# Patient Record
Sex: FEMALE | Race: WHITE | NOT HISPANIC OR LATINO | Employment: OTHER | URBAN - METROPOLITAN AREA
[De-identification: names, ages, dates, MRNs, and addresses within clinical notes are randomized per-mention and may not be internally consistent; named-entity substitution may affect disease eponyms.]

---

## 2017-02-17 ENCOUNTER — TRANSCRIBE ORDERS (OUTPATIENT)
Dept: ADMINISTRATIVE | Facility: HOSPITAL | Age: 62
End: 2017-02-17

## 2017-02-17 DIAGNOSIS — Z12.31 SCREENING MAMMOGRAM FOR HIGH-RISK PATIENT: Primary | ICD-10-CM

## 2017-03-09 ENCOUNTER — HOSPITAL ENCOUNTER (OUTPATIENT)
Dept: RADIOLOGY | Facility: HOSPITAL | Age: 62
Discharge: HOME | End: 2017-03-09
Attending: INTERNAL MEDICINE
Payer: COMMERCIAL

## 2017-03-09 DIAGNOSIS — Z12.31 SCREENING MAMMOGRAM FOR HIGH-RISK PATIENT: ICD-10-CM

## 2017-03-09 PROCEDURE — G0202 SCR MAMMO BI INCL CAD: HCPCS

## 2017-05-08 ENCOUNTER — TRANSCRIBE ORDERS (OUTPATIENT)
Dept: ADMINISTRATIVE | Facility: HOSPITAL | Age: 62
End: 2017-05-08

## 2017-05-08 DIAGNOSIS — Z13.820 OSTEOPOROSIS SCREENING: Primary | ICD-10-CM

## 2017-06-06 ENCOUNTER — HOSPITAL ENCOUNTER (OUTPATIENT)
Dept: RADIOLOGY | Facility: HOSPITAL | Age: 62
Discharge: HOME | End: 2017-06-06
Attending: INTERNAL MEDICINE
Payer: COMMERCIAL

## 2017-06-06 DIAGNOSIS — Z13.820 OSTEOPOROSIS SCREENING: ICD-10-CM

## 2017-06-06 PROCEDURE — 77080 DXA BONE DENSITY AXIAL: CPT

## 2017-12-06 ENCOUNTER — ALLSCRIPTS OFFICE VISIT (OUTPATIENT)
Dept: OTHER | Facility: OTHER | Age: 62
End: 2017-12-06

## 2017-12-06 ENCOUNTER — APPOINTMENT (OUTPATIENT)
Dept: RADIOLOGY | Facility: CLINIC | Age: 62
End: 2017-12-06
Payer: COMMERCIAL

## 2017-12-06 DIAGNOSIS — M54.50 LOW BACK PAIN: ICD-10-CM

## 2017-12-06 DIAGNOSIS — M47.816 SPONDYLOSIS OF LUMBAR REGION WITHOUT MYELOPATHY OR RADICULOPATHY: ICD-10-CM

## 2017-12-06 DIAGNOSIS — G89.4 CHRONIC PAIN SYNDROME: ICD-10-CM

## 2017-12-06 PROCEDURE — 72114 X-RAY EXAM L-S SPINE BENDING: CPT

## 2017-12-07 NOTE — CONSULTS
Assessment  Assessed    1  Chronic pain syndrome (338 4) (G89 4)   2  Chronic low back pain (724 2,338 29) (M54 5,G89 29)   3  Lumbar spondylosis (721 3) (M47 816)   4  Right shoulder pain (719 41) (M25 511)   5  Chronic pain of both wrists (926 16,267 81) (M25 531,M25 532,G89 29)    Plan  Chronic low back pain, Chronic pain syndrome, Lumbar spondylosis    · Diagnostic Lumbar Medial Branch Block-POC; Status:Active; Requested for:02Wvh0975;    Perform: In Office; ; Ordered;low back pain, Chronic pain syndrome, Lumbar spondylosis; Ordered By:Lloyd Lewis;   · XR SPINE LUMBAR COMPLETE W BENDING MINIMUM 6 VIEWS; Status:Active; Requested for:76Cse5376;    Perform:[facility]; UZF:05BDS6919; Ordered;low back pain, Chronic pain syndrome, Lumbar spondylosis; Ordered By:Lloyd Lewis;   · Follow-up Visit in 4 Weeks Evaluation and Treatment  Follow-up  Status: Hold For -Scheduling  Requested for: 40ZGN9276   Ordered;Chronic low back pain, Chronic pain syndrome, Lumbar spondylosis; Ordered By: Via Earnest 17 Performed:  Due: 16TTB7170  Chronic pain of both wrists, Right shoulder pain    · 1 - Blanco Sarabia DO, CHALO BRADEN [facility]  *  Status: Active  Requested for:30Cgh0024   Ordered;Chronic pain of both wrists, Right shoulder pain; Ordered By: Via Earnest 17 Performed:  Due: 15HHI1336  Care Summary provided  : Yes    Discussion/Summary    My impressions and treatment recommendations were discussed in detail with the patient, who verbalized understanding and had no further questions  that the patient has signs and symptoms of low back pain secondary to lumbar spondylosis, discussed the rationale of undergoing bilateral L3, L4, L5, and S1 diagnostic and confirmatory medial branch blocks  procedures, its risks, and benefits were explained in detail to the patient   Risks include but are not limited to bleeding, infection, hematoma formation, abscess formation, weakness, headache, failure the pain to improve, nerve irritation or damage, and potential worsening of the pain  The patient verbalized understanding and wished to proceed with the procedure  patient is also complaining of right shoulder pain as well as bilateral wrist pain  I would like her to see Dr Cherry Lin of Orthopedic sports medicine to evaluate her right shoulder pain and bilateral wrist pain  addition, since the patient does have arthritis in multiple joints in her body, I felt a reasonable to prescribe her a compound cream to be applied to her multiple painful joints 2-4 times daily as needed for pain relief  Side effects were reviewed with the patient  also felt a reasonable to obtain an x-ray of the lumbar spine to evaluate for any pathology that may be contributing to her pain complaints  is planned with the patient in 4 weeks time or sooner as warranted  Discharge instructions were provided  I personally saw and examined the patient and I agree with the above discussed plan of care  Chief Complaint  Chief Complaints    1  Pain    History of Present Illness  Ms María Dutta is a pleasant 72-year-old  female who [facility] initial evaluation of the above-stated pain complaints  The patient has a past medical and chronic pain history as outlined in the impression section below  She was referred to our office by Dr Xin Ashley  The patient reports a 15 year history of low back pain that is nonradiating in nature  She states that his pain is moderate to severe and 5 to 10/10 on the verbal numerical pain rating scale  The patient reports that her pain is nearly constant in nature without any typical pattern  She describes her pain as âburning, cramping, shooting, numbness, sharp, dull/aching, cutting, pins and needles, and throbbing  â The patient reports no pain relieving factors, but does state that standing, bending, walking, and exercise increases pain   She reports that physical therapy, home exercises, and chiropractic manipulation did not provide any pain relief  She does report moderate pain relief with bilateral knee surgery as well as heat/ice treatment  She is currently using tramadol, hydrocodone/acetaminophen, and oxycodone ER as needed for pain relief  She reports that she uses the oxycodone ER at night and depending on the severity of her pain, she will use Tramadol at and hydrocodone/acetaminophen for breakthrough pain  She does report that her medications do provide her pain relief some of the time  The patient is currently retired  Referring physician is  Dr Christian Resendiz  Primary Care physician is  [facility] presents with complaints of gradual onset of constant episodes of severe bilateral neck, bilateral upper back, bilateral lower back, right shoulder, right hand, bilateral hip, right ankle and right foot pain, described as sharp, dull, aching, burning, throbbing and cramping  On a scale of 1 to 10, the patient rates the pain as 10  Review of Systems   Constitutional: recent weight gain, but-- no fever-- and-- no recent weight loss  Eyes: no double vision-- and-- no blurry vision  Cardiovascular: no chest pain,-- no palpitations-- and-- no lower extremity edema  Respiratory: no complaints of shortness of breath-- and-- no wheezing  Musculoskeletal: difficulty walking,-- muscle weakness,-- joint stiffness-- and-- decreased range of motion, but-- no joint swelling,-- no limb swelling-- and-- no pain in extremity  Neurological: dizziness, but-- no difficulty swallowing,-- no memory loss,-- no loss of consciousness-- and-- no seizures  Gastrointestinal: no nausea,-- no vomiting,-- no constipation-- and-- no diarrhea  Genitourinary: frequent urination, but-- no difficulty initiating urine stream-- and-- no genital pain  Integumentary: no complaints of skin rash  Psychiatric: no depression    Endocrine: no excessive thirst,-- no adrenal disease,-- no hypothyroidism-- and-- no hyperthyroidism  Hematologic/Lymphatic: no tendency for easy bruising-- and-- no tendency for easy bleeding  ROS reviewed  Active Problems  Problems    1  Arthritis (716 90) (M19 90)   2  Depression (311) (F32 9)   3  Hepatitis, C Virus (070 70)   4  Hypertension (401 9) (I10)   5  Morbid or severe obesity due to excess calories (278 01) (E66 01)    Past Medical History  Problems    1  History of alcoholism (V11 3) (F10 21)   2  History of anxiety (V11 8) (Z86 59)   3  History of depression (V11 8) (Z86 59)   4  History of high cholesterol (V12 29) (Z86 39)   5  History of hypertension (V12 59) (Z86 79)    The active problems and past medical history were reviewed and updated today  Surgical History  Problems    1  History of Appendectomy   2  History of Cholecystectomy   3  History of Gastric Surgery For Morbid Obesity Laparoscopic Longitudinal Gastrectomy   4  History of Knee Replacement   5  History of Tonsillectomy    The surgical history was reviewed and updated today  Family History  Mother    1  Family history of Hypertension (V17 49)  Father    2  Family history of Congenital Heart Disease   3  Family history of Congestive Heart Failure   4  Family history of Hypertension (V17 49)   5  Family history of Stroke Syndrome (V17 1)  Family History    6  Family history of diabetes mellitus (V18 0) (Z83 3)    The family history was reviewed and updated today  Social History  Problems    · Does not drink alcohol (V49 89) (Z78 9)   · Lives with family   · No illicit drug use   · Retired  The social history was reviewed and updated today  The social history was reviewed and is unchanged  Current Meds   1  Calcium TABS; Therapy: (Recorded:18Bzq8029) to Recorded   2  [address] [facility] [place]; Therapy: (Delaney Cage) to Recorded   3  Lexapro 10 MG Oral Tablet; Therapy: 00ETE3810 to (Last Apple White)  Requested for: 51EBC5123 Ordered   4  Multiple Vitamins TABS;  Therapy: (Quintero Carmel) to Recorded   5  Omeprazole 20 MG Oral Capsule Delayed Release; take 1 capsule daily; Therapy: 43GLQ8722 to (Last Salud Miller)  Requested for: 24ODL6669; Status: ACTIVE - Renewal Denied Ordered   6  Oxycodone-Acetaminophen 5-325 MG Oral Tablet; Therapy: 07RKP7243 to (Last Rx:00Dbt3545)  Requested for: 44YUY5371 Ordered   7  Perindopril Erbumine 4 MG Oral Tablet; Therapy: 48AOY4658 to (Last Martha Grant)  Requested for: 31XDC0431 Ordered   8  TraMADol HCl - 50 MG Oral Tablet; Therapy: 60MER1084 to (Last Rx:08Apr2011)  Requested for: 50Xwd1653 Ordered    The medication list was reviewed and updated today  Allergies  Medication    1  Levaquin TABS   2  Penicillins    Vitals  Vital Signs    Recorded: 36XKS2528 08:24AM   Temperature 98 F   Heart Rate 72   Systolic 094   Diastolic 90   Height 5 ft 3 in   Weight 263 lb    BMI Calculated 46 59   BSA Calculated 2 17   Pain Scale 10       Physical Exam   Constitutional  General appearance: Well developed, well nourished, alert, in no distress, non-toxic and no overt pain behavior  Eyes  Sclera: anicteric  HEENT  Hearing grossly intact  Pulmonary  Respiratory effort: Even and unlabored  Cardiovascular  Examination of extremities: No edema or pitting edema present  Skin  Skin and subcutaneous tissue: Normal without rashes or lesions, well hydrated  Psychiatric  Mood and affect: Mood and affect appropriate  Neurologic  Cranial nerves: Cranial nerves II-XII grossly intact  Musculoskeletal  Gait and station: Normal    Lumbar/Sacral Spine examination demonstrates Lumbosacral Spine:  Appearance: Normal  Spinal alignment exhibits normal lordosis  Tenderness: None except at lumbar spine  No sacroiliac joint tenderness noted  ABDOULAYE testing is negative bilaterally  Lumbosacral Spine Sensory: intact to light touch and pinprick in the lower extremities  ROM Lumbosacral Spine: Lumbar facet loading is positive bilaterally   Flexion was not restricted-- and-- was painful  Extension was not restricted-- and-- was painful  Left lateral flexion was not restricted-- and-- was painful  Right lateral flexion was not restricted-- and-- was painful  Rotation to the left was not restricted-- and-- was painless  Rotation to the right was not restricted-- and-- was painless  Right Foot Plantar Flexion: 5/5  Left Foot Plantar Flexion: 5/5  Right Foot Dorsiflexion: 5/5  Left Foot Dorsiflexion: 5/5  Right Knee Flexion: 5/5  Left Knee Flexion: 5/5  Right Knee Extension: 5/5  Left Knee Extension: 5/5  Right Hip Flexion: 5/5  Left Hip Flexion: 5/5  Right Hip Extension: 5/5  Left Hip Extension: 5/5  Right Hip Abduction: 5/5  Left Hip Abduction: 5/5  Right Hip Adduction: 5/5-- and-- 5/5  Evaluation of Muscle Stretch Reflexes on the right side demonstrates 2/4 Knee Jerk Reflex-- and-- 2/4 Ankle Jerk Reflex  Evaluation of Muscle Stretch Reflexes on the left side demonstrates 2/4 Knee Jerk Reflex-- and-- 2/4 Ankle Jerk Reflex  Special Tests: negative Straight Leg Raise on right-- and-- negative Straight Leg Raise on left        Signatures   Electronically signed by : MONTSE Marinelli ; Dec  6 2017  9:21AM EST                       (Author)

## 2017-12-12 ENCOUNTER — GENERIC CONVERSION - ENCOUNTER (OUTPATIENT)
Dept: OTHER | Facility: OTHER | Age: 62
End: 2017-12-12

## 2017-12-15 ENCOUNTER — GENERIC CONVERSION - ENCOUNTER (OUTPATIENT)
Dept: OTHER | Facility: OTHER | Age: 62
End: 2017-12-15

## 2017-12-15 ENCOUNTER — HOSPITAL ENCOUNTER (OUTPATIENT)
Dept: RADIOLOGY | Facility: HOSPITAL | Age: 62
Setting detail: OUTPATIENT SURGERY
Discharge: HOME | End: 2017-12-15

## 2017-12-15 DIAGNOSIS — Z92.241 S/P EPIDURAL STEROID INJECTION: ICD-10-CM

## 2017-12-29 ENCOUNTER — GENERIC CONVERSION - ENCOUNTER (OUTPATIENT)
Dept: PAIN MEDICINE | Facility: CLINIC | Age: 62
End: 2017-12-29

## 2017-12-29 ENCOUNTER — HOSPITAL ENCOUNTER (OUTPATIENT)
Facility: AMBULARY SURGERY CENTER | Age: 62
Setting detail: OUTPATIENT SURGERY
Discharge: HOME | End: 2017-12-29
Attending: ANESTHESIOLOGY | Admitting: ANESTHESIOLOGY
Payer: COMMERCIAL

## 2017-12-29 ENCOUNTER — HOSPITAL ENCOUNTER (OUTPATIENT)
Dept: RADIOLOGY | Facility: HOSPITAL | Age: 62
Setting detail: OUTPATIENT SURGERY
Discharge: HOME | End: 2017-12-29
Payer: COMMERCIAL

## 2017-12-29 VITALS
SYSTOLIC BLOOD PRESSURE: 158 MMHG | RESPIRATION RATE: 18 BRPM | HEART RATE: 82 BPM | OXYGEN SATURATION: 97 % | DIASTOLIC BLOOD PRESSURE: 86 MMHG | TEMPERATURE: 98.8 F

## 2017-12-29 PROBLEM — G89.4 CHRONIC PAIN SYNDROME: Status: ACTIVE | Noted: 2017-12-29

## 2017-12-29 PROBLEM — M54.50 LOW BACK PAIN: Status: ACTIVE | Noted: 2017-12-29

## 2017-12-29 PROBLEM — M47.816 SPONDYLOSIS OF LUMBAR REGION WITHOUT MYELOPATHY OR RADICULOPATHY: Status: ACTIVE | Noted: 2017-12-29

## 2017-12-29 PROCEDURE — 72020 X-RAY EXAM OF SPINE 1 VIEW: CPT

## 2017-12-29 RX ORDER — HYDROCODONE BITARTRATE AND ACETAMINOPHEN 5; 325 MG/1; MG/1
1 TABLET ORAL EVERY 6 HOURS PRN
COMMUNITY
End: 2018-10-04 | Stop reason: ALTCHOICE

## 2017-12-29 RX ORDER — TRAMADOL HYDROCHLORIDE 50 MG/1
50 TABLET ORAL 2 TIMES DAILY
COMMUNITY
End: 2019-05-20

## 2017-12-29 RX ORDER — LIDOCAINE WITH 8.4% SOD BICARB 0.9%(10ML)
SYRINGE (ML) INJECTION AS NEEDED
Status: DISCONTINUED | OUTPATIENT
Start: 2017-12-29 | End: 2017-12-29 | Stop reason: HOSPADM

## 2017-12-29 RX ORDER — PERINDOPRIL ERBUMINE 4 MG/1
8 TABLET ORAL 2 TIMES DAILY
COMMUNITY

## 2017-12-29 RX ORDER — LIDOCAINE HYDROCHLORIDE 10 MG/ML
INJECTION, SOLUTION INFILTRATION; PERINEURAL AS NEEDED
Status: DISCONTINUED | OUTPATIENT
Start: 2017-12-29 | End: 2017-12-29 | Stop reason: HOSPADM

## 2017-12-29 RX ORDER — OMEPRAZOLE 20 MG/1
20 CAPSULE, DELAYED RELEASE ORAL DAILY PRN
COMMUNITY

## 2017-12-29 RX ORDER — ESCITALOPRAM OXALATE 10 MG/1
15 TABLET ORAL DAILY
COMMUNITY
End: 2019-05-20

## 2017-12-29 NOTE — OP NOTE
ATTENDING PHYSICIAN:  Katey Snyder MD     PRE-PROCEDURE DIAGNOSIS:  Lumbar facet arthropathy  POST-PROCEDURE DIAGNOSIS:  Lumbar facet arthropathy  PROCEDURE:  Bilateral lumbar diagnostic medial branch blocks at the L4, L5, and S1 levels  ESTIMATED BLOOD LOSS:  Minimal     COMPLICATIONS:  None  ANESTHESIA:  Local     LOCATION:  [address], [facility]  CONSENT: Today's procedure and its risks, benefits, as well as alternatives were explained to the patient  Risks include but are not limited to bleeding, infection, weakness, nerve irritation or damage, hematoma formation, abscess formation, failure the pain to improve, or potential worsening of the pain  The patient verbalized understanding and wished to proceed with the procedure  Written informed consent was thereby obtained  DESCRIPTION OF PROCEDURE: After written informed consent was obtained, the patient was taken to the fluoroscopy suite and placed in the prone position  Anatomical landmarks were identified with the aid of fluoroscopy in the PA and oblique views  The patient's lumbar region was prepped with antiseptic solution and draped in the usual sterile fashion  Strict aseptic technique was utilized  The skin and subcutaneous tissues at each needle entry site was infiltrated with a small amount of 1% preservative-free lidocaine using a 25-gauge 1-1/2-inch needle  A 25-gauge 3-1/2-inch needle was then incrementally advanced under fluoroscopic guidance in multiple views at each level such that the needle tip was advanced to contact os at the junction of the superior articulating process and the superomedial border of the transverse process at each of the cephalad levels as well as to contact os at the junction of the superior articulating process and the superomedial border of the sacral ala at the S1 level   After negative aspiration was confirmed, 0 5 mL of preservative-free 1% Lidocaine was injected into the cephalad needles and 1 mL of preservative-free 1% Lidocaine was injected into the needles at the S1 level  All needles were removed intact and hemostasis was maintained throughout  The patient tolerated the procedure well and no paresthesias or other complications were reported during or following this procedure  The antiseptic was wiped clean and Band-Aids were placed as appropriate  The patient was transferred to the recovery area and was observed for an appropriate period of time during which the patient remained hemodynamically stable and neurovascularly intact as the patient was prior to the procedure  The patient was subsequently discharged home in stable condition with supervision  The patient was instructed to call the office in a few days with an update  Discharge instructions were provided  I was present for and participated in all key and critical portions of this procedure      Faraz Gill MD  12/29/2017  10:03 AM

## 2017-12-29 NOTE — DISCHARGE INSTRUCTIONS

## 2018-01-03 ENCOUNTER — GENERIC CONVERSION - ENCOUNTER (OUTPATIENT)
Dept: OTHER | Facility: OTHER | Age: 63
End: 2018-01-03

## 2018-01-12 ENCOUNTER — HOSPITAL ENCOUNTER (OUTPATIENT)
Dept: RADIOLOGY | Facility: HOSPITAL | Age: 63
Setting detail: OUTPATIENT SURGERY
Discharge: HOME | End: 2018-01-12
Payer: COMMERCIAL

## 2018-01-12 ENCOUNTER — GENERIC CONVERSION - ENCOUNTER (OUTPATIENT)
Dept: PAIN MEDICINE | Facility: CLINIC | Age: 63
End: 2018-01-12

## 2018-01-12 ENCOUNTER — HOSPITAL ENCOUNTER (OUTPATIENT)
Facility: AMBULARY SURGERY CENTER | Age: 63
Setting detail: OUTPATIENT SURGERY
Discharge: HOME | End: 2018-01-12
Attending: ANESTHESIOLOGY | Admitting: ANESTHESIOLOGY
Payer: COMMERCIAL

## 2018-01-12 VITALS
RESPIRATION RATE: 18 BRPM | HEART RATE: 73 BPM | SYSTOLIC BLOOD PRESSURE: 174 MMHG | DIASTOLIC BLOOD PRESSURE: 79 MMHG | TEMPERATURE: 98.2 F | OXYGEN SATURATION: 96 %

## 2018-01-12 DIAGNOSIS — R52 PAIN: ICD-10-CM

## 2018-01-12 PROCEDURE — 72020 X-RAY EXAM OF SPINE 1 VIEW: CPT

## 2018-01-12 RX ORDER — BUPIVACAINE HYDROCHLORIDE 2.5 MG/ML
INJECTION, SOLUTION EPIDURAL; INFILTRATION; INTRACAUDAL AS NEEDED
Status: DISCONTINUED | OUTPATIENT
Start: 2018-01-12 | End: 2018-01-12 | Stop reason: HOSPADM

## 2018-01-12 RX ORDER — LIDOCAINE WITH 8.4% SOD BICARB 0.9%(10ML)
SYRINGE (ML) INJECTION AS NEEDED
Status: DISCONTINUED | OUTPATIENT
Start: 2018-01-12 | End: 2018-01-12 | Stop reason: HOSPADM

## 2018-01-12 NOTE — DISCHARGE INSTRUCTIONS

## 2018-01-12 NOTE — OP NOTE
ATTENDING PHYSICIAN:  Anderson Schmitt MD     PRE-PROCEDURE DIAGNOSIS:  Lumbar facet arthropathy  POST-PROCEDURE DIAGNOSIS:  Lumbar facet arthropathy  PROCEDURE:  Bilateral lumbar diagnostic medial branch blocks at the L4, L5, and S1 levels  ESTIMATED BLOOD LOSS:  Minimal     COMPLICATIONS:  None  ANESTHESIA:  Local     LOCATION:  [address]  CONSENT: Today's procedure and its risks, benefits, as well as alternatives were explained to the patient  Risks include but are not limited to bleeding, infection, weakness, nerve irritation or damage, hematoma formation, abscess formation, failure the pain to improve, or potential worsening of the pain  The patient verbalized understanding and wished to proceed with the procedure  Written informed consent was thereby obtained  DESCRIPTION OF PROCEDURE: After written informed consent was obtained, the patient was taken to the fluoroscopy suite and placed in the prone position  Anatomical landmarks were identified with the aid of fluoroscopy in the PA and oblique views  The patient's lumbar region was prepped with antiseptic solution and draped in the usual sterile fashion  Strict aseptic technique was utilized  The skin and subcutaneous tissues at each needle entry site was infiltrated with a small amount of 1% preservative-free lidocaine using a 25-gauge 1-1/2-inch needle  A 25-gauge 3-1/2-inch needle was then incrementally advanced under fluoroscopic guidance in multiple views at each level such that the needle tip was advanced to contact os at the junction of the superior articulating process and the superomedial border of the transverse process at each of the cephalad levels as well as to contact os at the junction of the superior articulating process and the superomedial border of the sacral ala at the S1 level   After negative aspiration was confirmed, 0 5 mL of preservative-free 0 25% Bupivicaine was injected into the cephalad needles and 1 mL of preservative-free 0 25% Bupivicaine was injected into the needles at the S1 level  All needles were removed intact and hemostasis was maintained throughout  The patient tolerated the procedure well and no paresthesias or other complications were reported during or following this procedure  The antiseptic was wiped clean and Band-Aids were placed as appropriate  The patient was transferred to the recovery area and was observed for an appropriate period of time during which the patient remained hemodynamically stable and neurovascularly intact as the patient was prior to the procedure  The patient was subsequently discharged home in stable condition with supervision  The patient was instructed to call the office in a few days with an update  Discharge instructions were provided  I was present for and participated in all key and critical portions of this procedure      Onesimo Anderson MD  1/12/2018  9:01 AM

## 2018-01-15 ENCOUNTER — GENERIC CONVERSION - ENCOUNTER (OUTPATIENT)
Dept: OTHER | Facility: OTHER | Age: 63
End: 2018-01-15

## 2018-01-22 VITALS
HEART RATE: 72 BPM | DIASTOLIC BLOOD PRESSURE: 90 MMHG | HEIGHT: 63 IN | TEMPERATURE: 98 F | SYSTOLIC BLOOD PRESSURE: 180 MMHG | BODY MASS INDEX: 46.6 KG/M2 | WEIGHT: 263 LBS

## 2018-01-23 NOTE — MISCELLANEOUS
Message   Recorded as Task   Date: 12/14/2017 02:43 PM, Created By: Zain Rincon   Task Name: Miscellaneous   Assigned To: [facility] [facility] [address] Clinical,Team   Regarding Patient: Deysi Jones, Status: Active   CommentCigordo Ambrocio - 14 Dec 2017 2:43 PM     TASK CREATED  Pt has a very bad lumbar strain and is scheduled for a procedure tomorrow and wants to know if this would be a problem  Please call [phone]   Pramod Reeder - 14 Dec 2017 3:56 PM     TASK EDITED  S/W pt  Pt stated she has been dealing with a bad lumbar strain since December 3rd  She had been putting ice on the area, taking , tramadol, hydrocodone  Pt states the area is so irritated and traumatized that she is afraid it will affect the outcome of the procedure tomorrow  Pt stopped her Krill oil on December 10  Pt wants to know if she should re-schedule her appointment  Advised pt will make AS aware and call back with recommendations  Please advise  Thanks  Lloyd Lewis - 15 Dec 2017 7:30 AM     TASK REPLIED TO: Previously Assigned To Venessa Pal  Since she is having lumbar medial branch blocks, a bad muscle spasm in the same area could give her a negative response to the MBB  As such, we should delay the procedure until the muscle spasm resolves  If needed, I can perform trigger point injections on her to help improve the spasm  More likely, she just needs some rest and relaxation to recover from it  Jonathan Clark - 15 Dec 2017 8:00 AM     TASK EDITED  S/W pt  Advised pt of same  Pt agrees  Pt states she will call back on Monday to reschedule her MBB procedure  Lloyd Lewis - 15 Dec 2017 9:10 AM     TASK REPLIED TO: Previously Assigned To [place]  Thanks  Active Problems    1  Arthritis (716 90) (M19 90)   2  Chronic low back pain (724 2,338 29) (M54 5,G89 29)   3  Chronic pain of both wrists (093 44,073 85) (M25 531,M25 532,G89 29)   4  Chronic pain syndrome (338 4) (G89 4)   5  Depression (311) (F32 9)   6  Hepatitis, C Virus (070 70)   7  Hypertension (401 9) (I10)   8  Lumbar spondylosis (721 3) (M47 816)   9  Morbid or severe obesity due to excess calories (278 01) (E66 01)   10  Right shoulder pain (719 41) (M25 511)    Current Meds   1  Calcium TABS; Therapy: (Recorded:15Jlj2619) to Recorded   2  [address] [facility]; Therapy: (Kaur Johnson) to Recorded   3  Lexapro 10 MG Oral Tablet (Escitalopram Oxalate); Therapy: 76GPG4037 to (Last [facility])  Requested for: 04YGM6978 Ordered   4  Multiple Vitamins TABS; Therapy: (Massiela Elizabeth) to Recorded   5  Omeprazole 20 MG Oral Capsule Delayed Release; take 1 capsule daily; Therapy: 39UCN1267 to (Last Nancy Chavez)  Requested for: 02KON7295; Status: ACTIVE   - Renewal Denied Ordered   6  Oxycodone-Acetaminophen 5-325 MG Oral Tablet; Therapy: 78ZHK2151 to (Last Rx:16Nov2011)  Requested for: 41URN6113 Ordered   7  Perindopril Erbumine 4 MG Oral Tablet; Therapy: 11SHE8039 to (Last [facility])  Requested for: 65TGM9790 Ordered   8  TraMADol HCl - 50 MG Oral Tablet; Therapy: 65AJB9027 to (Last Rx:08Apr2011)  Requested for: 08Apr2011 Ordered    Allergies    1  Levaquin TABS   2   Penicillins    Signatures   Electronically signed by : Devonda Angelucci, RN; Dec 15 2017  9:21AM EST                       (Author)

## 2018-01-23 NOTE — MISCELLANEOUS
Message   Recorded as Task   Date: 01/02/2018 12:14 PM, Created By: Pierre Moy   Task Name: Care Coordination   Assigned To: [facility] sched,Team   Regarding Patient: Lotus Chowdhury, Status: In Progress   Comment:    Lloyd Lewis - 02 Jan 2018 12:14 PM     TASK CREATED  MBB#1 diary reviewed  Please schedule MBB#2  Ron Porras - 03 Jan 2018 4:03 PM     TASK IN PROGRESS   Londonoziel Dodge - 51 Jan 2018 4:06 PM     TASK EDITED  Called pt, scheduled 1/12/18 for BL L4 L5 S1 MBB#2-LEVELS CHANGED BY DR GOMEZ  Went over pre procedure instructions, pt verbalized understanding  Active Problems    1  Arthritis (716 90) (M19 90)   2  Chronic low back pain (724 2,338 29) (M54 5,G89 29)   3  Chronic pain of both wrists (621 39,479 19) (M25 531,M25 532,G89 29)   4  Chronic pain syndrome (338 4) (G89 4)   5  Depression (311) (F32 9)   6  Hepatitis, C Virus (070 70)   7  Hypertension (401 9) (I10)   8  Lumbar spondylosis (721 3) (M47 816)   9  Morbid or severe obesity due to excess calories (278 01) (E66 01)   10  Right shoulder pain (719 41) (M25 511)    Current Meds   1  Calcium TABS; Therapy: (Recorded:07Gwr3358) to Recorded   2  [address] [facility]; Therapy: (Manny Mendiola) to Recorded   3  Lexapro 10 MG Oral Tablet (Escitalopram Oxalate); Therapy: 52ABO0304 to (Last Shade Cocker)  Requested for: 13HRF3514 Ordered   4  Multiple Vitamins TABS; Therapy: (Jarredqualin Maury) to Recorded   5  Omeprazole 20 MG Oral Capsule Delayed Release; take 1 capsule daily; Therapy: 25JOW6699 to (Last Nerissa Orozco)  Requested for: 75RBI7362; Status: ACTIVE   - Renewal Denied Ordered   6  Oxycodone-Acetaminophen 5-325 MG Oral Tablet; Therapy: 33BPF7911 to (Last Rx:16Nov2011)  Requested for: 75IHN1527 Ordered   7  Perindopril Erbumine 4 MG Oral Tablet; Therapy: 09CAY0979 to (Last Shade Cocker)  Requested for: 50BMA5996 Ordered   8  TraMADol HCl - 50 MG Oral Tablet;    Therapy: 37FKD4440 to (Last Rx:08Apr2011)  Requested for: 37OVW1210 Ordered    Allergies    1  Levaquin TABS   2   Penicillins    Signatures   Electronically signed by : Jose Bey, ; Artemio  3 2018  4:06PM EST                       (Author)

## 2018-01-23 NOTE — MISCELLANEOUS
Message   Recorded as Task   Date: 12/12/2017 09:13 AM, Created By: Zahra Cervantes   Task Name: Miscellaneous   Assigned To: Ron Porras   Regarding Patient: Parisa Ca, Status: [facility]   [facility] Dec [facility] 9:13 AM     TASK CREATED  Received denial letter for CPT code 36786, pt is scheduled for B/L L3, L4, L5, S1 MBB#1 on 12/15  They allow 1  up to 2 spinal levels when given  on both sides or 2  Up to 3 spinal levels when given on one side  They are not giving an option to do a peer to peer, but we can appeal in written form (would need to call to check on that process and length of time it would take)  Would you like to appeal or change the procedure? Via [email or web address] 17 - 12 Dec 2017 9:46 AM     TASK REPLIED TO: Previously Assigned To [address] procedure to B/L L4, L5, S1 MBB #1 with dx codes 57058 and 33694  Ron Porras - 12 Dec 2017 10:42 AM     TASK EDITED  Updated procedure in IDX, on PAT, and informed [facility] staff of change  Active Problems    1  Arthritis (716 90) (M19 90)   2  Chronic low back pain (724 2,338 29) (M54 5,G89 29)   3  Chronic pain of both wrists (482 76,138 67) (M25 531,M25 532,G89 29)   4  Chronic pain syndrome (338 4) (G89 4)   5  Depression (311) (F32 9)   6  Hepatitis, C Virus (070 70)   7  Hypertension (401 9) (I10)   8  Lumbar spondylosis (721 3) (M47 816)   9  Morbid or severe obesity due to excess calories (278 01) (E66 01)   10  Right shoulder pain (719 41) (M25 511)    Current Meds   1  Calcium TABS; Therapy: (Recorded:83Tjr1127) to Recorded   2  [facility]; Therapy: (Yvonne Peon) to Recorded   3  Lexapro 10 MG Oral Tablet (Escitalopram Oxalate); Therapy: 01EVK0451 to (Nguyễn Vega)  Requested for: 47FGH9811 Ordered   4  Multiple Vitamins TABS; Therapy: (Yvonne Peon) to Recorded   5  Omeprazole 20 MG Oral Capsule Delayed Release; take 1 capsule daily;    Therapy: 29YTN6222 to (Last Rx:75Cfn7117)  Requested for: 53YYH6935; Status: ACTIVE   - Renewal Denied Ordered   6  Oxycodone-Acetaminophen 5-325 MG Oral Tablet; Therapy: 32HFI9620 to (Last Rx:13Btq0058)  Requested for: 25HNP2756 Ordered   7  Perindopril Erbumine 4 MG Oral Tablet; Therapy: 13RKV1616 to (Last Thomasenia Plunk)  Requested for: 80GEZ1048 Ordered   8  TraMADol HCl - 50 MG Oral Tablet; Therapy: 75ZDN5288 to (Last Rx:08Apr2011)  Requested for: 08Apr2011 Ordered    Allergies    1  Levaquin TABS   2   Penicillins    Signatures   Electronically signed by : Jamil Novoa, ; Dec 12 2017 10:43AM EST                       (Author)

## 2018-01-24 VITALS
DIASTOLIC BLOOD PRESSURE: 80 MMHG | HEIGHT: 63 IN | WEIGHT: 263 LBS | HEART RATE: 78 BPM | BODY MASS INDEX: 46.6 KG/M2 | RESPIRATION RATE: 18 BRPM | TEMPERATURE: 97 F | SYSTOLIC BLOOD PRESSURE: 140 MMHG

## 2018-01-25 ENCOUNTER — HOSPITAL ENCOUNTER (OUTPATIENT)
Dept: RADIOLOGY | Facility: HOSPITAL | Age: 63
Setting detail: OUTPATIENT SURGERY
Discharge: HOME | End: 2018-01-25
Payer: COMMERCIAL

## 2018-01-25 ENCOUNTER — HOSPITAL ENCOUNTER (OUTPATIENT)
Facility: AMBULARY SURGERY CENTER | Age: 63
Setting detail: OUTPATIENT SURGERY
Discharge: HOME | End: 2018-01-25
Attending: ANESTHESIOLOGY | Admitting: ANESTHESIOLOGY
Payer: COMMERCIAL

## 2018-01-25 VITALS
TEMPERATURE: 97.4 F | OXYGEN SATURATION: 96 % | DIASTOLIC BLOOD PRESSURE: 75 MMHG | RESPIRATION RATE: 18 BRPM | SYSTOLIC BLOOD PRESSURE: 166 MMHG | HEART RATE: 75 BPM

## 2018-01-25 DIAGNOSIS — Z92.241 S/P EPIDURAL STEROID INJECTION: ICD-10-CM

## 2018-01-25 PROCEDURE — 64635 DESTROY LUMB/SAC FACET JNT: CPT | Performed by: ANESTHESIOLOGY

## 2018-01-25 PROCEDURE — 72020 X-RAY EXAM OF SPINE 1 VIEW: CPT

## 2018-01-25 PROCEDURE — 64636 DESTROY L/S FACET JNT ADDL: CPT | Performed by: ANESTHESIOLOGY

## 2018-01-25 RX ORDER — LIDOCAINE HYDROCHLORIDE 20 MG/ML
INJECTION, SOLUTION EPIDURAL; INFILTRATION; INTRACAUDAL; PERINEURAL AS NEEDED
Status: DISCONTINUED | OUTPATIENT
Start: 2018-01-25 | End: 2018-01-25 | Stop reason: HOSPADM

## 2018-01-25 RX ORDER — LIDOCAINE WITH 8.4% SOD BICARB 0.9%(10ML)
SYRINGE (ML) INJECTION AS NEEDED
Status: DISCONTINUED | OUTPATIENT
Start: 2018-01-25 | End: 2018-01-25 | Stop reason: HOSPADM

## 2018-01-25 NOTE — INTERVAL H&P NOTE
Patient was examined and seen by myself at today's visit  No interval changes in the H&P noted  We will proceed with the procedure as planned

## 2018-01-25 NOTE — DISCHARGE INSTRUCTIONS

## 2018-01-25 NOTE — H&P (VIEW-ONLY)
Assessment   1  Chronic pain syndrome (338 4) (G89 4)   2  Chronic low back pain (724 2,338 29) (M54 5,G89 29)   3  Lumbar spondylosis (721 3) (M47 816)    Plan   Chronic low back pain, Chronic pain syndrome, Lumbar spondylosis    · Radiofrequency Neurotomy Lumbar Medial Branches; Status:Active; Requested    LDI:[id number]; Perform: In Office; MUJ:90TSQ7348;PFSBSNK; For:Chronic low back pain, Chronic pain syndrome, Lumbar spondylosis; Ordered By:Lloyd Lewis;   · Follow-up Visit in 4 Weeks Evaluation and Treatment  Follow-up  Status: Hold For -    Scheduling  Requested for: 78IID7339   Ordered; For: Chronic low back pain, Chronic pain syndrome, Lumbar spondylosis; Ordered By: Latrice Guzman Performed:  Due: 68TMS2608    Discussion/Summary      My impressions and treatment recommendations were discussed in detail with the patient, who verbalized understanding and had no further questions  patient reported 5 days worth of 100% pain relief following the diagnostic bilateral L4, L5, and S1 medial branch block on December 29, 2017  She also reports 3-4 days worth of 80% relief following the confirmatory bilateral L4, L5, and S1 medial branch block on January 12, 2018  She would like to proceed to the bilateral lumbar facet radiofrequency ablation procedures  As such, I felt a reasonable to have the patient undergo these procedures  procedures, its risks, and benefits were explained in detail to the patient  Risks include but are not limited to bleeding, infection, hematoma formation, abscess formation, weakness, headache, failure the pain to improve, nerve irritation or damage, and potential worsening of the pain  The patient verbalized understanding and wished to proceed with the procedure  is planned with the patient in 4 weeks time or sooner as warranted  Discharge instructions were provided  I personally saw and examined the patient and I agree with the above discussed plan of care      The patient has the current Goals: Decreased pain and improved level of functioning  The patent has the current Barriers: Morbid obesity  Patient is able to Self-Care  Chief Complaint   1  Back Pain    History of Present Illness   Ms Nidia Cotton is a pleasant 80-year-old  female who presents to [facility] for interval re-evaluation of the above-stated pain complaints  The patient has a past medical and chronic pain history as outlined in the impression section below  She was last seen on January 12, 2018 at which time she underwent confirmatory bilateral L4, L5, and S1 medial branch blocks  today's office visit, the patient's pain score is 2/10 on the verbal numerical pain rating scale  Patient states that her pain is primarily in her low back and right hip  She states that her pain is worse in the morning, evening, and night  Her pain is constant in nature and she describes the quality of her pain as âdull/aching, sharp, throbbing, cramping, and pressure like  â The patient does report 100% pain relief following the diagnostic bilateral L4, L5, and S1 medial branch blocks for 4-5 days duration and 80% pain relief following the confirmatory bilateral L4, L5, and S1 medial branch blocks for 3-4 days duration  She would like to proceed with the lumbar facet radiofrequency ablation procedures  than as stated above, the patient denies any interval changes in medications, medical condition, mental condition, symptoms, or allergies since the last office visit  Ronen Crawford presents with complaints of gradual onset of constant episodes of mild bilateral lower back pain, described as sharp and throbbing, radiating to the right thigh  On a scale of 1 to 10, the patient rates the pain as 2  Symptoms are improving  Review of Systems        Constitutional: no fever,-- no recent weight gain-- and-- no recent weight loss  Eyes: no double vision-- and-- no blurry vision  Cardiovascular: no chest pain,-- no palpitations-- and-- no lower extremity edema  Respiratory: no complaints of shortness of breath-- and-- no wheezing  Musculoskeletal: difficulty walking,-- muscle weakness,-- joint stiffness-- and-- decreased range of motion, but-- no joint swelling,-- no limb swelling-- and-- no pain in extremity  Neurological: dizziness, but-- no difficulty swallowing,-- no memory loss,-- no loss of consciousness-- and-- no seizures  Gastrointestinal: constipation, but-- no nausea,-- no vomiting-- and-- no diarrhea  Genitourinary: no difficulty initiating urine stream,-- no genital pain-- and-- no frequent urination  Integumentary: no complaints of skin rash  Psychiatric: no depression  Endocrine: no excessive thirst,-- no adrenal disease,-- no hypothyroidism-- and-- no hyperthyroidism  Hematologic/Lymphatic: no tendency for easy bruising-- and-- no tendency for easy bleeding  ROS reviewed  Active Problems   1  Arthritis (716 90) (M19 90)   2  Chronic low back pain (724 2,338 29) (M54 5,G89 29)   3  Chronic pain of both wrists (450 07,287 81) (M25 531,M25 532,G89 29)   4  Chronic pain syndrome (338 4) (G89 4)   5  Depression (311) (F32 9)   6  Hepatitis, C Virus (070 70)   7  Hypertension (401 9) (I10)   8  Lumbar spondylosis (721 3) (M47 816)   9  Morbid or severe obesity due to excess calories (278 01) (E66 01)   10  Right shoulder pain (719 41) (M25 511)    Past Medical History   1  History of alcoholism (V11 3) (F10 21)   2  History of anxiety (V11 8) (Z86 59)   3  History of depression (V11 8) (Z86 59)   4  History of high cholesterol (V12 29) (Z86 39)   5  History of hypertension (V12 59) (Z86 79)    Surgical History   1  History of Appendectomy   2  History of Cholecystectomy   3  History of Gastric Surgery For Morbid Obesity Laparoscopic Longitudinal Gastrectomy   4  History of Knee Replacement   5   History of Tonsillectomy    Family History   Mother    1  Family history of Hypertension (V17 49)  Father    2  Family history of Congenital Heart Disease   3  Family history of Congestive Heart Failure   4  Family history of Hypertension (V17 49)   5  Family history of Stroke Syndrome (V17 1)  Family History    6  Family history of diabetes mellitus (V18 0) (Z83 3)    Social History    · Does not drink alcohol (V49 89) (Z78 9)   · Lives with family   · No illicit drug use   · Retired    Current Meds    1  Calcium TABS; Therapy: (Recorded:10Wmk4524) to Recorded   2  [facility]; Therapy: (Toi Burkitt) to Recorded   3  Lexapro 10 MG Oral Tablet; Therapy: 85EUA7684 to (Last Latricia [place])  Requested for: 57XIT7660 Ordered   4  Multiple Vitamins TABS; Therapy: (Toi Burkitt) to Recorded   5  Omeprazole 20 MG Oral Capsule Delayed Release; take 1 capsule daily; Therapy: 78WWC4370 to (Last Kathleen [facility])  Requested for: 67OTY1480; Status: ACTIVE -     Renewal Denied Ordered   6  Oxycodone-Acetaminophen 5-325 MG Oral Tablet; Therapy: 82IET3924 to (Last Rx:12Bda9154)  Requested for: 78YFM8456 Ordered   7  Perindopril Erbumine 4 MG Oral Tablet; Therapy: 35DHX1492 to (Last Latricia [place])  Requested for: 17UQG0899 Ordered   8  TraMADol HCl - 50 MG Oral Tablet; Therapy: 39MFC8622 to (Last Rx:75Cio3878)  Requested for: 12Gxd0407 Ordered    Allergies   1  Levaquin TABS   2  Penicillins    Vitals   Vital Signs    Recorded: 72FKH6294 09:22AM   Temperature 97 F, Oral   Heart Rate 78, L Radial   Respiration 18   Systolic 992   Diastolic 80   Height 5 ft 3 in   Weight 263 lb    BMI Calculated 46 59   BSA Calculated 2 17   Pain Scale 2     Physical Exam        Constitutional      General appearance: Well developed, well nourished, alert, in no distress, non-toxic and no overt pain behavior  Eyes      Sclera: anicteric      HEENT      Hearing grossly intact         Pulmonary      Respiratory effort: Even and unlabored  Cardiovascular      Examination of extremities: No edema or pitting edema present  Skin      Skin and subcutaneous tissue: Normal without rashes or lesions, well hydrated  Psychiatric      Mood and affect: Mood and affect appropriate  Neurologic      Cranial nerves: Cranial nerves II-XII grossly intact  Musculoskeletal      Gait and station: Normal        Lumbar/Sacral Spine examination demonstrates Lumbosacral Spine:      Appearance: Normal  Spinal alignment exhibits normal lordosis  Tenderness: None except at lumbar spine  Lumbosacral Spine Sensory: intact to light touch and pinprick in the lower extremities  ROM Lumbosacral Spine: Lumbar facet loading is positive bilaterally  Flexion was not restricted-- and-- was painful  Extension was not restricted-- and-- was painful  Left lateral flexion was not restricted-- and-- was painful  Right lateral flexion was not restricted-- and-- was painful  Rotation to the left was not restricted-- and-- was painless  Rotation to the right was not restricted-- and-- was painless  Foot and ankle strength was normal bilaterally  Knee strength was normal bilaterally  Hip strength was normal bilaterally        Signatures    Electronically signed by : MONTSE Austin ; Artemio 15 2018  9:39AM EST                       (Author)

## 2018-01-25 NOTE — OP NOTE
ATTENDING PHYSICIAN:   Otis Waters MD      PREPROCEDURE DIAGNOSIS:  Left lumbar facet arthropathy  POSTPROCEDURE DIAGNOSIS:  Left lumbar facet arthropathy  PROCEDURE: Left L4, L5, and S1 lumbar facet nerve radiofrequency ablation under fluoroscopic guidance  ANESTHESIA:  Local     ESTIMATED BLOOD LOSS:  Minimal     COMPLICATIONS:  None  LOCATION:   [address]  HISTORY OF PRESENTING ILLNESS:   We feel radiofrequency denervation of the L4, L5, and S1 facets is medically necessary, given the patient has disabling low back pain, which we suspect is facet mediated in the absence of nerve root compression or radicular pain  The patient has had two positive confirmatory diagnostic medial branch blocks  The patient has also failed three months of conservative therapy, including nonopioid medications, manipulation, physical therapy and home exercise program   The patient has not been treated with radiofrequency denervation at this anatomic location within the past six months  CONSENT:  Today's procedure, its potential benefits as well as its risks and potential side effects were reviewed  Discussed risks of the procedure including bleeding, infection, nerve irritation or damage, reactions to the medications, failure of the pain to improve, and potential worsening of the pain were explained in detail to the patient who verbalized understanding and who wished to proceed  Written informed consent was thereby obtained  DESCRIPTION OF THE PROCEDURE: After written informed consent was obtained, the patient was taken to the fluoroscopy suite and placed in the prone position  Anatomical landmarks were identified by way of palpation with fluoroscopy in the PA and oblique views  The patient's lumbar region was then prepped and draped in the usual sterile fashion using Chlorhexidine    The skin and subcutaneous tissues were subsequently infiltrated with approximately 1 ml of 1% preservative free Lidocaine at each of the four intended needle entry sites using a 25 gauge 1-1/2 inch needle  Via fluoroscopy in the AP and oblique views, an active tip needle was then incrementally advanced under fluoroscopic guidance at each level  At each of these levels, the needle tip was made to contact os at the superior medial border of the junction of the transverse process of the lumbar levels and to contact the os at the medial aspect of the groove formed by the sacral ala in the superior articular process of S1  After proper needle placement was confirmed with fluoroscopic guidance at each level, sensory and motor stimulation was performed at 2 Hz and 50 Hz  At all levels, there was negative extension into the lower extremities  Following this portion of the procedure, a 1 ml injectate of 2% preservative free Lidocaine was instilled at all of the levels  After a period of approximately 90 seconds, each level was lesioned at 90 degrees Celsius  Following the initial lesioning, each needle tip was repositioned x 2 under fluoroscopic guidance in a clockwise and counterclockwise fashion  Following each reposition, a total of two additional lesions at each side were performed for 90 seconds at 90 degrees Celsius  All needles were removed with the tips intact  The patient tolerated the procedure and hemostasis was maintained  There were no apparent paresthesias or complications  This skin was wiped clean and a Band-Aid was placed as appropriate  The patient was monitored for an appropriate period of time following the procedure and remained hemodynamically stable and neurovascularly intact  The patient was ultimately discharged to home with supervision in good condition and instructed to call the office to report the response  I was present for and participated in all key and critical portions of this procedure      Josiah Forde MD  1/25/2018  2:19 PM

## 2018-03-01 ENCOUNTER — TELEPHONE (OUTPATIENT)
Dept: PAIN MEDICINE | Facility: CLINIC | Age: 63
End: 2018-03-01

## 2018-03-01 NOTE — TELEPHONE ENCOUNTER
Patient came in [place] office stating she has not received a call back  She had a RFA done on the left side on 01/25/18 and states was supposed to be scheduled for one on the right side  Please cb at    TY

## 2018-03-01 NOTE — TELEPHONE ENCOUNTER
As long as she had a positive diagnostic response on the right with the MBBs I see no reason not to schedule the RFA

## 2018-03-01 NOTE — TELEPHONE ENCOUNTER
Please schedule patient for Right L4, L5, SI RFA with AS, pt believes she will need auth for the procedure  Had Left side done on 1/25

## 2018-03-01 NOTE — TELEPHONE ENCOUNTER
**[place] pt**    S/w pt she is s/p L L4, L5, SI RFA on 1/25 and was supposed to have the right side done as well but did not hear anything back from [facility], she had b/l L4, L5, SI MBB's  She has had great relief from the left side RFA and would now like to schedule the right side  Pt will need auth for the right side, prior to getting auth wanted to make sure it is ok to schedule? Please advise, thanks

## 2018-03-02 PROBLEM — M47.816 LUMBAR SPONDYLOSIS: Status: ACTIVE | Noted: 2018-03-02

## 2018-03-02 NOTE — TELEPHONE ENCOUNTER
Called and scheduled pt for RT L4, L5 & S1 RFA dos [phone]   Gave pt pre procedure instructions  Nothing to eat or drink 1 hr prior to procedure  Proper clothing to wear  Needs a   If ill or on antibiotics please call to reschedule

## 2018-03-16 ENCOUNTER — HOSPITAL ENCOUNTER (OUTPATIENT)
Dept: RADIOLOGY | Facility: HOSPITAL | Age: 63
Setting detail: OUTPATIENT SURGERY
Discharge: HOME | End: 2018-03-16
Payer: COMMERCIAL

## 2018-03-16 ENCOUNTER — TELEPHONE (OUTPATIENT)
Dept: PAIN MEDICINE | Facility: CLINIC | Age: 63
End: 2018-03-16

## 2018-03-16 ENCOUNTER — HOSPITAL ENCOUNTER (OUTPATIENT)
Facility: AMBULARY SURGERY CENTER | Age: 63
Setting detail: OUTPATIENT SURGERY
Discharge: HOME | End: 2018-03-16
Attending: ANESTHESIOLOGY | Admitting: ANESTHESIOLOGY
Payer: COMMERCIAL

## 2018-03-16 VITALS
HEART RATE: 78 BPM | DIASTOLIC BLOOD PRESSURE: 82 MMHG | TEMPERATURE: 98.6 F | RESPIRATION RATE: 20 BRPM | SYSTOLIC BLOOD PRESSURE: 173 MMHG | OXYGEN SATURATION: 95 %

## 2018-03-16 DIAGNOSIS — Z92.241 S/P EPIDURAL STEROID INJECTION: ICD-10-CM

## 2018-03-16 PROCEDURE — 72020 X-RAY EXAM OF SPINE 1 VIEW: CPT

## 2018-03-16 PROCEDURE — 64636 DESTROY L/S FACET JNT ADDL: CPT | Performed by: ANESTHESIOLOGY

## 2018-03-16 PROCEDURE — 64635 DESTROY LUMB/SAC FACET JNT: CPT | Performed by: ANESTHESIOLOGY

## 2018-03-16 RX ORDER — LIDOCAINE HYDROCHLORIDE 20 MG/ML
INJECTION, SOLUTION EPIDURAL; INFILTRATION; INTRACAUDAL; PERINEURAL AS NEEDED
Status: DISCONTINUED | OUTPATIENT
Start: 2018-03-16 | End: 2018-03-16 | Stop reason: HOSPADM

## 2018-03-16 RX ORDER — LIDOCAINE WITH 8.4% SOD BICARB 0.9%(10ML)
SYRINGE (ML) INJECTION AS NEEDED
Status: DISCONTINUED | OUTPATIENT
Start: 2018-03-16 | End: 2018-03-16 | Stop reason: HOSPADM

## 2018-03-16 NOTE — OP NOTE
ATTENDING PHYSICIAN:   Mima Whitt MD      PREPROCEDURE DIAGNOSIS:  Right lumbar facet arthropathy  POSTPROCEDURE DIAGNOSIS:  Right lumbar facet arthropathy  PROCEDURE: Right L4, L5, and S1 lumbar facet nerve radiofrequency ablation under fluoroscopic guidance  ANESTHESIA:  Local     ESTIMATED BLOOD LOSS:  Minimal     COMPLICATIONS:  None  LOCATION:   [address] [place] [address]  HISTORY OF PRESENTING ILLNESS:   We feel radiofrequency denervation of the L4, L5, and S1 facets is medically necessary, given the patient has disabling low back pain, which we suspect is facet mediated in the absence of nerve root compression or radicular pain  The patient has had two positive confirmatory diagnostic medial branch blocks  The patient has also failed three months of conservative therapy, including nonopioid medications, manipulation, physical therapy and home exercise program   The patient has not been treated with radiofrequency denervation at this anatomic location within the past six months  CONSENT:  Today's procedure, its potential benefits as well as its risks and potential side effects were reviewed  Discussed risks of the procedure including bleeding, infection, nerve irritation or damage, reactions to the medications, failure of the pain to improve, and potential worsening of the pain were explained in detail to the patient who verbalized understanding and who wished to proceed  Written informed consent was thereby obtained  DESCRIPTION OF THE PROCEDURE: After written informed consent was obtained, the patient was taken to the fluoroscopy suite and placed in the prone position  Anatomical landmarks were identified by way of palpation with fluoroscopy in the PA and oblique views  The patient's lumbar region was then prepped and draped in the usual sterile fashion using Chlorhexidine    The skin and subcutaneous tissues were subsequently infiltrated with approximately 1 ml of 1% preservative free Lidocaine at each of the four intended needle entry sites using a 25 gauge 1-1/2 inch needle  Via fluoroscopy in the AP and oblique views, an active tip needle was then incrementally advanced under fluoroscopic guidance at each level  At each of these levels, the needle tip was made to contact os at the superior medial border of the junction of the transverse process of the lumbar levels and to contact the os at the medial aspect of the groove formed by the sacral ala in the superior articular process of S1  After proper needle placement was confirmed with fluoroscopic guidance at each level, sensory and motor stimulation was performed at 2 Hz and 50 Hz  At all levels, there was negative extension into the lower extremities  Following this portion of the procedure, a 1 ml injectate of 2% preservative free Lidocaine was instilled at all of the levels  After a period of approximately 90 seconds, each level was lesioned at 90 degrees Celsius  Following the initial lesioning, each needle tip was repositioned x 2 under fluoroscopic guidance in a clockwise and counterclockwise fashion  Following each reposition, a total of two additional lesions at each side were performed for 90 seconds at 90 degrees Celsius  All needles were removed with the tips intact  The patient tolerated the procedure and hemostasis was maintained  There were no apparent paresthesias or complications  This skin was wiped clean and a Band-Aid was placed as appropriate  The patient was monitored for an appropriate period of time following the procedure and remained hemodynamically stable and neurovascularly intact  The patient was ultimately discharged to home with supervision in good condition and instructed to call the office to report the response  I was present for and participated in all key and critical portions of this procedure      Raman Carvajal MD  3/16/2018  10:33 AM

## 2018-03-16 NOTE — DISCHARGE INSTRUCTIONS

## 2018-03-16 NOTE — H&P
History of Present Illness: The patient is a 61 y o  female who presents with complaints of low back pain  Patient Active Problem List   Diagnosis    Chronic pain syndrome    Low back pain    Spondylosis of lumbar region without myelopathy or radiculopathy    Lumbar spondylosis       Past Medical History:   Diagnosis Date    Anxiety     Hypertension        Past Surgical History:   Procedure Laterality Date    APPENDECTOMY      CHOLECYSTECTOMY      GASTRIC BYPASS      JOINT REPLACEMENT      blateral knee replacements    NERVE BLOCK Bilateral 12/29/2017    Procedure: L4, L5, S1 MEDIAL BRANCH BLOCK #1;  Surgeon: Anderson Schmitt MD;  Location: [facility] MAIN OR;  Service: Pain Management     NERVE BLOCK Bilateral 1/12/2018    Procedure: BLOCK MEDIAL BRANCH L4, L5-S1  #2;  Surgeon: Anderson Schmitt MD;  Location: [facility] MAIN OR;  Service: Pain Management     RHIZOTOMY Left 1/25/2018    Procedure: L4, L5, S1 RFA;  Surgeon: Anderson Schmitt MD;  Location: [facility] MAIN OR;  Service: Pain Management     TONSILLECTOMY         No current facility-administered medications for this encounter  Allergies   Allergen Reactions    Penicillins Anaphylaxis    Levaquin [Levofloxacin] Itching       Physical Exam: There were no vitals filed for this visit  General: Awake, Alert, Oriented x 3, Mood and affect appropriate  Respiratory: Respirations even and unlabored  Cardiovascular: Peripheral pulses intact; no edema  Musculoskeletal Exam:  Lumbar facet loading is positive  ASA Score:  3    Assessment:  Right lumbar facet syndrome  Plan:  Proceed with radiofrequency ablation procedure as planned

## 2018-03-16 NOTE — PERIOPERATIVE NURSING NOTE
Denies increasing pain-dressings free of drainage-gait unsteady-required assistance to car-validated she would have assistance into home

## 2018-03-19 NOTE — TELEPHONE ENCOUNTER
S/W pt  Pt denies any s/s of infection, slight soreness at the needle sites, no sun-burning sensation,  no headaches or stiffness of neck  Pt used ice to the area  Pt is scheduled for a f/u on 4/16 to arrive at 9:30 for a 9:45 appt  Advised will make AS aware

## 2018-04-06 ENCOUNTER — TRANSCRIBE ORDERS (OUTPATIENT)
Dept: ADMINISTRATIVE | Facility: HOSPITAL | Age: 63
End: 2018-04-06

## 2018-04-06 DIAGNOSIS — Z12.31 ENCOUNTER FOR SCREENING MAMMOGRAM FOR MALIGNANT NEOPLASM OF BREAST: Primary | ICD-10-CM

## 2018-04-18 ENCOUNTER — HOSPITAL ENCOUNTER (OUTPATIENT)
Dept: RADIOLOGY | Facility: HOSPITAL | Age: 63
Discharge: HOME | End: 2018-04-18
Attending: INTERNAL MEDICINE
Payer: COMMERCIAL

## 2018-04-18 DIAGNOSIS — Z12.31 ENCOUNTER FOR SCREENING MAMMOGRAM FOR MALIGNANT NEOPLASM OF BREAST: ICD-10-CM

## 2018-04-18 PROCEDURE — 77067 SCR MAMMO BI INCL CAD: CPT

## 2018-05-11 ENCOUNTER — OFFICE VISIT (OUTPATIENT)
Dept: OBGYN CLINIC | Facility: CLINIC | Age: 63
End: 2018-05-11
Payer: COMMERCIAL

## 2018-05-11 ENCOUNTER — APPOINTMENT (OUTPATIENT)
Dept: RADIOLOGY | Facility: CLINIC | Age: 63
End: 2018-05-11
Payer: COMMERCIAL

## 2018-05-11 VITALS
HEART RATE: 79 BPM | SYSTOLIC BLOOD PRESSURE: 159 MMHG | WEIGHT: 263.8 LBS | HEIGHT: 63 IN | BODY MASS INDEX: 46.74 KG/M2 | DIASTOLIC BLOOD PRESSURE: 83 MMHG

## 2018-05-11 DIAGNOSIS — M19.011 GLENOHUMERAL ARTHRITIS, RIGHT: Primary | ICD-10-CM

## 2018-05-11 DIAGNOSIS — M25.511 RIGHT SHOULDER PAIN, UNSPECIFIED CHRONICITY: ICD-10-CM

## 2018-05-11 PROCEDURE — 20610 DRAIN/INJ JOINT/BURSA W/O US: CPT | Performed by: ORTHOPAEDIC SURGERY

## 2018-05-11 PROCEDURE — 99204 OFFICE O/P NEW MOD 45 MIN: CPT | Performed by: ORTHOPAEDIC SURGERY

## 2018-05-11 PROCEDURE — 73030 X-RAY EXAM OF SHOULDER: CPT

## 2018-05-11 RX ORDER — DEXAMETHASONE SODIUM PHOSPHATE 100 MG/10ML
40 INJECTION INTRAMUSCULAR; INTRAVENOUS
Status: COMPLETED | OUTPATIENT
Start: 2018-05-11 | End: 2018-05-11

## 2018-05-11 RX ORDER — LIDOCAINE HYDROCHLORIDE 10 MG/ML
4 INJECTION, SOLUTION INFILTRATION; PERINEURAL
Status: COMPLETED | OUTPATIENT
Start: 2018-05-11 | End: 2018-05-11

## 2018-05-11 RX ADMIN — DEXAMETHASONE SODIUM PHOSPHATE 40 MG: 100 INJECTION INTRAMUSCULAR; INTRAVENOUS at 08:57

## 2018-05-11 RX ADMIN — LIDOCAINE HYDROCHLORIDE 4 ML: 10 INJECTION, SOLUTION INFILTRATION; PERINEURAL at 08:57

## 2018-05-11 NOTE — PROGRESS NOTES
Assessment/Plan:  1  Glenohumeral arthritis, right  XR shoulder 2+ vw right    Large joint arthrocentesis       Scribe Attestation    I,:   Gracia Trevino am acting as a scribe while in the presence of the attending physician :        I,:   Jeanna Ramos MD personally performed the services described in this documentation    as scribed in my presence :              Monica Henderson has right shoulder pain consistent with right glenohumeral arthritis  We discussed that her pain is most likely due to her arthritis and inflammation caused after shoveling  She was given a cortisone injection at today's appointment which she tolerated very well  She can ice the area as needed  She will follow up in 1 month if she is still having pain  We did talk about possible need for shoulder replacement surgery in the future  Subjective:   Sarah Bey is a 61 y o  female who presents to the office today for right shoulder pain  Her pain first started about 2 months ago after shoveling snow  She denies any trauma or direct injury to the shoulder  She does have a history of tendointis in her right shoulder that was previously treated with a cortisone injection  She is now experiencing constant pain in her right shoulder that occasionally radiates into her arm and hand  Her pain worsens with increased movement and decreases at rest  She denies any numbness or tingling at today's appointment  Review of Systems   Constitutional: Negative for chills, fever and unexpected weight change  HENT: Negative for hearing loss, nosebleeds and sore throat  Eyes: Negative for pain, redness and visual disturbance  Respiratory: Negative for cough, shortness of breath and wheezing  Cardiovascular: Negative for chest pain, palpitations and leg swelling  Gastrointestinal: Negative for abdominal pain, nausea and vomiting  Endocrine: Positive for polydipsia and polyuria  Genitourinary: Negative for dysuria and hematuria  Musculoskeletal: Positive for arthralgias, joint swelling and myalgias  See HPI   Skin: Negative for rash and wound  Neurological: Negative for dizziness, numbness and headaches  Psychiatric/Behavioral: Negative for decreased concentration and suicidal ideas  The patient is nervous/anxious  Past Medical History:   Diagnosis Date    Anxiety     Hypertension        Past Surgical History:   Procedure Laterality Date    APPENDECTOMY      CHOLECYSTECTOMY      GASTRIC BYPASS      JOINT REPLACEMENT      blateral knee replacements    NERVE BLOCK Bilateral 12/29/2017    Procedure: L4, L5, S1 MEDIAL BRANCH BLOCK #1;  Surgeon: Yvonne Jimenez MD;  Location: [facility] MAIN OR;  Service: Pain Management     NERVE BLOCK Bilateral 1/12/2018    Procedure: BLOCK MEDIAL BRANCH L4, L5-S1  #2;  Surgeon: Yvonne Jimenez MD;  Location: [facility] MAIN OR;  Service: Pain Management     RADIOFREQUENCY ABLATION Right 3/16/2018    Procedure: Rt L4 L5 S1 RFA;  Surgeon: Yvonne Jimenez MD;  Location: [facility] MAIN OR;  Service: Pain Management     RHIZOTOMY Left 1/25/2018    Procedure: L4, L5, S1 RFA;  Surgeon: Yvonne Jimenez MD;  Location: [facility] MAIN OR;  Service: Pain Management     TONSILLECTOMY         History reviewed  No pertinent family history  Social History     Occupational History    Not on file       Social History Main Topics    Smoking status: Former Smoker    Smokeless tobacco: Never Used    Alcohol use No    Drug use: No    Sexual activity: Not on file         Current Outpatient Prescriptions:     escitalopram (LEXAPRO) 10 mg tablet, Take 5 mg by mouth daily, Disp: , Rfl:     HYDROcodone-acetaminophen (NORCO) 5-325 mg per tablet, Take 1 tablet by mouth every 6 (six) hours as needed for pain, Disp: , Rfl:     omeprazole (PriLOSEC) 20 mg delayed release capsule, Take 20 mg by mouth daily, Disp: , Rfl:     perindopril (ACEON) 4 MG tablet, Take 4 mg by mouth daily, Disp: , Rfl:     traMADol (ULTRAM) 50 mg tablet, Take 50 mg by mouth 2 (two) times a day, Disp: , Rfl:     Allergies   Allergen Reactions    Penicillins Anaphylaxis    Levaquin [Levofloxacin] Itching       Objective:  Vitals:    05/11/18 0811   BP: 159/83   Pulse: 79       Right Shoulder Exam     Tenderness   The patient is experiencing tenderness in the biceps tendon  Range of Motion   The patient has normal right shoulder ROM  Forward Flexion:  160 normal   External Rotation: normal   Internal Rotation 0 degrees: normal   Internal Rotation 90 degrees: normal     Muscle Strength   The patient has normal right shoulder strength (Pain with resisted horizontal abduction and forward flexion)  Internal Rotation: 5/5   External Rotation: 5/5   Supraspinatus: 5/5     Tests   Hawkin's test: positive    Other   Sensation: normal    Comments:  (-) Taylor's       Left Shoulder Exam   Left shoulder exam is normal             Physical Exam   Constitutional: She is oriented to person, place, and time  She appears well-developed and well-nourished  HENT:   Head: Normocephalic and atraumatic  Eyes: Conjunctivae are normal    Neck: Neck supple  Cardiovascular: Intact distal pulses  Pulmonary/Chest: Effort normal    Neurological: She is alert and oriented to person, place, and time  Skin: Skin is warm and dry  Psychiatric: She has a normal mood and affect  Her behavior is normal    Vitals reviewed  I have personally reviewed pertinent films in PACS and my interpretation is as follows: Three view right shoulder x-ray taken on 5/11/2018 shows no acute fracture or dislocation  Severe glenohumeral arthritis is present       Large joint arthrocentesis  Date/Time: 5/11/2018 8:57 AM  Consent given by: patient  Site marked: site marked  Timeout: Immediately prior to procedure a time out was called to verify the correct patient, procedure, equipment, support staff and site/side marked as required   Supporting Documentation  Indications: pain   Procedure Details  Location: shoulder - R glenohumeral  Preparation: Patient was prepped and draped in the usual sterile fashion  Needle size: 22 G  Ultrasound guidance: no  Approach: anterior  Medications administered: 4 mL lidocaine 1 %; 40 mg dexamethasone 100 mg/10 mL

## 2018-07-09 ENCOUNTER — OFFICE VISIT (OUTPATIENT)
Dept: PAIN MEDICINE | Facility: CLINIC | Age: 63
End: 2018-07-09
Payer: COMMERCIAL

## 2018-07-09 VITALS
HEIGHT: 63 IN | WEIGHT: 261.6 LBS | RESPIRATION RATE: 20 BRPM | HEART RATE: 76 BPM | SYSTOLIC BLOOD PRESSURE: 134 MMHG | DIASTOLIC BLOOD PRESSURE: 84 MMHG | BODY MASS INDEX: 46.35 KG/M2

## 2018-07-09 DIAGNOSIS — M54.50 CHRONIC MIDLINE LOW BACK PAIN WITHOUT SCIATICA: ICD-10-CM

## 2018-07-09 DIAGNOSIS — M47.816 SPONDYLOSIS OF LUMBAR REGION WITHOUT MYELOPATHY OR RADICULOPATHY: ICD-10-CM

## 2018-07-09 DIAGNOSIS — G89.4 CHRONIC PAIN SYNDROME: Primary | ICD-10-CM

## 2018-07-09 DIAGNOSIS — G89.29 CHRONIC MIDLINE LOW BACK PAIN WITHOUT SCIATICA: ICD-10-CM

## 2018-07-09 PROCEDURE — 99214 OFFICE O/P EST MOD 30 MIN: CPT | Performed by: ANESTHESIOLOGY

## 2018-07-09 RX ORDER — HYDROCODONE BITARTRATE AND ACETAMINOPHEN 7.5; 325 MG/1; MG/1
1 TABLET ORAL 3 TIMES DAILY PRN
Refills: 0 | Status: ON HOLD | COMMUNITY
Start: 2018-06-08 | End: 2018-12-14 | Stop reason: ALTCHOICE

## 2018-07-09 NOTE — PROGRESS NOTES
Pain Medicine Follow-Up Note    Assessment:  1  Chronic pain syndrome    2  Chronic midline low back pain without sciatica    3  Spondylosis of lumbar region without myelopathy or radiculopathy        Plan:  Orders Placed This Encounter   Procedures    MRI lumbar spine without contrast     Standing Status:   Future     Standing Expiration Date:   7/9/2022     Scheduling Instructions: There is no preparation for this test  Please leave your jewelry and valuables at home, wedding rings are the exception  Please bring your insurance cards, a form of photo ID and a list of your medications with you  Arrive 15 minutes prior to your appointment time in order to register  Please bring any prior CT or MRI studies of this area that were not performed at a [facility]  To schedule this appointment, please contact Central Scheduling at [phone]  Order Specific Question:   What is the patient's sedation requirement? Answer:   Unknown       New Medications Ordered This Visit   Medications    HYDROcodone-acetaminophen (NORCO) 7 5-325 mg per tablet     Sig: Take 1 tablet by mouth 3 (three) times a day as needed     Refill:  0     My impressions and treatment recommendations were discussed in detail with the patient who verbalized understanding and had no further questions  The patient had a left L4, L5, and S1 lumbar facet radiofrequency ablation procedure in January 2018 as well as a right L4-S1 lumbar facet radiofrequency ablation procedure on March 16, 2018  She reports about 2 months worth of relief following both procedures  At this point, she states that her pain has returned to what it was prior to undergoing those procedures  As such, I felt a reasonable to have the patient undergo a MRI of the lumbar spine to evaluate for any other pathology that may be contributing to her pain complaints      The patient also reports that she has been prescribed tramadol and it is not effective for her pain  She does report that hydrocodone/acetaminophen does control her pain regularly  I did ask the patient to speak to her prescribing physician about discontinuing the tramadol entirely and keeping her on 3 tablets of hydrocodone/acetaminophen per day  The patient verbalized understanding and stated that she would speak to her prescribing physician about this  Once the patient undergoes the MRI of the lumbar spine, I will discuss with her further treatment options  Discharge instructions were provided  I personally saw and examined the patient and I agree with the above discussed plan of care  History of Present Illness:    Axel Cheney is a 61 y o  female who presents to [facility] for interval re-evaluation of the above stated pain complaints  The patient has a past medical and chronic pain history as outlined in the assessment section  She was last seen on March 16, 2018 at which time she underwent a right L4-S1 lumbar facet radiofrequency ablation procedure  The patient also underwent a left L4-S1 lumbar facet radiofrequency ablation procedure in January 2018  At today's office visit, the patient's pain score is 7/10 on the verbal numerical pain scale  The patient states that her pain is primarily in her low back region  She also is complaining of right shoulder pain, right foot pain, cervical pain, and thoracic pain  She reports that she has been diagnosed with osteoarthritis of multiple joints of her body  She states that her pain is worse in the morning, evening, and night  Her pain is intermittent in nature and she describes the quality of her pain as dull/aching, sharp, pressure-like, and numbness    The patient does report that the lumbar facet radiofrequency ablation procedures did give her some relief, but only for 2 months duration each  She also reports that she has been prescribed tramadol and is not effective for her pain   She does report that hydrocodone/acetaminophen is effective for her pain, but she only uses 2 tablets per day  She does state that the hydrocodone/acetaminophen last her about 4-6 hours at a time  Other than as stated above, the patient denies any interval changes in medications, medical condition, mental condition, symptoms, or allergies since the last office visit  Review of Systems:    Review of Systems   Respiratory: Negative for shortness of breath  Cardiovascular: Negative for chest pain  Gastrointestinal: Negative for constipation, diarrhea, nausea and vomiting  Musculoskeletal: Positive for gait problem (difficulty walking/ decreased range of motion) and joint swelling (joint stiffness)  Negative for arthralgias and myalgias  Skin: Negative for rash  Neurological: Positive for weakness (muscle weakness)  Negative for dizziness and seizures  All other systems reviewed and are negative          Patient Active Problem List   Diagnosis    Chronic pain syndrome    Low back pain    Spondylosis of lumbar region without myelopathy or radiculopathy       Past Medical History:   Diagnosis Date    Anxiety     Hypertension        Past Surgical History:   Procedure Laterality Date    APPENDECTOMY      CHOLECYSTECTOMY      GASTRIC BYPASS      JOINT REPLACEMENT      blateral knee replacements    NERVE BLOCK Bilateral 12/29/2017    Procedure: L4, L5, S1 MEDIAL BRANCH BLOCK #1;  Surgeon: Yuko Bradford MD;  Location: [place] [address] MAIN OR;  Service: Pain Management     NERVE BLOCK Bilateral 1/12/2018    Procedure: BLOCK MEDIAL BRANCH L4, L5-S1  #2;  Surgeon: Yuko Bradford MD;  Location: [place] [address] MAIN OR;  Service: Pain Management     RADIOFREQUENCY ABLATION Right 3/16/2018    Procedure: Rt L4 L5 S1 RFA;  Surgeon: Yuko Bradford MD;  Location: [place] [address] MAIN OR;  Service: Pain Management     RHIZOTOMY Left 1/25/2018    Procedure: L4, L5, S1 RFA;  Surgeon: Yuko Bradford MD;  Location: [place] [address] MAIN OR; Service: Pain Management     TONSILLECTOMY         No family history on file  Social History     Occupational History    Not on file       Social History Main Topics    Smoking status: Former Smoker    Smokeless tobacco: Never Used    Alcohol use No    Drug use: No    Sexual activity: Not on file         Current Outpatient Prescriptions:     escitalopram (LEXAPRO) 10 mg tablet, Take 5 mg by mouth daily, Disp: , Rfl:     HYDROcodone-acetaminophen (NORCO) 7 5-325 mg per tablet, Take 1 tablet by mouth 3 (three) times a day as needed, Disp: , Rfl: 0    omeprazole (PriLOSEC) 20 mg delayed release capsule, Take 20 mg by mouth daily, Disp: , Rfl:     perindopril (ACEON) 4 MG tablet, Take 4 mg by mouth daily, Disp: , Rfl:     traMADol (ULTRAM) 50 mg tablet, Take 50 mg by mouth 2 (two) times a day, Disp: , Rfl:     HYDROcodone-acetaminophen (NORCO) 5-325 mg per tablet, Take 1 tablet by mouth every 6 (six) hours as needed for pain, Disp: , Rfl:     Allergies   Allergen Reactions    Penicillins Anaphylaxis    Levaquin [Levofloxacin] Itching       Physical Exam:    /84 (BP Location: Right arm, Patient Position: Sitting, Cuff Size: Standard)   Pulse 76   Resp 20   Ht 5' 3" (1 6 m)   Wt 119 kg (261 lb 9 6 oz)   BMI 46 34 kg/m²     Constitutional:obese  Eyes:anicteric  HEENT:grossly intact  Neck:supple, symmetric, trachea midline and no masses   Pulmonary:even and unlabored  Cardiovascular:No edema or pitting edema present  Skin:Normal without rashes or lesions and well hydrated  Psychiatric:Mood and affect appropriate  Neurologic:Cranial Nerves II-XII grossly intact  Musculoskeletal:normal      Imaging  MRI lumbar spine without contrast    (Results Pending)         Orders Placed This Encounter   Procedures    MRI lumbar spine without contrast

## 2018-07-09 NOTE — LETTER
July 9, 2018     Sandy Barnes MD  [facility] [address]    Patient: Shawanda Kovacs   YOB: 1955   Date of Visit: 7/9/2018       Dear Dr Coretta Caba:    Thank you for referring Asia Terrell to me for evaluation  Below are my notes for this consultation  If you have questions, please do not hesitate to call me  I look forward to following your patient along with you  Sincerely,        Rogelio Hameed MD        CC: No Recipients  Rogelio Hameed MD  7/9/2018  1:29 PM  Sign at close encounter  Pain Medicine Follow-Up Note    Assessment:  1  Chronic pain syndrome    2  Chronic midline low back pain without sciatica    3  Spondylosis of lumbar region without myelopathy or radiculopathy        Plan:  Orders Placed This Encounter   Procedures    MRI lumbar spine without contrast     Standing Status:   Future     Standing Expiration Date:   7/9/2022     Scheduling Instructions: There is no preparation for this test  Please leave your jewelry and valuables at home, wedding rings are the exception  Please bring your insurance cards, a form of photo ID and a list of your medications with you  Arrive 15 minutes prior to your appointment time in order to register  Please bring any prior CT or MRI studies of this area that were not performed at a [facility] facility  To schedule this appointment, please contact Central Scheduling at [phone]  Order Specific Question:   What is the patient's sedation requirement? Answer:   Unknown       New Medications Ordered This Visit   Medications    HYDROcodone-acetaminophen (NORCO) 7 5-325 mg per tablet     Sig: Take 1 tablet by mouth 3 (three) times a day as needed     Refill:  0     My impressions and treatment recommendations were discussed in detail with the patient who verbalized understanding and had no further questions        The patient had a left L4, L5, and S1 lumbar facet radiofrequency ablation procedure in January 2018 as well as a right L4-S1 lumbar facet radiofrequency ablation procedure on March 16, 2018  She reports about 2 months worth of relief following both procedures  At this point, she states that her pain has returned to what it was prior to undergoing those procedures  As such, I felt a reasonable to have the patient undergo a MRI of the lumbar spine to evaluate for any other pathology that may be contributing to her pain complaints  The patient also reports that she has been prescribed tramadol and it is not effective for her pain  She does report that hydrocodone/acetaminophen does control her pain regularly  I did ask the patient to speak to her prescribing physician about discontinuing the tramadol entirely and keeping her on 3 tablets of hydrocodone/acetaminophen per day  The patient verbalized understanding and stated that she would speak to her prescribing physician about this  Once the patient undergoes the MRI of the lumbar spine, I will discuss with her further treatment options  Discharge instructions were provided  I personally saw and examined the patient and I agree with the above discussed plan of care  History of Present Illness:    Ty Escamilla is a 61 y o  female who presents to [facility] for interval re-evaluation of the above stated pain complaints  The patient has a past medical and chronic pain history as outlined in the assessment section  She was last seen on March 16, 2018 at which time she underwent a right L4-S1 lumbar facet radiofrequency ablation procedure  The patient also underwent a left L4-S1 lumbar facet radiofrequency ablation procedure in January 2018  At today's office visit, the patient's pain score is 7/10 on the verbal numerical pain scale  The patient states that her pain is primarily in her low back region    She also is complaining of right shoulder pain, right foot pain, cervical pain, and thoracic pain  She reports that she has been diagnosed with osteoarthritis of multiple joints of her body  She states that her pain is worse in the morning, evening, and night  Her pain is intermittent in nature and she describes the quality of her pain as dull/aching, sharp, pressure-like, and numbness    The patient does report that the lumbar facet radiofrequency ablation procedures did give her some relief, but only for 2 months duration each  She also reports that she has been prescribed tramadol and is not effective for her pain  She does report that hydrocodone/acetaminophen is effective for her pain, but she only uses 2 tablets per day  She does state that the hydrocodone/acetaminophen last her about 4-6 hours at a time  Other than as stated above, the patient denies any interval changes in medications, medical condition, mental condition, symptoms, or allergies since the last office visit  Review of Systems:    Review of Systems   Respiratory: Negative for shortness of breath  Cardiovascular: Negative for chest pain  Gastrointestinal: Negative for constipation, diarrhea, nausea and vomiting  Musculoskeletal: Positive for gait problem (difficulty walking/ decreased range of motion) and joint swelling (joint stiffness)  Negative for arthralgias and myalgias  Skin: Negative for rash  Neurological: Positive for weakness (muscle weakness)  Negative for dizziness and seizures  All other systems reviewed and are negative          Patient Active Problem List   Diagnosis    Chronic pain syndrome    Low back pain    Spondylosis of lumbar region without myelopathy or radiculopathy       Past Medical History:   Diagnosis Date    Anxiety     Hypertension        Past Surgical History:   Procedure Laterality Date    APPENDECTOMY      CHOLECYSTECTOMY      GASTRIC BYPASS      JOINT REPLACEMENT      blateral knee replacements    NERVE BLOCK Bilateral 12/29/2017    Procedure: L4, L5, S1 MEDIAL BRANCH BLOCK #1;  Surgeon: Austin Issa MD;  Location: [facility] MAIN OR;  Service: Pain Management     NERVE BLOCK Bilateral 1/12/2018    Procedure: BLOCK MEDIAL BRANCH L4, L5-S1  #2;  Surgeon: Austin Issa MD;  Location: [facility] MAIN OR;  Service: Pain Management     RADIOFREQUENCY ABLATION Right 3/16/2018    Procedure: Rt L4 L5 S1 RFA;  Surgeon: Austin Issa MD;  Location: [facility] MAIN OR;  Service: Pain Management     RHIZOTOMY Left 1/25/2018    Procedure: L4, L5, S1 RFA;  Surgeon: Austin Issa MD;  Location: [facility] MAIN OR;  Service: Pain Management     TONSILLECTOMY         No family history on file  Social History     Occupational History    Not on file       Social History Main Topics    Smoking status: Former Smoker    Smokeless tobacco: Never Used    Alcohol use No    Drug use: No    Sexual activity: Not on file         Current Outpatient Prescriptions:     escitalopram (LEXAPRO) 10 mg tablet, Take 5 mg by mouth daily, Disp: , Rfl:     HYDROcodone-acetaminophen (NORCO) 7 5-325 mg per tablet, Take 1 tablet by mouth 3 (three) times a day as needed, Disp: , Rfl: 0    omeprazole (PriLOSEC) 20 mg delayed release capsule, Take 20 mg by mouth daily, Disp: , Rfl:     perindopril (ACEON) 4 MG tablet, Take 4 mg by mouth daily, Disp: , Rfl:     traMADol (ULTRAM) 50 mg tablet, Take 50 mg by mouth 2 (two) times a day, Disp: , Rfl:     HYDROcodone-acetaminophen (NORCO) 5-325 mg per tablet, Take 1 tablet by mouth every 6 (six) hours as needed for pain, Disp: , Rfl:     Allergies   Allergen Reactions    Penicillins Anaphylaxis    Levaquin [Levofloxacin] Itching       Physical Exam:    /84 (BP Location: Right arm, Patient Position: Sitting, Cuff Size: Standard)   Pulse 76   Resp 20   Ht 5' 3" (1 6 m)   Wt 119 kg (261 lb 9 6 oz)   BMI 46 34 kg/m²      Constitutional:obese  Eyes:anicteric  HEENT:grossly intact  Neck:supple, symmetric, trachea midline and no masses   Pulmonary:even and unlabored  Cardiovascular:No edema or pitting edema present  Skin:Normal without rashes or lesions and well hydrated  Psychiatric:Mood and affect appropriate  Neurologic:Cranial Nerves II-XII grossly intact  Musculoskeletal:normal      Imaging  MRI lumbar spine without contrast    (Results Pending)         Orders Placed This Encounter   Procedures    MRI lumbar spine without contrast

## 2018-07-20 ENCOUNTER — HOSPITAL ENCOUNTER (OUTPATIENT)
Dept: RADIOLOGY | Facility: HOSPITAL | Age: 63
Discharge: HOME | End: 2018-07-20
Attending: ANESTHESIOLOGY
Payer: COMMERCIAL

## 2018-07-20 DIAGNOSIS — G89.4 CHRONIC PAIN SYNDROME: ICD-10-CM

## 2018-07-20 DIAGNOSIS — M47.816 SPONDYLOSIS OF LUMBAR REGION WITHOUT MYELOPATHY OR RADICULOPATHY: ICD-10-CM

## 2018-07-20 DIAGNOSIS — G89.29 CHRONIC MIDLINE LOW BACK PAIN WITHOUT SCIATICA: ICD-10-CM

## 2018-07-20 DIAGNOSIS — M54.50 CHRONIC MIDLINE LOW BACK PAIN WITHOUT SCIATICA: ICD-10-CM

## 2018-07-20 PROCEDURE — 72148 MRI LUMBAR SPINE W/O DYE: CPT

## 2018-07-23 ENCOUNTER — TELEPHONE (OUTPATIENT)
Dept: RADIOLOGY | Facility: CLINIC | Age: 63
End: 2018-07-23

## 2018-07-23 DIAGNOSIS — M43.16 SPONDYLOLISTHESIS OF LUMBAR REGION: Primary | ICD-10-CM

## 2018-07-23 DIAGNOSIS — M47.816 SPONDYLOSIS OF LUMBAR REGION WITHOUT MYELOPATHY OR RADICULOPATHY: ICD-10-CM

## 2018-07-23 NOTE — TELEPHONE ENCOUNTER
----- Message from Yuko Bradford MD sent at 7/23/2018  8:03 AM EDT -----  Regarding: MRI Results  MRI of the lumbar spine is showing retrolisthesis (retrolisthesis occurs when a single vertebra slips and moves back along the intervertebral disc underneath or above it)  At this point in time, I do feel it reasonable to have the patient have a consultation with a neurosurgeon, since she did not respond to the lumbar facet radiofrequency ablation  If the patient is amenable, I will refer her to Dr Claris Ormond

## 2018-07-23 NOTE — TELEPHONE ENCOUNTER
S/W pt and gave her results per below  Appreciative  Pt states she is dealing with her Mother who fell and broke her femur last night and is not able to even think about another appt  She is agreeable to see Dr Matheus Metz in the near future and will call the office back to get information/referral   Was not able to take information down at this time

## 2018-07-23 NOTE — TELEPHONE ENCOUNTER
Aware  Thanks  Dr Carlee Alas referral placed in EPIC  Please mail this order to the patient since she is currently preoccupied

## 2018-10-04 ENCOUNTER — OFFICE VISIT (OUTPATIENT)
Dept: NEUROSURGERY | Facility: CLINIC | Age: 63
End: 2018-10-04
Payer: COMMERCIAL

## 2018-10-04 VITALS
HEART RATE: 84 BPM | WEIGHT: 250.8 LBS | BODY MASS INDEX: 44.44 KG/M2 | TEMPERATURE: 98.7 F | RESPIRATION RATE: 20 BRPM | SYSTOLIC BLOOD PRESSURE: 152 MMHG | DIASTOLIC BLOOD PRESSURE: 83 MMHG | HEIGHT: 63 IN

## 2018-10-04 DIAGNOSIS — M47.816 SPONDYLOSIS OF LUMBAR REGION WITHOUT MYELOPATHY OR RADICULOPATHY: ICD-10-CM

## 2018-10-04 DIAGNOSIS — M43.16 SPONDYLOLISTHESIS OF LUMBAR REGION: ICD-10-CM

## 2018-10-04 PROCEDURE — 99204 OFFICE O/P NEW MOD 45 MIN: CPT | Performed by: NEUROLOGICAL SURGERY

## 2018-10-04 RX ORDER — DIPHENHYDRAMINE HCL 25 MG
25 TABLET ORAL EVERY 8 HOURS PRN
COMMUNITY

## 2018-10-04 NOTE — PROGRESS NOTES
Office Note - Neurosurgery   Simran Martin 61 y o  female MRN: [id number]      Assessment:    Patient is stable  78-year-old woman with lower back pain which is likely multifactorial   There is likely facetogenic pain component given her excellent response to facet rhizotomy  I asked her to discuss repeating these with her pain specialist   She may also wish to consider a course of membrane stabilizing agents and will discuss this further with her PCP and pain specialist     She should also consider physical therapy or adequate therapy for core strengthening and range of motion exercises  Weight loss would be helpful as well  I explained that surgery for lower back pain is generally less reporting  She has no objective findings or symptoms of radiculopathy  Would not recommend any surgical intervention for her lumbar spine as she currently seems to have adequate strategies for managing her more chronic pain and acute flare-ups of pain  She will follow up through this office on a p r n  Basis  History, physical examination and diagnostic tests were reviewed and questions answered  Diagnosis, care plan and treatment options were discussed  The patient understand instructions and will follow up as directed  Plan:    Follow-up: prn    Problem List Items Addressed This Visit        Musculoskeletal and Integument    Spondylosis of lumbar region without myelopathy or radiculopathy    Spondylolisthesis of lumbar region          Subjective/Objective     Chief Complaint      Lower back pain  HPI      78-year-old woman with longstanding history of lower back pain more so on the left than on the right  She describes a dull aching sensation which improved to some extent with sitting  She typically rates her pain as 5 to 6/10 though she can have episodes of 10/10 pain  She will have some pain in her right hip and groin but this does not radiate further    She has numbness and tingling on the outer aspect of her left knee since a and left knee replacement  She denies any weakness or numbness radiating down her legs or pain in her legs  She denies any difficulties with bowel bladder function or changing perineal sensation  Facet rhizotomy help for 3-4 months  Opioids or helpful as well  She has not tried membrane stabilizing agents or physical therapy  She presents today with an MRI of the lumbar spine  ROS    Review of Systems   Constitutional: Negative  HENT: Negative  Eyes: Negative  Respiratory: Negative  Cardiovascular: Positive for palpitations (due to anxiety)  Gastrointestinal: Positive for constipation  Endocrine: Negative  Genitourinary: Positive for frequency  Negative for urgency  Musculoskeletal: Positive for back pain (across lower back, more centered of lower back and radiates into left side and radiates into bilateral hips and right groin and left knee )  Skin: Negative  Allergic/Immunologic: Negative  Neurological: Positive for dizziness, light-headedness and numbness (left knee )  Negative for seizures, syncope, weakness and headaches  Hematological: Negative  Psychiatric/Behavioral: Positive for sleep disturbance (due to pain )  Negative for confusion  Family History    History reviewed  No pertinent family history  Social History    Social History     Social History    Marital status:      Spouse name: N/A    Number of children: N/A    Years of education: N/A     Occupational History    Not on file       Social History Main Topics    Smoking status: Former Smoker    Smokeless tobacco: Never Used    Alcohol use No    Drug use: No    Sexual activity: Not on file     Other Topics Concern    Not on file     Social History Narrative    No narrative on file       Past Medical History    Past Medical History:   Diagnosis Date    Anxiety     Hypertension        Surgical History    Past Surgical History:   Procedure Laterality Date    APPENDECTOMY      CHOLECYSTECTOMY      GASTRIC BYPASS      JOINT REPLACEMENT      blateral knee replacements    NERVE BLOCK Bilateral 12/29/2017    Procedure: L4, L5, S1 MEDIAL BRANCH BLOCK #1;  Surgeon: Josiah Forde MD;  Location: [facility] MAIN OR;  Service: Pain Management     NERVE BLOCK Bilateral 1/12/2018    Procedure: BLOCK MEDIAL BRANCH L4, L5-S1  #2;  Surgeon: Josiah Forde MD;  Location: [facility] MAIN OR;  Service: Pain Management     RADIOFREQUENCY ABLATION Right 3/16/2018    Procedure: Rt L4 L5 S1 RFA;  Surgeon: Josiah Forde MD;  Location: [facility] MAIN OR;  Service: Pain Management     RHIZOTOMY Left 1/25/2018    Procedure: L4, L5, S1 RFA;  Surgeon: Josiah Forde MD;  Location: [facility] MAIN OR;  Service: Pain Management     TONSILLECTOMY         Medications      Current Outpatient Prescriptions:     diphenhydrAMINE (BENADRYL) 25 mg tablet, Take 25 mg by mouth daily at bedtime as needed for itching, Disp: , Rfl:     escitalopram (LEXAPRO) 10 mg tablet, Take 5 mg by mouth daily, Disp: , Rfl:     HYDROcodone-acetaminophen (NORCO) 7 5-325 mg per tablet, Take 1 tablet by mouth 3 (three) times a day as needed, Disp: , Rfl: 0    omeprazole (PriLOSEC) 20 mg delayed release capsule, Take 20 mg by mouth daily, Disp: , Rfl:     perindopril (ACEON) 4 MG tablet, Take 4 mg by mouth daily, Disp: , Rfl:     traMADol (ULTRAM) 50 mg tablet, Take 50 mg by mouth 2 (two) times a day, Disp: , Rfl:     Allergies    Allergies   Allergen Reactions    Penicillins Anaphylaxis    Levaquin [Levofloxacin] Itching       The following portions of the patient's history were reviewed and updated as appropriate: allergies, current medications, past family history, past medical history, past social history, past surgical history and problem list     Investigations    I personally reviewed the MRI results with the patient:      MRI of the lumbar spine without contrast dated July 20th, 2018   Normal lumbar lordosis  Degenerative disc disease and facet arthropathy throughout the lumbar spine  At L4-5 there is right foraminal stenosis secondary to ligamentous and facet hypertrophy and mild retrolisthesis  No other areas of significant neural compression  Intrathecal contents unremarkable  Physical Exam    Vitals:  Blood pressure 152/83, pulse 84, temperature 98 7 °F (37 1 °C), resp  rate 20, height 5' 3" (1 6 m), weight 114 kg (250 lb 12 8 oz)  ,Body mass index is 44 43 kg/m²  Physical Exam   Constitutional: She is oriented to person, place, and time  She appears well-developed and well-nourished  No distress  Morbidly obese  Musculoskeletal:        Lumbar back: She exhibits pain (  Pain with extension  )  She exhibits normal range of motion  Neurological: She is alert and oriented to person, place, and time  Gait normal    Skin: Skin is warm and dry  Psychiatric: She has a normal mood and affect  Her behavior is normal    Vitals reviewed  Neurologic Exam     Mental Status   Oriented to person, place, and time  Motor Exam   Right leg tone: normal  Left leg tone: normal  5/5 power in lower extremities  Sensory Exam   Right leg light touch: normal  Right leg pinprick: normal    Diminished pinprick and light touch sensation on the outer aspect of left knee  No dysesthesias or hyperesthesias  Gait, Coordination, and Reflexes     Gait  Gait: normal    Reflexes   Right ankle clonus: absent  Left ankle clonus: absent  Walks with a steady gait, though mildly stooped forward posture

## 2018-10-04 NOTE — LETTER
October 4, 2018     Rambo Cole MD  [address] [address]    Patient: Katalina Wild   YOB: 1955   Date of Visit: 10/4/2018       Dear Dr Vijay Purdy:    Thank you for referring Dianna Mello to me for evaluation  Below are my notes for this consultation  If you have questions, please do not hesitate to call me  I look forward to following your patient along with you  Sincerely,        Rickey Peacock MD        CC: MD Rickey Pederson MD  10/4/2018 10:55 AM  Sign at close encounter  Office Note - Neurosurgery   Katalina Wild 61 y o  female MRN: [id number]      Assessment:    Patient is stable  60-year-old woman with lower back pain which is likely multifactorial   There is likely facetogenic pain component given her excellent response to facet rhizotomy  I asked her to discuss repeating these with her pain specialist   She may also wish to consider a course of membrane stabilizing agents and will discuss this further with her PCP and pain specialist     She should also consider physical therapy or adequate therapy for core strengthening and range of motion exercises  Weight loss would be helpful as well  I explained that surgery for lower back pain is generally less reporting  She has no objective findings or symptoms of radiculopathy  Would not recommend any surgical intervention for her lumbar spine as she currently seems to have adequate strategies for managing her more chronic pain and acute flare-ups of pain  She will follow up through this office on a p r n  Basis  History, physical examination and diagnostic tests were reviewed and questions answered  Diagnosis, care plan and treatment options were discussed  The patient understand instructions and will follow up as directed      Plan:    Follow-up: prn    Problem List Items Addressed This Visit        Musculoskeletal and Integument    Spondylosis of lumbar region without myelopathy or radiculopathy    Spondylolisthesis of lumbar region          Subjective/Objective     Chief Complaint      Lower back pain  HPI      45-year-old woman with longstanding history of lower back pain more so on the left than on the right  She describes a dull aching sensation which improved to some extent with sitting  She typically rates her pain as 5 to 6/10 though she can have episodes of 10/10 pain  She will have some pain in her right hip and groin but this does not radiate further  She has numbness and tingling on the outer aspect of her left knee since a and left knee replacement  She denies any weakness or numbness radiating down her legs or pain in her legs  She denies any difficulties with bowel bladder function or changing perineal sensation  Facet rhizotomy help for 3-4 months  Opioids or helpful as well  She has not tried membrane stabilizing agents or physical therapy  She presents today with an MRI of the lumbar spine  ROS    Review of Systems   Constitutional: Negative  HENT: Negative  Eyes: Negative  Respiratory: Negative  Cardiovascular: Positive for palpitations (due to anxiety)  Gastrointestinal: Positive for constipation  Endocrine: Negative  Genitourinary: Positive for frequency  Negative for urgency  Musculoskeletal: Positive for back pain (across lower back, more centered of lower back and radiates into left side and radiates into bilateral hips and right groin and left knee )  Skin: Negative  Allergic/Immunologic: Negative  Neurological: Positive for dizziness, light-headedness and numbness (left knee )  Negative for seizures, syncope, weakness and headaches  Hematological: Negative  Psychiatric/Behavioral: Positive for sleep disturbance (due to pain )  Negative for confusion  Family History    History reviewed  No pertinent family history      Social History    Social History     Social History    Marital status:      Spouse name: N/A    Number of children: N/A    Years of education: N/A     Occupational History    Not on file       Social History Main Topics    Smoking status: Former Smoker    Smokeless tobacco: Never Used    Alcohol use No    Drug use: No    Sexual activity: Not on file     Other Topics Concern    Not on file     Social History Narrative    No narrative on file       Past Medical History    Past Medical History:   Diagnosis Date    Anxiety     Hypertension        Surgical History    Past Surgical History:   Procedure Laterality Date    APPENDECTOMY      CHOLECYSTECTOMY      GASTRIC BYPASS      JOINT REPLACEMENT      blateral knee replacements    NERVE BLOCK Bilateral 12/29/2017    Procedure: L4, L5, S1 MEDIAL BRANCH BLOCK #1;  Surgeon: Iris Montero MD;  Location: [facility] MAIN OR;  Service: Pain Management     NERVE BLOCK Bilateral 1/12/2018    Procedure: BLOCK MEDIAL BRANCH L4, L5-S1  #2;  Surgeon: Iris Montero MD;  Location: [facility] MAIN OR;  Service: Pain Management     RADIOFREQUENCY ABLATION Right 3/16/2018    Procedure: Rt L4 L5 S1 RFA;  Surgeon: Iris Montero MD;  Location: [facility] MAIN OR;  Service: Pain Management     RHIZOTOMY Left 1/25/2018    Procedure: L4, L5, S1 RFA;  Surgeon: Iris Montero MD;  Location: [facility] MAIN OR;  Service: Pain Management     TONSILLECTOMY         Medications      Current Outpatient Prescriptions:     diphenhydrAMINE (BENADRYL) 25 mg tablet, Take 25 mg by mouth daily at bedtime as needed for itching, Disp: , Rfl:     escitalopram (LEXAPRO) 10 mg tablet, Take 5 mg by mouth daily, Disp: , Rfl:     HYDROcodone-acetaminophen (NORCO) 7 5-325 mg per tablet, Take 1 tablet by mouth 3 (three) times a day as needed, Disp: , Rfl: 0    omeprazole (PriLOSEC) 20 mg delayed release capsule, Take 20 mg by mouth daily, Disp: , Rfl:     perindopril (ACEON) 4 MG tablet, Take 4 mg by mouth daily, Disp: , Rfl:     traMADol (ULTRAM) 50 mg tablet, Take 50 mg by mouth 2 (two) times a day, Disp: , Rfl:     Allergies    Allergies   Allergen Reactions    Penicillins Anaphylaxis    Levaquin [Levofloxacin] Itching       The following portions of the patient's history were reviewed and updated as appropriate: allergies, current medications, past family history, past medical history, past social history, past surgical history and problem list     Investigations    I personally reviewed the MRI results with the patient:      MRI of the lumbar spine without contrast dated July 20th, 2018  Normal lumbar lordosis  Degenerative disc disease and facet arthropathy throughout the lumbar spine  At L4-5 there is right foraminal stenosis secondary to ligamentous and facet hypertrophy and mild retrolisthesis  No other areas of significant neural compression  Intrathecal contents unremarkable  Physical Exam    Vitals:  Blood pressure 152/83, pulse 84, temperature 98 7 °F (37 1 °C), resp  rate 20, height 5' 3" (1 6 m), weight 114 kg (250 lb 12 8 oz)  ,Body mass index is 44 43 kg/m²  Physical Exam   Constitutional: She is oriented to person, place, and time  She appears well-developed and well-nourished  No distress  Morbidly obese  Musculoskeletal:        Lumbar back: She exhibits pain (  Pain with extension  )  She exhibits normal range of motion  Neurological: She is alert and oriented to person, place, and time  Gait normal    Skin: Skin is warm and dry  Psychiatric: She has a normal mood and affect  Her behavior is normal    Vitals reviewed  Neurologic Exam     Mental Status   Oriented to person, place, and time  Motor Exam   Right leg tone: normal  Left leg tone: normal  5/5 power in lower extremities  Sensory Exam   Right leg light touch: normal  Right leg pinprick: normal    Diminished pinprick and light touch sensation on the outer aspect of left knee  No dysesthesias or hyperesthesias       Gait, Coordination, and Reflexes     Gait  Gait: normal    Reflexes   Right ankle clonus: absent  Left ankle clonus: absent  Walks with a steady gait, though mildly stooped forward posture

## 2018-10-16 ENCOUNTER — EVALUATION (OUTPATIENT)
Dept: PHYSICAL THERAPY | Facility: CLINIC | Age: 63
End: 2018-10-16
Payer: COMMERCIAL

## 2018-10-16 DIAGNOSIS — M54.50 BILATERAL LOW BACK PAIN WITHOUT SCIATICA, UNSPECIFIED CHRONICITY: Primary | ICD-10-CM

## 2018-10-16 PROCEDURE — G8978 MOBILITY CURRENT STATUS: HCPCS

## 2018-10-16 PROCEDURE — G8979 MOBILITY GOAL STATUS: HCPCS

## 2018-10-16 PROCEDURE — 97162 PT EVAL MOD COMPLEX 30 MIN: CPT

## 2018-10-16 RX ORDER — OXYCODONE AND ACETAMINOPHEN 7.5; 325 MG/1; MG/1
1 TABLET ORAL EVERY 4 HOURS PRN
Status: ON HOLD | COMMUNITY
End: 2018-12-14 | Stop reason: ALTCHOICE

## 2018-10-16 NOTE — LETTER
2018    Alejandro Barrios MD  [facility] [id number]    Patient: Josseline Alvarez   YOB: 1955   Date of Visit: 10/16/2018     Encounter Diagnosis     ICD-10-CM    1  Bilateral low back pain without sciatica, unspecified chronicity M54 5        Dear Dr Maranda Omalley:    Please review the attached Plan of Care from [facility] recent visit  Please verify that you agree therapy should continue by signing the attached document and sending it back to our office  If you have any questions or concerns, please don't hesitate to call  Sincerely,    Luisa Crenshaw, PT      Referring Provider:      I certify that I have read the below Plan of Care and certify the need for these services furnished under this plan of treatment while under my care  Alejandro Barrios, [facility] [address],[facility]  VIA Facsimile: [phone]          PT Evaluation     Today's date: 10/17/2018  Patient name: Josseline Alvarez  : 1955  MRN: [id number]  Referring provider: Dee Dee Aj MD  Dx:   Encounter Diagnosis     ICD-10-CM    1  Bilateral low back pain without sciatica, unspecified chronicity M54 5                   Assessment  Impairments: abnormal gait, abnormal muscle firing, abnormal muscle tone, abnormal or restricted ROM, activity intolerance, impaired physical strength, lacks appropriate home exercise program, pain with function, poor posture  and poor body mechanics  Other impairment:  reduced stability  Functional limitations: see subjective  Assessment details: Josseline Alvarez is a 61 y o  female who presents with pain, decreased strength w/ hip flexion and glutes, decreased ROM flexion and extension with pain all directions, transfer dysfunction, ambulatory dysfunction, postural  Dysfunction, + TTP ower lumbar spinous process and reduced function   Due to these impairments, patient has difficulty performing ADL's, ambulation, stair negotiation, lifting/carrying, transfers  Patient's clinical presentation is consistent with their referring diagnosis of low back pain   Patient has been educated in home exercise program and plan of care   Patient would benefit from skilled physical therapy services to address their aforementioned functional limitations and progress towards prior level of function and independence with home exercise program    Understanding of Dx/Px/POC: good   Prognosis: good    Goals  STG  + Patient will have pain level 1/10 lumbar spine  at rest (2-3 weeks)  + Patient will report a 35% improvement in symptoms (2-3 weeks)   + Patient will be independent in basic HEP (2-3 weeks)  + Patient will demonstrate good posture with verbal cuing   (2-3 weeks)  + Patient will demonstrate reduced gait deviations ambulating on level surfaces   (2-3 week)  + Patient will demonstrate an increase in range of motion  lumbar spine, extension AROM in all planes  to  minimally limited (2-3 weeks)  + Patient will demonstrate proper transfer techniques with verbal cuing (2-3 weeks)  + Patient will perform proper breathing technique and therefore, appropriate abdominal recruitment independently (2-3 weeks)  + Patient will increase SLS time by  10 seconds (2-3 weeks)  + Patient will report increased ease sleeping due to a reduction in pain (2-3 weeks)      LTG:  + Patient will have pain level 3/10 lumbar spine  with ADL's (4-6 weeks)  + Patient will report a 65% improvement in symptoms (4-6 weeks)   + Patient will increase FOTO score to 53 (8 sessions)   + Patient will be independent in comprehensive HEP (4-6 weeks)  + Patient will demonstrate good posture independently  (4-6 weeks)  + Patient will demonstrate minimal gait deviations ambulating on level surfaces and painfree  (4-6 week)  + Patient will demonstrate an increase in range of motion  lumbar spine,  AROM in all planes  to  Min LOM  And pain free (4-6 weeks)  + Patient will demonstrate increase  MMT of  hip, ankle to  4/5 for maximum function  (4-6 weeks)  + Patient will demonstrate proper transfers techniques independently (4-6 weeks)      Plan  Patient would benefit from: PT eval and skilled physical therapy  Planned modality interventions: cryotherapy and thermotherapy: hydrocollator packs  Planned therapy interventions: abdominal trunk stabilization, activity modification, ADL retraining, body mechanics training, breathing training, flexibility, functional ROM exercises, graded exercise, home exercise program, joint mobilization, manual therapy, neuromuscular re-education, patient education, postural training, strengthening, stretching, therapeutic activities and therapeutic exercise  Plan of Care beginning date: 10/16/2018  Plan of Care expiration date: 2018  Treatment plan discussed with: patient  Plan details: 2-3 x week, 4-6 weeks: HEP development, stretching as needed per objective findings, strengthening core/lower quadrant, A/AA/PROM lumbar/hip motions, joint mobilizations prn, posture education, STM/MI as needed to reduce muscle tension per objective findings, muscle reeducation per objective findings, dynamic stabilization, PLOC discussed and agreed upon with patient  Subjective Evaluation    History of Present Illness  Mechanism of injury: Reports here back pain started about   Has history of low back pain for "years" no specific injury to cause pain  Pain progressively worsened and + 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  Patient reports pain across low back but more specifically with left of center  H/o of bilateral knee replacements  Function:  Pain with walking, had to use cane for ambulation, difficulty climbing stairs: step to, difficulty rolling in bed and interrupts sleeping, difficulty getting out of tub     Recurrent probem    Quality of life: good    Pain  Current pain ratin  At best pain rating: 3  At worst pain rating: 9  Location: see above  Quality: sharp and dull ache  Relieving factors: ice, medications and rest  Aggravating factors: standing and walking  Progression: worsening    Social Support  Steps to enter house: yes  3  Stairs in house: yes   12  Lives in: multiple-level home  Lives with: alone (elderly mother)    Employment status: not working  Exercise history: none      Diagnostic Tests  MRI studies: abnormal (arthritis, )  Treatments  Previous treatment: injection treatment and medication (cauderized the nerve)  Current treatment: medication  Patient Goals  Patient goals for therapy: decreased pain, independence with ADLs/IADLs and improved balance  Patient goal: to move around better, reduce the pain         Objective     Static Posture     Comments  Posture  Sitting:grossly wnl,   Standing: mild scoliotic curve concave to right, lower thoracic, lordosis wnl, iliac crest =    Gait: no assistive device, decreased stance time right, reduced push off on right        Palpation: + TTP in spinous process L3/L4, L4/L5    Functional movements  Squat: increased forward flexion at lumbar spine  (+)pain   SLS: left: 2 sec    Right : 3 sec   Bridgin%  (-) pain  Transfers sit/stand: difficulty getting out of chair, increased forward flexion at trunk, and pushes up with hands  Supine--> sit: method poor, sits straight up     Dermatomes  L2: (proximal lateral thigh) :   Right: normal    Left: "feels funny, but that's because of my knee operation"  L3:(distal medial thigh)   Right: normal   Left: normal  L4(medial lower leg ) :   Right: normal  Left: normal  L5 (distal lateral lower leg):   Right: normal   Left normal  LS1 (achilles tendon):   Right: normal    Left normal    Myotomes  L2 (hip flexion): Right 4-/5  Left: 4-/5  L3 (knee extension): Right: 4/5 Left: 4/5  L4 (ankle dorsiflexion): Right: 4/5 Left: 4/5  L5 (hallux extension): Right: 4/5  Left: 4/5  S1 (ankle plantarflexion): Right: 4/5 Left: 4/5    Lumbar ROM Flexion: minimal LOM (+)pain   Extension: moderate LOM (+)pain   Side bend: Right: moderate LOM (+)pain  Left: moderate LOM (+)pain     Repeated motions baseline sittin-2/10  RFISitting: x 5: increased pain: NWAR x 5:  pain during movement: BAR (1/10)  SHAHEED: increase in low back pain WAR pain during movement (-3/10)  RFIL: NT  REIL: NT     Flexibility   Hamstring: Right: fair  Left: fair both produce low back pain:   Hip flexors:Right: fair  Left: fair   No back pain   Quads: Right: NT Left: NT    Hip ROM/MMT: prn    Type I/II dysfunction: prn      Flowsheet Rows      Most Recent Value   PT/OT G-Codes   Current Score  35   Projected Score  53   FOTO information reviewed  Yes   Assessment Type  Evaluation   G code set  Mobility: Walking & Moving Around   Mobility: Walking and Moving Around Current Status ()  CJ   Mobility: Walking and Moving Around Goal Status ()  CK          Precautions HBP,anxiety    Visits: 1       Manual 10/16/18       STM lumbar musculature   Next visit        L/S p-a mobs Next visit        Man Flexibility: hamstring, hip flexor Next visit        MET        Total time:                Exercise Diary         Rec bike/nustep        glutes set        TA activation w/ exhale        Ball squeeze w/ exhale  Hp abd with belt w/ exhale        Clamshells        LTR        Bridges                Hip hinge        Hip hinge w/sit stand                 TA w/ hip flexion in standing        Supine/sit instruct                                Total time:                Modalities        MH        ice        Total time:

## 2018-10-16 NOTE — PROGRESS NOTES
PT Evaluation     Today's date: 10/17/2018  Patient name: Channing Manley  : 1955  MRN: [id number]  Referring provider: Amy Flores MD  Dx:   Encounter Diagnosis     ICD-10-CM    1  Bilateral low back pain without sciatica, unspecified chronicity M54 5                   Assessment  Impairments: abnormal gait, abnormal muscle firing, abnormal muscle tone, abnormal or restricted ROM, activity intolerance, impaired physical strength, lacks appropriate home exercise program, pain with function, poor posture  and poor body mechanics  Other impairment:  reduced stability  Functional limitations: see subjective  Assessment details: Channing Manley is a 61 y o  female who presents with pain, decreased strength w/ hip flexion and glutes, decreased ROM flexion and extension with pain all directions, transfer dysfunction, ambulatory dysfunction, postural  Dysfunction, + TTP ower lumbar spinous process and reduced function  Due to these impairments, patient has difficulty performing ADL's, ambulation, stair negotiation, lifting/carrying, transfers  Patient's clinical presentation is consistent with their referring diagnosis of low back pain   Patient has been educated in home exercise program and plan of care   Patient would benefit from skilled physical therapy services to address their aforementioned functional limitations and progress towards prior level of function and independence with home exercise program    Understanding of Dx/Px/POC: good   Prognosis: good    Goals  STG  + Patient will have pain level 1/10 lumbar spine  at rest (2-3 weeks)  + Patient will report a 35% improvement in symptoms (2-3 weeks)   + Patient will be independent in basic HEP (2-3 weeks)  + Patient will demonstrate good posture with verbal cuing   (2-3 weeks)  + Patient will demonstrate reduced gait deviations ambulating on level surfaces   (2-3 week)  + Patient will demonstrate an increase in range of motion  lumbar spine, extension AROM in all planes  to  minimally limited (2-3 weeks)  + Patient will demonstrate proper transfer techniques with verbal cuing (2-3 weeks)  + Patient will perform proper breathing technique and therefore, appropriate abdominal recruitment independently (2-3 weeks)  + Patient will increase SLS time by  10 seconds (2-3 weeks)  + Patient will report increased ease sleeping due to a reduction in pain (2-3 weeks)      LTG:  + Patient will have pain level 3/10 lumbar spine  with ADL's (4-6 weeks)  + Patient will report a 65% improvement in symptoms (4-6 weeks)   + Patient will increase FOTO score to 53 (8 sessions)   + Patient will be independent in comprehensive HEP (4-6 weeks)  + Patient will demonstrate good posture independently  (4-6 weeks)  + Patient will demonstrate minimal gait deviations ambulating on level surfaces and painfree  (4-6 week)  + Patient will demonstrate an increase in range of motion  lumbar spine,  AROM in all planes  to  Min LOM  And pain free (4-6 weeks)  + Patient will demonstrate increase  MMT of  hip, ankle to  4/5 for maximum function   (4-6 weeks)  + Patient will demonstrate proper transfers techniques independently (4-6 weeks)      Plan  Patient would benefit from: PT eval and skilled physical therapy  Planned modality interventions: cryotherapy and thermotherapy: hydrocollator packs  Planned therapy interventions: abdominal trunk stabilization, activity modification, ADL retraining, body mechanics training, breathing training, flexibility, functional ROM exercises, graded exercise, home exercise program, joint mobilization, manual therapy, neuromuscular re-education, patient education, postural training, strengthening, stretching, therapeutic activities and therapeutic exercise  Plan of Care beginning date: 10/16/2018  Plan of Care expiration date: 11/27/2018  Treatment plan discussed with: patient  Plan details: 2-3 x week, 4-6 weeks: HEP development, stretching as needed per objective findings, strengthening core/lower quadrant, A/AA/PROM lumbar/hip motions, joint mobilizations prn, posture education, STM/MI as needed to reduce muscle tension per objective findings, muscle reeducation per objective findings, dynamic stabilization, PLOC discussed and agreed upon with patient  Subjective Evaluation    History of Present Illness  Mechanism of injury: Reports here back pain started about   Has history of low back pain for "years" no specific injury to cause pain  Pain progressively worsened and + 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  Patient reports pain across low back but more specifically with left of center  H/o of bilateral knee replacements  Function:  Pain with walking, had to use cane for ambulation, difficulty climbing stairs: step to, difficulty rolling in bed and interrupts sleeping, difficulty getting out of tub     Recurrent probem    Quality of life: good    Pain  Current pain ratin  At best pain rating: 3  At worst pain ratin  Location: see above  Quality: sharp and dull ache  Relieving factors: ice, medications and rest  Aggravating factors: standing and walking  Progression: worsening    Social Support  Steps to enter house: yes  3  Stairs in house: yes   12  Lives in: multiple-level home  Lives with: alone (elderly mother)    Employment status: not working  Exercise history: none      Diagnostic Tests  MRI studies: abnormal (arthritis, )  Treatments  Previous treatment: injection treatment and medication (cauderized the nerve)  Current treatment: medication  Patient Goals  Patient goals for therapy: decreased pain, independence with ADLs/IADLs and improved balance  Patient goal: to move around better, reduce the pain         Objective     Static Posture     Comments  Posture  Sitting:grossly wnl,   Standing: mild scoliotic curve concave to right, lower thoracic, lordosis wnl, iliac crest =    Gait: no assistive device, decreased stance time right, reduced push off on right        Palpation: + TTP in spinous process L3/L4, L4/L5    Functional movements  Squat: increased forward flexion at lumbar spine  (+)pain   SLS: left: 2 sec    Right : 3 sec   Bridgin%  (-) pain  Transfers sit/stand: difficulty getting out of chair, increased forward flexion at trunk, and pushes up with hands  Supine--> sit: method poor, sits straight up     Dermatomes  L2: (proximal lateral thigh) :   Right: normal    Left: "feels funny, but that's because of my knee operation"  L3:(distal medial thigh)   Right: normal   Left: normal  L4(medial lower leg ) :   Right: normal  Left: normal  L5 (distal lateral lower leg):   Right: normal   Left normal  LS1 (achilles tendon):   Right: normal    Left normal    Myotomes  L2 (hip flexion): Right 4-/5  Left: 4-/5  L3 (knee extension): Right: 4/5 Left: 4/5  L4 (ankle dorsiflexion): Right: 4/5 Left: 4/5  L5 (hallux extension): Right: 4/5  Left: 4/5  S1 (ankle plantarflexion): Right: 4/5 Left: 4/5    Lumbar ROM   Flexion: minimal LOM (+)pain   Extension: moderate LOM (+)pain   Side bend: Right: moderate LOM (+)pain  Left: moderate LOM (+)pain     Repeated motions baseline sittin-2/10  RFISitting: x 5: increased pain: NWAR x 5:  pain during movement: BAR (1/10)  SHAHEED: increase in low back pain WAR pain during movement (-3/10)  RFIL: NT  REIL: NT     Flexibility   Hamstring: Right: fair  Left: fair both produce low back pain:   Hip flexors:Right: fair  Left: fair   No back pain   Quads: Right: NT Left: NT    Hip ROM/MMT: prn    Type I/II dysfunction: prn      Flowsheet Rows      Most Recent Value   PT/OT G-Codes   Current Score  35   Projected Score  53   FOTO information reviewed  Yes   Assessment Type  Evaluation   G code set  Mobility: Walking & Moving Around   Mobility: Walking and Moving Around Current Status ()  CJ   Mobility: Walking and Moving Around Goal Status ()  CK          Precautions HBP,anxiety    Visits: 1       Manual 10/16/18       STM lumbar musculature   Next visit        L/S p-a mobs Next visit        Man Flexibility: hamstring, hip flexor Next visit        MET        Total time:                Exercise Diary         Rec bike/nustep        glutes set        TA activation w/ exhale        Ball squeeze w/ exhale  Hp abd with belt w/ exhale        Clamshells        LTR        Bridges                Hip hinge        Hip hinge w/sit stand                 TA w/ hip flexion in standing        Supine/sit instruct                                Total time:                Modalities                ice        Total time:

## 2018-10-17 ENCOUNTER — TRANSCRIBE ORDERS (OUTPATIENT)
Dept: PHYSICAL THERAPY | Facility: CLINIC | Age: 63
End: 2018-10-17

## 2018-10-17 DIAGNOSIS — M54.50 BILATERAL LOW BACK PAIN WITHOUT SCIATICA, UNSPECIFIED CHRONICITY: Primary | ICD-10-CM

## 2018-10-19 ENCOUNTER — OFFICE VISIT (OUTPATIENT)
Dept: PHYSICAL THERAPY | Facility: CLINIC | Age: 63
End: 2018-10-19
Payer: COMMERCIAL

## 2018-10-19 DIAGNOSIS — M54.50 BILATERAL LOW BACK PAIN WITHOUT SCIATICA, UNSPECIFIED CHRONICITY: Primary | ICD-10-CM

## 2018-10-19 PROCEDURE — 97140 MANUAL THERAPY 1/> REGIONS: CPT

## 2018-10-19 PROCEDURE — 97112 NEUROMUSCULAR REEDUCATION: CPT

## 2018-10-19 NOTE — PROGRESS NOTES
Daily Note     Today's date: 10/19/2018  Patient name: Siri Valente  : 1955  MRN: [id number]  Referring provider: Connor Lambetr MD  Dx:   Encounter Diagnosis     ICD-10-CM    1  Bilateral low back pain without sciatica, unspecified chronicity M54 5                   Subjective: Lasha Gross reports that her 5 /10 central low back pain due to patient was winterizing the yard yesterday  Objective: See treatment diary below    Precautions HBP,anxiety    Visits: 1 2      Manual 10/16/18 10/19/18      STM lumbar musculature   Next visit  performed focus on ql and lumbar paraspinals on right       L/S p-a mobs Next visit        Man Flexibility: hamstring, hip flexor Next visit        MET        Total time:  12 min               Exercise Diary         Rec bike/nustep  lv 1 10 min with moist heat      glutes set  10x 10sec       TA activation w/ exhale  10 x       Ball squeeze w/ exhale  Hp abd with belt w/ exhale  10x each       Clamshells        LTR        Bridges                Hip hinge  20x       Hip hinge w/sit stand   10x               TA w/ hip flexion in standing        Supine/sit instruct  instruct      Sciatic nerve stretch instruct                       Total time:  20 min               Modalities        MH  10 min with nustep      ice        Total time:            Assessment: patient needed verbal and tactile cuing for proper form with exhale to facilitate abs,  Needed increased cuing for proper form with supine to sit to avoid a direct sit up  Was able to perform sit/stand with cuing but was fatigued in LE  Discussed with patient that as her leg strength increases actvitiy with be come easier  Exited with 2/10 pain level  Plan: Progress treatment as tolerated  with focus on lumbar ROM, educating on body mechanics and stabilization

## 2018-10-24 ENCOUNTER — OFFICE VISIT (OUTPATIENT)
Dept: PHYSICAL THERAPY | Facility: CLINIC | Age: 63
End: 2018-10-24
Payer: COMMERCIAL

## 2018-10-24 DIAGNOSIS — M54.50 BILATERAL LOW BACK PAIN WITHOUT SCIATICA, UNSPECIFIED CHRONICITY: Primary | ICD-10-CM

## 2018-10-24 PROCEDURE — 97140 MANUAL THERAPY 1/> REGIONS: CPT

## 2018-10-24 PROCEDURE — 97110 THERAPEUTIC EXERCISES: CPT

## 2018-10-24 NOTE — PROGRESS NOTES
Daily Note     Today's date: 10/24/2018  Patient name: Ariana Head  : 1955  MRN: [id number]  Referring provider: Lisa Bello MD  Dx:   Encounter Diagnosis     ICD-10-CM    1  Bilateral low back pain without sciatica, unspecified chronicity M54 5                   Subjective: Elaine Jackson reports that her pain level entering today is 6/10 upon entering  Objective: See treatment diary below  Precautions HBP,anxiety    Visits: 1 2 3     Manual 10/16/18 10/19/18 10/24/18     STM lumbar musculature   Next visit  performed focus on ql and lumbar paraspinals on right  performed       L/S p-a mobs Next visit   performed       Man Flexibility: hamstring, hip flexor Next visit   Hip flexors [id number]       MET        Total time:  12 min  15 min              Exercise Diary         Rec bike/nustep  lv 1 10 min with moist heat lv 2 9 min      glutes set  10x 10sec  10 sec x 10      TA activation w/ exhale  10 x  15 x      Ball squeeze w/ exhale  Hp abd with belt w/ exhale  10x each  15 x each      Clamshells   Red x 10 with opposite leg stabilizing       LTR        Bridges                Hip hinge  20x  20 x with ball      Hip hinge w/sit stand   10x  15x w/ ball              TA w/ hip flexion in standing        Supine/sit instruct  instruct 10x      Sciatic nerve stretch instruct                       Total time:  20 min  25 min              Modalities        MH  10 min with nustep Deferred      ice        Total time:            Assessment:   Reduced pain with pa glides lower lumbar grade I/II, reduced pain level upon entering to 3/10  Good form with sit/stand but has not achieved carryover yet to function  Still needs cuing  Good form with exhale and facilitation of abdominals  Plan: Progress treatment as tolerated    update hEP next session,

## 2018-10-26 ENCOUNTER — OFFICE VISIT (OUTPATIENT)
Dept: PHYSICAL THERAPY | Facility: CLINIC | Age: 63
End: 2018-10-26
Payer: COMMERCIAL

## 2018-10-26 DIAGNOSIS — M54.50 BILATERAL LOW BACK PAIN WITHOUT SCIATICA, UNSPECIFIED CHRONICITY: Primary | ICD-10-CM

## 2018-10-26 PROCEDURE — 97140 MANUAL THERAPY 1/> REGIONS: CPT

## 2018-10-26 PROCEDURE — 97110 THERAPEUTIC EXERCISES: CPT

## 2018-10-26 NOTE — PROGRESS NOTES
Daily Note     Today's date: 10/26/2018  Patient name: Simran Martin  : 1955  MRN: [id number]  Referring provider: Jb Romero MD  Dx:   Encounter Diagnosis     ICD-10-CM    1  Bilateral low back pain without sciatica, unspecified chronicity M54 5                   Subjective: Josue Ma reports that her pain level entering today was 2/10      Objective: See treatment diary below  Precautions HBP,anxiety    Visits: 1 2 3 4     Manual 10/16/18 10/19/18 10/24/18 10/26/2018    STM lumbar musculature   Next visit  performed focus on ql and lumbar paraspinals on right  performed   performed      L/S p-a mobs Next visit   performed   performed      Man Flexibility: hamstring, hip flexor Next visit   Hip flexors [id number]       MET        Total time:  12 min  15 min  15 min             Exercise Diary         Rec bike/nustep  lv 1 10 min with moist heat lv 2 9 min  lv 2 10 min     glutes set  10x 10sec  10 sec x 10  10 sec x 10    TA activation w/ exhale  10 x  15 x  15x    Ball squeeze w/ exhale  Hp abd with belt w/ exhale  10x each  15 x each  15 x     Clamshells   Red x 10 with opposite leg stabilizing   Red [id number]     LTR        Bridges                Hip hinge  20x  20 x with ball  20x     Hip hinge w/sit stand   10x  15x w/ ball  10x             TA w/ hip flexion in standing        Sciatic nerve stretch instruct                       Total time:  20 min  25 min  10 min (1:1)            Modalities        MH  10 min with nustep Deferred  While on nustep    ice        Total time:              Assessment: updated HEP to include red tband for hip ER motion  Patient had less tension in lumbar spine musculature then previous sessions  Plan: Progress treatment as tolerated

## 2018-10-30 ENCOUNTER — OFFICE VISIT (OUTPATIENT)
Dept: PHYSICAL THERAPY | Facility: CLINIC | Age: 63
End: 2018-10-30
Payer: COMMERCIAL

## 2018-10-30 DIAGNOSIS — M54.50 BILATERAL LOW BACK PAIN WITHOUT SCIATICA, UNSPECIFIED CHRONICITY: Primary | ICD-10-CM

## 2018-10-30 PROCEDURE — 97110 THERAPEUTIC EXERCISES: CPT

## 2018-10-30 PROCEDURE — 97140 MANUAL THERAPY 1/> REGIONS: CPT

## 2018-10-30 NOTE — PROGRESS NOTES
Daily Note     Today's date: 10/30/2018  Patient name: Rachel Philippe  : 1955  MRN: [id number]  Referring provider: Catarina Knott MD  Dx:   Encounter Diagnosis     ICD-10-CM    1  Bilateral low back pain without sciatica, unspecified chronicity M54 5                   Subjective: Christiano Thompson reports that her pain level 6/10  States she is doing exercises at home  States she has a sharp pain on left lumbar spine  States she felt pretty good yesterday  Feels her whole body aches when a storm is coming  Objective: See treatment diary below  Precautions HBP,anxiety    Visits: 1 2 3 4  5   Manual 10/16/18 10/19/18 10/24/18 10/26/2018 10/30/18   STM lumbar musculature   Next visit  performed focus on ql and lumbar paraspinals on right  performed   performed   performed     L/S p-a mobs Next visit   performed   performed   performed     Man Flexibility: hamstring, hip flexor Next visit   Hip flexors [id number]       MET        Total time:  12 min  15 min  15 min  10 min            Exercise Diary         Rec bike/nustep  lv 1 10 min with moist heat lv 2 9 min  lv 2 10 min  Stationary bike: lv 2 10 min with moist heat    glutes set  10x 10sec  10 sec x 10  10 sec x 10 D/c   TA activation w/ exhale  10 x  15 x  15x 15x   Ball squeeze w/ exhale  Hp abd with belt w/ exhale  10x each  15 x each  15 x  15x each    Clamshells   Red x 10 with opposite leg stabilizing   Red [id number]  Red [id number]     LTR        Bridges     10x             Hip hinge  20x  20 x with ball  20x  10 x    Hip hinge w/sit stand   10x  15x w/ ball  10x  2x10             TA w/ hip flexion in standing        Sciatic nerve stretch instruct            AMALIA: [id number]          Press up: 5 x 2   Total time:  20 min  25 min  10 min (1:1) 30 min            Modalities        MH  10 min with nustep Deferred  While on nustep While on nustep 10 min lumbar spine   ice        Total time:              Assessment: upgraded HEP, written informations provided  Patient exited clinic with 3/10 pain level  Patient had good form with bridging with denied lumbar pain         Plan: progress stabilization program and LE stretching next session

## 2018-11-01 ENCOUNTER — OFFICE VISIT (OUTPATIENT)
Dept: PHYSICAL THERAPY | Facility: CLINIC | Age: 63
End: 2018-11-01
Payer: COMMERCIAL

## 2018-11-01 DIAGNOSIS — M54.50 BILATERAL LOW BACK PAIN WITHOUT SCIATICA, UNSPECIFIED CHRONICITY: Primary | ICD-10-CM

## 2018-11-01 PROCEDURE — 97112 NEUROMUSCULAR REEDUCATION: CPT

## 2018-11-01 PROCEDURE — 97110 THERAPEUTIC EXERCISES: CPT

## 2018-11-01 NOTE — PROGRESS NOTES
Daily Note     Today's date: 2018  Patient name: Henry Sevilla  : 1955  MRN: [id number]  Referring provider: Jorge Alberto Peterson MD  Dx:   Encounter Diagnosis     ICD-10-CM    1  Bilateral low back pain without sciatica, unspecified chronicity M54 5                   Subjective: Eddie Andrade reports that her pain level entering today was 7/10  States she went to [address] [facility] after lasts session and did a big grocery shopping with walking and self checking, with increased lifting and bending  therefore had increased pain yesterday  Objective: See treatment diary below    Precautions HBP,anxiety    Visits: 6 (foto)  3 4  5   Manual 11/1/18  10/24/18 10/26/2018 10/30/18   STM lumbar musculature   performed   performed   performed     L/S p-a mobs   performed   performed   performed     Man Flexibility: hamstring, hip flexor   Hip flexors [id number]       MET        Total time:   15 min  15 min  10 min            Exercise Diary         Rec bike/nustep nustep x 10 min with moist heat  lv 2 9 min  lv 2 10 min  Stationary bike: lv 2 10 min with moist heat    TA activation w/ exhale 15 x   15 x  15x 15x   Ball squeeze w/ exhale  Hp abd with belt w/ exhale 15 x each   15 x each  15 x  15x each    Clamshells   Red x 10 with opposite leg stabilizing   Red [id number]  Red [id number]     LTR        Bridges 10 x 2     10x             Hip hinge   20 x with ball  20x  10 x    Hip hinge w/sit stand  10 x 2  15x w/ ball  10x  2x10     Lifting: w crate        Exhale with marching 15 x each        Exhale with alt UE lift and march 5 x each moving within exhale          Sidestepping with band  And abd bracing         Sciatic nerve stretch        AMALIA [id number]      AMALIA: [id number]     Prone press up 5 x 2 pre and post there ex each    Press up: 5 x 2   Total time:   25 min  10 min (1:1) 30 min            Modalities        MH   Deferred  While on nustep While on nustep 10 min lumbar spine   ice        Total time:        Prognosis: good    Goals  STG  + Patient will have pain level 1/10 lumbar spine  at rest (2-3 weeks)  + Patient will report a 35% improvement in symptoms (2-3 weeks)   + Patient will be independent in basic HEP (2-3 weeks) met   + Patient will demonstrate good posture with verbal cuing   (2-3 weeks)  + Patient will demonstrate reduced gait deviations ambulating on level surfaces   (2-3 week)  + Patient will demonstrate an increase in range of motion  lumbar spine, extension AROM in all planes  to  minimally limited (2-3 weeks) met   + Patient will demonstrate proper transfer techniques with verbal cuing (2-3 weeks) met   + Patient will perform proper breathing technique and therefore, appropriate abdominal recruitment independently (2-3 weeks) met   + Patient will increase SLS time by  10 seconds (2-3 weeks)  + Patient will report increased ease sleeping due to a reduction in pain (2-3 weeks)      LTG:  + Patient will have pain level 3/10 lumbar spine  with ADL's (4-6 weeks)  + Patient will report a 65% improvement in symptoms (4-6 weeks)   + Patient will increase FOTO score to 53 (8 sessions)   + Patient will be independent in comprehensive HEP (4-6 weeks)  + Patient will demonstrate good posture independently  (4-6 weeks)  + Patient will demonstrate minimal gait deviations ambulating on level surfaces and painfree  (4-6 week)  + Patient will demonstrate an increase in range of motion  lumbar spine,  AROM in all planes  to  wnl  And pain free (4-6 weeks)  + Patient will demonstrate increase  MMT of  hip, ankle to  4/5 for maximum function  (4-6 weeks)  + Patient will demonstrate proper transfers techniques independently (4-6 weeks) met    Assessment:  States nice improvement in symptoms with AMALIA exercises  Especially with mid back pain  States prone press up is no longer painful after prone press ups   Updated HEP to include prone press ups 3x day, written  information provided, and patient had a good understanding of proper form   Patient denied back pain with all stab  Patient was challenged to be able to march within the same exhale but was successful in achieving the task  demonstrated good form with crate lift with only minimal cuing  Still working towards existing goals       Plan: Progress treatment as tolerated  review of lifting as needed

## 2018-11-05 ENCOUNTER — OFFICE VISIT (OUTPATIENT)
Dept: PHYSICAL THERAPY | Facility: CLINIC | Age: 63
End: 2018-11-05
Payer: COMMERCIAL

## 2018-11-05 DIAGNOSIS — M54.50 BILATERAL LOW BACK PAIN WITHOUT SCIATICA, UNSPECIFIED CHRONICITY: Primary | ICD-10-CM

## 2018-11-05 PROCEDURE — 97110 THERAPEUTIC EXERCISES: CPT

## 2018-11-05 PROCEDURE — 97140 MANUAL THERAPY 1/> REGIONS: CPT

## 2018-11-05 PROCEDURE — 97112 NEUROMUSCULAR REEDUCATION: CPT

## 2018-11-05 NOTE — PROGRESS NOTES
Daily Note     Today's date: 2018  Patient name: Gordon Carbone  : 1955  MRN: [id number]  Referring provider: Violette Zee MD  Dx:   Encounter Diagnosis     ICD-10-CM    1  Bilateral low back pain without sciatica, unspecified chronicity M54 5                   Subjective: Kelsey Horvath reports that her pain level is 5/10  States shes her groin muscle has "really improved" was achy at night consistently  Mid back feels much better as well  Patient reports overall 50% improvmeent since [facility]  Objective: See treatment diary below  Precautions HBP,anxiety    Visits: 6 (foto) 7  4  5   Manual 11/1/18 11/5/18  10/26/2018 10/30/18   STM lumbar musculature  performed    performed   performed     L/S p-a mobs  performed    performed   performed     Man Flexibility: hamstring, hip flexor        MET        Total time:  12 min   15 min  10 min            Exercise Diary         Rec bike/nustep nustep x 10 min with moist heat lv 3 10 min with moist heat  lv 2 10 min  Stationary bike: lv 2 10 min with moist heat    TA activation w/ exhale 15 x  15x   15x 15x   Ball squeeze w/ exhale  Hp abd with belt w/ exhale 15 x each  15 x each   15 x  15x each    Clamshells: stabilizing with opposite leg   Red [id number] each  Red [id number]  Red [id number]     LTR        Bridges 10 x 2  2x10     10x             Hip hinge    20x  10 x    Hip hinge w/sit stand  10 x 2 15 x   10x  2x10     Lifting: w crate        Exhale with marching 15 x each  15 x each      Exhale with alt UE lift and march 5 x each moving within exhale    5 x each      Sidestepping with band  And abd bracing         Sciatic nerve stretch        AMALIA [id number]   [id number]     AMALIA: [id number]     Prone press up 5 x 2 pre and post there ex each 10 x    Press up: 5 x 2     Lifting with crate 10 x      Total time:  35  min  10 min (1:1) 30 min            Modalities        MH    While on nustep While on nustep 10 min lumbar spine   ice        Total time:        Prognosis: good    Goals  STG  + Patient will have pain level 1/10 lumbar spine  at rest (2-3 weeks)  + Patient will report a 35% improvement in symptoms (2-3 weeks) met   + Patient will be independent in basic HEP (2-3 weeks) met   + Patient will demonstrate good posture with verbal cuing   (2-3 weeks) met   + Patient will demonstrate reduced gait deviations ambulating on level surfaces   (2-3 week)  + Patient will demonstrate an increase in range of motion  lumbar spine, extension AROM in all planes  to  minimally limited (2-3 weeks) met   + Patient will demonstrate proper transfer techniques with verbal cuing (2-3 weeks) met   + Patient will perform proper breathing technique and therefore, appropriate abdominal recruitment independently (2-3 weeks) met   + Patient will increase SLS time by  10 seconds (2-3 weeks)  + Patient will report increased ease sleeping due to a reduction in pain (2-3 weeks)      LTG:  + Patient will have pain level 3/10 lumbar spine  with ADL's (4-6 weeks)  + Patient will report a 65% improvement in symptoms (4-6 weeks)   + Patient will increase FOTO score to 53 (8 sessions)   + Patient will be independent in comprehensive HEP (4-6 weeks)  + Patient will demonstrate good posture independently  (4-6 weeks)  + Patient will demonstrate minimal gait deviations ambulating on level surfaces and painfree  (4-6 week)  + Patient will demonstrate an increase in range of motion  lumbar spine,  AROM in all planes  to  wnl  And pain free (4-6 weeks)  + Patient will demonstrate increase  MMT of  hip, ankle to  4/5 for maximum function  (4-6 weeks)  + Patient will demonstrate proper transfers techniques independently (4-6 weeks) met      Assessment: no pain with manual PA glides of lumbar spine  No pain with AMALIA exercise  Noted is lumbar extension wnl  Patient sufficiently challenged with alternating UE/LE lift  Although patient was able to maintain good pelvic tilt   Still needed verbal cuing for proper form with crate lift  Plan: reeval 11/16/18: progress as tolerated

## 2018-11-07 ENCOUNTER — OFFICE VISIT (OUTPATIENT)
Dept: PHYSICAL THERAPY | Facility: CLINIC | Age: 63
End: 2018-11-07
Payer: COMMERCIAL

## 2018-11-07 DIAGNOSIS — M54.50 BILATERAL LOW BACK PAIN WITHOUT SCIATICA, UNSPECIFIED CHRONICITY: Primary | ICD-10-CM

## 2018-11-07 PROCEDURE — 97112 NEUROMUSCULAR REEDUCATION: CPT

## 2018-11-07 PROCEDURE — 97140 MANUAL THERAPY 1/> REGIONS: CPT

## 2018-11-07 PROCEDURE — 97110 THERAPEUTIC EXERCISES: CPT

## 2018-11-13 ENCOUNTER — OFFICE VISIT (OUTPATIENT)
Dept: PHYSICAL THERAPY | Facility: CLINIC | Age: 63
End: 2018-11-13
Payer: COMMERCIAL

## 2018-11-13 DIAGNOSIS — M54.50 BILATERAL LOW BACK PAIN WITHOUT SCIATICA, UNSPECIFIED CHRONICITY: Primary | ICD-10-CM

## 2018-11-13 PROCEDURE — 97140 MANUAL THERAPY 1/> REGIONS: CPT

## 2018-11-13 PROCEDURE — 97110 THERAPEUTIC EXERCISES: CPT

## 2018-11-13 PROCEDURE — 97112 NEUROMUSCULAR REEDUCATION: CPT

## 2018-11-13 NOTE — PROGRESS NOTES
Daily Note     Today's date: 2018  Patient name: Rachel Philippe  : 1955  MRN: [id number]  Referring provider: Catarina Knott MD  Dx:   Encounter Diagnosis     ICD-10-CM    1  Bilateral low back pain without sciatica, unspecified chronicity M54 5                   Subjective: Christiano Thompson reports that her back did not hurt after lift 2 50 lb bags of rock salt over weekend  Pain level entering today is 4/10  "not too bad"      Objective: See treatment diary below    Precautions HBP,anxiety    Visits: 6 (foto) 7 8 9 REEVAL   Manual 18    STM lumbar musculature  performed   performed  performed      L/S p-a mobs  performed        Man Flexibility: hamstring, hip flexor        MET   performed for a ERSL     Total time:  12 min  15 min  8 min             Exercise Diary      REEVAL performed   Rec bike/nustep nustep x 10 min with moist heat lv 3 10 min with moist heat lv 3 10 min  nustep 2 laps 10:13    TA activation w/ exhale 15 x  15x  15x  D/C    Ball squeeze w/ exhale  Hp abd with belt w/ exhale 15 x each  15 x each  + bridging: 10 x each exercise 10 x each    Clamshells: stabilizing with opposite leg   Red [id number] each [id number] green band  Green [id number]     LTR        SLS    Exercise initiated     Hip hinge        Hip hinge w/sit stand  10 x 2 15 x       Lifting: w crate    10x     Exhale with marching 15 x each  15 x each 20x 20x supine    Exhale with alt UE lift and march 5 x each moving within exhale    5 x each 10x  15x supine    Sidestepping with band  And abd bracing    Yellow 14 feet x 3 rounds Yellow 14 feet x 3 rounds  Red    Standing hip flexion with exhale    10 x each  10 x 2     AMALIA [id number]   [id number]    [id number]     Prone press up 5 x 2 pre and post there ex each 10 x  10x  10 x       Lifting with crate 10 x 12 5 lb lifting 0 x  performed  10 x     Total gym squats        Total time:  35  min 30 min  35 min             Modalities        MH        ice        Total time: Prognosis: good    Goals  STG  + Patient will have pain level 1/10 lumbar spine  at rest (2-3 weeks)  + Patient will report a 35% improvement in symptoms (2-3 weeks) met   + Patient will be independent in basic HEP (2-3 weeks) met   + Patient will demonstrate good posture with verbal cuing   (2-3 weeks) met   + Patient will demonstrate reduced gait deviations ambulating on level surfaces   (2-3 week)met   + Patient will demonstrate an increase in range of motion  lumbar spine, extension AROM in all planes  to  minimally limited (2-3 weeks) met   + Patient will demonstrate proper transfer techniques with verbal cuing (2-3 weeks) met   + Patient will perform proper breathing technique and therefore, appropriate abdominal recruitment independently (2-3 weeks) met   + Patient will increase SLS time by  10 seconds (2-3 weeks)  + Patient will report increased ease sleeping due to a reduction in pain (2-3 weeks)      LTG:  + Patient will have pain level 3/10 lumbar spine  with ADL's (4-6 weeks)  + Patient will report a 65% improvement in symptoms (4-6 weeks)   + Patient will increase FOTO score to 53 (8 sessions)   + Patient will be independent in comprehensive HEP (4-6 weeks)  + Patient will demonstrate good posture independently  (4-6 weeks) good working knowledge of proper form  + Patient will demonstrate minimal gait deviations ambulating on level surfaces and painfree  (4-6 week)  + Patient will demonstrate an increase in range of motion  lumbar spine,  AROM in all planes  to  wnl  And pain free (4-6 weeks)  + Patient will demonstrate increase  MMT of  hip, ankle to  4/5 for maximum function  (4-6 weeks)  + Patient will demonstrate proper transfers techniques independently (4-6 weeks) met    Assessment:   Patient entered today walking briskly with much reduced gait deviations  Added total gym squats for LE strengthening to assist with lifting to avoid increase pressure on knees        Plan: reeval 11/16/18,

## 2018-11-15 ENCOUNTER — OFFICE VISIT (OUTPATIENT)
Dept: PHYSICAL THERAPY | Facility: CLINIC | Age: 63
End: 2018-11-15
Payer: COMMERCIAL

## 2018-11-15 DIAGNOSIS — M54.50 BILATERAL LOW BACK PAIN WITHOUT SCIATICA, UNSPECIFIED CHRONICITY: Primary | ICD-10-CM

## 2018-11-15 PROCEDURE — 97140 MANUAL THERAPY 1/> REGIONS: CPT

## 2018-11-15 PROCEDURE — 97110 THERAPEUTIC EXERCISES: CPT

## 2018-11-15 PROCEDURE — G8979 MOBILITY GOAL STATUS: HCPCS

## 2018-11-15 PROCEDURE — G8978 MOBILITY CURRENT STATUS: HCPCS

## 2018-11-15 NOTE — PROGRESS NOTES
Daily Note     Today's date: 11/15/2018  Patient name: Ngoc Hernández  : 1955  MRN: [id number]  Referring provider: Kylee Murillo MD  Dx:   Encounter Diagnosis     ICD-10-CM    1  Bilateral low back pain without sciatica, unspecified chronicity M54 5        Start Time: 0900  Stop Time: 0945  Total time in clinic (min): 45 minutes    Subjective: Patient reports that yesterday she did a lot of lifting yesterday  She reports 6/10 pain upon arrival     Objective: See treatment diary below    Precautions HBP,anxiety    Visits: 6 (foto) 7 8 9 10   Manual 11/1/18 11/5/18 11/7/18 11/13/18 11/15   STM lumbar musculature  performed   performed  performed   performed   L/S p-a mobs  performed        Man Flexibility: hamstring, hip flexor        MET   performed for a ERSL     Total time:  12 min  15 min  8 min             Exercise Diary      1   Rec bike/nustep nustep x 10 min with moist heat lv 3 10 min with moist heat lv 3 10 min  nustep 2 laps 10:13 nustep 2 laps 10:00   TA activation w/ exhale 15 x  15x  15x  D/C    Ball squeeze  exhale  Hp abd with belt w/ exhale 15 x each  15 x each  + bridging: 10 x each exercise 10 x each    Clamshells: stabilizing with opposite leg   Red [id number] each [id number] green band  Green [id number]     LTR        SLS    Exercise initiated     Hip hinge        Hip hinge w/sit stand  10 x 2 15 x       Lifting: w crate    10x  10x  5 #   Exhale with marching 15 x each  15 x each 20x 20x supine 20x supine   Exhale with alt UE lift and march 5 x each moving within exhale    5 x each 10x  15x supine    Sidestepping with band  And abd bracing    Yellow 14 feet x 3 rounds Yellow 14 feet x 3 rounds  Red   14 feet 3 rounds   Standing hip flexion with exhale    10 x each  10 x 2  2 x 10    AMALIA [id number]   [id number]    [id number]     Prone press up 5 x 2 pre and post there ex each 10 x  10x  10 x       Lifting with crate 10 x 12 5 lb lifting 0 x  performed  10 x     Total gym squats        Total time:  35 min 30 min  35 min  30           Modalities        MH        ice        Total time:            Assessment: Tolerated treatment well  No provocation of symptoms with today's visit and demonstrated improved body mechanics with lifting  Added 5# to crate and patient was able to still lift with hip hinge and core activation  Patient would benefit from continued PT in order to continue to address poor body mechanics with function and reduce LBP  Plan: Continue per plan of care  Re evaluation next session

## 2018-11-20 ENCOUNTER — OFFICE VISIT (OUTPATIENT)
Dept: PHYSICAL THERAPY | Facility: CLINIC | Age: 63
End: 2018-11-20
Payer: COMMERCIAL

## 2018-11-20 DIAGNOSIS — M54.50 BILATERAL LOW BACK PAIN WITHOUT SCIATICA, UNSPECIFIED CHRONICITY: Primary | ICD-10-CM

## 2018-11-20 PROCEDURE — 97110 THERAPEUTIC EXERCISES: CPT

## 2018-11-20 PROCEDURE — 97140 MANUAL THERAPY 1/> REGIONS: CPT

## 2018-11-20 PROCEDURE — 97112 NEUROMUSCULAR REEDUCATION: CPT

## 2018-11-20 NOTE — PROGRESS NOTES
Daily Note     Today's date: 2018  Patient name: Manish Estevez  : 1955  MRN: [id number]  Referring provider: Bipin Alford MD  Dx:   Encounter Diagnosis     ICD-10-CM    1  Bilateral low back pain without sciatica, unspecified chronicity M54 5                   Subjective: 6/10 LBP       Objective: See treatment diary below    Precautions HBP,anxiety    Visits: 11  8 9 10   Manual 11-20  11/7/18 11/13/18 11/15   STM lumbar musculature Performed in prone x 10 min  performed   performed  performed   performed   L/S p-a mobs  performed        Man Flexibility: hamstring, hip flexor        MET   performed for a ERSL     Total time:  12 min  15 min  8 min             Exercise Diary      1   Rec bike/nustep nustep x 10 min 1 lap    58  lv 3 10 min with moist heat lv 3 10 min  nustep 2 laps 10:13 nustep 2 laps 10:00   TA activation w/ exhale DC  15x  15x  D/C    Ball squeeze  exhale  Hp abd with belt w/ exhale Bridging w hip abd/belt x 10 &   Bridging w ball x 10  15 x each  + bridging: 10 x each exercise 10 x each    Clamshells: stabilizing with opposite leg  Green 5 sec x 10  Red [id number] each [id number] green band  Green [id number]     LTR        SLS    Exercise initiated     Hip hinge        Hip hinge w/sit stand   15 x       Lifting: w crate 10 x 5#    10x  10x  5 #   Exhale with marching 20 x supine  15 x each 20x 20x supine 20x    Exhale with alt UE lift and march 5 x each moving within exhale  5 x each 10x  15x supine    Sidestepping with band  And abd bracing  Green 14 feet 3 rounds   Yellow 14 feet x 3 rounds Yellow 14 feet x 3 rounds  Red   14 feet 3 rounds   Standing hip flexion with exhale  2 x 10 - not today   10 x each  10 x 2  2 x 10    AMALIA [id number]   [id number]    [id number]     Prone press up 10 x  10 x  10x  10 x  10 x      Lifting with crate 10 x 12 5 lb lifting 0 x  performed  10 x     Total gym squats lvl 18 2 x 10        Total time: 35 min  35  min 30 min  35 min  30           Modalities         ice        Total time:              Assessment: Tolerated treatment well  Patient would benefit from continued PT; pt demonstrated proper floor to waist lift with crate with minimal cueing; pt able to progress to green and for sidestepping;   pt tender with palpation left L/S paraspinals & along the left PSIS; pt reports decreased right groin pain since starting therapy       Plan: Continue per plan of care    Resume stance hip flexion with abdominal brace

## 2018-11-23 ENCOUNTER — OFFICE VISIT (OUTPATIENT)
Dept: PHYSICAL THERAPY | Facility: CLINIC | Age: 63
End: 2018-11-23
Payer: COMMERCIAL

## 2018-11-23 DIAGNOSIS — G89.4 CHRONIC PAIN SYNDROME: ICD-10-CM

## 2018-11-23 DIAGNOSIS — M54.50 BILATERAL LOW BACK PAIN WITHOUT SCIATICA, UNSPECIFIED CHRONICITY: Primary | ICD-10-CM

## 2018-11-23 PROCEDURE — G8979 MOBILITY GOAL STATUS: HCPCS

## 2018-11-23 PROCEDURE — 97110 THERAPEUTIC EXERCISES: CPT

## 2018-11-23 PROCEDURE — G8980 MOBILITY D/C STATUS: HCPCS

## 2018-11-23 NOTE — LETTER
2018    Crissie Habermann, MD  [facility] [address]    Patient: Kassandra Menendez   YOB: 1955   Date of Visit: 2018     Encounter Diagnosis     ICD-10-CM    1  Bilateral low back pain without sciatica, unspecified chronicity M54 5    2  Chronic pain syndrome G89 4        Dear Dr Viviane Briggs:    Please review the attached Discharge Summary from [facility] recent visit  PT services have been discharged  If you have any questions or concerns, please don't hesitate to call  Sincerely,    Sobeida Schmidt, PT                        Crissie Habermann, MD  [facility] [address]: [phone]          PT Discharge Summary    Today's date: 2018  Patient name: Kassandra Menendez  : 1955  MRN: [id number]  Referring provider: Henry Lemon MD  Dx:   Encounter Diagnosis     ICD-10-CM    1  Bilateral low back pain without sciatica, unspecified chronicity M54 5                   Subjective: Samuel Crews reports that her overall improvement is 80-85%  States her pain level current 4/10, best pain level: 2/10, worst: 7/10    Function: limitations from low back is repetitive bending, no longer difficulty rolling in bed, no longer wakes her, increased ease getting out of chair, and increased ease walking resting pain level 1/10  Objective: See treatment diary below    Goals  Patient goal: to move around better, reduce the pain met   STG  + Patient will have pain level 1/10 lumbar spine  at rest (2-3 weeks) met   + Patient will report a 35% improvement in symptoms (2-3 weeks) met   + Patient will be independent in basic HEP (2-3 weeks) met   + Patient will demonstrate good posture with verbal cuing   (2-3 weeks) met   + Patient will demonstrate reduced gait deviations ambulating on level surfaces   (2-3 week) met   + Patient will demonstrate an increase in range of motion  lumbar spine, extension AROM in all planes  to minimally limited (2-3 weeks)met  + Patient will demonstrate proper transfer techniques with verbal cuing (2-3 weeks) met   + Patient will perform proper breathing technique and therefore, appropriate abdominal recruitment independently (2-3 weeks) met   + Patient will increase SLS time by  10 seconds (2-3 weeks) not met   + Patient will report increased ease sleeping due to a reduction in pain (2-3 weeks) met       LTG:  + Patient will have pain level 3/10 lumbar spine  with ADL's (4-6 weeks) met   + Patient will report a 65% improvement in symptoms (4-6 weeks) met   + Patient will increase FOTO score to 53 (8 sessions) met   + Patient will be independent in comprehensive HEP (4-6 weeks) met   + Patient will demonstrate good posture independently  (4-6 weeks) met   + Patient will demonstrate minimal gait deviations ambulating on level surfaces and painfree  (4-6 week) met   + Patient will demonstrate an increase in range of motion  lumbar spine,  AROM in all planes  to  Min LOM  And pain free (4-6 weeks) met   + Patient will demonstrate increase  MMT of  hip, ankle to  4/5 for maximum function  (4-6 weeks) met   + Patient will demonstrate proper transfers techniques independently (4-6 weeks)  Met     Comments (18)  Posture  Sitting:grossly wnl,   Standing: mild scoliotic curve concave to right, lower thoracic, lordosis wnl, iliac crest =    Gait: no assistive device, decreased stance time right, reduced push off on right    ( much improved, minimal to no devaitions)    Palpation: + TTP in spinous process L3/L4, L4/L5 (mild)    Functional movements  Squat: increased forward flexion at lumbar spine  (+)pain (much improved no pain)  SLS: left: 2 sec    Right : 3 sec (left: 4 sec, right 3 sec)  Bridgin%  (-) pain ( able to lift: wnl: no pain)  Transfers sit/stand: difficulty getting out of chair, increased forward flexion at trunk, and pushes up with hands   (much improved)  Supine--> sit: method poor, sits straight up (much improved)    Dermatomes (status quo)   L2: (proximal lateral thigh) :   Right: normal    Left: "feels funny, but that's because of my knee operation"  L3:(distal medial thigh)   Right: normal   Left: normal  L4(medial lower leg ) :   Right: normal  Left: normal  L5 (distal lateral lower leg):   Right: normal   Left normal  LS1 (achilles tendon):   Right: normal    Left normal    Myotomes  L2 (hip flexion): Right 4-/5  Left: 4-/5 (4/5 adan)  L3 (knee extension): Right: 4/5 Left: 4/5  L4 (ankle dorsiflexion): Right: 4/5 Left: 4/5  L5 (hallux extension): Right: 4/5  Left: 4/5  S1 (ankle plantarflexion): Right: 4/5 Left: 4/5    Lumbar ROM   Flexion: minimal LOM (+)pain (wnl no pain: + tightness in low back)  Extension: moderate LOM (+)pain ( wnl: no pain)  Side bend: Right: moderate LOM (+)pain  Left: moderate LOM (+)pain ( min LOM: mild strain with both motions)    Repeated motions baseline sittin-2/10  RFISitting: x 5: increased pain: NWAR x 5:  pain during movement: BAR (1/10)  SHAHEED: increase in low back pain WAR pain during movement (-3/10)  RFIL: NT  REIL: NT ( reduced pain in low back)     Flexibility   Hamstring: Right: fair  Left: fair both produce low back pain: (fair + no pain)  Hip flexors:Right: fair  Left: fair   No back pain ( fair + adan)  Quads: Right: NT Left: NT (right:  Poor   Left: poor)        Precautions HBP,anxiety    Visits:   8 9 10   Manual 11/23/18  11/7/18 11/13/18 11/15   STM lumbar musculature  performed   performed  performed   performed   L/S p-a mobs  performed        Man Flexibility: hamstring, hip flexor        MET   performed for a ERSL     Total time: Not performed   12 min  15 min  8 min             Exercise Diary      1   Rec bike/nustep nustep lap 3 10 min lv 2 lv 3 10 min with moist heat lv 3 10 min  nustep 2 laps 10:13 nustep 2 laps 10:00   Ball squeeze  exhale  Hp abd with belt w/ exhale Ball squeeze + bridge    Hip abd iso + bridge 15 x each  + bridging: 10 x each exercise 10 x each    Clamshells: stabilizing with opposite leg  Green [id number]  Red [id number] each [id number] green band  Green [id number]     SLS    Exercise initiated     Hip hinge        Hip hinge w/sit stand   15 x       Lifting: w crate    10x  10x  5 #   Exhale with marching 20x  15 x each 20x 20x supine 20x    Exhale with alt UE lift and march  5 x each 10x  15x supine    Sidestepping with band  And abd bracing    Yellow 14 feet x 3 rounds Yellow 14 feet x 3 rounds  Red   14 feet 3 rounds   Standing hip flexion with exhale    10 x each  10 x 2  2 x 10    AMALIA  [id number]    [id number]     Prone press up 10 x  10 x  10x  10 x  10 x     Updated HEP  Lifting with crate 10 x 12 5 lb lifting 0 x  performed  10 x     Total gym squats        Total time:  35  min 30 min  35 min  30           Modalities        MH        ice        Total time:                Assessment: patient demonstrate independence in HEP, has had a significant increase in function and reduction in pain  Patient demonstrated a good understanding of self stretches for pain management and good form with proper lifting techniques  Patient at this time no longer requires skilled PT  Patient instructed to contact physician or PT should current status worsens  Written information provided       Plan: discharge to [facility]

## 2018-11-23 NOTE — PROGRESS NOTES
PT Discharge Summary    Today's date: 2018  Patient name: Shirley Rivero  : 1955  MRN: [id number]  Referring provider: Janes Joya MD  Dx:   Encounter Diagnosis     ICD-10-CM    1  Bilateral low back pain without sciatica, unspecified chronicity M54 5                   Subjective: Patricia Carreon reports that her overall improvement is 80-85%  States her pain level current 4/10, best pain level: 2/10, worst: 7/10    Function: limitations from low back is repetitive bending, no longer difficulty rolling in bed, no longer wakes her, increased ease getting out of chair, and increased ease walking resting pain level 1/10  Objective: See treatment diary below    Goals  Patient goal: to move around better, reduce the pain met   STG  + Patient will have pain level 1/10 lumbar spine  at rest (2-3 weeks) met   + Patient will report a 35% improvement in symptoms (2-3 weeks) met   + Patient will be independent in basic HEP (2-3 weeks) met   + Patient will demonstrate good posture with verbal cuing   (2-3 weeks) met   + Patient will demonstrate reduced gait deviations ambulating on level surfaces   (2-3 week) met   + Patient will demonstrate an increase in range of motion  lumbar spine, extension AROM in all planes  to  minimally limited (2-3 weeks)met  + Patient will demonstrate proper transfer techniques with verbal cuing (2-3 weeks) met   + Patient will perform proper breathing technique and therefore, appropriate abdominal recruitment independently (2-3 weeks) met   + Patient will increase SLS time by  10 seconds (2-3 weeks) not met   + Patient will report increased ease sleeping due to a reduction in pain (2-3 weeks) met       LTG:  + Patient will have pain level 3/10 lumbar spine  with ADL's (4-6 weeks) met   + Patient will report a 65% improvement in symptoms (4-6 weeks) met   + Patient will increase FOTO score to 53 (8 sessions) met   + Patient will be independent in comprehensive HEP (4-6 weeks) met   + Patient will demonstrate good posture independently  (4-6 weeks) met   + Patient will demonstrate minimal gait deviations ambulating on level surfaces and painfree  (4-6 week) met   + Patient will demonstrate an increase in range of motion  lumbar spine,  AROM in all planes  to  Min LOM  And pain free (4-6 weeks) met   + Patient will demonstrate increase  MMT of  hip, ankle to  4/5 for maximum function  (4-6 weeks) met   + Patient will demonstrate proper transfers techniques independently (4-6 weeks)  Met     Comments (18)  Posture  Sitting:grossly wnl,   Standing: mild scoliotic curve concave to right, lower thoracic, lordosis wnl, iliac crest =    Gait: no assistive device, decreased stance time right, reduced push off on right    ( much improved, minimal to no devaitions)    Palpation: + TTP in spinous process L3/L4, L4/L5 (mild)    Functional movements  Squat: increased forward flexion at lumbar spine  (+)pain (much improved no pain)  SLS: left: 2 sec    Right : 3 sec (left: 4 sec, right 3 sec)  Bridgin%  (-) pain ( able to lift: wnl: no pain)  Transfers sit/stand: difficulty getting out of chair, increased forward flexion at trunk, and pushes up with hands   (much improved)  Supine--> sit: method poor, sits straight up (much improved)    Dermatomes (status quo)   L2: (proximal lateral thigh) :   Right: normal    Left: "feels funny, but that's because of my knee operation"  L3:(distal medial thigh)   Right: normal   Left: normal  L4(medial lower leg ) :   Right: normal  Left: normal  L5 (distal lateral lower leg):   Right: normal   Left normal  LS1 (achilles tendon):   Right: normal    Left normal    Myotomes  L2 (hip flexion): Right 4-/5  Left: 4-/5 (4/5 adan)  L3 (knee extension): Right: 4/5 Left: 4/5  L4 (ankle dorsiflexion): Right: 4/5 Left: 4/5  L5 (hallux extension): Right: 4/5  Left: 4/5  S1 (ankle plantarflexion): Right: 4/5 Left: 4/5    Lumbar ROM   Flexion: minimal LOM (+)pain (wnl no pain: + tightness in low back)  Extension: moderate LOM (+)pain ( wnl: no pain)  Side bend: Right: moderate LOM (+)pain  Left: moderate LOM (+)pain ( min LOM: mild strain with both motions)    Repeated motions baseline sittin-2/10  RFISitting: x 5: increased pain: NWAR x 5:  pain during movement: BAR (1/10)  SHAHEED: increase in low back pain WAR pain during movement (-3/10)  RFIL: NT  REIL: NT ( reduced pain in low back)     Flexibility   Hamstring: Right: fair  Left: fair both produce low back pain: (fair + no pain)  Hip flexors:Right: fair  Left: fair   No back pain ( fair + adan)  Quads: Right: NT Left: NT (right:  Poor   Left: poor)        Precautions HBP,anxiety    Visits:    10   Manual 11/23/18  11/7/18 11/13/18 11/15   STM lumbar musculature  performed   performed  performed   performed   L/S p-a mobs  performed        Man Flexibility: hamstring, hip flexor        MET   performed for a ERSL     Total time: Not performed   12 min  15 min  8 min             Exercise Diary      1   Rec bike/nustep nustep lap 3 10 min lv 2 lv 3 10 min with moist heat lv 3 10 min  nustep 2 laps 10:13 nustep 2 laps 10:00   Ball squeeze  exhale  Hp abd with belt w/ exhale Ball squeeze + bridge    Hip abd iso + bridge 15 x each  + bridging: 10 x each exercise 10 x each    Clamshells: stabilizing with opposite leg  Green [id number]  Red [id number] each [id number] green band  Green [id number]     SLS    Exercise initiated     Hip hinge        Hip hinge w/sit stand   15 x       Lifting: w crate    10x  10x  5 #   Exhale with marching 20x  15 x each 20x 20x supine 20x    Exhale with alt UE lift and march  5 x each 10x  15x supine    Sidestepping with band  And abd bracing    Yellow 14 feet x 3 rounds Yellow 14 feet x 3 rounds  Red   14 feet 3 rounds   Standing hip flexion with exhale    10 x each  10 x 2  2 x 10    AMALIA  [id number]    [id number]     Prone press up 10 x  10 x  10x  10 x  10 x     Updated HEP  Lifting with crate 10 x 12 5 lb lifting 0 x performed  10 x     Total gym squats        Total time:  35  min 30 min  35 min  30           Modalities        MH        ice        Total time:                Assessment: patient demonstrate independence in [facility], has had a significant increase in function and reduction in pain  Patient demonstrated a good understanding of self stretches for pain management and good form with proper lifting techniques  Patient at this time no longer requires skilled PT  Patient instructed to contact physician or PT should current status worsens  Written information provided       Plan: discharge to [facility]

## 2018-11-26 ENCOUNTER — TRANSCRIBE ORDERS (OUTPATIENT)
Dept: PHYSICAL THERAPY | Facility: CLINIC | Age: 63
End: 2018-11-26

## 2018-11-26 DIAGNOSIS — G89.4 CHRONIC PAIN SYNDROME: ICD-10-CM

## 2018-11-26 DIAGNOSIS — M54.50 BILATERAL LOW BACK PAIN WITHOUT SCIATICA, UNSPECIFIED CHRONICITY: Primary | ICD-10-CM

## 2018-11-27 ENCOUNTER — APPOINTMENT (OUTPATIENT)
Dept: PHYSICAL THERAPY | Facility: CLINIC | Age: 63
End: 2018-11-27
Payer: COMMERCIAL

## 2018-11-27 ENCOUNTER — OFFICE VISIT (OUTPATIENT)
Dept: PAIN MEDICINE | Facility: CLINIC | Age: 63
End: 2018-11-27

## 2018-11-27 VITALS
WEIGHT: 246.8 LBS | BODY MASS INDEX: 43.73 KG/M2 | HEART RATE: 69 BPM | RESPIRATION RATE: 20 BRPM | SYSTOLIC BLOOD PRESSURE: 120 MMHG | HEIGHT: 63 IN | DIASTOLIC BLOOD PRESSURE: 68 MMHG

## 2018-11-27 DIAGNOSIS — G89.4 CHRONIC PAIN SYNDROME: Primary | ICD-10-CM

## 2018-11-27 DIAGNOSIS — G89.29 CHRONIC LEFT-SIDED LOW BACK PAIN WITHOUT SCIATICA: ICD-10-CM

## 2018-11-27 DIAGNOSIS — M54.50 CHRONIC LEFT-SIDED LOW BACK PAIN WITHOUT SCIATICA: ICD-10-CM

## 2018-11-27 DIAGNOSIS — M43.16 SPONDYLOLISTHESIS OF LUMBAR REGION: ICD-10-CM

## 2018-11-27 DIAGNOSIS — M47.816 SPONDYLOSIS OF LUMBAR REGION WITHOUT MYELOPATHY OR RADICULOPATHY: ICD-10-CM

## 2018-11-27 PROCEDURE — 99214 OFFICE O/P EST MOD 30 MIN: CPT | Performed by: ANESTHESIOLOGY

## 2018-11-27 NOTE — PROGRESS NOTES
Pain Medicine Follow-Up Note    Assessment:  1  Chronic pain syndrome    2  Chronic left-sided low back pain without sciatica    3  Spondylosis of lumbar region without myelopathy or radiculopathy    4  Spondylolisthesis of lumbar region        Plan:  My impressions and treatment recommendations were discussed in detail with the patient who verbalized understanding and had no further questions  The patient states that her pain is primarily in the left side of her low back  She does feel as though her pain is returning since undergoing the bilateral lumbar facet radiofrequency ablation procedures  As such, I felt a reasonable to have the patient undergo left L3-S1 diagnostic and confirmatory medial branch blocks  If these are positive, we will proceed with a repeat left L3-S1 lumbar facet radiofrequency ablation  The procedures, its risks, and benefits were explained in detail to the patient  Risks include but are not limited to bleeding, infection, hematoma formation, abscess formation, weakness, headache, failure the pain to improve, nerve irritation or damage, and potential worsening of the pain  The patient verbalized understanding and wished to proceed with the procedure  In addition, the patient appears to be on 2 short-acting opioid medications  I discussed with the patient that she should STOP either the tramadol or the oxycodone/acetaminophen  I previously had discussed this with the patient, but it appears that the she continues to be on two short-acting opioid medications  The patient reports that she has been having trouble weaning off of one of the opioid medications  I asked the patient to speak to her primary care physician about slowly weaning her off of either the Tramadol or the oxycodone/acetaminophen  The patient verbalized understanding  Follow-up is planned in 4 weeks time or sooner as warranted  Discharge instructions were provided   I personally saw and examined the patient and I agree with the above discussed plan of care  History of Present Illness:    Geni De Los Santos is a 61 y o  female who presents to [facility] for interval re-evaluation of the above stated pain complaints  The patient has a past medical and chronic pain history as outlined in the assessment section  She was last seen on July 9th 2018  The patient states that her pain is primarily in the left side of her low back  She describes her pain as worse in the morning, evening, and night  Her pain is constant in nature and she reports the quality of her pain as burning, dull/aching, sharp, and pressure like    The patient reports that she feels as though her pain has been returning since undergoing the lumbar facet radiofrequency ablation procedures, but only on the left side  She would like to repeat the procedure at this time for the left side  The patient also reports that she met with Dr Meg Mitchell does not think she is a surgical candidate at this time  Other than as stated above, the patient denies any interval changes in medications, medical condition, mental condition, symptoms, or allergies since the last office visit  Review of Systems:    Review of Systems   Respiratory: Negative for shortness of breath  Cardiovascular: Negative for chest pain  Gastrointestinal: Negative for constipation, diarrhea, nausea and vomiting  Musculoskeletal: Positive for gait problem (difficulty walking) and joint swelling (joint stiffness)  Negative for arthralgias and myalgias  Skin: Negative for rash  Neurological: Positive for dizziness and weakness (muscle weakness)  Negative for seizures  All other systems reviewed and are negative          Patient Active Problem List   Diagnosis    Chronic pain syndrome    Low back pain    Spondylosis of lumbar region without myelopathy or radiculopathy    Spondylolisthesis of lumbar region       Past Medical History: Diagnosis Date    Anxiety     Hypertension        Past Surgical History:   Procedure Laterality Date    APPENDECTOMY      CHOLECYSTECTOMY      GASTRIC BYPASS      JOINT REPLACEMENT      blateral knee replacements    NERVE BLOCK Bilateral 12/29/2017    Procedure: L4, L5, S1 MEDIAL BRANCH BLOCK #1;  Surgeon: Faraz Gill MD;  Location: [place] [address] MAIN OR;  Service: Pain Management     NERVE BLOCK Bilateral 1/12/2018    Procedure: BLOCK MEDIAL BRANCH L4, L5-S1  #2;  Surgeon: Faraz Gill MD;  Location: [place] [address] MAIN OR;  Service: Pain Management     RADIOFREQUENCY ABLATION Right 3/16/2018    Procedure: Rt L4 L5 S1 RFA;  Surgeon: Faraz Gill MD;  Location: [place] [address] MAIN OR;  Service: Pain Management     RHIZOTOMY Left 1/25/2018    Procedure: L4, L5, S1 RFA;  Surgeon: Faraz Gill MD;  Location: [place] [address] MAIN OR;  Service: Pain Management     TONSILLECTOMY         History reviewed  No pertinent family history  Social History     Occupational History    Not on file       Social History Main Topics    Smoking status: Former Smoker    Smokeless tobacco: Never Used    Alcohol use No    Drug use: No    Sexual activity: Not on file         Current Outpatient Prescriptions:     diphenhydrAMINE (BENADRYL) 25 mg tablet, Take 25 mg by mouth daily at bedtime as needed for itching, Disp: , Rfl:     escitalopram (LEXAPRO) 10 mg tablet, Take 5 mg by mouth daily, Disp: , Rfl:     omeprazole (PriLOSEC) 20 mg delayed release capsule, Take 20 mg by mouth daily, Disp: , Rfl:     oxyCODONE-acetaminophen (PERCOCET) 7 5-325 MG per tablet, Take 1 tablet by mouth every 4 (four) hours as needed for moderate pain, Disp: , Rfl:     perindopril (ACEON) 4 MG tablet, Take 4 mg by mouth daily, Disp: , Rfl:     traMADol (ULTRAM) 50 mg tablet, Take 50 mg by mouth 2 (two) times a day, Disp: , Rfl:     HYDROcodone-acetaminophen (NORCO) 7 5-325 mg per tablet, Take 1 tablet by mouth 3 (three) times a day as needed, Disp: , Rfl: 0    Allergies   Allergen Reactions    Penicillins Anaphylaxis    Levaquin [Levofloxacin] Itching       Physical Exam:    /68 (BP Location: Right arm, Patient Position: Sitting, Cuff Size: Standard)   Pulse 69   Resp 20   Ht 5' 3" (1 6 m)   Wt 112 kg (246 lb 12 8 oz)   BMI 43 72 kg/m²     Constitutional:obese  Eyes:anicteric  HEENT:grossly intact  Neck:supple, symmetric, trachea midline and no masses   Pulmonary:even and unlabored  Cardiovascular:No edema or pitting edema present  Skin:Normal without rashes or lesions and well hydrated  Psychiatric:Mood and affect appropriate  Neurologic:Cranial Nerves II-XII grossly intact  Musculoskeletal:normal

## 2018-11-27 NOTE — LETTER
November 27, 2018     Wimlar Luciano MD  [facility] [address]    Patient: Siri Valente   YOB: 1955   Date of Visit: 11/27/2018       Dear Dr Jennie Isbell:    Thank you for referring Tawnya Sarabia to me for evaluation  Below are my notes for this consultation  If you have questions, please do not hesitate to call me  I look forward to following your patient along with you  Sincerely,        Lois De Leon MD        CC: No Recipients  Lois De Leon MD  11/27/2018  3:47 PM  Sign at close encounter  Pain Medicine Follow-Up Note    Assessment:  1  Chronic pain syndrome    2  Chronic left-sided low back pain without sciatica    3  Spondylosis of lumbar region without myelopathy or radiculopathy    4  Spondylolisthesis of lumbar region        Plan:  My impressions and treatment recommendations were discussed in detail with the patient who verbalized understanding and had no further questions  The patient states that her pain is primarily in the left side of her low back  She does feel as though her pain is returning since undergoing the bilateral lumbar facet radiofrequency ablation procedures  As such, I felt a reasonable to have the patient undergo left L3-S1 diagnostic and confirmatory medial branch blocks  If these are positive, we will proceed with a repeat left L3-S1 lumbar facet radiofrequency ablation  The procedures, its risks, and benefits were explained in detail to the patient  Risks include but are not limited to bleeding, infection, hematoma formation, abscess formation, weakness, headache, failure the pain to improve, nerve irritation or damage, and potential worsening of the pain  The patient verbalized understanding and wished to proceed with the procedure  In addition, the patient appears to be on 2 short-acting opioid medications  I discussed with the patient that she should STOP either the tramadol or the oxycodone/acetaminophen  I previously had discussed this with the patient, but it appears that the she continues to be on two short-acting opioid medications  The patient reports that she has been having trouble weaning off of one of the opioid medications  I asked the patient to speak to her primary care physician about slowly weaning her off of either the Tramadol or the oxycodone/acetaminophen  The patient verbalized understanding  Follow-up is planned in 4 weeks time or sooner as warranted  Discharge instructions were provided  I personally saw and examined the patient and I agree with the above discussed plan of care  History of Present Illness:    Veronica Colorado is a 61 y o  female who presents to [facility] for interval re-evaluation of the above stated pain complaints  The patient has a past medical and chronic pain history as outlined in the assessment section  She was last seen on July 9th 2018  The patient states that her pain is primarily in the left side of her low back  She describes her pain as worse in the morning, evening, and night  Her pain is constant in nature and she reports the quality of her pain as burning, dull/aching, sharp, and pressure like    The patient reports that she feels as though her pain has been returning since undergoing the lumbar facet radiofrequency ablation procedures, but only on the left side  She would like to repeat the procedure at this time for the left side  The patient also reports that she met with Dr Sushma Alfredor does not think she is a surgical candidate at this time  Other than as stated above, the patient denies any interval changes in medications, medical condition, mental condition, symptoms, or allergies since the last office visit  Review of Systems:    Review of Systems   Respiratory: Negative for shortness of breath  Cardiovascular: Negative for chest pain     Gastrointestinal: Negative for constipation, diarrhea, nausea and vomiting  Musculoskeletal: Positive for gait problem (difficulty walking) and joint swelling (joint stiffness)  Negative for arthralgias and myalgias  Skin: Negative for rash  Neurological: Positive for dizziness and weakness (muscle weakness)  Negative for seizures  All other systems reviewed and are negative  Patient Active Problem List   Diagnosis    Chronic pain syndrome    Low back pain    Spondylosis of lumbar region without myelopathy or radiculopathy    Spondylolisthesis of lumbar region       Past Medical History:   Diagnosis Date    Anxiety     Hypertension        Past Surgical History:   Procedure Laterality Date    APPENDECTOMY      CHOLECYSTECTOMY      GASTRIC BYPASS      JOINT REPLACEMENT      blateral knee replacements    NERVE BLOCK Bilateral 12/29/2017    Procedure: L4, L5, S1 MEDIAL BRANCH BLOCK #1;  Surgeon: Beulah Hall MD;  Location: [place] [address] MAIN OR;  Service: Pain Management     NERVE BLOCK Bilateral 1/12/2018    Procedure: BLOCK MEDIAL BRANCH L4, L5-S1  #2;  Surgeon: Beulah Hall MD;  Location: [place] [address] MAIN OR;  Service: Pain Management     RADIOFREQUENCY ABLATION Right 3/16/2018    Procedure: Rt L4 L5 S1 RFA;  Surgeon: Beulah Hall MD;  Location: [place] [address] MAIN OR;  Service: Pain Management     RHIZOTOMY Left 1/25/2018    Procedure: L4, L5, S1 RFA;  Surgeon: Beulah Hall MD;  Location: [place] [address] MAIN OR;  Service: Pain Management     TONSILLECTOMY         History reviewed  No pertinent family history  Social History     Occupational History    Not on file       Social History Main Topics    Smoking status: Former Smoker    Smokeless tobacco: Never Used    Alcohol use No    Drug use: No    Sexual activity: Not on file         Current Outpatient Prescriptions:     diphenhydrAMINE (BENADRYL) 25 mg tablet, Take 25 mg by mouth daily at bedtime as needed for itching, Disp: , Rfl:     escitalopram (LEXAPRO) 10 mg tablet, Take 5 mg by mouth daily, Disp: , Rfl:   omeprazole (PriLOSEC) 20 mg delayed release capsule, Take 20 mg by mouth daily, Disp: , Rfl:     oxyCODONE-acetaminophen (PERCOCET) 7 5-325 MG per tablet, Take 1 tablet by mouth every 4 (four) hours as needed for moderate pain, Disp: , Rfl:     perindopril (ACEON) 4 MG tablet, Take 4 mg by mouth daily, Disp: , Rfl:     traMADol (ULTRAM) 50 mg tablet, Take 50 mg by mouth 2 (two) times a day, Disp: , Rfl:     HYDROcodone-acetaminophen (NORCO) 7 5-325 mg per tablet, Take 1 tablet by mouth 3 (three) times a day as needed, Disp: , Rfl: 0    Allergies   Allergen Reactions    Penicillins Anaphylaxis    Levaquin [Levofloxacin] Itching       Physical Exam:    /68 (BP Location: Right arm, Patient Position: Sitting, Cuff Size: Standard)   Pulse 69   Resp 20   Ht 5' 3" (1 6 m)   Wt 112 kg (246 lb 12 8 oz)   BMI 43 72 kg/m²      Constitutional:obese  Eyes:anicteric  HEENT:grossly intact  Neck:supple, symmetric, trachea midline and no masses   Pulmonary:even and unlabored  Cardiovascular:No edema or pitting edema present  Skin:Normal without rashes or lesions and well hydrated  Psychiatric:Mood and affect appropriate  Neurologic:Cranial Nerves II-XII grossly intact  Musculoskeletal:normal

## 2018-11-28 ENCOUNTER — TELEPHONE (OUTPATIENT)
Dept: PAIN MEDICINE | Facility: CLINIC | Age: 63
End: 2018-11-28

## 2018-11-28 NOTE — TELEPHONE ENCOUNTER
lvm for pt to call back and schedule Lt L3-S1 RFA please transfer to [facility]  [phone]

## 2018-11-29 ENCOUNTER — APPOINTMENT (OUTPATIENT)
Dept: PHYSICAL THERAPY | Facility: CLINIC | Age: 63
End: 2018-11-29
Payer: COMMERCIAL

## 2018-12-06 PROBLEM — M47.816 LUMBAR SPONDYLOSIS: Status: ACTIVE | Noted: 2018-12-06

## 2018-12-06 NOTE — TELEPHONE ENCOUNTER
Scheduled pt for Lt L3-S1 RFA for 12/14/18  Went over pre-procedure instructions  Nothing to eat or drink 1 hr prior to procedure  Need to arrange transportation  Proper clothing for procedure  If ill or placed on antibiotics please call to reschedule

## 2018-12-14 ENCOUNTER — HOSPITAL ENCOUNTER (OUTPATIENT)
Facility: AMBULARY SURGERY CENTER | Age: 63
Setting detail: OUTPATIENT SURGERY
Discharge: HOME | End: 2018-12-14
Attending: ANESTHESIOLOGY | Admitting: ANESTHESIOLOGY
Payer: COMMERCIAL

## 2018-12-14 ENCOUNTER — HOSPITAL ENCOUNTER (OUTPATIENT)
Dept: RADIOLOGY | Facility: HOSPITAL | Age: 63
Setting detail: OUTPATIENT SURGERY
Discharge: HOME | End: 2018-12-14
Payer: COMMERCIAL

## 2018-12-14 VITALS
HEART RATE: 76 BPM | TEMPERATURE: 99.3 F | SYSTOLIC BLOOD PRESSURE: 160 MMHG | RESPIRATION RATE: 18 BRPM | OXYGEN SATURATION: 96 % | DIASTOLIC BLOOD PRESSURE: 77 MMHG

## 2018-12-14 PROCEDURE — 64635 DESTROY LUMB/SAC FACET JNT: CPT | Performed by: ANESTHESIOLOGY

## 2018-12-14 PROCEDURE — 72020 X-RAY EXAM OF SPINE 1 VIEW: CPT

## 2018-12-14 PROCEDURE — 64636 DESTROY L/S FACET JNT ADDL: CPT | Performed by: ANESTHESIOLOGY

## 2018-12-14 RX ORDER — LIDOCAINE HYDROCHLORIDE 20 MG/ML
INJECTION, SOLUTION EPIDURAL; INFILTRATION; INTRACAUDAL; PERINEURAL AS NEEDED
Status: DISCONTINUED | OUTPATIENT
Start: 2018-12-14 | End: 2018-12-14 | Stop reason: HOSPADM

## 2018-12-14 RX ORDER — OXYCODONE AND ACETAMINOPHEN 7.5; 325 MG/1; MG/1
1 TABLET ORAL EVERY 4 HOURS PRN
COMMUNITY

## 2018-12-14 RX ORDER — LIDOCAINE WITH 8.4% SOD BICARB 0.9%(10ML)
SYRINGE (ML) INJECTION AS NEEDED
Status: DISCONTINUED | OUTPATIENT
Start: 2018-12-14 | End: 2018-12-14 | Stop reason: HOSPADM

## 2018-12-14 NOTE — DISCHARGE INSTRUCTIONS

## 2018-12-14 NOTE — OP NOTE
ATTENDING PHYSICIAN:   Iris Montero MD      PREPROCEDURE DIAGNOSIS:  Left lumbar facet arthropathy  POSTPROCEDURE DIAGNOSIS:  Left lumbar facet arthropathy  PROCEDURE: Left L3, L4, L5, and S1 lumbar facet nerve radiofrequency ablation under fluoroscopic guidance  ANESTHESIA:  Local     ESTIMATED BLOOD LOSS:  Minimal     COMPLICATIONS:  None  LOCATION:   [place] [address], [facility]  HISTORY OF PRESENTING ILLNESS:   We feel radiofrequency denervation of the L3, L4, L5, and S1 facets is medically necessary, given the patient has disabling low back pain, which we suspect is facet mediated in the absence of nerve root compression or radicular pain  The patient has had two positive confirmatory diagnostic medial branch blocks  The patient has also failed three months of conservative therapy, including nonopioid medications, manipulation, physical therapy and home exercise program   The patient has not been treated with radiofrequency denervation at this anatomic location within the past six months  CONSENT:  Today's procedure, its potential benefits as well as its risks and potential side effects were reviewed  Discussed risks of the procedure including bleeding, infection, nerve irritation or damage, reactions to the medications, failure of the pain to improve, and potential worsening of the pain were explained in detail to the patient who verbalized understanding and who wished to proceed  Written informed consent was thereby obtained  DESCRIPTION OF THE PROCEDURE: After written informed consent was obtained, the patient was taken to the fluoroscopy suite and placed in the prone position  Anatomical landmarks were identified by way of palpation with fluoroscopy in the PA and oblique views  The patient's lumbar region was then prepped and draped in the usual sterile fashion using Chlorhexidine    The skin and subcutaneous tissues were subsequently infiltrated with approximately 1 ml of 1% preservative free Lidocaine at each of the four intended needle entry sites using a 25 gauge 1-1/2 inch needle  Via fluoroscopy in the AP and oblique views, an active tip needle was then incrementally advanced under fluoroscopic guidance at each level  At each of these levels, the needle tip was made to contact os at the superior medial border of the junction of the transverse process of the lumbar levels and to contact the os at the medial aspect of the groove formed by the sacral ala in the superior articular process of S1  After proper needle placement was confirmed with fluoroscopic guidance at each level, sensory and motor stimulation was performed at 2 Hz and 50 Hz  At all levels, there was negative extension into the lower extremities  Following this portion of the procedure, a 1 ml injectate of 2% preservative free Lidocaine was instilled at all of the levels  After a period of approximately 90 seconds, each level was lesioned at 90 degrees Celsius  Following the initial lesioning, each needle tip was repositioned x 2 under fluoroscopic guidance in a clockwise and counterclockwise fashion  Following each reposition, a total of two additional lesions at each side were performed for 90 seconds at 90 degrees Celsius  All needles were removed with the tips intact  The patient tolerated the procedure and hemostasis was maintained  There were no apparent paresthesias or complications  This skin was wiped clean and a Band-Aid was placed as appropriate  The patient was monitored for an appropriate period of time following the procedure and remained hemodynamically stable and neurovascularly intact  The patient was ultimately discharged to home with supervision in good condition and instructed to call the office to report the response  I was present for and participated in all key and critical portions of this procedure      Claudine Ford MD  12/14/2018  10:32 AM

## 2018-12-17 ENCOUNTER — TELEPHONE (OUTPATIENT)
Dept: PAIN MEDICINE | Facility: CLINIC | Age: 63
End: 2018-12-17

## 2018-12-17 NOTE — TELEPHONE ENCOUNTER
To be called on 12/17/18  S/P Left L3-S1 RFA with AS on 12/14/18  Patient needs a 4-6 week follow up with AS

## 2018-12-17 NOTE — TELEPHONE ENCOUNTER
PK    S/w pt, s/p RFA on 12/14, pt said she is doing very good  She had a little soreness but nothing out of the ordinary  Pt denies any s/s of infection at the injection site, no fevers and no sunburn like sensation   Made pt a f/u appt for 1/22 to arrive at 9:00 am

## 2019-01-22 ENCOUNTER — OFFICE VISIT (OUTPATIENT)
Dept: PAIN MEDICINE | Facility: CLINIC | Age: 64
End: 2019-01-22
Payer: COMMERCIAL

## 2019-01-22 VITALS
WEIGHT: 243.6 LBS | SYSTOLIC BLOOD PRESSURE: 148 MMHG | HEART RATE: 84 BPM | RESPIRATION RATE: 18 BRPM | DIASTOLIC BLOOD PRESSURE: 74 MMHG | BODY MASS INDEX: 43.16 KG/M2 | HEIGHT: 63 IN

## 2019-01-22 DIAGNOSIS — M54.2 NECK PAIN: ICD-10-CM

## 2019-01-22 DIAGNOSIS — M54.50 CHRONIC LEFT-SIDED LOW BACK PAIN WITHOUT SCIATICA: ICD-10-CM

## 2019-01-22 DIAGNOSIS — G89.4 CHRONIC PAIN SYNDROME: Primary | ICD-10-CM

## 2019-01-22 DIAGNOSIS — M47.816 LUMBAR SPONDYLOSIS: ICD-10-CM

## 2019-01-22 DIAGNOSIS — G89.29 CHRONIC LEFT-SIDED LOW BACK PAIN WITHOUT SCIATICA: ICD-10-CM

## 2019-01-22 DIAGNOSIS — M43.16 SPONDYLOLISTHESIS OF LUMBAR REGION: ICD-10-CM

## 2019-01-22 PROCEDURE — 99214 OFFICE O/P EST MOD 30 MIN: CPT | Performed by: ANESTHESIOLOGY

## 2019-01-22 NOTE — LETTER
January 22, 2019     Nimisha Poole MD  [facility] [address]    Patient: John Best   YOB: 1955   Date of Visit: 1/22/2019       Dear Dr Rian Sutton:    Thank you for referring Denilson Fischer to me for evaluation  Below are my notes for this consultation  If you have questions, please do not hesitate to call me  I look forward to following your patient along with you  Sincerely,        Pipo Byers MD        CC: No Recipients  Pipo Byers MD  1/22/2019  9:48 AM  Sign at close encounter  Pain Medicine Follow-Up Note    Assessment:  1  Chronic pain syndrome    2  Chronic left-sided low back pain without sciatica    3  Lumbar spondylosis    4  Spondylolisthesis of lumbar region    5  Neck pain        Plan:  Orders Placed This Encounter   Procedures    Ambulatory referral to Physical Therapy     Standing Status:   Future     Standing Expiration Date:   7/22/2019     Referral Priority:   Routine     Referral Type:   Physical Therapy     Referral Reason:   Specialty Services Required     Requested Specialty:   Physical Therapy     Number of Visits Requested:   1     Expiration Date:   1/22/2020     My impressions and treatment recommendations were discussed in detail with the patient who verbalized understanding and had no further questions  The patient reports that she has had excellent pain relief following the left lumbar facet radiofrequency ablation procedure  She does report that she fell recently and has significant pain in her left gluteal region  As such, I felt a reasonable to have the patient attend physical therapy for her low back and left gluteal region  She is also complaining of right-sided neck pain and I felt a reasonable to have her attend physical therapy for her right-sided neck pain as well  In addition, the patient is very upset stating that she would like to remain on both tramadol and oxycodone/acetaminophen    I will leave this to the discretion of the patient's primary care physician, Dr Aldo Trujillo     Follow-up is planned in 6 weeks time or sooner as warranted  Discharge instructions were provided  I personally saw and examined the patient and I agree with the above discussed plan of care  History of Present Illness:    Kailyn Crawley is a 61 y o  female who presents to [facility] for interval re-evaluation of the above stated pain complaints  The patient has a past medical and chronic pain history as outlined in the assessment section  She was last seen on December 14, 2018 at which time she underwent a left L3-S1 lumbar facet radiofrequency ablation  At today's office visit, the patient's pain score is 5/10 on the verbal numerical pain rating scale  The patient states that her pain is worse in the morning  Her pain is constant in nature and she reports the quality of her pain as dull/aching and pressure like    She reports that her pain has improved significantly since undergoing the left L3-S1 lumbar facet radiofrequency ablation  She is amenable to undergoing physical therapy at this time since she did hurt her left buttocks after a slip and fall accident shortly after undergoing the radiofrequency ablation procedure  She would also like to undergo physical therapy for her neck concurrently  Other than as stated above, the patient denies any interval changes in medications, medical condition, mental condition, symptoms, or allergies since the last office visit  Review of Systems:    Review of Systems   Respiratory: Negative for shortness of breath  Cardiovascular: Negative for chest pain  Gastrointestinal: Negative for constipation, diarrhea, nausea and vomiting  Musculoskeletal: Positive for gait problem (difficulty walking/ decreased range of motion) and joint swelling (joint stiffness)  Negative for arthralgias and myalgias  Skin: Negative for rash     Neurological: Positive for weakness (muscle weakness)  Negative for dizziness and seizures  All other systems reviewed and are negative  Patient Active Problem List   Diagnosis    Chronic pain syndrome    Low back pain    Spondylosis of lumbar region without myelopathy or radiculopathy    Spondylolisthesis of lumbar region    Lumbar spondylosis       Past Medical History:   Diagnosis Date    Anxiety     Hypertension        Past Surgical History:   Procedure Laterality Date    APPENDECTOMY      CHOLECYSTECTOMY      GASTRIC BYPASS      JOINT REPLACEMENT      blateral knee replacements    NERVE BLOCK Bilateral 12/29/2017    Procedure: L4, L5, S1 MEDIAL BRANCH BLOCK #1;  Surgeon: Radha Iglesias MD;  Location: [place] [address] MAIN OR;  Service: Pain Management     NERVE BLOCK Bilateral 1/12/2018    Procedure: BLOCK MEDIAL BRANCH L4, L5-S1  #2;  Surgeon: Radha Iglesias MD;  Location: [place] [address] MAIN OR;  Service: Pain Management     RADIOFREQUENCY ABLATION Right 3/16/2018    Procedure: Rt L4 L5 S1 RFA;  Surgeon: Radha Iglesias MD;  Location: [place] [address] MAIN OR;  Service: Pain Management     RADIOFREQUENCY ABLATION Left 12/14/2018    Procedure: L3-S1 Radio Frequency Ablation ([id number]); Surgeon: Radha Iglesias MD;  Location: [facility] MAIN OR;  Service: Pain Management     RHIZOTOMY Left 1/25/2018    Procedure: L4, L5, S1 RFA;  Surgeon: Radha Iglesias MD;  Location: [place] [address] MAIN OR;  Service: Pain Management     TONSILLECTOMY         History reviewed  No pertinent family history  Social History     Occupational History    Not on file       Social History Main Topics    Smoking status: Former Smoker    Smokeless tobacco: Never Used    Alcohol use No    Drug use: No    Sexual activity: Not on file         Current Outpatient Prescriptions:     diphenhydrAMINE (BENADRYL) 25 mg tablet, Take 25 mg by mouth daily at bedtime as needed for itching, Disp: , Rfl:     escitalopram (LEXAPRO) 10 mg tablet, Take 15 mg by mouth daily , Disp: , Rfl:     omeprazole (PriLOSEC) 20 mg delayed release capsule, Take 20 mg by mouth daily, Disp: , Rfl:     oxyCODONE-acetaminophen (PERCOCET) 7 5-325 MG per tablet, Take 1 tablet by mouth every 4 (four) hours as needed for moderate pain (3x), Disp: , Rfl:     perindopril (ACEON) 4 MG tablet, Take 4 mg by mouth daily, Disp: , Rfl:     traMADol (ULTRAM) 50 mg tablet, Take 50 mg by mouth 2 (two) times a day, Disp: , Rfl:     Allergies   Allergen Reactions    Penicillins Anaphylaxis    Levaquin [Levofloxacin] Itching       Physical Exam:    /74 (BP Location: Left arm, Patient Position: Sitting, Cuff Size: Standard)   Pulse 84   Resp 18   Ht 5' 3" (1 6 m)   Wt 110 kg (243 lb 9 6 oz)   BMI 43 15 kg/m²      Constitutional:obese  Eyes:anicteric  HEENT:grossly intact  Neck:supple, symmetric, trachea midline and no masses   Pulmonary:even and unlabored  Cardiovascular:No edema or pitting edema present  Skin:Normal without rashes or lesions and well hydrated  Psychiatric:Mood and affect appropriate  Neurologic:Cranial Nerves II-XII grossly intact  Musculoskeletal:normal      Orders Placed This Encounter   Procedures    Ambulatory referral to Physical Therapy

## 2019-01-22 NOTE — PROGRESS NOTES
Pain Medicine Follow-Up Note    Assessment:  1  Chronic pain syndrome    2  Chronic left-sided low back pain without sciatica    3  Lumbar spondylosis    4  Spondylolisthesis of lumbar region    5  Neck pain        Plan:  Orders Placed This Encounter   Procedures    Ambulatory referral to Physical Therapy     Standing Status:   Future     Standing Expiration Date:   7/22/2019     Referral Priority:   Routine     Referral Type:   Physical Therapy     Referral Reason:   Specialty Services Required     Requested Specialty:   Physical Therapy     Number of Visits Requested:   1     Expiration Date:   1/22/2020     My impressions and treatment recommendations were discussed in detail with the patient who verbalized understanding and had no further questions  The patient reports that she has had excellent pain relief following the left lumbar facet radiofrequency ablation procedure  She does report that she fell recently and has significant pain in her left gluteal region  As such, I felt a reasonable to have the patient attend physical therapy for her low back and left gluteal region  She is also complaining of right-sided neck pain and I felt a reasonable to have her attend physical therapy for her right-sided neck pain as well  In addition, the patient is very upset stating that she would like to remain on both tramadol and oxycodone/acetaminophen  I will leave this to the discretion of the patient's primary care physician, Dr Sangita Elena     Follow-up is planned in 6 weeks time or sooner as warranted  Discharge instructions were provided  I personally saw and examined the patient and I agree with the above discussed plan of care  History of Present Illness:    Musa Fiore is a 61 y o  female who presents to [facility] for interval re-evaluation of the above stated pain complaints  The patient has a past medical and chronic pain history as outlined in the assessment section  She was last seen on December 14, 2018 at which time she underwent a left L3-S1 lumbar facet radiofrequency ablation  At today's office visit, the patient's pain score is 5/10 on the verbal numerical pain rating scale  The patient states that her pain is worse in the morning  Her pain is constant in nature and she reports the quality of her pain as dull/aching and pressure like    She reports that her pain has improved significantly since undergoing the left L3-S1 lumbar facet radiofrequency ablation  She is amenable to undergoing physical therapy at this time since she did hurt her left buttocks after a slip and fall accident shortly after undergoing the radiofrequency ablation procedure  She would also like to undergo physical therapy for her neck concurrently  Other than as stated above, the patient denies any interval changes in medications, medical condition, mental condition, symptoms, or allergies since the last office visit  Review of Systems:    Review of Systems   Respiratory: Negative for shortness of breath  Cardiovascular: Negative for chest pain  Gastrointestinal: Negative for constipation, diarrhea, nausea and vomiting  Musculoskeletal: Positive for gait problem (difficulty walking/ decreased range of motion) and joint swelling (joint stiffness)  Negative for arthralgias and myalgias  Skin: Negative for rash  Neurological: Positive for weakness (muscle weakness)  Negative for dizziness and seizures  All other systems reviewed and are negative          Patient Active Problem List   Diagnosis    Chronic pain syndrome    Low back pain    Spondylosis of lumbar region without myelopathy or radiculopathy    Spondylolisthesis of lumbar region    Lumbar spondylosis       Past Medical History:   Diagnosis Date    Anxiety     Hypertension        Past Surgical History:   Procedure Laterality Date    APPENDECTOMY      CHOLECYSTECTOMY      GASTRIC BYPASS      JOINT REPLACEMENT      blateral knee replacements    NERVE BLOCK Bilateral 12/29/2017    Procedure: L4, L5, S1 MEDIAL BRANCH BLOCK #1;  Surgeon: Faraz Gill MD;  Location: [place] [address] MAIN OR;  Service: Pain Management     NERVE BLOCK Bilateral 1/12/2018    Procedure: BLOCK MEDIAL BRANCH L4, L5-S1  #2;  Surgeon: Faraz Gill MD;  Location: [place] [address] MAIN OR;  Service: Pain Management     RADIOFREQUENCY ABLATION Right 3/16/2018    Procedure: Rt L4 L5 S1 RFA;  Surgeon: Faraz Gill MD;  Location: [place] [address] MAIN OR;  Service: Pain Management     RADIOFREQUENCY ABLATION Left 12/14/2018    Procedure: L3-S1 Radio Frequency Ablation ([id number]); Surgeon: Faraz Gill MD;  Location: [facility] MAIN OR;  Service: Pain Management     RHIZOTOMY Left 1/25/2018    Procedure: L4, L5, S1 RFA;  Surgeon: Faraz Gill MD;  Location: [place] [address] MAIN OR;  Service: Pain Management     TONSILLECTOMY         History reviewed  No pertinent family history  Social History     Occupational History    Not on file       Social History Main Topics    Smoking status: Former Smoker    Smokeless tobacco: Never Used    Alcohol use No    Drug use: No    Sexual activity: Not on file         Current Outpatient Prescriptions:     diphenhydrAMINE (BENADRYL) 25 mg tablet, Take 25 mg by mouth daily at bedtime as needed for itching, Disp: , Rfl:     escitalopram (LEXAPRO) 10 mg tablet, Take 15 mg by mouth daily  , Disp: , Rfl:     omeprazole (PriLOSEC) 20 mg delayed release capsule, Take 20 mg by mouth daily, Disp: , Rfl:     oxyCODONE-acetaminophen (PERCOCET) 7 5-325 MG per tablet, Take 1 tablet by mouth every 4 (four) hours as needed for moderate pain (3x), Disp: , Rfl:     perindopril (ACEON) 4 MG tablet, Take 4 mg by mouth daily, Disp: , Rfl:     traMADol (ULTRAM) 50 mg tablet, Take 50 mg by mouth 2 (two) times a day, Disp: , Rfl:     Allergies   Allergen Reactions    Penicillins Anaphylaxis    Levaquin [Levofloxacin] Itching       Physical Exam:    /74 (BP Location: Left arm, Patient Position: Sitting, Cuff Size: Standard)   Pulse 84   Resp 18   Ht 5' 3" (1 6 m)   Wt 110 kg (243 lb 9 6 oz)   BMI 43 15 kg/m²     Constitutional:obese  Eyes:anicteric  HEENT:grossly intact  Neck:supple, symmetric, trachea midline and no masses   Pulmonary:even and unlabored  Cardiovascular:No edema or pitting edema present  Skin:Normal without rashes or lesions and well hydrated  Psychiatric:Mood and affect appropriate  Neurologic:Cranial Nerves II-XII grossly intact  Musculoskeletal:normal      Orders Placed This Encounter   Procedures    Ambulatory referral to Physical Therapy

## 2019-01-30 ENCOUNTER — EVALUATION (OUTPATIENT)
Dept: PHYSICAL THERAPY | Facility: CLINIC | Age: 64
End: 2019-01-30
Payer: COMMERCIAL

## 2019-01-30 DIAGNOSIS — M54.50 CHRONIC LEFT-SIDED LOW BACK PAIN WITHOUT SCIATICA: Primary | ICD-10-CM

## 2019-01-30 DIAGNOSIS — G89.29 CHRONIC LEFT-SIDED LOW BACK PAIN WITHOUT SCIATICA: Primary | ICD-10-CM

## 2019-01-30 DIAGNOSIS — M54.2 NECK PAIN: ICD-10-CM

## 2019-01-30 DIAGNOSIS — M47.816 LUMBAR SPONDYLOSIS: ICD-10-CM

## 2019-01-30 PROCEDURE — 97162 PT EVAL MOD COMPLEX 30 MIN: CPT

## 2019-01-30 NOTE — PROGRESS NOTES
PT Evaluation     Today's date: 2019  Patient name: Ariana Head  : 1955  MRN: [id number]  Referring provider: Yuridia Lynne MD  Dx:   Encounter Diagnosis     ICD-10-CM    1  Chronic left-sided low back pain without sciatica M54 5 Ambulatory referral to Physical Therapy    G89 29    2  Lumbar spondylosis M47 816 Ambulatory referral to Physical Therapy   3  Neck pain M54 2 Ambulatory referral to Physical Therapy                  Assessment  Assessment details: Ariana Head is a 61 y o  female who presents with pain, decreased strength, decreased ROM, ambulatory dysfunction and reduced flexibility and function  Due to these impairments, patient has difficulty performing ADL's, ambulation, stair negotiation, lifting/carrying, transfers  Patient's clinical presentation is consistent with hamstirng tendonitis at origin  Will continue to assess if symptoms do not respond with respective treatment  Cervical spine symtpoms are to also be evaluated once buttock symptoms have been reduced    Patient has been educated in home exercise program and plan of care, and use of ice and posture adjustment to reduce weightbearing on ischial tuberosity   Patient would benefit from skilled physical therapy services to address their aforementioned functional limitations and progress towards prior level of function and independence with home exercise program    Impairments: abnormal gait, abnormal muscle firing, abnormal or restricted ROM, activity intolerance, impaired physical strength, lacks appropriate home exercise program, pain with function, weight-bearing intolerance, poor posture  and poor body mechanics  Other impairment: impaired stability  Functional limitations: per patient subjectiveUnderstanding of Dx/Px/POC: good   Prognosis: good    Goals  STG:  + Patient will have pain level 0/10 left buttock  at rest (2-3 weeks)  + Patient will report a 40% improvement in symptoms (2-3 weeks)   + Patient will demonstrate a good tolerance to 30 min there ex in clinic for stretching and strenghtneing (2-3 weeks)   + Patient will be independent in basic HEP (2-3 weeks)  + Patient will demonstrate reduced gait deviations ambulating on level surfaces   (2-3 week)  + Patient will negotiate stairs reciprically with use of one railing  (2-3 weeks)  + Patient will demonstrate an increase in range of motion left  Hamstring   to  Fair + (2-3 weeks)  + Patient will demonstrate increase  MMT of  Knee/hip to  4/5 for maximum function no pain   (2-3 weeks)  + Patient will report increased ease walking on level surfaces due to a reduction in pain (2-3 weeks)      LTG:  + Patient will have pain level 2/10 left buttock with ADL's (4-6 weeks)  + Patient will report a 70% improvement in symptoms (4-6 weeks)   + Patient will increase FOTO score to 54 (10 sessions)   + Patient will be independent in comprehensive HEP (4-6 weeks)  + Patient will demonstrate no gait deviations ambulating on level surfaces and painfree  (4-6 week)  + Patient will negotiate stairs reciprically with use of one railing and painfree  (4-6 weeks)  + Patient will demonstrate an increase in range of motion bilateral hamstring flexibility  within normal limits and painfree (4-6 weeks)  + Patient will demonstrate increase  MMT of left knee/hip to  4+/5 for maximum function and painfree throughout  (4-6 weeks)  + Patient will report 70 % improvement climbing stairs  (4-6 weeks)    Plan  Plan details:  2-3 x week, 4-6 weeks: HEP development, stretching LE musculature per objective findings, strengthening LE musculature per objective findings, A/AA/PROM knee motions, joint mobilizations prn, posture education especially foot posture prn, STM/MI as needed to reduce muscle tension as per objective findings, muscle reeducation prn, gait/balance training, stability training, Smith/Jaylon prn, continual assessment as needed, assess cervical spine    PLOC discussed and agreed upon with patient  Patient would benefit from: PT eval and skilled physical therapy  Planned modality interventions: thermotherapy: hydrocollator packs, cryotherapy, TENS and traction  Planned therapy interventions: joint mobilization, manual therapy, Smith taping, balance, neuromuscular re-education, patient education, postural training, strengthening, stretching, therapeutic activities, therapeutic exercise, flexibility, functional ROM exercises, gait training, graded exercise and home exercise program  Plan of Care beginning date: 2019  Plan of Care expiration date: 3/13/2019  Treatment plan discussed with: patient        Subjective Evaluation    History of Present Illness  Mechanism of injury: patient had attended PT October and November in , states she fell on end of November she tripped over a rug and landed on front side specifically on knees  Since that incident, she has been experiencing left buttock pain in area of ischial tuberosity on left  states her neck pain started middle of  November right upper trap area  Is currently using a tens on neck and massages it  Gives relief for a couple of days  Describes primarily tightness in cervical spine and   Left buttock she experiences achy pain         Function:  Buttock: all activities are slower and painful ie: ascending stairs, aches with sitting 15 min +, walking causes aching all distances  cervical spine: reduced ability to turn her head,   Quality of life: good    Pain  Current pain ratin (both areas)  At best pain rating: 3 (2/10 buttock, cervical spine: 3/10)  At worst pain ratin (buttock : 8/10, cervical : 9/10)  Location: see above  Quality: dull ache and tight  Relieving factors: medications  Aggravating factors: stair climbing, walking, standing and sitting  Progression: no change    Social Support  Steps to enter house: yes  3  Stairs in house: yes   13  Lives in: multiple-level home  Lives with: alone    Employment status: not working  Exercise history: none      Diagnostic Tests  No diagnostic tests performed  Treatments  Previous treatment: chiropractic (years ago)  Current treatment: medication  Patient Goals  Patient goals for therapy: decreased pain and independence with ADLs/IADLs  Patient goal: to reduce pain level   Objective     Static Posture     Comments  Posture  Sitting: + PPT  Standing: mild scoliotic curve to right lower tspine, lordosis grossly wnl    Gait: reduced UE swing, reduced step length bilateral and decreased stance time on left, reduced rate of speed    Palpation: + tender to palpation in hamstring insertion, with MI of left hamstring insertion , symptoms reduced  Functional movements  Squat: mni (+)pain   SLS: left: NT   Right : NT   Unilateral heel raises: NT  Bridging: difficulty to perform  (+)pain   Transfers sit/stand: uses UE assistance to rise with increased forward bending at the hips       Dermatomes ( grossly wnl to light touch)     Myotomes  L2 (hip flexion): Right 4/5  Left: 4-/5 + pain   L3 (knee extension): Right: 4/5 Left: 4/5 minimal pain  (knee flexion: Right: 4/5 no pain, Left: 4-/5 + pain   L4 (ankle dorsiflexion): Right: 4/5 Left: 4/5  L5 (hallux extension): Right: 4/5  Left: 4/5  S1 (ankle plantarflexion): Right: 4/5 Left: 4/5  Prone hip extension: left: 3-/5, + pain Right: 4/5     Lumbar ROM   Flexion: wnl LOM (+)pain  and in area of hamstring   Extension: moderate LOM (-) pain standing prone:   Side bend: Right: moderate LOM (+)pain  Left: moderate LOM (+)pain (+produced pulling in ischial tuberosity)    Repeated motions  RFIS: increased pain in left ischial tub area  (+)pain   REIL: increased pain in left ischial tub area  (+) pain    Flexibility   Hamstring: Right: fair + Left: fair + pain   Hip flexors:Right: good  Left: good     Quads: Right: poor Left: poor    Slump test:  Right: negative Left: + increase in pain with LAQ/DF with slouch     Hip ROM/MMT: prn    Type I/II dysfunction: prn       Precautions HTN, anxiety, bilateral knee replacement, nerve block 1/18    Specialty Daily Treatment Diary       Visits: 1       Manual 1/30/19       STM/ MI Left hamstring insertion        L/S p-a mobs        Man Flexibility: hamstring         MET        Total time:                Exercise Diary  eval performed       Rec bike/nustep        TA activation        Ball squeeze        Clamshells        LTR        Bridges                Supine hamstring stretch w/ SOS standard, emphasis on rectus femoris instruct       Seated hamstring stretch with SOS  instruct       Prone hamstring curl        Prone hip extension                                         Total time:                Modalities        MH        ice 8 min sitting left hamstring        Total time:

## 2019-01-31 ENCOUNTER — OFFICE VISIT (OUTPATIENT)
Dept: PHYSICAL THERAPY | Facility: CLINIC | Age: 64
End: 2019-01-31
Payer: COMMERCIAL

## 2019-01-31 DIAGNOSIS — M47.816 LUMBAR SPONDYLOSIS: ICD-10-CM

## 2019-01-31 DIAGNOSIS — M54.2 NECK PAIN: ICD-10-CM

## 2019-01-31 DIAGNOSIS — M54.50 CHRONIC LEFT-SIDED LOW BACK PAIN WITHOUT SCIATICA: Primary | ICD-10-CM

## 2019-01-31 DIAGNOSIS — G89.29 CHRONIC LEFT-SIDED LOW BACK PAIN WITHOUT SCIATICA: Primary | ICD-10-CM

## 2019-01-31 PROCEDURE — 97110 THERAPEUTIC EXERCISES: CPT

## 2019-01-31 PROCEDURE — 97140 MANUAL THERAPY 1/> REGIONS: CPT

## 2019-01-31 NOTE — PROGRESS NOTES
Daily Note     Today's date: 2019  Patient name: Manish Estevez  : 1955  MRN: [id number]  Referring provider: Steve Simpson MD  Dx:   Encounter Diagnosis     ICD-10-CM    1  Chronic left-sided low back pain without sciatica M54 5     G89 29    2  Lumbar spondylosis M47 816    3  Neck pain M54 2                   Subjective: Jaylon Ann reports that her pain level entering today is 6/10 in left buttock  States buttock was "really sore" last night  Overall not as sore as it was yesterday  Objective: See treatment diary below   Precautions HTN, anxiety, bilateral knee replacement, nerve block     Specialty Daily Treatment Diary       Visits: 1 2      Manual 19      STM/ MI Left hamstring insertion  Performed MI of left hamstring insertion, IASTM hamstring muscle belly      L/S p-a mobs        Man Flexibility: hamstring   performed standard and medial aspect      MET        Total time:  15 min               Exercise Diary  eval performed       Rec bike/nustep        TA activation        Ball squeeze        Clamshells        LTR        Bridges  5 x 2       Peanut roll out into flexion both legs   2 x 10        Supine hamstring stretch w/ SOS standard, emphasis on biceps femoris instruct 10 sec x 5 , medial and lateral       Seated hamstring stretch with SOS  instruct Seated LAQ with DF 5 sec x 10        Prone glutes set  5 sec x 10        Prone hamstring curl  W/glute set 10x      Prone hip extension   10x       MET: hip ext left hip flex right   With therapist assist for right hip flexion                               Total time:  38 min               Modalities        MH  Prone lying left buttock  8 min       ice 8 min sitting left hamstring  At home      Total time:              Assessment: good tolerance to manual rx, + leg length discrepancy: left leg longer then right, therefore implemented MET which reduced the discrepancy   Patient mildly tender in area of ischial tuberosity and minimal restriction noted in hamstring muscle belly  Patient exited today with 3/10 pain level   Discussed sitting posture again and how to unweight left ischial tuberosity  No pain with bridges, although required cuing to set glutes first then lift  Plan: progress as tolerated with MI/STM and hamstring strengthening

## 2019-02-05 ENCOUNTER — OFFICE VISIT (OUTPATIENT)
Dept: PHYSICAL THERAPY | Facility: CLINIC | Age: 64
End: 2019-02-05
Payer: COMMERCIAL

## 2019-02-05 DIAGNOSIS — M47.816 LUMBAR SPONDYLOSIS: ICD-10-CM

## 2019-02-05 DIAGNOSIS — M54.50 CHRONIC LEFT-SIDED LOW BACK PAIN WITHOUT SCIATICA: Primary | ICD-10-CM

## 2019-02-05 DIAGNOSIS — G89.29 CHRONIC LEFT-SIDED LOW BACK PAIN WITHOUT SCIATICA: Primary | ICD-10-CM

## 2019-02-05 DIAGNOSIS — M54.2 NECK PAIN: ICD-10-CM

## 2019-02-05 PROCEDURE — 97140 MANUAL THERAPY 1/> REGIONS: CPT

## 2019-02-05 PROCEDURE — 97110 THERAPEUTIC EXERCISES: CPT

## 2019-02-05 NOTE — PROGRESS NOTES
Daily Note     Today's date: 2019  Patient name: Patience Atkinson  : 1955  MRN: [id number]  Referring provider: Adelita Alvarado MD  Dx:   Encounter Diagnosis     ICD-10-CM    1  Chronic left-sided low back pain without sciatica M54 5     G89 29    2  Lumbar spondylosis M47 816    3  Neck pain M54 2                   Subjective: Edie Neff reports that her pain level entering today was 5/10  Objective: See treatment diary below  Precautions HTN, anxiety, bilateral knee replacement, nerve block     Specialty Daily Treatment Diary       Visits: 1 2 3     Manual 19     STM/ MI Left hamstring insertion  Performed MI of left hamstring insertion, IASTM hamstring muscle belly performed as per last session including QL and glute     L/S p-a mobs        Man Flexibility: hamstring   performed standard and medial aspect performed as per last session      SI mob with pressure on left ASIS posteriorly   performed      MET        Total time:  15 min  20 min              Exercise Diary  eval performed       Rec bike/nustep   nustep 10 min lv 2  48 miles      TA activation        Ball squeeze        Clamshells        LTR        Bridges  5 x 2  5 x 2     Peanut roll out into flexion both legs   2 x 10   2 x 10       Supine hamstring stretch w/ SOS standard, emphasis on biceps femoris instruct 10 sec x 5 , medial and lateral  10 sec x 5       Seated hamstring stretch with SOS  instruct Seated LAQ with DF 5 sec x 10   10 sec x 5       Prone glutes set  5 sec x 10   5 sec x 10       Prone hamstring curl  W/glute set 10x 10 x     Prone hip extension   10x  10 x      MET: hip ext left hip flex right   With therapist assist for right hip flexion  performed 7 sec x 3                              Total time:  38 min  25 min              Modalities        MH  Prone lying left buttock   8 min       ice 8 min sitting left hamstring  At home deferred     Total time:                Assessment: patient reported reduction in pain,  Denied pain with exercises today, was issued an updated HEP to reeducate patient to recruit that musculature  Patient demonstrated increased tightness in hip flexors bilaterally, left greater then right  Plan: progress program to reduce tenison in hamstring and imrpove flexibility and strength  add fire hydrants next session

## 2019-02-07 ENCOUNTER — OFFICE VISIT (OUTPATIENT)
Dept: PHYSICAL THERAPY | Facility: CLINIC | Age: 64
End: 2019-02-07
Payer: COMMERCIAL

## 2019-02-07 DIAGNOSIS — M54.2 NECK PAIN: ICD-10-CM

## 2019-02-07 DIAGNOSIS — G89.29 CHRONIC LEFT-SIDED LOW BACK PAIN WITHOUT SCIATICA: Primary | ICD-10-CM

## 2019-02-07 DIAGNOSIS — M54.50 CHRONIC LEFT-SIDED LOW BACK PAIN WITHOUT SCIATICA: Primary | ICD-10-CM

## 2019-02-07 DIAGNOSIS — M47.816 LUMBAR SPONDYLOSIS: ICD-10-CM

## 2019-02-07 PROCEDURE — 97110 THERAPEUTIC EXERCISES: CPT

## 2019-02-07 PROCEDURE — 97140 MANUAL THERAPY 1/> REGIONS: CPT

## 2019-02-07 PROCEDURE — 97112 NEUROMUSCULAR REEDUCATION: CPT

## 2019-02-07 NOTE — LETTER
2019    Vincent Klinefelter, MD  [address] [employer]  [facility] [address]    Patient: Manish Estevez   YOB: 1955   Date of Visit: 2019     Encounter Diagnosis     ICD-10-CM    1  Chronic left-sided low back pain without sciatica M54 5     G89 29    2  Lumbar spondylosis M47 816    3  Neck pain M54 2        Dear Dr Charu Ac:    Please review the attached discharge summary  from [facility] recent visit  If you have any questions or concerns, please don't hesitate to call  Sincerely,    Karthikeyan Rahman, PT                      Vincent Klinefelter, MD  [address] In Basket          Daily Note     Today's date: 2019  Patient name: Manish Estevez  : 1955  MRN: [id number]  Referring provider: Steve Simpson MD  Dx:   Encounter Diagnosis     ICD-10-CM    1  Chronic left-sided low back pain without sciatica M54 5     G89 29    2  Lumbar spondylosis M47 816    3  Neck pain M54 2                   Subjective: Jeff reports that her pain in localized area of left ischial tuberosity         Objective: See treatment diary below  Precautions HTN, anxiety, bilateral knee replacement, nerve block     Specialty Daily Treatment Diary       Visits: 1 2 3 4    Manual 19    STM/ MI Left hamstring insertion  Performed MI of left hamstring insertion, IASTM hamstring muscle belly performed as per last session including QL and glute MI manually medial hamstring , muscle belly     L/S p-a mobs        Man Flexibility: hamstring   performed standard and medial aspect performed as per last session  self    SI mob with pressure on left ASIS posteriorly   performed  performed     MET        Total time:  15 min  20 min  15 min             Exercise Diary  eval performed       Rec bike/nustep   nustep 10 min lv 2  48 miles  5 min lv 2     TA activation        Ball squeeze        [facility]        Birdbox 5 x 2  5 x 2 5 sec x 10      Peanut roll out into flexion both legs   2 x 10   2 x 10       Supine hamstring stretch w/ SOS standard, emphasis on biceps femoris instruct 10 sec x 5 , medial and lateral  10 sec x 5   10 sec x 5      Seated hamstring stretch with SOS  instruct Seated LAQ with DF 5 sec x 10   10 sec x 5       Prone glutes set  5 sec x 10   5 sec x 10   5 sec x 10      Prone hamstring curl  W/glute set 10x 10 x     Prone hip extension   10x  10 x      MET: hip ext left hip flex   With therapist assist for right hip flexion  performed 7 sec x 3  10 sec x 7            Neuro:         Breathing technique    5 sec x 10      + hip abd iso    5 sec x 10      + hip add iso    5 sec x 10                      Total time:  38 min  25 min  30 min             Modalities        MH  Prone lying left buttock  8 min       ice 8 min sitting left hamstring  At home deferred     Total time:                Assessment: exquisite tenderness to palpation on medial hamstring and hip adductors  Leg length discrepancy noted: left leg longer then right  No significant change with MET or after manual overpressure  Added MET and neuro stab to reduce Leg length dysfunction  Plan: continue with STM,IASTM and to address leg length discrepancy  , with stab and maunual techniques  2/25/19: patient stopped into clinic to speak with therapist  She stated she just saw Dr Jennifer Sullivan and will be having a shoulder replacement in the near future  She is currently doing her HEP and she would like to discontinue PT at this time  Therefore, PT services are discharged  Please refer to evaluation for d c summary

## 2019-02-07 NOTE — PROGRESS NOTES
Daily Note     Today's date: 2019  Patient name: Rachel Philippe  : 1955  MRN: [id number]  Referring provider: Zabrina Lyon MD  Dx:   Encounter Diagnosis     ICD-10-CM    1  Chronic left-sided low back pain without sciatica M54 5     G89 29    2  Lumbar spondylosis M47 816    3  Neck pain M54 2                   Subjective: Christiano Thompson reports that her pain in localized area of left ischial tuberosity         Objective: See treatment diary below  Precautions HTN, anxiety, bilateral knee replacement, nerve block     Specialty Daily Treatment Diary       Visits: 1 2 3 4    Manual 19    STM/ MI Left hamstring insertion  Performed MI of left hamstring insertion, IASTM hamstring muscle belly performed as per last session including QL and glute MI manually medial hamstring , muscle belly     L/S p-a mobs        Man Flexibility: hamstring   performed standard and medial aspect performed as per last session  self    SI mob with pressure on left ASIS posteriorly   performed  performed     MET        Total time:  15 min  20 min  15 min             Exercise Diary  eval performed       Rec bike/nustep   nustep 10 min lv 2  48 miles  5 min lv 2     TA activation        Ball squeeze        Clamshells        LTR        Bridges  5 x 2  5 x 2 5 sec x 10      Peanut roll out into flexion both legs   2 x 10   2 x 10       Supine hamstring stretch w/ SOS standard, emphasis on biceps femoris instruct 10 sec x 5 , medial and lateral  10 sec x 5   10 sec x 5      Seated hamstring stretch with SOS  instruct Seated LAQ with DF 5 sec x 10   10 sec x 5       Prone glutes set  5 sec x 10   5 sec x 10   5 sec x 10      Prone hamstring curl  W/glute set 10x 10 x     Prone hip extension   10x  10 x      MET: hip ext left hip flex   With therapist assist for right hip flexion  performed 7 sec x 3  10 sec x 7            Neuro:         Breathing technique    5 sec x 10      + hip abd iso    5 sec x 10      + hip add iso    5 sec x 10                      Total time:  38 min  25 min  30 min             Modalities        MH  Prone lying left buttock  8 min       ice 8 min sitting left hamstring  At home deferred     Total time:                Assessment: exquisite tenderness to palpation on medial hamstring and hip adductors  Leg length discrepancy noted: left leg longer then right  No significant change with MET or after manual overpressure  Added MET and neuro stab to reduce Leg length dysfunction  Plan: continue with STM,IASTM and to address leg length discrepancy  , with stab and maunual techniques  2/25/19: patient stopped into clinic to speak with therapist  She stated she just saw Dr Gentry Ribeiro and will be having a shoulder replacement in the near future  She is currently doing her HEP and she would like to discontinue PT at this time  Therefore, PT services are discharged  Please refer to evaluation for d c summary

## 2019-02-11 ENCOUNTER — APPOINTMENT (OUTPATIENT)
Dept: PHYSICAL THERAPY | Facility: CLINIC | Age: 64
End: 2019-02-11
Payer: COMMERCIAL

## 2019-02-13 ENCOUNTER — APPOINTMENT (OUTPATIENT)
Dept: PHYSICAL THERAPY | Facility: CLINIC | Age: 64
End: 2019-02-13
Payer: COMMERCIAL

## 2019-02-20 ENCOUNTER — APPOINTMENT (OUTPATIENT)
Dept: PHYSICAL THERAPY | Facility: CLINIC | Age: 64
End: 2019-02-20
Payer: COMMERCIAL

## 2019-02-22 ENCOUNTER — APPOINTMENT (OUTPATIENT)
Dept: PHYSICAL THERAPY | Facility: CLINIC | Age: 64
End: 2019-02-22
Payer: COMMERCIAL

## 2019-02-25 ENCOUNTER — APPOINTMENT (OUTPATIENT)
Dept: RADIOLOGY | Facility: CLINIC | Age: 64
End: 2019-02-25
Payer: COMMERCIAL

## 2019-02-25 ENCOUNTER — OFFICE VISIT (OUTPATIENT)
Dept: OBGYN CLINIC | Facility: CLINIC | Age: 64
End: 2019-02-25
Payer: COMMERCIAL

## 2019-02-25 ENCOUNTER — APPOINTMENT (OUTPATIENT)
Dept: PHYSICAL THERAPY | Facility: CLINIC | Age: 64
End: 2019-02-25
Payer: COMMERCIAL

## 2019-02-25 VITALS
BODY MASS INDEX: 43.34 KG/M2 | HEIGHT: 63 IN | DIASTOLIC BLOOD PRESSURE: 90 MMHG | WEIGHT: 244.6 LBS | HEART RATE: 75 BPM | SYSTOLIC BLOOD PRESSURE: 165 MMHG

## 2019-02-25 DIAGNOSIS — M19.011 GLENOHUMERAL ARTHRITIS, RIGHT: Primary | ICD-10-CM

## 2019-02-25 DIAGNOSIS — M25.511 RIGHT SHOULDER PAIN, UNSPECIFIED CHRONICITY: ICD-10-CM

## 2019-02-25 PROCEDURE — 99214 OFFICE O/P EST MOD 30 MIN: CPT | Performed by: ORTHOPAEDIC SURGERY

## 2019-02-25 PROCEDURE — 73030 X-RAY EXAM OF SHOULDER: CPT

## 2019-02-25 NOTE — PROGRESS NOTES
Assessment/Plan:  1  Right shoulder pain, unspecified chronicity  XR shoulder 2+ vw right   2  Glenohumeral arthritis, right         Scribe Attestation    I,:   Donovan Dobbins MA am acting as a scribe while in the presence of the attending physician :        I,:   Kentrell Jacome MD personally performed the services described in this documentation    as scribed in my presence :          I engaged in a conversation with Doretha Schilling regarding her right shoulder arthritis  Due to the limited relief she received from the steroid injection and her limited range of motion, I would support her decision for a shoulder replacement at this time  I feel this surgery is a reasonable decision based off her clinical exam and her x-rays which show bone on bone arthritis  I discussed with Joching Schilling that I would like her to obtain an MRI prior to signing up for surgery  This will help r/o rotator cuff damage  If there is no rotator cuff damage she would be a candidate for a standard total shoulder  Evidence of a rotator cuff that could not support a standard would indicate the need for a reverse total shoulder replacement  We briefly discussed her post-operative plan which would include a hospital stay following surgery  She would be placed in a sling 6 weeks post operatively and start therapy shortly after the surgery  An order for an MRI without contrast was provided in the office today  I will see her back in the office after the MRI to further discuss surgery and sign consent forms  Subjective:   Geni De Los Santos is a 59 y o  female who presents for a follow up in regard to her right shoulder pain and osteoarthritis  She reports at there last visit on 5/1/18 she received a steroid injection  The patient states this gave her limited relief  Due to family issues she has not been able to follow up until today  Today she states she has limited range of motion and generalized pain to her shoulder region   This pain is constant and sharp and severe and can increase with activity  She denies numbness and tingling  She would like to discuss surgical options today  Review of Systems   Constitutional: Negative for chills, fever and unexpected weight change  HENT: Negative for hearing loss, nosebleeds and sore throat  Eyes: Negative for pain, redness and visual disturbance  Respiratory: Negative for cough, shortness of breath and wheezing  Cardiovascular: Negative for chest pain, palpitations and leg swelling  Gastrointestinal: Negative for abdominal pain, nausea and vomiting  Endocrine: Negative for polydipsia and polyuria  Genitourinary: Negative for dyspareunia and hematuria  Musculoskeletal: Positive for arthralgias, joint swelling and myalgias  Skin: Negative for rash and wound  Neurological: Negative for dizziness, numbness and headaches  Psychiatric/Behavioral: Negative for decreased concentration and suicidal ideas  The patient is nervous/anxious  Past Medical History:   Diagnosis Date    Anxiety     Hypertension        Past Surgical History:   Procedure Laterality Date    APPENDECTOMY      CHOLECYSTECTOMY      GASTRIC BYPASS      JOINT REPLACEMENT      blateral knee replacements    NERVE BLOCK Bilateral 12/29/2017    Procedure: L4, L5, S1 MEDIAL BRANCH BLOCK #1;  Surgeon: Radha Iglesias MD;  Location: [facility] MAIN OR;  Service: Pain Management     NERVE BLOCK Bilateral 1/12/2018    Procedure: BLOCK MEDIAL BRANCH L4, L5-S1  #2;  Surgeon: Radha Iglesias MD;  Location: [facility] MAIN OR;  Service: Pain Management     RADIOFREQUENCY ABLATION Right 3/16/2018    Procedure: Rt L4 L5 S1 RFA;  Surgeon: Radha Iglesias MD;  Location: [facility] MAIN OR;  Service: Pain Management     RADIOFREQUENCY ABLATION Left 12/14/2018    Procedure: L3-S1 Radio Frequency Ablation ([id number]);   Surgeon: Radha Iglesias MD;  Location: [facility] MAIN OR;  Service: Pain Management     RHIZOTOMY Left 1/25/2018    Procedure: L4, L5, S1 RFA;  Surgeon: Blanco Baxter MD;  Location: [facility] MAIN OR;  Service: Pain Management     TONSILLECTOMY         History reviewed  No pertinent family history  Social History     Occupational History    Not on file   Tobacco Use    Smoking status: Former Smoker    Smokeless tobacco: Never Used   Substance and Sexual Activity    Alcohol use: No    Drug use: No    Sexual activity: Not on file         Current Outpatient Medications:     diphenhydrAMINE (BENADRYL) 25 mg tablet, Take 25 mg by mouth daily at bedtime as needed for itching, Disp: , Rfl:     escitalopram (LEXAPRO) 10 mg tablet, Take 15 mg by mouth daily  , Disp: , Rfl:     omeprazole (PriLOSEC) 20 mg delayed release capsule, Take 20 mg by mouth daily, Disp: , Rfl:     oxyCODONE-acetaminophen (PERCOCET) 7 5-325 MG per tablet, Take 1 tablet by mouth every 4 (four) hours as needed for moderate pain (3x), Disp: , Rfl:     perindopril (ACEON) 4 MG tablet, Take 4 mg by mouth daily, Disp: , Rfl:     traMADol (ULTRAM) 50 mg tablet, Take 50 mg by mouth 2 (two) times a day, Disp: , Rfl:     Allergies   Allergen Reactions    Penicillins Anaphylaxis    Levaquin [Levofloxacin] Itching       Objective:  Vitals:    02/25/19 1014   BP: 148/88   Pulse: 75       Right Shoulder Exam     Range of Motion   Active abduction:  130 abnormal   External rotation:  20 abnormal   Forward flexion:  130 abnormal   Internal rotation 90 degrees:  10 abnormal     Muscle Strength   Abduction: 4/5   Internal rotation: 5/5   External rotation: 5/5     Tests   Jasmine test: positive  Impingement: positive  Drop arm: negative    Other   Erythema: absent  Scars: absent  Sensation: normal  Pulse: present            Physical Exam   Constitutional: She is oriented to person, place, and time  She appears well-developed and well-nourished  HENT:   Head: Normocephalic and atraumatic  Eyes: Conjunctivae and EOM are normal    Neck: Normal range of motion  Neck supple  Cardiovascular: Normal rate and intact distal pulses  Pulmonary/Chest: Effort normal  No respiratory distress  Neurological: She is alert and oriented to person, place, and time  Skin: Skin is warm and dry  Psychiatric: She has a normal mood and affect  Her behavior is normal        I have personally reviewed pertinent films in PACS and my interpretation is as follows:  X-rays of the right shoulder taken on 2/25/19 show no acute fracture or dislocation  Severe glenohumeral arthritis is present  there is some mild posterior glenoid wear  We have compared these to previous x-rays demonstrating similar appearance of severe arthritic change

## 2019-02-27 ENCOUNTER — APPOINTMENT (OUTPATIENT)
Dept: PHYSICAL THERAPY | Facility: CLINIC | Age: 64
End: 2019-02-27
Payer: COMMERCIAL

## 2019-03-04 ENCOUNTER — TELEPHONE (OUTPATIENT)
Dept: OBGYN CLINIC | Facility: CLINIC | Age: 64
End: 2019-03-04

## 2019-03-04 NOTE — TELEPHONE ENCOUNTER
No therapy was performed  No bracing/splinting  Looks like hse is on tramadol   She did have steroid injection

## 2019-03-05 ENCOUNTER — OFFICE VISIT (OUTPATIENT)
Dept: PAIN MEDICINE | Facility: CLINIC | Age: 64
End: 2019-03-05
Payer: COMMERCIAL

## 2019-03-05 VITALS
SYSTOLIC BLOOD PRESSURE: 132 MMHG | WEIGHT: 243 LBS | HEIGHT: 63 IN | RESPIRATION RATE: 17 BRPM | DIASTOLIC BLOOD PRESSURE: 62 MMHG | BODY MASS INDEX: 43.05 KG/M2 | HEART RATE: 70 BPM

## 2019-03-05 DIAGNOSIS — M54.50 CHRONIC LEFT-SIDED LOW BACK PAIN WITHOUT SCIATICA: ICD-10-CM

## 2019-03-05 DIAGNOSIS — M47.816 LUMBAR SPONDYLOSIS: ICD-10-CM

## 2019-03-05 DIAGNOSIS — G89.29 CHRONIC LEFT-SIDED LOW BACK PAIN WITHOUT SCIATICA: ICD-10-CM

## 2019-03-05 DIAGNOSIS — M43.16 SPONDYLOLISTHESIS OF LUMBAR REGION: ICD-10-CM

## 2019-03-05 DIAGNOSIS — G89.4 CHRONIC PAIN SYNDROME: Primary | ICD-10-CM

## 2019-03-05 PROCEDURE — 99214 OFFICE O/P EST MOD 30 MIN: CPT | Performed by: ANESTHESIOLOGY

## 2019-03-05 NOTE — PROGRESS NOTES
Pain Medicine Follow-Up Note    Assessment:  1  Chronic pain syndrome    2  Chronic left-sided low back pain without sciatica    3  Lumbar spondylosis    4  Spondylolisthesis of lumbar region        Plan:  My impressions and treatment recommendations were discussed in detail with the patient who verbalized understanding and had no further questions  The patient is reporting left hamstring pain as her primary pain complaint at today's visit  She also is reporting right shoulder pain and will be undergoing a right shoulder arthroplasty with Dr Ree Lord in the near future  I discussed with the patient about her left hamstring pain currently  She would like to put her left lower extremity pain on hold until she undergoes the a right shoulder arthroplasty  She did report that physical therapy was helping with her lower extremity pain, but it was aggravating her right shoulder pain, so she has stopped it at this time  In addition, the patient reports that she is maintained on tramadol 50 mg 4 times daily as needed for pain as well as oxycodone/acetaminophen 7 5/325 mg 1 tablet 3 times daily as needed for pain  I discussed with the patient that I would not prescribe 2 short-acting opioid medications together  I had a long discussion regarding both the tramadol she is taking and the oxycodone/acetaminophen  Both medications are currently short acting opioid medications  I suggested to the patient to speak to her primary care physician regarding the possibility of trialing a long-acting opioid medication in place of 1 of the short-acting opioid medications  The patient was not aware that she was taking short-acting opioid medications currently and stated that she would speak to Dr Destin Mcqueen regarding this  The patient also does not seem satisfied with my recommendations regarding her opioid medications    I suggested that she seek another opinion regarding another pain management physician since she feels as though she needs to be on both short-acting tramadol and short-acting oxycodone/acetaminophen  The patient stated that she will consider this  I asked the patient to return to see me after she has completed her right shoulder arthroplasty  The patient verbalized understanding  Discharge instructions were provided  I personally saw and examined the patient and I agree with the above discussed plan of care  History of Present Illness:    Musa Fiore is a 59 y o  female who presents to [facility] for interval re-evaluation of the above stated pain complaints  The patient has a past medical and chronic pain history as outlined in the assessment section  She was last seen on January 22, 2019 at which time I asked the patient to undergo a course of physical therapy 2-3 times per week for 4-6 weeks  At today's office visit, the patient's pain score is 6/10 on the verbal numerical pain rating scale  The patient states that her pain is primarily in her right hamstring region  She reports that her pain is much improved in terms of her low back  She describes her pain as worse in the morning evening  Her pain is intermittent in nature and she reports the quality of her pain as dull/aching    She is reporting that the physical therapy was helping with her left hamstring pain and left buttocks pain, but was exacerbating her right shoulder pain  She stated that she has stopped physical therapy currently and will consider restarting it after she has undergone the right shoulder arthroplasty  Other than as stated above, the patient denies any interval changes in medications, medical condition, mental condition, symptoms, or allergies since the last office visit  Review of Systems:    Review of Systems   Respiratory: Negative for shortness of breath  Cardiovascular: Negative for chest pain  Gastrointestinal: Negative for constipation, diarrhea, nausea and vomiting  Musculoskeletal: Positive for gait problem (difficulty walking/ decreased range of motion) and joint swelling (joint stiffness)  Negative for arthralgias and myalgias  Skin: Negative for rash  Neurological: Positive for weakness (muscle weakness)  Negative for dizziness, tremors and seizures  All other systems reviewed and are negative  Patient Active Problem List   Diagnosis    Chronic pain syndrome    Low back pain    Spondylosis of lumbar region without myelopathy or radiculopathy    Spondylolisthesis of lumbar region    Lumbar spondylosis       Past Medical History:   Diagnosis Date    Anxiety     Hypertension        Past Surgical History:   Procedure Laterality Date    APPENDECTOMY      CHOLECYSTECTOMY      GASTRIC BYPASS      JOINT REPLACEMENT      blateral knee replacements    NERVE BLOCK Bilateral 12/29/2017    Procedure: L4, L5, S1 MEDIAL BRANCH BLOCK #1;  Surgeon: Joseph Cornelius MD;  Location: [facility] MAIN OR;  Service: Pain Management     NERVE BLOCK Bilateral 1/12/2018    Procedure: BLOCK MEDIAL BRANCH L4, L5-S1  #2;  Surgeon: Joseph Cornelius MD;  Location: [facility] MAIN OR;  Service: Pain Management     RADIOFREQUENCY ABLATION Right 3/16/2018    Procedure: Rt L4 L5 S1 RFA;  Surgeon: Joseph Cornelius MD;  Location: [facility] MAIN OR;  Service: Pain Management     RADIOFREQUENCY ABLATION Left 12/14/2018    Procedure: L3-S1 Radio Frequency Ablation ([id number]); Surgeon: Joseph Cornelius MD;  Location: [facility] MAIN OR;  Service: Pain Management     RHIZOTOMY Left 1/25/2018    Procedure: L4, L5, S1 RFA;  Surgeon: Joseph Cornelius MD;  Location: [facility] MAIN OR;  Service: Pain Management     TONSILLECTOMY         History reviewed  No pertinent family history      Social History     Occupational History    Not on file   Tobacco Use    Smoking status: Former Smoker    Smokeless tobacco: Never Used   Substance and Sexual Activity    Alcohol use: No    Drug use: No    Sexual activity: Not on file         Current Outpatient Medications:     diphenhydrAMINE (BENADRYL) 25 mg tablet, Take 25 mg by mouth daily at bedtime as needed for itching, Disp: , Rfl:     escitalopram (LEXAPRO) 10 mg tablet, Take 15 mg by mouth daily  , Disp: , Rfl:     omeprazole (PriLOSEC) 20 mg delayed release capsule, Take 20 mg by mouth daily, Disp: , Rfl:     oxyCODONE-acetaminophen (PERCOCET) 7 5-325 MG per tablet, Take 1 tablet by mouth every 4 (four) hours as needed for moderate pain (3x), Disp: , Rfl:     perindopril (ACEON) 4 MG tablet, Take 4 mg by mouth daily, Disp: , Rfl:     traMADol (ULTRAM) 50 mg tablet, Take 50 mg by mouth 2 (two) times a day, Disp: , Rfl:     Allergies   Allergen Reactions    Penicillins Anaphylaxis    Levaquin [Levofloxacin] Itching       Physical Exam:    /62 (BP Location: Right arm, Patient Position: Sitting, Cuff Size: Standard)   Pulse 70   Resp 17   Ht 5' 3" (1 6 m)   Wt 110 kg (243 lb)   BMI 43 05 kg/m²     Constitutional:obese  Eyes:anicteric  HEENT:grossly intact  Neck:supple, symmetric, trachea midline and no masses   Pulmonary:even and unlabored  Cardiovascular:No edema or pitting edema present  Skin:Normal without rashes or lesions and well hydrated  Psychiatric:Mood and affect appropriate  Neurologic:Cranial Nerves II-XII grossly intact  Musculoskeletal:normal

## 2019-03-08 ENCOUNTER — HOSPITAL ENCOUNTER (OUTPATIENT)
Dept: RADIOLOGY | Facility: HOSPITAL | Age: 64
Discharge: HOME | End: 2019-03-08
Attending: ORTHOPAEDIC SURGERY
Payer: COMMERCIAL

## 2019-03-08 DIAGNOSIS — M25.511 RIGHT SHOULDER PAIN, UNSPECIFIED CHRONICITY: ICD-10-CM

## 2019-03-08 DIAGNOSIS — M19.011 GLENOHUMERAL ARTHRITIS, RIGHT: ICD-10-CM

## 2019-03-08 PROCEDURE — 73221 MRI JOINT UPR EXTREM W/O DYE: CPT

## 2019-03-13 ENCOUNTER — OFFICE VISIT (OUTPATIENT)
Dept: OBGYN CLINIC | Facility: CLINIC | Age: 64
End: 2019-03-13
Payer: COMMERCIAL

## 2019-03-13 VITALS — WEIGHT: 242.6 LBS | BODY MASS INDEX: 42.98 KG/M2 | HEIGHT: 63 IN

## 2019-03-13 DIAGNOSIS — M75.111 INCOMPLETE TEAR OF RIGHT ROTATOR CUFF: ICD-10-CM

## 2019-03-13 DIAGNOSIS — M19.011 PRIMARY OSTEOARTHRITIS OF RIGHT SHOULDER: Primary | ICD-10-CM

## 2019-03-13 PROCEDURE — 99214 OFFICE O/P EST MOD 30 MIN: CPT | Performed by: ORTHOPAEDIC SURGERY

## 2019-03-13 NOTE — PROGRESS NOTES
Assessment/Plan:  1  Primary osteoarthritis of right shoulder     2  Incomplete tear of right rotator cuff         Scribe Attestation    I,:   Heidi Pickens am acting as a scribe while in the presence of the attending physician :        I,:   Wen Menchaca MD personally performed the services described in this documentation    as scribed in my presence :            Cara Delcid and I did review her MRI together in the office today  I explained that in addition to the severe osteoarthritis that was previously diagnosed, her MRI does show a focal full-thickness tear of the supraspinatus tendon as well as diffuse tendinosis and atrophy throughout the rotator cuff musculature  Due to this, I believe a reverse total shoulder arthroplasty is the most appropriate operative procedure for her  We did again discuss the details of this procedure as well as the expected recovery and associated risks  Risk of the surgery are inclusive of but not limited to bleeding, infection, nerve injury, blood clot, worsening of symptoms, not achieving the anticipated results, persistent stiffness, weakness and the need for additional surgery  Cara Delcid verbally stated she understood those risks and would like to proceed with the surgery  I did obtain written consent for the procedure today in the office  Our DME fitter did provide her with a postoperative sling  An order for postoperative physical therapy and a polar Care cube was also placed in her chart today  She will meet with my surgery scheduler to pick a date for her operation  We did also discuss the Co Q10 that she takes and I advised her to discontinue this medication one week before her procedure  I will see her back at time of surgery  Subjective:   Adalid Stuart is a 59 y o  female who presents today for follow-up evaluation of her right shoulder  She does suffer from severe glenohumeral osteoarthritis    At her last visit, an MRI was ordered to evaluate the integrity of the rotator cuff for preoperative planning purposes as we consider a total shoulder arthroplasty versus a reverse total shoulder arthroplasty  She has had the MRI in the images and report are available for review today  She continues to experience persistent and sharp and severe pain about the shoulder that increases with activity  She finds it especially difficult to reach overhead and behind her back  She denies any new injury or trauma  She denies any distal paresthesias of the upper extremity  Review of Systems   Constitutional: Negative for chills, fever and unexpected weight change  HENT: Negative for hearing loss, nosebleeds and sore throat  Eyes: Negative for pain, redness and visual disturbance  Respiratory: Negative for cough, shortness of breath and wheezing  Cardiovascular: Negative for chest pain, palpitations and leg swelling  Gastrointestinal: Negative for abdominal pain, nausea and vomiting  Endocrine: Negative for polydipsia and polyuria  Genitourinary: Negative for dysuria and hematuria  Musculoskeletal: Positive for arthralgias, joint swelling and myalgias  See HPI   Skin: Negative for rash and wound  Neurological: Positive for dizziness  Negative for numbness and headaches  Psychiatric/Behavioral: Negative for decreased concentration and suicidal ideas  The patient is nervous/anxious            Past Medical History:   Diagnosis Date    Anxiety     Hypertension        Past Surgical History:   Procedure Laterality Date    APPENDECTOMY      CHOLECYSTECTOMY      GASTRIC BYPASS      JOINT REPLACEMENT      blateral knee replacements    NERVE BLOCK Bilateral 12/29/2017    Procedure: L4, L5, S1 MEDIAL BRANCH BLOCK #1;  Surgeon: Charissa Romero MD;  Location: [place] [address] MAIN OR;  Service: Pain Management     NERVE BLOCK Bilateral 1/12/2018    Procedure: BLOCK MEDIAL BRANCH L4, L5-S1  #2;  Surgeon: Charissa Romero MD;  Location: [place] [address] MAIN OR;  Service: Pain Management     RADIOFREQUENCY ABLATION Right 3/16/2018    Procedure: Rt L4 L5 S1 RFA;  Surgeon: Darryl Castro MD;  Location: [facility] MAIN OR;  Service: Pain Management     RADIOFREQUENCY ABLATION Left 12/14/2018    Procedure: L3-S1 Radio Frequency Ablation ([id number]); Surgeon: Darryl Castro MD;  Location: [facility] MAIN OR;  Service: Pain Management     RHIZOTOMY Left 1/25/2018    Procedure: L4, L5, S1 RFA;  Surgeon: Darryl Castro MD;  Location: [facility] MAIN OR;  Service: Pain Management     TONSILLECTOMY         History reviewed  No pertinent family history  Social History     Occupational History    Not on file   Tobacco Use    Smoking status: Former Smoker    Smokeless tobacco: Never Used   Substance and Sexual Activity    Alcohol use: No    Drug use: No    Sexual activity: Not on file         Current Outpatient Medications:     diphenhydrAMINE (BENADRYL) 25 mg tablet, Take 25 mg by mouth daily at bedtime as needed for itching, Disp: , Rfl:     escitalopram (LEXAPRO) 10 mg tablet, Take 15 mg by mouth daily  , Disp: , Rfl:     omeprazole (PriLOSEC) 20 mg delayed release capsule, Take 20 mg by mouth daily, Disp: , Rfl:     oxyCODONE-acetaminophen (PERCOCET) 7 5-325 MG per tablet, Take 1 tablet by mouth every 4 (four) hours as needed for moderate pain (3x), Disp: , Rfl:     perindopril (ACEON) 4 MG tablet, Take 4 mg by mouth daily, Disp: , Rfl:     traMADol (ULTRAM) 50 mg tablet, Take 50 mg by mouth 2 (two) times a day, Disp: , Rfl:     Allergies   Allergen Reactions    Penicillins Anaphylaxis    Levaquin [Levofloxacin] Itching       Objective: There were no vitals filed for this visit  Right Shoulder Exam     Tenderness   The patient is experiencing tenderness in the biceps tendon (diffuse)      Range of Motion   Active abduction: 160   External rotation: 30   Forward flexion: 160   Internal rotation 0 degrees: L5   Internal rotation 90 degrees: 0     Muscle Strength   Internal rotation: 5/5   External rotation: 4/5   Supraspinatus: 4/5   Subscapularis: 5/5     Other   Erythema: absent  Scars: absent  Sensation: normal  Pulse: present    Comments:  Empty Can (+)            Physical Exam   Constitutional: She is oriented to person, place, and time  She appears well-developed and well-nourished  HENT:   Head: Normocephalic and atraumatic  Eyes: Pupils are equal, round, and reactive to light  Conjunctivae are normal    Neck: Normal range of motion  Neck supple  Cardiovascular: Normal rate, normal heart sounds and intact distal pulses  Pulmonary/Chest: Effort normal and breath sounds normal  No respiratory distress  Musculoskeletal:   As noted in HPI   Neurological: She is alert and oriented to person, place, and time  Skin: Skin is warm and dry  Psychiatric: She has a normal mood and affect  Her behavior is normal    Vitals reviewed  I have personally reviewed pertinent films in PACS and my interpretation is as follows:  MRI of the right shoulder obtained on 03/08/2019 shows a small full-thickness tear of the anterior leading edge of the supraspinatus tendon with a partial-thickness articular sided tear extending more posteriorly  There is mild to moderate atrophy and tendinosis of supraspinatus, infraspinatus, and subscapularis  There is severe degenerative change of the glenohumeral joint, however the glenoid appears to have good bone stock

## 2019-03-20 ENCOUNTER — TELEPHONE (OUTPATIENT)
Dept: OBGYN CLINIC | Facility: CLINIC | Age: 64
End: 2019-03-20

## 2019-03-20 NOTE — TELEPHONE ENCOUNTER
Sepideh Zapien returned my call  She states she has not done any recent PT on the shoulder  She said, maybe 25 years ago, but it didn't help  I will contact [facility] with this information  I did tell Tammie Husain would keep her updated

## 2019-03-20 NOTE — TELEPHONE ENCOUNTER
I believe we should start physical therapy at this point  We can certainly see her back after she has done physical therapy to evaluate her progress to see if she is satisfied  If she is not satisfied with her progress, we can certainly talk about the total shoulder procedure once again

## 2019-03-20 NOTE — TELEPHONE ENCOUNTER
Dany Dallas was calling to obtain any additional clinical information with regards to conservative treatment that has been done for Zonia's right shoulder  I returned the call to Dany Dallas lm, stating that in May 2018 she did have a cortisone injection that provided little relief and also stated that NO PT has been done for the right shoulder

## 2019-03-20 NOTE — TELEPHONE ENCOUNTER
I spoke with Suzzanne Mohs at [facility]  So without a trial of HEP or PT, there is a very high chance that it will be denied  Once it is denied, then we would start an Appeal process, which we would need to submit documentation of PT anyway and then we would need to wait 6 months to re apply for the authorization  Suzzanne Mohs is giving the patient & us an opportunity to order and have her do PT, get documentation that it is or does not help and then we can resubmit for the authorization in say a month or so  She is waiting on my call back to proceed however we choose  Please advise on your thoughts

## 2019-03-20 NOTE — TELEPHONE ENCOUNTER
I have left a message for Elaine Jackson to return my call  RE: need to know if she's ever done PT for her right shoulder and when

## 2019-03-21 ENCOUNTER — TELEPHONE (OUTPATIENT)
Dept: OBGYN CLINIC | Facility: CLINIC | Age: 64
End: 2019-03-21

## 2019-03-21 DIAGNOSIS — M19.011 PRIMARY OSTEOARTHRITIS OF RIGHT SHOULDER: Primary | ICD-10-CM

## 2019-03-21 NOTE — TELEPHONE ENCOUNTER
Please put order in for PT, Right shoulder  This will NOT be for post op PT  Her surgery is being put on hold

## 2019-04-01 ENCOUNTER — EVALUATION (OUTPATIENT)
Dept: PHYSICAL THERAPY | Facility: CLINIC | Age: 64
End: 2019-04-01
Payer: COMMERCIAL

## 2019-04-01 DIAGNOSIS — M19.011 PRIMARY OSTEOARTHRITIS OF RIGHT SHOULDER: Primary | ICD-10-CM

## 2019-04-01 PROCEDURE — 97162 PT EVAL MOD COMPLEX 30 MIN: CPT

## 2019-04-05 ENCOUNTER — OFFICE VISIT (OUTPATIENT)
Dept: PHYSICAL THERAPY | Facility: CLINIC | Age: 64
End: 2019-04-05
Payer: COMMERCIAL

## 2019-04-05 DIAGNOSIS — M19.011 PRIMARY OSTEOARTHRITIS OF RIGHT SHOULDER: Primary | ICD-10-CM

## 2019-04-05 PROCEDURE — 97110 THERAPEUTIC EXERCISES: CPT

## 2019-04-05 PROCEDURE — 97140 MANUAL THERAPY 1/> REGIONS: CPT

## 2019-04-08 ENCOUNTER — OFFICE VISIT (OUTPATIENT)
Dept: PHYSICAL THERAPY | Facility: CLINIC | Age: 64
End: 2019-04-08
Payer: COMMERCIAL

## 2019-04-08 DIAGNOSIS — M19.011 PRIMARY OSTEOARTHRITIS OF RIGHT SHOULDER: Primary | ICD-10-CM

## 2019-04-08 PROCEDURE — 97140 MANUAL THERAPY 1/> REGIONS: CPT

## 2019-04-08 PROCEDURE — 97110 THERAPEUTIC EXERCISES: CPT

## 2019-04-12 ENCOUNTER — OFFICE VISIT (OUTPATIENT)
Dept: PHYSICAL THERAPY | Facility: CLINIC | Age: 64
End: 2019-04-12
Payer: COMMERCIAL

## 2019-04-12 DIAGNOSIS — M19.011 PRIMARY OSTEOARTHRITIS OF RIGHT SHOULDER: Primary | ICD-10-CM

## 2019-04-12 PROCEDURE — 97140 MANUAL THERAPY 1/> REGIONS: CPT

## 2019-04-12 PROCEDURE — 97110 THERAPEUTIC EXERCISES: CPT

## 2019-04-15 ENCOUNTER — OFFICE VISIT (OUTPATIENT)
Dept: PHYSICAL THERAPY | Facility: CLINIC | Age: 64
End: 2019-04-15
Payer: COMMERCIAL

## 2019-04-15 DIAGNOSIS — M19.011 PRIMARY OSTEOARTHRITIS OF RIGHT SHOULDER: Primary | ICD-10-CM

## 2019-04-15 PROCEDURE — 97140 MANUAL THERAPY 1/> REGIONS: CPT

## 2019-04-15 PROCEDURE — 97110 THERAPEUTIC EXERCISES: CPT

## 2019-04-18 ENCOUNTER — APPOINTMENT (OUTPATIENT)
Dept: PHYSICAL THERAPY | Facility: CLINIC | Age: 64
End: 2019-04-18
Payer: COMMERCIAL

## 2019-04-19 ENCOUNTER — OFFICE VISIT (OUTPATIENT)
Dept: PHYSICAL THERAPY | Facility: CLINIC | Age: 64
End: 2019-04-19
Payer: COMMERCIAL

## 2019-04-19 DIAGNOSIS — M19.011 PRIMARY OSTEOARTHRITIS OF RIGHT SHOULDER: Primary | ICD-10-CM

## 2019-04-19 PROCEDURE — 97110 THERAPEUTIC EXERCISES: CPT

## 2019-04-22 ENCOUNTER — OFFICE VISIT (OUTPATIENT)
Dept: PHYSICAL THERAPY | Facility: CLINIC | Age: 64
End: 2019-04-22
Payer: COMMERCIAL

## 2019-04-22 DIAGNOSIS — M19.011 PRIMARY OSTEOARTHRITIS OF RIGHT SHOULDER: Primary | ICD-10-CM

## 2019-04-22 PROCEDURE — 97110 THERAPEUTIC EXERCISES: CPT

## 2019-04-26 ENCOUNTER — OFFICE VISIT (OUTPATIENT)
Dept: PHYSICAL THERAPY | Facility: CLINIC | Age: 64
End: 2019-04-26
Payer: COMMERCIAL

## 2019-04-26 DIAGNOSIS — M19.011 PRIMARY OSTEOARTHRITIS OF RIGHT SHOULDER: Primary | ICD-10-CM

## 2019-04-26 PROCEDURE — 97110 THERAPEUTIC EXERCISES: CPT

## 2019-04-29 ENCOUNTER — OFFICE VISIT (OUTPATIENT)
Dept: PHYSICAL THERAPY | Facility: CLINIC | Age: 64
End: 2019-04-29
Payer: COMMERCIAL

## 2019-04-29 DIAGNOSIS — M19.011 PRIMARY OSTEOARTHRITIS OF RIGHT SHOULDER: Primary | ICD-10-CM

## 2019-04-29 PROCEDURE — 97110 THERAPEUTIC EXERCISES: CPT

## 2019-04-29 PROCEDURE — 97140 MANUAL THERAPY 1/> REGIONS: CPT

## 2019-05-03 ENCOUNTER — APPOINTMENT (OUTPATIENT)
Dept: PHYSICAL THERAPY | Facility: CLINIC | Age: 64
End: 2019-05-03
Payer: COMMERCIAL

## 2019-05-06 ENCOUNTER — EVALUATION (OUTPATIENT)
Dept: PHYSICAL THERAPY | Facility: CLINIC | Age: 64
End: 2019-05-06
Payer: COMMERCIAL

## 2019-05-06 DIAGNOSIS — M19.011 PRIMARY OSTEOARTHRITIS OF RIGHT SHOULDER: Primary | ICD-10-CM

## 2019-05-06 PROCEDURE — 97140 MANUAL THERAPY 1/> REGIONS: CPT

## 2019-05-06 PROCEDURE — 97110 THERAPEUTIC EXERCISES: CPT

## 2019-05-10 ENCOUNTER — OFFICE VISIT (OUTPATIENT)
Dept: OBGYN CLINIC | Facility: CLINIC | Age: 64
End: 2019-05-10
Payer: COMMERCIAL

## 2019-05-10 VITALS
HEIGHT: 63 IN | HEART RATE: 76 BPM | SYSTOLIC BLOOD PRESSURE: 170 MMHG | WEIGHT: 249 LBS | DIASTOLIC BLOOD PRESSURE: 84 MMHG | BODY MASS INDEX: 44.12 KG/M2

## 2019-05-10 DIAGNOSIS — M19.011 PRIMARY OSTEOARTHRITIS OF RIGHT SHOULDER: Primary | ICD-10-CM

## 2019-05-10 PROCEDURE — 99214 OFFICE O/P EST MOD 30 MIN: CPT | Performed by: ORTHOPAEDIC SURGERY

## 2019-05-13 ENCOUNTER — TRANSCRIBE ORDERS (OUTPATIENT)
Dept: ADMINISTRATIVE | Facility: HOSPITAL | Age: 64
End: 2019-05-13

## 2019-05-13 ENCOUNTER — OFFICE VISIT (OUTPATIENT)
Dept: LAB | Facility: HOSPITAL | Age: 64
DRG: 483 | End: 2019-05-13
Attending: ORTHOPAEDIC SURGERY
Payer: COMMERCIAL

## 2019-05-13 DIAGNOSIS — M19.011 PRIMARY OSTEOARTHRITIS OF RIGHT SHOULDER: ICD-10-CM

## 2019-05-13 LAB
ABO GROUP BLD: NORMAL
ANION GAP SERPL CALCULATED.3IONS-SCNC: 8 MMOL/L (ref 4–13)
APTT PPP: 27 SECONDS (ref 24–33)
BASOPHILS # BLD AUTO: 0.04 THOUSANDS/ΜL (ref 0–0.1)
BASOPHILS NFR BLD AUTO: 1 % (ref 0–1)
BLD GP AB SCN SERPL QL: NEGATIVE
BUN SERPL-MCNC: 17 MG/DL (ref 5–25)
CALCIUM SERPL-MCNC: 9.4 MG/DL (ref 8.3–10.1)
CHLORIDE SERPL-SCNC: 104 MMOL/L (ref 100–108)
CO2 SERPL-SCNC: 28 MMOL/L (ref 21–32)
CREAT SERPL-MCNC: 0.64 MG/DL (ref 0.6–1.3)
EOSINOPHIL # BLD AUTO: 0.18 THOUSAND/ΜL (ref 0–0.61)
EOSINOPHIL NFR BLD AUTO: 3 % (ref 0–6)
ERYTHROCYTE [DISTWIDTH] IN BLOOD BY AUTOMATED COUNT: 12.9 % (ref 11.6–15.1)
GFR SERPL CREATININE-BSD FRML MDRD: 95 ML/MIN/1.73SQ M
GLUCOSE SERPL-MCNC: 103 MG/DL (ref 65–140)
HCT VFR BLD AUTO: 43 % (ref 34.8–46.1)
HGB BLD-MCNC: 13.6 G/DL (ref 11.5–15.4)
IMM GRANULOCYTES # BLD AUTO: 0.01 THOUSAND/UL (ref 0–0.2)
IMM GRANULOCYTES NFR BLD AUTO: 0 % (ref 0–2)
INR PPP: 0.94 (ref 0.86–1.16)
LYMPHOCYTES # BLD AUTO: 1.89 THOUSANDS/ΜL (ref 0.6–4.47)
LYMPHOCYTES NFR BLD AUTO: 33 % (ref 14–44)
MCH RBC QN AUTO: 28.7 PG (ref 26.8–34.3)
MCHC RBC AUTO-ENTMCNC: 31.6 G/DL (ref 31.4–37.4)
MCV RBC AUTO: 91 FL (ref 82–98)
MONOCYTES # BLD AUTO: 0.43 THOUSAND/ΜL (ref 0.17–1.22)
MONOCYTES NFR BLD AUTO: 8 % (ref 4–12)
NEUTROPHILS # BLD AUTO: 3.2 THOUSANDS/ΜL (ref 1.85–7.62)
NEUTS SEG NFR BLD AUTO: 55 % (ref 43–75)
NRBC BLD AUTO-RTO: 0 /100 WBCS
PLATELET # BLD AUTO: 274 THOUSANDS/UL (ref 149–390)
PMV BLD AUTO: 10.1 FL (ref 8.9–12.7)
POTASSIUM SERPL-SCNC: 3.9 MMOL/L (ref 3.5–5.3)
PROTHROMBIN TIME: 9.9 SECONDS (ref 9.4–11.7)
RBC # BLD AUTO: 4.74 MILLION/UL (ref 3.81–5.12)
RH BLD: POSITIVE
SODIUM SERPL-SCNC: 140 MMOL/L (ref 136–145)
SPECIMEN EXPIRATION DATE: NORMAL
WBC # BLD AUTO: 5.75 THOUSAND/UL (ref 4.31–10.16)

## 2019-05-13 PROCEDURE — 85610 PROTHROMBIN TIME: CPT

## 2019-05-13 PROCEDURE — 93005 ELECTROCARDIOGRAM TRACING: CPT

## 2019-05-13 PROCEDURE — 80048 BASIC METABOLIC PNL TOTAL CA: CPT

## 2019-05-13 PROCEDURE — 86900 BLOOD TYPING SEROLOGIC ABO: CPT

## 2019-05-13 PROCEDURE — 85025 COMPLETE CBC W/AUTO DIFF WBC: CPT

## 2019-05-13 PROCEDURE — 85730 THROMBOPLASTIN TIME PARTIAL: CPT

## 2019-05-13 PROCEDURE — 86850 RBC ANTIBODY SCREEN: CPT

## 2019-05-13 PROCEDURE — 36415 COLL VENOUS BLD VENIPUNCTURE: CPT

## 2019-05-13 PROCEDURE — 86901 BLOOD TYPING SEROLOGIC RH(D): CPT

## 2019-05-14 ENCOUNTER — OFFICE VISIT (OUTPATIENT)
Dept: PHYSICAL THERAPY | Facility: CLINIC | Age: 64
End: 2019-05-14
Payer: COMMERCIAL

## 2019-05-14 DIAGNOSIS — M19.011 PRIMARY OSTEOARTHRITIS OF RIGHT SHOULDER: Primary | ICD-10-CM

## 2019-05-14 LAB
ATRIAL RATE: 72 BPM
P AXIS: 74 DEGREES
PR INTERVAL: 140 MS
QRS AXIS: 54 DEGREES
QRSD INTERVAL: 92 MS
QT INTERVAL: 394 MS
QTC INTERVAL: 431 MS
T WAVE AXIS: 63 DEGREES
VENTRICULAR RATE: 72 BPM

## 2019-05-14 PROCEDURE — 93010 ELECTROCARDIOGRAM REPORT: CPT | Performed by: INTERNAL MEDICINE

## 2019-05-14 PROCEDURE — 97110 THERAPEUTIC EXERCISES: CPT

## 2019-05-20 RX ORDER — ASCORBIC ACID 500 MG
500 TABLET ORAL EVERY MORNING
COMMUNITY

## 2019-05-20 RX ORDER — PHENOL 1.4 %
AEROSOL, SPRAY (ML) MUCOUS MEMBRANE
COMMUNITY

## 2019-05-20 RX ORDER — SENNOSIDES 8.6 MG
650 CAPSULE ORAL EVERY 8 HOURS PRN
COMMUNITY

## 2019-05-22 ENCOUNTER — ANESTHESIA EVENT (OUTPATIENT)
Dept: PERIOP | Facility: HOSPITAL | Age: 64
DRG: 483 | End: 2019-05-22
Payer: COMMERCIAL

## 2019-05-23 ENCOUNTER — HOSPITAL ENCOUNTER (INPATIENT)
Facility: HOSPITAL | Age: 64
LOS: 1 days | Discharge: HOME | DRG: 483 | End: 2019-05-24
Attending: ORTHOPAEDIC SURGERY | Admitting: ORTHOPAEDIC SURGERY
Payer: COMMERCIAL

## 2019-05-23 ENCOUNTER — ANESTHESIA (OUTPATIENT)
Dept: PERIOP | Facility: HOSPITAL | Age: 64
DRG: 483 | End: 2019-05-23
Payer: COMMERCIAL

## 2019-05-23 ENCOUNTER — APPOINTMENT (INPATIENT)
Dept: RADIOLOGY | Facility: HOSPITAL | Age: 64
DRG: 483 | End: 2019-05-23
Payer: COMMERCIAL

## 2019-05-23 DIAGNOSIS — M19.011 PRIMARY OSTEOARTHRITIS OF RIGHT SHOULDER: ICD-10-CM

## 2019-05-23 LAB
ABO GROUP BLD: NORMAL
BLD GP AB SCN SERPL QL: NEGATIVE
RH BLD: POSITIVE
SPECIMEN EXPIRATION DATE: NORMAL

## 2019-05-23 PROCEDURE — 0RBJ0ZZ EXCISION OF RIGHT SHOULDER JOINT, OPEN APPROACH: ICD-10-PCS | Performed by: ORTHOPAEDIC SURGERY

## 2019-05-23 PROCEDURE — 86900 BLOOD TYPING SEROLOGIC ABO: CPT | Performed by: ORTHOPAEDIC SURGERY

## 2019-05-23 PROCEDURE — 73020 X-RAY EXAM OF SHOULDER: CPT

## 2019-05-23 PROCEDURE — 87081 CULTURE SCREEN ONLY: CPT | Performed by: ORTHOPAEDIC SURGERY

## 2019-05-23 PROCEDURE — 88304 TISSUE EXAM BY PATHOLOGIST: CPT | Performed by: PATHOLOGY

## 2019-05-23 PROCEDURE — C1776 JOINT DEVICE (IMPLANTABLE): HCPCS | Performed by: ORTHOPAEDIC SURGERY

## 2019-05-23 PROCEDURE — 0RRJ0JZ REPLACEMENT OF RIGHT SHOULDER JOINT WITH SYNTHETIC SUBSTITUTE, OPEN APPROACH: ICD-10-PCS | Performed by: ORTHOPAEDIC SURGERY

## 2019-05-23 PROCEDURE — C9290 INJ, BUPIVACAINE LIPOSOME: HCPCS

## 2019-05-23 PROCEDURE — C1713 ANCHOR/SCREW BN/BN,TIS/BN: HCPCS | Performed by: ORTHOPAEDIC SURGERY

## 2019-05-23 PROCEDURE — C9290 INJ, BUPIVACAINE LIPOSOME: HCPCS | Performed by: ANESTHESIOLOGY

## 2019-05-23 PROCEDURE — 0LQ10ZZ REPAIR RIGHT SHOULDER TENDON, OPEN APPROACH: ICD-10-PCS | Performed by: ORTHOPAEDIC SURGERY

## 2019-05-23 PROCEDURE — 23472 RECONSTRUCT SHOULDER JOINT: CPT | Performed by: ORTHOPAEDIC SURGERY

## 2019-05-23 PROCEDURE — 86850 RBC ANTIBODY SCREEN: CPT | Performed by: ORTHOPAEDIC SURGERY

## 2019-05-23 PROCEDURE — 86901 BLOOD TYPING SEROLOGIC RH(D): CPT | Performed by: ORTHOPAEDIC SURGERY

## 2019-05-23 DEVICE — GLOBAL UNITE POROCOAT STANDARD STEM SIZE 12 120MM
Type: IMPLANTABLE DEVICE | Status: FUNCTIONAL
Brand: GLOBAL UNITE

## 2019-05-23 DEVICE — GLOBAL UNITE ANATOMIC PROXIMAL BODY 135 DEGREE SIZE 12 POROCOAT
Type: IMPLANTABLE DEVICE | Status: FUNCTIONAL
Brand: GLOBAL UNITE

## 2019-05-23 DEVICE — GLOBAL UNITE STANDARD HUMERAL HEAD SIZE 48MM X 15MM
Type: IMPLANTABLE DEVICE | Status: FUNCTIONAL
Brand: GLOBAL UNITE

## 2019-05-23 DEVICE — GLOBAL ANCHOR PEG GLENOID PREMIERON X-LINKED PE SIZE 48MM
Type: IMPLANTABLE DEVICE | Status: FUNCTIONAL
Brand: GLOBAL

## 2019-05-23 DEVICE — IMPLANTABLE DEVICE: Type: IMPLANTABLE DEVICE | Status: FUNCTIONAL

## 2019-05-23 RX ORDER — LIDOCAINE HYDROCHLORIDE 10 MG/ML
INJECTION, SOLUTION INFILTRATION; PERINEURAL AS NEEDED
Status: DISCONTINUED | OUTPATIENT
Start: 2019-05-23 | End: 2019-05-23 | Stop reason: SURG

## 2019-05-23 RX ORDER — HYDROMORPHONE HCL/PF 1 MG/ML
0.5 SYRINGE (ML) INJECTION
Status: DISCONTINUED | OUTPATIENT
Start: 2019-05-23 | End: 2019-05-23 | Stop reason: HOSPADM

## 2019-05-23 RX ORDER — MIDAZOLAM HYDROCHLORIDE 1 MG/ML
INJECTION INTRAMUSCULAR; INTRAVENOUS
Status: COMPLETED | OUTPATIENT
Start: 2019-05-23 | End: 2019-05-23

## 2019-05-23 RX ORDER — SODIUM CHLORIDE, SODIUM LACTATE, POTASSIUM CHLORIDE, CALCIUM CHLORIDE 600; 310; 30; 20 MG/100ML; MG/100ML; MG/100ML; MG/100ML
75 INJECTION, SOLUTION INTRAVENOUS CONTINUOUS
Status: DISPENSED | OUTPATIENT
Start: 2019-05-23 | End: 2019-05-23

## 2019-05-23 RX ORDER — ONDANSETRON 2 MG/ML
4 INJECTION INTRAMUSCULAR; INTRAVENOUS EVERY 6 HOURS PRN
Status: DISCONTINUED | OUTPATIENT
Start: 2019-05-23 | End: 2019-05-24 | Stop reason: HOSPADM

## 2019-05-23 RX ORDER — MEPERIDINE HYDROCHLORIDE 25 MG/ML
12.5 INJECTION INTRAMUSCULAR; INTRAVENOUS; SUBCUTANEOUS
Status: DISCONTINUED | OUTPATIENT
Start: 2019-05-23 | End: 2019-05-23 | Stop reason: HOSPADM

## 2019-05-23 RX ORDER — CHOLECALCIFEROL (VITAMIN D3) 125 MCG
500 CAPSULE ORAL DAILY
Status: DISCONTINUED | OUTPATIENT
Start: 2019-05-23 | End: 2019-05-24 | Stop reason: HOSPADM

## 2019-05-23 RX ORDER — MAGNESIUM HYDROXIDE 1200 MG/15ML
LIQUID ORAL AS NEEDED
Status: DISCONTINUED | OUTPATIENT
Start: 2019-05-23 | End: 2019-05-23 | Stop reason: HOSPADM

## 2019-05-23 RX ORDER — ONDANSETRON 2 MG/ML
4 INJECTION INTRAMUSCULAR; INTRAVENOUS ONCE AS NEEDED
Status: DISCONTINUED | OUTPATIENT
Start: 2019-05-23 | End: 2019-05-23 | Stop reason: HOSPADM

## 2019-05-23 RX ORDER — SODIUM CHLORIDE, SODIUM LACTATE, POTASSIUM CHLORIDE, CALCIUM CHLORIDE 600; 310; 30; 20 MG/100ML; MG/100ML; MG/100ML; MG/100ML
125 INJECTION, SOLUTION INTRAVENOUS CONTINUOUS
Status: DISCONTINUED | OUTPATIENT
Start: 2019-05-23 | End: 2019-05-23 | Stop reason: SDUPTHER

## 2019-05-23 RX ORDER — PANTOPRAZOLE SODIUM 40 MG/1
40 TABLET, DELAYED RELEASE ORAL
Status: DISCONTINUED | OUTPATIENT
Start: 2019-05-24 | End: 2019-05-24 | Stop reason: HOSPADM

## 2019-05-23 RX ORDER — CHOLECALCIFEROL (VITAMIN D3) 125 MCG
100 CAPSULE ORAL EVERY MORNING
Status: DISCONTINUED | OUTPATIENT
Start: 2019-05-24 | End: 2019-05-24 | Stop reason: HOSPADM

## 2019-05-23 RX ORDER — LANOLIN ALCOHOL/MO/W.PET/CERES
9 CREAM (GRAM) TOPICAL
Status: DISCONTINUED | OUTPATIENT
Start: 2019-05-23 | End: 2019-05-24 | Stop reason: HOSPADM

## 2019-05-23 RX ORDER — BUPIVACAINE HYDROCHLORIDE 5 MG/ML
INJECTION, SOLUTION PERINEURAL
Status: COMPLETED | OUTPATIENT
Start: 2019-05-23 | End: 2019-05-23

## 2019-05-23 RX ORDER — FENTANYL CITRATE 50 UG/ML
INJECTION, SOLUTION INTRAMUSCULAR; INTRAVENOUS AS NEEDED
Status: DISCONTINUED | OUTPATIENT
Start: 2019-05-23 | End: 2019-05-23 | Stop reason: SURG

## 2019-05-23 RX ORDER — ONDANSETRON 2 MG/ML
INJECTION INTRAMUSCULAR; INTRAVENOUS AS NEEDED
Status: DISCONTINUED | OUTPATIENT
Start: 2019-05-23 | End: 2019-05-23 | Stop reason: SURG

## 2019-05-23 RX ORDER — VANCOMYCIN HYDROCHLORIDE 1 G/200ML
1000 INJECTION, SOLUTION INTRAVENOUS EVERY 12 HOURS
Status: COMPLETED | OUTPATIENT
Start: 2019-05-23 | End: 2019-05-23

## 2019-05-23 RX ORDER — DEXAMETHASONE SODIUM PHOSPHATE 10 MG/ML
INJECTION, SOLUTION INTRAMUSCULAR; INTRAVENOUS AS NEEDED
Status: DISCONTINUED | OUTPATIENT
Start: 2019-05-23 | End: 2019-05-23 | Stop reason: SURG

## 2019-05-23 RX ORDER — ACETAMINOPHEN 325 MG/1
650 TABLET ORAL EVERY 6 HOURS PRN
Status: DISCONTINUED | OUTPATIENT
Start: 2019-05-23 | End: 2019-05-24 | Stop reason: HOSPADM

## 2019-05-23 RX ORDER — OXYCODONE HYDROCHLORIDE AND ACETAMINOPHEN 5; 325 MG/1; MG/1
1 TABLET ORAL EVERY 4 HOURS PRN
Status: DISCONTINUED | OUTPATIENT
Start: 2019-05-23 | End: 2019-05-24 | Stop reason: HOSPADM

## 2019-05-23 RX ORDER — PROPOFOL 10 MG/ML
INJECTION, EMULSION INTRAVENOUS AS NEEDED
Status: DISCONTINUED | OUTPATIENT
Start: 2019-05-23 | End: 2019-05-23 | Stop reason: SURG

## 2019-05-23 RX ORDER — ASCORBIC ACID 500 MG
500 TABLET ORAL EVERY MORNING
Status: DISCONTINUED | OUTPATIENT
Start: 2019-05-24 | End: 2019-05-24 | Stop reason: HOSPADM

## 2019-05-23 RX ORDER — EPHEDRINE SULFATE 50 MG/ML
INJECTION INTRAVENOUS AS NEEDED
Status: DISCONTINUED | OUTPATIENT
Start: 2019-05-23 | End: 2019-05-23 | Stop reason: SURG

## 2019-05-23 RX ORDER — SODIUM CHLORIDE, SODIUM LACTATE, POTASSIUM CHLORIDE, CALCIUM CHLORIDE 600; 310; 30; 20 MG/100ML; MG/100ML; MG/100ML; MG/100ML
INJECTION, SOLUTION INTRAVENOUS CONTINUOUS PRN
Status: DISCONTINUED | OUTPATIENT
Start: 2019-05-23 | End: 2019-05-23 | Stop reason: SURG

## 2019-05-23 RX ORDER — DIPHENHYDRAMINE HCL 25 MG
25 TABLET ORAL EVERY 8 HOURS PRN
Status: DISCONTINUED | OUTPATIENT
Start: 2019-05-23 | End: 2019-05-24 | Stop reason: HOSPADM

## 2019-05-23 RX ORDER — ASPIRIN 325 MG
325 TABLET ORAL DAILY
Status: DISCONTINUED | OUTPATIENT
Start: 2019-05-24 | End: 2019-05-24 | Stop reason: HOSPADM

## 2019-05-23 RX ORDER — FENTANYL CITRATE/PF 50 MCG/ML
25 SYRINGE (ML) INJECTION
Status: DISCONTINUED | OUTPATIENT
Start: 2019-05-23 | End: 2019-05-23 | Stop reason: HOSPADM

## 2019-05-23 RX ORDER — LISINOPRIL 20 MG/1
20 TABLET ORAL DAILY
Status: DISCONTINUED | OUTPATIENT
Start: 2019-05-23 | End: 2019-05-24 | Stop reason: HOSPADM

## 2019-05-23 RX ORDER — DOCUSATE SODIUM 100 MG/1
100 CAPSULE, LIQUID FILLED ORAL 2 TIMES DAILY
Status: DISCONTINUED | OUTPATIENT
Start: 2019-05-23 | End: 2019-05-24 | Stop reason: HOSPADM

## 2019-05-23 RX ADMIN — CYANOCOBALAMIN TAB 500 MCG 500 MCG: 500 TAB at 15:43

## 2019-05-23 RX ADMIN — OXYCODONE HYDROCHLORIDE AND ACETAMINOPHEN 1 TABLET: 5; 325 TABLET ORAL at 23:55

## 2019-05-23 RX ADMIN — FENTANYL CITRATE 100 MCG: 50 INJECTION, SOLUTION INTRAMUSCULAR; INTRAVENOUS at 11:05

## 2019-05-23 RX ADMIN — FENTANYL CITRATE 25 MCG: 50 INJECTION, SOLUTION INTRAMUSCULAR; INTRAVENOUS at 13:10

## 2019-05-23 RX ADMIN — DOCUSATE SODIUM 100 MG: 100 CAPSULE, LIQUID FILLED ORAL at 18:03

## 2019-05-23 RX ADMIN — MELATONIN 9 MG: at 21:36

## 2019-05-23 RX ADMIN — SODIUM CHLORIDE, SODIUM LACTATE, POTASSIUM CHLORIDE, AND CALCIUM CHLORIDE 75 ML/HR: .6; .31; .03; .02 INJECTION, SOLUTION INTRAVENOUS at 15:38

## 2019-05-23 RX ADMIN — BUPIVACAINE HYDROCHLORIDE 10 ML: 5 INJECTION, SOLUTION PERINEURAL at 09:45

## 2019-05-23 RX ADMIN — VANCOMYCIN HYDROCHLORIDE 1000 MG: 1 INJECTION, SOLUTION INTRAVENOUS at 21:38

## 2019-05-23 RX ADMIN — EPHEDRINE SULFATE 10 MG: 50 INJECTION, SOLUTION INTRAVENOUS at 11:19

## 2019-05-23 RX ADMIN — ESCITALOPRAM OXALATE 15 MG: 5 TABLET, FILM COATED ORAL at 21:36

## 2019-05-23 RX ADMIN — EPHEDRINE SULFATE 5 MG: 50 INJECTION, SOLUTION INTRAVENOUS at 11:10

## 2019-05-23 RX ADMIN — DEXAMETHASONE SODIUM PHOSPHATE 8 MG: 10 INJECTION, SOLUTION INTRAMUSCULAR; INTRAVENOUS at 11:26

## 2019-05-23 RX ADMIN — ONDANSETRON 4 MG: 2 INJECTION INTRAMUSCULAR; INTRAVENOUS at 11:26

## 2019-05-23 RX ADMIN — SODIUM CHLORIDE, SODIUM LACTATE, POTASSIUM CHLORIDE, AND CALCIUM CHLORIDE: .6; .31; .03; .02 INJECTION, SOLUTION INTRAVENOUS at 12:30

## 2019-05-23 RX ADMIN — ACETAMINOPHEN 650 MG: 325 TABLET, FILM COATED ORAL at 18:06

## 2019-05-23 RX ADMIN — EPHEDRINE SULFATE 10 MG: 50 INJECTION, SOLUTION INTRAVENOUS at 11:31

## 2019-05-23 RX ADMIN — SODIUM CHLORIDE, SODIUM LACTATE, POTASSIUM CHLORIDE, AND CALCIUM CHLORIDE 125 ML/HR: .6; .31; .03; .02 INJECTION, SOLUTION INTRAVENOUS at 08:40

## 2019-05-23 RX ADMIN — LIDOCAINE HYDROCHLORIDE 80 MG: 10 INJECTION, SOLUTION INFILTRATION; PERINEURAL at 11:05

## 2019-05-23 RX ADMIN — EPHEDRINE SULFATE 15 MG: 50 INJECTION, SOLUTION INTRAVENOUS at 11:13

## 2019-05-23 RX ADMIN — PROPOFOL 180 MG: 10 INJECTION, EMULSION INTRAVENOUS at 11:05

## 2019-05-23 RX ADMIN — FENTANYL CITRATE 25 MCG: 50 INJECTION, SOLUTION INTRAMUSCULAR; INTRAVENOUS at 13:01

## 2019-05-23 RX ADMIN — FENTANYL CITRATE 50 MCG: 50 INJECTION, SOLUTION INTRAMUSCULAR; INTRAVENOUS at 12:42

## 2019-05-23 RX ADMIN — MIDAZOLAM HYDROCHLORIDE 2 MG: 1 INJECTION, SOLUTION INTRAMUSCULAR; INTRAVENOUS at 09:45

## 2019-05-23 RX ADMIN — SODIUM CHLORIDE, SODIUM LACTATE, POTASSIUM CHLORIDE, AND CALCIUM CHLORIDE: .6; .31; .03; .02 INJECTION, SOLUTION INTRAVENOUS at 10:53

## 2019-05-24 ENCOUNTER — TELEPHONE (OUTPATIENT)
Dept: OBGYN CLINIC | Facility: HOSPITAL | Age: 64
End: 2019-05-24

## 2019-05-24 ENCOUNTER — TELEPHONE (OUTPATIENT)
Dept: OBGYN CLINIC | Facility: CLINIC | Age: 64
End: 2019-05-24

## 2019-05-24 VITALS
WEIGHT: 243.38 LBS | TEMPERATURE: 98.8 F | HEART RATE: 84 BPM | HEIGHT: 63 IN | SYSTOLIC BLOOD PRESSURE: 108 MMHG | OXYGEN SATURATION: 94 % | DIASTOLIC BLOOD PRESSURE: 48 MMHG | BODY MASS INDEX: 43.12 KG/M2 | RESPIRATION RATE: 20 BRPM

## 2019-05-24 DIAGNOSIS — M19.011 PRIMARY OSTEOARTHRITIS OF RIGHT SHOULDER: Primary | ICD-10-CM

## 2019-05-24 LAB
ALBUMIN SERPL BCP-MCNC: 3 G/DL (ref 3.5–5)
ALP SERPL-CCNC: 48 U/L (ref 46–116)
ALT SERPL W P-5'-P-CCNC: 21 U/L (ref 12–78)
ANION GAP SERPL CALCULATED.3IONS-SCNC: 7 MMOL/L (ref 4–13)
APTT PPP: 23 SECONDS (ref 26–38)
AST SERPL W P-5'-P-CCNC: 25 U/L (ref 5–45)
BILIRUB SERPL-MCNC: 0.3 MG/DL (ref 0.2–1)
BUN SERPL-MCNC: 16 MG/DL (ref 5–25)
CALCIUM SERPL-MCNC: 9 MG/DL (ref 8.3–10.1)
CHLORIDE SERPL-SCNC: 105 MMOL/L (ref 100–108)
CO2 SERPL-SCNC: 28 MMOL/L (ref 21–32)
CREAT SERPL-MCNC: 0.47 MG/DL (ref 0.6–1.3)
ERYTHROCYTE [DISTWIDTH] IN BLOOD BY AUTOMATED COUNT: 13.5 % (ref 11.6–15.1)
GFR SERPL CREATININE-BSD FRML MDRD: 105 ML/MIN/1.73SQ M
GLUCOSE SERPL-MCNC: 114 MG/DL (ref 65–140)
HCT VFR BLD AUTO: 35.6 % (ref 34.8–46.1)
HGB BLD-MCNC: 10.8 G/DL (ref 11.5–15.4)
INR PPP: 0.96 (ref 0.86–1.16)
MCH RBC QN AUTO: 29.3 PG (ref 26.8–34.3)
MCHC RBC AUTO-ENTMCNC: 30.3 G/DL (ref 31.4–37.4)
MCV RBC AUTO: 97 FL (ref 82–98)
MRSA NOSE QL CULT: NORMAL
PLATELET # BLD AUTO: 183 THOUSANDS/UL (ref 149–390)
PMV BLD AUTO: 10.5 FL (ref 8.9–12.7)
POTASSIUM SERPL-SCNC: 3.7 MMOL/L (ref 3.5–5.3)
PROT SERPL-MCNC: 5.9 G/DL (ref 6.4–8.2)
PROTHROMBIN TIME: 10.1 SECONDS (ref 9.4–11.7)
RBC # BLD AUTO: 3.68 MILLION/UL (ref 3.81–5.12)
SODIUM SERPL-SCNC: 140 MMOL/L (ref 136–145)
WBC # BLD AUTO: 10.72 THOUSAND/UL (ref 4.31–10.16)

## 2019-05-24 PROCEDURE — G8987 SELF CARE CURRENT STATUS: HCPCS

## 2019-05-24 PROCEDURE — 97535 SELF CARE MNGMENT TRAINING: CPT

## 2019-05-24 PROCEDURE — 85610 PROTHROMBIN TIME: CPT | Performed by: PHYSICIAN ASSISTANT

## 2019-05-24 PROCEDURE — 85730 THROMBOPLASTIN TIME PARTIAL: CPT | Performed by: PHYSICIAN ASSISTANT

## 2019-05-24 PROCEDURE — 80053 COMPREHEN METABOLIC PANEL: CPT | Performed by: PHYSICIAN ASSISTANT

## 2019-05-24 PROCEDURE — 99024 POSTOP FOLLOW-UP VISIT: CPT | Performed by: PHYSICIAN ASSISTANT

## 2019-05-24 PROCEDURE — 85027 COMPLETE CBC AUTOMATED: CPT | Performed by: PHYSICIAN ASSISTANT

## 2019-05-24 PROCEDURE — 97167 OT EVAL HIGH COMPLEX 60 MIN: CPT

## 2019-05-24 PROCEDURE — G8988 SELF CARE GOAL STATUS: HCPCS

## 2019-05-24 RX ORDER — OXYCODONE HYDROCHLORIDE AND ACETAMINOPHEN 5; 325 MG/1; MG/1
1 TABLET ORAL EVERY 4 HOURS PRN
Qty: 20 TABLET | Refills: 0 | Status: SHIPPED | OUTPATIENT
Start: 2019-05-24 | End: 2019-07-05 | Stop reason: ALTCHOICE

## 2019-05-24 RX ADMIN — CYANOCOBALAMIN TAB 500 MCG 500 MCG: 500 TAB at 08:27

## 2019-05-24 RX ADMIN — OXYCODONE HYDROCHLORIDE AND ACETAMINOPHEN 500 MG: 500 TABLET ORAL at 08:27

## 2019-05-24 RX ADMIN — OXYCODONE HYDROCHLORIDE AND ACETAMINOPHEN 1 TABLET: 5; 325 TABLET ORAL at 08:32

## 2019-05-24 RX ADMIN — LISINOPRIL 20 MG: 20 TABLET ORAL at 08:29

## 2019-05-24 RX ADMIN — PANTOPRAZOLE SODIUM 40 MG: 40 TABLET, DELAYED RELEASE ORAL at 05:57

## 2019-05-24 RX ADMIN — OXYCODONE HYDROCHLORIDE AND ACETAMINOPHEN 1 TABLET: 5; 325 TABLET ORAL at 03:55

## 2019-05-24 RX ADMIN — DOCUSATE SODIUM 100 MG: 100 CAPSULE, LIQUID FILLED ORAL at 08:27

## 2019-05-24 RX ADMIN — ASPIRIN 325 MG: 325 TABLET ORAL at 08:27

## 2019-05-24 RX ADMIN — Medication 100 MG: at 08:27

## 2019-05-31 ENCOUNTER — OFFICE VISIT (OUTPATIENT)
Dept: OBGYN CLINIC | Facility: CLINIC | Age: 64
End: 2019-05-31

## 2019-05-31 ENCOUNTER — APPOINTMENT (OUTPATIENT)
Dept: RADIOLOGY | Facility: CLINIC | Age: 64
End: 2019-05-31
Payer: COMMERCIAL

## 2019-05-31 VITALS
SYSTOLIC BLOOD PRESSURE: 133 MMHG | WEIGHT: 245.8 LBS | HEART RATE: 74 BPM | DIASTOLIC BLOOD PRESSURE: 78 MMHG | BODY MASS INDEX: 43.55 KG/M2 | HEIGHT: 63 IN

## 2019-05-31 DIAGNOSIS — M19.011 PRIMARY OSTEOARTHRITIS OF RIGHT SHOULDER: ICD-10-CM

## 2019-05-31 DIAGNOSIS — M19.011 PRIMARY OSTEOARTHRITIS OF RIGHT SHOULDER: Primary | ICD-10-CM

## 2019-05-31 PROCEDURE — 99024 POSTOP FOLLOW-UP VISIT: CPT | Performed by: PHYSICIAN ASSISTANT

## 2019-05-31 PROCEDURE — 73030 X-RAY EXAM OF SHOULDER: CPT

## 2019-06-06 ENCOUNTER — EVALUATION (OUTPATIENT)
Dept: PHYSICAL THERAPY | Facility: CLINIC | Age: 64
End: 2019-06-06
Payer: COMMERCIAL

## 2019-06-06 DIAGNOSIS — M19.011 PRIMARY OSTEOARTHRITIS OF RIGHT SHOULDER: Primary | ICD-10-CM

## 2019-06-06 PROCEDURE — 97162 PT EVAL MOD COMPLEX 30 MIN: CPT

## 2019-06-10 ENCOUNTER — OFFICE VISIT (OUTPATIENT)
Dept: PHYSICAL THERAPY | Facility: CLINIC | Age: 64
End: 2019-06-10
Payer: COMMERCIAL

## 2019-06-10 DIAGNOSIS — M19.011 PRIMARY OSTEOARTHRITIS OF RIGHT SHOULDER: Primary | ICD-10-CM

## 2019-06-10 PROCEDURE — 97140 MANUAL THERAPY 1/> REGIONS: CPT

## 2019-06-10 PROCEDURE — 97110 THERAPEUTIC EXERCISES: CPT

## 2019-06-12 ENCOUNTER — OFFICE VISIT (OUTPATIENT)
Dept: PHYSICAL THERAPY | Facility: CLINIC | Age: 64
End: 2019-06-12
Payer: COMMERCIAL

## 2019-06-12 DIAGNOSIS — M19.011 PRIMARY OSTEOARTHRITIS OF RIGHT SHOULDER: Primary | ICD-10-CM

## 2019-06-12 PROCEDURE — 97140 MANUAL THERAPY 1/> REGIONS: CPT

## 2019-06-12 PROCEDURE — 97110 THERAPEUTIC EXERCISES: CPT

## 2019-06-17 ENCOUNTER — OFFICE VISIT (OUTPATIENT)
Dept: PHYSICAL THERAPY | Facility: CLINIC | Age: 64
End: 2019-06-17
Payer: COMMERCIAL

## 2019-06-17 DIAGNOSIS — M19.011 PRIMARY OSTEOARTHRITIS OF RIGHT SHOULDER: Primary | ICD-10-CM

## 2019-06-17 PROCEDURE — 97110 THERAPEUTIC EXERCISES: CPT

## 2019-06-19 ENCOUNTER — OFFICE VISIT (OUTPATIENT)
Dept: PHYSICAL THERAPY | Facility: CLINIC | Age: 64
End: 2019-06-19
Payer: COMMERCIAL

## 2019-06-19 DIAGNOSIS — M19.011 PRIMARY OSTEOARTHRITIS OF RIGHT SHOULDER: Primary | ICD-10-CM

## 2019-06-19 PROCEDURE — 97140 MANUAL THERAPY 1/> REGIONS: CPT

## 2019-06-19 PROCEDURE — 97110 THERAPEUTIC EXERCISES: CPT

## 2019-06-24 ENCOUNTER — OFFICE VISIT (OUTPATIENT)
Dept: PHYSICAL THERAPY | Facility: CLINIC | Age: 64
End: 2019-06-24
Payer: COMMERCIAL

## 2019-06-24 DIAGNOSIS — M19.011 PRIMARY OSTEOARTHRITIS OF RIGHT SHOULDER: Primary | ICD-10-CM

## 2019-06-24 PROCEDURE — 97110 THERAPEUTIC EXERCISES: CPT

## 2019-06-26 ENCOUNTER — OFFICE VISIT (OUTPATIENT)
Dept: PHYSICAL THERAPY | Facility: CLINIC | Age: 64
End: 2019-06-26
Payer: COMMERCIAL

## 2019-06-26 DIAGNOSIS — M19.011 PRIMARY OSTEOARTHRITIS OF RIGHT SHOULDER: Primary | ICD-10-CM

## 2019-06-26 PROCEDURE — 97110 THERAPEUTIC EXERCISES: CPT

## 2019-06-26 PROCEDURE — 97140 MANUAL THERAPY 1/> REGIONS: CPT

## 2019-07-01 ENCOUNTER — OFFICE VISIT (OUTPATIENT)
Dept: PHYSICAL THERAPY | Facility: CLINIC | Age: 64
End: 2019-07-01
Payer: COMMERCIAL

## 2019-07-01 DIAGNOSIS — M19.011 PRIMARY OSTEOARTHRITIS OF RIGHT SHOULDER: Primary | ICD-10-CM

## 2019-07-01 PROCEDURE — 97110 THERAPEUTIC EXERCISES: CPT

## 2019-07-01 NOTE — PROGRESS NOTES
Daily Note     Today's date: 2019  Patient name: Enio rKuger  : 1955  MRN: [id number]  Referring provider: Amado Newberry MD  Dx:   Encounter Diagnosis     ICD-10-CM    1  Primary osteoarthritis of right shoulder M19 011                   Subjective: Kalyan Salcedo reports that her pain level 1/10  States she spends time out of the sling on occasion  Enter clinic without sling this session         Objective: See treatment diary below  Precautions HTN, anxiety, panic disorder    Specialty Daily Treatment Diary       4Visits: 4 Progress per protocol  5  6   FOTO 7  8   Manual 19   PROM shoulder  performed   performed performed    GHJ mobs        Scap mobs        STM        Total Time:  Not performed  8 min    5 min            Exercise Diary         UBE/Nustep        Pulleys 15x  20 x   20x  30x    Tubing rows  Red 2 x 10   Red 2 x 10   Red 2 x 10   Green 2 x 10     Tubing ext  Red 5 sec x 10   Red  Red 5 sec x 10   Green 5 x 10    Gagan scap retraction w/ER     Red 5 sec x 10             Self PROM using left         tricep extension  Yellow 2 x 10   Yellow 2 x 10   Red 2 x 10   Green  2x10 Green 5 sec x 10     bicep curl 1lb sitting 2 x 10    2 lb 2 x 10   2 lb 2 x 10   2 lb 2 x 10   3 lb 2 x 10     AAROM shoulder flexion, abd, IR/ER Abd: 10 x  Flexion: 10x  Standing abd: 10 x (standing)  Er: standing 10 x  Flexion:10 x  Supine: flex: 10 x 2  Flex with ball  2x10 Next visit    Supine alphabet  +++ 1 x  1x 1x   Supine flexion  +++ --     sidelying shoulder ER   10 x 2 2x10  ER and abd 2 x 10  Each    wall slides   5 x 2  10 x 10x 2 x 10     Supine snow angels   10 x  10x 10 x    Standing shoulder isometrics   5 sec x 5  HEP  5 sec x 10 --> d/c    horizontal adduction  +++ 5 x 2   Next visit    Total time:  20min  20 min  25min  35 min 40 min            Modalities        MH        Ice 10 min  6 min  deferred deferred Deferred    Total time:         Week 2:  Week 8: 7/18  Week 3: 6/13                                                                        Week 9: 7/25  Week 4: 6/20 (progress per protocol)                                    Week 10: 8/1  Week 5: 6/27                                                                        Week 11: 8/8  Week 6: 7/4                                                                          Week 12: 8/15  Week 7: 7      Assessment:   Patient shoulder ROM is significantly improving right shoulder with minimal to no pain  Patient program can be progressed next session due to achieving 6 weeks post op  Plan: Progress treament per protocol

## 2019-07-03 ENCOUNTER — OFFICE VISIT (OUTPATIENT)
Dept: PHYSICAL THERAPY | Facility: CLINIC | Age: 64
End: 2019-07-03
Payer: COMMERCIAL

## 2019-07-03 DIAGNOSIS — M19.011 PRIMARY OSTEOARTHRITIS OF RIGHT SHOULDER: Primary | ICD-10-CM

## 2019-07-03 PROCEDURE — 97110 THERAPEUTIC EXERCISES: CPT

## 2019-07-03 NOTE — PROGRESS NOTES
Daily Note     Today's date: 7/3/2019  Patient name: Susanne Alvarado  : 1955  MRN: [id number]  Referring provider: Roque Horan MD  Dx:   Encounter Diagnosis     ICD-10-CM    1  Primary osteoarthritis of right shoulder M19 011                   Subjective: Jaylon Mann reports that her pain level entering today is 0/10          Objective: See treatment diary below    Precautions HTN, anxiety, panic disorder    Specialty Daily Treatment Diary       4Visits: Progress per protocol  5  6   FOTO 7  8 9    Manual 6/19/19 6/24/19 6/26/19 7/1/19 7/3/19   PROM shoulder performed   performed performed  performed    GHJ mobs        Scap mobs        STM        Total Time:  8 min    5 min  5 min            Exercise Diary         UBE/Nustep        Pulleys 20 x   20x  30x  30x    Tubing rows Red 2 x 10   Red 2 x 10   Red 2 x 10   Green 2 x 10   Green 2 x 10     Tubing ext Red 5 sec x 10   Red  Red 5 sec x 10   Green 5 x 10  Green 5 sec x 10     Gagan scap retraction w/ER    Red 5 sec x 10   Red 5 sec x 10             Self PROM using left         tricep extension  Yellow 2 x 10   Red 2 x 10   Green  2x10 Green 5 sec x 10   Green Next visit    bicep curl  2 lb 2 x 10   2 lb 2 x 10   2 lb 2 x 10   3 lb 2 x 10   3 lb 3 x 10    AAROM shoulder flexion, abd, IR/ER Standing abd: 10 x (standing)  Er: standing 10 x  Flexion:10 x  Supine: flex: 10 x 2  Flex with ball  2x10 Next visit  flexion with cane 10 x    Supine alphabet +++ 1 x  1x 1x  1 x    Supine flexion +++ --      sidelying shoulder ER  10 x 2 2x10  ER and abd 2 x 10  Each     wall slides  5 x 2  10 x 10x 2 x 10   2 x 10     Supine snow angels  10 x  10x 10 x  5 x 2    Standing shoulder isometrics  5 sec x 5  HEP  5 sec x 10 --> d/c  D/c    Ball vs wall      10 x each                    horizontal adduction +++ 5 x 2   Next visit  10 x 2    Total time:  20 min  25min  35 min 40 min  40 min            Modalities        MH        Ice 6 min  deferred deferred Deferred     Total time:         Week 2: 6/6                                                                          Week 8: 7/18  Week 3: 6/13                                                                        Week 9: 7/25  Week 4: 6/20 (progress per protocol)                                    Week 10: 8/1  Week 5: 6/27                                                                        Week 11: 8/8  Week 6: 7/4                                                                          Week 12: 8/15  Week 7: 7/11      Assessment: overall excellent tolerance to PT  Has nicely progressing ROM  Will initiate strengthening next session per protocol  Good strength noted with dynamic stab IR/ER  reaching against gravity noted is upper trap compensation  Plan: Progress treatment as tolerated    reeval next session

## 2019-07-05 ENCOUNTER — OFFICE VISIT (OUTPATIENT)
Dept: OBGYN CLINIC | Facility: CLINIC | Age: 64
End: 2019-07-05

## 2019-07-05 VITALS
BODY MASS INDEX: 43.38 KG/M2 | HEIGHT: 63 IN | HEART RATE: 67 BPM | DIASTOLIC BLOOD PRESSURE: 84 MMHG | WEIGHT: 244.8 LBS | SYSTOLIC BLOOD PRESSURE: 142 MMHG

## 2019-07-05 DIAGNOSIS — M19.011 PRIMARY OSTEOARTHRITIS OF RIGHT SHOULDER: Primary | ICD-10-CM

## 2019-07-05 PROCEDURE — 99024 POSTOP FOLLOW-UP VISIT: CPT | Performed by: PHYSICIAN ASSISTANT

## 2019-07-05 RX ORDER — CLINDAMYCIN HYDROCHLORIDE 300 MG/1
CAPSULE ORAL AS NEEDED
COMMUNITY
Start: 2019-06-25

## 2019-07-05 NOTE — PROGRESS NOTES
Assessment/Plan:  1  Primary osteoarthritis of right shoulder         The patient is doing very well and will remain out of her sling at this point  She will continue physical therapy on the total shoulder replacement protocol  She will begin strengthening at this point  She has already way ahead of schedule and looks quite well today  She will follow up in another 6 weeks for repeat evaluation  Subjective:   Axel Cheney is a 59 y o  female who presents today for follow-up of her right shoulder, now about 6 weeks status post total shoulder replacement  She is doing quite well and denies any pain about the shoulder at this point  She discontinue use of her sling yesterday, as she was 6 weeks postoperative  She already notes very good active range of motion of the shoulder  She notes good sensation of the right upper extremity  She has no complaints today      Review of Systems      Past Medical History:   Diagnosis Date    Anxiety     Apnea, sleep     patient states "repeated test says no"    Arthritis     severe in the joints    Chronic pain disorder     joints    Fall 11/22/2018    tripped on a folded rug- left hamstring injury    Foot injury     right foot-fx of bone-no repair-has severe arthritis in the foot and ankle    History of sleep apnea     Hypertension     Insomnia     Obesity     Lost 70lb with gastric sleeve (2011)    Osteoarthritis     Wears glasses     Wears partial dentures     upper and lower-but did lose the lower        Past Surgical History:   Procedure Laterality Date    ANKLE ARTHROSCOPY Right     APPENDECTOMY      CHOLECYSTECTOMY      GASTRECTOMY SLEEVE LAPAROSCOPIC  2011    lost 70 lb    JOINT REPLACEMENT      blateral knee replacements    NERVE BLOCK Bilateral 12/29/2017    Procedure: L4, L5, S1 MEDIAL BRANCH BLOCK #1;  Surgeon: Marti Noonan MD;  [facility] MAIN OR;  Service: Pain Management     NERVE BLOCK Bilateral 1/12/2018    Procedure: BLOCK MEDIAL BRANCH L4, L5-S1  #2;  Surgeon: Marti Noonan MD;  Location: [facility] MAIN OR;  Service: Pain Management     NV RECONSTR TOTAL SHOULDER IMPLANT Right 5/23/2019    Procedure: ARTHROPLASTY SHOULDER TOTAL , excision of deep cyst at shoulder, biceps tenotomy;  Surgeon: Mary Varela MD;  Location: [address] [place] [address];  Service: Orthopedics    RADIOFREQUENCY ABLATION Right 3/16/2018    Procedure: Rt L4 L5 S1 RFA;  Surgeon: Marti Noonan MD;  Location: [facility] MAIN OR;  Service: Pain Management     RADIOFREQUENCY ABLATION Left 12/14/2018    Procedure: L3-S1 Radio Frequency Ablation ([id number]);   Surgeon: Marti Noonan MD;  Location: [facility] MAIN OR;  Service: Pain Management     RHIZOTOMY Left 1/25/2018    Procedure: L4, L5, S1 RFA;  Surgeon: Marti Noonan MD;  Location: [facility] MAIN OR;  Service: Pain Management     TONSILLECTOMY         Family History   Problem Relation Age of Onset    Other Mother         in a nursing home-fx of the leg    Heart disease Father         CHF    Alcohol abuse Father     Cirrhosis Father     Other Brother         lung abscess, pneumonia    No Known Problems Sister     No Known Problems Maternal Aunt     No Known Problems Maternal Uncle     No Known Problems Paternal Aunt     No Known Problems Paternal Uncle     No Known Problems Maternal Grandmother     No Known Problems Maternal Grandfather     No Known Problems Paternal Grandmother     No Known Problems Paternal Grandfather        Social History     Occupational History    Not on file   Tobacco Use    Smoking status: Former Smoker     Last attempt to quit: 2004     Years since quitting: 15 5    Smokeless tobacco: Never Used   Substance and Sexual Activity    Alcohol use: Never     Frequency: Never    Drug use: Never    Sexual activity: Not on file         Current Outpatient Medications:     acetaminophen (TYLENOL) 650 mg CR tablet, Take 650 mg by mouth every 8 (eight) hours as needed for mild pain, Disp: , Rfl:     ascorbic acid (VITAMIN C) 500 mg tablet, Take 500 mg by mouth every morning, Disp: , Rfl:     clindamycin (CLEOCIN) 300 MG capsule, , Disp: , Rfl:     Coenzyme Q10 (CO Q 10) 100 MG CAPS, Take by mouth every morning Last dose 5/13/19, Disp: , Rfl:     Cyanocobalamin (VITAMIN B 12 PO), Take 500 mcg by mouth every morning, Disp: , Rfl:     diphenhydrAMINE (BENADRYL) 25 mg tablet, Take 25 mg by mouth every 8 (eight) hours as needed for itching , Disp: , Rfl:     Escitalopram Oxalate (LEXAPRO PO), Take 15 mg by mouth daily at bedtime, Disp: , Rfl:     Glucosamine-Chondroitin (MOVE FREE PO), Take 2 tablets by mouth every morning, Disp: , Rfl:     Melatonin 10 MG TABS, Take by mouth daily at bedtime, Disp: , Rfl:     Multiple Vitamins-Minerals (CENTRUM SILVER 50+WOMEN PO), Take by mouth every morning, Disp: , Rfl:     omeprazole (PriLOSEC) 20 mg delayed release capsule, Take 20 mg by mouth every morning , Disp: , Rfl:     oxyCODONE-acetaminophen (PERCOCET) 7 5-325 MG per tablet, Take 1 tablet by mouth every 4 (four) hours as needed for moderate pain (3x), Disp: , Rfl:     perindopril (ACEON) 4 MG tablet, Take 4 mg by mouth 2 (two) times a day , Disp: , Rfl:     Allergies   Allergen Reactions    Penicillins Anaphylaxis     Total body redness and fever    Levaquin [Levofloxacin] Itching       Objective:  Vitals:    07/05/19 0942   BP: 142/84   Pulse: 67       Right Shoulder Exam     Tenderness   The patient is experiencing no tenderness      Range of Motion   External rotation: 30   Forward flexion: 140     Tests   Drop arm: negative    Other   Erythema: absent  Scars: present  Sensation: normal  Pulse: present            Physical Exam

## 2019-07-08 ENCOUNTER — OFFICE VISIT (OUTPATIENT)
Dept: PHYSICAL THERAPY | Facility: CLINIC | Age: 64
End: 2019-07-08
Payer: COMMERCIAL

## 2019-07-08 DIAGNOSIS — M19.011 PRIMARY OSTEOARTHRITIS OF RIGHT SHOULDER: Primary | ICD-10-CM

## 2019-07-08 PROCEDURE — 97110 THERAPEUTIC EXERCISES: CPT

## 2019-07-08 NOTE — PROGRESS NOTES
PT Reevaluation     Today's date: 2019  Patient name: Arianna Shepard  : 1955  MRN: [id number]  Referring provider: Soniya Holguin MD  Dx:   Encounter Diagnosis     ICD-10-CM    1  Primary osteoarthritis of right shoulder M19 011                   Subjective: Darryl Rothman reports that her % improvement is 70%  Pain level at rest 0/10, with ADL's 0/10, at worst 0/10  Current symptoms include: mid back pain resolved completely, patient states only pain with excessive activity     Function: lifting is limited with right UE           Objective: See treatment diary below  Precautions HTN, anxiety, panic disorder    Specialty Daily Treatment Diary       4Visits: 6   FOTO 7  8 9  10    Manual 6/24/19 6/26/19 7/1/19 7/3/19 7/8/19   PROM shoulder  performed performed  performed  performed    GHJ mobs        Scap mobs        STM        Total Time:    5 min  5 min  5 min            Exercise Diary      reeval performed    UBE/Nustep        Pulleys  20x  30x  30x  30 x flex, retraction    Tubing rows Red 2 x 10   Red 2 x 10   Green 2 x 10   Green 2 x 10   Green 2 x 10     Tubing ext Red  Red 5 sec x 10   Green 5 x 10  Green 5 sec x 10   green 2 x 10     Gagan scap retraction w/ER   Red 5 sec x 10   Red 5 sec x 10   Red 5 sec x 10             Self PROM using left         tricep extension  Red 2 x 10   Green  2x10 Green 5 sec x 10   Green Next visit  Green 2 x 10     bicep curl 2 lb 2 x 10   2 lb 2 x 10   3 lb 2 x 10   3 lb 3 x 10  4 lb 2x10    AAROM shoulder flexion, abd, IR/ER Supine: flex: 10 x 2  Flex with ball  2x10 Next visit  flexion with cane 10 x  Next visit    Supine alphabet 1 x  1x 1x  1 x     Supine flexion --       sidelying shoulder ER 10 x 2 2x10  ER and abd 2 x 10  Each      wall slides  10 x 10x 2 x 10   2 x 10   1 lb 2 x 10     Supine snow angels 10 x  10x 10 x  5 x 2     Standing shoulder isometrics  HEP  5 sec x 10 --> d/c  D/c     Ball vs wall     10 x each  1 lb 10 x each                    horizontal adduction 5 x 2   Next visit  10 x 2     Total time:  25min  35 min 40 min  40 min  40min            Modalities        MH        Ice deferred deferred Deferred      Total time:         Week 2: 6/6                                                                          Week 8: 7/18  Week 3: 6/13                                                                        Week 9: 7/25  Week 4: 6/20 (progress per protocol)                                    Week 10: 8/1  Week 5: 6/27                                                                        Week 11: 8/8  Week 6: 7/4                                                                          Week 12: 8/15  Week 7: 7/11  Static Posture     Comments (7/8/19)  Shoulder:  Posture  Sitting: forward shoulders, forward head , right UE in sling    Cervical ROM:  Flexion:wnl  + pulling in right shoulder ( wnl no pain)  Extension:wnl  No pain   Rotation right:minimal LOM no pain( wnl no pain)  Rotation left:minimal LOM no pain ( min LOM no pain)  SB right:moderate LOM no pain (status quo)   SB left: moderate LOM no pain  (status quo)     Palpation:+ TTP in the following muscles: bicep, ant/lateral delt, ((ant,lat delt, bicep: + TTP)    Inspection: 14 cm incision anterior shoulder  Healing well, no redness no drainage, no sign of infection    (well healed, no redness no drainage, no sign of infection)  Functional reaching: (tested in standing)  Overhead: Right:NT lft: WFL, painfree  (Right: limited about 15 % no pain)  Behind head: Right:NT  painful Left: WFL, painfree (Right: limited about 20 % , no pain)  Behind back :  Right: NT , painful  Left: WFL, painfree (Right: NT)    Scapular Mechanics: NT    MMT  Flexion (supine): Right: 2-/5 + pain Left: 4/5 no pain ((Right: 3/5)  Abduction (supine): Right: 2-/5 + pain    Left: 4/5  no pain (Right: 3-/5)  ER: Right: 2-/5 + pain  Left: 4/5 no pain (Right: 4/5 no pain)  IR: Right: 2-/5 + pain    Left: 4/5 no pain (Right: 4-/5 no pain)  Adduction: Right: NT    Left: 4/5 no pain (Right: 4/5 )  Extension: Right: NT   Left: 4/5 no pain  ( Right: 4/5 no pain)    Bicep: Right: 4-/5  + pain  Left: 4/5 no pain (4/5 no pain)  Tricep: Right: 4/5  no pain   Left: 4/5 no pain   : Right: fair Left: fair +     ROM  Flexion: Right: PROM:100 + pain  Left: wnl (Right: 148 AAROM)  Abduction: Right: 60 PROM  + pain  Left: wnl (Right:  135 AAROM)  Er (supine 45 deg abd): Right: PROM: 18  - pain  Left: wnl  (Right: 80 AROM)  IR (supine at 45 deg abd) : Right: to body + pain Left: wnl (Right: 65 AAROM)  Horizontal Adduction (supine): Right:nt  Left: wnl  no pain ( Right:   Extension: Right: nt   Left: wnl (Right: to neutral)  Elbow flex and ext: wnl no pain       Short term goals:  2-3 weeks  + patient will increase ROM by 20 degrees all motions ( 3 weeks) (met)   + patient will tolerate 30 min there ex without an increase in pain ( 2-3 weeks) (met)   + paitent will report a 50% improvement in pain  (2-3 weeks) (met)   + Patient will be independent in basic HEP (2-3 weeks) (met)     Long term goals:  8-12 weeks   + Patient will report 85 % improvement in symptoms with ADL's   (8-10 weeks) (NOT MET)   + Patient will have full Activerange of motion right shoulder(10 weeks)performed   + Patient will have pain level 0/10 right shoulder with ADL's (4-6 weeks)(met)  + Patient will demonstrate 4-//5  MMT shoulder motions(4-6 weeks) (STILL PROGRESSING)   + Patient will increase FOTO score by 10 points (12 sessions) (met)   + Patient will demonstrate appropriate scapulohumeral rhythm with reaching overhead (4-6 weeks) (STILL PROGRESSING)   + patient will demonstrate functional reaching without compensatory patterns  (4-6 weeks) (STILL PROGRESSING)   + Patient will be independent in a comprehensive home exercise program (4-6 weeks) (STILL PROGRESSING)         Assessment: patient has made significant gains since [facility]    She is now able to reach above head against gravity in standing   She has much improved ROM  And now can progress into strengthening part of protocol  Plan: 2 x week another 4-6 weeks: progress per protocol   7/8/19--> 8/21/19

## 2019-07-10 ENCOUNTER — OFFICE VISIT (OUTPATIENT)
Dept: PHYSICAL THERAPY | Facility: CLINIC | Age: 64
End: 2019-07-10
Payer: COMMERCIAL

## 2019-07-10 DIAGNOSIS — M19.011 PRIMARY OSTEOARTHRITIS OF RIGHT SHOULDER: Primary | ICD-10-CM

## 2019-07-10 PROCEDURE — 97110 THERAPEUTIC EXERCISES: CPT

## 2019-07-10 NOTE — PROGRESS NOTES
Daily Note     Today's date: 7/10/2019  Patient name: Ty Escamilla  : 1955  MRN: [id number]  Referring provider: Boris Singletary MD  Dx:   Encounter Diagnosis     ICD-10-CM    1  Primary osteoarthritis of right shoulder M19 011                   Subjective: Bret Mancini reports that her bicep was aching yesterday, describes 1/10 right shoulder pain upon entering today  In posterior aspect of shoulder         Objective: See treatment diary below    Precautions HTN, anxiety, panic disorder    Specialty Daily Treatment Diary       4Visits: FOTO 7  8 9  10  11   Manual 6/26/19 7/1/19 7/3/19 7/8/19 7/10/19   PROM shoulder performed performed  performed  performed     GHJ mobs        Scap mobs        STM        Total Time:   5 min  5 min  5 min             Exercise Diary     reeval performed     UBE/Nustep     lv 1 UE only 5 min    Pulleys 20x  30x  30x  30 x flex, retraction  30 x flex, rtxn   Tubing rows Red 2 x 10   Green 2 x 10   Green 2 x 10   Green 2 x 10   Blue 2 x 10     Tubing ext Red 5 sec x 10   Green 5 x 10  Green 5 sec x 10   green 2 x 10   Blue 2 x 10     Gagan scap retraction w/ER  Red 5 sec x 10   Red 5 sec x 10   Red 5 sec x 10   Red Next visit    SL shoulder abd     1 lb 2 x 10     Supine flexion with cane      2 lb 2 x 10     tricep extension  Green  2x10 Green 5 sec x 10   Green Next visit  Green 2 x 10   --   bicep curl 2 lb 2 x 10   3 lb 2 x 10   3 lb 3 x 10  4 lb 2x10  4 lb 2 x 10     AAROM shoulder flexion, abd, IR/ER Flex with ball  2x10 Next visit  flexion with cane 10 x  Next visit     Supine alphabet 1x 1x  1 x      Supine flexion     2 x 10  0 lb    sidelying shoulder ER 2x10  ER and abd 2 x 10  Each    1 lb 2 x 10     wall slides  10x 2 x 10   2 x 10   1 lb 2 x 10   1 lb 2 x 10     Supine snow angels 10x 10 x  5 x 2      Ball vs wall    10 x each  1 lb 10 x each  1 lb    tband shoulder IR, ER     Yellow 2 x 10 each      funcitonal reaching with moving pegs up wallboard      Peg 13 3 rounds    Wall presses      2 x 10     horizontal adduction  Next visit  10 x 2      Total time:  35 min 40 min  40 min  40min  40min            Modalities        MH        Ice deferred Deferred       Total time:         Week 2: 6/6                                                                          Week 8: 7/18  Week 3: 6/13                                                                        Week 9: 7/25  Week 4: 6/20 (progress per protocol)                                    Week 10: 8/1  Week 5: 6/27                                                                        Week 11: 8/8  Week 6: 7/4                                                                          Week 12: 8/15  Week 7: 7/11    Assessment:   Was not able to tolerate supine shoulder flexion with weight this session therefore added supine flexion with weight using a cane  Patient was able to do functional reaching with minimal upper trap substitution       Plan: upgrade HEP next session and progress per protocol

## 2019-07-15 ENCOUNTER — OFFICE VISIT (OUTPATIENT)
Dept: PHYSICAL THERAPY | Facility: CLINIC | Age: 64
End: 2019-07-15
Payer: COMMERCIAL

## 2019-07-15 DIAGNOSIS — M19.011 PRIMARY OSTEOARTHRITIS OF RIGHT SHOULDER: Primary | ICD-10-CM

## 2019-07-15 PROCEDURE — 97110 THERAPEUTIC EXERCISES: CPT

## 2019-07-15 NOTE — PROGRESS NOTES
Daily Note     Today's date: 7/15/2019  Patient name: Bambi Wheeler  : 1955  MRN: [id number]  Referring provider: Rashi De Leon MD  Dx:   Encounter Diagnosis     ICD-10-CM    1   Primary osteoarthritis of right shoulder M19 011                   Subjective: Joel Patino reports that her pain level entering today 0/10      Objective: See treatment diary below  Precautions HTN, anxiety, panic disorder    Specialty Daily Treatment Diary       4Visits: 8 9  10  11 12   Manual 7/1/19 7/3/19 7/8/19 7/10/19 7/15/19   PROM shoulder performed  performed  performed      GHJ mobs        Scap mobs        STM        Total Time:  5 min  5 min  5 min              Exercise Diary    reeval performed      UBE/Nustep    lv 1 UE only 5 min  lv 1 8:09am   Pulleys 30x  30x  30 x flex, retraction  30 x flex, rtxn 30x flex, rtxn each    Tubing rows Green 2 x 10   Green 2 x 10   Green 2 x 10   Blue 2 x 10   blue   Tubing ext Green 5 x 10  Green 5 sec x 10   green 2 x 10   Blue 2 x 10   Blue    Gagan scap retraction w/ER Red 5 sec x 10   Red 5 sec x 10   Red 5 sec x 10   Red Next visit  Red 5 sec x 10  + flexion: 5 x 2    SL shoulder abd    1 lb 2 x 10      Supine flexion with cane     2 lb 2 x 10   2 lb 2 x 10     tricep extension  Green 5 sec x 10   Green Next visit  Green 2 x 10   --    bicep curl 3 lb 2 x 10   3 lb 3 x 10  4 lb 2x10  4 lb 2 x 10   4 lb 2 x 10     AAROM shoulder flexion, abd, IR/ER Next visit  flexion with cane 10 x  Next visit      Supine alphabet 1x  1 x    1 x    Supine flexion    2 x 10  0 lb  0 lb --   sidelying shoulder ER 2 x 10  Each    1 lb 2 x 10   1 lb 2 x 10     wall slides  2 x 10   2 x 10   1 lb 2 x 10   1 lb 2 x 10   1 lb 2 x 10     Supine snow angels 10 x  5 x 2       Ball vs wall   10 x each  1 lb 10 x each  1 lb  1 lb 10 x 2    tband shoulder IR, ER    Yellow 2 x 10 each       funcitonal reaching with moving pegs up wallboard     Peg 13 3 rounds  Hole 13 5 rounds    Wall presses     2 x 10   2 x 10 horizontal adduction Next visit  10 x 2    2 x 10     Total time:  40 min  40 min  40min  40min  40 min            Modalities        MH        Ice Deferred        Total time:         Week 2: 6/6                                                                          Week 8: 7/18  Week 3: 6/13                                                                        Week 9: 7/25  Week 4: 6/20 (progress per protocol)                                    Week 10: 8/1  Week 5: 6/27                                                                        Week 11: 8/8  Week 6: 7/4                                                                          Week 12: 8/15  Week 7: 7/11      Assessment: was able to progress to red band with scap resistance into flexion  With good ROM and minimal upper trap substitution  Plan: Progress treatment as tolerated

## 2019-07-16 ENCOUNTER — TRANSCRIBE ORDERS (OUTPATIENT)
Dept: ADMINISTRATIVE | Facility: HOSPITAL | Age: 64
End: 2019-07-16

## 2019-07-16 DIAGNOSIS — Z12.31 VISIT FOR SCREENING MAMMOGRAM: Primary | ICD-10-CM

## 2019-07-17 ENCOUNTER — OFFICE VISIT (OUTPATIENT)
Dept: PHYSICAL THERAPY | Facility: CLINIC | Age: 64
End: 2019-07-17
Payer: COMMERCIAL

## 2019-07-17 DIAGNOSIS — M19.011 PRIMARY OSTEOARTHRITIS OF RIGHT SHOULDER: Primary | ICD-10-CM

## 2019-07-17 PROCEDURE — 97110 THERAPEUTIC EXERCISES: CPT

## 2019-07-17 NOTE — PROGRESS NOTES
Daily Note     Today's date: 2019  Patient name: Susanne Alvarado  : 1955  MRN: [id number]  Referring provider: Roque Horan MD  Dx:   Encounter Diagnosis     ICD-10-CM    1  Primary osteoarthritis of right shoulder M19 011                   Subjective: Jaylon Mann reports that her pain level entering today is 0/10         Objective: See treatment diary below  Precautions HTN, anxiety, panic disorder    Specialty Daily Treatment Diary       4Visits: 9  10  11 12 13   Manual 7/3/19 7/8/19 7/10/19 7/15/19 7/17/19   PROM shoulder performed  performed       GHJ mobs        Scap mobs        STM        Total Time:  5 min  5 min               Exercise Diary   reeval performed       UBE/Nustep   lv 1 UE only 5 min  lv 1 8:09am nustep UE/LE lv 8 min    Pulleys 30x  30 x flex, retraction  30 x flex, rtxn 30x flex, rtxn each  30x each   Tubing rows Green 2 x 10   Green 2 x 10   Blue 2 x 10   blue Blue    Tubing ext Green 5 sec x 10   green 2 x 10   Blue 2 x 10   Blue  Blue    Gagan scap retraction w/ER Red 5 sec x 10   Red 5 sec x 10   Red Next visit  Red 5 sec x 10  + flexion: 5 x 2  Red 5 sec x 20  + flexion: 5 x 2      SL shoulder abd   1 lb 2 x 10       Supine flexion with cane    2 lb 2 x 10   2 lb 2 x 10   3 lb 2 x 10      tricep extension  Green Next visit  Green 2 x 10   --  D/c    bicep curl 3 lb 3 x 10  4 lb 2x10  4 lb 2 x 10   4 lb 2 x 10   4 lb 2 x 10     AAROM shoulder flexion, abd, IR/ER flexion with cane 10 x  Next visit       Supine alphabet  1 x    1 x  1 lb 1 x    Supine flexion   2 x 10  0 lb  0 lb -- 2 x 10     sidelying shoulder ER   1 lb 2 x 10   1 lb 2 x 10   1 5  lb 3 x 10    wall slides  2 x 10   1 lb 2 x 10   1 lb 2 x 10   1 lb 2 x 10   1 5 2 x 10     Supine snow angels 5 x 2        Ball vs wall  10 x each  1 lb 10 x each  1 lb  1 lb 10 x 2  1 5  lb 2 x 10     tband shoulder IR, ER   Yellow 2 x 10 each        funcitonal reaching with moving pegs up wallboard    Peg 13 3 rounds  Hole 13 5 rounds  1 5 lb 13 holes 2 round 0 lb 3 rounds    Wall presses    2 x 10   2 x 10   2 x 10     scap depression with ball         horizontal adduction 10 x 2    2 x 10   D/c    Body blade: neutral , shoulder IR/ER     15 sec x 3 each postion   Chest pull w/ band      Yellow 10 x    Total time:  40 min  40min  40min  40 min  40 min            Modalities        MH        Ice        Total time:         Week 2: 6/6                                                                          Week 8: 7/18  Week 3: 6/13                                                                        Week 9: 7/25  Week 4: 6/20 (progress per protocol)                                    Week 10: 8/1  Week 5: 6/27                                                                        Week 11: 8/8  Week 6: 7/4                                                                          Week 12: 8/15  Week 7: 7/11    Assessment: patient was able to progress strengthening to 1 5 lb with some strengthening exercise  Added body blade this session which she was highly challenged  Was unable to achieve synchronicity with IR/Er  Plan: Progress treament per protocol   specifically

## 2019-07-22 ENCOUNTER — OFFICE VISIT (OUTPATIENT)
Dept: PHYSICAL THERAPY | Facility: CLINIC | Age: 64
End: 2019-07-22
Payer: COMMERCIAL

## 2019-07-22 DIAGNOSIS — M19.011 PRIMARY OSTEOARTHRITIS OF RIGHT SHOULDER: Primary | ICD-10-CM

## 2019-07-22 PROCEDURE — 97110 THERAPEUTIC EXERCISES: CPT

## 2019-07-22 NOTE — PROGRESS NOTES
Daily Note     Today's date: 2019  Patient name: Alexis Alas  : 1955  MRN: [id number]  Referring provider: Naty Suggs MD  Dx:   Encounter Diagnosis     ICD-10-CM    1  Primary osteoarthritis of right shoulder M19 011                   Subjective: Ariadna Gomez reports that her joints feel bad due to weather, except right shoulder 0/10          Objective: See treatment diary below    Precautions HTN, anxiety, panic disorder    Specialty Daily Treatment Diary       4Visits: 10   12 13 14    Manual 7/8/19 7/10/19 7/15/19 7/17/19 7/22/19   PROM shoulder performed        GHJ mobs        Scap mobs        STM        Total Time:  5 min                Exercise Diary  reeval performed        UBE/Nustep  lv 1 UE only 5 min  lv 1 8:09am nustep UE/LE lv 8 min  10 lv 2 UE only    Pulleys 30 x flex, retraction  30 x flex, rtxn 30x flex, rtxn each  30x each --   Tubing rows Green 2 x 10   Blue 2 x 10   blue Blue  Blue 3x 10    Tubing ext green 2 x 10   Blue 2 x 10   Blue  Blue  Blue 3 x 10    Gagan scap retraction w/ER Red 5 sec x 10   Red Next visit  Red 5 sec x 10  + flexion: 5 x 2  Red 5 sec x 20  + flexion: 5 x 2       SL shoulder abd  1 lb 2 x 10        Supine flexion with cane   2 lb 2 x 10   2 lb 2 x 10   3 lb 2 x 10       bicep curl 4 lb 2x10  4 lb 2 x 10   4 lb 2 x 10   4 lb 2 x 10   4 lb 2 x 10     AAROM shoulder flexion, abd, IR/ER Next visit        Supine alphabet   1 x  1 lb 1 x     Supine flexion  2 x 10  0 lb  0 lb -- 2 x 10      sidelying shoulder ER  1 lb 2 x 10   1 lb 2 x 10   1 5  lb 3 x 10  1 5 3 x 10    wall slides  1 lb 2 x 10   1 lb 2 x 10   1 lb 2 x 10   1 5 2 x 10   1 5lb 2 x 10     Supine snow angels        Ball vs wall  1 lb 10 x each  1 lb  1 lb 10 x 2  1 5  lb 2 x 10   1 5 lb 2 x 10     tband shoulder IR, ER  Yellow 2 x 10 each      Red 2 x 10     funcitonal reaching with moving pegs up wallboard   Peg 13 3 rounds  Hole 13 5 rounds  1 5 lb 13 holes 2 round 0 lb 3 rounds  1 5 lb 13 holes 8 rounds    Wall presses   2 x 10   2 x 10   2 x 10      horizontal adduction   2 x 10   D/c     Body blade: neutral , shoulder IR/ER    15 sec x 3 each postion    Chest pull w/ band     Yellow 10 x  Yellow 2 x 10     Total time:  40min  40min  40 min  40 min  38 min            Modalities        MH        Ice        Total time:         Week 2: 6/6                                                                          Week 8: 7/18  Week 3: 6/13                                                                        Week 9: 7/25  Week 4: 6/20 (progress per protocol)                                    Week 10: 8/1  Week 5: 6/27                                                                        Week 11: 8/8  Week 6: 7/4                                                                          Week 12: 8/15  Week 7: 7/11    Reeval: 8/8/19    Assessment:   Challenged with activities that utilized 1 5 pound weight  Patient overall had good form with exercises  Patient to follow up with primary PT after next session and possible transition to HEP     Plan: follow up with [facility] after vacation

## 2019-07-24 ENCOUNTER — APPOINTMENT (OUTPATIENT)
Dept: PHYSICAL THERAPY | Facility: CLINIC | Age: 64
End: 2019-07-24
Payer: COMMERCIAL

## 2019-07-29 ENCOUNTER — OFFICE VISIT (OUTPATIENT)
Dept: PHYSICAL THERAPY | Facility: CLINIC | Age: 64
End: 2019-07-29
Payer: COMMERCIAL

## 2019-07-29 DIAGNOSIS — M19.011 PRIMARY OSTEOARTHRITIS OF RIGHT SHOULDER: Primary | ICD-10-CM

## 2019-07-29 PROCEDURE — 97110 THERAPEUTIC EXERCISES: CPT

## 2019-07-29 NOTE — PROGRESS NOTES
Daily Note     Today's date: 2019  Patient name: Axel Cheney  : 1955  MRN: [id number]  Referring provider: Joaquín Schumacher MD  Dx:   Encounter Diagnosis     ICD-10-CM    1  Primary osteoarthritis of right shoulder M19 011        Start Time: 1145  Stop Time: 1230  Total time in clinic (min): 45 minutes    Subjective: Pt does not report any pain prior to session          Objective: See treatment diary below    Precautions HTN, anxiety, panic disorder    Specialty Daily Treatment Diary       4Visits:  12 13 14  15   Manual 7/10/19 7/15/19 7/17/19 7/22/19 7/29/19   PROM shoulder        GHJ mobs        Scap mobs        STM        Total Time:                 Exercise Diary         UBE/Nustep lv 1 UE only 5 min  lv 1 8:09am nustep UE/LE lv 8 min  10 lv 2 UE only  10 lv 2 UE only   Pulleys 30 x flex, rtxn 30x flex, rtxn each  30x each --    Tubing rows Blue 2 x 10   blue Blue  Blue 3x 10  Blue 3x10   Tubing ext Blue 2 x 10   Blue  Blue  Blue 3 x 10  Blue 3x10   Gagan scap retraction w/ER Red Next visit  Red 5 sec x 10  + flexion: 5 x 2  Red 5 sec x 20  + flexion: 5 x 2        SL shoulder abd 1 lb 2 x 10         Supine flexion with cane  2 lb 2 x 10   2 lb 2 x 10   3 lb 2 x 10        bicep curl 4 lb 2 x 10   4 lb 2 x 10   4 lb 2 x 10   4 lb 2 x 10   4 lb 2 x 10     AAROM shoulder flexion, abd, IR/ER        Supine alphabet  1 x  1 lb 1 x      Supine flexion 2 x 10  0 lb  0 lb -- 2 x 10       sidelying shoulder ER 1 lb 2 x 10   1 lb 2 x 10   1 5  lb 3 x 10  1 5 3 x 10  1 5 3 x 10    wall slides  1 lb 2 x 10   1 lb 2 x 10   1 5 2 x 10   1 5lb 2 x 10   1 5lb 2x10   Supine snow angels        Ball vs wall  1 lb  1 lb 10 x 2  1 5  lb 2 x 10   1 5 lb 2 x 10   1 5 lb 2 x 10     tband shoulder IR, ER Yellow 2 x 10 each      Red 2 x 10   Red 2 x 10   funcitonal reaching with moving pegs up wallboard  Peg 13 3 rounds  Hole 13 5 rounds  1 5 lb 13 holes 2 round 0 lb 3 rounds  1 5 lb 13 holes 8 rounds  1 5 lb 13 holes 8 rounds    Wall presses  2 x 10   2 x 10   2 x 10       horizontal adduction  2 x 10   D/c      Body blade: neutral , shoulder IR/ER   15 sec x 3 each postion     Chest pull w/ band    Yellow 10 x  Yellow 2 x 10   Yellow 2 x 10    Total time:  40min  40 min  40 min  38 min  45 min           Modalities        MH        Ice        Total time:         Week 2: 6/6                                                                          Week 8: 7/18  Week 3: 6/13                                                                        Week 9: 7/25  Week 4: 6/20 (progress per protocol)                                    Week 10: 8/1  Week 5: 6/27                                                                        Week 11: 8/8  Week 6: 7/4                                                                          Week 12: 8/15  Week 7: 7/11    Reeval: 8/8/19    Assessment:   Pt tolerated exercises well with 1 5 lb cuff wt, fatigued but demo good form throughout  Plan: follow up with [facility] after vacation

## 2019-07-31 ENCOUNTER — APPOINTMENT (OUTPATIENT)
Dept: PHYSICAL THERAPY | Facility: CLINIC | Age: 64
End: 2019-07-31
Payer: COMMERCIAL

## 2019-08-06 ENCOUNTER — OFFICE VISIT (OUTPATIENT)
Dept: PHYSICAL THERAPY | Facility: CLINIC | Age: 64
End: 2019-08-06
Payer: COMMERCIAL

## 2019-08-06 DIAGNOSIS — M19.011 PRIMARY OSTEOARTHRITIS OF RIGHT SHOULDER: Primary | ICD-10-CM

## 2019-08-06 PROCEDURE — 97110 THERAPEUTIC EXERCISES: CPT

## 2019-08-06 NOTE — PROGRESS NOTES
Daily Note     Today's date: 2019  Patient name: Fab Hinson  : 1955  MRN: [id number]  Referring provider: Angelito Casarez MD  Dx:   Encounter Diagnosis     ICD-10-CM    1  Primary osteoarthritis of right shoulder M19 011                   Subjective: Reinaldo Feliz reports that her pain level entering 0/10          Objective: See treatment diary below    Precautions HTN, anxiety, panic disorder    Specialty Daily Treatment Diary       4Visits: 12 13 14  15 16    Manual 7/15/19 7/17/19 7/22/19 7/29/19 8/6/19   PROM shoulder        GHJ mobs        Scap mobs        STM        Total Time:                 Exercise Diary         UBE/Nustep lv 1 8:09am nustep UE/LE lv 8 min  10 lv 2 UE only  10 lv 2 UE only UE lv 2 10    Pulleys 30x flex, rtxn each  30x each --  15 x each    Tubing rows blue Blue  Blue 3x 10  Blue 3x10    Tubing ext Blue  Blue  Blue 3 x 10  Blue 3x10    Gagan scap retraction w/ER Red 5 sec x 10  + flexion: 5 x 2  Red 5 sec x 20  + flexion: 5 x 2      5 sec x 10   + flexion: 10x    SL shoulder abd        Supine flexion with cane  2 lb 2 x 10   3 lb 2 x 10         bicep curl 4 lb 2 x 10   4 lb 2 x 10   4 lb 2 x 10   4 lb 2 x 10   5 lb 2 x 10     AAROM shoulder flexion, abd, IR/ER        Supine alphabet 1 x  1 lb 1 x       Supine flexion 0 lb -- 2 x 10        sidelying shoulder ER 1 lb 2 x 10   1 5  lb 3 x 10  1 5 3 x 10  1 5 3 x 10     wall slides  1 lb 2 x 10   1 5 2 x 10   1 5lb 2 x 10   1 5lb 2x10 2 0 lb 2 x 10     Supine snow angels        Ball vs wall  1 lb 10 x 2  1 5  lb 2 x 10   1 5 lb 2 x 10   1 5 lb 2 x 10   2 0 lb 2 x 10     tband shoulder IR, ER   Red 2 x 10   Red 2 x 10    funcitonal reaching with moving pegs up wallboard  Hole 13 5 rounds  1 5 lb 13 holes 2 round 0 lb 3 rounds  1 5 lb 13 holes 8 rounds  1 5 lb 13 holes 8 rounds  1 5 lb 10 rounds   Wall presses  2 x 10   2 x 10     2 x 10     Body blade: neutral , shoulder IR/ER  15 sec x 3 each postion   15 sec x 3 neutral, IR/ER, random   Chest pull w/ band   Yellow 10 x  Yellow 2 x 10   Yellow 2 x 10  Red 2 x 10     Shoulder matrix: 4 directions     5 rounds    PNF bilateral D2 flexion        St scaption     2 x 10     Total time:  40 min  40 min  38 min  45 min 40 min            Modalities        MH        Ice        Total time:         Week 2: 6/6                                                                          Week 8: 7/18  Week 3: 6/13                                                                        Week 9: 7/25  Week 4: 6/20 (progress per protocol)                                    Week 10: 8/1  Week 5: 6/27                                                                        Week 11: 8/8  Week 6: 7/4                                                                          Week 12: 8/15  Week 7: 7/11    Reeval: 8/8/19    Assessment: overall needs minimal to no cuing for proper form with exercises  fatigues quickly with bodyblade  Plan: Progress note during next visit  1-2 visits, discharge in about 3-4 visits

## 2019-08-07 ENCOUNTER — HOSPITAL ENCOUNTER (OUTPATIENT)
Dept: RADIOLOGY | Facility: HOSPITAL | Age: 64
Discharge: HOME | End: 2019-08-07
Attending: INTERNAL MEDICINE
Payer: COMMERCIAL

## 2019-08-07 VITALS — HEIGHT: 63 IN | WEIGHT: 244 LBS | BODY MASS INDEX: 43.23 KG/M2

## 2019-08-07 DIAGNOSIS — Z12.31 VISIT FOR SCREENING MAMMOGRAM: ICD-10-CM

## 2019-08-07 PROCEDURE — 77067 SCR MAMMO BI INCL CAD: CPT

## 2019-08-08 ENCOUNTER — OFFICE VISIT (OUTPATIENT)
Dept: PHYSICAL THERAPY | Facility: CLINIC | Age: 64
End: 2019-08-08
Payer: COMMERCIAL

## 2019-08-08 DIAGNOSIS — M19.011 PRIMARY OSTEOARTHRITIS OF RIGHT SHOULDER: Primary | ICD-10-CM

## 2019-08-08 PROCEDURE — 97110 THERAPEUTIC EXERCISES: CPT

## 2019-08-08 NOTE — PROGRESS NOTES
Daily Note     Today's date: 2019  Patient name: Sarah Bey  : 1955  MRN: [id number]  Referring provider: Sintia uSng MD  Dx:   Encounter Diagnosis     ICD-10-CM    1  Primary osteoarthritis of right shoulder M19 011                   Subjective: Monica Henderson reports that her pain level entering today "im doing really well"      Objective: See treatment diary below  Precautions HTN, anxiety, panic disorder    Specialty Daily Treatment Diary       4Visits: 13 14  15 16  17   Manual 19   PROM shoulder        GHJ mobs        Scap mobs        STM        Total Time:                 Exercise Diary         UBE/Nustep nustep UE/LE lv 8 min  10 lv 2 UE only  10 lv 2 UE only UE lv 2 10  UE: lv 3 10 min   31 miles    Pulleys 30x each --  15 x each  20 x    Vertical row     Pl 1 3 x 10     Tubing rows Blue  Blue 3x 10  Blue 3x10  Dc    Tubing ext Blue  Blue 3 x 10  Blue 3x10  With 1 step back  2 x 10     Gagan scap retraction w/ER Red 5 sec x 20  + flexion: 5 x 2      5 sec x 10   + flexion: 10x  5 sec x 15  + flexion 15 x   SL shoulder abd        Supine flexion with cane  3 lb 2 x 10          bicep curl 4 lb 2 x 10   4 lb 2 x 10   4 lb 2 x 10   5 lb 2 x 10      AAROM shoulder flexion, abd, IR/ER        Supine alphabet 1 lb 1 x     Standing 1 lb  1 x    Supine flexion 2 x 10         sidelying shoulder ER 1 5  lb 3 x 10  1 5 3 x 10  1 5 3 x 10      wall slides  1 5 2 x 10   1 5lb 2 x 10   1 5lb 2x10 2 0 lb 2 x 10   2 0 lb 2 x 10     Supine snow angels        Ball vs wall  1 5  lb 2 x 10   1 5 lb 2 x 10   1 5 lb 2 x 10   2 0 lb 2 x 10      tband shoulder IR, ER  Red 2 x 10   Red 2 x 10     funcitonal reaching with moving pegs up wallboard  1 5 lb 13 holes 2 round 0 lb 3 rounds  1 5 lb 13 holes 8 rounds  1 5 lb 13 holes 8 rounds  1 5 lb 10 rounds 1 5 lbs 10 rounds   Wall presses  2 x 10     2 x 10      Body blade: neutral , shoulder IR/ER 15 sec x 3 each postion   15 sec x 3 neutral, IR/ER, random 15 sec x 3 neutral, IR/ER, random   Chest pull w/ band  Yellow 10 x  Yellow 2 x 10   Yellow 2 x 10  Red 2 x 10      Shoulder matrix: 4 directions    5 rounds     PNF bilateral D2 flexion        St scaption    2 x 10   2 x 10     Total time:  40 min  38 min  45 min 40 min  40 min            Modalities                Ice        Total time:         Week 2: 6/6                                                                          Week 8: 7/18  Week 3: 6/13                                                                        Week 9: 7/25  Week 4: 6/20 (progress per protocol)                                    Week 10: 8/1  Week 5: 6/27                                                                        Week 11: 8/8  Week 6: 7/4                                                                          Week 12: 8/15  Week 7: 7/11      Assessment: progressed alphabet form supine to standing, which fatigued patient quickly resulting in mild upper trap substitution      Plan: d/c in 2 more sessions

## 2019-08-13 ENCOUNTER — OFFICE VISIT (OUTPATIENT)
Dept: PHYSICAL THERAPY | Facility: CLINIC | Age: 64
End: 2019-08-13
Payer: COMMERCIAL

## 2019-08-13 DIAGNOSIS — M19.011 PRIMARY OSTEOARTHRITIS OF RIGHT SHOULDER: Primary | ICD-10-CM

## 2019-08-13 PROCEDURE — 97110 THERAPEUTIC EXERCISES: CPT

## 2019-08-13 NOTE — PROGRESS NOTES
Daily Note     Today's date: 2019  Patient name: Alondra Melendez  : 1955  MRN: [id number]  Referring provider: David Fernch MD  Dx:   Encounter Diagnosis     ICD-10-CM    1  Primary osteoarthritis of right shoulder M19 011                   Subjective: Lynnette Goldman reports that her pain level is 0/10,  She states her shoulder is feeling great  Objective: See treatment diary below  Precautions HTN, anxiety, panic disorder    Specialty Daily Treatment Diary       4Visits: 14  15 16  17 18 (foto)   Manual 19   PROM shoulder        GHJ mobs        Scap mobs        STM        Total Time:                 Exercise Diary         UBE/Nustep 10 lv 2 UE only  10 lv 2 UE only UE lv 2 10  UE: lv 3 10 min   31 miles  lv 4 10 min   32 miles   Pulleys --  15 x each  20 x  20x   Vertical row    Pl 1 3 x 10      Tubing rows Blue 3x 10  Blue 3x10  Dc     Tubing ext Blue 3 x 10  Blue 3x10  With 1 step back  2 x 10      Gagan scap retraction w/ER   5 sec x 10   + flexion: 10x  5 sec x 15  + flexion 15 x    SL shoulder abd        Supine flexion with cane         bicep curl 4 lb 2 x 10   4 lb 2 x 10   5 lb 2 x 10    5 lb 2 x 10     AAROM shoulder flexion, abd, IR/ER        Supine alphabet    Standing 1 lb  1 x  1 lb 1x   Supine flexion     D/c    sidelying shoulder ER 1 5 3 x 10  1 5 3 x 10    2 0 lb 2 x 10     wall slides  1 5lb 2 x 10   1 5lb 2x10 2 0 lb 2 x 10   2 0 lb 2 x 10      Supine snow angels        Ball vs wall  1 5 lb 2 x 10   1 5 lb 2 x 10   2 0 lb 2 x 10    2 0 lb 2 x 10     tband shoulder IR, ER Red 2 x 10   Red 2 x 10      funcitonal reaching with moving pegs up wallboard  1 5 lb 13 holes 8 rounds  1 5 lb 13 holes 8 rounds  1 5 lb 10 rounds 1 5 lbs 10 rounds 2 0 lbs 10 rounds    Wall presses    2 x 10    2 x 10     Body blade: neutral , shoulder IR/ER   15 sec x 3 neutral, IR/ER, random 15 sec x 3 neutral, IR/ER, random    Chest pull w/ band  Yellow 2 x 10   Yellow 2 x 10 Red 2 x 10    Red 2 x 10     Shoulder matrix: 4 directions   5 rounds   5  Rounds x 2 rounds   PNF bilateral D2 flexion        St scaption   2 x 10   2 x 10   2 lb 2 x 10     Total time:  38 min  45 min 40 min  40 min  40min            Modalities        MH        Ice        Total time:         Week 2: 6/6                                                                          Week 8: 7/18  Week 3: 6/13                                                                        Week 9: 7/25  Week 4: 6/20 (progress per protocol)                                    Week 10: 8/1  Week 5: 6/27                                                                        Week 11: 8/8  Week 6: 7/4                                                                          Week 12: 8/15  Week 7: 7/11        Assessment:   Patient denied pain throughout session and only needs minimal cuing for proper form  Patient reports she is ready for discharge      Plan: discharge next session

## 2019-08-15 ENCOUNTER — OFFICE VISIT (OUTPATIENT)
Dept: PHYSICAL THERAPY | Facility: CLINIC | Age: 64
End: 2019-08-15
Payer: COMMERCIAL

## 2019-08-15 DIAGNOSIS — M19.011 PRIMARY OSTEOARTHRITIS OF RIGHT SHOULDER: Primary | ICD-10-CM

## 2019-08-15 PROCEDURE — 97110 THERAPEUTIC EXERCISES: CPT

## 2019-08-15 NOTE — PROGRESS NOTES
Daily Note     Today's date: 8/15/2019  Patient name: Dorian Graves  : 1955  MRN: [id number]  Referring provider: Bernardo Yost MD  Dx:   Encounter Diagnosis     ICD-10-CM    1  Primary osteoarthritis of right shoulder M19 011        Start Time: 1115  Stop Time: 1200  Total time in clinic (min): 45 minutes    Subjective: Pt reports no pain prior to session, she states she is ready for discharge  Objective: See treatment diary below  Precautions HTN, anxiety, panic disorder    Specialty Daily Treatment Diary       4Visits: 15 16  17 18 (foto) 19   Manual 7/29/19 8/6/19 8/8/19 8/13/19 8/15/19   PROM shoulder        GHJ mobs        Scap mobs        STM        Total Time:                 Exercise Diary         UBE/Nustep 10 lv 2 UE only UE lv 2 10  UE: lv 3 10 min   31 miles  lv 4 10 min   32 miles lv 3 10 min   Pulleys  15 x each  20 x  20x 20x   Vertical row   Pl 1 3 x 10    Pl 1 3 x 10    Tubing rows Blue 3x10  Dc      Tubing ext Blue 3x10  With 1 step back  2 x 10    With 1 step back  2 x 10   Gagan scap retraction w/ER  5 sec x 10   + flexion: 10x  5 sec x 15  + flexion 15 x     SL shoulder abd        Supine flexion with cane         bicep curl 4 lb 2 x 10   5 lb 2 x 10    5 lb 2 x 10   5lb 2x10   AAROM shoulder flexion, abd, IR/ER        Supine alphabet   Standing 1 lb  1 x  1 lb 1x    Supine flexion    D/c     sidelying shoulder ER 1 5 3 x 10    2 0 lb 2 x 10      wall slides  1 5lb 2x10 2 0 lb 2 x 10   2 0 lb 2 x 10       Supine snow angels        Ball vs wall  1 5 lb 2 x 10   2 0 lb 2 x 10    2 0 lb 2 x 10      tband shoulder IR, ER Red 2 x 10       funcitonal reaching with moving pegs up wallboard  1 5 lb 13 holes 8 rounds  1 5 lb 10 rounds 1 5 lbs 10 rounds 2 0 lbs 10 rounds  2lb  10 rds   Wall presses   2 x 10    2 x 10      Body blade: neutral , shoulder IR/ER  15 sec x 3 neutral, IR/ER, random 15 sec x 3 neutral, IR/ER, random     Chest pull w/ band  Yellow 2 x 10  Red 2 x 10    Red 2 x 10   Red 2x10   Shoulder matrix: 4 directions  5 rounds   5  Rounds x 2 rounds    PNF bilateral D2 flexion        St scaption  2 x 10   2 x 10   2 lb 2 x 10   2lb 2x10   Total time:  45 min 40 min  40 min  40min  40 min           Modalities        MH        Ice        Total time:         Week 2: 6/6                                                                          Week 8: 7/18  Week 3: 6/13                                                                        Week 9: 7/25  Week 4: 6/20 (progress per protocol)                                    Week 10: 8/1  Week 5: 6/27                                                                        Week 11: 8/8  Week 6: 7/4                                                                          Week 12: 8/15  Week 7: 7/11        Assessment:   Patient experienced no pain throughout session  She demo good understanding of HEP  She progress with her overall strength and ROM      Plan: Discharge

## 2019-08-15 NOTE — LETTER
2019    MD [phone]    Patient: Shawanda Kovacs   YOB: 1955   Date of Visit: 8/15/2019     Encounter Diagnosis     ICD-10-CM    1  Primary osteoarthritis of right shoulder M19 011        Dear Dr Ty File:    Please review the attached Discharge Summary from Marysol Bray recent visit  PT services have been discharged  If you have any questions or concerns, please don't hesitate to call  Sincerely,    Ken Arnett, PT                        MD [phone]  VIA In Basket          Daily Note     Today's date: 8/15/2019  Patient name: Shawanda Kovacs  : 1955  MRN: [id number]  Referring provider: Delbert Cade MD  Dx:   Encounter Diagnosis     ICD-10-CM    1  Primary osteoarthritis of right shoulder M19 011        Start Time: 1115  Stop Time: 1200  Total time in clinic (min): 45 minutes    Subjective: Pt reports no pain prior to session, she states she is ready for discharge  Objective: See treatment diary below  Precautions HTN, anxiety, panic disorder    Specialty Daily Treatment Diary       4Visits: 15 16  17 18 (foto) 19   Manual 7/29/19 8/6/19 8/8/19 8/13/19 8/15/19   PROM shoulder        GHJ mobs        Scap mobs        STM        Total Time:                 Exercise Diary         UBE/Nustep 10 lv 2 UE only UE lv 2 10  UE: lv 3 10 min   31 miles  lv 4 10 min   32 miles lv 3 10 min   Pulleys  15 x each  20 x  20x 20x   Vertical row   Pl 1 3 x 10    Pl 1 3 x 10    Tubing rows Blue 3x10  Dc      Tubing ext Blue 3x10  With 1 step back  2 x 10    With 1 step back  2 x 10   Gagan scap retraction w/ER  5 sec x 10   + flexion: 10x  5 sec x 15  + flexion 15 x     SL shoulder abd        Supine flexion with cane         bicep curl 4 lb 2 x 10   5 lb 2 x 10    5 lb 2 x 10   5lb 2x10   AAROM shoulder flexion, abd, IR/ER        Supine alphabet   Standing 1 lb  1 x  1 lb 1x    Supine flexion    D/c     sidelying shoulder ER 1 5 3 x 10    2 0 lb 2 x 10      wall slides  1 5lb 2x10 2 0 lb 2 x 10   2 0 lb 2 x 10       Supine snow angels        Ball vs wall  1 5 lb 2 x 10   2 0 lb 2 x 10    2 0 lb 2 x 10      tband shoulder IR, ER Red 2 x 10       funcitonal reaching with moving pegs up wallboard  1 5 lb 13 holes 8 rounds  1 5 lb 10 rounds 1 5 lbs 10 rounds 2 0 lbs 10 rounds  2lb  10 rds   Wall presses   2 x 10    2 x 10      Body blade: neutral , shoulder IR/ER  15 sec x 3 neutral, IR/ER, random 15 sec x 3 neutral, IR/ER, random     Chest pull w/ band  Yellow 2 x 10  Red 2 x 10    Red 2 x 10   Red 2x10   Shoulder matrix: 4 directions  5 rounds   5  Rounds x 2 rounds    PNF bilateral D2 flexion        St scaption  2 x 10   2 x 10   2 lb 2 x 10   2lb 2x10   Total time:  45 min 40 min  40 min  40min  40 min           Modalities        MH        Ice        Total time:         Week 2: 6/6                                                                          Week 8: 7/18  Week 3: 6/13                                                                        Week 9: 7/25  Week 4: 6/20 (progress per protocol)                                    Week 10: 8/1  Week 5: 6/27                                                                        Week 11: 8/8  Week 6: 7/4                                                                          Week 12: 8/15  Week 7: 7/11        Assessment:   Patient experienced no pain throughout session  She demo good understanding of HEP  She progress with her overall strength and ROM  Plan: Discharge        Attestation signed by Ezekiel Kent PT at 8/19/2019  2:16 PM:  Discharge Summary    S: patient denies any functional limitations and any pain symptoms  O: measurements performed by Mian Castaneda PTA  Right ROM:  Flex: 155/158  ABD: 148/155  Er: 78  IR 36    MMT: 4-/5 throughout right shoulder       A: patient has made significant gains overall: she denies functional limitations  And is independent in comprehensive HEP  At this time all goals have been met and patient no longer requires skilled Pt      P: d/c to HEP

## 2019-08-16 ENCOUNTER — OFFICE VISIT (OUTPATIENT)
Dept: OBGYN CLINIC | Facility: CLINIC | Age: 64
End: 2019-08-16

## 2019-08-16 VITALS
BODY MASS INDEX: 43.41 KG/M2 | WEIGHT: 245 LBS | HEART RATE: 73 BPM | DIASTOLIC BLOOD PRESSURE: 78 MMHG | HEIGHT: 63 IN | SYSTOLIC BLOOD PRESSURE: 133 MMHG

## 2019-08-16 DIAGNOSIS — M19.011 PRIMARY OSTEOARTHRITIS OF RIGHT SHOULDER: ICD-10-CM

## 2019-08-16 DIAGNOSIS — Z96.611 STATUS POST SHOULDER REPLACEMENT, RIGHT: Primary | ICD-10-CM

## 2019-08-16 PROCEDURE — 99024 POSTOP FOLLOW-UP VISIT: CPT | Performed by: ORTHOPAEDIC SURGERY

## 2019-08-16 NOTE — PROGRESS NOTES
Patient Name:  Sofiya Rowan  MRN:  [id number]    Assessment     1  Status post shoulder replacement, right     2  Primary osteoarthritis of right shoulder         Plan     Gloria Canada is doing very well 12 weeks status post right standard total shoulder replacement  She demonstrates excellent range of motion and strength on exam today  She may continue to do activities of daily living as tolerated  However I did note to her that the prosthesis is not designed to withstand significant weight with lifting or pushing  She may continue with home exercises as well that she was previously prescribed from physical therapy  Gloria Canada may follow up with me on an as-needed basis  Subjective     Gloria Canada is a pleasant 79-year-old female who is a little over 12 weeks status post right standard total shoulder replacement  She states she is doing very well and is able to do her activities of daily living very little discomfort or difficulty  She does have some restrictions range of motion particularly with overhead activities  However she states that physical therapy went very well and she was discharged yesterday 8/15/2019  She denies any paresthesias into the right upper extremity      Objective     /78 (BP Location: Left arm, Patient Position: Sitting, Cuff Size: Large)   Pulse 73   Ht 5' 3" (1 6 m)   Wt 111 kg (245 lb)   BMI 43 40 kg/m²     Scar at the anterior aspect of the shoulder is well healed with no erythema or signs of infection  Demonstrates normal strength internal external, abduction the shoulder  She has full range of motion into the shoulder on exam today  Has normal sensation to touch into the upper arm, forearm wrist and hand  Demonstrates 2+ radial pulse          Scribe Attestation    I,:   Kalani Perez am acting as a scribe while in the presence of the attending physician :        I,:   Esteban Herrera MD personally performed the services described in this documentation    as scribed in my presence :

## 2020-03-11 ENCOUNTER — OFFICE VISIT (OUTPATIENT)
Dept: OBGYN CLINIC | Facility: CLINIC | Age: 65
End: 2020-03-11
Payer: COMMERCIAL

## 2020-03-11 VITALS
BODY MASS INDEX: 44.01 KG/M2 | DIASTOLIC BLOOD PRESSURE: 84 MMHG | WEIGHT: 248.4 LBS | SYSTOLIC BLOOD PRESSURE: 132 MMHG | HEIGHT: 63 IN

## 2020-03-11 DIAGNOSIS — M65.321 TRIGGER INDEX FINGER OF RIGHT HAND: ICD-10-CM

## 2020-03-11 DIAGNOSIS — M65.341 TRIGGER RING FINGER OF RIGHT HAND: Primary | ICD-10-CM

## 2020-03-11 PROCEDURE — 20550 NJX 1 TENDON SHEATH/LIGAMENT: CPT | Performed by: ORTHOPAEDIC SURGERY

## 2020-03-11 PROCEDURE — 99214 OFFICE O/P EST MOD 30 MIN: CPT | Performed by: ORTHOPAEDIC SURGERY

## 2020-03-11 RX ORDER — LIDOCAINE HYDROCHLORIDE 5 MG/ML
0.5 INJECTION, SOLUTION INFILTRATION; PERINEURAL
Status: COMPLETED | OUTPATIENT
Start: 2020-03-11 | End: 2020-03-11

## 2020-03-11 RX ORDER — TRIAMCINOLONE ACETONIDE 40 MG/ML
20 INJECTION, SUSPENSION INTRA-ARTICULAR; INTRAMUSCULAR
Status: COMPLETED | OUTPATIENT
Start: 2020-03-11 | End: 2020-03-11

## 2020-03-11 RX ADMIN — LIDOCAINE HYDROCHLORIDE 0.5 ML: 5 INJECTION, SOLUTION INFILTRATION; PERINEURAL at 15:27

## 2020-03-11 RX ADMIN — TRIAMCINOLONE ACETONIDE 20 MG: 40 INJECTION, SUSPENSION INTRA-ARTICULAR; INTRAMUSCULAR at 15:27

## 2020-03-11 NOTE — PROGRESS NOTES
Assessment/Plan:  1  Trigger ring finger of right hand  Hand/upper extremity injection: R ring A1   2  Trigger index finger of right hand         Scribe Attestation    I,:   Bety Hummel MA am acting as a scribe while in the presence of the attending physician :        I,:   Rah Ball MD personally performed the services described in this documentation    as scribed in my presence :              I discussed with Dilia Dickey that her signs and symptoms are consistent with right ring finger trigger finger  She is tender to palpation over the A1 pulley  She does have obvious triggering  Treatment options were discussed in the form of a steroid injection  She was agreeable to this  She consented and underwent a right ring finger trigger finger injection in the office today  She is aware she can get two injections max per finger for this  She was advised the injection can take a week to take effect  She may follow up with me as needed  Insofar as her index finger is concerned, this appears to be a fairly early trigger finger  It is not actively triggering today  We spoke about possible need in the future for injection there although I do not believe she requires that today  We will monitor the situation  Subjective:   Dianna Red is a 72 y o  female who presents to the office today for evaluation of right ring finger triggering  Patient states this has been ongoing for the past few months  She notes pain to the volar aspect of her MCP  Patient states this is worse in the morning and she will have to use her opposite hand to unlock it  She denies any known injury  She denies any numbness or tingling  She denies any prior treatment for this  Review of Systems   Constitutional: Negative for chills and fever  HENT: Negative for drooling and sneezing  Eyes: Negative for redness  Respiratory: Negative for cough and wheezing  Gastrointestinal: Negative for nausea and vomiting  Musculoskeletal: Positive for arthralgias, joint swelling and myalgias  Neurological: Negative for weakness and numbness  Psychiatric/Behavioral: Negative for behavioral problems  The patient is not nervous/anxious  Past Medical History:   Diagnosis Date    Anxiety     Apnea, sleep     patient states "repeated test says no"    Arthritis     severe in the joints    Chronic pain disorder     joints    Fall 11/22/2018    tripped on a folded rug- left hamstring injury    Foot injury     right foot-fx of bone-no repair-has severe arthritis in the foot and ankle    History of sleep apnea     Hypertension     Insomnia     Obesity     Lost 70lb with gastric sleeve (2011)    Osteoarthritis     Wears glasses     Wears partial dentures     upper and lower-but did lose the lower        Past Surgical History:   Procedure Laterality Date    ANKLE ARTHROSCOPY Right     APPENDECTOMY      CHOLECYSTECTOMY      GASTRECTOMY SLEEVE LAPAROSCOPIC  2011    lost 70 lb    JOINT REPLACEMENT      blateral knee replacements    NERVE BLOCK Bilateral 12/29/2017    Procedure: L4, L5, S1 MEDIAL BRANCH BLOCK #1;  Surgeon: Bobbi Ayala MD;  Location: [facility] MAIN OR;  Service: Pain Management     NERVE BLOCK Bilateral 1/12/2018    Procedure: BLOCK MEDIAL BRANCH L4, L5-S1  #2;  Surgeon: Bobbi Ayala MD;  Location: [facility] MAIN OR;  Service: Pain Management     ND RECONSTR TOTAL SHOULDER IMPLANT Right 5/23/2019    Procedure: ARTHROPLASTY SHOULDER TOTAL , excision of deep cyst at shoulder, biceps tenotomy;  Surgeon: Sobeida Valdovinos MD;  Location: [address] [place] [address];  Service: Orthopedics    RADIOFREQUENCY ABLATION Right 3/16/2018    Procedure: Rt L4 L5 S1 RFA;  Surgeon: Bobbi Ayala MD;  Location: [facility] MAIN OR;  Service: Pain Management     RADIOFREQUENCY ABLATION Left 12/14/2018    Procedure: L3-S1 Radio Frequency Ablation ([id number]);   Surgeon: Bobbi Ayala MD;  Location: [facility] MAIN OR;  Service: Pain Management     RHIZOTOMY Left 2018    Procedure: L4, L5, S1 RFA;  Surgeon: Paola Chavez MD;  Location: [place] [address] MAIN OR;  Service: Pain Management     TONSILLECTOMY         Family History   Problem Relation Age of Onset    Other Mother         in a nursing home-fx of the leg    Heart disease Father         CHF    Alcohol abuse Father     Cirrhosis Father     Other Brother         lung abscess, pneumonia    No Known Problems Sister     Colon cancer Maternal Aunt     No Known Problems Maternal Uncle     No Known Problems Paternal Aunt     No Known Problems Paternal Uncle     Colon cancer Maternal Grandmother     No Known Problems Maternal Grandfather     No Known Problems Paternal Grandmother     No Known Problems Paternal Grandfather        Social History     Occupational History    Not on file   Tobacco Use    Smoking status: Former Smoker     Last attempt to quit: 2004     Years since quittin 2    Smokeless tobacco: Never Used   Substance and Sexual Activity    Alcohol use: Never     Frequency: Never    Drug use: Never    Sexual activity: Not on file         Current Outpatient Medications:     acetaminophen (TYLENOL) 650 mg CR tablet, Take 650 mg by mouth every 8 (eight) hours as needed for mild pain, Disp: , Rfl:     ascorbic acid (VITAMIN C) 500 mg tablet, Take 500 mg by mouth every morning, Disp: , Rfl:     clindamycin (CLEOCIN) 300 MG capsule, , Disp: , Rfl:     Coenzyme Q10 (CO Q 10) 100 MG CAPS, Take by mouth every morning Last dose 19, Disp: , Rfl:     Cyanocobalamin (VITAMIN B 12 PO), Take 500 mcg by mouth every morning, Disp: , Rfl:     diphenhydrAMINE (BENADRYL) 25 mg tablet, Take 25 mg by mouth every 8 (eight) hours as needed for itching , Disp: , Rfl:     Escitalopram Oxalate (LEXAPRO PO), Take 15 mg by mouth daily at bedtime, Disp: , Rfl:     Glucosamine-Chondroitin (MOVE FREE PO), Take 2 tablets by mouth every morning, Disp: , Rfl:     Melatonin 10 MG TABS, Take by mouth daily at bedtime, Disp: , Rfl:     Multiple Vitamins-Minerals (CENTRUM SILVER 50+WOMEN PO), Take by mouth every morning, Disp: , Rfl:     omeprazole (PriLOSEC) 20 mg delayed release capsule, Take 20 mg by mouth every morning , Disp: , Rfl:     oxyCODONE-acetaminophen (PERCOCET) 7 5-325 MG per tablet, Take 1 tablet by mouth every 4 (four) hours as needed for moderate pain (3x), Disp: , Rfl:     perindopril (ACEON) 4 MG tablet, Take 4 mg by mouth 2 (two) times a day , Disp: , Rfl:     Allergies   Allergen Reactions    Penicillins Anaphylaxis     Total body redness and fever    Levaquin [Levofloxacin] Itching       Objective:  Vitals:    03/11/20 1514   BP: 132/84       Right Hand Exam     Tenderness   Right hand tenderness location: A1 pulley ring finger  Other   Erythema: absent  Sensation: normal  Pulse: present    Comments:  Obvious trigger ring finger  Full ROM DIP PIP and MCP small, ring, long, and index finger  No swelling  No skin lessions    The right index finger has some mild pain to palpation over the A1 pulley however does not have any obvious triggering and has sensation light touch intact along the radial and ulnar aspects of that digit with no obvious skin lesions and good capillary refill  Physical Exam   Constitutional: She is oriented to person, place, and time  She appears well-developed and well-nourished  HENT:   Head: Normocephalic and atraumatic  Eyes: Conjunctivae are normal  Right eye exhibits no discharge  Left eye exhibits no discharge  Neck: Normal range of motion  Neck supple  Cardiovascular: Normal rate and intact distal pulses  Pulmonary/Chest: Effort normal  No respiratory distress  Musculoskeletal:   As noted in HPI   Neurological: She is alert and oriented to person, place, and time  Skin: Skin is warm and dry  Psychiatric: She has a normal mood and affect   Her behavior is normal  Judgment and thought content normal    Vitals reviewed    Hand/upper extremity injection: R ring A1  Date/Time: 3/11/2020 3:27 PM  Consent given by: patient  Site marked: site marked  Timeout: Immediately prior to procedure a time out was called to verify the correct patient, procedure, equipment, support staff and site/side marked as required   Supporting Documentation  Indications: pain   Procedure Details  Condition:trigger finger Location: ring finger - R ring A1   Preparation: Patient was prepped and draped in the usual sterile fashion  Needle size: 25 G  Ultrasound guidance: no  Medications administered: 0 5 mL lidocaine 0 5 %; 20 mg triamcinolone acetonide 40 mg/mL    Patient tolerance: patient tolerated the procedure well with no immediate complications  Dressing:  Sterile dressing applied

## 2020-08-20 ENCOUNTER — TRANSCRIBE ORDERS (OUTPATIENT)
Dept: ADMINISTRATIVE | Facility: HOSPITAL | Age: 65
End: 2020-08-20

## 2020-08-20 DIAGNOSIS — Z12.31 ENCOUNTER FOR SCREENING MAMMOGRAM FOR MALIGNANT NEOPLASM OF BREAST: Primary | ICD-10-CM

## 2020-08-20 DIAGNOSIS — M81.0 OSTEOPOROSIS, UNSPECIFIED OSTEOPOROSIS TYPE, UNSPECIFIED PATHOLOGICAL FRACTURE PRESENCE: ICD-10-CM

## 2020-10-12 ENCOUNTER — OFFICE VISIT (OUTPATIENT)
Dept: PAIN MEDICINE | Facility: CLINIC | Age: 65
End: 2020-10-12
Payer: COMMERCIAL

## 2020-10-12 ENCOUNTER — TELEPHONE (OUTPATIENT)
Dept: PAIN MEDICINE | Facility: CLINIC | Age: 65
End: 2020-10-12

## 2020-10-12 VITALS
SYSTOLIC BLOOD PRESSURE: 173 MMHG | HEART RATE: 82 BPM | TEMPERATURE: 99.6 F | BODY MASS INDEX: 44.44 KG/M2 | WEIGHT: 250.8 LBS | DIASTOLIC BLOOD PRESSURE: 82 MMHG | HEIGHT: 63 IN

## 2020-10-12 DIAGNOSIS — M46.1 SACROILIITIS (HCC): ICD-10-CM

## 2020-10-12 DIAGNOSIS — G89.29 CHRONIC RIGHT-SIDED LOW BACK PAIN WITHOUT SCIATICA: ICD-10-CM

## 2020-10-12 DIAGNOSIS — M54.50 CHRONIC RIGHT-SIDED LOW BACK PAIN WITHOUT SCIATICA: ICD-10-CM

## 2020-10-12 DIAGNOSIS — G89.4 CHRONIC PAIN SYNDROME: Primary | ICD-10-CM

## 2020-10-12 PROCEDURE — 99214 OFFICE O/P EST MOD 30 MIN: CPT | Performed by: ANESTHESIOLOGY

## 2020-10-13 ENCOUNTER — HOSPITAL ENCOUNTER (OUTPATIENT)
Dept: RADIOLOGY | Facility: HOSPITAL | Age: 65
Discharge: HOME | End: 2020-10-13
Attending: INTERNAL MEDICINE
Payer: COMMERCIAL

## 2020-10-13 VITALS — BODY MASS INDEX: 44.3 KG/M2 | WEIGHT: 250 LBS | HEIGHT: 63 IN

## 2020-10-13 DIAGNOSIS — Z12.31 ENCOUNTER FOR SCREENING MAMMOGRAM FOR MALIGNANT NEOPLASM OF BREAST: ICD-10-CM

## 2020-10-13 DIAGNOSIS — M81.0 OSTEOPOROSIS, UNSPECIFIED OSTEOPOROSIS TYPE, UNSPECIFIED PATHOLOGICAL FRACTURE PRESENCE: ICD-10-CM

## 2020-10-13 PROCEDURE — 77080 DXA BONE DENSITY AXIAL: CPT

## 2020-10-17 DIAGNOSIS — Z11.59 SPECIAL SCREENING EXAMINATION FOR UNSPECIFIED VIRAL DISEASE: ICD-10-CM

## 2020-10-17 PROCEDURE — U0003 INFECTIOUS AGENT DETECTION BY NUCLEIC ACID (DNA OR RNA); SEVERE ACUTE RESPIRATORY SYNDROME CORONAVIRUS 2 (SARS-COV-2) (CORONAVIRUS DISEASE [COVID-19]), AMPLIFIED PROBE TECHNIQUE, MAKING USE OF HIGH THROUGHPUT TECHNOLOGIES AS DESCRIBED BY CMS-2020-01-R: HCPCS | Performed by: ANESTHESIOLOGY

## 2020-10-19 LAB — SARS-COV-2 RNA SPEC QL NAA+PROBE: NOT DETECTED

## 2020-10-23 ENCOUNTER — APPOINTMENT (OUTPATIENT)
Dept: RADIOLOGY | Facility: HOSPITAL | Age: 65
End: 2020-10-23
Payer: COMMERCIAL

## 2020-10-23 ENCOUNTER — HOSPITAL ENCOUNTER (OUTPATIENT)
Facility: AMBULARY SURGERY CENTER | Age: 65
Setting detail: OUTPATIENT SURGERY
Discharge: HOME | End: 2020-10-23
Attending: ANESTHESIOLOGY | Admitting: ANESTHESIOLOGY
Payer: COMMERCIAL

## 2020-10-23 VITALS
HEART RATE: 80 BPM | SYSTOLIC BLOOD PRESSURE: 157 MMHG | HEIGHT: 63 IN | BODY MASS INDEX: 44.3 KG/M2 | WEIGHT: 250 LBS | TEMPERATURE: 96.3 F | OXYGEN SATURATION: 97 % | DIASTOLIC BLOOD PRESSURE: 70 MMHG | RESPIRATION RATE: 18 BRPM

## 2020-10-23 PROCEDURE — 27096 INJECT SACROILIAC JOINT: CPT | Performed by: ANESTHESIOLOGY

## 2020-10-23 PROCEDURE — 72200 X-RAY EXAM SI JOINTS: CPT

## 2020-10-23 RX ORDER — NAPROXEN 250 MG/1
250 TABLET ORAL
COMMUNITY

## 2020-10-23 RX ORDER — BUPIVACAINE HYDROCHLORIDE 2.5 MG/ML
INJECTION, SOLUTION EPIDURAL; INFILTRATION; INTRACAUDAL AS NEEDED
Status: DISCONTINUED | OUTPATIENT
Start: 2020-10-23 | End: 2020-10-23 | Stop reason: HOSPADM

## 2020-10-23 RX ORDER — METHYLPREDNISOLONE ACETATE 80 MG/ML
INJECTION, SUSPENSION INTRA-ARTICULAR; INTRALESIONAL; INTRAMUSCULAR; SOFT TISSUE AS NEEDED
Status: DISCONTINUED | OUTPATIENT
Start: 2020-10-23 | End: 2020-10-23 | Stop reason: HOSPADM

## 2020-10-23 RX ORDER — LIDOCAINE WITH 8.4% SOD BICARB 0.9%(10ML)
SYRINGE (ML) INJECTION AS NEEDED
Status: DISCONTINUED | OUTPATIENT
Start: 2020-10-23 | End: 2020-10-23 | Stop reason: HOSPADM

## 2020-10-30 ENCOUNTER — TELEPHONE (OUTPATIENT)
Dept: PAIN MEDICINE | Facility: CLINIC | Age: 65
End: 2020-10-30

## 2020-11-16 ENCOUNTER — HOSPITAL ENCOUNTER (OUTPATIENT)
Dept: RADIOLOGY | Facility: HOSPITAL | Age: 65
Discharge: HOME | End: 2020-11-16
Attending: INTERNAL MEDICINE
Payer: COMMERCIAL

## 2020-11-16 VITALS — BODY MASS INDEX: 43.77 KG/M2 | WEIGHT: 247 LBS | HEIGHT: 63 IN

## 2020-11-16 PROCEDURE — 77063 BREAST TOMOSYNTHESIS BI: CPT

## 2020-11-16 PROCEDURE — 77067 SCR MAMMO BI INCL CAD: CPT

## 2020-12-03 ENCOUNTER — HOSPITAL ENCOUNTER (OUTPATIENT)
Dept: RADIOLOGY | Facility: HOSPITAL | Age: 65
Discharge: HOME | End: 2020-12-03
Attending: INTERNAL MEDICINE
Payer: COMMERCIAL

## 2020-12-03 ENCOUNTER — OFFICE VISIT (OUTPATIENT)
Dept: RHEUMATOLOGY | Facility: CLINIC | Age: 65
End: 2020-12-03
Payer: COMMERCIAL

## 2020-12-03 VITALS
SYSTOLIC BLOOD PRESSURE: 176 MMHG | TEMPERATURE: 96.6 F | WEIGHT: 247 LBS | BODY MASS INDEX: 43.75 KG/M2 | DIASTOLIC BLOOD PRESSURE: 83 MMHG | HEART RATE: 74 BPM

## 2020-12-03 DIAGNOSIS — M15.0 PRIMARY GENERALIZED (OSTEO)ARTHRITIS: ICD-10-CM

## 2020-12-03 DIAGNOSIS — M25.50 DIFFUSE ARTHRALGIA: ICD-10-CM

## 2020-12-03 DIAGNOSIS — G89.4 CHRONIC PAIN SYNDROME: ICD-10-CM

## 2020-12-03 DIAGNOSIS — M25.50 DIFFUSE ARTHRALGIA: Primary | ICD-10-CM

## 2020-12-03 PROCEDURE — 73130 X-RAY EXAM OF HAND: CPT

## 2020-12-03 PROCEDURE — 99205 OFFICE O/P NEW HI 60 MIN: CPT | Performed by: INTERNAL MEDICINE

## 2020-12-05 LAB
CCP IGA+IGG SERPL IA-ACNC: 5 UNITS (ref 0–19)
CRP SERPL-MCNC: 2 MG/L (ref 0–10)
ENA SS-A AB SER-ACNC: <0.2 AI (ref 0–0.9)
ENA SS-B AB SER-ACNC: <0.2 AI (ref 0–0.9)
ERYTHROCYTE [SEDIMENTATION RATE] IN BLOOD BY WESTERGREN METHOD: 10 MM/HR (ref 0–40)
RHEUMATOID FACT SERPL-ACNC: <10 IU/ML (ref 0–13.9)

## 2020-12-07 ENCOUNTER — TELEPHONE (OUTPATIENT)
Dept: RHEUMATOLOGY | Facility: CLINIC | Age: 65
End: 2020-12-07

## 2021-01-27 ENCOUNTER — OFFICE VISIT (OUTPATIENT)
Dept: OBGYN CLINIC | Facility: CLINIC | Age: 66
End: 2021-01-27
Payer: COMMERCIAL

## 2021-01-27 VITALS — DIASTOLIC BLOOD PRESSURE: 70 MMHG | SYSTOLIC BLOOD PRESSURE: 142 MMHG | TEMPERATURE: 98.1 F

## 2021-01-27 DIAGNOSIS — M65.341 TRIGGER FINGER, RIGHT RING FINGER: Primary | ICD-10-CM

## 2021-01-27 PROCEDURE — 99214 OFFICE O/P EST MOD 30 MIN: CPT | Performed by: ORTHOPAEDIC SURGERY

## 2021-01-27 PROCEDURE — 20550 NJX 1 TENDON SHEATH/LIGAMENT: CPT | Performed by: ORTHOPAEDIC SURGERY

## 2021-01-27 RX ORDER — LIDOCAINE HYDROCHLORIDE 10 MG/ML
0.5 INJECTION, SOLUTION EPIDURAL; INFILTRATION; INTRACAUDAL; PERINEURAL
Status: COMPLETED | OUTPATIENT
Start: 2021-01-27 | End: 2021-01-27

## 2021-01-27 RX ORDER — TRIAMCINOLONE ACETONIDE 40 MG/ML
20 INJECTION, SUSPENSION INTRA-ARTICULAR; INTRAMUSCULAR
Status: COMPLETED | OUTPATIENT
Start: 2021-01-27 | End: 2021-01-27

## 2021-01-27 RX ADMIN — LIDOCAINE HYDROCHLORIDE 0.5 ML: 10 INJECTION, SOLUTION EPIDURAL; INFILTRATION; INTRACAUDAL; PERINEURAL at 10:22

## 2021-01-27 RX ADMIN — TRIAMCINOLONE ACETONIDE 20 MG: 40 INJECTION, SUSPENSION INTRA-ARTICULAR; INTRAMUSCULAR at 10:22

## 2021-01-27 NOTE — PROGRESS NOTES
Assessment/Plan:  1  Trigger finger, right ring finger  Hand/upper extremity injection: R ring A1       Scribe Attestation    I,:  Joni Sage am acting as a scribe while in the presence of the attending physician :       I,:  Diego Moran MD personally performed the services described in this documentation    as scribed in my presence :         Aleks Garner upon examination does demonstrate a recurrence of a triggering ring finger on the right side  She does have a palpable nodule that is tender to palpation at the A1 pulley of the ring finger  She does also actively trigger on exam   As she did have success with the previous steroid injection I did offer this to her today  She was amenable to having the injection in office today  She tolerated the injection well with no complications  She was dressed with sterile bandage  I did note that it can take up to 1 week to have full symptom relief  I did also remark that this is her final injection prior to considering a trigger release procedure  Aleks Garner did verbalize understanding of all information provided to her today had no further questions  Should she have a recurrence of triggering after this 2nd steroid injection she may follow-up with me to consider a trigger release procedure  Otherwise, she may follow up with me on an as-needed basis  Hand/upper extremity injection: R ring A1  Universal Protocol:  Consent given by: patient  Time out: Immediately prior to procedure a "time out" was called to verify the correct patient, procedure, equipment, support staff and site/side marked as required    Timeout called at: 1/27/2021 10:21 AM   Patient understanding: patient states understanding of the procedure being performed  Patient consent: the patient's understanding of the procedure matches consent given  Site marked: the operative site was marked  Supporting Documentation  Indications: therapeutic and tendon swelling   Procedure Details  Condition:trigger finger Location: ring finger - R ring A1   Needle size: 25 G  Ultrasound guidance: no  Approach: volar  Medications administered: 20 mg triamcinolone acetonide 40 mg/mL; 0 5 mL lidocaine (PF) 1 %    Patient tolerance: patient tolerated the procedure well with no immediate complications  Dressing:  Sterile dressing applied           Subjective:   Elsy Rawls is a 72 y o  female who presents to the office today for evaluation of her right ring finger  She has been experiencing recurrent triggering of the ring finger over the past several weeks  He states that it is quite painful and describes the pain as an intermittent and moderate sometimes severe sharp pain when her finger triggers and locks  She notes that active motion such as grasping activities has been affected due to this  She does also appreciate swelling into her finger and hand  She denies any traumatic injury that caused a recurrence of triggering  She also denies any distal paresthesias  She did receive a steroid injection into the ring finger for this very issue on 3/11/2020  She states that she had significant symptom relief following the injection  She does wish to discuss having a 2nd steroid injection today  She denies a history of diabetes  Review of Systems   Constitutional: Negative for chills, fever and unexpected weight change  HENT: Negative for hearing loss, nosebleeds and sore throat  Eyes: Negative for pain, redness and visual disturbance  Respiratory: Negative for cough, shortness of breath and wheezing  Cardiovascular: Negative for chest pain, palpitations and leg swelling  Gastrointestinal: Negative for abdominal pain, nausea and vomiting  Endocrine: Negative for polydipsia and polyuria  Genitourinary: Negative for dysuria and hematuria  Musculoskeletal: Positive for arthralgias and myalgias  See HPI   Skin: Negative for rash and wound     Neurological: Negative for dizziness, numbness and headaches  Psychiatric/Behavioral: Negative for decreased concentration and suicidal ideas  The patient is not nervous/anxious  Past Medical History:   Diagnosis Date    Anxiety     Apnea, sleep     patient states "repeated test says no"    Arthritis     severe in the joints    Chronic pain disorder     joints    Fall 11/22/2018    tripped on a folded rug- left hamstring injury    Foot injury     right foot-fx of bone-no repair-has severe arthritis in the foot and ankle    History of sleep apnea     Hypertension     Insomnia     Obesity     Lost 70lb with gastric sleeve (2011)    Osteoarthritis     Wears glasses     Wears partial dentures     upper and lower-but did lose the lower        Past Surgical History:   Procedure Laterality Date    ANKLE ARTHROSCOPY Right     APPENDECTOMY      CHOLECYSTECTOMY      GASTRECTOMY SLEEVE LAPAROSCOPIC  2011    lost 70 lb    JOINT REPLACEMENT      blateral knee replacements    NERVE BLOCK Bilateral 12/29/2017    Procedure: L4, L5, S1 MEDIAL BRANCH BLOCK #1;  Surgeon: Francy Bryan MD;  Location: [facility] MAIN OR;  Service: Pain Management     NERVE BLOCK Bilateral 1/12/2018    Procedure: BLOCK MEDIAL BRANCH L4, L5-S1  #2;  Surgeon: Francy Bryan MD;  Location: [facility] MAIN OR;  Service: Pain Management     TX INJECTION,SACROILIAC JOINT Right 10/23/2020    Procedure: Sacroiliac Joint injection ([id number]);   Surgeon: Francy Bryan MD;  Location: [facility] MAIN OR;  Service: Pain Management     TX RECONSTR TOTAL SHOULDER IMPLANT Right 5/23/2019    Procedure: ARTHROPLASTY SHOULDER TOTAL , excision of deep cyst at shoulder, biceps tenotomy;  Surgeon: Jorden Hernandez MD;  Location: [address] [place] [address];  Service: Orthopedics    RADIOFREQUENCY ABLATION Right 3/16/2018    Procedure: Rt L4 L5 S1 RFA;  Surgeon: Francy Bryan MD;  Location: [facility] MAIN OR;  Service: Pain Management     RADIOFREQUENCY ABLATION Left 12/14/2018    Procedure: L3-S1 Radio Frequency Ablation ([id number]);   Surgeon: Eulalio Thomas MD;  Location: [facility] MAIN OR;  Service: Pain Management     RHIZOTOMY Left 2018    Procedure: L4, L5, S1 RFA;  Surgeon: Eulalio Thomas MD;  Location: [place] [address] MAIN OR;  Service: Pain Management     TONSILLECTOMY         Family History   Problem Relation Age of Onset    Other Mother         in a nursing home-fx of the leg    Heart disease Father         CHF    Alcohol abuse Father     Cirrhosis Father     Other Brother         lung abscess, pneumonia    Colon cancer Maternal Aunt     No Known Problems Maternal Uncle     No Known Problems Paternal Aunt     No Known Problems Paternal Uncle     Colon cancer Maternal Grandmother     No Known Problems Maternal Grandfather     No Known Problems Paternal Grandmother     No Known Problems Paternal Grandfather        Social History     Occupational History    Not on file   Tobacco Use    Smoking status: Former Smoker     Quit date:      Years since quittin 0    Smokeless tobacco: Never Used   Substance and Sexual Activity    Alcohol use: Never     Frequency: Never    Drug use: Never    Sexual activity: Not on file         Current Outpatient Medications:     acetaminophen (TYLENOL) 650 mg CR tablet, Take 650 mg by mouth every 8 (eight) hours as needed for mild pain, Disp: , Rfl:     ascorbic acid (VITAMIN C) 500 mg tablet, Take 500 mg by mouth every morning, Disp: , Rfl:     clindamycin (CLEOCIN) 300 MG capsule, , Disp: , Rfl:     Coenzyme Q10 (CO Q 10) 100 MG CAPS, Take by mouth every morning Last dose 19, Disp: , Rfl:     Cyanocobalamin (VITAMIN B 12 PO), Take 500 mcg by mouth every morning, Disp: , Rfl:     diphenhydrAMINE (BENADRYL) 25 mg tablet, Take 25 mg by mouth every 8 (eight) hours as needed for itching , Disp: , Rfl:     Escitalopram Oxalate (LEXAPRO PO), Take 15 mg by mouth daily at bedtime, Disp: , Rfl:     Glucosamine-Chondroitin (MOVE FREE PO), Take 2 tablets by mouth every morning, Disp: , Rfl:     Melatonin 10 MG TABS, Take by mouth daily at bedtime, Disp: , Rfl:     Multiple Vitamins-Minerals (CENTRUM SILVER 50+WOMEN PO), Take by mouth every morning, Disp: , Rfl:     naproxen (NAPROSYN) 250 mg tablet, Take 250 mg by mouth, Disp: , Rfl:     omeprazole (PriLOSEC) 20 mg delayed release capsule, Take 20 mg by mouth every morning , Disp: , Rfl:     oxyCODONE-acetaminophen (PERCOCET) 7 5-325 MG per tablet, Take 1 tablet by mouth every 4 (four) hours as needed for moderate pain (3x), Disp: , Rfl:     perindopril (ACEON) 4 MG tablet, Take 4 mg by mouth 2 (two) times a day , Disp: , Rfl:     Allergies   Allergen Reactions    Penicillins Anaphylaxis     Total body redness and fever    Levaquin [Levofloxacin] Itching       Objective:  Vitals:    21 1012   BP: 142/70   Temp: 98 1 °F (36 7 °C)       Right Hand Exam     Tenderness   Right hand tenderness location: A1 pully ring finger]    Range of Motion   Hand   MP Rin   PIP Rin   DIP Rin     Muscle Strength   Wrist extension: 5/5   Wrist flexion: 5/5   : 5/5     Other   Erythema: absent  Scars: absent  Sensation: normal  Pulse: present    Comments:  Nodule at the A1 pully of the ring finger with tenderness          Strength/Myotome Testing     Right Wrist/Hand   Wrist extension: 5  Wrist flexion: 5        Physical Exam  Vitals signs reviewed  HENT:      Head: Normocephalic and atraumatic  Eyes:      General:         Right eye: No discharge  Left eye: No discharge  Conjunctiva/sclera: Conjunctivae normal       Pupils: Pupils are equal, round, and reactive to light  Neck:      Musculoskeletal: Normal range of motion and neck supple  Cardiovascular:      Rate and Rhythm: Normal rate  Pulmonary:      Effort: Pulmonary effort is normal  No respiratory distress  Musculoskeletal:      Comments: As noted in HPI   Skin:     General: Skin is warm and dry     Neurological:      Mental Status: She is alert and oriented to person, place, and time

## 2021-03-01 DIAGNOSIS — Z23 ENCOUNTER FOR IMMUNIZATION: ICD-10-CM

## 2021-08-13 ENCOUNTER — TELEPHONE (OUTPATIENT)
Dept: GASTROENTEROLOGY | Facility: CLINIC | Age: 66
End: 2021-08-13

## 2021-08-13 NOTE — TELEPHONE ENCOUNTER
Recall call went out 5/20 via [employer] with no return calls from pt to schedule  Pt is due for a recall colon with Dr Clint Kaur for hx of colon polyps  I lmom for pt to please call back to schedule a procedure with Dr Clint Kaur   Will call pt again in one week if do not hear back from her

## 2021-08-20 NOTE — TELEPHONE ENCOUNTER
I lmom for pt to please call back to schedule a procedure with Dr Fofana Session   Will call pt again in two weeks if do not hear back from her

## 2021-10-20 ENCOUNTER — TELEPHONE (OUTPATIENT)
Dept: PREADMISSION TESTING | Facility: HOSPITAL | Age: 66
End: 2021-10-20

## 2021-10-20 VITALS — BODY MASS INDEX: 42.51 KG/M2 | WEIGHT: 240 LBS

## 2021-10-26 ENCOUNTER — ANESTHESIA EVENT (OUTPATIENT)
Dept: GASTROENTEROLOGY | Facility: AMBULARY SURGERY CENTER | Age: 66
End: 2021-10-26

## 2021-10-26 ENCOUNTER — ANESTHESIA (OUTPATIENT)
Dept: GASTROENTEROLOGY | Facility: AMBULARY SURGERY CENTER | Age: 66
End: 2021-10-26

## 2021-10-26 ENCOUNTER — HOSPITAL ENCOUNTER (OUTPATIENT)
Dept: GASTROENTEROLOGY | Facility: AMBULARY SURGERY CENTER | Age: 66
Setting detail: OUTPATIENT SURGERY
Discharge: HOME | End: 2021-10-26
Attending: INTERNAL MEDICINE
Payer: COMMERCIAL

## 2021-10-26 VITALS
RESPIRATION RATE: 18 BRPM | OXYGEN SATURATION: 99 % | WEIGHT: 240 LBS | TEMPERATURE: 97.4 F | HEART RATE: 67 BPM | SYSTOLIC BLOOD PRESSURE: 170 MMHG | HEIGHT: 63 IN | BODY MASS INDEX: 42.52 KG/M2 | DIASTOLIC BLOOD PRESSURE: 63 MMHG

## 2021-10-26 DIAGNOSIS — Z86.010 HX OF COLONIC POLYPS: ICD-10-CM

## 2021-10-26 PROCEDURE — 88305 TISSUE EXAM BY PATHOLOGIST: CPT | Performed by: PATHOLOGY

## 2021-10-26 PROCEDURE — 45380 COLONOSCOPY AND BIOPSY: CPT | Performed by: INTERNAL MEDICINE

## 2021-10-26 RX ORDER — PROPOFOL 10 MG/ML
INJECTION, EMULSION INTRAVENOUS CONTINUOUS PRN
Status: DISCONTINUED | OUTPATIENT
Start: 2021-10-26 | End: 2021-10-26

## 2021-10-26 RX ORDER — SODIUM CHLORIDE, SODIUM LACTATE, POTASSIUM CHLORIDE, CALCIUM CHLORIDE 600; 310; 30; 20 MG/100ML; MG/100ML; MG/100ML; MG/100ML
75 INJECTION, SOLUTION INTRAVENOUS CONTINUOUS
Status: DISCONTINUED | OUTPATIENT
Start: 2021-10-26 | End: 2021-10-30 | Stop reason: HOSPADM

## 2021-10-26 RX ORDER — LIDOCAINE HYDROCHLORIDE 10 MG/ML
INJECTION, SOLUTION EPIDURAL; INFILTRATION; INTRACAUDAL; PERINEURAL AS NEEDED
Status: DISCONTINUED | OUTPATIENT
Start: 2021-10-26 | End: 2021-10-26

## 2021-10-26 RX ORDER — SODIUM CHLORIDE, SODIUM LACTATE, POTASSIUM CHLORIDE, CALCIUM CHLORIDE 600; 310; 30; 20 MG/100ML; MG/100ML; MG/100ML; MG/100ML
INJECTION, SOLUTION INTRAVENOUS CONTINUOUS PRN
Status: DISCONTINUED | OUTPATIENT
Start: 2021-10-26 | End: 2021-10-26

## 2021-10-26 RX ADMIN — LIDOCAINE HYDROCHLORIDE 100 MG: 10 INJECTION, SOLUTION EPIDURAL; INFILTRATION; INTRACAUDAL; PERINEURAL at 11:25

## 2021-10-26 RX ADMIN — SODIUM CHLORIDE, SODIUM LACTATE, POTASSIUM CHLORIDE, AND CALCIUM CHLORIDE 75 ML/HR: .6; .31; .03; .02 INJECTION, SOLUTION INTRAVENOUS at 11:11

## 2021-10-26 RX ADMIN — PROPOFOL 140 MCG/KG/MIN: 10 INJECTION, EMULSION INTRAVENOUS at 11:25

## 2021-10-26 RX ADMIN — LIDOCAINE HYDROCHLORIDE 40 MG: 10 INJECTION, SOLUTION EPIDURAL; INFILTRATION; INTRACAUDAL; PERINEURAL at 11:32

## 2021-10-26 RX ADMIN — SODIUM CHLORIDE, SODIUM LACTATE, POTASSIUM CHLORIDE, AND CALCIUM CHLORIDE: .6; .31; .03; .02 INJECTION, SOLUTION INTRAVENOUS at 11:17

## 2021-12-01 ENCOUNTER — EVALUATION (OUTPATIENT)
Dept: PHYSICAL THERAPY | Facility: CLINIC | Age: 66
End: 2021-12-01
Payer: COMMERCIAL

## 2021-12-01 DIAGNOSIS — M54.31 RIGHT SIDED SCIATICA: Primary | ICD-10-CM

## 2021-12-01 PROCEDURE — 97162 PT EVAL MOD COMPLEX 30 MIN: CPT

## 2021-12-03 ENCOUNTER — HOSPITAL ENCOUNTER (OUTPATIENT)
Dept: RADIOLOGY | Facility: HOSPITAL | Age: 66
Discharge: HOME | End: 2021-12-03
Attending: INTERNAL MEDICINE
Payer: COMMERCIAL

## 2021-12-03 DIAGNOSIS — R52 PAIN: ICD-10-CM

## 2021-12-03 PROCEDURE — 73502 X-RAY EXAM HIP UNI 2-3 VIEWS: CPT

## 2021-12-06 ENCOUNTER — OFFICE VISIT (OUTPATIENT)
Dept: PHYSICAL THERAPY | Facility: CLINIC | Age: 66
End: 2021-12-06
Payer: COMMERCIAL

## 2021-12-06 DIAGNOSIS — M54.31 RIGHT SIDED SCIATICA: Primary | ICD-10-CM

## 2021-12-06 PROCEDURE — 97110 THERAPEUTIC EXERCISES: CPT

## 2021-12-06 PROCEDURE — 97140 MANUAL THERAPY 1/> REGIONS: CPT

## 2021-12-08 ENCOUNTER — OFFICE VISIT (OUTPATIENT)
Dept: OBGYN CLINIC | Facility: CLINIC | Age: 66
End: 2021-12-08
Payer: COMMERCIAL

## 2021-12-08 VITALS
SYSTOLIC BLOOD PRESSURE: 167 MMHG | WEIGHT: 241 LBS | DIASTOLIC BLOOD PRESSURE: 82 MMHG | BODY MASS INDEX: 42.7 KG/M2 | HEART RATE: 76 BPM | HEIGHT: 63 IN

## 2021-12-08 DIAGNOSIS — M65.341 TRIGGER FINGER, RIGHT RING FINGER: Primary | ICD-10-CM

## 2021-12-08 PROCEDURE — 99214 OFFICE O/P EST MOD 30 MIN: CPT | Performed by: ORTHOPAEDIC SURGERY

## 2021-12-08 RX ORDER — ESCITALOPRAM OXALATE 10 MG/1
15 TABLET ORAL
COMMUNITY
Start: 2021-12-01

## 2021-12-09 ENCOUNTER — OFFICE VISIT (OUTPATIENT)
Dept: PHYSICAL THERAPY | Facility: CLINIC | Age: 66
End: 2021-12-09
Payer: COMMERCIAL

## 2021-12-09 DIAGNOSIS — M54.31 RIGHT SIDED SCIATICA: Primary | ICD-10-CM

## 2021-12-09 PROCEDURE — 97140 MANUAL THERAPY 1/> REGIONS: CPT

## 2021-12-09 PROCEDURE — 97110 THERAPEUTIC EXERCISES: CPT

## 2021-12-14 ENCOUNTER — OFFICE VISIT (OUTPATIENT)
Dept: PHYSICAL THERAPY | Facility: CLINIC | Age: 66
End: 2021-12-14
Payer: COMMERCIAL

## 2021-12-14 DIAGNOSIS — M54.31 RIGHT SIDED SCIATICA: Primary | ICD-10-CM

## 2021-12-14 PROCEDURE — 97110 THERAPEUTIC EXERCISES: CPT

## 2021-12-14 PROCEDURE — 97140 MANUAL THERAPY 1/> REGIONS: CPT

## 2021-12-16 ENCOUNTER — OFFICE VISIT (OUTPATIENT)
Dept: PHYSICAL THERAPY | Facility: CLINIC | Age: 66
End: 2021-12-16
Payer: COMMERCIAL

## 2021-12-16 DIAGNOSIS — M54.31 RIGHT SIDED SCIATICA: Primary | ICD-10-CM

## 2021-12-16 PROCEDURE — 97112 NEUROMUSCULAR REEDUCATION: CPT

## 2021-12-16 PROCEDURE — 97110 THERAPEUTIC EXERCISES: CPT

## 2021-12-20 ENCOUNTER — OFFICE VISIT (OUTPATIENT)
Dept: PHYSICAL THERAPY | Facility: CLINIC | Age: 66
End: 2021-12-20
Payer: COMMERCIAL

## 2021-12-20 DIAGNOSIS — M54.31 RIGHT SIDED SCIATICA: Primary | ICD-10-CM

## 2021-12-20 PROCEDURE — 97110 THERAPEUTIC EXERCISES: CPT

## 2021-12-20 PROCEDURE — 97140 MANUAL THERAPY 1/> REGIONS: CPT

## 2021-12-21 ENCOUNTER — HOSPITAL ENCOUNTER (OUTPATIENT)
Facility: HOSPITAL | Age: 66
Setting detail: OUTPATIENT SURGERY
Discharge: HOME | End: 2021-12-21
Attending: ORTHOPAEDIC SURGERY | Admitting: ORTHOPAEDIC SURGERY
Payer: COMMERCIAL

## 2021-12-21 ENCOUNTER — APPOINTMENT (OUTPATIENT)
Dept: PHYSICAL THERAPY | Facility: CLINIC | Age: 66
End: 2021-12-21
Payer: COMMERCIAL

## 2021-12-21 VITALS
WEIGHT: 236 LBS | BODY MASS INDEX: 41.82 KG/M2 | HEIGHT: 63 IN | RESPIRATION RATE: 16 BRPM | HEART RATE: 78 BPM | DIASTOLIC BLOOD PRESSURE: 89 MMHG | OXYGEN SATURATION: 96 % | TEMPERATURE: 98 F | SYSTOLIC BLOOD PRESSURE: 193 MMHG

## 2021-12-21 PROCEDURE — 26055 INCISE FINGER TENDON SHEATH: CPT | Performed by: ORTHOPAEDIC SURGERY

## 2021-12-21 PROCEDURE — 26055 INCISE FINGER TENDON SHEATH: CPT | Performed by: PHYSICIAN ASSISTANT

## 2021-12-21 RX ORDER — MAGNESIUM HYDROXIDE 1200 MG/15ML
LIQUID ORAL AS NEEDED
Status: DISCONTINUED | OUTPATIENT
Start: 2021-12-21 | End: 2021-12-21 | Stop reason: HOSPADM

## 2021-12-23 ENCOUNTER — OFFICE VISIT (OUTPATIENT)
Dept: PHYSICAL THERAPY | Facility: CLINIC | Age: 66
End: 2021-12-23
Payer: COMMERCIAL

## 2021-12-23 DIAGNOSIS — M54.31 RIGHT SIDED SCIATICA: Primary | ICD-10-CM

## 2021-12-23 PROCEDURE — 97110 THERAPEUTIC EXERCISES: CPT

## 2021-12-23 NOTE — PROGRESS NOTES
Daily Note     Today's date: 2018  Patient name: Jodi Prince  : 1955  MRN: [id number]  Referring provider: Oneal Jiang MD  Dx:   Encounter Diagnosis     ICD-10-CM    1  Bilateral low back pain without sciatica, unspecified chronicity M54 5                   Subjective: Benson Muñoz reports that her pain level entering today was 5/10 "in that one spot" on left lumbar spine      Objective: See treatment diary below  Precautions HBP,anxiety    Visits: 6 (foto) 7 8  5   Manual 11/1/18 11/5/18 11/7/18  10/30/18   STM lumbar musculature  performed   performed   performed     L/S p-a mobs  performed     performed     Man Flexibility: hamstring, hip flexor        MET   performed for a ERSL     Total time:  12 min  15 min   10 min            Exercise Diary         Rec bike/nustep nustep x 10 min with moist heat lv 3 10 min with moist heat lv 3 10 min   Stationary bike: lv 2 10 min with moist heat    TA activation w/ exhale 15 x  15x  15x  D/C 15x   Ball squeeze w/ exhale  Hp abd with belt w/ exhale 15 x each  15 x each  + bridging: 10 x each exercise  15x each    Clamshells: stabilizing with opposite leg   Red [id number] each [id number] green band   Red [id number]     LTR        Bridges 10 x 2  2x10   D/C  10x             Hip hinge     10 x    Hip hinge w/sit stand  10 x 2 15 x    2x10     Lifting: w crate        Exhale with marching 15 x each  15 x each 20x     Exhale with alt UE lift and march 5 x each moving within exhale    5 x each 10x      Sidestepping with band  And abd bracing    Yellow 14 feet x 3 rounds     Standing hip flexion with exhale    10 x each      AMALIA [id number]   [id number]     AMALIA: [id number]     Prone press up 5 x 2 pre and post there ex each 10 x  10x   Press up: 5 x 2     Lifting with crate 10 x 12 5 lb lifting 0 x      Total time:  35  min 30 min   30 min            Modalities        MH     While on nustep 10 min lumbar spine   ice        Total time:        Prognosis: good    Goals  STG  + Patient will Problem: Safety:  Goal: Free from accidental physical injury  Description: Free from accidental physical injury  12/23/2021 0047 by Cristofer Mendoza RN  Outcome: Met This Shift     Problem: Safety:  Goal: Free from intentional harm  Description: Free from intentional harm  12/23/2021 0047 by Cristofer Mendoza RN  Outcome: Met This Shift     Problem: Daily Care:  Goal: Daily care needs are met  Description: Daily care needs are met  12/23/2021 0047 by Cristofer Mendoza RN  Outcome: Met This Shift have pain level 1/10 lumbar spine  at rest (2-3 weeks)  + Patient will report a 35% improvement in symptoms (2-3 weeks) met   + Patient will be independent in basic HEP (2-3 weeks) met   + Patient will demonstrate good posture with verbal cuing   (2-3 weeks) met   + Patient will demonstrate reduced gait deviations ambulating on level surfaces   (2-3 week)  + Patient will demonstrate an increase in range of motion  lumbar spine, extension AROM in all planes  to  minimally limited (2-3 weeks) met   + Patient will demonstrate proper transfer techniques with verbal cuing (2-3 weeks) met   + Patient will perform proper breathing technique and therefore, appropriate abdominal recruitment independently (2-3 weeks) met   + Patient will increase SLS time by  10 seconds (2-3 weeks)  + Patient will report increased ease sleeping due to a reduction in pain (2-3 weeks)      LTG:  + Patient will have pain level 3/10 lumbar spine  with ADL's (4-6 weeks)  + Patient will report a 65% improvement in symptoms (4-6 weeks)   + Patient will increase FOTO score to 53 (8 sessions)   + Patient will be independent in comprehensive HEP (4-6 weeks)  + Patient will demonstrate good posture independently  (4-6 weeks)  + Patient will demonstrate minimal gait deviations ambulating on level surfaces and painfree  (4-6 week)  + Patient will demonstrate an increase in range of motion  lumbar spine,  AROM in all planes  to  wnl  And pain free (4-6 weeks)  + Patient will demonstrate increase  MMT of  hip, ankle to  4/5 for maximum function  (4-6 weeks)  + Patient will demonstrate proper transfers techniques independently (4-6 weeks) met        Assessment: + ERSL found in flexion and performed MET in flexion with no significant change  performed MI on mutilifidus on left  Which reduced pain level  + tightness in PA glide at L5/S1  Otherwise patient reports reduction in pain with PA glides   Good tolerance to progression of there ex and updated Hep was issued  Needed cuing for proper form with lifting crate to avoid forward flexion of hips  Patient exited clinic with 1/10 pain  States   Plan: reeval 11/16/18, progress stab as tolerated

## 2021-12-28 ENCOUNTER — OFFICE VISIT (OUTPATIENT)
Dept: PHYSICAL THERAPY | Facility: CLINIC | Age: 66
End: 2021-12-28
Payer: COMMERCIAL

## 2021-12-28 DIAGNOSIS — M54.31 RIGHT SIDED SCIATICA: Primary | ICD-10-CM

## 2021-12-28 PROCEDURE — 97110 THERAPEUTIC EXERCISES: CPT | Performed by: PHYSICAL THERAPIST

## 2021-12-30 ENCOUNTER — OFFICE VISIT (OUTPATIENT)
Dept: PHYSICAL THERAPY | Facility: CLINIC | Age: 66
End: 2021-12-30
Payer: COMMERCIAL

## 2021-12-30 DIAGNOSIS — M54.31 RIGHT SIDED SCIATICA: Primary | ICD-10-CM

## 2021-12-30 PROCEDURE — 97140 MANUAL THERAPY 1/> REGIONS: CPT

## 2021-12-30 PROCEDURE — 97110 THERAPEUTIC EXERCISES: CPT

## 2022-01-04 ENCOUNTER — APPOINTMENT (OUTPATIENT)
Dept: PHYSICAL THERAPY | Facility: CLINIC | Age: 67
End: 2022-01-04
Payer: COMMERCIAL

## 2022-01-05 ENCOUNTER — OFFICE VISIT (OUTPATIENT)
Dept: OBGYN CLINIC | Facility: CLINIC | Age: 67
End: 2022-01-05

## 2022-01-05 ENCOUNTER — EVALUATION (OUTPATIENT)
Dept: PHYSICAL THERAPY | Facility: CLINIC | Age: 67
End: 2022-01-05
Payer: COMMERCIAL

## 2022-01-05 DIAGNOSIS — M54.31 RIGHT SIDED SCIATICA: Primary | ICD-10-CM

## 2022-01-05 DIAGNOSIS — M65.341 TRIGGER FINGER, RIGHT RING FINGER: Primary | ICD-10-CM

## 2022-01-05 PROCEDURE — 97530 THERAPEUTIC ACTIVITIES: CPT

## 2022-01-05 PROCEDURE — 99024 POSTOP FOLLOW-UP VISIT: CPT | Performed by: PHYSICIAN ASSISTANT

## 2022-01-05 PROCEDURE — 97110 THERAPEUTIC EXERCISES: CPT

## 2022-01-05 RX ORDER — AMLODIPINE BESYLATE 5 MG/1
TABLET ORAL
COMMUNITY
Start: 2021-12-22

## 2022-01-05 NOTE — PROGRESS NOTES
Assessment/Plan:  1  Trigger finger, right ring finger         Patient is doing well and will avoid strenuous activity for another week  She can then gradually increase her activity as tolerated  There are no signs of infection today  She will follow up as needed  Subjective:   Geni De Los Santos is a 77 y o  female who presents today for follow-up of her right ring finger, now about 2 weeks status post trigger release  She is doing well and notes only some mild tenderness about her incision  She notes good sensation of the hand  She denies any triggering         Review of Systems      Past Medical History:   Diagnosis Date    Anxiety     Apnea, sleep     patient states "repeated test says no"    Arthritis     severe in the joints    Chronic pain disorder     joints    Fall 11/22/2018    tripped on a folded rug- left hamstring injury    Foot injury     right foot-fx of bone-no repair-has severe arthritis in the foot and ankle    History of sleep apnea     Hypertension     Insomnia     Obesity     Lost 70lb with gastric sleeve (2011)    Osteoarthritis     Wears glasses     Wears partial dentures     upper and lower-but did lose the lower        Past Surgical History:   Procedure Laterality Date    ANKLE ARTHROSCOPY Right     APPENDECTOMY      CHOLECYSTECTOMY      GASTRECTOMY SLEEVE LAPAROSCOPIC  2011    lost 70 lb    JOINT REPLACEMENT      blateral knee replacements    NERVE BLOCK Bilateral 12/29/2017    Procedure: L4, L5, S1 MEDIAL BRANCH BLOCK #1;  Surgeon: Alberto Garnica MD;  Location: [facility] MAIN OR;  Service: Pain Management     NERVE BLOCK Bilateral 1/12/2018    Procedure: BLOCK MEDIAL BRANCH L4, L5-S1  #2;  Surgeon: Alberto Garnica MD;  Location: [facility] MAIN OR;  Service: Pain Management     KY INCISE FINGER TENDON SHEATH Right 12/21/2021    Procedure: RELEASE TRIGGER RING FINGER;  Surgeon: Dennise Rivas MD;  Location: [address] [place] [address];  Service: Orthopedics    KY INJECTION,SACROILIAC JOINT Right 10/23/2020    Procedure: Sacroiliac Joint injection (86469); Surgeon: Radha Iglesias MD;  Location: [facility] MAIN OR;  Service: Pain Management     NC RECONSTR TOTAL SHOULDER IMPLANT Right 2019    Procedure: ARTHROPLASTY SHOULDER TOTAL , excision of deep cyst at shoulder, biceps tenotomy;  Surgeon: Tiff Coronado MD;  Location: [address] [place] [address];  Service: Orthopedics    RADIOFREQUENCY ABLATION Right 3/16/2018    Procedure: Rt L4 L5 S1 RFA;  Surgeon: Radha Iglesias MD;  Location: [facility] MAIN OR;  Service: Pain Management     RADIOFREQUENCY ABLATION Left 2018    Procedure: L3-S1 Radio Frequency Ablation ([id number]);   Surgeon: Radha Iglesias MD;  Location: [facility] MAIN OR;  Service: Pain Management     RHIZOTOMY Left 2018    Procedure: L4, L5, S1 RFA;  Surgeon: Radha Iglesias MD;  Location: [facility] MAIN OR;  Service: Pain Management     TONSILLECTOMY         Family History   Problem Relation Age of Onset    Other Mother         in a nursing home-fx of the leg    Heart disease Father         CHF    Alcohol abuse Father     Cirrhosis Father     Other Brother         lung abscess, pneumonia    Colon cancer Maternal Aunt     No Known Problems Maternal Uncle     No Known Problems Paternal Aunt     No Known Problems Paternal Uncle     Colon cancer Maternal Grandmother     No Known Problems Maternal Grandfather     No Known Problems Paternal Grandmother     No Known Problems Paternal Grandfather        Social History     Occupational History    Not on file   Tobacco Use    Smoking status: Former Smoker     Quit date:      Years since quittin 0    Smokeless tobacco: Never Used   Substance and Sexual Activity    Alcohol use: Never    Drug use: Never    Sexual activity: Not on file         Current Outpatient Medications:     amLODIPine (NORVASC) 5 mg tablet, , Disp: , Rfl:     acetaminophen (TYLENOL) 650 mg CR tablet, Take 650 mg by mouth every 8 (eight) hours as needed for mild pain, Disp: , Rfl:     ascorbic acid (VITAMIN C) 500 mg tablet, Take 500 mg by mouth every morning, Disp: , Rfl:     clindamycin (CLEOCIN) 300 MG capsule, as needed Pre dental procedures, Disp: , Rfl:     Coenzyme Q10 (CO Q 10) 100 MG CAPS, Take by mouth every morning Last dose 5/13/19, Disp: , Rfl:     Cyanocobalamin (VITAMIN B 12 PO), Take 500 mcg by mouth every morning, Disp: , Rfl:     diphenhydrAMINE (BENADRYL) 25 mg tablet, Take 25 mg by mouth every 8 (eight) hours as needed for itching , Disp: , Rfl:     escitalopram (LEXAPRO) 10 mg tablet, 15 mg daily at bedtime  , Disp: , Rfl:     Glucosamine-Chondroitin (MOVE FREE PO), Take 2 tablets by mouth every morning, Disp: , Rfl:     Melatonin 10 MG TABS, Take by mouth daily at bedtime, Disp: , Rfl:     naproxen (NAPROSYN) 250 mg tablet, Take 250 mg by mouth Last dose 12/16/21 , Disp: , Rfl:     omeprazole (PriLOSEC) 20 mg delayed release capsule, Take 20 mg by mouth daily as needed , Disp: , Rfl:     oxyCODONE-acetaminophen (PERCOCET) 7 5-325 MG per tablet, Take 1 tablet by mouth every 4 (four) hours as needed for moderate pain (3x), Disp: , Rfl:     perindopril (ACEON) 4 MG tablet, Take 8 mg by mouth 2 (two) times a day , Disp: , Rfl:     Allergies   Allergen Reactions    Penicillins Anaphylaxis     Total body redness and fever    Levaquin [Levofloxacin] Itching       Objective: There were no vitals filed for this visit  Ortho Exam    Physical Exam    Right ring finger: Sutures removed  Incisions CDI  No erythema  Full ROM of digit without triggering

## 2022-01-05 NOTE — LETTER
2022    Nicole Begum MD  [facility] [address]    Patient: Musa Fiore   YOB: 1955   Date of Visit: 2022     Encounter Diagnosis     ICD-10-CM    1  Right sided sciatica  M54 31        Dear Dr Sangita Elena:    Thank you for your recent referral of Musa Fiore  Please review the attached evaluation summary from Zonia's recent visit  Please verify that you agree with the plan of care by signing the attached order  If you have any questions or concerns, please do not hesitate to call  I sincerely appreciate the opportunity to share in the care of one of your patients and hope to have another opportunity to work with you in the near future  Sincerely,    Scooby Tello, PT      Referring Provider:      I certify that I have read the below Plan of Care and certify the need for these services furnished under this plan of treatment while under my care  Nicole Begum MD  [address],[facility] Floor [address]  Via Fax: [phone]          Daily Note/Progress Note      Today's date: 2022  Patient name: Musa Fiore  : 1955  MRN: [id number]  Referring provider: Angelina Anderson MD  Dx:   Encounter Diagnosis     ICD-10-CM    1  Right sided sciatica  M54 31        Subjective: Pt reports % improvement since SOC: 30%  The last 70% is due to she continues to have pain, ache in right groin area and low back on right  Describes taking pain killers for general pain issues  Pain level at rest:  3 /10 with prolong sittin/10, pain level with ADLS:  0-6 /49, pain level at worst: 10/10 which occurred 2 weeks ago, was occurring daily and nightly  At this time, the functional limitations include: difficulty picking up items from floor, is slower to move upon rising         Objective: See treatment diary below    Goals  STG  + Patient will have pain level 3/10 lumbar spine  at rest (2-3 weeks) not met consistently  + Patient will report a 25% improvement in symptoms (2-3 weeks) met  + Patient will be independent in basic HEP (2-3 weeks) met   + Patient will demonstrate an increase in range of motion  Hip motions, AROM in all planes  to  within normal limits (2-3 weeks) stil progressing  +Patient will no longer demonstrate tenderness and tension in right hip adductors (2-3 weeks) met       LTG:  + Patient will have pain level 2/10 lumbar spine, right groin  with ADL's (4-6 weeks) not met   + Patient will report a 50% improvement in symptoms (4-6 weeks)(not met   + Patient will increase FOTO score by 10 points (8 sessions) (NT)  + Patient will be independent in comprehensive HEP (4-6 weeks) (Nt)  + Patient will demonstrate reduced gait deviations ambulating on level surfaces   (4-6 week) met   + Patient will demonstrate increase  MMT of  hip,  to  4/5 t/o  for maximum function  (4-6 weeks) met           Objective (1/5/21)    Static Posture     Comments  Posture   Standing: forward flexed at hips ( improved upright posture)    Gait: antalgic gait, reduced hip flexion, reduced step length and reduced stride length, ( gait worsens with inc pain   Entering today minimal deviations with mild reduced stance time on right)    Palpation: mild TTP in area of L4, exquisitely tender in area hip bursa and along IT band, hip adductors increased tenderness and tension  (NT)    Functional movements   Squat: able to lower 40% , unable to tap chair (-) pain (able to control lowering to chair, still unable to tap chair, no pain)   Bridging: able to lift 25%  (able to lift 75%)  Transfers sit/stand: needs UE support to achieve standing position ( no longer needs UE assist to get out of chair)    Dermatomes wnl to light touch adan LE     Myotomes   L2 (hip flexion): Right 3/5  Left: 3+/5 (4-/5 adan no pain)   L3 (knee extension): Right: 4/5 + pain Left: 4/5 ( 4+/5 no pain)   Hamstring: Right: 4-/5 left 4/5 ( 4+/5 no pain)   L4 (ankle dorsiflexion): Right: 3+/5 Left: 4/5 (Right: 4-/5)   L5 (hallux extension): Right: 4/5  Left: 4/5 (4/5 adan )   S1 (ankle plantarflexion): Right: 4/5 Left: 4/5 (4+/5 adan )    Lumbar ROM    Flexion: wnl LOM (-) pain ( no pain + pressure in groin)   Extension: major LOM (+)pain ( status quo)   Side bend: Right: moderate LOM (+)pain  (status quo)     Left: moderate LOM (+)pain  (status quo)       Repeated motions:  Standing flexion:    Effect: NE  (inc pain in right groin, W)   Standing extension:   Effect: inc pain in right buttock and groin NW ( inc buttock and groin pull and ache, W)  Prone extension:    Effect level: prone lying produced pulling in low back (prior to prone lyin/10, AMALIA: pain inc W)    TA facilitation: poor ( fair )    Flexibility   Hamstring: Right: good  Left: good   Hip flexors:Right: poor Left: poor   (can achieve neutral)  (quad: poor adan, right stretch inc right groin pain)    Hip ROM: left hip wnl all motions and painfree  Right hip: flexion: 90/100 + in low back and buttock area ( 108 ache in low back and groin )  IR: 22 + pain (28 no pain)  ER: 42 + pain (40 no pain)  Abd: 20 deg (30 deg )    + grind test on right ( NT)    Assessment: Henry Sevilla demonstrates improvement since [facility]  With improved strength adan LE, reduced pain with strength testing, improved hip ROM and reduced gait deviations  Patient continues to have mild gait deviations which increase with increased pain level  Patient continues to have  Patients main complaint is groin pain and discussed with patient she may benefit form another assessment from pain management  Patient states these symptoms mimic those she experienced prior to her nerve cauterization in 2018  Recommend continuing with PT another 2-4 weeks to continue to strengthen patient, improve flexibility and function  Patient toa lso meet with primary and cuprogress     The goal status of Henry Sevilla is indicated above       Plan: continue 2 x week 2-4 weeks: HEP development, ROM flexibility, ROM, lumbar stab and strenghtening   1/5/22--> 2/5/22          Eval/ Re-eval Auth #/ Referral # Total visits Start date  Expiration date Total active units  Total manual units  PT only or  PT+OT?   12/1/21 AUTH RQD         12/6/21 [id number] 19 12/1/21 5/30/21 99                                           Precautions: HBP, spondylolisthesis, anxiety  Specialty Daily Treatment Diary       Insurance :         Visits: 6/33 7/33 8/33 9/33/21 10/33   Manual: 12/20/21 12/23/21 12/28/21 12/30/21 1/5/22   STM/Mi: hip adductors, piriformis performed performedhip adductors  MI hip adductors     Man Flexibility: adan hip flexors, hamstrings Performed  Performed  Performed Performed hip flex, hamstring Hip flexor: 10 sec x 5             PROM right hip        Belt distraction right hip         LLD adan  performed Performed  Performed Performed     Ther ex:        Protocol:         Rec bike/  nustep lv 1 7 min  lv 1 7min  7'  7 min NS: lv 3  lv 4 10 min                    Bent knee fall out  5 sec x 15  5 sec x 15  5s x15 Red 5 sec x 10   Red 2 x 10     Wilner test stretch Unable due to pain in right hip  manually Manual Manual  manual   Seated hamstring stretch -- ++      Banded hip flexion  Not performed marching in supine to 90 deg with ab bracing 10 x 2   marching in supine to 90 deg with ab bracing 10 x 2     sidestepping Unable to complete due to pain  --   Hold    clamshell Without resistance 2 x 10   Yellow 2 x 10   2x10 sidelying 5 sec x 10   --   Butterfly stretch  5 sec x 10   5 sec x 10   5s x10 5 sec x 10      standing hip abduction  --   -    Standing hip felxor stretch     10 sec x 5   10 sec x 5     Sit/stand to mat table     10 x 2  Form chair 10 x   AMALIA     5 sec x 10     Standing march     10 x 2    Neuro Torey:         TA activation (stab cuff)        + marching        +hip abd iso        +hip add iso        + alternating LE extension                glute sets  5 sec x 20  5s x20 5 sec x 20     bridging     10 x   Hip add iso  Pt deferred  Therapeutic Activity:         Reevaluation     performed           Gait Training:                 HEP:                 Modalities        MH        ice     Deferred    Total time:            Access Code: [email or web address]  URL: [email or web address]    Date: 12/06/2021  Prepared by:  Tania Bello    Exercises  Supine Hip Adduction Isometric with Ball - 2 x daily - 7 x weekly - 1 sets - 10 reps - 5 sec hold  Supine Gluteal Sets - 2 x daily - 7 x weekly - 1 sets - 10 reps - 5 sec hold  Supine Butterfly Groin Stretch - 1 x daily - 7 x weekly - 1 sets - 10 reps - 5 sec hold

## 2022-01-05 NOTE — PROGRESS NOTES
Daily Note/Progress Note      Today's date: 2022  Patient name: John Bets  : 1955  MRN: [id number]  Referring provider: Veronica Nunez MD  Dx:   Encounter Diagnosis     ICD-10-CM    1  Right sided sciatica  M54 31        Subjective: Pt reports % improvement since SOC: 30%  The last 70% is due to she continues to have pain, ache in right groin area and low back on right  Describes taking pain killers for general pain issues  Pain level at rest:  3 /10 with prolong sittin/10, pain level with ADLS:  0-, pain level at worst: 10/10 which occurred 2 weeks ago, was occurring daily and nightly  At this time, the functional limitations include: difficulty picking up items from floor, is slower to move upon rising  Objective: See treatment diary below    Goals  STG  + Patient will have pain level 3/10 lumbar spine  at rest (2-3 weeks) not met consistently  + Patient will report a 25% improvement in symptoms (2-3 weeks) met  + Patient will be independent in basic HEP (2-3 weeks) met   + Patient will demonstrate an increase in range of motion  Hip motions, AROM in all planes  to  within normal limits (2-3 weeks) stil progressing  +Patient will no longer demonstrate tenderness and tension in right hip adductors (2-3 weeks) met       LTG:  + Patient will have pain level 2/10 lumbar spine, right groin  with ADL's (4-6 weeks) not met   + Patient will report a 50% improvement in symptoms (4-6 weeks)(not met   + Patient will increase FOTO score by 10 points (8 sessions) (NT)  + Patient will be independent in comprehensive HEP (4-6 weeks) (Nt)  + Patient will demonstrate reduced gait deviations ambulating on level surfaces   (4-6 week) met   + Patient will demonstrate increase  MMT of  hip,  to  4/5 t/o  for maximum function   (4-6 weeks) met           Objective (21)    Static Posture     Comments  Posture   Standing: forward flexed at hips ( improved upright posture)    Gait: antalgic gait, reduced hip flexion, reduced step length and reduced stride length, ( gait worsens with inc pain   Entering today minimal deviations with mild reduced stance time on right)    Palpation: mild TTP in area of L4, exquisitely tender in area hip bursa and along IT band, hip adductors increased tenderness and tension  (NT)    Functional movements   Squat: able to lower 40% , unable to tap chair (-) pain (able to control lowering to chair, still unable to tap chair, no pain)   Bridging: able to lift 25%  (able to lift 75%)  Transfers sit/stand: needs UE support to achieve standing position ( no longer needs UE assist to get out of chair)    Dermatomes wnl to light touch adan LE     Myotomes   L2 (hip flexion): Right 3/5  Left: 3+/5 (4-/5 adan no pain)   L3 (knee extension): Right: 4/5 + pain Left: 4/5 ( 4+/5 no pain)   Hamstring: Right: 4-/5 left 4/5 ( 4+/5 no pain)   L4 (ankle dorsiflexion): Right: 3+/5 Left: 4/5 (Right: 4-/5)   L5 (hallux extension): Right: 4/5  Left: 4/5 (4/5 adan )   S1 (ankle plantarflexion): Right: 4/5 Left: 4/5 (4+/5 adan )    Lumbar ROM    Flexion: wnl LOM (-) pain ( no pain + pressure in groin)   Extension: major LOM (+)pain ( status quo)   Side bend: Right: moderate LOM (+)pain  (status quo)     Left: moderate LOM (+)pain  (status quo)       Repeated motions:  Standing flexion:    Effect: NE  (inc pain in right groin, W)   Standing extension:   Effect: inc pain in right buttock and groin NW ( inc buttock and groin pull and ache, W)  Prone extension:    Effect level: prone lying produced pulling in low back (prior to prone lyin/10, AMALIA: pain inc W)    TA facilitation: poor ( fair )    Flexibility   Hamstring: Right: good  Left: good   Hip flexors:Right: poor Left: poor   (can achieve neutral)  (quad: poor adan, right stretch inc right groin pain)    Hip ROM: left hip wnl all motions and painfree  Right hip: flexion: 90/100 + in low back and buttock area ( 108 ache in low back and groin )  IR: 22 + pain (28 no pain)  ER: 42 + pain (40 no pain)  Abd: 20 deg (30 deg )    + grind test on right ( NT)    Assessment: Aniya Garcia demonstrates improvement since [facility]  With improved strength adan LE, reduced pain with strength testing, improved hip ROM and reduced gait deviations  Patient continues to have mild gait deviations which increase with increased pain level  Patient continues to have  Patients main complaint is groin pain and discussed with patient she may benefit form another assessment from pain management  Patient states these symptoms mimic those she experienced prior to her nerve cauterization in 2018  Recommend continuing with PT another 2-4 weeks to continue to strengthen patient, improve flexibility and function  Patient toa lso meet with primary and cuprogress     The goal status of Aniya Garcia is indicated above   Plan: continue 2 x week 2-4 weeks: HEP development, ROM flexibility, ROM, lumbar stab and strenghtening   1/5/22--> 2/5/22          Eval/ Re-eval Auth #/ Referral # Total visits Start date  Expiration date Total active units  Total manual units  PT only or  PT+OT?   12/1/21 AUTH RQD         12/6/21 [id number] 07 12/1/21 5/30/21 99                                           Precautions: HBP, spondylolisthesis, anxiety  Specialty Daily Treatment Diary       Insurance :         Visits: 6/33 7/33 8/33 9/33/21 10/33   Manual: 12/20/21 12/23/21 12/28/21 12/30/21 1/5/22   STM/Mi: hip adductors, piriformis performed performedhip adductors  MI hip adductors     Man Flexibility: adan hip flexors, hamstrings Performed  Performed  Performed Performed hip flex, hamstring Hip flexor: 10 sec x 5             PROM right hip        Belt distraction right hip         LLD adan  performed Performed  Performed Performed     Ther ex:        Protocol:         Rec bike/  nustep lv 1 7 min  lv 1 7min  7'  7 min NS: lv 3  lv 4 10 min                    Bent knee fall out  5 sec x 15  5 sec x 15  5s x15 Red 5 sec x 10   Red 2 x 10     Wilner test stretch Unable due to pain in right hip  manually Manual Manual  manual   Seated hamstring stretch -- ++      Banded hip flexion  Not performed marching in supine to 90 deg with ab bracing 10 x 2   marching in supine to 90 deg with ab bracing 10 x 2     sidestepping Unable to complete due to pain  --   Hold    clamshell Without resistance 2 x 10   Yellow 2 x 10   2x10 sidelying 5 sec x 10   --   Butterfly stretch  5 sec x 10   5 sec x 10   5s x10 5 sec x 10      standing hip abduction  --   -    Standing hip felxor stretch     10 sec x 5   10 sec x 5     Sit/stand to mat table     10 x 2  Form chair 10 x   AMALIA     5 sec x 10     Standing march     10 x 2    Neuro Torey:         TA activation (stab cuff)        + marching        +hip abd iso        +hip add iso        + alternating LE extension                glute sets  5 sec x 20  5s x20 5 sec x 20     bridging     10 x   Hip add iso  Pt deferred  Therapeutic Activity:         Reevaluation     performed           Gait Training:                 HEP:                 Modalities        MH        ice     Deferred    Total time:            Access Code: [email or web address]  URL: [email or web address]    Date: 12/06/2021  Prepared by:  Alfredo Copping    Exercises  Supine Hip Adduction Isometric with Ball - 2 x daily - 7 x weekly - 1 sets - 10 reps - 5 sec hold  Supine Gluteal Sets - 2 x daily - 7 x weekly - 1 sets - 10 reps - 5 sec hold  Supine Butterfly Groin Stretch - 1 x daily - 7 x weekly - 1 sets - 10 reps - 5 sec hold

## 2022-01-07 ENCOUNTER — APPOINTMENT (OUTPATIENT)
Dept: PHYSICAL THERAPY | Facility: CLINIC | Age: 67
End: 2022-01-07
Payer: COMMERCIAL

## 2022-01-07 ENCOUNTER — OFFICE VISIT (OUTPATIENT)
Dept: PHYSICAL THERAPY | Facility: CLINIC | Age: 67
End: 2022-01-07
Payer: COMMERCIAL

## 2022-01-07 DIAGNOSIS — M54.31 RIGHT SIDED SCIATICA: Primary | ICD-10-CM

## 2022-01-07 PROCEDURE — 97110 THERAPEUTIC EXERCISES: CPT

## 2022-01-07 PROCEDURE — 97530 THERAPEUTIC ACTIVITIES: CPT

## 2022-01-07 PROCEDURE — 97140 MANUAL THERAPY 1/> REGIONS: CPT

## 2022-01-07 NOTE — PROGRESS NOTES
Daily Note         Today's date: 2022  Patient name: Ajith Patten  : 1955  MRN: [id number]  Referring provider: Mary Hannah MD  Dx:   Encounter Diagnosis     ICD-10-CM    1  Right sided sciatica  M54 31        Subjective: Ajith Patten reports pain level entering today is 4/10  States she has not had real bad pain in 2 weeks  States she took pain meds this am due to having to shovel  Objective: See treatment diary below    Assessment:  progressed ther ex today is standing  described a "nagging" pain with hip abduction which reduced after completion of exercises  Patient was also able to progress glute strengthening as indicated by tolerating banded bridges and progressed clamshells to next band  Patient was issued updated Hep in written form and appropriate band if needed  Patient demonstrated independence with these exercises        Plan: continue 2-3 weeks until patient sees MD Hopson/ Re-eval Auth #/ Referral # Total visits Start date  Expiration date Total active units  Total manual units  PT only or  PT+OT?   21 AUTH RQD         21 [id number] 21 99                                           Precautions: HBP, spondylolisthesis, anxiety  Specialty Daily Treatment Diary       Insurance :         Visits: 7/33 8/33 9/33/21 10/33 11/33   Manual: 21   STM/Mi: hip adductors, piriformis performedhip adductors  MI hip adductors   Mi of hip adductors, hip felxor   Man Flexibility: adan hip flexors, hamstrings Performed  Performed Performed hip flex, hamstring Hip flexor: 10 sec x 5   Hip flexor: 10 sec x 5             PROM right hip        Belt distraction right hip         LLD adan  Performed  Performed Performed   performed   Ther ex:        Protocol:         Rec bike/  nustep lv 1 7min  7'  7 min NS: lv 3  lv 4 10 min  NS: 10 min                    Bent knee fall out  5 sec x 15  5s x15 Red 5 sec x 10   Red 2 x 10   Red 2 x 10     Wilner test stretch manually Manual Manual  manual manual   Seated hamstring stretch ++       Banded hip flexion  marching in supine to 90 deg with ab bracing 10 x 2   marching in supine to 90 deg with ab bracing 10 x 2   marching in supine to 90 deg with ab bracing 10 x 2    sidestepping --   Hold     clamshell Yellow 2 x 10   2x10 sidelying 5 sec x 10   -- sidelying red 5 sec x 10  X 2 rounds    Butterfly stretch  5 sec x 10   5s x10 5 sec x 10    5 sec x 10     standing hip abduction    -     Standing hip felxor stretch    10 sec x 5   10 sec x 5   rev   Sit/stand to mat table    10 x 2  Form chair 10 x    AMALIA    5 sec x 10      Standing march    10 x 2  10 x 2   Standing hip abd      10x 2   Step ups     4 in : 2 x 10             Neuro Torey:         TA activation (stab cuff)        + marching        +hip abd iso        +hip add iso        + alternating LE extension                glute sets 5 sec x 20  5s x20 5 sec x 20   D/c    bridging    10 x Red band to facilitate glutes: 2 x 10     Hip add iso Pt deferred  Therapeutic Activity:         Reevaluation    performed            Gait Training:                 HEP:      upgraded           Modalities        MH        ice    Deferred     Total time:            Access Code: [email or web address]  URL: [email or web address]    Date: 12/06/2021  Prepared by: Ilya Frederick    Exercises  Supine Hip Adduction Isometric with Ball - 2 x daily - 7 x weekly - 1 sets - 10 reps - 5 sec hold  Supine Gluteal Sets - 2 x daily - 7 x weekly - 1 sets - 10 reps - 5 sec hold  Supine Butterfly Groin Stretch - 1 x daily - 7 x weekly - 1 sets - 10 reps - 5 sec hold       Access Code: [email or web address]  URL: [email or web address]  Date: 01/07/2022  Prepared by:  Ilya Frederick    Exercises  · Standing March with Counter Support - 1 x daily - 7 x weekly - 2 sets - 10 reps  · Standing Hip Abduction with Counter Support - 1 x daily - 7 x weekly - 2 sets - 10 reps  · Clamshell with Resistance - 1 x daily - 7 x weekly - 1 sets - 10 reps - 5 sec hold  · Supine Bridge with Resistance Band - 1 x daily - 7 x weekly - 2 sets - 10 reps  · Modified Wilner Stretch - 1 x daily - 7 x weekly - 1 sets - 10 reps - 5 sec hold

## 2022-01-11 ENCOUNTER — OFFICE VISIT (OUTPATIENT)
Dept: PHYSICAL THERAPY | Facility: CLINIC | Age: 67
End: 2022-01-11
Payer: COMMERCIAL

## 2022-01-11 DIAGNOSIS — M54.31 RIGHT SIDED SCIATICA: Primary | ICD-10-CM

## 2022-01-11 PROCEDURE — 97110 THERAPEUTIC EXERCISES: CPT

## 2022-01-11 PROCEDURE — 97112 NEUROMUSCULAR REEDUCATION: CPT

## 2022-01-11 NOTE — PROGRESS NOTES
Daily Note         Today's date: 2022  Patient name: Geni De Los Santos  : 1955  MRN: [id number]  Referring provider: Daron Hdez MD  Dx:   Encounter Diagnosis     ICD-10-CM    1  Right sided sciatica  M54 31        Subjective: Geni De Los Santos reports pain level entering today is 2/10  States overall she feel better  States she has mild groin pain on right  Objective: See treatment diary below    Assessment:  patient needed cuing for proper form with clamshells  to avoid trunk rotaiton  was able to self correct with cuing    Patient was able to progress to 6 in step for step ups and completed there ex stating she "feels good"  Plan: progress with LE strengthening focus on hips and stretching hip flexor  review hip flexor stretch in standing next session             Eval/ Re-eval Auth #/ Referral # Total visits Start date  Expiration date Total active units  Total manual units  PT only or  PT+OT?   21 AUTH RQD         21 [id number] 21 99                                           Precautions: HBP, spondylolisthesis, anxiety  Specialty Daily Treatment Diary       Insurance :        Visits: 9/33/21 10/33 11/33 12/33   Manual: 21   STM/Mi: hip adductors, piriformis MI hip adductors   Mi of hip adductors, hip felxor --   Man Flexibility: adan hip flexors, hamstrings Performed hip flex, hamstring Hip flexor: 10 sec x 5   Hip flexor: 10 sec x 5   10 sec x 5            PROM right hip       Belt distraction right hip        LLD adan  Performed   performed --   Ther ex:       Protocol:        Rec bike/  nustep 7 min NS: lv 3  lv 4 10 min  NS: 10 min  NS: 5-7 min lv 3                  Bent knee fall out  Red 5 sec x 10   Red 2 x 10   Red 2 x 10   Red: 15 x 2 adan    Wilner test stretch Manual  manual manual --   Seated hamstring stretch       Banded hip flexion  marching in supine to 90 deg with ab bracing 10 x 2   marching in supine to 90 deg with ab bracing 10 x 2  marching in supine to 90 deg with ab bracing 10 x 2   Advance to knee extenison next session   clamshell sidelying 5 sec x 10   -- sidelying red 5 sec x 10  X 2 rounds  Green 5 sec x 10    Butterfly stretch  5 sec x 10    5 sec x 10   5 sec x 10     standing hip abduction  -      Standing hip felxor stretch  10 sec x 5   10 sec x 5   rev Rev next session   Sit/stand to mat table  10 x 2  Form chair 10 x     AMALIA  5 sec x 10    --   Standing march  10 x 2  10 x 2 2 x 10     Standing hip abd    10x 2 2 x 10     Step ups   4 in : 2 x 10   6 in 2 x 10  adan    Supported squats       Neuro Torey:        TA activation (stab cuff)       + marching       +hip abd iso       +hip add iso       + alternating LE extension              glute sets 5 sec x 20   D/c     bridging  10 x Red band to facilitate glutes: 2 x 10   Red band to facilitate glutes: 2 x 10   Hip add iso                     Therapeutic Activity:        Reevaluation  performed            Gait Training:               HEP:    upgraded           Modalities       MH       ice  Deferred      Total time:           Access Code: [email or web address]  URL: [email or web address]    Date: 12/06/2021  Prepared by: Ermalinda Sinclair    Exercises  Supine Hip Adduction Isometric with Ball - 2 x daily - 7 x weekly - 1 sets - 10 reps - 5 sec hold  Supine Gluteal Sets - 2 x daily - 7 x weekly - 1 sets - 10 reps - 5 sec hold  Supine Butterfly Groin Stretch - 1 x daily - 7 x weekly - 1 sets - 10 reps - 5 sec hold       Access Code: [email or web address]  URL: [email or web address]  Date: 01/07/2022  Prepared by:  Ermalinda Sinclair    Exercises  · Standing March with Counter Support - 1 x daily - 7 x weekly - 2 sets - 10 reps  · Standing Hip Abduction with Counter Support - 1 x daily - 7 x weekly - 2 sets - 10 reps  · Clamshell with Resistance - 1 x daily - 7 x weekly - 1 sets - 10 reps - 5 sec hold  · Supine Bridge with Resistance Band - 1 x daily - 7 x weekly - 2 sets - 10 reps  · Modified Wilner Stretch - 1 x daily - 7 x weekly - 1 sets - 10 reps - 5 sec hold

## 2022-01-13 ENCOUNTER — OFFICE VISIT (OUTPATIENT)
Dept: PHYSICAL THERAPY | Facility: CLINIC | Age: 67
End: 2022-01-13
Payer: COMMERCIAL

## 2022-01-13 DIAGNOSIS — M54.31 RIGHT SIDED SCIATICA: Primary | ICD-10-CM

## 2022-01-13 PROCEDURE — 97112 NEUROMUSCULAR REEDUCATION: CPT | Performed by: PHYSICAL THERAPIST

## 2022-01-13 PROCEDURE — 97110 THERAPEUTIC EXERCISES: CPT | Performed by: PHYSICAL THERAPIST

## 2022-01-13 NOTE — PROGRESS NOTES
Daily Note     Today's date: 2022  Patient name: Zak Hartmann  : 1955  MRN: [id number]  Referring provider: Melissa Morgan MD  Dx:   Encounter Diagnosis     ICD-10-CM    1  Right sided sciatica  M54 31        Start Time: 845  Stop Time: 945  Total time in clinic (min): 60 minutes    Subjective: "It was a little naggy yesterday but today's it's not too bad " Lying in bed can cause a nagging ache she reports "but it's absolutely no where near as bad as when I started here" and this motivates her to get OOB  4/10 at worst        Objective: See treatment diary below  Inc time today spent on manual stretching and education for HEP update  Assessment: Tolerated treatment well  Patient demo ongoing need for vc's thorughout therex however is very pleased with progress and is identifying she may DC soon and be I with self manageent  Plan: Continue per plan of care          Eval/ Re-eval Auth #/ Referral # Total visits Start date  Expiration date Total active units  Total manual units  PT only or  PT+OT?   21 AUTH RQD         21 [id number] 21 99                                           Precautions: HBP, spondylolisthesis, anxiety  Specialty Daily Treatment Diary       Insurance :         Visits: 9/33/21 10/33 11/33 12/33 13/33   Manual: 21   STM/Mi: hip adductors, piriformis MI hip adductors   Mi of hip adductors, hip felxor --    Man Flexibility: adan hip flexors, hamstrings Performed hip flex, hamstring Hip flexor: 10 sec x 5   Hip flexor: 10 sec x 5   10 sec x 5   10s x 5           PROM right hip        Belt distraction right hip         LLD adan  Performed   performed --    Ther ex:        Protocol:         Rec bike/  nustep 7 min NS: lv 3  lv 4 10 min  NS: 10 min  NS: 5-7 min lv 3  L3-4 9'                   Bent knee fall out  Red 5 sec x 10   Red 2 x 10   Red 2 x 10   Red: 15 x 2 adan  Green: 15 x 2 ea   Wilner test stretch Manual manual manual --    Seated hamstring stretch        Banded hip flexion  marching in supine to 90 deg with ab bracing 10 x 2   marching in supine to 90 deg with ab bracing 10 x 2  marching in supine to 90 deg with ab bracing 10 x 2   Advance to knee extenison next session marching in supine to 90 deg with ab bracing 10 x 2   Advanced to knee extenison   clamshell sidelying 5 sec x 10   -- sidelying red 5 sec x 10  X 2 rounds  Green 5 sec x 10  Green 2x10   Butterfly stretch  5 sec x 10    5 sec x 10   5 sec x 10   5s x10   standing hip abduction  -       Standing hip felxor stretch  10 sec x 5   10 sec x 5   rev Rev next session Edu pt, 5x10s   Sit/stand to mat table  10 x 2  Form chair 10 x      AMALIA  5 sec x 10    --    Standing march  10 x 2  10 x 2 2 x 10   2x10   Standing hip abd    10x 2 2 x 10   2x10   Step ups   4 in : 2 x 10   6 in 2 x 10  adan     Supported squats        Neuro Torey:         TA activation (stab cuff)        + marching        +hip abd iso        +hip add iso        + alternating LE extension                glute sets 5 sec x 20   D/c      bridging  10 x Red band to facilitate glutes: 2 x 10   Red band to facilitate glutes: 2 x 10    Hip add iso                        Therapeutic Activity:         Reevaluation  performed              Gait Training:                 HEP:    upgraded  Updated           Modalities        MH        ice  Deferred       Total time:            Access Code: [email or web address]  URL: [email or web address]    Date: 12/06/2021  Prepared by: Alfredo Copping    Exercises  Supine Hip Adduction Isometric with Ball - 2 x daily - 7 x weekly - 1 sets - 10 reps - 5 sec hold  Supine Gluteal Sets - 2 x daily - 7 x weekly - 1 sets - 10 reps - 5 sec hold  Supine Butterfly Groin Stretch - 1 x daily - 7 x weekly - 1 sets - 10 reps - 5 sec hold       Access Code: [email or web address]  URL: [email or web address]  Date: 01/07/2022  Prepared by:  Alfredo Copping    Exercises  · Standing March with Counter Support - 1 x daily - 7 x weekly - 2 sets - 10 reps  · Standing Hip Abduction with Counter Support - 1 x daily - 7 x weekly - 2 sets - 10 reps  · Clamshell with Resistance - 1 x daily - 7 x weekly - 1 sets - 10 reps - 5 sec hold  · Supine Bridge with Resistance Band - 1 x daily - 7 x weekly - 2 sets - 10 reps  · Modified Wilner Stretch - 1 x daily - 7 x weekly - 1 sets - 10 reps - 5 sec hold

## 2022-01-18 ENCOUNTER — OFFICE VISIT (OUTPATIENT)
Dept: PHYSICAL THERAPY | Facility: CLINIC | Age: 67
End: 2022-01-18
Payer: COMMERCIAL

## 2022-01-18 DIAGNOSIS — M54.31 RIGHT SIDED SCIATICA: Primary | ICD-10-CM

## 2022-01-18 PROCEDURE — 97112 NEUROMUSCULAR REEDUCATION: CPT | Performed by: PHYSICAL THERAPIST

## 2022-01-18 PROCEDURE — 97110 THERAPEUTIC EXERCISES: CPT | Performed by: PHYSICAL THERAPIST

## 2022-01-18 NOTE — PROGRESS NOTES
Daily Note     Today's date: 2022  Patient name: Boston Bowers  : 1955  MRN: [id number]  Referring provider: Idania Villarreal MD  Dx:   Encounter Diagnosis     ICD-10-CM    1  Right sided sciatica  M54 31                   Subjective: Pt reports she has been having discomfort, she did not leave therapy after last session  She is overall reporting she continues to improve and is "much" better than she had been at start of therapy  Pt reports pain 4/10 in groin lately  She is more functional in her home she reports  Objective: See treatment diary below      Assessment: Tolerated treatment well  Patient demo ongoing gait deficits however overall pt's ability to progress thorugh care plan is good  Plan: Continue per plan of care          Eval/ Re-eval Auth #/ Referral # Total visits Start date  Expiration date Total active units  Total manual units  PT only or  PT+OT?   21 AUTH RQD         21 [id number] 21 99                                           Precautions: HBP, spondylolisthesis, anxiety  Specialty Daily Treatment Diary       Insurance :         Visits: 10/33 11/33 12/33 13/33 14/33   Manual: 22   STM/Mi: hip adductors, piriformis  Mi of hip adductors, hip felxor --     Man Flexibility: adan hip flexors, hamstrings Hip flexor: 10 sec x 5   Hip flexor: 10 sec x 5   10 sec x 5   10s x 5            PROM right hip        Belt distraction right hip         LLD adan   performed --     Ther ex:        Protocol:         Rec bike/  nustep lv 4 10 min  NS: 10 min  NS: 5-7 min lv 3  L3-4 9' L3 10'                   Bent knee fall out  Red 2 x 10   Red 2 x 10   Red: 15 x 2 adan  Green: 15 x 2 ea Green 15x 2    Wilner test stretch manual manual --     Seated hamstring stretch        Banded hip flexion   marching in supine to 90 deg with ab bracing 10 x 2  marching in supine to 90 deg with ab bracing 10 x 2   Advance to knee extenison next session marching in supine to 90 deg with ab bracing 10 x 2   Advanced to knee extenison marching in supine to 90 deg with ab bracing 10 x 2   Advanced to knee extenison   clamshell -- sidelying red 5 sec x 10  X 2 rounds  Green 5 sec x 10  Green 2x10 x10   Butterfly stretch   5 sec x 10   5 sec x 10   5s x10 5s x10   standing hip abduction         Standing hip felxor stretch  10 sec x 5   rev Rev next session Edu pt, 5x10s 5x10s   Sit/stand to mat table  Form chair 10 x       AMALIA 5 sec x 10    --     Standing march 10 x 2  10 x 2 2 x 10   2x10 2x10   Standing hip abd   10x 2 2 x 10   2x10 2x10   Step ups  4 in : 2 x 10   6 in 2 x 10  adan   2x10   Supported squats        Neuro Torey:         TA activation (stab cuff)        + marching        +hip abd iso        +hip add iso        + alternating LE extension                glute sets  D/c       bridging 10 x Red band to facilitate glutes: 2 x 10   Red band to facilitate glutes: 2 x 10  2x10   Hip add iso                        Therapeutic Activity:         Reevaluation performed               Gait Training:                 HEP:   upgraded  Updated            Modalities        MH        ice Deferred        Total time:            Access Code: [email or web address]  URL: [email or web address]    Date: 12/06/2021  Prepared by: Thang Kulkarnitist    Exercises  Supine Hip Adduction Isometric with Ball - 2 x daily - 7 x weekly - 1 sets - 10 reps - 5 sec hold  Supine Gluteal Sets - 2 x daily - 7 x weekly - 1 sets - 10 reps - 5 sec hold  Supine Butterfly Groin Stretch - 1 x daily - 7 x weekly - 1 sets - 10 reps - 5 sec hold       Access Code: [email or web address]  URL: [email or web address]  Date: 01/07/2022  Prepared by:  Thang Quaker    Exercises  · Standing March with Counter Support - 1 x daily - 7 x weekly - 2 sets - 10 reps  · Standing Hip Abduction with Counter Support - 1 x daily - 7 x weekly - 2 sets - 10 reps  · Clamshell with Resistance - 1 x daily - 7 x weekly - 1 sets - 10 reps - 5 sec hold  · Supine Bridge with Resistance Band - 1 x daily - 7 x weekly - 2 sets - 10 reps  · Modified Wilner Stretch - 1 x daily - 7 x weekly - 1 sets - 10 reps - 5 sec hold

## 2022-01-20 ENCOUNTER — APPOINTMENT (OUTPATIENT)
Dept: PHYSICAL THERAPY | Facility: CLINIC | Age: 67
End: 2022-01-20
Payer: COMMERCIAL

## 2022-01-25 ENCOUNTER — OFFICE VISIT (OUTPATIENT)
Dept: PHYSICAL THERAPY | Facility: CLINIC | Age: 67
End: 2022-01-25
Payer: COMMERCIAL

## 2022-01-25 DIAGNOSIS — M54.31 RIGHT SIDED SCIATICA: Primary | ICD-10-CM

## 2022-01-25 PROCEDURE — 97110 THERAPEUTIC EXERCISES: CPT

## 2022-01-25 NOTE — PROGRESS NOTES
Daily Note         Today's date: 2022  Patient name: Gabriel Mantilla  : 1955  MRN: [id number]  Referring provider: Baldomero Ernandez MD  Dx:   Encounter Diagnosis     ICD-10-CM    1  Right sided sciatica  M54 31        Subjective: Gabriel Mantilla reports pain level entering today is a 1/10  Objective: See treatment diary below    Assessment:  patient entered today with minimal to no pain  patient only needed mimimal cuing for proper form with all excercises  discussed pLOc and patient/therpist agreed possible transition to HEP next session due to her progress  Attempted to progress with sidestepping but patient deferred due to fear of return of pain  Plan: Potential discharge next visit            Eval/ Re-eval Auth #/ Referral # Total visits Start date  Expiration date Total active units  Total manual units  PT only or  PT+OT?   21 AUTH RQD         21 [id number] 21 99                                           Precautions: HBP, spondylolisthesis, anxiety  Specialty Daily Treatment Diary       Insurance :         Visits: 11/33 12/33 13/33 14/33 15/33   Manual: 22   STM/Mi: hip adductors, piriformis Mi of hip adductors, hip felxor --   --   Man Flexibility: adan hip flexors, hamstrings Hip flexor: 10 sec x 5   10 sec x 5   10s x 5  --           LLD adan  performed --      Ther ex:        Protocol:         Rec bike/  nustep NS: 10 min  NS: 5-7 min lv 3  L3-4 9' L3 10' 10 min lv 3                    Bent knee fall out  Red 2 x 10   Red: 15 x 2 adan  Green: 15 x 2 ea Green 15x 2  Green 15 x 2    Winler test stretch manual --      +Banded hip flexion  marching in supine to 90 deg with ab bracing 10 x 2  marching in supine to 90 deg with ab bracing 10 x 2   Advance to knee extenison next session marching in supine to 90 deg with ab bracing 10 x 2   Advanced to knee extenison marching in supine to 90 deg with ab bracing 10 x 2 Advanced to knee extenison Pelvic tilt + Knee extension: 10 x 2    clamshell sidelying red 5 sec x 10  X 2 rounds  Green 5 sec x 10  Green 2x10 x10 Green: 2 x 10     Butterfly stretch  5 sec x 10   5 sec x 10   5s x10 5s x10 D/c    Standing hip felxor stretch  rev Rev next session Edu pt, 5x10s 5x10s 10 sec x 5     Standing march 10 x 2 2 x 10   2x10 2x10 15 x 2    Standing hip abd  10x 2 2 x 10   2x10 2x10 15 x 2    Step ups 4 in : 2 x 10   6 in 2 x 10  adan   2x10 6 in: 2 x 10  adan    Supported squats     Mini squats: 10 x 2    Neuro Torey:         glute sets D/c        bridging Red band to facilitate glutes: 2 x 10   Red band to facilitate glutes: 2 x 10  2x10 2 x 15: red band    Hip add iso                        Therapeutic Activity:         Reevaluation                Gait Training:                 HEP:  upgraded  Updated             Modalities        MH        ice        Total time:            Access Code: [email or web address]  URL: [email or web address]    Date: 12/06/2021  Prepared by: Toshia Lopez    Exercises  Supine Hip Adduction Isometric with Ball - 2 x daily - 7 x weekly - 1 sets - 10 reps - 5 sec hold  Supine Gluteal Sets - 2 x daily - 7 x weekly - 1 sets - 10 reps - 5 sec hold  Supine Butterfly Groin Stretch - 1 x daily - 7 x weekly - 1 sets - 10 reps - 5 sec hold       Access Code: [email or web address]  URL: [email or web address]  Date: 01/07/2022  Prepared by:  Toshia Gaster    Exercises  · Standing March with Counter Support - 1 x daily - 7 x weekly - 2 sets - 10 reps  · Standing Hip Abduction with Counter Support - 1 x daily - 7 x weekly - 2 sets - 10 reps  · Clamshell with Resistance - 1 x daily - 7 x weekly - 1 sets - 10 reps - 5 sec hold  · Supine Bridge with Resistance Band - 1 x daily - 7 x weekly - 2 sets - 10 reps  · Modified Wilner Stretch - 1 x daily - 7 x weekly - 1 sets - 10 reps - 5 sec hold

## 2022-01-26 ENCOUNTER — HOSPITAL ENCOUNTER (OUTPATIENT)
Dept: RADIOLOGY | Facility: HOSPITAL | Age: 67
Discharge: HOME | End: 2022-01-26
Attending: INTERNAL MEDICINE
Payer: COMMERCIAL

## 2022-01-26 VITALS — BODY MASS INDEX: 43.41 KG/M2 | HEIGHT: 63 IN | WEIGHT: 245 LBS

## 2022-01-26 DIAGNOSIS — Z12.31 ENCOUNTER FOR SCREENING MAMMOGRAM FOR MALIGNANT NEOPLASM OF BREAST: ICD-10-CM

## 2022-01-26 PROCEDURE — 77063 BREAST TOMOSYNTHESIS BI: CPT

## 2022-01-26 PROCEDURE — 77067 SCR MAMMO BI INCL CAD: CPT

## 2022-01-27 ENCOUNTER — OFFICE VISIT (OUTPATIENT)
Dept: PHYSICAL THERAPY | Facility: CLINIC | Age: 67
End: 2022-01-27
Payer: COMMERCIAL

## 2022-01-27 DIAGNOSIS — M54.31 RIGHT SIDED SCIATICA: Primary | ICD-10-CM

## 2022-01-27 PROCEDURE — 97110 THERAPEUTIC EXERCISES: CPT

## 2022-01-27 PROCEDURE — 97530 THERAPEUTIC ACTIVITIES: CPT

## 2022-01-27 NOTE — PROGRESS NOTES
Daily Note/Progress Note      Today's date: 2022  Patient name: Ana Chester  : 1955  MRN: [id number]  Referring provider: Valdemar De Guzman MD  Dx:   Encounter Diagnosis     ICD-10-CM    1  Right sided sciatica  M54 31        Subjective: Pt reports % improvement since SOC: 85%  The last 15% is due to dull ache in right groin and lower right glute Pain level at rest:  0 /10, pain level with ADLS:  2 /, pain level at worst: 4/10 which occurs couple times a day, resolves "quickly"  At this time, the functional limitations include: difficulty with taking initial steps after prolong sitting, no loner uses the walker  Objective: See treatment diary below    Goals  STG  + Patient will have pain level 3/10 lumbar spine  at rest (2-3 weeks) not met consistently (met)  + Patient will report a 25% improvement in symptoms (2-3 weeks) met  + Patient will be independent in basic HEP (2-3 weeks) met   + Patient will demonstrate an increase in range of motion  Hip motions, AROM in all planes  to  within normal limits (2-3 weeks) stil progressing ( met)  +Patient will no longer demonstrate tenderness and tension in right hip adductors (2-3 weeks) met        LTG:  + Patient will have pain level 2/10 lumbar spine, right groin  with ADL's (4-6 weeks) not met (met)  + Patient will report a 50% improvement in symptoms (4-6 weeks)(not met (met)  + Patient will increase FOTO score by 10 points (8 sessions) (NT) ( met )  + Patient will be independent in comprehensive HEP (4-6 weeks) (Nt) (met)  + Patient will demonstrate reduced gait deviations ambulating on level surfaces   (4-6 week) met   + Patient will demonstrate increase  MMT of  hip,  to  4/5 t/o  for maximum function   (4-6 weeks) met           Objective (22) (22)    Static Posture     Comments  Posture   Standing: forward flexed at hips ( improved upright posture)    Gait: antalgic gait, reduced hip flexion, reduced step length and reduced stride length, ( gait worsens with inc pain  Entering today minimal deviations with mild reduced stance time on right) ( no assistive device with ambulation, reduced step length, no other deviations observed    deviations progress with an increase in pain)    Palpation: mild TTP in area of L4, exquisitely tender in area hip bursa and along IT band, hip adductors increased tenderness and tension  (NT)    Functional movements   Squat: able to lower 40% , unable to tap chair (-) pain (able to control lowering to chair, still unable to tap chair, no pain)   Bridging: able to lift 25%  (able to lift 75%)  Transfers sit/stand: needs UE support to achieve standing position ( no longer needs UE assist to get out of chair)    Dermatomes wnl to light touch adan LE     Myotomes   L2 (hip flexion): Right 3/5  Left: 3+/5 (4-/5 adan no pain)(4/5 adan)   L3 (knee extension): Right: 4/5 + pain Left: 4/5 ( 4+/5 no pain)   Hamstring: Right: 4-/5 left 4/5 ( 4+/5 no pain)   L4 (ankle dorsiflexion): Right: 3+/5 Left: 4/5 (Right: 4-/5) (status quo)    L5 (hallux extension): Right: 4/5  Left: 4/5 (4/5 adan )   S1 (ankle plantarflexion): Right: 4/5 Left: 4/5 (4+/5 adan )    Lumbar ROM    Flexion: wnl LOM (-) pain ( no pain + pressure in groin)   Extension: major LOM (+)pain ( status quo) ( Mod LOm: + pressure in groin)    Side bend: Right: moderate LOM (+)pain  (status quo) ( mod LOM)    Left: moderate LOM (+)pain  (status quo) ( min LOM)      Repeated motions: (22)  Standing flexion:    Effect: NE  (inc pain in right groin, W) ( wnl no pain)   Standing extension:   Effect: inc pain in right buttock and groin NW ( inc buttock and groin pull and ache, W) ( mod LOM: + pressure in groin)  Prone extension:    Effect level: prone lying produced pulling in low back (prior to prone lyin/10, AMALIA: pain inc W)(NT)    TA facilitation: poor ( fair )(status quo)     Flexibility   Hamstring: Right: good  Left: good   Hip flexors:Right: poor Left: poor (can achieve neutral)(status quo)   (quad: poor adan, right stretch inc right groin pain)(status quo)     Hip ROM: left hip wnl all motions and painfree  Right hip: flexion: 90/100 + in low back and buttock area ( 108 ache in low back and groin )(105/115: no pain )  IR: 22 + pain (28 no pain)(23 no pain)  ER: 42 + pain (40 no pain)(55 no pain)   Abd: 20 deg (30 deg )(28 no pain     + grind test on right ( NT)        Assessment: Veronica Colorado demonstrates significant improvement in symptoms since [facility]  She describes 85% improvement, has improved foto score to expected score and tolerates a comprehensive strengthening and stretching program which she demonstrates independence in   At this time, patient no longer requires skilled PT  She will continue to benefit from her comprehensive HEP  The goal status of Veronica Colorado is indicated above  Plan: d/c to HEP, patient to follow up with pain management prn            Eval/ Re-eval Auth #/ Referral # Total visits Start date  Expiration date Total active units  Total manual units  PT only or  PT+OT?   12/1/21 AUTH RQD         12/6/21 [id number] 30 12/1/21 5/30/21 99                                           Precautions: HBP, spondylolisthesis, anxiety  Specialty Daily Treatment Diary       Insurance :      D/C   Visits: 12/33 13/33 14/33 15/33 16/33   Manual: 1/11/22 1/13/22 1/18/22 1/25/22 1/27/22   STM/Mi: hip adductors, piriformis --   --    Man Flexibility: adan hip flexors, hamstrings 10 sec x 5   10s x 5  --            LLD adan  --       Ther ex:        Protocol:         Rec bike/  nustep NS: 5-7 min lv 3  L3-4 9' L3 10' 10 min lv 3  10 min lv 4                   Bent knee fall out  Red: 15 x 2 adan  Green: 15 x 2 ea Green 15x 2  Green 15 x 2  15 x 2    Wilner test stretch --       +Banded hip flexion  marching in supine to 90 deg with ab bracing 10 x 2   Advance to knee extenison next session marching in supine to 90 deg with ab bracing 10 x 2   Advanced to knee extenison marching in supine to 90 deg with ab bracing 10 x 2   Advanced to knee extenison Pelvic tilt + Knee extension: 10 x 2  Pelvic tilt + Knee extension: 10 x 2    clamshell Green 5 sec x 10  Green 2x10 x10 Green: 2 x 10   Green: 2 x 10     Butterfly stretch  5 sec x 10   5s x10 5s x10 D/c     Standing hip felxor stretch  Rev next session Edu pt, 5x10s 5x10s 10 sec x 5   10 sec x 5     Standing march 2 x 10   2x10 2x10 15 x 2  15 x  2   Standing hip abd  2 x 10   2x10 2x10 15 x 2  15 x 2    Step ups 6 in 2 x 10  adan   2x10 6 in: 2 x 10  adan  6 in 2 x 10     Supported squats    Mini squats: 10 x 2  Mini squats: 10 x 2    Neuro Torey:         glute sets        bridging Red band to facilitate glutes: 2 x 10  2x10 2 x 15: red band  Green 10 x 2    Hip add iso                        Therapeutic Activity:         Reevaluation     performed           Gait Training:                 HEP:   Updated              Modalities                ice        Total time:            Access Code: [email or web address]  URL: [email or web address]    Date: 12/06/2021  Prepared by: Sharbennie Jointer    Exercises  Supine Hip Adduction Isometric with Ball - 2 x daily - 7 x weekly - 1 sets - 10 reps - 5 sec hold  Supine Gluteal Sets - 2 x daily - 7 x weekly - 1 sets - 10 reps - 5 sec hold  Supine Butterfly Groin Stretch - 1 x daily - 7 x weekly - 1 sets - 10 reps - 5 sec hold       Access Code: [email or web address]  URL: [email or web address]  Date: 01/07/2022  Prepared by:  Sharl Jointer    Exercises  · Standing March with Counter Support - 1 x daily - 7 x weekly - 2 sets - 10 reps  · Standing Hip Abduction with Counter Support - 1 x daily - 7 x weekly - 2 sets - 10 reps  · Clamshell with Resistance - 1 x daily - 7 x weekly - 1 sets - 10 reps - 5 sec hold  · Supine Bridge with Resistance Band - 1 x daily - 7 x weekly - 2 sets - 10 reps  · Modified Wilner Stretch - 1 x daily - 7 x weekly - 1 sets - 10 reps - 5 sec hold    Access Code: [email or web address]  URL: [email or web address]  Date: 01/27/2022  Prepared by:  Basilio Perdomo    Exercises  · Bent Knee Fallouts - 1 x daily - 7 x weekly - 2 sets - 10 reps

## 2022-08-26 ENCOUNTER — RA CDI HCC (OUTPATIENT)
Dept: OTHER | Facility: HOSPITAL | Age: 67
End: 2022-08-26

## 2022-08-26 NOTE — PROGRESS NOTES
[facility]  coding opportunities       Chart reviewed, no opportunity found:   [address]        Patients Insurance     Medicare Insurance: [employer]

## 2022-09-01 ENCOUNTER — OFFICE VISIT (OUTPATIENT)
Dept: FAMILY MEDICINE CLINIC | Facility: CLINIC | Age: 67
End: 2022-09-01
Payer: COMMERCIAL

## 2022-09-01 VITALS — WEIGHT: 231 LBS | HEIGHT: 63 IN | BODY MASS INDEX: 40.93 KG/M2 | HEART RATE: 83 BPM | OXYGEN SATURATION: 95 %

## 2022-09-01 DIAGNOSIS — E83.52 HYPERCALCEMIA: ICD-10-CM

## 2022-09-01 DIAGNOSIS — E66.01 CLASS 3 SEVERE OBESITY DUE TO EXCESS CALORIES WITHOUT SERIOUS COMORBIDITY WITH BODY MASS INDEX (BMI) OF 40.0 TO 44.9 IN ADULT (HCC): ICD-10-CM

## 2022-09-01 DIAGNOSIS — E78.00 HYPERCHOLESTEROLEMIA: ICD-10-CM

## 2022-09-01 DIAGNOSIS — G89.29 CHRONIC RIGHT-SIDED LOW BACK PAIN WITHOUT SCIATICA: Primary | ICD-10-CM

## 2022-09-01 DIAGNOSIS — M46.1 SACROILIITIS, NOT ELSEWHERE CLASSIFIED (HCC): ICD-10-CM

## 2022-09-01 DIAGNOSIS — F11.20 CONTINUOUS OPIOID DEPENDENCE (HCC): ICD-10-CM

## 2022-09-01 DIAGNOSIS — M54.50 CHRONIC RIGHT-SIDED LOW BACK PAIN WITHOUT SCIATICA: Primary | ICD-10-CM

## 2022-09-01 DIAGNOSIS — I10 PRIMARY HYPERTENSION: ICD-10-CM

## 2022-09-01 PROCEDURE — 3725F SCREEN DEPRESSION PERFORMED: CPT | Performed by: INTERNAL MEDICINE

## 2022-09-01 PROCEDURE — 99213 OFFICE O/P EST LOW 20 MIN: CPT | Performed by: INTERNAL MEDICINE

## 2022-09-01 PROCEDURE — 1003F LEVEL OF ACTIVITY ASSESS: CPT | Performed by: INTERNAL MEDICINE

## 2022-09-01 PROCEDURE — 1160F RVW MEDS BY RX/DR IN RCRD: CPT | Performed by: INTERNAL MEDICINE

## 2022-09-01 RX ORDER — PERINDOPRIL ERBUMINE 8 MG/1
8 TABLET ORAL 2 TIMES DAILY
COMMUNITY
Start: 2022-07-06

## 2022-09-01 NOTE — PROGRESS NOTES
BMI Counseling: Body mass index is 40 92 kg/m²  The BMI is above normal  Nutrition recommendations include consuming healthier snacks  Exercise recommendations include exercising 3-5 times per week  No pharmacotherapy was ordered  Rationale for BMI follow-up plan is due to patient being overweight or obese  Subjective  Patient continues to experience pain in the low back area sometimes radiating down into the right groin  Currently taking oxycodone for the pain  She was seen by the pain management physicians and has received injections also  Labs in the past of shown hypercalcemia  Patient denies any symptoms of chest pain palpitation shortness of breath the   Objective  Blood pressure 130/80  Patient present time not in any distress with pain   ENT examination is unremarkable for heart B1-N5 soft systolic murmur present lungs clinically clear  Abdomen soft nontender  Extremities trace edema  SLR is about 30° on the right side  Tenderness present in the sacroiliac joint area  Assessment and Plan  Patient with chronic pain syndrome opioid dependent also had a follow-up with the pain management physician  Patient also had physical therapy for 2 months  Hypertension stable  Obesity patient had gastric surgery in the past she did lose weight but still her BMI is high trying to follow with diet  Hypercalcemia which she had it in the past but repeat 1 came back normal even PTH has come back normal in the past however will repeat 1 more time  Patient has no symptoms related to hypercalcemia  Sacroiliitis on pain medication  Hyper cholesterolemia patient is trying to follow and controlled with the diet  Will have a follow-up in the office in 1 month time     PHQ-2/9 Depression Screening    Little interest or pleasure in doing things: 0 - not at all  Feeling down, depressed, or hopeless: 0 - not at all  PHQ-2 Score: 0  PHQ-2 Interpretation: Negative depression screen

## 2022-09-21 DIAGNOSIS — K21.9 GASTROESOPHAGEAL REFLUX DISEASE WITHOUT ESOPHAGITIS: Primary | ICD-10-CM

## 2022-09-22 RX ORDER — OMEPRAZOLE 20 MG/1
20 CAPSULE, DELAYED RELEASE ORAL DAILY
Qty: 90 CAPSULE | Refills: 0 | Status: SHIPPED | OUTPATIENT
Start: 2022-09-22

## 2022-09-30 ENCOUNTER — OFFICE VISIT (OUTPATIENT)
Dept: FAMILY MEDICINE CLINIC | Facility: CLINIC | Age: 67
End: 2022-09-30
Payer: COMMERCIAL

## 2022-09-30 VITALS
HEIGHT: 63 IN | BODY MASS INDEX: 41.29 KG/M2 | DIASTOLIC BLOOD PRESSURE: 80 MMHG | SYSTOLIC BLOOD PRESSURE: 140 MMHG | HEART RATE: 78 BPM | WEIGHT: 233 LBS | OXYGEN SATURATION: 95 %

## 2022-09-30 DIAGNOSIS — G89.4 CHRONIC PAIN SYNDROME: Primary | ICD-10-CM

## 2022-09-30 DIAGNOSIS — E66.01 MORBID OBESITY WITH BMI OF 40.0-44.9, ADULT (HCC): ICD-10-CM

## 2022-09-30 DIAGNOSIS — K21.9 GASTROESOPHAGEAL REFLUX DISEASE WITHOUT ESOPHAGITIS: ICD-10-CM

## 2022-09-30 DIAGNOSIS — M19.91 PRIMARY OSTEOARTHRITIS, UNSPECIFIED SITE: ICD-10-CM

## 2022-09-30 DIAGNOSIS — F41.9 ANXIETY: ICD-10-CM

## 2022-09-30 DIAGNOSIS — I10 PRIMARY HYPERTENSION: ICD-10-CM

## 2022-09-30 PROCEDURE — 99214 OFFICE O/P EST MOD 30 MIN: CPT | Performed by: INTERNAL MEDICINE

## 2022-09-30 RX ORDER — OXYCODONE AND ACETAMINOPHEN 7.5; 325 MG/1; MG/1
1 TABLET ORAL EVERY 4 HOURS PRN
Qty: 120 TABLET | Refills: 0 | Status: SHIPPED | OUTPATIENT
Start: 2022-09-30 | End: 2022-10-28 | Stop reason: SDUPTHER

## 2022-09-30 RX ORDER — ZINC GLUCONATE 50 MG
50 TABLET ORAL DAILY
COMMUNITY

## 2022-09-30 NOTE — ASSESSMENT & PLAN NOTE
Patient with osteoarthritis of various joints currently taking glucosamine chondroitin along with the pain medication oxycodone

## 2022-09-30 NOTE — ASSESSMENT & PLAN NOTE
Patient with GERD symptoms currently taking over as all 20 mg daily in the morning  Without that medication she is  having increasing symptoms of reflux  Will continue with the same medication

## 2022-09-30 NOTE — ASSESSMENT & PLAN NOTE
Patient with a BMI of 41 27  She is status post gastric sleeve  Discussed with patient regarding diet exercise losing weight

## 2022-09-30 NOTE — ASSESSMENT & PLAN NOTE
Patient with anxiety symptoms well controlled with Lexapro 15 mg daily  Will continue with the same for now

## 2022-09-30 NOTE — ASSESSMENT & PLAN NOTE
Patient with chronic pain syndrome with low back pain prolonged period of time she had received steroid injections in the past received physical therapy in the past was seen by the pain management also  Currently she is taking Percocet 7 5/325 mg every 4 hours as needed for pain  Will continue with the same recommend patient to follow up with the pain management physician also since at times the pain is getting severe

## 2022-09-30 NOTE — PROGRESS NOTES
Office Visit Note  22     Veronica Colorado 79 y o  female MRN: [id number]  : 1955    Assessment:     1  Chronic pain syndrome  Assessment & Plan:  Patient with chronic pain syndrome with low back pain prolonged period of time she had received steroid injections in the past received physical therapy in the past was seen by the pain management also  Currently she is taking Percocet 7 5/325 mg every 4 hours as needed for pain  Will continue with the same recommend patient to follow up with the pain management physician also since at times the pain is getting severe  Orders:  -     oxyCODONE-acetaminophen (Percocet) 7 5-325 MG per tablet; Take 1 tablet by mouth every 4 (four) hours as needed for moderate pain Max Daily Amount: 6 tablets    2  Gastroesophageal reflux disease without esophagitis  Assessment & Plan:  Patient with GERD symptoms currently taking over as all 20 mg daily in the morning  Without that medication she is  having increasing symptoms of reflux  Will continue with the same medication  3  Primary hypertension  Assessment & Plan:  Patient with a history of hypertension currently taking amlodipine 5 mg daily along with parindopril will continue the same  Today's blood pressure is 140/80  If necessary will adjust the dose next visit  4  Morbid obesity with BMI of 40 0-44 9, adult [facility])  Assessment & Plan:  Patient with a BMI of 41 27  She is status post gastric sleeve  Discussed with patient regarding diet exercise losing weight  5  Primary osteoarthritis, unspecified site  Assessment & Plan:  Patient with osteoarthritis of various joints currently taking glucosamine chondroitin along with the pain medication oxycodone  6  Anxiety  Assessment & Plan:  Patient with anxiety symptoms well controlled with Lexapro 15 mg daily  Will continue with the same for now  Discussion Summary and Plan:   Today's care plan and medications were reviewed with patient in detail and all their questions answered to their satisfaction  Chief Complaint   Patient presents with    Follow-up      Subjective:  Patient is coming here for evaluation regarding chronic low back pain sometimes radiating down the lower extremities  Patient has seen pain management physician past and received steroid injections to some extent it has helped but continues to experience the pain  Patient also received physical therapy in the past   Patient is status post bariatric surgery gastric sleeve in the past patient currently taking pain medication oxycodone p r n  4 times a day  No chest pain palpitation shortness of breath  History of hypertension blood pressure fluctuates at times  The following portions of the patient's history were reviewed and updated as appropriate: allergies, current medications, past family history, past medical history, past social history, past surgical history and problem list     Review of Systems   Constitutional: Negative for chills and fever  HENT: Negative for ear pain and sore throat  Eyes: Negative for pain and visual disturbance  Respiratory: Negative for cough and shortness of breath  Cardiovascular: Negative for chest pain and palpitations  Gastrointestinal: Negative for abdominal pain and vomiting  Genitourinary: Negative for dysuria and hematuria  Musculoskeletal: Positive for arthralgias and back pain  Skin: Negative for color change and rash  Neurological: Negative for seizures and syncope  All other systems reviewed and are negative          Historical Information   Patient Active Problem List   Diagnosis    Chronic pain syndrome    Low back pain    Spondylosis of lumbar region without myelopathy or radiculopathy    Spondylolisthesis of lumbar region    Lumbar spondylosis    Primary osteoarthritis of right shoulder    Incomplete tear of right rotator cuff    Morbid obesity with BMI of 40 0-44 9, adult ([facility] [address] )    H/O bariatric surgery    Hypertension    Gastroesophageal reflux disease    Sacroiliitis, not elsewhere classified ([facility] [address] )    Continuous opioid dependence ([facility] [address] )    Primary osteoarthritis    Anxiety     Past Medical History:   Diagnosis Date    Anxiety     Apnea, sleep     patient states "repeated test says no"    Arthritis     severe in the joints    Chronic pain disorder     joints    DJD (degenerative joint disease)     Fall 11/22/2018    tripped on a folded rug- left hamstring injury    Foot injury     right foot-fx of bone-no repair-has severe arthritis in the foot and ankle    History of sleep apnea     Hypercholesteremia     Hypertension     Insomnia     Low back pain     Obesity     Lost 70lb with gastric sleeve (2011)    Osteoarthritis     Wears glasses     Wears partial dentures     upper and lower-but did lose the lower      Past Surgical History:   Procedure Laterality Date    ANKLE ARTHROSCOPY Right     APPENDECTOMY      CHOLECYSTECTOMY      COLONOSCOPY  10/26/2021    DXA PROCEDURE (HISTORICAL)  10/13/2020    GASTRECTOMY SLEEVE LAPAROSCOPIC  2011    lost 70 lb    JOINT REPLACEMENT      blateral knee replacements    MAMMO (HISTORICAL)  01/26/2022    NERVE BLOCK Bilateral 12/29/2017    Procedure: L4, L5, S1 MEDIAL BRANCH BLOCK #1;  Surgeon: Alice Burroughs MD;  Location: [facility] MAIN OR;  Service: Pain Management     NERVE BLOCK Bilateral 01/12/2018    Procedure: BLOCK MEDIAL BRANCH L4, L5-S1  #2;  Surgeon: Alice Burroughs MD;  Location: [facility] MAIN OR;  Service: Pain Management     RI INCISE FINGER TENDON SHEATH Right 12/21/2021    Procedure: RELEASE TRIGGER RING FINGER;  Surgeon: Elle Whitlock MD;  Location: [address] [place] [address];  Service: Orthopedics    RI [address] Right 10/23/2020    Procedure: Sacroiliac Joint injection (75381);   Surgeon: Alice Burroughs MD;  Location: [facility] MAIN OR;  Service: Pain Management     RI RECONSTR TOTAL SHOULDER IMPLANT Right 2019    Procedure: ARTHROPLASTY SHOULDER TOTAL , excision of deep cyst at shoulder, biceps tenotomy;  Surgeon: Duke Almeida MD;  Location: [address] [place] [address];  Service: Orthopedics    RADIOFREQUENCY ABLATION Right 2018    Procedure: Rt L4 L5 S1 RFA;  Surgeon: Mima Whitt MD;  Location: [facility] MAIN OR;  Service: Pain Management     RADIOFREQUENCY ABLATION Left 2018    Procedure: L3-S1 Radio Frequency Ablation ([id number]);   Surgeon: Mima Whitt MD;  Location: [facility] MAIN OR;  Service: Pain Management     RHIZOTOMY Left 2018    Procedure: L4, L5, S1 RFA;  Surgeon: Mima Whitt MD;  [facility];  Service: Pain Management     TONSILLECTOMY       Social History     Substance and Sexual Activity   Alcohol Use Never     Social History     Substance and Sexual Activity   Drug Use Never     Social History     Tobacco Use   Smoking Status Former Smoker    Quit date:     Years since quittin 7   Smokeless Tobacco Never Used     Family History   Problem Relation Age of Onset    Other Mother         in a nursing home-fx of the leg    Heart disease Father         CHF    Alcohol abuse Father     Cirrhosis Father     Other Brother         lung abscess, pneumonia    Colon cancer Maternal Aunt     No Known Problems Maternal Uncle     No Known Problems Paternal Aunt     No Known Problems Paternal Uncle     Colon cancer Maternal Grandmother     No Known Problems Maternal Grandfather     No Known Problems Paternal Grandmother     No Known Problems Paternal Grandfather     No Known Problems Maternal Aunt     No Known Problems Maternal Aunt     Breast cancer Cousin 48     Health Maintenance Due   Topic    Hepatitis C Screening     Medicare Annual Wellness Visit (AWV)     Urinary Incontinence Screening     Pneumococcal Vaccine: 65+ Years (1 - PCV)    COVID-19 Vaccine (3 - Booster for Moderna series)    Influenza Vaccine (1)    Fall Risk       Meds/Allergies Current Outpatient Medications:     acetaminophen (TYLENOL) 650 mg CR tablet, Take 650 mg by mouth every 8 (eight) hours as needed for mild pain, Disp: , Rfl:     amLODIPine (NORVASC) 5 mg tablet, , Disp: , Rfl:     ascorbic acid (VITAMIN C) 500 mg tablet, Take 500 mg by mouth every morning, Disp: , Rfl:     clindamycin (CLEOCIN) 300 MG capsule, as needed Pre dental procedures, Disp: , Rfl:     Coenzyme Q10 (CO Q 10) 100 MG CAPS, Take by mouth every morning Last dose 5/13/19, Disp: , Rfl:     Cyanocobalamin (VITAMIN B 12 PO), Take 500 mcg by mouth every morning, Disp: , Rfl:     diphenhydrAMINE (BENADRYL) 25 mg tablet, Take 25 mg by mouth every 8 (eight) hours as needed for itching , Disp: , Rfl:     escitalopram (LEXAPRO) 10 mg tablet, 15 mg daily at bedtime 1 5 tab bolton, Disp: , Rfl:     Glucosamine-Chondroitin (MOVE FREE PO), Take 2 tablets by mouth every morning, Disp: , Rfl:     Melatonin 10 MG TABS, Take by mouth daily at bedtime, Disp: , Rfl:     omeprazole (PriLOSEC) 20 mg delayed release capsule, Take 1 capsule (20 mg total) by mouth in the morning, Disp: 90 capsule, Rfl: 0    oxyCODONE-acetaminophen (PERCOCET) 7 5-325 MG per tablet, Take 1 tablet by mouth every 4 (four) hours as needed for moderate pain (3x), Disp: , Rfl:     oxyCODONE-acetaminophen (Percocet) 7 5-325 MG per tablet, Take 1 tablet by mouth every 4 (four) hours as needed for moderate pain Max Daily Amount: 6 tablets, Disp: 120 tablet, Rfl: 0    perindopril (ACEON) 8 MG tablet, , Disp: , Rfl:     zinc gluconate 50 mg tablet, Take 50 mg by mouth daily, Disp: , Rfl:     naproxen (NAPROSYN) 250 mg tablet, Take 250 mg by mouth 2 (two) times a day as needed for muscle/joint pain Last dose 12/16/21  (Patient not taking: Reported on 9/30/2022), Disp: , Rfl:       Objective:    Vitals:   /80 (BP Location: Right arm, Patient Position: Sitting, Cuff Size: Large)   Pulse 78   Ht 5' 3" (1 6 m)   Wt 106 kg (233 lb)   SpO2 95%   BMI 41 27 kg/m²   Body mass index is 41 27 kg/m²  Vitals:    09/30/22 1041   Weight: 106 kg (233 lb)       Physical Exam  Vitals and nursing note reviewed  Constitutional:       Appearance: She is obese  Cardiovascular:      Rate and Rhythm: Normal rate and regular rhythm  Heart sounds: Normal heart sounds  Pulmonary:      Effort: Pulmonary effort is normal       Breath sounds: Normal breath sounds  Musculoskeletal:      Cervical back: Normal range of motion and neck supple  Right lower leg: No edema  Left lower leg: No edema  Comments: SLR 30° both sides   Neurological:      Mental Status: She is alert and oriented to person, place, and time  Lab Review   No visits with results within 2 Month(s) from this visit  Latest known visit with results is:   Hospital Outpatient Visit on 10/26/2021   Component Date Value Ref Range Status    Case Report 10/26/2021    Final                    Value:Surgical Pathology Report                         Case: [id number]                                   Authorizing Provider:  Yamileth Malagon MD           Collected:           10/26/2021 1137              Ordering Location:     [facility], located within [facility]   Received:            10/26/2021 [facility]                                                                       Pathologist:           Live Mahmood MD                                                              Specimen:    Colon, bx descending colon polyp                                                           Final Diagnosis 10/26/2021    Final                    Value: This result contains rich text formatting which cannot be displayed here   Note 10/26/2021    Final                    Value: This result contains rich text formatting which cannot be displayed here   Additional Information 10/26/2021    Final                    Value: This result contains rich text formatting which cannot be displayed here  Ingrid Vizcaino 10/26/2021    Final                    Value: This result contains rich text formatting which cannot be displayed here  Squaxin Longs        "This note has been constructed using a voice recognition system  Therefore there may be syntax, spelling, and/or grammatical errors   Please call if you have any questions  "

## 2022-09-30 NOTE — ASSESSMENT & PLAN NOTE
Patient with a history of hypertension currently taking amlodipine 5 mg daily along with parindopril will continue the same  Today's blood pressure is 140/80  If necessary will adjust the dose next visit

## 2022-10-08 LAB
ALBUMIN SERPL-MCNC: 4.6 G/DL (ref 3.8–4.8)
ALBUMIN/GLOB SERPL: 2 {RATIO} (ref 1.2–2.2)
ALP SERPL-CCNC: 96 IU/L (ref 44–121)
ALT SERPL-CCNC: 17 IU/L (ref 0–32)
AST SERPL-CCNC: 23 IU/L (ref 0–40)
BILIRUB SERPL-MCNC: <0.2 MG/DL (ref 0–1.2)
BUN SERPL-MCNC: 19 MG/DL (ref 8–27)
BUN/CREAT SERPL: 37 (ref 12–28)
CALCIUM SERPL-MCNC: 10 MG/DL (ref 8.7–10.3)
CHLORIDE SERPL-SCNC: 101 MMOL/L (ref 96–106)
CO2 SERPL-SCNC: 26 MMOL/L (ref 20–29)
CREAT SERPL-MCNC: 0.51 MG/DL (ref 0.57–1)
EGFR: 102 ML/MIN/1.73
GLOBULIN SER-MCNC: 2.3 G/DL (ref 1.5–4.5)
GLUCOSE SERPL-MCNC: 94 MG/DL (ref 70–99)
POTASSIUM SERPL-SCNC: 4.7 MMOL/L (ref 3.5–5.2)
PROT SERPL-MCNC: 6.9 G/DL (ref 6–8.5)
PTH-INTACT SERPL-MCNC: 44 PG/ML (ref 15–65)
SODIUM SERPL-SCNC: 140 MMOL/L (ref 134–144)

## 2022-10-28 ENCOUNTER — OFFICE VISIT (OUTPATIENT)
Dept: FAMILY MEDICINE CLINIC | Facility: CLINIC | Age: 67
End: 2022-10-28
Payer: COMMERCIAL

## 2022-10-28 VITALS
SYSTOLIC BLOOD PRESSURE: 128 MMHG | DIASTOLIC BLOOD PRESSURE: 78 MMHG | HEART RATE: 78 BPM | BODY MASS INDEX: 41.29 KG/M2 | WEIGHT: 233 LBS | HEIGHT: 63 IN | OXYGEN SATURATION: 95 %

## 2022-10-28 DIAGNOSIS — K21.9 GASTROESOPHAGEAL REFLUX DISEASE WITHOUT ESOPHAGITIS: ICD-10-CM

## 2022-10-28 DIAGNOSIS — E83.52 HYPERCALCEMIA: ICD-10-CM

## 2022-10-28 DIAGNOSIS — I10 PRIMARY HYPERTENSION: ICD-10-CM

## 2022-10-28 DIAGNOSIS — M19.91 PRIMARY OSTEOARTHRITIS, UNSPECIFIED SITE: ICD-10-CM

## 2022-10-28 DIAGNOSIS — G89.4 CHRONIC PAIN SYNDROME: ICD-10-CM

## 2022-10-28 DIAGNOSIS — F41.9 ANXIETY: ICD-10-CM

## 2022-10-28 DIAGNOSIS — E66.01 MORBID OBESITY WITH BMI OF 40.0-44.9, ADULT (HCC): ICD-10-CM

## 2022-10-28 DIAGNOSIS — Z23 ENCOUNTER FOR IMMUNIZATION: Primary | ICD-10-CM

## 2022-10-28 PROCEDURE — G0008 ADMIN INFLUENZA VIRUS VAC: HCPCS | Performed by: INTERNAL MEDICINE

## 2022-10-28 PROCEDURE — 99214 OFFICE O/P EST MOD 30 MIN: CPT | Performed by: INTERNAL MEDICINE

## 2022-10-28 PROCEDURE — 90662 IIV NO PRSV INCREASED AG IM: CPT | Performed by: INTERNAL MEDICINE

## 2022-10-28 RX ORDER — OXYCODONE AND ACETAMINOPHEN 7.5; 325 MG/1; MG/1
1 TABLET ORAL EVERY 4 HOURS PRN
Qty: 120 TABLET | Refills: 0 | Status: SHIPPED | OUTPATIENT
Start: 2022-10-28 | End: 2022-10-28 | Stop reason: SDUPTHER

## 2022-10-28 RX ORDER — OXYCODONE AND ACETAMINOPHEN 7.5; 325 MG/1; MG/1
1 TABLET ORAL EVERY 4 HOURS PRN
Qty: 120 TABLET | Refills: 0 | Status: SHIPPED | OUTPATIENT
Start: 2022-10-28

## 2022-10-28 NOTE — ASSESSMENT & PLAN NOTE
Patient with osteoarthritis of multiple joints especially the low back area and also of the right shoulder status post replacement  Not able to give nonsteroidals because of a gastric sleeve she had before currently taking pain medication Percocet p r n

## 2022-10-28 NOTE — ASSESSMENT & PLAN NOTE
Patient with chronic pain syndrome history of low back pain sacroiliitis    She has received steroid injections in the past but continues to experience the pain currently she is taking Percocet 7 5/3251 daily every 4 hours p r n  for pain will continue with the same pain management with opioids discussed with the patient in bed and especially with regards to the dependence

## 2022-10-28 NOTE — PROGRESS NOTES
Office Visit Note  10/28/22     Elizabeth Ochoa 79 y o  female MRN: [id number]  : 1955    Assessment:     1  Encounter for immunization  -     influenza vaccine, high-dose, PF 0 5 mL    2  Primary osteoarthritis, unspecified site  Assessment & Plan:  Patient with osteoarthritis of multiple joints especially the low back area and also of the right shoulder status post replacement  Not able to give nonsteroidals because of a gastric sleeve she had before currently taking pain medication Percocet p r n       3  Morbid obesity with BMI of 40 0-44 9, adult [facility])  Assessment & Plan:  Patient weighs about the same with a BMI of 41 27  It appears even after gastric sleeve her weight remains still high at this time  She lost about 60-70 lb after the gastric sleeve  Again recommended regarding the calorie intake cut back carbohydrate intake      4  Primary hypertension  Assessment & Plan:  Blood pressure is stable on current medication amlodipine 5 mg and perindopril 8 mg 2 times a day will continue the same today blood pressure is 128/78       5  Gastroesophageal reflux disease without esophagitis    6  Chronic pain syndrome  Assessment & Plan:  Patient with chronic pain syndrome history of low back pain sacroiliitis  She has received steroid injections in the past but continues to experience the pain currently she is taking Percocet 7 3251 daily every 4 hours p r n  for pain will continue with the same pain management with opioids discussed with the patient in bed and especially with regards to the dependence    Orders:  -     oxyCODONE-acetaminophen (Percocet) 7 5-325 MG per tablet; Take 1 tablet by mouth every 4 (four) hours as needed for moderate pain Max Daily Amount: 6 tablets    7  Anxiety  Assessment & Plan:  Patient is currently taking Lexapro 15 mg appears to be helping will continue with the same        8  Hypercalcemia  Assessment & Plan:  Patient had hypercalcemia in the previous blood test results  We repeated it and it came back normal at 9 0 and PTH intact also came back normal at 44 will monitor  Discussion Summary and Plan: Today's care plan and medications were reviewed with patient in detail and all their questions answered to their satisfaction  Chief Complaint   Patient presents with   • Follow-up     Flu vaccine      Subjective:  Patient has come in for evaluation regarding low back pain radiating some been on both lower extremities  He has received steroid injections in the past was followed by the pain management physician also received physical therapy but the pain persisted currently taking Percocet 7 5 mg/325 every 4 hours p r n  pain  Patient also had lab work done as there was slight elevation in the calcium level but the repeat 1 came back normal including the PTH intact level  The patient complains of a lesion on the right shoulder area arm area  Patient involuntarily touch that area and causes some irritation  The following portions of the patient's history were reviewed and updated as appropriate: allergies, current medications, past family history, past medical history, past social history, past surgical history and problem list     Review of Systems   Constitutional: Negative for chills and fever  HENT: Negative for ear pain and sore throat  Eyes: Negative for pain and visual disturbance  Respiratory: Negative for cough and shortness of breath  Cardiovascular: Negative for chest pain and palpitations  Gastrointestinal: Negative for abdominal pain and vomiting  Genitourinary: Negative for dysuria and hematuria  Musculoskeletal: Positive for arthralgias and back pain  Skin: Negative for color change and rash  Neurological: Negative for seizures and syncope  All other systems reviewed and are negative          Historical Information   Patient Active Problem List   Diagnosis   • Chronic pain syndrome   • Low back pain   • Spondylosis of lumbar region without myelopathy or radiculopathy   • Spondylolisthesis of lumbar region   • Lumbar spondylosis   • Primary osteoarthritis of right shoulder   • Incomplete tear of right rotator cuff   • Morbid obesity with BMI of 40 0-44 9, adult ([facility])   • H/O bariatric surgery   • Hypertension   • Gastroesophageal reflux disease   • Sacroiliitis, not elsewhere classified ([facility] )   • Continuous opioid dependence ([facility] )   • Primary osteoarthritis   • Anxiety   • Hypercalcemia     Past Medical History:   Diagnosis Date   • Anxiety    • Apnea, sleep     patient states "repeated test says no"   • Arthritis     severe in the joints   • Chronic pain disorder     joints   • DJD (degenerative joint disease)    • Fall 11/22/2018    tripped on a folded rug- left hamstring injury   • Foot injury     right foot-fx of bone-no repair-has severe arthritis in the foot and ankle   • History of sleep apnea    • Hypercholesteremia    • Hypertension    • Insomnia    • Low back pain    • Obesity     Lost 70lb with gastric sleeve (2011)   • Osteoarthritis    • Wears glasses    • Wears partial dentures     upper and lower-but did lose the lower      Past Surgical History:   Procedure Laterality Date   • ANKLE ARTHROSCOPY Right    • APPENDECTOMY     • CHOLECYSTECTOMY     • COLONOSCOPY  10/26/2021   • DXA PROCEDURE (HISTORICAL)  10/13/2020   • GASTRECTOMY SLEEVE LAPAROSCOPIC  2011    lost 70 lb   • JOINT REPLACEMENT      blateral knee replacements   • MAMMO (HISTORICAL)  01/26/2022   • NERVE BLOCK Bilateral 12/29/2017    Procedure: L4, L5, S1 MEDIAL BRANCH BLOCK #1;  Surgeon: Danny Gatica MD;  Location: [facility] MAIN OR;  Service: Pain Management    • NERVE BLOCK Bilateral 01/12/2018    Procedure: BLOCK MEDIAL BRANCH L4, L5-S1  #2;  Surgeon: Danny Gatica MD;  Location: [facility] MAIN OR;  Service: Pain Management    • NV INCISE FINGER TENDON SHEATH Right 12/21/2021    Procedure: RELEASE TRIGGER RING FINGER;  Surgeon: Justa Mendoza MD;  Location: [address] [place] [address];  Service: Orthopedics   • AK INJECTION,SACROILIAC JOINT Right 10/23/2020    Procedure: Sacroiliac Joint injection (97395); Surgeon: Kaylah Landry MD;  Location: [facility] MAIN OR;  Service: Pain Management    • AK RECONSTR TOTAL SHOULDER IMPLANT Right 2019    Procedure: ARTHROPLASTY SHOULDER TOTAL , excision of deep cyst at shoulder, biceps tenotomy;  Surgeon: Leonardo Roy MD;  Location: [address] [place] [address];  Service: Orthopedics   • RADIOFREQUENCY ABLATION Right 2018    Procedure: Rt L4 L5 S1 RFA;  Surgeon: Kaylah Landry MD;  Location: [facility] MAIN OR;  Service: Pain Management    • RADIOFREQUENCY ABLATION Left 2018    Procedure: L3-S1 Radio Frequency Ablation ([id number]);   Surgeon: Kaylah Landry MD;  Location: [facility] MAIN OR;  Service: Pain Management    • RHIZOTOMY Left 2018    Procedure: L4, L5, S1 RFA;  Surgeon: Kaylah Landry MD;  Location: [facility] MAIN OR;  Service: Pain Management    • TONSILLECTOMY       Social History     Substance and Sexual Activity   Alcohol Use Never     Social History     Substance and Sexual Activity   Drug Use Never     Social History     Tobacco Use   Smoking Status Former Smoker   • Quit date:    • Years since quittin 8   Smokeless Tobacco Never Used     Family History   Problem Relation Age of Onset   • Other Mother         in a nursing home-fx of the leg   • Heart disease Father         CHF   • Alcohol abuse Father    • Cirrhosis Father    • Other Brother         lung abscess, pneumonia   • Colon cancer Maternal Aunt    • No Known Problems Maternal Uncle    • No Known Problems Paternal Aunt    • No Known Problems Paternal Uncle    • Colon cancer Maternal Grandmother    • No Known Problems Maternal Grandfather    • No Known Problems Paternal Grandmother    • No Known Problems Paternal Grandfather    • No Known Problems Maternal Aunt    • No Known Problems Maternal Aunt    • Breast cancer Cousin 48     Health Maintenance Due   Topic   • Hepatitis C Screening    • Medicare Annual Wellness Visit (AWV)    • Urinary Incontinence Screening    • Pneumococcal Vaccine: 65+ Years (1 - PCV)   • COVID-19 Vaccine (3 - Booster for Moderna series)   • Fall Risk    • Breast Cancer Screening: Mammogram       Meds/Allergies       Current Outpatient Medications:   •  acetaminophen (TYLENOL) 650 mg CR tablet, Take 650 mg by mouth every 8 (eight) hours as needed for mild pain, Disp: , Rfl:   •  amLODIPine (NORVASC) 5 mg tablet, , Disp: , Rfl:   •  ascorbic acid (VITAMIN C) 500 mg tablet, Take 500 mg by mouth every morning, Disp: , Rfl:   •  clindamycin (CLEOCIN) 300 MG capsule, as needed Pre dental procedures, Disp: , Rfl:   •  Coenzyme Q10 (CO Q 10) 100 MG CAPS, Take by mouth every morning Last dose 5/13/19, Disp: , Rfl:   •  Cyanocobalamin (VITAMIN B 12 PO), Take 500 mcg by mouth every morning, Disp: , Rfl:   •  diphenhydrAMINE (BENADRYL) 25 mg tablet, Take 25 mg by mouth every 8 (eight) hours as needed for itching , Disp: , Rfl:   •  escitalopram (LEXAPRO) 10 mg tablet, 15 mg daily at bedtime 1 5 tab bolton, Disp: , Rfl:   •  Glucosamine-Chondroitin (MOVE FREE PO), Take 2 tablets by mouth every morning, Disp: , Rfl:   •  Melatonin 10 MG TABS, Take by mouth daily at bedtime, Disp: , Rfl:   •  naproxen (NAPROSYN) 250 mg tablet, Take 250 mg by mouth 2 (two) times a day as needed for muscle/joint pain Last dose 12/16/21, Disp: , Rfl:   •  omeprazole (PriLOSEC) 20 mg delayed release capsule, Take 1 capsule (20 mg total) by mouth in the morning, Disp: 90 capsule, Rfl: 0  •  oxyCODONE-acetaminophen (Percocet) 7 5-325 MG per tablet, Take 1 tablet by mouth every 4 (four) hours as needed for moderate pain Max Daily Amount: 6 tablets, Disp: 120 tablet, Rfl: 0  •  perindopril (ACEON) 8 MG tablet, 8 mg 2 (two) times a day, Disp: , Rfl:   •  zinc gluconate 50 mg tablet, Take 50 mg by mouth daily, Disp: , Rfl:       Objective:    Vitals:   /78 (BP Location: Right arm, Patient Position: Sitting, Cuff Size: Large)   Pulse 78   Ht 5' 3" (1 6 m)   Wt 106 kg (233 lb)   SpO2 95%   BMI 41 27 kg/m²   Body mass index is 41 27 kg/m²  Vitals:    10/28/22 1251   Weight: 106 kg (233 lb)       Physical Exam  Vitals and nursing note reviewed  Constitutional:       Appearance: Normal appearance  Cardiovascular:      Rate and Rhythm: Normal rate and regular rhythm  Heart sounds: Normal heart sounds  Pulmonary:      Effort: Pulmonary effort is normal       Breath sounds: Normal breath sounds  Abdominal:      General: Abdomen is flat  Palpations: Abdomen is soft  Musculoskeletal:      Cervical back: Normal range of motion and neck supple  Right lower leg: No edema  Left lower leg: No edema  Comments: SLR about 40° both sides   Neurological:      Mental Status: She is alert and oriented to person, place, and time           Lab Review   Orders Only on 10/07/2022   Component Date Value Ref Range Status   • Glucose, Random 10/07/2022 94  70 - 99 mg/dL Final                  **Please note reference interval change**   • BUN 10/07/2022 19  8 - 27 mg/dL Final   • Creatinine 10/07/2022 0 51 (A) 0 57 - 1 00 mg/dL Final   • eGFR 10/07/2022 102  >59 mL/min/1 73 Final   • SL AMB BUN/CREATININE RATIO 10/07/2022 37 (A) 12 - 28 Final   • Sodium 10/07/2022 140  134 - 144 mmol/L Final   • Potassium 10/07/2022 4 7  3 5 - 5 2 mmol/L Final   • Chloride 10/07/2022 101  96 - 106 mmol/L Final   • CO2 10/07/2022 26  20 - 29 mmol/L Final   • CALCIUM 10/07/2022 10 0  8 7 - 10 3 mg/dL Final   • Protein, Total 10/07/2022 6 9  6 0 - 8 5 g/dL Final   • Albumin 10/07/2022 4 6  3 8 - 4 8 g/dL Final   • Globulin, Total 10/07/2022 2 3  1 5 - 4 5 g/dL Final   • Albumin/Globulin Ratio 10/07/2022 2 0  1 2 - 2 2 Final   • TOTAL BILIRUBIN 10/07/2022 <0 2  0 0 - 1 2 mg/dL Final   • Alk Phos Isoenzymes 10/07/2022 96  44 - 121 IU/L Final   • AST 10/07/2022 23  0 - 40 IU/L Final   • ALT 10/07/2022 17  0 - 32 IU/L Final   • PTH, Intact 10/07/2022 44  15 - 65 pg/mL Final         Gustabo Sy        "This note has been constructed using a voice recognition system  Therefore there may be syntax, spelling, and/or grammatical errors   Please call if you have any questions  "

## 2022-10-28 NOTE — ASSESSMENT & PLAN NOTE
Patient had hypercalcemia in the previous blood test results  We repeated it and it came back normal at 9 0 and PTH intact also came back normal at 44 will monitor

## 2022-10-28 NOTE — ASSESSMENT & PLAN NOTE
Blood pressure is stable on current medication amlodipine 5 mg and perindopril 8 mg 2 times a day will continue the same today blood pressure is 128/78

## 2022-10-28 NOTE — ASSESSMENT & PLAN NOTE
Patient weighs about the same with a BMI of 41 27  It appears even after gastric sleeve her weight remains still high at this time  She lost about 60-70 lb after the gastric sleeve    Again recommended regarding the calorie intake cut back carbohydrate intake

## 2022-11-25 ENCOUNTER — OFFICE VISIT (OUTPATIENT)
Dept: FAMILY MEDICINE CLINIC | Facility: CLINIC | Age: 67
End: 2022-11-25

## 2022-11-25 VITALS
HEART RATE: 95 BPM | BODY MASS INDEX: 40.75 KG/M2 | HEIGHT: 63 IN | WEIGHT: 230 LBS | SYSTOLIC BLOOD PRESSURE: 136 MMHG | OXYGEN SATURATION: 97 % | DIASTOLIC BLOOD PRESSURE: 80 MMHG

## 2022-11-25 DIAGNOSIS — G89.4 CHRONIC PAIN SYNDROME: Primary | ICD-10-CM

## 2022-11-25 DIAGNOSIS — I10 PRIMARY HYPERTENSION: ICD-10-CM

## 2022-11-25 DIAGNOSIS — K21.9 GASTROESOPHAGEAL REFLUX DISEASE WITHOUT ESOPHAGITIS: ICD-10-CM

## 2022-11-25 DIAGNOSIS — F41.9 ANXIETY: ICD-10-CM

## 2022-11-25 DIAGNOSIS — E66.01 MORBID OBESITY WITH BMI OF 40.0-44.9, ADULT (HCC): ICD-10-CM

## 2022-11-25 DIAGNOSIS — E78.5 DYSLIPIDEMIA: ICD-10-CM

## 2022-11-25 RX ORDER — OXYCODONE AND ACETAMINOPHEN 7.5; 325 MG/1; MG/1
1 TABLET ORAL EVERY 4 HOURS PRN
Qty: 120 TABLET | Refills: 0 | Status: SHIPPED | OUTPATIENT
Start: 2022-11-25

## 2022-11-25 NOTE — ASSESSMENT & PLAN NOTE
Patient's BMI is 40 74 status post gastric sleeve several years back     Emphasized the need for the diet exercise losing weight and cut back on calorie intake and carbohydrate intake

## 2022-11-25 NOTE — ASSESSMENT & PLAN NOTE
Patient continues to experience GERD symptoms on and off currently she is taking omeprazole 20 mg daily will continue the same

## 2022-11-25 NOTE — ASSESSMENT & PLAN NOTE
Patient's last cholesterol level was 202 over triglycerides 91 HDL eighty one LDL is 115 emphasized regarding diet exercise a will follow up with repeat level at later date

## 2022-11-25 NOTE — ASSESSMENT & PLAN NOTE
Patient continues to experience pain in the low back area  Not seen by the pain management physician yet  Currently taking Percocet 7 5/325 q 4 hours p r n  pain  Again emphasized the need for to be followed by the pain management physician

## 2022-11-25 NOTE — PROGRESS NOTES
Office Visit Note  22     Cuauhtemoc Tam 79 y o  female MRN: [id number]  : 1955    Assessment:     1  Chronic pain syndrome  Assessment & Plan:  Patient continues to experience pain in the low back area  Not seen by the pain management physician yet  Currently taking Percocet 7 5/325 q 4 hours p r n  pain  Again emphasized the need for to be followed by the pain management physician  Orders:  -     oxyCODONE-acetaminophen (Percocet) 7 5-325 MG per tablet; Take 1 tablet by mouth every 4 (four) hours as needed for moderate pain Max Daily Amount: 6 tablets    2  Morbid obesity with BMI of 40 0-44 9, adult ([facility] )  Assessment & Plan:  Patient's BMI is 40 74 status post gastric sleeve several years back   Emphasized the need for the diet exercise losing weight and cut back on calorie intake and carbohydrate intake      3  Primary hypertension  Assessment & Plan:  Blood pressure today is 136/80 will continue the current medications amlodipine and perindopril      4  Gastroesophageal reflux disease without esophagitis  Assessment & Plan:  Patient continues to experience GERD symptoms on and off currently she is taking omeprazole 20 mg daily will continue the same      5  Anxiety  Assessment & Plan:  Patient is on Lexapro 15 mg will continue the same  6  Dyslipidemia  Assessment & Plan:  Patient's last cholesterol level was 202 over triglycerides 91 HDL eighty one LDL is 115 emphasized regarding diet exercise a will follow up with repeat level at later date  Discussion Summary and Plan: Today's care plan and medications were reviewed with patient in detail and all their questions answered to their satisfaction  Chief Complaint   Patient presents with   • Follow-up      Subjective:  Patient continues to experience pain in the low back area radiating down lower extremities currently taking oxycodone 7 5 mg 4 times a day as needed    Recommended patient be seen by the pain management physician not done yet  emphasized regarding the same  All medications reviewed labs reviewed      The following portions of the patient's history were reviewed and updated as appropriate: allergies, current medications, past family history, past medical history, past social history, past surgical history and problem list     Review of Systems   Constitutional: Negative for chills and fever  HENT: Negative for ear pain and sore throat  Eyes: Negative for pain and visual disturbance  Respiratory: Negative for cough and shortness of breath  Cardiovascular: Negative for chest pain and palpitations  Gastrointestinal: Negative for abdominal pain and vomiting  Genitourinary: Negative for dysuria and hematuria  Musculoskeletal: Positive for arthralgias and back pain  Skin: Negative for color change and rash  Neurological: Negative for seizures and syncope  All other systems reviewed and are negative          Historical Information   Patient Active Problem List   Diagnosis   • Chronic pain syndrome   • Low back pain   • Spondylosis of lumbar region without myelopathy or radiculopathy   • Spondylolisthesis of lumbar region   • Lumbar spondylosis   • Primary osteoarthritis of right shoulder   • Incomplete tear of right rotator cuff   • Morbid obesity with BMI of 40 0-44 9, adult ([facility] )   • H/O bariatric surgery   • Hypertension   • Gastroesophageal reflux disease   • Sacroiliitis, not elsewhere classified ([facility] )   • Continuous opioid dependence ([facility] )   • Primary osteoarthritis   • Anxiety   • Hypercalcemia   • Dyslipidemia     Past Medical History:   Diagnosis Date   • Anxiety    • Apnea, sleep     patient states "repeated test says no"   • Arthritis     severe in the joints   • Chronic pain disorder     joints   • DJD (degenerative joint disease)    • Fall 11/22/2018    tripped on a folded rug- left hamstring injury   • Foot injury     right foot-fx of bone-no repair-has severe arthritis in the foot and ankle   • History of sleep apnea    • Hypercholesteremia    • Hypertension    • Insomnia    • Low back pain    • Obesity     Lost 70lb with gastric sleeve (2011)   • Osteoarthritis    • Wears glasses    • Wears partial dentures     upper and lower-but did lose the lower      Past Surgical History:   Procedure Laterality Date   • ANKLE ARTHROSCOPY Right    • APPENDECTOMY     • CHOLECYSTECTOMY     • COLONOSCOPY  10/26/2021   • DXA PROCEDURE (HISTORICAL)  10/13/2020   • GASTRECTOMY SLEEVE LAPAROSCOPIC  2011    lost 70 lb   • JOINT REPLACEMENT      blateral knee replacements   • MAMMO (HISTORICAL)  01/26/2022   • NERVE BLOCK Bilateral 12/29/2017    Procedure: L4, L5, S1 MEDIAL BRANCH BLOCK #1;  Surgeon: Susan Cabello MD;  Location: [facility] MAIN OR;  Service: Pain Management    • NERVE BLOCK Bilateral 01/12/2018    Procedure: BLOCK MEDIAL BRANCH L4, L5-S1  #2;  Surgeon: Susan Cabello MD;  Location: [facility] MAIN OR;  Service: Pain Management    • WA INCISE FINGER TENDON SHEATH Right 12/21/2021    Procedure: RELEASE TRIGGER RING FINGER;  Surgeon: Nydia Rodriguez MD;  Location: [address] [place] [address];  Service: Orthopedics   • WA INJECTION,SACROILIAC JOINT Right 10/23/2020    Procedure: Sacroiliac Joint injection (46514); Surgeon: Susan Cabello MD;  Location: [facility] MAIN OR;  Service: Pain Management    • WA RECONSTR TOTAL SHOULDER IMPLANT Right 05/23/2019    Procedure: ARTHROPLASTY SHOULDER TOTAL , excision of deep cyst at shoulder, biceps tenotomy;  Surgeon: Madisyn Brown MD;  Location: [address] [place] [address];  Service: Orthopedics   • RADIOFREQUENCY ABLATION Right 03/16/2018    Procedure: Rt L4 L5 S1 RFA;  Surgeon: Susan Cabello MD;  Location: [facility] MAIN OR;  Service: Pain Management    • RADIOFREQUENCY ABLATION Left 12/14/2018    Procedure: L3-S1 Radio Frequency Ablation ([id number]);   Surgeon: Susan Cabello MD;  Location: [facility] MAIN OR;  Service: Pain Management    • RHIZOTOMY Left 01/25/2018    Procedure: L4, L5, S1 RFA;  Surgeon: Susan Cabello MD;  Location: [facility] MAIN OR;  Service: Pain Management    • TONSILLECTOMY       Social History     Substance and Sexual Activity   Alcohol Use Never     Social History     Substance and Sexual Activity   Drug Use Never     Social History     Tobacco Use   Smoking Status Former   • Types: Cigarettes   • Quit date:    • Years since quittin 9   Smokeless Tobacco Never     Family History   Problem Relation Age of Onset   • Other Mother         in a nursing home-fx of the leg   • Heart disease Father         CHF   • Alcohol abuse Father    • Cirrhosis Father    • Other Brother         lung abscess, pneumonia   • Colon cancer Maternal Aunt    • No Known Problems Maternal Uncle    • No Known Problems Paternal Aunt    • No Known Problems Paternal Uncle    • Colon cancer Maternal Grandmother    • No Known Problems Maternal Grandfather    • No Known Problems Paternal Grandmother    • No Known Problems Paternal Grandfather    • No Known Problems Maternal Aunt    • No Known Problems Maternal Aunt    • Breast cancer Cousin 48     Health Maintenance Due   Topic   • Hepatitis C Screening    • Medicare Annual Wellness Visit (AWV)    • Hepatitis B Vaccine (1 of 3 - 3-dose series)   • Urinary Incontinence Screening    • Pneumococcal Vaccine: 65+ Years (1 - PCV)   • COVID-19 Vaccine (3 - Booster for Moderna series)   • Fall Risk    • Breast Cancer Screening: Mammogram       Meds/Allergies       Current Outpatient Medications:   •  acetaminophen (TYLENOL) 650 mg CR tablet, Take 650 mg by mouth every 8 (eight) hours as needed for mild pain, Disp: , Rfl:   •  amLODIPine (NORVASC) 5 mg tablet, , Disp: , Rfl:   •  ascorbic acid (VITAMIN C) 500 mg tablet, Take 500 mg by mouth every morning, Disp: , Rfl:   •  clindamycin (CLEOCIN) 300 MG capsule, as needed Pre dental procedures, Disp: , Rfl:   •  Coenzyme Q10 (CO Q 10) 100 MG CAPS, Take by mouth every morning Last dose 19, Disp: , Rfl:   • Cyanocobalamin (VITAMIN B 12 PO), Take 500 mcg by mouth every morning, Disp: , Rfl:   •  diphenhydrAMINE (BENADRYL) 25 mg tablet, Take 25 mg by mouth every 8 (eight) hours as needed for itching , Disp: , Rfl:   •  escitalopram (LEXAPRO) 10 mg tablet, 15 mg daily at bedtime 1 5 tab bolton, Disp: , Rfl:   •  Glucosamine-Chondroitin (MOVE FREE PO), Take 2 tablets by mouth every morning, Disp: , Rfl:   •  Melatonin 10 MG TABS, Take by mouth daily at bedtime, Disp: , Rfl:   •  omeprazole (PriLOSEC) 20 mg delayed release capsule, Take 1 capsule (20 mg total) by mouth in the morning, Disp: 90 capsule, Rfl: 0  •  oxyCODONE-acetaminophen (Percocet) 7 5-325 MG per tablet, Take 1 tablet by mouth every 4 (four) hours as needed for moderate pain Max Daily Amount: 6 tablets, Disp: 120 tablet, Rfl: 0  •  perindopril (ACEON) 8 MG tablet, 8 mg 2 (two) times a day, Disp: , Rfl:   •  zinc gluconate 50 mg tablet, Take 50 mg by mouth daily, Disp: , Rfl:       Objective:    Vitals:   /80 (BP Location: Right arm, Patient Position: Sitting, Cuff Size: Large)   Pulse 95   Ht 5' 3" (1 6 m)   Wt 104 kg (230 lb)   SpO2 97%   BMI 40 74 kg/m²   Body mass index is 40 74 kg/m²  Vitals:    11/25/22 0851   Weight: 104 kg (230 lb)       Physical Exam  Vitals and nursing note reviewed  Constitutional:       Appearance: Normal appearance  Cardiovascular:      Rate and Rhythm: Normal rate and regular rhythm  Heart sounds: Normal heart sounds  Pulmonary:      Effort: Pulmonary effort is normal       Breath sounds: Normal breath sounds  Abdominal:      General: Abdomen is flat  Palpations: Abdomen is soft  Musculoskeletal:      Cervical back: Normal range of motion and neck supple  Right lower leg: No edema  Left lower leg: No edema  Comments: SLR about 30° on the right and 40 on the left   Neurological:      Mental Status: She is alert and oriented to person, place, and time           Lab Review   Orders Only on 10/07/2022   Component Date Value Ref Range Status   • Glucose, Random 10/07/2022 94  70 - 99 mg/dL Final                  **Please note reference interval change**   • BUN 10/07/2022 19  8 - 27 mg/dL Final   • Creatinine 10/07/2022 0 51 (L)  0 57 - 1 00 mg/dL Final   • eGFR 10/07/2022 102  >59 mL/min/1 73 Final   • SL AMB BUN/CREATININE RATIO 10/07/2022 37 (H)  12 - 28 Final   • Sodium 10/07/2022 140  134 - 144 mmol/L Final   • Potassium 10/07/2022 4 7  3 5 - 5 2 mmol/L Final   • Chloride 10/07/2022 101  96 - 106 mmol/L Final   • CO2 10/07/2022 26  20 - 29 mmol/L Final   • CALCIUM 10/07/2022 10 0  8 7 - 10 3 mg/dL Final   • Protein, Total 10/07/2022 6 9  6 0 - 8 5 g/dL Final   • Albumin 10/07/2022 4 6  3 8 - 4 8 g/dL Final   • Globulin, Total 10/07/2022 2 3  1 5 - 4 5 g/dL Final   • Albumin/Globulin Ratio 10/07/2022 2 0  1 2 - 2 2 Final   • TOTAL BILIRUBIN 10/07/2022 <0 2  0 0 - 1 2 mg/dL Final   • Alk Phos Isoenzymes 10/07/2022 96  44 - 121 IU/L Final   • AST 10/07/2022 23  0 - 40 IU/L Final   • ALT 10/07/2022 17  0 - 32 IU/L Final   • PTH, Intact 10/07/2022 44  15 - 65 pg/mL Final         Gustabo Sy MD        "This note has been constructed using a voice recognition system  Therefore there may be syntax, spelling, and/or grammatical errors   Please call if you have any questions  "

## 2022-12-22 ENCOUNTER — HOSPITAL ENCOUNTER (OUTPATIENT)
Dept: RADIOLOGY | Facility: HOSPITAL | Age: 67
Discharge: HOME | End: 2022-12-22

## 2022-12-22 ENCOUNTER — TELEPHONE (OUTPATIENT)
Dept: FAMILY MEDICINE CLINIC | Facility: CLINIC | Age: 67
End: 2022-12-22

## 2022-12-22 DIAGNOSIS — M79.641 PAIN IN RIGHT HAND: Primary | ICD-10-CM

## 2022-12-22 DIAGNOSIS — M79.641 PAIN IN RIGHT HAND: ICD-10-CM

## 2022-12-23 ENCOUNTER — OFFICE VISIT (OUTPATIENT)
Dept: FAMILY MEDICINE CLINIC | Facility: CLINIC | Age: 67
End: 2022-12-23

## 2022-12-23 ENCOUNTER — OFFICE VISIT (OUTPATIENT)
Dept: OBGYN CLINIC | Facility: CLINIC | Age: 67
End: 2022-12-23

## 2022-12-23 VITALS
HEART RATE: 83 BPM | BODY MASS INDEX: 40.93 KG/M2 | OXYGEN SATURATION: 96 % | WEIGHT: 231 LBS | SYSTOLIC BLOOD PRESSURE: 148 MMHG | HEIGHT: 63 IN | DIASTOLIC BLOOD PRESSURE: 86 MMHG

## 2022-12-23 VITALS
BODY MASS INDEX: 40.93 KG/M2 | DIASTOLIC BLOOD PRESSURE: 74 MMHG | SYSTOLIC BLOOD PRESSURE: 186 MMHG | WEIGHT: 231 LBS | HEIGHT: 63 IN | HEART RATE: 90 BPM

## 2022-12-23 DIAGNOSIS — M54.50 CHRONIC RIGHT-SIDED LOW BACK PAIN WITHOUT SCIATICA: ICD-10-CM

## 2022-12-23 DIAGNOSIS — F41.9 ANXIETY: ICD-10-CM

## 2022-12-23 DIAGNOSIS — E66.01 MORBID OBESITY WITH BMI OF 40.0-44.9, ADULT (HCC): ICD-10-CM

## 2022-12-23 DIAGNOSIS — M25.531 PAIN AND SWELLING OF RIGHT WRIST: ICD-10-CM

## 2022-12-23 DIAGNOSIS — M79.641 PAIN IN RIGHT HAND: Primary | ICD-10-CM

## 2022-12-23 DIAGNOSIS — F11.20 CONTINUOUS OPIOID DEPENDENCE (HCC): ICD-10-CM

## 2022-12-23 DIAGNOSIS — G89.4 CHRONIC PAIN SYNDROME: ICD-10-CM

## 2022-12-23 DIAGNOSIS — I10 PRIMARY HYPERTENSION: ICD-10-CM

## 2022-12-23 DIAGNOSIS — G89.29 CHRONIC RIGHT-SIDED LOW BACK PAIN WITHOUT SCIATICA: ICD-10-CM

## 2022-12-23 DIAGNOSIS — M19.91 PRIMARY OSTEOARTHRITIS, UNSPECIFIED SITE: ICD-10-CM

## 2022-12-23 DIAGNOSIS — K21.9 GASTROESOPHAGEAL REFLUX DISEASE WITHOUT ESOPHAGITIS: ICD-10-CM

## 2022-12-23 DIAGNOSIS — M25.431 PAIN AND SWELLING OF RIGHT WRIST: ICD-10-CM

## 2022-12-23 DIAGNOSIS — M25.531 PAIN IN RIGHT WRIST: Primary | ICD-10-CM

## 2022-12-23 RX ORDER — OXYCODONE AND ACETAMINOPHEN 7.5; 325 MG/1; MG/1
1 TABLET ORAL EVERY 4 HOURS PRN
Qty: 120 TABLET | Refills: 0 | Status: SHIPPED | OUTPATIENT
Start: 2022-12-23

## 2022-12-23 NOTE — ASSESSMENT & PLAN NOTE
Blood pressure is slightly high today at 148/86 most likely because of the pain she is having at this time will monitor currently she is taking Aceon 8 mg 2 times a day

## 2022-12-23 NOTE — PROGRESS NOTES
Office Visit Note  22     Rashard Green 79 y o  female MRN: [id number]  : 1955    Assessment:     1  Chronic pain syndrome  Assessment & Plan:  Patient with chronic pain syndrome low back area being followed by the pain management physician had received injections in the past   Patient currently taking Percocet 7 5 325 every 4 hours as needed for pain  Side effects of pain medication dependence again discussed with the patient recommend very strongly to follow-up with a pain management physician also  Orders:  -     oxyCODONE-acetaminophen (Percocet) 7 5-325 MG per tablet; Take 1 tablet by mouth every 4 (four) hours as needed for moderate pain Max Daily Amount: 6 tablets    2  Anxiety    3  Continuous opioid dependence ([facility] )  Assessment & Plan:  Patient is on pain medications for long period of time discussed with the patient regarding the same and also to follow-up with a pain management      4  Gastroesophageal reflux disease without esophagitis  Assessment & Plan:  Patient with GERD symptoms currently taking omeprazole 20 mg we will continue the same      5  Primary hypertension  Assessment & Plan:  Blood pressure is slightly high today at 148/86 most likely because of the pain she is having at this time will monitor currently she is taking Aceon 8 mg 2 times a day      6  Chronic right-sided low back pain without sciatica    7  Morbid obesity with BMI of 40 0-44 9, adult ([facility] )    8  Primary osteoarthritis, unspecified site  Assessment & Plan:  Patient with arthritis in multiple joints s/p gastric sleeve avoiding nonsteroidals continue as needed pain medication oxycodone      9  Pain in right hand  Assessment & Plan:  Patient with swelling right hand with difficulty movements of the wrist now x-rays of the hand showing some arthritis in the right thumb area  Exam showing swelling of the hand on the dorsum and the wrist area movements of the wrist causing discomfort    We will get x-ray of the wrist also  Meanwhile take the pain medication oxycodone trying to avoid nonsteroidals because of the gastric surgery  We will refer the patient to the orthopedics also who had seen her in the past for the trigger finger on the right hand  Meanwhile she can use an Ace bandage to reduce the movements in the wrist area  Patient already on pain medication for her chronic pain syndrome  10  Pain and swelling of right wrist  -     XR wrist 3+ vw right; Future; Expected date: 12/23/2022        Discussion Summary and Plan: Today's care plan and medications were reviewed with patient in detail and all their questions answered to their satisfaction  Chief Complaint   Patient presents with   • Follow-up     F/U and right hand pain, went to have x-rays  Matheus Conn        Subjective:  Patient is coming here for evaluation regarding pain discomfort in the right hand started last Saturday she does not recall injury to the hand but not sure  She noticed swelling pain gradually increased she has been using ice and pain medication but still the pain is present she has taken some aspirin is also  She had x-rays taken of the right hand it had shown evidence of arthritis in the right thumb area including the first carpometacarpal articulation  They have not commented anything on the wrist   Patient now noticing discomfort in the wrist area also with swelling  There is no history of gout  The following portions of the patient's history were reviewed and updated as appropriate: allergies, current medications, past family history, past medical history, past social history, past surgical history and problem list     Review of Systems   Constitutional: Negative for chills and fever  HENT: Negative for ear pain and sore throat  Eyes: Negative for pain and visual disturbance  Respiratory: Negative for cough and shortness of breath  Cardiovascular: Negative for chest pain and palpitations  Gastrointestinal: Negative for abdominal pain and vomiting  Genitourinary: Negative for dysuria and hematuria  Musculoskeletal: Positive for arthralgias and back pain  Pain and swelling of left hand and wrist   Skin: Negative for color change and rash  Neurological: Negative for seizures and syncope  All other systems reviewed and are negative          Historical Information   Patient Active Problem List   Diagnosis   • Chronic pain syndrome   • Low back pain   • Spondylosis of lumbar region without myelopathy or radiculopathy   • Spondylolisthesis of lumbar region   • Lumbar spondylosis   • Primary osteoarthritis of right shoulder   • Morbid obesity with BMI of 40 0-44 9, adult ([facility] )   • H/O bariatric surgery   • Hypertension   • Gastroesophageal reflux disease   • Sacroiliitis, not elsewhere classified ([facility] )   • Continuous [facility] dependence ([facility]   • Primary osteoarthritis   • Anxiety   • Hypercalcemia   • Dyslipidemia   • Pain in right hand     Past Medical History:   Diagnosis Date   • Anxiety    • Apnea, sleep     patient states "repeated test says no"   • Arthritis     severe in the joints   • Chronic pain disorder     joints   • DJD (degenerative joint disease)    • Fall 11/22/2018    tripped on a folded rug- left hamstring injury   • Foot injury     right foot-fx of bone-no repair-has severe arthritis in the foot and ankle   • History of sleep apnea    • Hypercholesteremia    • Hypertension    • Insomnia    • Low back pain    • Obesity     Lost 70lb with gastric sleeve (2011)   • Osteoarthritis    • Wears glasses    • Wears partial dentures     upper and lower-but did lose the lower      Past Surgical History:   Procedure Laterality Date   • ANKLE ARTHROSCOPY Right    • APPENDECTOMY     • CHOLECYSTECTOMY     • COLONOSCOPY  10/26/2021   • DXA PROCEDURE (HISTORICAL)  10/13/2020   • GASTRECTOMY SLEEVE LAPAROSCOPIC  2011    lost 70 lb   • JOINT REPLACEMENT      blateral knee replacements   • MAMMO (HISTORICAL)  2022   • NERVE BLOCK Bilateral 2017    Procedure: L4, L5, S1 MEDIAL BRANCH BLOCK #1;  Surgeon: Dena López MD;  Location: [facility] MAIN OR;  Service: Pain Management    • NERVE BLOCK Bilateral 2018    Procedure: BLOCK MEDIAL BRANCH L4, L5-S1  #2;  Surgeon: Dena López MD;  Location: [facility] MAIN OR;  Service: Pain Management    • CT INCISE FINGER TENDON SHEATH Right 2021    Procedure: RELEASE TRIGGER RING FINGER;  Surgeon: Zina Lawrence MD;  Location: [address] [place] [address];  Service: Orthopedics   • CT INJECTION,SACROILIAC JOINT Right 10/23/2020    Procedure: Sacroiliac Joint injection (51608); Surgeon: Dena López MD;  Location: [facility] MAIN OR;  Service: Pain Management    • CT RECONSTR TOTAL SHOULDER IMPLANT Right 2019    Procedure: ARTHROPLASTY SHOULDER TOTAL , excision of deep cyst at shoulder, biceps tenotomy;  Surgeon: Lynne Hdz MD;  Location: [address] [place] [address];  Service: Orthopedics   • RADIOFREQUENCY ABLATION Right 2018    Procedure: Rt L4 L5 S1 RFA;  Surgeon: Dena López MD;  Location: [facility] MAIN OR;  Service: Pain Management    • RADIOFREQUENCY ABLATION Left 2018    Procedure: L3-S1 Radio Frequency Ablation (13918 [id number]);   Surgeon: Dena López MD;  Location: [facility] MAIN OR;  Service: Pain Management    • RHIZOTOMY Left 2018    Procedure: L4, L5, S1 RFA;  Surgeon: Dena López MD;  Location: [facility] MAIN OR;  Service: Pain Management    • TONSILLECTOMY       Social History     Substance and Sexual Activity   Alcohol Use Never     Social History     Substance and Sexual Activity   Drug Use Never     Social History     Tobacco Use   Smoking Status Former   • Types: Cigarettes   • Quit date:    • Years since quittin 9   Smokeless Tobacco Never     Family History   Problem Relation Age of Onset   • Other Mother         in a nursing home-fx of the leg   • Heart disease Father         CHF   • Alcohol abuse Father    • Cirrhosis Father    • Other Brother         lung abscess, pneumonia   • Colon cancer Maternal Aunt    • No Known Problems Maternal Uncle    • No Known Problems Paternal Aunt    • No Known Problems Paternal Uncle    • Colon cancer Maternal Grandmother    • No Known Problems Maternal Grandfather    • No Known Problems Paternal Grandmother    • No Known Problems Paternal Grandfather    • No Known Problems Maternal Aunt    • No Known Problems Maternal Aunt    • Breast cancer Cousin 48     Health Maintenance Due   Topic   • Hepatitis C Screening    • Medicare Annual Wellness Visit (AWV)    • Hepatitis B Vaccine (1 of 3 - 3-dose series)   • Pneumococcal Vaccine: 65+ Years (1 - PCV)   • COVID-19 Vaccine (3 - Booster for Moderna series)   • Breast Cancer Screening: Mammogram       Meds/Allergies       Current Outpatient Medications:   •  acetaminophen (TYLENOL) 650 mg CR tablet, Take 650 mg by mouth every 8 (eight) hours as needed for mild pain, Disp: , Rfl:   •  amLODIPine (NORVASC) 5 mg tablet, , Disp: , Rfl:   •  ascorbic acid (VITAMIN C) 500 mg tablet, Take 500 mg by mouth every morning, Disp: , Rfl:   •  clindamycin (CLEOCIN) 300 MG capsule, as needed Pre dental procedures, Disp: , Rfl:   •  Coenzyme Q10 (CO Q 10) 100 MG CAPS, Take by mouth every morning Last dose 5/13/19, Disp: , Rfl:   •  Cyanocobalamin (VITAMIN B 12 PO), Take 500 mcg by mouth every morning, Disp: , Rfl:   •  diphenhydrAMINE (BENADRYL) 25 mg tablet, Take 25 mg by mouth every 8 (eight) hours as needed for itching , Disp: , Rfl:   •  escitalopram (LEXAPRO) 10 mg tablet, 15 mg daily at bedtime 1 5 tab bolton, Disp: , Rfl:   •  Glucosamine-Chondroitin (MOVE FREE PO), Take 2 tablets by mouth every morning, Disp: , Rfl:   •  Melatonin 10 MG TABS, Take by mouth daily at bedtime, Disp: , Rfl:   •  omeprazole (PriLOSEC) 20 mg delayed release capsule, TAKE 1 CAPSULE BY MOUTH IN  THE MORNING, Disp: 90 capsule, Rfl: 1  •  oxyCODONE-acetaminophen (Percocet) 7 5-325 MG per tablet, Take 1 tablet by mouth every 4 (four) hours as needed for moderate pain Max Daily Amount: 6 tablets, Disp: 120 tablet, Rfl: 0  •  perindopril (ACEON) 8 MG tablet, 8 mg 2 (two) times a day, Disp: , Rfl:   •  zinc gluconate 50 mg tablet, Take 50 mg by mouth daily, Disp: , Rfl:       Objective:    Vitals:   /86 (BP Location: Left arm, Patient Position: Sitting, Cuff Size: Large)   Pulse 83   Ht 5' 3" (1 6 m) Comment: per patient  Wt 105 kg (231 lb)   SpO2 96%   BMI 40 92 kg/m²   Body mass index is 40 92 kg/m²  Vitals:    12/23/22 0840   Weight: 105 kg (231 lb)       Physical Exam  Vitals and nursing note reviewed  Constitutional:       Appearance: Normal appearance  Cardiovascular:      Rate and Rhythm: Normal rate and regular rhythm  Heart sounds: Normal heart sounds  Pulmonary:      Effort: Pulmonary effort is normal       Breath sounds: Normal breath sounds  Musculoskeletal:      Cervical back: Normal range of motion and neck supple  Right lower leg: No edema  Left lower leg: No edema  Comments: Right hand swollen, wrist also swollen movements causing pain and discomfort x-ray findings noted no history of gout we will order x-ray of the wrist ice Ace bandage follow-up with the orthopedics   Neurological:      Mental Status: She is alert and oriented to person, place, and time  Lab Review   No visits with results within 2 Month(s) from this visit     Latest known visit with results is:   Orders Only on 10/07/2022   Component Date Value Ref Range Status   • Glucose, Random 10/07/2022 94  70 - 99 mg/dL Final                  **Please note reference interval change**   • BUN 10/07/2022 19  8 - 27 mg/dL Final   • Creatinine 10/07/2022 0 51 (L)  0 57 - 1 00 mg/dL Final   • eGFR 10/07/2022 102  >59 mL/min/1 73 Final   • SL AMB BUN/CREATININE RATIO 10/07/2022 37 (H)  12 - 28 Final   • Sodium 10/07/2022 140  134 - 144 mmol/L Final   • Potassium 10/07/2022 4 7  3 5 - 5 2 mmol/L Final   • Chloride 10/07/2022 101  96 - 106 mmol/L Final   • CO2 10/07/2022 26  20 - 29 mmol/L Final   • CALCIUM 10/07/2022 10 0  8 7 - 10 3 mg/dL Final   • Protein, Total 10/07/2022 6 9  6 0 - 8 5 g/dL Final   • Albumin 10/07/2022 4 6  3 8 - 4 8 g/dL Final   • Globulin, Total 10/07/2022 2 3  1 5 - 4 5 g/dL Final   • Albumin/Globulin Ratio 10/07/2022 2 0  1 2 - 2 2 Final   • TOTAL BILIRUBIN 10/07/2022 <0 2  0 0 - 1 2 mg/dL Final   • Alk Phos Isoenzymes 10/07/2022 96  44 - 121 IU/L Final   • AST 10/07/2022 23  0 - 40 IU/L Final   • ALT 10/07/2022 17  0 - 32 IU/L Final   • PTH, Intact 10/07/2022 44  15 - 65 pg/mL Final         Gustabo Sy MD        "This note has been constructed using a voice recognition system  Therefore there may be syntax, spelling, and/or grammatical errors   Please call if you have any questions  "

## 2022-12-23 NOTE — ASSESSMENT & PLAN NOTE
Patient with chronic pain syndrome low back area being followed by the pain management physician had received injections in the past   Patient currently taking Percocet 7 5 325 every 4 hours as needed for pain  Side effects of pain medication dependence again discussed with the patient recommend very strongly to follow-up with a pain management physician also

## 2022-12-23 NOTE — PROGRESS NOTES
Assessment/Plan:  1  Pain in right wrist          Despite no obvious trauma, the patient does have a bony abnormality about the dorsal right wrist in the region of her pain  Again, I did review these images with Dr Reshma Jordan today who agreed with this abnormality  He did advise having the patient see 1 of our hand specialists  We will help set this appointment up today  I did advise the patient to contact us immediately if she starts to develop any erythema about the dorsum of the hand  Today, she does not have any clear signs of infection  I did provide her with a cock-up wrist brace today  She can ice and take over-the-counter medications as needed for discomfort  She will follow up with our hand specialist as soon as possible  Subjective:   Gregg Nieves is a 79 y o  female who presents today for evaluation of her right wrist   She notes that last Saturday she started with pain about the dorsal right wrist and hand  She denies any inciting event prior to the onset of her symptoms, however she did wash her dog around when her symptoms started  She does not recall any fall or significant injury though  She notes her symptoms have progressed since Saturday  She did have an x-ray of the hand prior to seeing me which was read as normal by Radiology  She again complains of pain about the dorsal wrist, which can radiate into the dorsal hand at times  This is worse with extension of the wrist and activity and better with rest   She notes good sensation into the hand  She denies any erythema about the hand but does complain of swelling dorsally  She denies any fevers/chills  Review of Systems   Constitutional: Negative  Negative for chills and fever  HENT: Negative  Negative for ear pain and sore throat  Eyes: Negative  Negative for pain and redness  Respiratory: Negative  Negative for shortness of breath and wheezing  Cardiovascular: Negative for chest pain and palpitations  Gastrointestinal: Negative  Negative for abdominal pain and blood in stool  Endocrine: Negative  Negative for polydipsia and polyuria  Genitourinary: Negative  Negative for difficulty urinating and dysuria  Musculoskeletal:        As noted in HPI   Skin: Negative  Negative for pallor and rash  Neurological: Negative  Negative for dizziness and numbness  Hematological: Negative  Negative for adenopathy  Does not bruise/bleed easily  Psychiatric/Behavioral: Negative  Negative for confusion and suicidal ideas           Past Medical History:   Diagnosis Date   • Anxiety    • Apnea, sleep     patient states "repeated test says no"   • Arthritis     severe in the joints   • Chronic pain disorder     joints   • DJD (degenerative joint disease)    • Fall 11/22/2018    tripped on a folded rug- left hamstring injury   • Foot injury     right foot-fx of bone-no repair-has severe arthritis in the foot and ankle   • History of sleep apnea    • Hypercholesteremia    • Hypertension    • Insomnia    • Low back pain    • Obesity     Lost 70lb with gastric sleeve (2011)   • Osteoarthritis    • Wears glasses    • Wears partial dentures     upper and lower-but did lose the lower        Past Surgical History:   Procedure Laterality Date   • ANKLE ARTHROSCOPY Right    • APPENDECTOMY     • CHOLECYSTECTOMY     • COLONOSCOPY  10/26/2021   • DXA PROCEDURE (HISTORICAL)  10/13/2020   • GASTRECTOMY SLEEVE LAPAROSCOPIC  2011    lost 70 lb   • JOINT REPLACEMENT      blateral knee replacements   • MAMMO (HISTORICAL)  01/26/2022   • NERVE BLOCK Bilateral 12/29/2017    Procedure: L4, L5, S1 MEDIAL BRANCH BLOCK #1;  Surgeon: Casey Sheldon MD;  Location: [place] [address] MAIN OR;  Service: Pain Management    • NERVE BLOCK Bilateral 01/12/2018    Procedure: BLOCK MEDIAL BRANCH L4, L5-S1  #2;  Surgeon: Casey Sheldon MD;  Location: [place] [address] MAIN OR;  Service: Pain Management    • [place] INCISE FINGER TENDON SHEATH Right 12/21/2021    Procedure: RELEASE TRIGGER RING FINGER;  Surgeon: Mily Perez MD;  Location: [address] [place] [address];  Service: Orthopedics   • VA INJECTION,SACROILIAC JOINT Right 10/23/2020    Procedure: Sacroiliac Joint injection (08302); Surgeon: Nura English MD;  Location: [facility] MAIN OR;  Service: Pain Management    • VA RECONSTR TOTAL SHOULDER IMPLANT Right 2019    Procedure: ARTHROPLASTY SHOULDER TOTAL , excision of deep cyst at shoulder, biceps tenotomy;  Surgeon: Sameer Nunez MD;  Location: [address] [place] [address];  Service: Orthopedics   • RADIOFREQUENCY ABLATION Right 2018    Procedure: Rt L4 L5 S1 RFA;  Surgeon: Nura English MD;  Location: [facility] MAIN OR;  Service: Pain Management    • RADIOFREQUENCY ABLATION Left 2018    Procedure: L3-S1 Radio Frequency Ablation (46043 [id number]);   Surgeon: Nura English MD;  Location: [facility] MAIN OR;  Service: Pain Management    • RHIZOTOMY Left 2018    Procedure: L4, L5, S1 RFA;  Surgeon: Nura English MD;  Location: [facility] MAIN OR;  Service: Pain Management    • TONSILLECTOMY         Family History   Problem Relation Age of Onset   • Other Mother         in a nursing home-fx of the leg   • Heart disease Father         CHF   • Alcohol abuse Father    • Cirrhosis Father    • Other Brother         lung abscess, pneumonia   • Colon cancer Maternal Aunt    • No Known Problems Maternal Uncle    • No Known Problems Paternal Aunt    • No Known Problems Paternal Uncle    • Colon cancer Maternal Grandmother    • No Known Problems Maternal Grandfather    • No Known Problems Paternal Grandmother    • No Known Problems Paternal Grandfather    • No Known Problems Maternal Aunt    • No Known Problems Maternal Aunt    • Breast cancer Cousin 48       Social History     Occupational History   • Not on file   Tobacco Use   • Smoking status: Former     Types: Cigarettes     Quit date:      Years since quittin 9   • Smokeless tobacco: Never   Vaping Use   • Vaping Use: Never used Substance and Sexual Activity   • Alcohol use: Never   • Drug use: Never   • Sexual activity: Not on file         Current Outpatient Medications:   •  acetaminophen (TYLENOL) 650 mg CR tablet, Take 650 mg by mouth every 8 (eight) hours as needed for mild pain, Disp: , Rfl:   •  amLODIPine (NORVASC) 5 mg tablet, , Disp: , Rfl:   •  ascorbic acid (VITAMIN C) 500 mg tablet, Take 500 mg by mouth every morning, Disp: , Rfl:   •  clindamycin (CLEOCIN) 300 MG capsule, as needed Pre dental procedures, Disp: , Rfl:   •  Coenzyme Q10 (CO Q 10) 100 MG CAPS, Take by mouth every morning Last dose 5/13/19, Disp: , Rfl:   •  Cyanocobalamin (VITAMIN B 12 PO), Take 500 mcg by mouth every morning, Disp: , Rfl:   •  diphenhydrAMINE (BENADRYL) 25 mg tablet, Take 25 mg by mouth every 8 (eight) hours as needed for itching , Disp: , Rfl:   •  escitalopram (LEXAPRO) 10 mg tablet, 15 mg daily at bedtime 1 5 tab bolton, Disp: , Rfl:   •  Glucosamine-Chondroitin (MOVE FREE PO), Take 2 tablets by mouth every morning, Disp: , Rfl:   •  Melatonin 10 MG TABS, Take by mouth daily at bedtime, Disp: , Rfl:   •  omeprazole (PriLOSEC) 20 mg delayed release capsule, TAKE 1 CAPSULE BY MOUTH IN  THE MORNING, Disp: 90 capsule, Rfl: 1  •  oxyCODONE-acetaminophen (Percocet) 7 5-325 MG per tablet, Take 1 tablet by mouth every 4 (four) hours as needed for moderate pain Max Daily Amount: 6 tablets, Disp: 120 tablet, Rfl: 0  •  perindopril (ACEON) 8 MG tablet, 8 mg 2 (two) times a day, Disp: , Rfl:   •  zinc gluconate 50 mg tablet, Take 50 mg by mouth daily, Disp: , Rfl:     Allergies   Allergen Reactions   • Penicillins Anaphylaxis     Total body redness and fever   • Levaquin [Levofloxacin] Itching       Objective:  Vitals:    12/23/22 1000   BP: (!) 186/74   Pulse: 90       Right Hand Exam     Tenderness   Right hand tenderness location: Tenderness dorsal wrist   More mild tenderness dorsal hand      Range of Motion   Wrist   Extension: 5 (with significant pain)   Flexion: 40   Pronation: normal   Supination: normal     Other   Erythema: absent  Sensation: normal  Pulse: present    Comments:  Minimal discomfort with passive and active extension of the digits  Physical Exam  Constitutional:       General: She is not in acute distress  Appearance: She is well-developed  HENT:      Head: Normocephalic and atraumatic  Eyes:      General: No scleral icterus  Conjunctiva/sclera: Conjunctivae normal    Neck:      Vascular: No JVD  Cardiovascular:      Rate and Rhythm: Normal rate  Pulmonary:      Effort: Pulmonary effort is normal  No respiratory distress  Skin:     General: Skin is warm  Neurological:      Mental Status: She is alert and oriented to person, place, and time  Coordination: Coordination normal          I have personally reviewed pertinent films in PACS and my interpretation is as follows:  X-rays right hand: The red normal by Radiology, there is a bony abnormality about the dorsal wrist   Dr Bety Cooper did view these images today and agreed with this assessment  This document was created using speech voice recognition software  Grammatical errors, random word insertions, pronoun errors, and incomplete sentences are an occasional consequence of this system due to software limitations, ambient noise, and hardware issues  Any formal questions or concerns about content, text, or information contained within the body of this dictation should be directly addressed to the provider for clarification

## 2022-12-23 NOTE — ASSESSMENT & PLAN NOTE
Patient is on pain medications for long period of time discussed with the patient regarding the same and also to follow-up with a pain management

## 2022-12-23 NOTE — ASSESSMENT & PLAN NOTE
Patient with arthritis in multiple joints s/p gastric sleeve avoiding nonsteroidals continue as needed pain medication oxycodone

## 2022-12-23 NOTE — ASSESSMENT & PLAN NOTE
Patient with swelling right hand with difficulty movements of the wrist now x-rays of the hand showing some arthritis in the right thumb area  Exam showing swelling of the hand on the dorsum and the wrist area movements of the wrist causing discomfort  We will get x-ray of the wrist also  Meanwhile take the pain medication oxycodone trying to avoid nonsteroidals because of the gastric surgery  We will refer the patient to the orthopedics also who had seen her in the past for the trigger finger on the right hand  Meanwhile she can use an Ace bandage to reduce the movements in the wrist area  Patient already on pain medication for her chronic pain syndrome

## 2022-12-30 ENCOUNTER — OFFICE VISIT (OUTPATIENT)
Dept: OBGYN CLINIC | Facility: CLINIC | Age: 67
End: 2022-12-30

## 2022-12-30 VITALS
SYSTOLIC BLOOD PRESSURE: 180 MMHG | HEIGHT: 63 IN | DIASTOLIC BLOOD PRESSURE: 99 MMHG | HEART RATE: 88 BPM | BODY MASS INDEX: 40.93 KG/M2 | WEIGHT: 231 LBS | OXYGEN SATURATION: 99 %

## 2022-12-30 DIAGNOSIS — S63.501A SPRAIN OF RIGHT WRIST, INITIAL ENCOUNTER: Primary | ICD-10-CM

## 2022-12-30 NOTE — PROGRESS NOTES
ORTHOPAEDIC HAND, WRIST, AND ELBOW OFFICE  VISIT       ASSESSMENT/PLAN:      Diagnoses and all orders for this visit:    Sprain of right wrist, initial encounter    79year old female with right wrist sprain in the setting of an old triquetral fracture  We discussed pt's xrays with her today and that while she does have a triquetral fracture, it appears to be old in nature with well-rounded edges  Therefore, she likely just tweeked this area with the new injury  Since she already is feeling much better, this should continue to heal without surgery  Pt can use the brace as needed and work on ROM of the wrist  Call if pain does not improve or worsens  The patient verbalized understanding of exam findings and treatment plan  We engaged in the shared decision-making process and treatment options were discussed at length with the patient  Surgical and conservative management discussed today along with risks and benefits  Follow Up:  Pt elects PRN        General Discussions:  Fracture - Nonoperative Care: The physiology of a fractured bone was discussed with the patient today  With non-displaced or minimally displaced fractures, conservative treatment such as casting or splinting often results in a functional recovery  Typically, these fractures are immobilized in either a cast or splint depending on the pattern  Radiographs are typically taken at intervals throughout the fracture healing to ensure that reduction or alignment is not lost   If the fracture loses its alignment, surgical intervention may be required to stabilize it  Medical conditions such as diabetes, osteoporosis, vitamin D deficiency, and a history of or exposure to smoking may delay or prevent fracture healing  Options between cast/splint immobilization and surgical treatment were offered and the risks and benefits of both were discussed         Missy Blake MD  Attending, Orthopaedic Surgery  Hand, Wrist, and Elbow Surgery  [facility]    ____________________________________________________________________________________________________________________________________________      CHIEF COMPLAINT:  Chief Complaint   Patient presents with   • Right Wrist - Pain   • Right Hand - Pain       SUBJECTIVE:  Mikey Ortiz is a 79y o  year old LHD female who presents today at the request of Tyrell Mejía PA-C for evaluation and treatment of right wrist pain  Pt states this started on 12/17/22  Believes she may have hit the wrist while giving her dog a bath  She was seen by Niurka Castle who remarked on pt's abnormal wrist xrays and wanted her to be evaluated by hand  She was given a cock up wrist splint and states this helped a lot  A few days ago, her pain and swelling started to improve tremendously and she feels like she is 90% better  At this time, pt only wears the brace when out and about         I have personally reviewed all the relevant PMH, PSH, SH, FH, Medications and allergies      PAST MEDICAL HISTORY:  Past Medical History:   Diagnosis Date   • Anxiety    • Apnea, sleep     patient states "repeated test says no"   • Arthritis     severe in the joints   • Chronic pain disorder     joints   • DJD (degenerative joint disease)    • Fall 11/22/2018    tripped on a folded rug- left hamstring injury   • Foot injury     right foot-fx of bone-no repair-has severe arthritis in the foot and ankle   • History of sleep apnea    • Hypercholesteremia    • Hypertension    • Insomnia    • Low back pain    • Obesity     Lost 70lb with gastric sleeve (2011)   • Osteoarthritis    • Wears glasses    • Wears partial dentures     upper and lower-but did lose the lower        PAST SURGICAL HISTORY:  Past Surgical History:   Procedure Laterality Date   • ANKLE ARTHROSCOPY Right    • APPENDECTOMY     • CHOLECYSTECTOMY     • COLONOSCOPY  10/26/2021   • DXA PROCEDURE (HISTORICAL)  10/13/2020   • GASTRECTOMY SLEEVE LAPAROSCOPIC  2011    lost 70 lb   • JOINT REPLACEMENT      blateral knee replacements   • MAMMO (HISTORICAL)  01/26/2022   • NERVE BLOCK Bilateral 12/29/2017    Procedure: L4, L5, S1 MEDIAL BRANCH BLOCK #1;  Surgeon: Silvia Nj MD;  Location: [facility] MAIN OR;  Service: Pain Management    • NERVE BLOCK Bilateral 01/12/2018    Procedure: BLOCK MEDIAL BRANCH L4, L5-S1  #2;  Surgeon: Silvia Nj MD;  Location: [facility] MAIN OR;  Service: Pain Management    • RI ARTHROPLASTY GLENOHUMERAL JOINT TOTAL SHOULDER Right 05/23/2019    Procedure: ARTHROPLASTY SHOULDER TOTAL , excision of deep cyst at shoulder, biceps tenotomy;  Surgeon: Vimal Davis MD;  Location: [address] [place] [address];  Service: Orthopedics   • RI INJECT SI JOINT ARTHRGRPHY&/ANES/STEROID W/LESA Right 10/23/2020    Procedure: Sacroiliac Joint injection (91389); Surgeon: Silvia Nj MD;  Location: [facility] MAIN OR;  Service: Pain Management    • RI TENDON SHEATH INCISION Right 12/21/2021    Procedure: RELEASE TRIGGER RING FINGER;  Surgeon: Anjali Sidhu MD;  Location: [address] [place] [address];  Service: Orthopedics   • RADIOFREQUENCY ABLATION Right 03/16/2018    Procedure: Rt L4 L5 S1 RFA;  Surgeon: Silvia Nj MD;  Location: [facility] MAIN OR;  Service: Pain Management    • RADIOFREQUENCY ABLATION Left 12/14/2018    Procedure: L3-S1 Radio Frequency Ablation ([id number]);   Surgeon: Silvia Nj MD;  Location: [facility] MAIN OR;  Service: Pain Management    • RHIZOTOMY Left 01/25/2018    Procedure: L4, L5, S1 RFA;  Surgeon: Silvia Nj MD;  Location: [facility] MAIN OR;  Service: Pain Management    • TONSILLECTOMY         FAMILY HISTORY:  Family History   Problem Relation Age of Onset   • Other Mother         in a nursing home-fx of the leg   • Heart disease Father         CHF   • Alcohol abuse Father    • Cirrhosis Father    • Other Brother         lung abscess, pneumonia   • Colon cancer Maternal Aunt    • No Known Problems Maternal Uncle    • No Known Problems Paternal Aunt    • No Known Problems Paternal Uncle    • Colon cancer Maternal Grandmother    • No Known Problems Maternal Grandfather    • No Known Problems Paternal Grandmother    • No Known Problems Paternal Grandfather    • No Known Problems Maternal Aunt    • No Known Problems Maternal Aunt    • Breast cancer Cousin 48       SOCIAL HISTORY:  Social History     Tobacco Use   • Smoking status: Former     Types: Cigarettes     Quit date:      Years since quittin 0   • Smokeless tobacco: Never   Vaping Use   • Vaping Use: Never used   Substance Use Topics   • Alcohol use: Never   • Drug use: Never       MEDICATIONS:    Current Outpatient Medications:   •  acetaminophen (TYLENOL) 650 mg CR tablet, Take 650 mg by mouth every 8 (eight) hours as needed for mild pain, Disp: , Rfl:   •  amLODIPine (NORVASC) 5 mg tablet, , Disp: , Rfl:   •  ascorbic acid (VITAMIN C) 500 mg tablet, Take 500 mg by mouth every morning, Disp: , Rfl:   •  clindamycin (CLEOCIN) 300 MG capsule, as needed Pre dental procedures, Disp: , Rfl:   •  Coenzyme Q10 (CO Q 10) 100 MG CAPS, Take by mouth every morning Last dose 19, Disp: , Rfl:   •  Cyanocobalamin (VITAMIN B 12 PO), Take 500 mcg by mouth every morning, Disp: , Rfl:   •  diphenhydrAMINE (BENADRYL) 25 mg tablet, Take 25 mg by mouth every 8 (eight) hours as needed for itching , Disp: , Rfl:   •  escitalopram (LEXAPRO) 10 mg tablet, 15 mg daily at bedtime 1 5 tab bolton, Disp: , Rfl:   •  Glucosamine-Chondroitin (MOVE FREE PO), Take 2 tablets by mouth every morning, Disp: , Rfl:   •  Melatonin 10 MG TABS, Take by mouth daily at bedtime, Disp: , Rfl:   •  omeprazole (PriLOSEC) 20 mg delayed release capsule, TAKE 1 CAPSULE BY MOUTH IN  THE MORNING, Disp: 90 capsule, Rfl: 1  •  oxyCODONE-acetaminophen (Percocet) 7 5-325 MG per tablet, Take 1 tablet by mouth every 4 (four) hours as needed for moderate pain Max Daily Amount: 6 tablets, Disp: 120 tablet, Rfl: 0  •  perindopril (ACEON) 8 MG tablet, 8 mg 2 (two) times a day, Disp: , Rfl:   •  zinc gluconate 50 mg tablet, Take 50 mg by mouth daily, Disp: , Rfl:     ALLERGIES:  Allergies   Allergen Reactions   • Penicillins Anaphylaxis     Total body redness and fever   • Levaquin [Levofloxacin] Itching           REVIEW OF SYSTEMS:  Musculoskeletal:        As noted in HPI  All other systems reviewed and are negative  VITALS:  Vitals:    12/30/22 0908   BP: (!) 180/99   Pulse: 88   SpO2: 99%       LABS:  HgA1c: No results found for: HGBA1C  BMP:   Lab Results   Component Value Date    CALCIUM 9 0 05/24/2019    K 4 7 10/07/2022    CO2 26 10/07/2022     10/07/2022    BUN 19 10/07/2022    CREATININE 0 51 (L) 10/07/2022       _____________________________________________________  PHYSICAL EXAMINATION:  General: well developed and well nourished, alert, oriented times 3 and appears comfortable  Psychiatric: Normal  HEENT: Normocephalic, Atraumatic Trachea Midline, No torticollis  Pulmonary: No audible wheezing or respiratory distress   Abdomen/GI: Non tender, non distended   Cardiovascular: No pitting edema, 2+ radial pulse   Skin: No masses, erythema, lacerations, fluctation, ulcerations  Neurovascular: Sensation Intact to the Median, Ulnar, Radial Nerve, Motor Intact to the Median, Ulnar, Radial Nerve and Pulses Intact  Musculoskeletal: Normal, except as noted in detailed exam and in HPI  MUSCULOSKELETAL EXAMINATION:  Right Wrist:  No concerning edema, erythema, ecchymosis today  Pt describes ttp along the dorsal wrist, near area of triquetrum  She has + wrist flexion/extension, but this is limited to approximately 70% compared to contralateral side  Pt able to make a full fist      ___________________________________________________  STUDIES REVIEWED:  Images of the right wrist were reviewed in PACS by Dr Kathie Brunner and demonstrate what appears to be old triquetral fracture with rounded edges of the bone           PROCEDURES PERFORMED:  Procedures  No Procedures performed today    _____________________________________________________      Jasson Anderson    I,:  Dulce Sorto PA-C am acting as a scribe while in the presence of the attending physician :       I,:  Colby Larios MD personally performed the services described in this documentation    as scribed in my presence :

## 2023-01-18 ENCOUNTER — OFFICE VISIT (OUTPATIENT)
Dept: FAMILY MEDICINE CLINIC | Facility: CLINIC | Age: 68
End: 2023-01-18

## 2023-01-18 VITALS
HEART RATE: 82 BPM | SYSTOLIC BLOOD PRESSURE: 160 MMHG | OXYGEN SATURATION: 98 % | HEIGHT: 63 IN | WEIGHT: 232 LBS | BODY MASS INDEX: 41.11 KG/M2 | DIASTOLIC BLOOD PRESSURE: 84 MMHG

## 2023-01-18 DIAGNOSIS — K21.9 GASTROESOPHAGEAL REFLUX DISEASE WITHOUT ESOPHAGITIS: ICD-10-CM

## 2023-01-18 DIAGNOSIS — M79.641 PAIN IN RIGHT HAND: ICD-10-CM

## 2023-01-18 DIAGNOSIS — F11.20 CONTINUOUS OPIOID DEPENDENCE (HCC): ICD-10-CM

## 2023-01-18 DIAGNOSIS — I10 PRIMARY HYPERTENSION: Primary | ICD-10-CM

## 2023-01-18 DIAGNOSIS — G89.4 CHRONIC PAIN SYNDROME: ICD-10-CM

## 2023-01-18 DIAGNOSIS — E66.01 MORBID OBESITY WITH BMI OF 40.0-44.9, ADULT (HCC): ICD-10-CM

## 2023-01-18 DIAGNOSIS — Z98.84 H/O BARIATRIC SURGERY: ICD-10-CM

## 2023-01-18 DIAGNOSIS — M46.1 SACROILIITIS, NOT ELSEWHERE CLASSIFIED (HCC): ICD-10-CM

## 2023-01-18 RX ORDER — OXYCODONE AND ACETAMINOPHEN 7.5; 325 MG/1; MG/1
1 TABLET ORAL EVERY 4 HOURS PRN
Qty: 120 TABLET | Refills: 0 | Status: SHIPPED | OUTPATIENT
Start: 2023-01-18

## 2023-01-18 RX ORDER — AMLODIPINE BESYLATE 5 MG/1
5 TABLET ORAL EVERY 12 HOURS
Qty: 60 TABLET | Refills: 1 | Status: SHIPPED | OUTPATIENT
Start: 2023-01-18

## 2023-01-18 RX ORDER — OXYCODONE AND ACETAMINOPHEN 7.5; 325 MG/1; MG/1
1 TABLET ORAL EVERY 4 HOURS PRN
Qty: 120 TABLET | Refills: 0 | Status: SHIPPED | OUTPATIENT
Start: 2023-01-18 | End: 2023-01-18 | Stop reason: SDUPTHER

## 2023-01-18 NOTE — PROGRESS NOTES
Office Visit Note  23     Franci Blackwell 79 y o  female MRN: [id number]  : 1955    Assessment:     1  Chronic pain syndrome  Assessment & Plan:  Patient with pain in the low back area right groin area currently on pain medication oxycodone was still getting the pain will recommend to have a follow-up with the pain management physician again whom she had seen in the past     Orders:  -     oxyCODONE-acetaminophen (Percocet) 7 5-325 MG per tablet; Take 1 tablet by mouth every 4 (four) hours as needed for moderate pain Max Daily Amount: 6 tablets    2  Continuous opioid dependence ([facility] )  Assessment & Plan:  Patient continues to be getting dependent on the pain medication oxycodone 7 5 mg as needed 3-4 times a day discussed with the patient regarding the dependence recommend very strongly to follow-up with the pain management physician as soon as possible  3  Gastroesophageal reflux disease without esophagitis  Assessment & Plan:  Patient is on omeprazole 20 mg daily we will continue with the same recommend patient not to eat late avoid spicy food keep the head end of the bed up      4  H/O bariatric surgery  Assessment & Plan:  Patient status post bariatric surgery for weight reduction however her weight is still on the high side with a BMI of 41 10  Emphasized regarding diet and exercise      5  Primary hypertension  Assessment & Plan:  Patient blood pressure today is high at 160/84 currently taking amlodipine 5 mg daily and also she is on perindopril 8 mg 2 times a day  Will increase the dose of the amlodipine to 5 mg twice a day and follow-up  6  Morbid obesity with BMI of 40 0-44 9, adult ([facility] )  Assessment & Plan:  BMI today is 41 10 we will continue weight reduction with diet and exercise cutting back on the calorie intake lifestyle modification  However because of the pain she is experiencing in the back she is stating limited with doing exercises      7   Pain in right hand  Assessment & Plan:  Right hand pain much better swelling is down seen by the orthopedic felt it was from the old fracture she had in that area received splint and ice doing better will monitor  Orthopedic notes appreciated  8  Sacroiliitis, not elsewhere classified [facility])  Assessment & Plan:  Patient with sacroiliitis now complaining of pain in the right groin recommend follow-up with the pain management          Discussion Summary and Plan: Today's care plan and medications were reviewed with patient in detail and all their questions answered to their satisfaction  Chief Complaint   Patient presents with   • Follow-up      Subjective:  Patient is coming here for evaluation regarding her hypertension, pain in the right hand, and pain in the right groin area which is increasing in intensity in the recent past history of low back pain being followed by the pain management physician in the past currently taking pain medication on a regular basis  No tingling numbness in the lower extremities no headache dizziness chest pain palpitation shortness of breath  Patient was recently seen by the orthopedic surgeon on the right hand pain felt it was secondary to the old fracture with some flareup and was given a splint and ice to which she has responded very well  The following portions of the patient's history were reviewed and updated as appropriate: allergies, current medications, past family history, past medical history, past social history, past surgical history and problem list     Review of Systems   Constitutional: Negative for chills and fever  HENT: Negative for ear pain and sore throat  Eyes: Negative for pain and visual disturbance  Respiratory: Negative for cough and shortness of breath  Cardiovascular: Negative for chest pain and palpitations  Gastrointestinal: Negative for abdominal pain and vomiting  Genitourinary: Negative for dysuria and hematuria     Musculoskeletal: Positive for arthralgias and back pain  Right groin pain   Skin: Negative for color change and rash  Neurological: Negative for seizures and syncope  All other systems reviewed and are negative          Historical Information   Patient Active Problem List   Diagnosis   • Chronic pain syndrome   • Low back pain   • Spondylosis of lumbar region without myelopathy or radiculopathy   • Spondylolisthesis of lumbar region   • Lumbar spondylosis   • Primary osteoarthritis of right shoulder   • Morbid obesity with BMI of 40 0-44 9, adult ([facility] )   • H/O bariatric surgery   • Hypertension   • Gastroesophageal reflux disease   • Sacroiliitis, not elsewhere classified ([facility] )   • Continuous opioid dependence ([facility] )   • Primary osteoarthritis   • Anxiety   • Hypercalcemia   • Dyslipidemia   • Pain in right hand     Past Medical History:   Diagnosis Date   • Anxiety    • Apnea, sleep     patient states "repeated test says no"   • Arthritis     severe in the joints   • Chronic pain disorder     joints   • DJD (degenerative joint disease)    • Fall 11/22/2018    tripped on a folded rug- left hamstring injury   • Foot injury     right foot-fx of bone-no repair-has severe arthritis in the foot and ankle   • History of sleep apnea    • Hypercholesteremia    • Hypertension    • Insomnia    • Low back pain    • Obesity     Lost 70lb with gastric sleeve (2011)   • Osteoarthritis    • Wears glasses    • Wears partial dentures     upper and lower-but did lose the lower      Past Surgical History:   Procedure Laterality Date   • ANKLE ARTHROSCOPY Right    • APPENDECTOMY     • CHOLECYSTECTOMY     • COLONOSCOPY  10/26/2021   • DXA PROCEDURE (HISTORICAL)  10/13/2020   • GASTRECTOMY SLEEVE LAPAROSCOPIC  2011    lost 70 lb   • JOINT REPLACEMENT      blateral knee replacements   • MAMMO (HISTORICAL)  01/26/2022   • NERVE BLOCK Bilateral 12/29/2017    Procedure: L4, L5, S1 MEDIAL BRANCH BLOCK #1;  Surgeon: Lidia Redd MD;  Location: [facility] MAIN OR;  Service: Pain Management    • NERVE BLOCK Bilateral 2018    Procedure: BLOCK MEDIAL BRANCH L4, L5-S1  #2;  Surgeon: Maranda Marin MD;  Location: [facility] MAIN OR;  Service: Pain Management    • UT ARTHROPLASTY GLENOHUMERAL JOINT TOTAL SHOULDER Right 2019    Procedure: ARTHROPLASTY SHOULDER TOTAL , excision of deep cyst at shoulder, biceps tenotomy;  Surgeon: Justin Rosenthal MD;  Location: [address] [place] [address];  Service: Orthopedics   • UT INJECT SI JOINT ARTHRGRPHY&/ANES/STEROID W/LESA Right 10/23/2020    Procedure: Sacroiliac Joint injection ([id number]); Surgeon: Maranda Marin MD;  Location: [facility] MAIN OR;  Service: Pain Management    • UT TENDON SHEATH INCISION Right 2021    Procedure: RELEASE TRIGGER RING FINGER;  Surgeon: Cameron Banuelos MD;  Location: [address] [place] [address];  Service: Orthopedics   • RADIOFREQUENCY ABLATION Right 2018    Procedure: Rt L4 L5 S1 RFA;  Surgeon: Maranda Marin MD;  Location: [facility] MAIN OR;  Service: Pain Management    • RADIOFREQUENCY ABLATION Left 2018    Procedure: L3-S1 Radio Frequency Ablation ([id number]);   Surgeon: Maranda Marin MD;  Location: [facility] MAIN OR;  Service: Pain Management    • RHIZOTOMY Left 2018    Procedure: L4, L5, S1 RFA;  Surgeon: Maranda Marin MD;  Location: [facility] MAIN OR;  Service: Pain Management    • TONSILLECTOMY       Social History     Substance and Sexual Activity   Alcohol Use Never     Social History     Substance and Sexual Activity   Drug Use Never     Social History     Tobacco Use   Smoking Status Former   • Types: Cigarettes   • Quit date:    • Years since quittin 0   Smokeless Tobacco Never     Family History   Problem Relation Age of Onset   • Other Mother         in a nursing home-fx of the leg   • Heart disease Father         CHF   • Alcohol abuse Father    • Cirrhosis Father    • Other Brother         lung abscess, pneumonia   • Colon cancer Maternal Aunt    • No Known Problems Maternal Uncle    • No Known Problems Paternal Aunt    • No Known Problems Paternal Uncle    • Colon cancer Maternal Grandmother    • No Known Problems Maternal Grandfather    • No Known Problems Paternal Grandmother    • No Known Problems Paternal Grandfather    • No Known Problems Maternal Aunt    • No Known Problems Maternal Aunt    • Breast cancer Cousin 48     Health Maintenance Due   Topic   • Hepatitis C Screening    • Medicare Annual Wellness Visit (AWV)    • Pneumococcal Vaccine: 65+ Years (1 - PCV)   • COVID-19 Vaccine (3 - Booster for Moderna series)   • Breast Cancer Screening: Mammogram       Meds/Allergies       Current Outpatient Medications:   •  acetaminophen (TYLENOL) 650 mg CR tablet, Take 650 mg by mouth every 8 (eight) hours as needed for mild pain, Disp: , Rfl:   •  amLODIPine (NORVASC) 5 mg tablet, 1 tablet (5 mg total) every 12 (twelve) hours, Disp: 60 tablet, Rfl: 1  •  ascorbic acid (VITAMIN C) 500 mg tablet, Take 500 mg by mouth every morning, Disp: , Rfl:   •  clindamycin (CLEOCIN) 300 MG capsule, as needed Pre dental procedures, Disp: , Rfl:   •  Coenzyme Q10 (CO Q 10) 100 MG CAPS, Take by mouth every morning Last dose 5/13/19, Disp: , Rfl:   •  Cyanocobalamin (VITAMIN B 12 PO), Take 500 mcg by mouth every morning, Disp: , Rfl:   •  diphenhydrAMINE (BENADRYL) 25 mg tablet, Take 25 mg by mouth every 8 (eight) hours as needed for itching , Disp: , Rfl:   •  escitalopram (LEXAPRO) 10 mg tablet, 15 mg daily at bedtime 1 5 tab bolton, Disp: , Rfl:   •  Glucosamine-Chondroitin (MOVE FREE PO), Take 2 tablets by mouth every morning, Disp: , Rfl:   •  Melatonin 10 MG TABS, Take by mouth daily at bedtime, Disp: , Rfl:   •  omeprazole (PriLOSEC) 20 mg delayed release capsule, TAKE 1 CAPSULE BY MOUTH IN  THE MORNING, Disp: 90 capsule, Rfl: 1  •  oxyCODONE-acetaminophen (Percocet) 7 5-325 MG per tablet, Take 1 tablet by mouth every 4 (four) hours as needed for moderate pain Max Daily Amount: 6 tablets, Disp: 120 tablet, Rfl: 0  •  perindopril (ACEON) 8 MG tablet, 8 mg 2 (two) times a day, Disp: , Rfl:   •  zinc gluconate 50 mg tablet, Take 50 mg by mouth daily, Disp: , Rfl:       Objective:    Vitals:   /84 (BP Location: Right arm, Patient Position: Sitting, Cuff Size: Large)   Pulse 82   Ht 5' 3" (1 6 m)   Wt 105 kg (232 lb)   SpO2 98%   BMI 41 10 kg/m²   Body mass index is 41 1 kg/m²  Vitals:    01/18/23 0854   Weight: 105 kg (232 lb)       Physical Exam  Vitals and nursing note reviewed  Constitutional:       Appearance: Normal appearance  Cardiovascular:      Rate and Rhythm: Normal rate and regular rhythm  Heart sounds: Normal heart sounds  Pulmonary:      Effort: Pulmonary effort is normal       Breath sounds: Normal breath sounds  Abdominal:      Palpations: Abdomen is soft  Musculoskeletal:      Cervical back: Normal range of motion and neck supple  Right lower leg: No edema  Left lower leg: No edema  Comments: Minimal tenderness in the lumbosacral spine area spine movements causing discomfort and pain   Neurological:      Mental Status: She is alert and oriented to person, place, and time  Lab Review   No visits with results within 2 Month(s) from this visit     Latest known visit with results is:   Orders Only on 10/07/2022   Component Date Value Ref Range Status   • Glucose, Random 10/07/2022 94  70 - 99 mg/dL Final                  **Please note reference interval change**   • BUN 10/07/2022 19  8 - 27 mg/dL Final   • Creatinine 10/07/2022 0 51 (L)  0 57 - 1 00 mg/dL Final   • eGFR 10/07/2022 102  >59 mL/min/1 73 Final   • SL AMB BUN/CREATININE RATIO 10/07/2022 37 (H)  12 - 28 Final   • Sodium 10/07/2022 140  134 - 144 mmol/L Final   • Potassium 10/07/2022 4 7  3 5 - 5 2 mmol/L Final   • Chloride 10/07/2022 101  96 - 106 mmol/L Final   • CO2 10/07/2022 26  20 - 29 mmol/L Final   • CALCIUM 10/07/2022 10 0  8 7 - 10 3 mg/dL Final   • Protein, Total 10/07/2022 6 9  6 0 - 8 5 g/dL Final   • Albumin 10/07/2022 4 6  3 8 - 4 8 g/dL Final   • Globulin, Total 10/07/2022 2 3  1 5 - 4 5 g/dL Final   • Albumin/Globulin Ratio 10/07/2022 2 0  1 2 - 2 2 Final   • TOTAL BILIRUBIN 10/07/2022 <0 2  0 0 - 1 2 mg/dL Final   • Alk Phos Isoenzymes 10/07/2022 96  44 - 121 IU/L Final   • AST 10/07/2022 23  0 - 40 IU/L Final   • ALT 10/07/2022 17  0 - 32 IU/L Final   • PTH, Intact 10/07/2022 44  15 - 65 pg/mL Final         Gustabo Sy MD        "This note has been constructed using a voice recognition system  Therefore there may be syntax, spelling, and/or grammatical errors   Please call if you have any questions  "

## 2023-01-18 NOTE — ASSESSMENT & PLAN NOTE
Patient blood pressure today is high at 160/84 currently taking amlodipine 5 mg daily and also she is on perindopril 8 mg 2 times a day  Will increase the dose of the amlodipine to 5 mg twice a day and follow-up

## 2023-01-18 NOTE — ASSESSMENT & PLAN NOTE
Right hand pain much better swelling is down seen by the orthopedic felt it was from the old fracture she had in that area received splint and ice doing better will monitor  Orthopedic notes appreciated  no

## 2023-01-22 NOTE — ASSESSMENT & PLAN NOTE
Patient status post bariatric surgery for weight reduction however her weight is still on the high side with a BMI of 41 10    Emphasized regarding diet and exercise

## 2023-01-22 NOTE — ASSESSMENT & PLAN NOTE
BMI today is 41 10 we will continue weight reduction with diet and exercise cutting back on the calorie intake lifestyle modification    However because of the pain she is experiencing in the back she is stating limited with doing exercises

## 2023-01-22 NOTE — ASSESSMENT & PLAN NOTE
Patient with pain in the low back area right groin area currently on pain medication oxycodone was still getting the pain will recommend to have a follow-up with the pain management physician again whom she had seen in the past

## 2023-01-22 NOTE — ASSESSMENT & PLAN NOTE
Patient continues to be getting dependent on the pain medication oxycodone 7 5 mg as needed 3-4 times a day discussed with the patient regarding the dependence recommend very strongly to follow-up with the pain management physician as soon as possible

## 2023-01-22 NOTE — ASSESSMENT & PLAN NOTE
Patient with sacroiliitis now complaining of pain in the right groin recommend follow-up with the pain management

## 2023-01-22 NOTE — ASSESSMENT & PLAN NOTE
Patient is on omeprazole 20 mg daily we will continue with the same recommend patient not to eat late avoid spicy food keep the head end of the bed up

## 2023-02-16 ENCOUNTER — OFFICE VISIT (OUTPATIENT)
Dept: FAMILY MEDICINE CLINIC | Facility: CLINIC | Age: 68
End: 2023-02-16

## 2023-02-16 VITALS
WEIGHT: 234 LBS | HEIGHT: 63 IN | BODY MASS INDEX: 41.46 KG/M2 | DIASTOLIC BLOOD PRESSURE: 80 MMHG | OXYGEN SATURATION: 99 % | SYSTOLIC BLOOD PRESSURE: 130 MMHG | HEART RATE: 78 BPM

## 2023-02-16 DIAGNOSIS — E83.52 HYPERCALCEMIA: ICD-10-CM

## 2023-02-16 DIAGNOSIS — M46.1 SACROILIITIS, NOT ELSEWHERE CLASSIFIED (HCC): ICD-10-CM

## 2023-02-16 DIAGNOSIS — Z12.31 ENCOUNTER FOR SCREENING MAMMOGRAM FOR MALIGNANT NEOPLASM OF BREAST: ICD-10-CM

## 2023-02-16 DIAGNOSIS — M81.0 AGE-RELATED OSTEOPOROSIS WITHOUT CURRENT PATHOLOGICAL FRACTURE: ICD-10-CM

## 2023-02-16 DIAGNOSIS — G89.4 CHRONIC PAIN SYNDROME: ICD-10-CM

## 2023-02-16 DIAGNOSIS — G89.29 CHRONIC RIGHT-SIDED LOW BACK PAIN WITHOUT SCIATICA: ICD-10-CM

## 2023-02-16 DIAGNOSIS — F41.9 ANXIETY: ICD-10-CM

## 2023-02-16 DIAGNOSIS — L30.9 DERMATITIS: ICD-10-CM

## 2023-02-16 DIAGNOSIS — F11.20 CONTINUOUS OPIOID DEPENDENCE (HCC): ICD-10-CM

## 2023-02-16 DIAGNOSIS — I10 PRIMARY HYPERTENSION: Primary | ICD-10-CM

## 2023-02-16 DIAGNOSIS — K21.9 GASTROESOPHAGEAL REFLUX DISEASE WITHOUT ESOPHAGITIS: ICD-10-CM

## 2023-02-16 DIAGNOSIS — E66.01 MORBID OBESITY WITH BMI OF 40.0-44.9, ADULT (HCC): ICD-10-CM

## 2023-02-16 DIAGNOSIS — E78.5 DYSLIPIDEMIA: ICD-10-CM

## 2023-02-16 DIAGNOSIS — Z98.84 H/O BARIATRIC SURGERY: ICD-10-CM

## 2023-02-16 DIAGNOSIS — M54.50 CHRONIC RIGHT-SIDED LOW BACK PAIN WITHOUT SCIATICA: ICD-10-CM

## 2023-02-16 PROBLEM — M79.641 PAIN IN RIGHT HAND: Status: RESOLVED | Noted: 2022-12-23 | Resolved: 2023-02-16

## 2023-02-16 RX ORDER — AMLODIPINE BESYLATE 10 MG/1
10 TABLET ORAL DAILY
Qty: 90 TABLET | Refills: 0 | Status: SHIPPED | OUTPATIENT
Start: 2023-02-16

## 2023-02-16 RX ORDER — OXYCODONE AND ACETAMINOPHEN 7.5; 325 MG/1; MG/1
1 TABLET ORAL EVERY 4 HOURS PRN
Qty: 120 TABLET | Refills: 0 | Status: SHIPPED | OUTPATIENT
Start: 2023-02-16

## 2023-02-16 RX ORDER — TRIAMCINOLONE ACETONIDE 5 MG/G
CREAM TOPICAL 3 TIMES DAILY
Qty: 30 G | Refills: 0 | Status: SHIPPED | OUTPATIENT
Start: 2023-02-16

## 2023-02-16 NOTE — ASSESSMENT & PLAN NOTE
Patient's BMI is 41 45 status post bariatric surgery  Discussed with patient regarding diet exercise cutting back calorie intake and carbohydrate intake and lifestyle modification  Not able to do much exercise because of the back pain

## 2023-02-16 NOTE — ASSESSMENT & PLAN NOTE
Patient has been dependent on opioids for long time also being followed by the pain management physician recommend very strongly to see if he can taper it down

## 2023-02-16 NOTE — ASSESSMENT & PLAN NOTE
Patient with 2 small lesions on the right arm causing her to scratch frequently appears like dermatitis will apply triamcinolone cream

## 2023-02-16 NOTE — ASSESSMENT & PLAN NOTE
Patient with low back pain radiating to the groin area on oxycodone patient is going to make an appointment with the pain management physician for possible injections    Discussed with patient regarding dependence

## 2023-02-16 NOTE — ASSESSMENT & PLAN NOTE
Again emphasized regarding the diet we will follow-up with repeat lipid profile will order on the next visit

## 2023-02-16 NOTE — PROGRESS NOTES
Office Visit Note  23     Lizzy Granda 79 y o  female MRN: [id number]  : 1955    Assessment:     1  Primary hypertension  -     amLODIPine (NORVASC) 10 mg tablet; Take 1 tablet (10 mg total) by mouth daily    2  Anxiety  Assessment & Plan:  Patient is on Lexapro 15 mg her anxiety symptoms are better we will continue the same      3  Chronic pain syndrome  Assessment & Plan:  Patient with low back pain radiating to the groin area on oxycodone patient is going to make an appointment with the pain management physician for possible injections  Discussed with patient regarding dependence    Orders:  -     oxyCODONE-acetaminophen (Percocet) 7 5-325 MG per tablet; Take 1 tablet by mouth every 4 (four) hours as needed for moderate pain Max Daily Amount: 6 tablets    4  Continuous opioid dependence ([address] )  Assessment & Plan:  Patient has been dependent on opioids for long time also being followed by the pain management physician recommend very strongly to see if he can taper it down  5  Dyslipidemia  Assessment & Plan:  Again emphasized regarding the diet we will follow-up with repeat lipid profile will order on the next visit  6  Gastroesophageal reflux disease without esophagitis  Assessment & Plan:  Patient currently on omeprazole 20 mg daily continue the same avoid spicy food and eating late in the night      7  H/O bariatric surgery    8  Hypercalcemia  Assessment & Plan: We will continue to monitor calcium levels  He currently PTH is normal      9  Chronic right-sided low back pain without sciatica    10  Morbid obesity with BMI of 40 0-44 9, adult [facility])  Assessment & Plan:  Patient's BMI is 41 45 status post bariatric surgery  Discussed with patient regarding diet exercise cutting back calorie intake and carbohydrate intake and lifestyle modification  Not able to do much exercise because of the back pain        11  Sacroiliitis, not elsewhere classified [facility])  Assessment & Plan:  Patient with sacroiliitis being followed by the pain management      12  Encounter for screening mammogram for malignant neoplasm of breast  -     Mammo screening bilateral w 3d & cad; Future; Expected date: 02/16/2023    13  Dermatitis  Assessment & Plan:  Patient with 2 small lesions on the right arm causing her to scratch frequently appears like dermatitis will apply triamcinolone cream     Orders:  -     triamcinolone (KENALOG) 0 5 % cream; Apply topically 3 (three) times a day    14  Age-related osteoporosis without current pathological fracture  -     DXA bone density spine hip and pelvis; Future; Expected date: 02/16/2023      BMI Counseling: Body mass index is 41 45 kg/m²  The BMI is above normal  Nutrition recommendations include decreasing portion sizes, decreasing fast food intake, consuming healthier snacks, moderation in carbohydrate intake and reducing intake of cholesterol  Exercise recommendations include exercising 3-5 times per week  No pharmacotherapy was ordered  Rationale for BMI follow-up plan is due to patient being overweight or obese  Depression Screening and Follow-up Plan: Patient was screened for depression during today's encounter  They screened negative with a PHQ-2 score of 0  Discussion Summary and Plan: Today's care plan and medications were reviewed with patient in detail and all their questions answered to their satisfaction  Chief Complaint   Patient presents with   • Follow-up      Subjective:  Patient has coming here for a follow-up evaluation with regards to her symptoms of low back pain chronic pain syndrome also she has noticed 2 small lesions on the right arm causing some itching sensation no pain no burning  Patient has been tolerating the amlodipine 5 mg twice a day no side effects with that medication  No chest pain palpitation shortness of breath    Medications reviewed renewed patient also is due for mammogram and DEXA scan we will order the same       The following portions of the patient's history were reviewed and updated as appropriate: allergies, current medications, past family history, past medical history, past social history, past surgical history and problem list     Review of Systems   Constitutional: Negative for chills and fever  HENT: Negative for ear pain and sore throat  Eyes: Negative for pain and visual disturbance  Respiratory: Negative for cough and shortness of breath  Cardiovascular: Negative for chest pain and palpitations  Gastrointestinal: Negative for abdominal pain and vomiting  Genitourinary: Negative for dysuria and hematuria  Musculoskeletal: Positive for arthralgias and back pain  Skin: Positive for rash  Negative for color change  Neurological: Negative for seizures and syncope  All other systems reviewed and are negative          Historical Information   Patient Active Problem List   Diagnosis   • Chronic pain syndrome   • Low back pain   • Spondylosis of lumbar region without myelopathy or radiculopathy   • Spondylolisthesis of lumbar region   • Lumbar spondylosis   • Primary osteoarthritis of right shoulder   • Class 3 severe obesity in [facility])   • H/O bariatric surgery   • Hypertension   • Gastroesophageal reflux disease   • Sacroiliitis, not elsewhere classified ([facility] )   • Continuous opioid dependence ([facility] )   • Primary osteoarthritis   • Anxiety   • Hypercalcemia   • Dyslipidemia   • Dermatitis     Past Medical History:   Diagnosis Date   • Anxiety    • Apnea, sleep     patient states "repeated test says no"   • Arthritis     severe in the joints   • Chronic pain disorder     joints   • DJD (degenerative joint disease)    • Fall 11/22/2018    tripped on a folded rug- left hamstring injury   • Foot injury     right foot-fx of bone-no repair-has severe arthritis in the foot and ankle   • History of sleep apnea    • Hypercholesteremia    • Hypertension    • Insomnia    • Low back pain    • Obesity     Lost 70lb with gastric sleeve (2011)   • Osteoarthritis    • Wears glasses    • Wears partial dentures     upper and lower-but did lose the lower      Past Surgical History:   Procedure Laterality Date   • ANKLE ARTHROSCOPY Right    • APPENDECTOMY     • CHOLECYSTECTOMY     • COLONOSCOPY  10/26/2021   • DXA PROCEDURE (HISTORICAL)  10/13/2020   • GASTRECTOMY SLEEVE LAPAROSCOPIC  2011    lost 70 lb   • JOINT REPLACEMENT      blateral knee replacements   • MAMMO (HISTORICAL)  01/26/2022   • NERVE BLOCK Bilateral 12/29/2017    Procedure: L4, L5, S1 MEDIAL BRANCH BLOCK #1;  Surgeon: Ovi Gilmore MD;  Location: [place] [address] MAIN OR;  Service: Pain Management    • NERVE BLOCK Bilateral 01/12/2018    Procedure: BLOCK MEDIAL BRANCH L4, L5-S1  #2;  Surgeon: Ovi Gilmore MD;  Location: [place] [address] MAIN OR;  Service: Pain Management    • TX ARTHROPLASTY GLENOHUMERAL JOINT TOTAL SHOULDER Right 05/23/2019    Procedure: ARTHROPLASTY SHOULDER TOTAL , excision of deep cyst at shoulder, biceps tenotomy;  Surgeon: Clinton Jerome MD;  Location: [address] [place] [address];  Service: Orthopedics   • TX INJECT SI JOINT ARTHRGRPHY&/ANES/STEROID W/LESA Right 10/23/2020    Procedure: Sacroiliac Joint injection (46847); Surgeon: Ovi Gilmore MD;  Location: [facility] MAIN OR;  Service: Pain Management    • TX TENDON SHEATH INCISION Right 12/21/2021    Procedure: RELEASE TRIGGER RING FINGER;  Surgeon: Ishmael Pineda MD;  Location: [address] [place] [address];  Service: Orthopedics   • RADIOFREQUENCY ABLATION Right 03/16/2018    Procedure: Rt L4 L5 S1 RFA;  Surgeon: Ovi Gilmore MD;  Location: [place] [address] MAIN OR;  Service: Pain Management    • RADIOFREQUENCY ABLATION Left 12/14/2018    Procedure: L3-S1 Radio Frequency Ablation ([id number]);   Surgeon: Ovi Gilmore MD;  Location: [facility] MAIN OR;  Service: Pain Management    • RHIZOTOMY Left 01/25/2018    Procedure: L4, L5, S1 RFA;  Surgeon: Ovi Gilmore MD;  Location: [place] [address] MAIN OR;  Service: Pain Management • TONSILLECTOMY       Social History     Substance and Sexual Activity   Alcohol Use Never     Social History     Substance and Sexual Activity   Drug Use Never     Social History     Tobacco Use   Smoking Status Former   • Types: Cigarettes   • Quit date:    • Years since quittin 1   Smokeless Tobacco Never     Family History   Problem Relation Age of Onset   • Other Mother         in a nursing home-fx of the leg   • Heart disease Father         CHF   • Alcohol abuse Father    • Cirrhosis Father    • Other Brother         lung abscess, pneumonia   • Colon cancer Maternal Aunt    • No Known Problems Maternal Uncle    • No Known Problems Paternal Aunt    • No Known Problems Paternal Uncle    • Colon cancer Maternal Grandmother    • No Known Problems Maternal Grandfather    • No Known Problems Paternal Grandmother    • No Known Problems Paternal Grandfather    • No Known Problems Maternal Aunt    • No Known Problems Maternal Aunt    • Breast cancer Cousin 48     Health Maintenance Due   Topic   • Hepatitis C Screening    • Medicare Annual Wellness Visit (AWV)    • Pneumococcal Vaccine: 65+ Years (1 - PCV)   • COVID-19 Vaccine (3 - Booster for Moderna series)   • Breast Cancer Screening: Mammogram       Meds/Allergies       Current Outpatient Medications:   •  acetaminophen (TYLENOL) 650 mg CR tablet, Take 650 mg by mouth every 8 (eight) hours as needed for mild pain, Disp: , Rfl:   •  amLODIPine (NORVASC) 10 mg tablet, Take 1 tablet (10 mg total) by mouth daily, Disp: 90 tablet, Rfl: 0  •  ascorbic acid (VITAMIN C) 500 mg tablet, Take 500 mg by mouth every morning, Disp: , Rfl:   •  clindamycin (CLEOCIN) 300 MG capsule, as needed Pre dental procedures, Disp: , Rfl:   •  Coenzyme Q10 (CO Q 10) 100 MG CAPS, Take by mouth every morning Last dose 19, Disp: , Rfl:   •  Cyanocobalamin (VITAMIN B 12 PO), Take 500 mcg by mouth every morning, Disp: , Rfl:   •  diphenhydrAMINE (BENADRYL) 25 mg tablet, Take 25 mg by mouth every 8 (eight) hours as needed for itching , Disp: , Rfl:   •  escitalopram (LEXAPRO) 10 mg tablet, 15 mg daily at bedtime 1 5 tab bolton, Disp: , Rfl:   •  Glucosamine-Chondroitin (MOVE FREE PO), Take 2 tablets by mouth every morning, Disp: , Rfl:   •  Melatonin 10 MG TABS, Take by mouth daily at bedtime, Disp: , Rfl:   •  omeprazole (PriLOSEC) 20 mg delayed release capsule, TAKE 1 CAPSULE BY MOUTH IN  THE MORNING, Disp: 90 capsule, Rfl: 1  •  oxyCODONE-acetaminophen (Percocet) 7 5-325 MG per tablet, Take 1 tablet by mouth every 4 (four) hours as needed for moderate pain Max Daily Amount: 6 tablets, Disp: 120 tablet, Rfl: 0  •  perindopril (ACEON) 8 MG tablet, 8 mg 2 (two) times a day, Disp: , Rfl:   •  triamcinolone (KENALOG) 0 5 % cream, Apply topically 3 (three) times a day, Disp: 30 g, Rfl: 0  •  zinc gluconate 50 mg tablet, Take 50 mg by mouth daily, Disp: , Rfl:       Objective:    Vitals:   /80 (BP Location: Right arm, Patient Position: Sitting, Cuff Size: Large)   Pulse 78   Ht 5' 3" (1 6 m)   Wt 106 kg (234 lb)   SpO2 99%   BMI 41 45 kg/m²   Body mass index is 41 45 kg/m²  Vitals:    02/16/23 0846   Weight: 106 kg (234 lb)       Physical Exam  Vitals and nursing note reviewed  Constitutional:       Appearance: Normal appearance  Cardiovascular:      Rate and Rhythm: Normal rate and regular rhythm  Heart sounds: Normal heart sounds  Pulmonary:      Effort: Pulmonary effort is normal       Breath sounds: Normal breath sounds  Abdominal:      Palpations: Abdomen is soft  Musculoskeletal:      Cervical back: Normal range of motion and neck supple  Right lower leg: No edema  Left lower leg: No edema  Skin:     Comments: Lesions noted on the right arm appears like dermatitis   Neurological:      Mental Status: She is alert and oriented to person, place, and time     Psychiatric:         Behavior: Behavior normal          Lab Review   No visits with results within 2 Month(s) from this visit  Latest known visit with results is:   Orders Only on 10/07/2022   Component Date Value Ref Range Status   • Glucose, Random 10/07/2022 94  70 - 99 mg/dL Final                  **Please note reference interval change**   • BUN 10/07/2022 19  8 - 27 mg/dL Final   • Creatinine 10/07/2022 0 51 (L)  0 57 - 1 00 mg/dL Final   • eGFR 10/07/2022 102  >59 mL/min/1 73 Final   • SL AMB BUN/CREATININE RATIO 10/07/2022 37 (H)  12 - 28 Final   • Sodium 10/07/2022 140  134 - 144 mmol/L Final   • Potassium 10/07/2022 4 7  3 5 - 5 2 mmol/L Final   • Chloride 10/07/2022 101  96 - 106 mmol/L Final   • CO2 10/07/2022 26  20 - 29 mmol/L Final   • CALCIUM 10/07/2022 10 0  8 7 - 10 3 mg/dL Final   • Protein, Total 10/07/2022 6 9  6 0 - 8 5 g/dL Final   • Albumin 10/07/2022 4 6  3 8 - 4 8 g/dL Final   • Globulin, Total 10/07/2022 2 3  1 5 - 4 5 g/dL Final   • Albumin/Globulin Ratio 10/07/2022 2 0  1 2 - 2 2 Final   • TOTAL BILIRUBIN 10/07/2022 <0 2  0 0 - 1 2 mg/dL Final   • Alk Phos Isoenzymes 10/07/2022 96  44 - 121 IU/L Final   • AST 10/07/2022 23  0 - 40 IU/L Final   • ALT 10/07/2022 17  0 - 32 IU/L Final   • PTH, Intact 10/07/2022 44  15 - 65 pg/mL Final         Gustabo Sy MD        "This note has been constructed using a voice recognition system  Therefore there may be syntax, spelling, and/or grammatical errors   Please call if you have any questions  "

## 2023-02-16 NOTE — ASSESSMENT & PLAN NOTE
Patient currently on omeprazole 20 mg daily continue the same avoid spicy food and eating late in the night

## 2023-03-09 ENCOUNTER — RA CDI HCC (OUTPATIENT)
Dept: OTHER | Facility: HOSPITAL | Age: 68
End: 2023-03-09

## 2023-03-09 NOTE — PROGRESS NOTES
[facility]  coding opportunities       Chart reviewed, no opportunity found:   [address]        Patients Insurance     Medicare Insurance: [employer]

## 2023-03-15 ENCOUNTER — OFFICE VISIT (OUTPATIENT)
Dept: FAMILY MEDICINE CLINIC | Facility: CLINIC | Age: 68
End: 2023-03-15

## 2023-03-15 VITALS
DIASTOLIC BLOOD PRESSURE: 80 MMHG | OXYGEN SATURATION: 98 % | HEART RATE: 84 BPM | BODY MASS INDEX: 40.75 KG/M2 | SYSTOLIC BLOOD PRESSURE: 130 MMHG | WEIGHT: 230 LBS | HEIGHT: 63 IN

## 2023-03-15 DIAGNOSIS — E83.52 HYPERCALCEMIA: ICD-10-CM

## 2023-03-15 DIAGNOSIS — Z98.84 H/O BARIATRIC SURGERY: ICD-10-CM

## 2023-03-15 DIAGNOSIS — K21.9 GASTROESOPHAGEAL REFLUX DISEASE WITHOUT ESOPHAGITIS: ICD-10-CM

## 2023-03-15 DIAGNOSIS — F41.9 ANXIETY: ICD-10-CM

## 2023-03-15 DIAGNOSIS — E66.01 CLASS 3 SEVERE OBESITY DUE TO EXCESS CALORIES WITHOUT SERIOUS COMORBIDITY WITH BODY MASS INDEX (BMI) OF 40.0 TO 44.9 IN ADULT (HCC): ICD-10-CM

## 2023-03-15 DIAGNOSIS — L30.9 DERMATITIS: ICD-10-CM

## 2023-03-15 DIAGNOSIS — G89.4 CHRONIC PAIN SYNDROME: Primary | ICD-10-CM

## 2023-03-15 DIAGNOSIS — E78.5 DYSLIPIDEMIA: ICD-10-CM

## 2023-03-15 DIAGNOSIS — F11.20 CONTINUOUS OPIOID DEPENDENCE (HCC): ICD-10-CM

## 2023-03-15 DIAGNOSIS — M19.91 PRIMARY OSTEOARTHRITIS, UNSPECIFIED SITE: ICD-10-CM

## 2023-03-15 DIAGNOSIS — I10 PRIMARY HYPERTENSION: ICD-10-CM

## 2023-03-15 RX ORDER — OXYCODONE AND ACETAMINOPHEN 7.5; 325 MG/1; MG/1
1 TABLET ORAL EVERY 4 HOURS PRN
Qty: 120 TABLET | Refills: 0 | Status: SHIPPED | OUTPATIENT
Start: 2023-03-15

## 2023-03-15 NOTE — ASSESSMENT & PLAN NOTE
S/p bariatric surgery not able to give any nonsteroidals today's BMI is 40 74  Her weight is still high in spite of bariatric surgery

## 2023-03-15 NOTE — ASSESSMENT & PLAN NOTE
Patient currently taking omeprazole 20 mg daily sometimes she ends up taking Tums also because of the increased reflux symptoms we will continue with the same

## 2023-03-15 NOTE — ASSESSMENT & PLAN NOTE
Patient with chronic low back pain radiating to the groin areas currently on oxycodone not able to give you nonsteroidals because of the gastric bypass surgery she had  She is also making an appointment with the pain management physician whom she had seen in the past for possible other interventions

## 2023-03-15 NOTE — ASSESSMENT & PLAN NOTE
Patient has been on opioids for long  Of time again recommend patient to be seen by the pain management to see if any other recommendations

## 2023-03-15 NOTE — ASSESSMENT & PLAN NOTE
Patient BMI is 40 74 s/p bariatric surgery again recommend patient to be on a strict diet cutting back carbohydrate intake calorie intake lifestyle modification    Patient not able to do much exercise because of back pain and joint pains

## 2023-03-15 NOTE — ASSESSMENT & PLAN NOTE
Blood pressure is stable today 130/80 currently taking perindopril 8 mg twice a day along with amlodipine 10 mg daily

## 2023-03-15 NOTE — PROGRESS NOTES
Office Visit Note  03/15/23     Gregg Nieves 76 y o  female MRN: [id number]  : 1955    Assessment:     1  Chronic pain syndrome  Assessment & Plan:  Patient with chronic low back pain radiating to the groin areas currently on oxycodone not able to give you nonsteroidals because of the gastric bypass surgery she had  She is also making an appointment with the pain management physician whom she had seen in the past for possible other interventions  2  Anxiety  Assessment & Plan:  Patient with increased anxiety symptoms currently taking Lexapro 15 mg appears to be helping we will continue with the same for now  3  Class 3 severe obesity due to excess calories without serious comorbidity with body mass index (BMI) of 40 0 to 44 9 in adult [facility])  Assessment & Plan:  Patient BMI is 40 74 s/p bariatric surgery again recommend patient to be on a strict diet cutting back carbohydrate intake calorie intake lifestyle modification  Patient not able to do much exercise because of back pain and joint pains      4  Continuous opioid dependence [facility])  Assessment & Plan:  Patient has been on opioids for long  Of time again recommend patient to be seen by the pain management to see if any other recommendations  5  Dermatitis  Assessment & Plan:  Dermatitis getting better with triamcinolone cream on the right shoulder area  6  Dyslipidemia  Assessment & Plan:  Follow-up with the lab work currently not on any medication      7  Gastroesophageal reflux disease without esophagitis  Assessment & Plan:  Patient currently taking omeprazole 20 mg daily sometimes she ends up taking Tums also because of the increased reflux symptoms we will continue with the same  8  H/O bariatric surgery  Assessment & Plan:  S/p bariatric surgery not able to give any nonsteroidals today's BMI is 40 74  Her weight is still high in spite of bariatric surgery  9  Hypercalcemia  Assessment & Plan:   We will periodically check the calcium and PTH levels      10  Primary hypertension  Assessment & Plan:  Blood pressure is stable today 130/80 currently taking perindopril 8 mg twice a day along with amlodipine 10 mg daily      11  Primary osteoarthritis, unspecified site  Assessment & Plan:  Patient on pain medication because of the gastric sleeve avoiding nonsteroidals               Discussion Summary and Plan: Today's care plan and medications were reviewed with patient in detail and all their questions answered to their satisfaction  Chief Complaint   Patient presents with   • Follow-up     F/U and joints are really bad  Catherine King        Subjective:  Patient is coming here for a follow-up evaluation regarding chronic pain syndrome with low back pain joint pains in the past she was seen by pain management and received injections also continues to experience pain currently on oxycodone  She denies any chest pains palpitation shortness of breath  Patient with multiple other medical problems including hypertension GERD DJD obesity      The following portions of the patient's history were reviewed and updated as appropriate: allergies, current medications, past family history, past medical history, past social history, past surgical history and problem list     Review of Systems   Constitutional: Negative for chills and fever  HENT: Negative for ear pain and sore throat  Eyes: Negative for pain and visual disturbance  Respiratory: Negative for cough and shortness of breath  Cardiovascular: Negative for chest pain and palpitations  Gastrointestinal: Negative for abdominal pain and vomiting  Genitourinary: Negative for dysuria and hematuria  Musculoskeletal: Positive for arthralgias and back pain  Skin: Negative for color change and rash  Neurological: Negative for seizures and syncope  All other systems reviewed and are negative          Historical Information   Patient Active Problem List Diagnosis   • Chronic pain syndrome   • Low back pain   • Spondylosis of lumbar region without myelopathy or radiculopathy   • Spondylolisthesis of lumbar region   • Lumbar spondylosis   • Primary osteoarthritis of right shoulder   • Class 3 severe obesity in adult [facility])   • H/O bariatric surgery   • Hypertension   • Gastroesophageal reflux disease   • Sacroiliitis, not elsewhere classified ([facility] [address] )   • Continuous opioid dependence ([facility] [address] )   • Primary osteoarthritis   • Anxiety   • Hypercalcemia   • Dyslipidemia   • Dermatitis     Past Medical History:   Diagnosis Date   • Anxiety    • Apnea, sleep     patient states "repeated test says no"   • Arthritis     severe in the joints   • Chronic pain disorder     joints   • DJD (degenerative joint disease)    • Fall 11/22/2018    tripped on a folded rug- left hamstring injury   • Foot injury     right foot-fx of bone-no repair-has severe arthritis in the foot and ankle   • History of sleep apnea    • Hypercholesteremia    • Hypertension    • Insomnia    • Low back pain    • Obesity     Lost 70lb with gastric sleeve (2011)   • Osteoarthritis    • Wears glasses    • Wears partial dentures     upper and lower-but did lose the lower      Past Surgical History:   Procedure Laterality Date   • ANKLE ARTHROSCOPY Right    • APPENDECTOMY     • CHOLECYSTECTOMY     • COLONOSCOPY  10/26/2021   • DXA PROCEDURE (HISTORICAL)  10/13/2020   • GASTRECTOMY SLEEVE LAPAROSCOPIC  2011    lost 70 lb   • JOINT REPLACEMENT      blateral knee replacements   • MAMMO (HISTORICAL)  01/26/2022   • NERVE BLOCK Bilateral 12/29/2017    Procedure: L4, L5, S1 MEDIAL BRANCH BLOCK #1;  Surgeon: Kevin Lewis MD;  Location: [facility] MAIN OR;  Service: Pain Management    • NERVE BLOCK Bilateral 01/12/2018    Procedure: BLOCK MEDIAL BRANCH L4, L5-S1  #2;  Surgeon: Kevin Lewis MD;  Location: [facility] MAIN OR;  Service: Pain Management    • MD ARTHROPLASTY GLENOHUMERAL JOINT TOTAL SHOULDER Right 05/23/2019 Procedure: ARTHROPLASTY SHOULDER TOTAL , excision of deep cyst at shoulder, biceps tenotomy;  Surgeon: Yokasta Peña MD;  Location: [address] [place] [address];  Service: Orthopedics   • HI INJECT SI JOINT ARTHRGRPHY&/ANES/STEROID W/LESA Right 10/23/2020    Procedure: Sacroiliac Joint injection (90508); Surgeon: Emily Garay MD;  Location: [facility] MAIN OR;  Service: Pain Management    • HI TENDON SHEATH INCISION Right 2021    Procedure: RELEASE TRIGGER RING FINGER;  Surgeon: Elizabeth Jimenez MD;  Location: [address] [place] [address];  Service: Orthopedics   • RADIOFREQUENCY ABLATION Right 2018    Procedure: Rt L4 L5 S1 RFA;  Surgeon: Emily Garay MD;  Location: [facility] MAIN OR;  Service: Pain Management    • RADIOFREQUENCY ABLATION Left 2018    Procedure: L3-S1 Radio Frequency Ablation ([id number]);   Surgeon: Emily Garay MD;  Location: [facility] MAIN OR;  Service: Pain Management    • RHIZOTOMY Left 2018    Procedure: L4, L5, S1 RFA;  Surgeon: Emily Garay MD;  Location: [facility] MAIN OR;  Service: Pain Management    • TONSILLECTOMY       Social History     Substance and Sexual Activity   Alcohol Use Never     Social History     Substance and Sexual Activity   Drug Use Never     Social History     Tobacco Use   Smoking Status Former   • Types: Cigarettes   • Quit date:    • Years since quittin 2   Smokeless Tobacco Never     Family History   Problem Relation Age of Onset   • Other Mother         in a nursing home-fx of the leg   • Heart disease Father         CHF   • Alcohol abuse Father    • Cirrhosis Father    • Other Brother         lung abscess, pneumonia   • Colon cancer Maternal Aunt    • No Known Problems Maternal Uncle    • No Known Problems Paternal Aunt    • No Known Problems Paternal Uncle    • Colon cancer Maternal Grandmother    • No Known Problems Maternal Grandfather    • No Known Problems Paternal Grandmother    • No Known Problems Paternal Grandfather    • No Known Problems Maternal Aunt    • No Known Problems Maternal Aunt    • Breast cancer Cousin 48     Health Maintenance Due   Topic   • Hepatitis C Screening    • Medicare Annual Wellness Visit (AWV)    • Pneumococcal Vaccine: 65+ Years (1 - PCV)   • COVID-19 Vaccine (3 - Booster for Moderna series)   • Breast Cancer Screening: Mammogram       Meds/Allergies       Current Outpatient Medications:   •  acetaminophen (TYLENOL) 650 mg CR tablet, Take 650 mg by mouth every 8 (eight) hours as needed for mild pain, Disp: , Rfl:   •  amLODIPine (NORVASC) 10 mg tablet, Take 1 tablet (10 mg total) by mouth daily, Disp: 90 tablet, Rfl: 0  •  ascorbic acid (VITAMIN C) 500 mg tablet, Take 500 mg by mouth every morning, Disp: , Rfl:   •  clindamycin (CLEOCIN) 300 MG capsule, as needed Pre dental procedures, Disp: , Rfl:   •  Coenzyme Q10 (CO Q 10) 100 MG CAPS, Take by mouth every morning Last dose 5/13/19, Disp: , Rfl:   •  Cyanocobalamin (VITAMIN B 12 PO), Take 500 mcg by mouth every morning, Disp: , Rfl:   •  diphenhydrAMINE (BENADRYL) 25 mg tablet, Take 25 mg by mouth every 8 (eight) hours as needed for itching , Disp: , Rfl:   •  escitalopram (LEXAPRO) 10 mg tablet, 15 mg daily at bedtime 1 5 tab bolton, Disp: , Rfl:   •  Glucosamine-Chondroitin (MOVE FREE PO), Take 2 tablets by mouth every morning, Disp: , Rfl:   •  Melatonin 10 MG TABS, Take by mouth daily at bedtime, Disp: , Rfl:   •  omeprazole (PriLOSEC) 20 mg delayed release capsule, TAKE 1 CAPSULE BY MOUTH IN  THE MORNING, Disp: 90 capsule, Rfl: 1  •  oxyCODONE-acetaminophen (Percocet) 7 5-325 MG per tablet, Take 1 tablet by mouth every 4 (four) hours as needed for moderate pain Max Daily Amount: 6 tablets, Disp: 120 tablet, Rfl: 0  •  perindopril (ACEON) 8 MG tablet, 8 mg 2 (two) times a day, Disp: , Rfl:   •  triamcinolone (KENALOG) 0 5 % cream, Apply topically 3 (three) times a day, Disp: 30 g, Rfl: 0  •  zinc gluconate 50 mg tablet, Take 50 mg by mouth daily, Disp: , Rfl: Objective:    Vitals:   /80 (BP Location: Right arm, Patient Position: Sitting, Cuff Size: Large)   Pulse 84   Ht 5' 3" (1 6 m)   Wt 104 kg (230 lb)   SpO2 98%   BMI 40 74 kg/m²   Body mass index is 40 74 kg/m²  Vitals:    03/15/23 0744   Weight: 104 kg (230 lb)       Physical Exam  Vitals and nursing note reviewed  Constitutional:       Appearance: Normal appearance  Cardiovascular:      Rate and Rhythm: Normal rate and regular rhythm  Heart sounds: Normal heart sounds  Pulmonary:      Effort: Pulmonary effort is normal       Breath sounds: Normal breath sounds  Musculoskeletal:      Cervical back: Normal range of motion and neck supple  Right lower leg: No edema  Left lower leg: No edema  Comments: Joint movements causing discomfort and pain SLR about 30 degrees both sides   Skin:     General: Skin is warm and dry  Neurological:      Mental Status: She is alert and oriented to person, place, and time  Lab Review   No visits with results within 2 Month(s) from this visit     Latest known visit with results is:   Orders Only on 10/07/2022   Component Date Value Ref Range Status   • Glucose, Random 10/07/2022 94  70 - 99 mg/dL Final                  **Please note reference interval change**   • BUN 10/07/2022 19  8 - 27 mg/dL Final   • Creatinine 10/07/2022 0 51 (L)  0 57 - 1 00 mg/dL Final   • eGFR 10/07/2022 102  >59 mL/min/1 73 Final   • SL AMB BUN/CREATININE RATIO 10/07/2022 37 (H)  12 - 28 Final   • Sodium 10/07/2022 140  134 - 144 mmol/L Final   • Potassium 10/07/2022 4 7  3 5 - 5 2 mmol/L Final   • Chloride 10/07/2022 101  96 - 106 mmol/L Final   • CO2 10/07/2022 26  20 - 29 mmol/L Final   • CALCIUM 10/07/2022 10 0  8 7 - 10 3 mg/dL Final   • Protein, Total 10/07/2022 6 9  6 0 - 8 5 g/dL Final   • Albumin 10/07/2022 4 6  3 8 - 4 8 g/dL Final   • Globulin, Total 10/07/2022 2 3  1 5 - 4 5 g/dL Final   • Albumin/Globulin Ratio 10/07/2022 2 0  1 2 - 2 2 Final • TOTAL BILIRUBIN 10/07/2022 <0 2  0 0 - 1 2 mg/dL Final   • Alk Phos Isoenzymes 10/07/2022 96  44 - 121 IU/L Final   • AST 10/07/2022 23  0 - 40 IU/L Final   • ALT 10/07/2022 17  0 - 32 IU/L Final   • PTH, Intact 10/07/2022 44  15 - 65 pg/mL Final         Candis Allen MD        "This note has been constructed using a voice recognition system  Therefore there may be syntax, spelling, and/or grammatical errors   Please call if you have any questions  "

## 2023-03-15 NOTE — ASSESSMENT & PLAN NOTE
Patient with increased anxiety symptoms currently taking Lexapro 15 mg appears to be helping we will continue with the same for now

## 2023-03-27 ENCOUNTER — TELEPHONE (OUTPATIENT)
Dept: FAMILY MEDICINE CLINIC | Facility: CLINIC | Age: 68
End: 2023-03-27

## 2023-03-27 DIAGNOSIS — M54.50 CHRONIC MIDLINE LOW BACK PAIN, UNSPECIFIED WHETHER SCIATICA PRESENT: Primary | ICD-10-CM

## 2023-03-27 DIAGNOSIS — G89.29 CHRONIC MIDLINE LOW BACK PAIN, UNSPECIFIED WHETHER SCIATICA PRESENT: Primary | ICD-10-CM

## 2023-04-20 ENCOUNTER — APPOINTMENT (OUTPATIENT)
Dept: RADIOLOGY | Facility: CLINIC | Age: 68
End: 2023-04-20

## 2023-04-20 DIAGNOSIS — M54.50 LOW BACK PAIN, UNSPECIFIED BACK PAIN LATERALITY, UNSPECIFIED CHRONICITY, UNSPECIFIED WHETHER SCIATICA PRESENT: ICD-10-CM

## 2023-04-24 ENCOUNTER — TELEPHONE (OUTPATIENT)
Dept: PAIN MEDICINE | Facility: MEDICAL CENTER | Age: 68
End: 2023-04-24

## 2023-04-24 NOTE — TELEPHONE ENCOUNTER
Leslie Miller MD  [facility]  X-ray shows severe multilevel degenerative spondylosis which is consistent with arthritis of the lumbar spine       As per the patient, she would like to trial physical therapy at this time, so I encourage her to do that

## 2023-04-25 ENCOUNTER — OFFICE VISIT (OUTPATIENT)
Dept: PHYSICAL THERAPY | Facility: CLINIC | Age: 68
End: 2023-04-25

## 2023-04-25 DIAGNOSIS — M54.50 CHRONIC MIDLINE LOW BACK PAIN, UNSPECIFIED WHETHER SCIATICA PRESENT: Primary | ICD-10-CM

## 2023-04-25 DIAGNOSIS — G89.29 CHRONIC MIDLINE LOW BACK PAIN, UNSPECIFIED WHETHER SCIATICA PRESENT: Primary | ICD-10-CM

## 2023-04-25 NOTE — PROGRESS NOTES
"Daily Note     Today's date: 2023  Patient name: Justo Jackson  : 1955  MRN: [id number]  Referring provider: Delfin Osler, MD  Dx:   Encounter Diagnosis     ICD-10-CM    1  Chronic midline low back pain, unspecified whether sciatica present  M54 50     G89 29                      Subjective:  I took pain meds this morning because of the pain  My pain is now   Objective: See treatment diary below      Assessment: Tolerated treatment well  Patient would benefit from continued PT  She needs cues to perform hip hinge correctly  Following demo and VCs she could perform sit to stand without flexing her spine  Plan: Continue per plan of care  Progress treatment as tolerated     Progress core stabs           Eval/ Re-eval Auth #/ Referral # Total visits Start date  Expiration date Total active units  Total manual units  PT only or  PT+OT?   23 No auth no visit limit                                       Medical History        Past Medical History:   Diagnosis Date   • Anxiety     • Apnea, sleep       patient states \"repeated test says no\"   • Arthritis       severe in the joints   • Chronic pain disorder       joints   • Depression     • DJD (degenerative joint disease)     • Fall 2018     tripped on a folded rug- left hamstring injury   • Foot injury       right foot-fx of bone-no repair-has severe arthritis in the foot and ankle   • GERD (gastroesophageal reflux disease)     • History of sleep apnea     • Hypercholesteremia     • Hypertension     • Insomnia     • Low back pain     • Obesity       Lost 70lb with gastric sleeve ()   • Osteoarthritis     • Wears glasses     • Wears partial dentures       upper and lower-but did lose the lower                                                                                                                                                                Reeval:   Insurance :              Visits: 1  2         Manual: 23 "  4/25/23                       L/S p-a mobs             Belt hip mobs                                                       Ther ex/NMR:             Manual stretching: hamstring, hip flexor, piriformis/glute             Rec bike/  nustep    6 min         KTC with peanut    20         LTR    20         Wilner test stretch             Seated hamstring stretch             Piriformis stretch                           Prone quad stretch             AMALIA             Prone press up                                         Sit/stand with hip hinge    2x10                       TA activation (stab cuff)    10x         + marching    10x         +hip abd iso    10x         +hip add iso    10x         + alternating LE extension                           Glut set prone                           Glute set + bridge                           Therapeutic Activity:              Reevaluation                           Gait Training:                            HEP:                            Modalities                          ice             Total time:

## 2023-04-27 ENCOUNTER — OFFICE VISIT (OUTPATIENT)
Dept: PHYSICAL THERAPY | Facility: CLINIC | Age: 68
End: 2023-04-27

## 2023-04-27 DIAGNOSIS — M54.50 CHRONIC MIDLINE LOW BACK PAIN, UNSPECIFIED WHETHER SCIATICA PRESENT: Primary | ICD-10-CM

## 2023-04-27 DIAGNOSIS — G89.29 CHRONIC MIDLINE LOW BACK PAIN, UNSPECIFIED WHETHER SCIATICA PRESENT: Primary | ICD-10-CM

## 2023-04-27 NOTE — PROGRESS NOTES
"Daily Note     Today's date: 2023  Patient name: José Miguel Crawford  : 1955  MRN: [id number]  Referring provider: Sam Snow MD  Dx:   Encounter Diagnosis     ICD-10-CM    1  Chronic midline low back pain, unspecified whether sciatica present  M54 50     G89 29           Start Time: 930  Stop Time: 1010  Total time in clinic (min): 40 minutes    Subjective:  \"I always have pain  My pain is 5/10  \"       Objective: See treatment diary below      Assessment: Tolerated treatment well  Patient notes increased fatigue today, with increased pain of her R hip when performing supine marches  She demonstrates good form today, with no cueing requires for TA activation  Cued to perform breathing techniques during exercises today  Continue to progress as per primary PT  Patient would benefit from continued PT  Plan: Continue per plan of care  Progress treatment as tolerated                Eval/ Re-eval Auth #/ Referral # Total visits Start date  Expiration date Total active units  Total manual units  PT only or  PT+OT?   23 No auth no visit limit                                       Medical History        Past Medical History:   Diagnosis Date   • Anxiety     • Apnea, sleep       patient states \"repeated test says no\"   • Arthritis       severe in the joints   • Chronic pain disorder       joints   • Depression     • DJD (degenerative joint disease)     • Fall 2018     tripped on a folded rug- left hamstring injury   • Foot injury       right foot-fx of bone-no repair-has severe arthritis in the foot and ankle   • GERD (gastroesophageal reflux disease)     • History of sleep apnea     • Hypercholesteremia     • Hypertension     • Insomnia     • Low back pain     • Obesity       Lost 70lb with gastric sleeve ()   • Osteoarthritis     • Wears glasses     • Wears partial dentures       upper and lower-but did lose the lower                                                                     " Reeval:   Insurance :              Visits: 1  2  3       Manual: 4/20/23 4/25/23 4/27/23                     L/S p-a mobs             Belt hip mobs                                                       Ther ex/NMR:             Manual stretching: hamstring, hip flexor, piriformis/glute             Rec bike/  nustep    6 min  6 min        KTC with peanut    20  20       LTR    20  20       Wilner test stretch             Seated hamstring stretch             Piriformis stretch                           Prone quad stretch             AMALIA             Prone press up                                         Sit/stand with hip hinge    2x10                       TA activation (stab cuff)    10x  10x       + marching    10x  20x       +hip abd iso    10x  20x       +hip add iso    10x  20x       + alternating LE extension                           Glut set prone     Supine 20                     Glute set + bridge                           Therapeutic Activity:              Reevaluation                           Gait Training:                            HEP:                            Modalities                          ice             Total time:

## 2023-05-01 ENCOUNTER — OFFICE VISIT (OUTPATIENT)
Dept: PHYSICAL THERAPY | Facility: CLINIC | Age: 68
End: 2023-05-01

## 2023-05-01 DIAGNOSIS — M54.50 CHRONIC MIDLINE LOW BACK PAIN, UNSPECIFIED WHETHER SCIATICA PRESENT: Primary | ICD-10-CM

## 2023-05-01 DIAGNOSIS — G89.29 CHRONIC MIDLINE LOW BACK PAIN, UNSPECIFIED WHETHER SCIATICA PRESENT: Primary | ICD-10-CM

## 2023-05-01 NOTE — PROGRESS NOTES
"Daily Note     Today's date: 2023  Patient name: Madi Negrete  : 1955  MRN: [id number]  Referring provider: Bryn Rosales MD  Dx:   Encounter Diagnosis     ICD-10-CM    1  Chronic midline low back pain, unspecified whether sciatica present  M54 50     G89 29           Start Time: 1100  Stop Time: 1140  Total time in clinic (min): 40 minutes     Patient was co-treated by a physical therapy student, Mina Coley, under my direct supervision  Subjective: Pt reports she is doing well with intermittent pain but improved function  Objective: See treatment diary below      Assessment: Tolerated treatment well  Pt demonstrates impaired spinal ROM but this ROM increases after lower trunk rotations  Addition of glute set+bridge is well tolerated for increasing trunk stabilizer strength and hip extensor strength  Patient demonstrated fatigue post treatment and would benefit from continued PT to maximize function and promote return to PLOF  Plan: Continue per plan of care  Advance per primary PT             Eval/ Re-eval Auth #/ Referral # Total visits Start date  Expiration date Total active units  Total manual units  PT only or  PT+OT?   23 No auth no visit limit                                       Medical History        Past Medical History:   Diagnosis Date    Anxiety      Apnea, sleep       patient states \"repeated test says no\"    Arthritis       severe in the joints    Chronic pain disorder       joints    Depression      DJD (degenerative joint disease)      Fall 2018     tripped on a folded rug- left hamstring injury    Foot injury       right foot-fx of bone-no repair-has severe arthritis in the foot and ankle    GERD (gastroesophageal reflux disease)      History of sleep apnea      Hypercholesteremia      Hypertension      Insomnia      Low back pain      Obesity       Lost 70lb with gastric sleeve ()    Osteoarthritis      Wears glasses      Wears " partial dentures       upper and lower-but did lose the lower                                                                                                                                              Reeval:   Insurance :              Visits: 1  2  3  4     Manual: 4/20/23 4/25/23 4/27/23 5/1/23                   L/S p-a mobs             Belt hip mobs                                                       Ther ex/NMR:             Manual stretching: hamstring, hip flexor, piriformis/glute             Rec bike/  nustep    6 min  6 min  NS 9 min     KTC with peanut    20  20       LTR    20  20 20     Wilner test stretch             Seated hamstring stretch             Piriformis stretch                      Squats 10x2 at bar     Prone quad stretch             AMALIA             Prone press up                                         Sit/stand with hip hinge    2x10                       TA activation (stab cuff)    10x  10x  10x     + marching    10x  20x  20x     +hip abd iso    10x  20x  20x     +hip add iso    10x  20x  20x     + alternating LE extension                           Glut set prone     Supine 20                     Glute set + bridge        x10                   Therapeutic Activity:              Reevaluation                           Gait Training:                            HEP:                            Modalities             MH             ice             Total time:

## 2023-05-03 ENCOUNTER — APPOINTMENT (OUTPATIENT)
Dept: PHYSICAL THERAPY | Facility: CLINIC | Age: 68
End: 2023-05-03
Payer: COMMERCIAL

## 2023-05-03 ENCOUNTER — OFFICE VISIT (OUTPATIENT)
Dept: PHYSICAL THERAPY | Facility: CLINIC | Age: 68
End: 2023-05-03

## 2023-05-03 DIAGNOSIS — M54.50 CHRONIC MIDLINE LOW BACK PAIN, UNSPECIFIED WHETHER SCIATICA PRESENT: Primary | ICD-10-CM

## 2023-05-03 DIAGNOSIS — G89.29 CHRONIC MIDLINE LOW BACK PAIN, UNSPECIFIED WHETHER SCIATICA PRESENT: Primary | ICD-10-CM

## 2023-05-03 NOTE — PROGRESS NOTES
"        Daily Note         Today's date: 5/3/2023  Patient name: Korina Fabian  : 1955  MRN: [id number]  Referring provider: María Ornelas MD  Dx:   Encounter Diagnosis     ICD-10-CM    1  Chronic midline low back pain, unspecified whether sciatica present  M54 50     G89 29           Subjective: Korina Fabian reports 4/10 pain in right groin, with minimal pain in low back  Objective: See treatment diary below    Assessment:  patient entered with improved gait  patient dmeosntrates reduced hip mobility during gait but overall improved    Considerable tightness noted still on right hip particularly with piriformis stretch  Patient was able to perform sit/stand without UE support from mat table        Plan: progress flexibility and strengthening as patient tolerates;               Eval/ Re-eval Auth #/ Referral # Total visits Start date  Expiration date Total active units  Total manual units  PT only or  PT+OT?   23 No auth no visit limit                                       Medical History        Past Medical History:   Diagnosis Date    Anxiety      Apnea, sleep       patient states \"repeated test says no\"    Arthritis       severe in the joints    Chronic pain disorder       joints    Depression      DJD (degenerative joint disease)      Fall 2018     tripped on a folded rug- left hamstring injury    Foot injury       right foot-fx of bone-no repair-has severe arthritis in the foot and ankle    GERD (gastroesophageal reflux disease)      History of sleep apnea      Hypercholesteremia      Hypertension      Insomnia      Low back pain      Obesity       Lost 70lb with gastric sleeve ()    Osteoarthritis      Wears glasses      Wears partial dentures       upper and lower-but did lose the lower                                                                                                                                              Reeval: " 5/20/23  Insurance :               Visits: 1  2  3  4 5    Manual: 4/20/23  4/25/23  4/27/23  5/1/23  5/3/23                   L/S p-a mobs              Belt hip mobs          Next visit                                                  Ther ex/NMR:              Manual stretching: hamstring, hip flexor, piriformis/glute         Performed hamstring, piriformis and hip flexors     Rec bike/  nustep    6 min  6 min  NS 9 min  NS: 10 min     KTC with peanut    20  20        LTR    20  20 20  20 x each side    Wilner test stretch              Seated hamstring stretch              Piriformis stretch                       Squats 10x2 at bar 10 x 2    Prone quad stretch              Sit/stand with hip hinge    2x10      10 x 2                   TA activation (stab cuff)    10x  10x  10x  5 sec x 15    + marching    10x  20x  20x  -    +hip abd iso    10x  20x  20x -    +hip add iso    10x  20x  20x  -    + alternating LE extension                             Glut set prone     Supine 20    supine: 5 sec x 20                    Glute set + bridge        x10  10 x 2                  Therapeutic Activity:               Reevaluation                             Gait Training:                              HEP:                              Modalities                            ice              Total time:

## 2023-05-05 ENCOUNTER — APPOINTMENT (OUTPATIENT)
Dept: PHYSICAL THERAPY | Facility: CLINIC | Age: 68
End: 2023-05-05
Payer: COMMERCIAL

## 2023-05-08 ENCOUNTER — OFFICE VISIT (OUTPATIENT)
Dept: PHYSICAL THERAPY | Facility: CLINIC | Age: 68
End: 2023-05-08

## 2023-05-08 DIAGNOSIS — M54.50 CHRONIC MIDLINE LOW BACK PAIN, UNSPECIFIED WHETHER SCIATICA PRESENT: Primary | ICD-10-CM

## 2023-05-08 DIAGNOSIS — G89.29 CHRONIC MIDLINE LOW BACK PAIN, UNSPECIFIED WHETHER SCIATICA PRESENT: Primary | ICD-10-CM

## 2023-05-08 NOTE — PROGRESS NOTES
"Daily Note     Today's date: 2023  Patient name: Hussein Alford  : 1955  MRN: [id number]  Referring provider: Hilario Garcia MD  Dx:   Encounter Diagnosis     ICD-10-CM    1  Chronic midline low back pain, unspecified whether sciatica present  M54 50     G89 29           Start Time: 1054  Stop Time: 1140  Total time in clinic (min): 46 minutes    Subjective: Pain 4/10 on presentation, but better than when she started PT  Objective: See treatment diary below      Assessment: Tolerated treatment well  Patient demonstrated fatigue post treatment, exhibited good technique with therapeutic exercises and would benefit from continued PT  Patient subjective report of pain improved after manual interventions  Patient struggled with STS but was able to complete reps asked of her  Plan: Continue per plan of care  Progress per primary PT             Eval/ Re-eval Auth #/ Referral # Total visits Start date  Expiration date Total active units  Total manual units  PT only or  PT+OT?   23 No auth no visit limit                                       Medical History        Past Medical History:   Diagnosis Date   • Anxiety     • Apnea, sleep       patient states \"repeated test says no\"   • Arthritis       severe in the joints   • Chronic pain disorder       joints   • Depression     • DJD (degenerative joint disease)     • Fall 2018     tripped on a folded rug- left hamstring injury   • Foot injury       right foot-fx of bone-no repair-has severe arthritis in the foot and ankle   • GERD (gastroesophageal reflux disease)     • History of sleep apnea     • Hypercholesteremia     • Hypertension     • Insomnia     • Low back pain     • Obesity       Lost 70lb with gastric sleeve ()   • Osteoarthritis     • Wears glasses     • Wears partial dentures       upper and lower-but did lose the lower                                                                                                          " Reeval: 5/20/23  Insurance :               Visits: 1  2  3  4 5 6   Manual: 4/20/23  4/25/23  4/27/23  5/1/23  5/3/23 5/8/23                  L/S p-a mobs              Belt hip mobs          Next visit  Performed R hip gr 3-4 3x30s                                                Ther ex/NMR:              Manual stretching: hamstring, hip flexor, piriformis/glute         Performed hamstring, piriformis and hip flexors     Rec bike/  nustep    6 min  6 min  NS 9 min  NS: 10 min  NS 10 min   KTC with peanut    20  20        LTR    20  20 20  20 x each side 20 x each side   Wilner test stretch              Seated hamstring stretch              Piriformis stretch                       Squats 10x2 at bar 10 x 2    Prone quad stretch              Sit/stand with hip hinge    2x10      10 x 2 10 x 2                  TA activation (stab cuff)    10x  10x  10x  5 sec x 15 5 sec x 20   + marching    10x  20x  20x  - 20x   +hip abd iso    10x  20x  20x - 20x   +hip add iso    10x  20x  20x  - 20x   + alternating LE extension                             Glut set prone     Supine 20    supine: 5 sec x 20  Supine 20                  Glute set + bridge        x10  10 x 2 10 x 2                 Therapeutic Activity:               Reevaluation                             Gait Training:                              HEP:                              Modalities                            ice              Total time:

## 2023-05-10 ENCOUNTER — OFFICE VISIT (OUTPATIENT)
Dept: FAMILY MEDICINE CLINIC | Facility: CLINIC | Age: 68
End: 2023-05-10

## 2023-05-10 ENCOUNTER — APPOINTMENT (OUTPATIENT)
Dept: PHYSICAL THERAPY | Facility: CLINIC | Age: 68
End: 2023-05-10
Payer: COMMERCIAL

## 2023-05-10 VITALS
DIASTOLIC BLOOD PRESSURE: 80 MMHG | WEIGHT: 232 LBS | HEIGHT: 63 IN | BODY MASS INDEX: 41.11 KG/M2 | OXYGEN SATURATION: 96 % | SYSTOLIC BLOOD PRESSURE: 135 MMHG | HEART RATE: 80 BPM

## 2023-05-10 DIAGNOSIS — G89.4 CHRONIC PAIN SYNDROME: Primary | ICD-10-CM

## 2023-05-10 DIAGNOSIS — F41.9 ANXIETY: ICD-10-CM

## 2023-05-10 DIAGNOSIS — G89.29 CHRONIC MIDLINE LOW BACK PAIN WITHOUT SCIATICA: ICD-10-CM

## 2023-05-10 DIAGNOSIS — M19.91 PRIMARY OSTEOARTHRITIS, UNSPECIFIED SITE: ICD-10-CM

## 2023-05-10 DIAGNOSIS — M54.50 CHRONIC MIDLINE LOW BACK PAIN WITHOUT SCIATICA: ICD-10-CM

## 2023-05-10 DIAGNOSIS — F11.20 CONTINUOUS OPIOID DEPENDENCE (HCC): ICD-10-CM

## 2023-05-10 DIAGNOSIS — E78.5 DYSLIPIDEMIA: ICD-10-CM

## 2023-05-10 DIAGNOSIS — K21.9 GASTROESOPHAGEAL REFLUX DISEASE WITHOUT ESOPHAGITIS: ICD-10-CM

## 2023-05-10 DIAGNOSIS — I10 PRIMARY HYPERTENSION: ICD-10-CM

## 2023-05-10 DIAGNOSIS — E66.01 CLASS 3 SEVERE OBESITY DUE TO EXCESS CALORIES WITHOUT SERIOUS COMORBIDITY WITH BODY MASS INDEX (BMI) OF 40.0 TO 44.9 IN ADULT (HCC): ICD-10-CM

## 2023-05-10 RX ORDER — OXYCODONE AND ACETAMINOPHEN 7.5; 325 MG/1; MG/1
1 TABLET ORAL EVERY 4 HOURS PRN
Qty: 120 TABLET | Refills: 0 | Status: SHIPPED | OUTPATIENT
Start: 2023-05-10

## 2023-05-10 NOTE — ASSESSMENT & PLAN NOTE
Patient not able to take any nonsteroidals because of the gastric surgery we will continue with the pain medications for the low back pain continue with the physical therapy follow-up with the pain management

## 2023-05-10 NOTE — ASSESSMENT & PLAN NOTE
Blood pressure today is 135/80 we will continue with aceon 8 mg daily along with amlodipine 10 mg daily

## 2023-05-10 NOTE — ASSESSMENT & PLAN NOTE
Patient is currently taking oxycodone 7 5 325 4 times daily as needed for severe pain not able to give nonsteroidals because of the gastric bypass surgery

## 2023-05-10 NOTE — ASSESSMENT & PLAN NOTE
Patient BMI 41 10 again recommend very strongly to cut back calorie intake carbohydrate intake lifestyle modification

## 2023-05-10 NOTE — PROGRESS NOTES
Name: Skip Guzman      : 1955      MRN: [id number]  Encounter Provider: Juju Escalera MD  Encounter Date: 5/10/2023   Encounter department: [place] [address]     1  Chronic pain syndrome  Assessment & Plan:  Patient is currently taking oxycodone 7 5 325 4 times daily as needed for severe pain not able to give nonsteroidals because of the gastric bypass surgery  2  Anxiety  Assessment & Plan:  Continue with Lexapro      3  Class 3 severe obesity due to excess calories without serious comorbidity with body mass index (BMI) of 40 0 to 44 9 in adult [facility])  Assessment & Plan:  Patient BMI 41 10 again recommend very strongly to cut back calorie intake carbohydrate intake lifestyle modification  4  Continuous opioid dependence ([facility] [address] )  Assessment & Plan:  Patient not able to take any nonsteroidals because of the gastric surgery we will continue with the pain medications for the low back pain continue with the physical therapy follow-up with the pain management      5  Dyslipidemia  Assessment & Plan:  Patient going for lipid profile will start the medications if necessary      6  Gastroesophageal reflux disease without esophagitis  Assessment & Plan:  Patient on omeprazole 20 mg daily we will continue      7  Primary hypertension  Assessment & Plan:  Blood pressure today is 135/80 we will continue with aceon 8 mg daily along with amlodipine 10 mg daily      8  Chronic midline low back pain without sciatica    9  Primary osteoarthritis, unspecified site  Assessment & Plan:  Patient with osteoarthritis of different joints currently taking pain medication only for this  Not able to give nonsteroidals because of the gastric surgery  Subjective     Patient is here today for a 1 month follow up visit for Chronic pain Syndrome  She states she started physical therapy 3 weeks ago and it is going well    Patient was also seen by the pain management physician and "had x-rays taken which has revealed severe osteoarthritis at various levels  Pain management has recommended physical therapy at this time and then he will reevaluate after the therapy is done  May need steroid injections if necessary  Reviewed all medications discussed with the patient    Review of Systems   Constitutional: Negative for chills, fatigue and fever  HENT: Negative for ear pain and sore throat  Eyes: Negative for pain and visual disturbance  Respiratory: Negative for cough and shortness of breath  Cardiovascular: Negative for chest pain and palpitations  Gastrointestinal: Negative for abdominal pain, constipation, diarrhea and vomiting  Endocrine: Negative for polydipsia  Genitourinary: Negative for difficulty urinating, dysuria and hematuria  Musculoskeletal: Negative for arthralgias, back pain and myalgias  Skin: Negative for rash and wound  Neurological: Negative for dizziness, seizures, syncope and headaches  Psychiatric/Behavioral: Negative for agitation, confusion, hallucinations and sleep disturbance  All other systems reviewed and are negative        Past Medical History:   Diagnosis Date   • Anxiety    • Apnea, sleep     patient states \"repeated test says no\"   • Arthritis     severe in the joints   • Chronic pain disorder     joints   • Depression    • DJD (degenerative joint disease)    • Fall 11/22/2018    tripped on a folded rug- left hamstring injury   • Foot injury     right foot-fx of bone-no repair-has severe arthritis in the foot and ankle   • GERD (gastroesophageal reflux disease)    • History of sleep apnea    • Hypercholesteremia    • Hypertension    • Insomnia    • Low back pain    • Obesity     Lost 70lb with gastric sleeve (2011)   • Osteoarthritis    • Wears glasses    • Wears partial dentures     upper and lower-but did lose the lower      Past Surgical History:   Procedure Laterality Date   • ANKLE ARTHROSCOPY Right    • APPENDECTOMY     • " CHOLECYSTECTOMY     • COLONOSCOPY  10/26/2021   • DXA PROCEDURE (HISTORICAL)  10/13/2020   • GASTRECTOMY SLEEVE LAPAROSCOPIC  2011    lost 70 lb   • JOINT REPLACEMENT      blateral knee replacements   • MAMMO (HISTORICAL)  01/26/2022   • NERVE BLOCK Bilateral 12/29/2017    Procedure: L4, L5, S1 MEDIAL BRANCH BLOCK #1;  Surgeon: Munira Bauer MD;  Location: [place] [address] MAIN OR;  Service: Pain Management    • NERVE BLOCK Bilateral 01/12/2018    Procedure: BLOCK MEDIAL BRANCH L4, L5-S1  #2;  Surgeon: Munira Bauer MD;  Location: [place] [address] MAIN OR;  Service: Pain Management    • WA ARTHROPLASTY GLENOHUMERAL JOINT TOTAL SHOULDER Right 05/23/2019    Procedure: ARTHROPLASTY SHOULDER TOTAL , excision of deep cyst at shoulder, biceps tenotomy;  Surgeon: Matt Smith MD;  Location: [address] [place] [address];  Service: Orthopedics   • WA INJECT SI JOINT ARTHRGRPHY&/ANES/STEROID W/LESA Right 10/23/2020    Procedure: Sacroiliac Joint injection (19730); Surgeon: Munira Bauer MD;  Location: [facility] MAIN OR;  Service: Pain Management    • WA TENDON SHEATH INCISION Right 12/21/2021    Procedure: RELEASE TRIGGER RING FINGER;  Surgeon: Santa Abarca MD;  Location: [address] [place] [address];  Service: Orthopedics   • RADIOFREQUENCY ABLATION Right 03/16/2018    Procedure: Rt L4 L5 S1 RFA;  Surgeon: Munira Bauer MD;  Location: [place] [address] MAIN OR;  Service: Pain Management    • RADIOFREQUENCY ABLATION Left 12/14/2018    Procedure: L3-S1 Radio Frequency Ablation ([id number]);   Surgeon: Munira Bauer MD;  Location: [facility] MAIN OR;  Service: Pain Management    • RHIZOTOMY Left 01/25/2018    Procedure: L4, L5, S1 RFA;  Surgeon: Munira Bauer MD;  Location: [place] [address] MAIN OR;  Service: Pain Management    • TONSILLECTOMY       Family History   Problem Relation Age of Onset   • Other Mother         in a nursing home-fx of the leg   • Heart disease Father         CHF   • Alcohol abuse Father    • Cirrhosis Father    • Other Brother         lung abscess, pneumonia • Colon cancer Maternal Aunt    • No Known Problems Maternal Uncle    • No Known Problems Paternal Aunt    • No Known Problems Paternal Uncle    • Colon cancer Maternal Grandmother    • No Known Problems Maternal Grandfather    • No Known Problems Paternal Grandmother    • No Known Problems Paternal Grandfather    • No Known Problems Maternal Aunt    • No Known Problems Maternal Aunt    • Breast cancer Cousin 48     Social History     Socioeconomic History   • Marital status:      Spouse name: None   • Number of children: None   • Years of education: None   • Highest education level: None   Occupational History   • None   Tobacco Use   • Smoking status: Former     Types: Cigarettes     Quit date:      Years since quittin 3   • Smokeless tobacco: Never   Vaping Use   • Vaping Use: Never used   Substance and Sexual Activity   • Alcohol use: Never   • Drug use: Never   • Sexual activity: None   Other Topics Concern   • None   Social History Narrative   • None     Social Determinants of Health     Financial Resource Strain: Not on file   Food Insecurity: Not on file   Transportation Needs: Not on file   Physical Activity: Not on file   Stress: Not on file   Social Connections: Not on file   Intimate Partner Violence: Not on file   Housing Stability: Not on file     Current Outpatient Medications on File Prior to Visit   Medication Sig   • acetaminophen (TYLENOL) 650 mg CR tablet Take 650 mg by mouth every 8 (eight) hours as needed for mild pain   • amLODIPine (NORVASC) 10 mg tablet Take 1 tablet (10 mg total) by mouth daily   • ascorbic acid (VITAMIN C) 500 mg tablet Take 500 mg by mouth every morning   • clindamycin (CLEOCIN) 300 MG capsule as needed Pre dental procedures   • Coenzyme Q10 (CO Q 10) 100 MG CAPS Take by mouth every morning Last dose 19   • Cyanocobalamin (VITAMIN B 12 PO) Take 500 mcg by mouth every morning   • diphenhydrAMINE (BENADRYL) 25 mg tablet Take 25 mg by mouth every 8 "(eight) hours as needed for itching    • escitalopram (LEXAPRO) 10 mg tablet TAKE 1 AND 1/2 TABLETS BY  MOUTH DAILY AT BEDTIME   • Glucosamine-Chondroitin (MOVE FREE PO) Take 2 tablets by mouth every morning   • Melatonin 10 MG TABS Take by mouth daily at bedtime   • omeprazole (PriLOSEC) 20 mg delayed release capsule TAKE 1 CAPSULE BY MOUTH IN  THE MORNING   • oxyCODONE-acetaminophen (Percocet) 7 5-325 MG per tablet Take 1 tablet by mouth every 4 (four) hours as needed for moderate pain Max Daily Amount: 6 tablets   • perindopril (ACEON) 8 MG tablet 8 mg 2 (two) times a day   • triamcinolone (KENALOG) 0 5 % cream Apply topically 3 (three) times a day   • zinc gluconate 50 mg tablet Take 50 mg by mouth daily     Allergies   Allergen Reactions   • Penicillins Anaphylaxis     Total body redness and fever   • Levaquin [Levofloxacin] Itching     Immunization History   Administered Date(s) Administered   • COVID-19 MODERNA VACC 0 5 ML IM 05/12/2021, 05/12/2021, 06/12/2021, 06/12/2021   • INFLUENZA 10/14/2020, 10/28/2022   • Influenza, high dose seasonal 0 7 mL 10/28/2022   • Tetanus Toxoid, Unspecified 03/24/2014   • Zoster Vaccine Recombinant 10/29/2020, 01/05/2021       Objective     /80   Pulse 80   Ht 5' 3\" (1 6 m)   Wt 105 kg (232 lb)   SpO2 96%   BMI 41 10 kg/m²     Physical Exam  Vitals and nursing note reviewed  Constitutional:       Appearance: Normal appearance  She is obese  She is not ill-appearing  HENT:      Right Ear: External ear normal       Left Ear: External ear normal       Nose: Nose normal       Mouth/Throat:      Pharynx: Oropharynx is clear  No oropharyngeal exudate  Eyes:      Conjunctiva/sclera: Conjunctivae normal    Cardiovascular:      Rate and Rhythm: Normal rate and regular rhythm  Pulses: Normal pulses  Heart sounds: Normal heart sounds  No murmur heard  Pulmonary:      Effort: Pulmonary effort is normal       Breath sounds: Normal breath sounds   No wheezing or " rales    Abdominal:      General: Bowel sounds are normal  There is no distension  Tenderness: There is no abdominal tenderness  Musculoskeletal:         General: Normal range of motion  Cervical back: Normal range of motion and neck supple  Right lower leg: No edema  Left lower leg: No edema  Comments: Joint movements causing discomfort and pain SLR about 30 degrees both sides   Skin:     General: Skin is warm and dry  Coloration: Skin is not jaundiced or pale  Neurological:      Mental Status: She is alert and oriented to person, place, and time  Motor: Weakness present        Coordination: Coordination normal       Gait: Gait abnormal    Psychiatric:         Mood and Affect: Mood normal          Judgment: Judgment normal        Cherise Morales MD

## 2023-05-10 NOTE — ASSESSMENT & PLAN NOTE
Patient with osteoarthritis of different joints currently taking pain medication only for this  Not able to give nonsteroidals because of the gastric surgery

## 2023-05-12 ENCOUNTER — OFFICE VISIT (OUTPATIENT)
Dept: PHYSICAL THERAPY | Facility: CLINIC | Age: 68
End: 2023-05-12

## 2023-05-12 DIAGNOSIS — M54.50 CHRONIC MIDLINE LOW BACK PAIN, UNSPECIFIED WHETHER SCIATICA PRESENT: Primary | ICD-10-CM

## 2023-05-12 DIAGNOSIS — I10 PRIMARY HYPERTENSION: ICD-10-CM

## 2023-05-12 DIAGNOSIS — G89.29 CHRONIC MIDLINE LOW BACK PAIN, UNSPECIFIED WHETHER SCIATICA PRESENT: Primary | ICD-10-CM

## 2023-05-12 RX ORDER — AMLODIPINE BESYLATE 10 MG/1
TABLET ORAL
Qty: 90 TABLET | Refills: 3 | Status: SHIPPED | OUTPATIENT
Start: 2023-05-12

## 2023-05-12 NOTE — PROGRESS NOTES
"        Daily Note         Today's date: 2023  Patient name: Mike Dandy  : 1955  MRN: [id number]  Referring provider: Villa Hilton MD  Dx:   Encounter Diagnosis     ICD-10-CM    1  Chronic midline low back pain, unspecified whether sciatica present  M54 50     G89 29           Subjective: Mike Dandy reports pain level entering today is 6/10  States she feels like PT is helping  Objective: See treatment diary below    Assessment:  patient requested to hold belt joint mobs at hip after mob was performed    patient denied pain but reprots \"I just dontlike it\"  discussed with patient the benefit of a hip xray  but she reported she has no interest in a hip replacement so she is not interested in knowing the results of a xray    The goal of the current treatment is to address patient's functional limitations as well as objective findings  Patient continues with antalgic gait with reduced stance time on right  Plan: reeval in 1-2 sessions                 Eval/ Re-eval Auth #/ Referral # Total visits Start date  Expiration date Total active units  Total manual units  PT only or  PT+OT?   23 No auth no visit limit                                       Medical History        Past Medical History:   Diagnosis Date   • Anxiety     • Apnea, sleep       patient states \"repeated test says no\"   • Arthritis       severe in the joints   • Chronic pain disorder       joints   • Depression     • DJD (degenerative joint disease)     • Fall 2018     tripped on a folded rug- left hamstring injury   • Foot injury       right foot-fx of bone-no repair-has severe arthritis in the foot and ankle   • GERD (gastroesophageal reflux disease)     • History of sleep apnea     • Hypercholesteremia     • Hypertension     • Insomnia     • Low back pain     • Obesity         Lost 70lb with gastric sleeve ()   • Osteoarthritis     • Wears glasses     • Wears partial dentures       upper and " lower-but did lose the lower                                                                                                                                              Reeval: 23  Insurance :           foto performed    Visits:  2  3  4 5 6 7   Manual:  4/25/23  4/27/23  5/1/23  5/3/23 5/8/23 5/12/23                 L/S p-a mobs             Belt hip mobs        Next visit  Performed R hip gr 3-4 3x30s Performed   Hold per patient request                                             Ther ex/NMR:             Manual stretching: hamstring, hip flexor, piriformis/glute       Performed hamstring, piriformis and hip flexors   Hip flexor stretch, piriformis  10 sec x 5     Rec bike/  nustep  6 min  6 min  NS 9 min  NS: 10 min  NS 10 min NS: 10 min    KTC with peanut  20  20      20 x    LTR  20  20 20  20 x each side 20 x each side 20 x each    Wilner test stretch             Seated hamstring stretch             Piriformis stretch                    Squats 10x2 at bar 10 x 2  Supported squats at bar: 10 x 2   Prone quad stretch             Sit/stand with hip hinge  2x10      10 x 2 10 x 2 --                 TA activation (stab cuff)  10x  10x  10x  5 sec x 15 5 sec x 20    + marching  10x  20x  20x  - 20x    +hip abd iso  10x  20x  20x - 20x    +hip add iso  10x  20x  20x  - 20x    + alternating LE extension                           Glut set prone   Supine 20    supine: 5 sec x 20  Supine 20 Standin sec x 10                   Glute set + bridge      x10  10 x 2 10 x 2                 Therapeutic Activity:              Reevaluation                           Gait Training:                            HEP:                            Modalities                          ice             Total time:

## 2023-05-15 ENCOUNTER — OFFICE VISIT (OUTPATIENT)
Dept: PHYSICAL THERAPY | Facility: CLINIC | Age: 68
End: 2023-05-15

## 2023-05-15 DIAGNOSIS — G89.29 CHRONIC MIDLINE LOW BACK PAIN, UNSPECIFIED WHETHER SCIATICA PRESENT: Primary | ICD-10-CM

## 2023-05-15 DIAGNOSIS — M54.50 CHRONIC MIDLINE LOW BACK PAIN, UNSPECIFIED WHETHER SCIATICA PRESENT: Primary | ICD-10-CM

## 2023-05-15 DIAGNOSIS — K04.7 TOOTH INFECTION: Primary | ICD-10-CM

## 2023-05-15 RX ORDER — CLINDAMYCIN HYDROCHLORIDE 300 MG/1
300 CAPSULE ORAL AS NEEDED
Qty: 20 CAPSULE | Refills: 0 | Status: SHIPPED | OUTPATIENT
Start: 2023-05-15 | End: 2023-05-20

## 2023-05-15 NOTE — PROGRESS NOTES
"        Daily Note         Today's date: 5/15/2023  Patient name: Trinidad Eckert  : 1955  MRN: [id number]  Referring provider: Marilee Antonio MD  Dx:   Encounter Diagnosis     ICD-10-CM    1  Chronic midline low back pain, unspecified whether sciatica present  M54 50     G89 29           Subjective: Trinidad Eckert reports pain level 5/10       Objective: See treatment diary below    Assessment:  still signficant tension and tightness with piriformis stretch and rosy test stretch irght greater then left  was able ot progress bridgng actvities today to include hip add and abd isometrics  did experience hamstring cramping with bridging which reslved with instruction to perform ankle DF while performing bridging    The goal of the current treatment is to address patient's functional limitations as well as objective findings         Plan: reeval 23              Eval/ Re-eval Auth #/ Referral # Total visits Start date  Expiration date Total active units  Total manual units  PT only or  PT+OT?   23 No auth no visit limit                                       Medical History        Past Medical History:   Diagnosis Date   • Anxiety     • Apnea, sleep       patient states \"repeated test says no\"   • Arthritis       severe in the joints   • Chronic pain disorder       joints   • Depression     • DJD (degenerative joint disease)     • Fall 2018     tripped on a folded rug- left hamstring injury   • Foot injury       right foot-fx of bone-no repair-has severe arthritis in the foot and ankle   • GERD (gastroesophageal reflux disease)     • History of sleep apnea     • Hypercholesteremia     • Hypertension     • Insomnia     • Low back pain     • Obesity         Lost 70lb with gastric sleeve ()   • Osteoarthritis     • Wears glasses     • Wears partial dentures       upper and lower-but did lose the lower                                                                                          " Reeval: 23  Insurance :         foto performed     Visits:  3  4 5 6 7 8   Manual:  4/27/23  5/1/23  5/3/23 5/8/23 5/12/23 5/15/23                L/S p-a mobs            Belt hip mobs      Next visit  Performed R hip gr 3-4 3x30s Performed   Hold per patient request                                           Ther ex/NMR:            Manual stretching: hamstring, hip flexor, piriformis/glute     Performed hamstring, piriformis and hip flexors    sidelying hip flexor stretch: self: 10 sec x 5    Piriformis stretch: manual 10 sec x 5     Rec bike/  nustep  6 min  NS 9 min  NS: 10 min  NS 10 min NS: 10 min  NS: 10 min    KTC with peanut  20      20 x  20 x    LTR  20 20  20 x each side 20 x each side 20 x each  --m   Seated hamstring stretch         10 sec x 5          Squats 10x2 at bar 10 x 2  Supported squats at bar: 10 x 2 10 x 2   Prone quad stretch            Sit/stand with hip hinge      10 x 2 10 x 2 --                 TA activation (stab cuff)  10x  10x  5 sec x 15 5 sec x 20     + marching  20x  20x  - 20x     +hip abd iso  20x  20x - 20x     +hip add iso  20x  20x  - 20x     + alternating LE extension                         Glut set prone Supine 20    supine: 5 sec x 20  Supine 20 Standin sec x 10   Standing 5 sec x 10     st: hip extension         10 x 2   Glute set + bridge    x10  10 x 2 10 x 2  + hip add iso: x 10    + hip abd iso red band:  10 x               Therapeutic Activity:             Reevaluation                         Gait Training:                          HEP:                          Modalities                        ice            Total time:

## 2023-05-17 ENCOUNTER — OFFICE VISIT (OUTPATIENT)
Dept: PHYSICAL THERAPY | Facility: CLINIC | Age: 68
End: 2023-05-17

## 2023-05-17 DIAGNOSIS — G89.29 CHRONIC MIDLINE LOW BACK PAIN, UNSPECIFIED WHETHER SCIATICA PRESENT: Primary | ICD-10-CM

## 2023-05-17 DIAGNOSIS — M54.50 CHRONIC MIDLINE LOW BACK PAIN, UNSPECIFIED WHETHER SCIATICA PRESENT: Primary | ICD-10-CM

## 2023-05-17 NOTE — PROGRESS NOTES
"        Daily Note         Today's date: 2023  Patient name: Kristi Hampton  : 1955  MRN: [id number]  Referring provider: Antolin Perry MD  Dx:   Encounter Diagnosis     ICD-10-CM    1  Chronic midline low back pain, unspecified whether sciatica present  M54 50     G89 29           Subjective: Kristi Hampton reports \"my hamstring has become painful\"       Objective: See treatment diary below    Assessment:  increased time spent rolling left hamstring with focus on hamstring insertion  patient tender in area which reduced iwth rolling  patient dmeonstrated left hamstring crmap with bridging which resolved when cued for ankle DF   patient and therapist discussed [address] and agreed paatient will benefit from participation in chronic pain group which occurs 1 x week  patient agreeable to idea  therefore will reeval next session then do pain group 1 x week and ortho PT 1 x week  The goal of the current treatment is to address patient's functional limitations as well as objective findings         Plan: reeval next session              Eval/ Re-eval Auth #/ Referral # Total visits Start date  Expiration date Total active units  Total manual units  PT only or  PT+OT?   23 No auth no visit limit                                       Medical History        Past Medical History:   Diagnosis Date   • Anxiety     • Apnea, sleep       patient states \"repeated test says no\"   • Arthritis       severe in the joints   • Chronic pain disorder       joints   • Depression     • DJD (degenerative joint disease)     • Fall 2018     tripped on a folded rug- left hamstring injury   • Foot injury       right foot-fx of bone-no repair-has severe arthritis in the foot and ankle   • GERD (gastroesophageal reflux disease)     • History of sleep apnea     • Hypercholesteremia     • Hypertension     • Insomnia     • Low back pain     • Obesity         Lost 70lb with gastric sleeve ()   • Osteoarthritis     • Wears " glasses     • Wears partial dentures       upper and lower-but did lose the lower                                                                                                                                              Reeval: 23  Insurance :       foto performed      Visits:  4 5 6 7 8 9   Manual:  5/1/23  5/3/23 5/8/23 5/12/23 5/15/23 5/17/23            IASTM left hamstring insertion with roll out stick   L/S p-a mobs           Belt hip mobs    Next visit  Performed R hip gr 3-4 3x30s Performed   Hold per patient request                                         Ther ex/NMR:           Manual stretching: hamstring, hip flexor, piriformis/glute   Performed hamstring, piriformis and hip flexors    sidelying hip flexor stretch: self: 10 sec x 5    Piriformis stretch: manual 10 sec x 5   Hamstring stretch 10 sec x 5    Hip felxor stretch: 10 sec x 5  Piriformis stretch 10 sec x 5     Rec bike/  nustep NS 9 min  NS: 10 min  NS 10 min NS: 10 min  NS: 10 min  NS: 10 min lv 3   KTC with peanut      20 x  20 x     LTR 20  20 x each side 20 x each side 20 x each  --m    Seated hamstring stretch       10 sec x 5         Squats 10x2 at bar 10 x 2  Supported squats at bar: 10 x 2 10 x 2 --   Prone quad stretch           Sit/stand with hip hinge    10 x 2 10 x 2 --  10 x 3               TA activation (stab cuff)  10x  5 sec x 15 5 sec x 20      + marching  20x  - 20x      +hip abd iso  20x - 20x      +hip add iso  20x  - 20x      + alternating LE extension                       Glut set prone    supine: 5 sec x 20  Supine 20 Standin sec x 10   Standing 5 sec x 10  Prone 5 sec x 10      st: hip extension       10 x 2 --   Glute set + bridge  x10  10 x 2 10 x 2  + hip add iso: x 10    + hip abd iso red band:  10 x + hip add iso: x 10    + hip abd iso red band:  10 x   Step ups        6 in: 10 x 2   Therapeutic Activity:            Reevaluation                       Gait Training:                        HEP:                        Modalities           MH           ice           Total time:

## 2023-05-22 ENCOUNTER — EVALUATION (OUTPATIENT)
Dept: PHYSICAL THERAPY | Facility: CLINIC | Age: 68
End: 2023-05-22

## 2023-05-22 DIAGNOSIS — G89.29 CHRONIC MIDLINE LOW BACK PAIN, UNSPECIFIED WHETHER SCIATICA PRESENT: Primary | ICD-10-CM

## 2023-05-22 DIAGNOSIS — M54.50 CHRONIC MIDLINE LOW BACK PAIN, UNSPECIFIED WHETHER SCIATICA PRESENT: Primary | ICD-10-CM

## 2023-05-22 NOTE — LETTER
May 23, 2023    Parvin Jones MD  [address] [address]    Patient: Satish Cook   YOB: 1955   Date of Visit: 2023     Encounter Diagnosis     ICD-10-CM    1  Chronic midline low back pain, unspecified whether sciatica present  M54 50     G89 29           Dear Dr Shona Dinh:    Thank you for your recent referral of Satish Cook  Please review the attached evaluation summary from Zonia's recent visit  Please verify that you agree with the plan of care by signing the attached order  If you have any questions or concerns, please do not hesitate to call  I sincerely appreciate the opportunity to share in the care of one of your patients and hope to have another opportunity to work with you in the near future  Sincerely,    Pierre Holguin PT      Referring Provider:      I certify that I have read the below Plan of Care and certify the need for these services furnished under this plan of treatment while under my care  Parvin Jones MD  [address] [address]  [place]          Daily Note/Progress Note      Today's date: 2023  Patient name: Satish Cook  : 1955  MRN: [id number]  Referring provider: Maria Eugenia Almaraz MD  Dx:   Encounter Diagnosis     ICD-10-CM    1  Chronic midline low back pain, unspecified whether sciatica present  M54 50     G89 29           Subjective: Pt reports % improvement since SOC: 50%  The last 50% is due to still experiencing groin pain in right hip, low back has improved, now experiencing intermittently hamstring pulling ion left LE  Pain level at rest:  0 /10, pain level with ADLS:  4-5 /18  At this time, the functional limitations include: difficulty with bending over to clean up after the dog, standing limited to an hour for food preparation  Needs to pull herself up on the railings with climbing stairs        Objective: (23)    Concurrent Complaints:  Red flags present: Neg   Negative for bladder dysfunction, bowel dysfunction and saddle (S4) numbness    Posture (status quo)    Sitting:+ slouched   Standing: right shoulder higher then left     LLD:neg in standing    Skin creases:elevated on left compared to right    Shift:neg    Scoliosis: mid back curved to left with correction in lumbar spine    Knees: right LE postures in ER and unweights right LE     Gait: reduced step length adan, reduced stance time on left (   Assistive device: none   Trunk movement: reduced    Stride length: reduced   Trendelenburg: mild adan   Foot drop: neg    Functional movements:   Squat: unable to tap standard chair,  (+)pain (status quo)   Bridging: able to lift 50% of motion  (-) pain   Transfers sit/stand: needs adan UE assistance (able to perform but with difficulty)   bed mobility independent    Lumbar AROM:    Flexion: minimal LOM (+)pain produced grabbing in low back and right groin (min LOM: + pressure in low back and  Left hamstring)   Extension: moderate LOM grabs in low back and right groin ( moderate LOM: limited by groin pain)   Side bend: Right: major LOM (+)pain  (mod LOM: + pain)    Left: moderate LOM (+)pain  (mod LOM + pain)   Rotation: right:min LOm + pain right LE ((status quo)     Left: wnl no pain (status quo)       Neurodynamic testing:    Passive SLR: neg adan    Hip/knee/ankle MMT: hip abd: 4+/5 (break test)  Clamshells: 4+/5 adan (break test)  Hip/knee/ankle ROM:   Hip flexion left 90, Right: 85 (Right: 100 deg, left 114)  Abd: left wnl   Right: 35 (status quo)  ER: left wnl Right: 35 deg (Right: 40 + pain)  IR: left 25 deg  Right: 18 deg ( Right: 10 + pain)    Dermatomes: grossly wnl to light touch adan LE    Myotomes   L2 (hip flexion): Right 4-/5  + pain Left: 4/5 (4/5 no pain)   L3 (knee extension): Right: 4+/5 Left: 4+/5   L4 (ankle dorsiflexion): Right: 4+/5 Left: 4+/5   L5 (hallux extension): Right: 4+/5  Left: 4+/5   S1 (ankle plantarflexion): Right: 4+/5 "Left: 4+/5   S2 (hip extensors): Right: 4+/5 Left: 4+/5    TA facilitation: poor    Flexibility:   Hamstring: Right: good  Left: good   Hip flexors:Right: poor Left: poor ( able to achieve neutral)    Quads: Right: poor Left: poor (status quo)   (piriformis:   Poor adan)       Assessment: Liliana Dawn demonstrates mild improvement in ROM and strength  Noted improvement is increase in hip flexor strength and right hip ROM  patient continues to report significant groin pain and right hip pain  Encouraged patient to request a hip xray due to limited ROM and patient has a history of 3 other joint replacements and patient defers due to not wanting another replacement  Patient amicable to participating in chronic pain group offered by clinic which she will attend 2 x week for 8 sessions  Patient will remain on program with ortho PT 1 x week for next 4-5 weeks then transition to d/c   The goal status of Liliana Dawn is indicated above       Plan:  1 x week with ortho x 4 weeks, and 1 x week with chronic pain group x 8 sessions  (5/22/23--> 6/30/22)             Eval/ Re-eval Auth #/ Referral # Total visits Start date  Expiration date Total active units  Total manual units  PT only or  PT+OT?   4/20/23 No auth no visit limit                                       Medical History        Past Medical History:   Diagnosis Date   • Anxiety     • Apnea, sleep       patient states \"repeated test says no\"   • Arthritis       severe in the joints   • Chronic pain disorder       joints   • Depression     • DJD (degenerative joint disease)     • Fall 11/22/2018     tripped on a folded rug- left hamstring injury   • Foot injury       right foot-fx of bone-no repair-has severe arthritis in the foot and ankle   • GERD (gastroesophageal reflux disease)     • History of sleep apnea     • Hypercholesteremia     • Hypertension     • Insomnia     • Low back pain     • Obesity         Lost 70lb with gastric sleeve (2011)   • " Osteoarthritis     • Wears glasses     • Wears partial dentures       upper and lower-but did lose the lower                                                                                                                                              Reeval: 23  Insurance :       foto performed       Visits:  4 5 6 7 8 9 10    Manual:  5/1/23  5/3/23 5/8/23 5/12/23 5/15/23 5/17/23 5/22/23            IASTM left hamstring insertion with roll out stick IASTM left hamstring insertion with roll out stick   Belt hip mobs    Next visit  Performed R hip gr 3-4 3x30s Performed   Hold per patient request                                             Ther ex/NMR:            Manual stretching: hamstring, hip flexor, piriformis/glute   Performed hamstring, piriformis and hip flexors    sidelying hip flexor stretch: self: 10 sec x 5    Piriformis stretch: manual 10 sec x 5   Hamstring stretch 10 sec x 5    Hip felxor stretch: 10 sec x 5  Piriformis stretch 10 sec x 5   Piriformis stretch 10 sec x 5   Rec bike/  nustep NS 9 min  NS: 10 min  NS 10 min NS: 10 min  NS: 10 min  NS: 10 min lv 3 lv 3 10 min NS   KTC with peanut      20 x  20 x      LTR 20  20 x each side 20 x each side 20 x each  --     Seated hamstring stretch       10 sec x 5          Squats 10x2 at bar 10 x 2  Supported squats at bar: 10 x 2 10 x 2 --    Prone quad stretch            Sit/stand with hip hinge    10 x 2 10 x 2 --  10 x 3    TA activation (stab cuff)  10x  5 sec x 15 5 sec x 20    Standin sec x 10    + marching  20x  - 20x       +hip abd iso  20x - 20x       +hip add iso  20x  - 20x       + alternating LE extension                         Glut set prone    supine: 5 sec x 20  Supine 20 Standin sec x 10   Standing 5 sec x 10  Prone 5 sec x 10   Standin sec x 20     st: hip extension       10 x 2 --    Glute set + bridge  x10  10 x 2 10 x 2  + hip add iso: x 10    + hip abd iso red band:  10 x + hip add iso: x 10    + hip abd iso red band: 10 x    Step ups        6 in: 10 x 2 6 in: 10 x 2              Therapeutic Activity:             Reevaluation         performed                Gait Training:                          HEP:                          Modalities            MH            ice            Total time:

## 2023-05-22 NOTE — PROGRESS NOTES
Daily Note/Progress Note      Today's date: 2023  Patient name: Etienne Kang  : 1955  MRN: [id number]  Referring provider: Simone Woodall MD  Dx:   Encounter Diagnosis     ICD-10-CM    1  Chronic midline low back pain, unspecified whether sciatica present  M54 50     G89 29           Subjective: Pt reports % improvement since SOC: 50%  The last 50% is due to still experiencing groin pain in right hip, low back has improved, now experiencing intermittently hamstring pulling ion left LE  Pain level at rest:  0 /10, pain level with ADLS:  4-5 /34  At this time, the functional limitations include: difficulty with bending over to clean up after the dog, standing limited to an hour for food preparation  Needs to pull herself up on the railings with climbing stairs        Objective: (23)    Concurrent Complaints:  Red flags present: Neg   Negative for bladder dysfunction, bowel dysfunction and saddle (S4) numbness    Posture (status quo)    Sitting:+ slouched   Standing: right shoulder higher then left     LLD:neg in standing    Skin creases:elevated on left compared to right    Shift:neg    Scoliosis: mid back curved to left with correction in lumbar spine    Knees: right LE postures in ER and unweights right LE     Gait: reduced step length adan, reduced stance time on left (   Assistive device: none   Trunk movement: reduced    Stride length: reduced   Trendelenburg: mild adan   Foot drop: neg    Functional movements:   Squat: unable to tap standard chair,  (+)pain (status quo)   Bridging: able to lift 50% of motion  (-) pain   Transfers sit/stand: needs adan UE assistance (able to perform but with difficulty)   bed mobility independent    Lumbar AROM:    Flexion: minimal LOM (+)pain produced grabbing in low back and right groin (min LOM: + pressure in low back and  Left hamstring)   Extension: moderate LOM grabs in low back and right groin ( moderate LOM: limited by groin pain)   Side bend: [facility] MEDICINE PROGRESS NOTE   Patient: Mayuri Novoa  Today's Date: 2022    YOB: 1936  Admission Date: 2022    MRN: [id number]  Inpatient LOS: 1    Room: E0  Hospital Day: Hospital Day: 2    Subjective   HISTORY AND SUBJECTIVE COMPLAINTS     Chief Complaint:   Chest pain and SOB    Interval History / Subjective:   Admitted overnight for chest discomfort with history of CAD s/p stent placement  Troponins 328 on admission, ECG showing a paced rhythm, BNP 7016, vital signs are stable  This morning complains of chest discomfort which is improved since admission, no SOB or difficulty breathing.     Hospital Course:  Mayuri Novoa is a 85 year old female who presented on 2022 with complaints of Chest Pain Adult  .    ROS:  Pertinent systems negative except as above.    Objective   PHYSICAL EXAMINATION     Vital 24 Hour Range Most Recent Value   Temperature Temp  Min: 98.6 °F (37 °C)  Max: 98.6 °F (37 °C) 98.6 °F (37 °C)   Pulse Pulse  Min: 69  Max: 82 75   Respiratory Resp  Min: 15  Max: 32 (!) 24   Blood Pressure BP  Min: 145/85  Max: 187/103 (!) 145/85   Pulse Oximetry SpO2  Min: 84 %  Max: 100 % 96 %   Arterial BP No data recorded     O2 O2 Flow Rate (L/min)  Av L/min  Min: 2 L/min   Min taken time: 22 0805  Max: 2 L/min   Max taken time: 22 0805       Recorded Intake and Output:    Intake/Output Summary (Last 24 hours) at 2022 0938  Last data filed at 2022 0805  Gross per 24 hour   Intake 240 ml   Output 4650 ml   Net -4410 ml      Recorded Last Stool Occurrence:       Vital Most Recent Value First Value   Weight 68.5 kg (151 lb) Weight: 68.5 kg (151 lb)   Height 5' 1\" (154.9 cm) Height: 5' 1\" (154.9 cm)   BMI 28.53 N/A     General: Looks well, no apparent distress, sitting in the bed, AOx3, pleasant and conversational  CV: irregularly irregular, normal s1-s2, no murmurs   Resp: clear to auscultation bilaterally, no wheezing  Right: major LOM (+)pain  (mod LOM: + pain)    Left: moderate LOM (+)pain  (mod LOM + pain)   Rotation: right:min LOm + pain right LE ((status quo)     Left: wnl no pain (status quo)       Neurodynamic testing:    Passive SLR: neg adan    Hip/knee/ankle MMT: hip abd: 4+/5 (break test)  Clamshells: 4+/5 adan (break test)  Hip/knee/ankle ROM:   Hip flexion left 90, Right: 85 (Right: 100 deg, left 114)  Abd: left wnl   Right: 35 (status quo)  ER: left wnl Right: 35 deg (Right: 40 + pain)  IR: left 25 deg  Right: 18 deg ( Right: 10 + pain)    Dermatomes: grossly wnl to light touch adan LE    Myotomes   L2 (hip flexion): Right 4-/5  + pain Left: 4/5 (4/5 no pain)   L3 (knee extension): Right: 4+/5 Left: 4+/5   L4 (ankle dorsiflexion): Right: 4+/5 Left: 4+/5   L5 (hallux extension): Right: 4+/5  Left: 4+/5   S1 (ankle plantarflexion): Right: 4+/5 Left: 4+/5   S2 (hip extensors): Right: 4+/5 Left: 4+/5    TA facilitation: poor    Flexibility:   Hamstring: Right: good  Left: good   Hip flexors:Right: poor Left: poor ( able to achieve neutral)    Quads: Right: poor Left: poor (status quo)   (piriformis:   Poor adan)       Assessment: Mateus Paredes demonstrates mild improvement in ROM and strength  Noted improvement is increase in hip flexor strength and right hip ROM  patient continues to report significant groin pain and right hip pain  Encouraged patient to request a hip xray due to limited ROM and patient has a history of 3 other joint replacements and patient defers due to not wanting another replacement  Patient amicable to participating in chronic pain group offered by clinic which she will attend 2 x week for 8 sessions  Patient will remain on program with ortho PT 1 x week for next 4-5 weeks then transition to d/c   The goal status of Mateus Paredes is indicated above       Plan:  1 x week with ortho x 4 weeks, and 1 x week with chronic pain group x 8 sessions  (5/22/23--> 6/30/22)              Eval/ Re-eval Auth #/ or crackles   Abd: soft, nontender, nondistended, bowel sounds present  Ext: 1+ edema in bilateral lower extremities, 2+ radial and DP pulses   Skin: no rashes, lesions, or ulcers noted  Neuro: CN 2-12 grossly intact, normal sensation  and no focal deficits noted  Psych: normal judgement and insight, oriented to time, place and person and normal mood and affect    TEST RESULTS     Labs: The Laboratory values listed below have been reviewed and pertinent findings discussed in the Assessment and Plan.    Laboratory values:     Recent Labs   Lab 07/20/22  0425 07/19/22 1911 07/19/22  1543 07/18/22  1012   SODIUM 142  --  141 143   POTASSIUM 3.5  --  3.3* 3.9   CHLORIDE 105  --  103 103   CO2 31  --  28 29   CALCIUM 9.4  --  9.9 9.4   GLUCOSE 100*  --  106* 101*   BUN 13  --  12 13   CREATININE 0.98*  --  1.00* 1.04*   MG 1.5* 1.5*  --  2.0        Recent Labs   Lab 07/20/22  0425 07/19/22  2311 07/19/22  2119 07/19/22  1911 07/19/22  1543 07/18/22  1012   HTROPI  --  283* 280* 305* 304* 328*   NTPROB 7,016*  --   --   --  7,016* 6,444*       Recent Labs   Lab 07/20/22  0846 07/20/22  0530 07/20/22  0113 07/19/22  1543   PT  --   --   --  11.9*   INR  --   --   --  1.1   PTT 64* 74* 39* 29         Radiology: Imaging studies have been reviewed and pertinent findings discussed in the Assessment and Plan.  Results for orders placed or performed during the hospital encounter of 07/19/22 (from the past 48 hour(s))   XR CHEST AP OR PA - PORTABLE    Impression    IMPRESSION:  1. Modest cardiomegaly with vascular congestion        ANCILLARY ORDERS     Diet:  One Time Diet Regular  Sodium 2 Gm (low Sodium); Fluid Restrict 1500ml (900 From Dietary) Diet  Telemetry: On  Consults:    IP CONSULT TO CARDIOLOGY  IP CONSULT TO CARDIAC REHAB  IP CONSULT TO PHARMACY  IP CONSULT TO SOCIAL WORK  Therapy Orders:   No orders of the defined types were placed in this encounter.      ADVANCED DIRECTIVES     Code Status: Full Resuscitation          ASSESSMENT AND PLAN       Coronary artery disease with current angina  NSTEMI  Troponins 328 on admission, ECG showing a paced rhythm  troponin's 305 -> 280 -> 283  Heparin gtt started, on aspirin and plavix   Anti-ischemic treatment: PRN nitroglycerin, metoprolol, nitroglycerin   Morphine for pain  Supplemental oxygen, monitor electrolytes  Statin therapy - started atorvastatin  -symptomatic on admission and currently complaining of chest discomfort, EKG not showing ST elevations  -most recent cath: 01/28/2022 shows multivessel CAD with NIMO to proximal-mid LAD and and mid-left circumflex   Cardiology consult placed - appreciate you  Plan to transfer patient to [facility] for cardiac catheterization and possible intervention  ER   Called [facility] for transfer.  Discussed with Dr. Mcgrath from [facility], recommends transfer to [facility] plan is for cardiac catheterization on Thursday.  Recommends holding Eliquis, starting heparin drip without a bolus 12 hours after the last dose of Eliquis, and agrees with Lasix IV.  For now will continue to trend cardiac markers and current therapy    CHF with reduced/preserved ejection fraction, acute on chronic   -ECHO as above  -LVEF assessment:   Ejection Fraction   Date Value Ref Range Status   04/19/2022 51 % Final     -admission NTproBNP: 7016  -fluid balance since admission: -4.4L  -labs as above   -imaging as above   -discussed with cardiology   -CHF pathway, metoprolol, furosemide, fluid restriction,         LVEF < or equal to 35%: no  ACE/ARB for LVEF<40%: not indicated EF greater than 40%  Angiotensin Receptor Neprilysin Inhibitor for LVEF<40%: not indicated EF > 40%  Beta blocker (evidence based) for LVEF <40%: yes - Bisoprolol, Coreg or Metoprolol XL for LVEF < 40%  Aldosterone blocking agent/Antagonist prescribed for LVEF < or equal to 35%: not indicated EF > 35%  Hydralazine and Nitrate ordered for  patients w LVEF < 40%: not indicated - patient  "Referral # Total visits Start date  Expiration date Total active units  Total manual units  PT only or  PT+OT?   4/20/23 No auth no visit limit                                       Medical History        Past Medical History:   Diagnosis Date   • Anxiety     • Apnea, sleep       patient states \"repeated test says no\"   • Arthritis       severe in the joints   • Chronic pain disorder       joints   • Depression     • DJD (degenerative joint disease)     • Fall 11/22/2018     tripped on a folded rug- left hamstring injury   • Foot injury       right foot-fx of bone-no repair-has severe arthritis in the foot and ankle   • GERD (gastroesophageal reflux disease)     • History of sleep apnea     • Hypercholesteremia     • Hypertension     • Insomnia     • Low back pain     • Obesity         Lost 70lb with gastric sleeve (2011)   • Osteoarthritis     • Wears glasses     • Wears partial dentures       upper and lower-but did lose the lower                                                                                                                                              Reeval: 5/20/23  Insurance :       foto performed       Visits:  4 5 6 7 8 9 10    Manual:  5/1/23  5/3/23 5/8/23 5/12/23 5/15/23 5/17/23 5/22/23            IASTM left hamstring insertion with roll out stick IASTM left hamstring insertion with roll out stick   Belt hip mobs    Next visit  Performed R hip gr 3-4 3x30s Performed   Hold per patient request                                             Ther ex/NMR:            Manual stretching: hamstring, hip flexor, piriformis/glute   Performed hamstring, piriformis and hip flexors    sidelying hip flexor stretch: self: 10 sec x 5    Piriformis stretch: manual 10 sec x 5   Hamstring stretch 10 sec x 5    Hip felxor stretch: 10 sec x 5  Piriformis stretch 10 sec x 5   Piriformis stretch 10 sec x 5   Rec bike/  nustep NS 9 min  NS: 10 min  NS 10 min NS: 10 min  NS: 10 min  NS: 10 min lv 3 lv 3 10 min NS " KTC with peanut      20 x  20 x      LTR 20  20 x each side 20 x each side 20 x each  --     Seated hamstring stretch       10 sec x 5          Squats 10x2 at bar 10 x 2  Supported squats at bar: 10 x 2 10 x 2 --    Prone quad stretch            Sit/stand with hip hinge    10 x 2 10 x 2 --  10 x 3    TA activation (stab cuff)  10x  5 sec x 15 5 sec x 20    Standin sec x 10    + marching  20x  - 20x       +hip abd iso  20x - 20x       +hip add iso  20x  - 20x       + alternating LE extension                         Glut set prone    supine: 5 sec x 20  Supine 20 Standin sec x 10   Standing 5 sec x 10  Prone 5 sec x 10   Standin sec x 20     st: hip extension       10 x 2 --    Glute set + bridge  x10  10 x 2 10 x 2  + hip add iso: x 10    + hip abd iso red band:  10 x + hip add iso: x 10    + hip abd iso red band:  10 x    Step ups        6 in: 10 x 2 6 in: 10 x 2              Therapeutic Activity:             Reevaluation         performed                Gait Training:                          HEP:                          Modalities                        ice            Total time:                 not   SGLT2 LVEF <40%: (exclusions type 1 DM, GFR <20, ketoacidosis) will be considered  Hx of or current Atrial fibrillation/ Atrial flutter: history of and/or current atrial fibrillation/atrial flutter no antithrombotic - other on heparin and DAPT for ACS  Post discharge follow-up within 7 days scheduled: To be scheduled at time of discharge    Chronic/Resolved Medical Issues   Atrial fibrillation without rapid ventricular response  -CHADSVASc Stroke Risk Score = 9  -potassium   Potassium (mmol/L)   Date Value   07/20/2022 3.5   -EKG shows paced rhyhtm without other acute change  -telemetry monitoring  -echocardiogram done on 04/2022   -cardiology consult-Dr Chandra; MD notified  Anticoagulated on eliquis - held for now  Rate controlled on metoprolol     Essential hypertension  -home medications- see orders  -blood pressure control as above in vital signs  -continue to monitor with additional adjustments to optimize blood pressure control as appropriate     Stage 3 chronic kidney disease  -admit creatinine/GFR 1.00/55  -baseline creatinine; 1.00  -current creatinine as above   -continue to follow closely  -avoid nephrotoxins  -nephrology consult No    Type 2 diabetes mellitus without long-term use of insulin  -most recent HgA1c   Hemoglobin A1C (%)   Date Value   03/28/2022 6.6 (H)      -sliding scale insulin  -consistent carbohydrate diet  -home medications -see orders  -one touches q.i.d.  -daily adjustment as needed to optimize glucose control    Subclinical Hypothyroidism  -PTA levothyroxine       F/E/N  Saline locked, heparin gtt; monitor and replace electrolytes per protocol; NPO  Smoking status: non smoker    Nutrition status: appropriate  Body mass index is 28.53 kg/m². - Overweight BMI 25-29  DVT Prophylaxis: Heparin drip per protocol         Disposition: ICU  DISCHARGE PLANNING     The patient's treatment plans were discussed with patient.     Recommendations for Discharge   SW     PT      OT     SLP        Anticipated [facility]   Expected Discharge Date: 07/20/2022  Barriers to Discharge: Awaiting transfer to [facility]        Ld Baltazar PA-C  Hospitalist  7/20/2022  9:38 AM     Discussed case with supervising physician Dr. Rachel    Attending physicians addendum-    Patient seen.  Chart reviewed.  Discussed with .  Discussed with midlevel.  Agree with assessment plan.  Awaiting opening of bed at [facility].

## 2023-05-23 ENCOUNTER — OFFICE VISIT (OUTPATIENT)
Dept: PHYSICAL THERAPY | Facility: CLINIC | Age: 68
End: 2023-05-23

## 2023-05-23 DIAGNOSIS — G89.29 CHRONIC MIDLINE LOW BACK PAIN, UNSPECIFIED WHETHER SCIATICA PRESENT: Primary | ICD-10-CM

## 2023-05-23 DIAGNOSIS — M54.50 CHRONIC MIDLINE LOW BACK PAIN, UNSPECIFIED WHETHER SCIATICA PRESENT: Primary | ICD-10-CM

## 2023-05-23 NOTE — PROGRESS NOTES
Daily Note     Today's date: 2023  Patient name: Miriam Linares  : 1955  MRN: [id number]  Referring provider: Sally Platt MD  Dx:   Encounter Diagnosis     ICD-10-CM    1  Chronic midline low back pain, unspecified whether sciatica present  M54 50     G89 29           Start Time: 1145  Stop Time: 1315  Total time in clinic (min): 90 minutes    Subjective: Patient states she experiences an increase in pain with weather and temperature changes  Pt with no new complaints at start of today's session  Objective: See treatment diary below    Neuro Re-Ed:  Patient introduced to the topic of pain neuroscience education and that improving knowledge of how pain works promotes improved recovery and rehab  Current knowledge and understanding of patient on pain related topics was explored to create baseline  Patient educated on the concept of the nervous system as the bodies alarm system, and the role of nociception to warn body of danger  Peripheral nerve sensitization, hyperalgesia, and allodynia were explained using metaphors to promote deep learning  Patient encouraged to identify personal yellow flags, and explore/identify activity limitations as a result of  nervous system hypersensitivity prior to next visit  Week 1: Patient educated on the concept of neuroplasticity, and how factors as temperature, stress, movement, immunity, and blood flow affect pain via ion channel expression  Instruction provided regarding homeostasis/ion channel balance disruption may occur based on what your brain thinks is needed for survival  Patient encouraged to review/diagram nerve sensitivity topics discussed today to promote deep learning  Reviewed importance of aerobic exercise to assist with sympathetic down-regulation during today's session, including HR target >100 and/or RPE >5-6/10  Reviewed exercise recommendations with patient, mainly 30 minutes of exercise, 3-5 days/week          TherEx:  Aerobic Warm-Up: "10 minutes on NuStep    Circuit 1: 60\" on / 30\" off, 3 rounds   - Squat Progression: Sit to stands (21\" box)   - Core: Palloff press   - Biceps curls    - Rows (standing with band)    Pain Catastrophizing Scale: 20/52  NPQ: 7/12        Assessment: Tolerated treatment well  Rows performed in sitting position vs at cc due to poor hip mobility  Patient demo good understanding of PNE topics covered during today's session  Patient challenged with sit <> stands, although able to independently identify need for increased surface height to improve technique and comfort  Patient with fatigue post session  Patient will continue to benefit from skilled PT to improve functional mobility and activity tolerance  Plan: Continue per plan of care              Eval/ Re-eval Auth #/ Referral # Total visits Start date  Expiration date Total active units  Total manual units  PT only or  PT+OT?   4/20/23 No auth no visit limit                                       Medical History        Past Medical History:   Diagnosis Date   • Anxiety     • Apnea, sleep       patient states \"repeated test says no\"   • Arthritis       severe in the joints   • Chronic pain disorder       joints   • Depression     • DJD (degenerative joint disease)     • Fall 11/22/2018     tripped on a folded rug- left hamstring injury   • Foot injury       right foot-fx of bone-no repair-has severe arthritis in the foot and ankle   • GERD (gastroesophageal reflux disease)     • History of sleep apnea     • Hypercholesteremia     • Hypertension     • Insomnia     • Low back pain     • Obesity         Lost 70lb with gastric sleeve (2011)   • Osteoarthritis     • Wears glasses     • Wears partial dentures       upper and lower-but did lose the lower                                                                                                                                              Reeval: 5/20/23  Insurance :     foto performed     Pain Group   Visits: 5 6 7 8 9 " 10  11   Manual:  5/3/23 5/8/23 5/12/23 5/15/23 5/17/23 5/22/23 5/23/23          IASTM left hamstring insertion with roll out stick IASTM left hamstring insertion with roll out stick    Belt hip mobs  Next visit  Performed R hip gr 3-4 3x30s Performed   Hold per patient request                                           Ther ex/NMR:           Manual stretching: hamstring, hip flexor, piriformis/glute Performed hamstring, piriformis and hip flexors    sidelying hip flexor stretch: self: 10 sec x 5    Piriformis stretch: manual 10 sec x 5   Hamstring stretch 10 sec x 5    Hip felxor stretch: 10 sec x 5  Piriformis stretch 10 sec x 5   Piriformis stretch 10 sec x 5    Rec bike/  nustep  NS: 10 min  NS 10 min NS: 10 min  NS: 10 min  NS: 10 min lv 3 lv 3 10 min NS    KTC with peanut    20 x  20 x       LTR  20 x each side 20 x each side 20 x each  --      Seated hamstring stretch     10 sec x 5          10 x 2  Supported squats at bar: 10 x 2 10 x 2 --     Prone quad stretch           Sit/stand with hip hinge  10 x 2 10 x 2 --  10 x 3     TA activation (stab cuff)  5 sec x 15 5 sec x 20    Standin sec x 10     + marching  - 20x        +hip abd iso - 20x        +hip add iso  - 20x        + alternating LE extension                       Glut set prone  supine: 5 sec x 20  Supine 20 Standin sec x 10   Standing 5 sec x 10  Prone 5 sec x 10   Standin sec x 20      st: hip extension     10 x 2 --     Glute set + bridge  10 x 2 10 x 2  + hip add iso: x 10    + hip abd iso red band:  10 x + hip add iso: x 10    + hip abd iso red band:  10 x     Step ups      6 in: 10 x 2 6 in: 10 x 2               Therapeutic Activity:            Reevaluation       performed                Gait Training:                        HEP:                        Modalities                      ice           Total time:

## 2023-05-24 ENCOUNTER — APPOINTMENT (OUTPATIENT)
Dept: PHYSICAL THERAPY | Facility: CLINIC | Age: 68
End: 2023-05-24
Payer: COMMERCIAL

## 2023-05-25 ENCOUNTER — APPOINTMENT (OUTPATIENT)
Dept: PHYSICAL THERAPY | Facility: CLINIC | Age: 68
End: 2023-05-25
Payer: COMMERCIAL

## 2023-05-30 ENCOUNTER — OFFICE VISIT (OUTPATIENT)
Dept: PHYSICAL THERAPY | Facility: CLINIC | Age: 68
End: 2023-05-30

## 2023-05-30 DIAGNOSIS — G89.29 CHRONIC MIDLINE LOW BACK PAIN, UNSPECIFIED WHETHER SCIATICA PRESENT: Primary | ICD-10-CM

## 2023-05-30 DIAGNOSIS — M54.50 CHRONIC MIDLINE LOW BACK PAIN, UNSPECIFIED WHETHER SCIATICA PRESENT: Primary | ICD-10-CM

## 2023-05-30 NOTE — PROGRESS NOTES
"Daily Note     Today's date: 2023  Patient name: Mani Montilla  : 1955  MRN: [id number]  Referring provider: Danni Rojas MD  Dx:   Encounter Diagnosis     ICD-10-CM    1  Chronic midline low back pain, unspecified whether sciatica present  M54 50     G89 29           Start Time: 1145  Stop Time: 1305  Total time in clinic (min): 80 minutes    Subjective: Patient states she was able to do work in the yard over the weekend  \"Things are getting a bit easier but I have pain in all of my joints  Even at rest, everything throbs, its all because of my arthritis  \" States she is considering using SPC due to right hip pain  Objective: See treatment diary below    Neuro Re-Ed:  Week 2: Patient educated regarding spreading pain symptoms, and that feeling pain in adjacent areas of the body does not indicate definite tissue injury  Hyperalgesia, immune responses and central sensitization topics were introduced using metaphors to promote deep learning  Reviewed topic of diet and how certain foods can be inflammatory, resulting in more pain, in certain individuals  TherEx:  Aerobic Warm-Up: 10 minutes on NuStep    Circuit 1: 60\" on / 30\" off, 3 rounds   - Suitcase carries (4 kg KB)   - Single lateral step with hands on wall   - Bent over rows (3# DB)        Assessment: Tolerated treatment well  Discussed \"hurt does not equal harm\" and \"sore but safe\" principles with patient during today's session  Patient challenged with \"tissue issues vs pain issues\" topics covered during today's session  Patient able to independently identify need for exercise regression during lateral stepping due to pain, substituted single step with UE support instead with improved tolerance  Patient with fatigue post session  Patient will continue to benefit from skilled PT to improve functional mobility and activity tolerance  Plan: Continue per plan of care              Eval/ Re-eval Auth #/ Referral # Total visits Start " "date  Expiration date Total active units  Total manual units  PT only or  PT+OT?   4/20/23 No auth no visit limit                                       Medical History        Past Medical History:   Diagnosis Date   • Anxiety     • Apnea, sleep       patient states \"repeated test says no\"   • Arthritis       severe in the joints   • Chronic pain disorder       joints   • Depression     • DJD (degenerative joint disease)     • Fall 11/22/2018     tripped on a folded rug- left hamstring injury   • Foot injury       right foot-fx of bone-no repair-has severe arthritis in the foot and ankle   • GERD (gastroesophageal reflux disease)     • History of sleep apnea     • Hypercholesteremia     • Hypertension     • Insomnia     • Low back pain     • Obesity         Lost 70lb with gastric sleeve (2011)   • Osteoarthritis     • Wears glasses     • Wears partial dentures       upper and lower-but did lose the lower                                                                                                                                              Reeval: 5/20/23  Insurance :   foto performed     [facility]   Visits: [phone]  11 12   Manual: 5/8/23 5/12/23 5/15/23 5/17/23 5/22/23 5/23/23 5/30/23        IASTM left hamstring insertion with roll out stick IASTM left hamstring insertion with roll out stick     Belt hip mobs Performed R hip gr 3-4 3x30s Performed   Hold per patient request                                         Ther ex/NMR:          Manual stretching: hamstring, hip flexor, piriformis/glute   sidelying hip flexor stretch: self: 10 sec x 5    Piriformis stretch: manual 10 sec x 5   Hamstring stretch 10 sec x 5    Hip felxor stretch: 10 sec x 5  Piriformis stretch 10 sec x 5   Piriformis stretch 10 sec x 5     Rec bike/  nustep NS 10 min NS: 10 min  NS: 10 min  NS: 10 min lv 3 lv 3 10 min NS     KTC with peanut  20 x  20 x        LTR 20 x each side 20 x each  --       Seated hamstring stretch   10 " sec x 5            Supported squats at bar: 10 x 2 10 x 2 --      Prone quad stretch          Sit/stand with hip hinge 10 x 2 --  10 x 3      TA activation (stab cuff) 5 sec x 20    Standin sec x 10      + marching 20x         +hip abd iso 20x         +hip add iso 20x         + alternating LE extension                     Glut set prone Supine 20 Standin sec x 10   Standing 5 sec x 10  Prone 5 sec x 10   Standin sec x 20       st: hip extension   10 x 2 --      Glute set + bridge 10 x 2  + hip add iso: x 10    + hip abd iso red band:  10 x + hip add iso: x 10    + hip abd iso red band:  10 x      Step ups    6 in: 10 x 2 6 in: 10 x 2                Therapeutic Activity:           Reevaluation     performed                Gait Training:                      HEP:                      Modalities                    ice          Total time:

## 2023-06-01 ENCOUNTER — OFFICE VISIT (OUTPATIENT)
Dept: PHYSICAL THERAPY | Facility: CLINIC | Age: 68
End: 2023-06-01

## 2023-06-01 DIAGNOSIS — M54.50 CHRONIC MIDLINE LOW BACK PAIN, UNSPECIFIED WHETHER SCIATICA PRESENT: Primary | ICD-10-CM

## 2023-06-01 DIAGNOSIS — G89.29 CHRONIC MIDLINE LOW BACK PAIN, UNSPECIFIED WHETHER SCIATICA PRESENT: Primary | ICD-10-CM

## 2023-06-01 NOTE — PROGRESS NOTES
"        Daily Note         Today's date: 2023  Patient name: Ovi Sharp  : 1955  MRN: [id number]  Referring provider: Mateus Coon MD  Dx:   Encounter Diagnosis     ICD-10-CM    1  Chronic midline low back pain, unspecified whether sciatica present  M54 50     G89 29           Subjective: Ovi Sharp reports she believes it is her hip that is causing her pain        Objective: See treatment diary below    Assessment:  patient reported cramping with bridging, advised to focus on glute set rto reduce hamstring recruitment   Antalgic gait entering clinic  With major restriction during Piriformis stretch  Plan: complete scheudled sessions, then proceed with pain group                Eval/ Re-eval Auth #/ Referral # Total visits Start date  Expiration date Total active units  Total manual units  PT only or  PT+OT?   23 No auth no visit limit                                       Medical History        Past Medical History:   Diagnosis Date   • Anxiety     • Apnea, sleep       patient states \"repeated test says no\"   • Arthritis       severe in the joints   • Chronic pain disorder       joints   • Depression     • DJD (degenerative joint disease)     • Fall 2018     tripped on a folded rug- left hamstring injury   • Foot injury       right foot-fx of bone-no repair-has severe arthritis in the foot and ankle   • GERD (gastroesophageal reflux disease)     • History of sleep apnea     • Hypercholesteremia     • Hypertension     • Insomnia     • Low back pain     • Obesity         Lost 70lb with gastric sleeve ()   • Osteoarthritis     • Wears glasses     • Wears partial dentures       upper and lower-but did lose the lower                                                                                                                                              Reeval: 23  Insurance :     [facility]    Visits: [phone]  11 12 13   Manual: 5/15/23 5/17/23 5/22/23 " 23      IASTM left hamstring insertion with roll out stick IASTM left hamstring insertion with roll out stick      Belt hip mobs                                       Ther ex/NMR:         Manual stretching: hamstring, hip flexor, piriformis/glute sidelying hip flexor stretch: self: 10 sec x 5    Piriformis stretch: manual 10 sec x 5   Hamstring stretch 10 sec x 5    Hip felxor stretch: 10 sec x 5  Piriformis stretch 10 sec x 5   Piriformis stretch 10 sec x 5   Piriformis 10 sec x 5     Rec bike/  nustep NS: 10 min  NS: 10 min lv 3 lv 3 10 min NS   NS: 10 min lv 3    KTC with peanut 20 x         LTR --     20 x each    Seated hamstring stretch 10 sec x 5       10 sec x 5 adan  W/ SOS      10 x 2 --    10 x 2    Prone quad stretch         Sit/stand with hip hinge  10 x 3       TA activation (stab cuff)   Standin sec x 10      Standin sec x 10   + marching      10 x 2    +hip abd iso      D/c    +hip add iso      D/c    + alternating LE extension      --             Glut set prone Standing 5 sec x 10  Prone 5 sec x 10   Standin sec x 20     Standin sec x 10     st: hip extension 10 x 2 --       Glute set + bridge + hip add iso: x 10    + hip abd iso red band:  10 x + hip add iso: x 10    + hip abd iso red band:  10 x    + hip add iso: x 10    + hip abd iso green band:  10 x   Step ups  6 in: 10 x 2 6 in: 10 x 2     6 in: 10 x 2            Therapeutic Activity:          Reevaluation   performed                Gait Training:                    HEP:                    Modalities                  ice         Total time:

## 2023-06-02 ENCOUNTER — APPOINTMENT (OUTPATIENT)
Dept: LAB | Facility: HOSPITAL | Age: 68
End: 2023-06-02
Attending: INTERNAL MEDICINE
Payer: COMMERCIAL

## 2023-06-02 DIAGNOSIS — Z13.29 SCREENING FOR THYROID DISORDER: ICD-10-CM

## 2023-06-02 DIAGNOSIS — R73.03 PRE-DIABETES: ICD-10-CM

## 2023-06-02 DIAGNOSIS — Z13.0 SCREENING FOR DEFICIENCY ANEMIA: ICD-10-CM

## 2023-06-02 DIAGNOSIS — E78.5 DYSLIPIDEMIA: ICD-10-CM

## 2023-06-02 LAB
ALBUMIN SERPL BCP-MCNC: 4.3 G/DL (ref 3.5–5)
ALP SERPL-CCNC: 74 U/L (ref 34–104)
ALT SERPL W P-5'-P-CCNC: 15 U/L (ref 7–52)
ANION GAP SERPL CALCULATED.3IONS-SCNC: 13 MMOL/L (ref 4–13)
AST SERPL W P-5'-P-CCNC: 23 U/L (ref 13–39)
BASOPHILS # BLD AUTO: 0.03 THOUSANDS/ÂΜL (ref 0–0.1)
BASOPHILS NFR BLD AUTO: 0 % (ref 0–1)
BILIRUB SERPL-MCNC: 0.49 MG/DL (ref 0.2–1)
BILIRUB UR QL STRIP: NEGATIVE
BUN SERPL-MCNC: 19 MG/DL (ref 5–25)
CALCIUM SERPL-MCNC: 10.4 MG/DL (ref 8.4–10.2)
CHLORIDE SERPL-SCNC: 100 MMOL/L (ref 96–108)
CHOLEST SERPL-MCNC: 207 MG/DL
CLARITY UR: CLEAR
CO2 SERPL-SCNC: 30 MMOL/L (ref 21–32)
COLOR UR: YELLOW
CREAT SERPL-MCNC: 0.48 MG/DL (ref 0.6–1.3)
EOSINOPHIL # BLD AUTO: 0.15 THOUSAND/ÂΜL (ref 0–0.61)
EOSINOPHIL NFR BLD AUTO: 2 % (ref 0–6)
ERYTHROCYTE [DISTWIDTH] IN BLOOD BY AUTOMATED COUNT: 13.6 % (ref 11.6–15.1)
EST. AVERAGE GLUCOSE BLD GHB EST-MCNC: 111 MG/DL
GFR SERPL CREATININE-BSD FRML MDRD: 101 ML/MIN/1.73SQ M
GLUCOSE P FAST SERPL-MCNC: 99 MG/DL (ref 65–99)
GLUCOSE UR STRIP-MCNC: NEGATIVE MG/DL
HBA1C MFR BLD: 5.5 %
HCT VFR BLD AUTO: 43.2 % (ref 34.8–46.1)
HDLC SERPL-MCNC: 77 MG/DL
HGB BLD-MCNC: 13.9 G/DL (ref 11.5–15.4)
HGB UR QL STRIP.AUTO: NEGATIVE
IMM GRANULOCYTES # BLD AUTO: 0.03 THOUSAND/UL (ref 0–0.2)
IMM GRANULOCYTES NFR BLD AUTO: 0 % (ref 0–2)
KETONES UR STRIP-MCNC: NEGATIVE MG/DL
LDLC SERPL CALC-MCNC: 111 MG/DL (ref 0–100)
LEUKOCYTE ESTERASE UR QL STRIP: NEGATIVE
LYMPHOCYTES # BLD AUTO: 2.35 THOUSANDS/ÂΜL (ref 0.6–4.47)
LYMPHOCYTES NFR BLD AUTO: 32 % (ref 14–44)
MCH RBC QN AUTO: 28.4 PG (ref 26.8–34.3)
MCHC RBC AUTO-ENTMCNC: 32.2 G/DL (ref 31.4–37.4)
MCV RBC AUTO: 88 FL (ref 82–98)
MONOCYTES # BLD AUTO: 0.53 THOUSAND/ÂΜL (ref 0.17–1.22)
MONOCYTES NFR BLD AUTO: 7 % (ref 4–12)
NEUTROPHILS # BLD AUTO: 4.27 THOUSANDS/ÂΜL (ref 1.85–7.62)
NEUTS SEG NFR BLD AUTO: 59 % (ref 43–75)
NITRITE UR QL STRIP: NEGATIVE
NONHDLC SERPL-MCNC: 130 MG/DL
NRBC BLD AUTO-RTO: 0 /100 WBCS
PH UR STRIP.AUTO: 7 [PH]
PLATELET # BLD AUTO: 325 THOUSANDS/UL (ref 149–390)
PMV BLD AUTO: 10.6 FL (ref 8.9–12.7)
POTASSIUM SERPL-SCNC: 4.4 MMOL/L (ref 3.5–5.3)
PROT SERPL-MCNC: 7.3 G/DL (ref 6.4–8.4)
PROT UR STRIP-MCNC: NEGATIVE MG/DL
RBC # BLD AUTO: 4.89 MILLION/UL (ref 3.81–5.12)
SODIUM SERPL-SCNC: 143 MMOL/L (ref 135–147)
SP GR UR STRIP.AUTO: 1.01 (ref 1–1.03)
TRIGL SERPL-MCNC: 95 MG/DL
TSH SERPL DL<=0.05 MIU/L-ACNC: 2.17 UIU/ML (ref 0.45–4.5)
UROBILINOGEN UR QL STRIP.AUTO: 0.2 E.U./DL
WBC # BLD AUTO: 7.36 THOUSAND/UL (ref 4.31–10.16)

## 2023-06-02 PROCEDURE — 85025 COMPLETE CBC W/AUTO DIFF WBC: CPT

## 2023-06-02 PROCEDURE — 36415 COLL VENOUS BLD VENIPUNCTURE: CPT

## 2023-06-02 PROCEDURE — 80061 LIPID PANEL: CPT

## 2023-06-02 PROCEDURE — 83036 HEMOGLOBIN GLYCOSYLATED A1C: CPT

## 2023-06-02 PROCEDURE — 80053 COMPREHEN METABOLIC PANEL: CPT

## 2023-06-02 PROCEDURE — 81003 URINALYSIS AUTO W/O SCOPE: CPT

## 2023-06-02 PROCEDURE — 84443 ASSAY THYROID STIM HORMONE: CPT

## 2023-06-06 ENCOUNTER — OFFICE VISIT (OUTPATIENT)
Dept: PHYSICAL THERAPY | Facility: CLINIC | Age: 68
End: 2023-06-06
Payer: COMMERCIAL

## 2023-06-06 DIAGNOSIS — G89.29 CHRONIC MIDLINE LOW BACK PAIN, UNSPECIFIED WHETHER SCIATICA PRESENT: Primary | ICD-10-CM

## 2023-06-06 DIAGNOSIS — M54.50 CHRONIC MIDLINE LOW BACK PAIN, UNSPECIFIED WHETHER SCIATICA PRESENT: Primary | ICD-10-CM

## 2023-06-06 DIAGNOSIS — G89.4 CHRONIC PAIN SYNDROME: ICD-10-CM

## 2023-06-06 PROCEDURE — 97110 THERAPEUTIC EXERCISES: CPT

## 2023-06-06 PROCEDURE — 97112 NEUROMUSCULAR REEDUCATION: CPT

## 2023-06-06 RX ORDER — OXYCODONE AND ACETAMINOPHEN 7.5; 325 MG/1; MG/1
1 TABLET ORAL EVERY 6 HOURS PRN
Qty: 60 TABLET | Refills: 0 | Status: SHIPPED | OUTPATIENT
Start: 2023-06-06 | End: 2023-06-07 | Stop reason: DRUGHIGH

## 2023-06-06 NOTE — PROGRESS NOTES
Patient was given by pharmacy accidentally 60 pills more than her usual prescription of 120/month of Percocet 7 5/ 325  Discussed with the pharmcist at [address] [place] [address]  I will write as an addendum prescription for 60 pills that were given in the month of April

## 2023-06-06 NOTE — PROGRESS NOTES
"Daily Note     Today's date: 2023  Patient name: Brittany Kline  : 1955  MRN: [id number]  Referring provider: Naveen Gray MD  Dx:   Encounter Diagnosis     ICD-10-CM    1  Chronic midline low back pain, unspecified whether sciatica present  M54 50     G89 29           Start Time: 1145  Stop Time: 1300  Total time in clinic (min): 75 minutes    Subjective: Patient states she continues to experience right groin pain  States she has doctor's appointment later, planning to bring her hip up at the appointment  Objective: See treatment diary below    Neuro Re-Ed:  Week 3: Patient was educated regarding endogenous mechanisms and strategies to increase the brain's production of chemicals which decrease pain, such as aerobic exercise and improved pain knowledge  The concepts of pacing, graded exposure, 'sore but safe' and 'hurt does not equal harm' were discussed  Educated patient on guided practice and potential benefits of progressive muscle relaxation (PMR) for chronic pain, stress management, and sleep  TherEx:  Aerobic Warm-Up: NuStep, 10 min    Circuit 1: 60\" on / 30\" off, 3 rounds   - Step ups (6\")   - [place] carry with tidal tank (10#)   - Alt UE/LE at wall        Assessment: Tolerated treatment well  Patient challenged with alternating UE/LE at wall, with notable impairment in SLS, although no LoB noted  Patient demo improving understanding of PNE topics covered during today's session  Utilized UE support during step ups to promote safety  Patient will continue to benefit from skilled PT to improve functional mobility and activity tolerance  Plan: Continue per plan of care              Eval/ Re-eval Auth #/ Referral # Total visits Start date  Expiration date Total active units  Total manual units  PT only or  PT+OT?   23 No auth no visit limit                                       Medical History        Past Medical History:   Diagnosis Date   • Anxiety     • Apnea, sleep       " "patient states \"repeated test says no\"   • Arthritis       severe in the joints   • Chronic pain disorder       joints   • Depression     • DJD (degenerative joint disease)     • Fall 2018     tripped on a folded rug- left hamstring injury   • Foot injury       right foot-fx of bone-no repair-has severe arthritis in the foot and ankle   • GERD (gastroesophageal reflux disease)     • History of sleep apnea     • Hypercholesteremia     • Hypertension     • Insomnia     • Low back pain     • Obesity         Lost 70lb with gastric sleeve ()   • Osteoarthritis     • Wears glasses     • Wears partial dentures       upper and lower-but did lose the lower                                                                                                                                              Reeval: 23  Insurance :    [facility]  [facility]   Visits: [phone]  [phone]   Manual: 23     IASTM left hamstring insertion with roll out stick IASTM left hamstring insertion with roll out stick       Belt hip mobs                                       Ther ex/NMR:         Manual stretching: hamstring, hip flexor, piriformis/glute Hamstring stretch 10 sec x 5    Hip felxor stretch: 10 sec x 5  Piriformis stretch 10 sec x 5   Piriformis stretch 10 sec x 5   Piriformis 10 sec x 5      Rec bike/  nustep NS: 10 min lv 3 lv 3 10 min NS   NS: 10 min lv 3     KTC with peanut         LTR     20 x each     Seated hamstring stretch     10 sec x 5 adan  W/ SOS       --    10 x 2     Prone quad stretch         Sit/stand with hip hinge 10 x 3        TA activation (stab cuff)  Standin sec x 10      Standin sec x 10    + marching     10 x 2     +hip abd iso     D/c     +hip add iso     D/c     + alternating LE extension     --              Glut set prone Prone 5 sec x 10   Standin sec x 20     Standin sec x 10      st: hip extension --        Glute set + bridge + " hip add iso: x 10    + hip abd iso red band:  10 x    + hip add iso: x 10    + hip abd iso green band:  10 x    Step ups 6 in: 10 x 2 6 in: 10 x 2     6 in: 10 x 2             Therapeutic Activity:          Reevaluation  performed                 Gait Training:                    HEP:                    Modalities         MH         ice         Total time:

## 2023-06-07 ENCOUNTER — OFFICE VISIT (OUTPATIENT)
Dept: FAMILY MEDICINE CLINIC | Facility: CLINIC | Age: 68
End: 2023-06-07
Payer: COMMERCIAL

## 2023-06-07 ENCOUNTER — HOSPITAL ENCOUNTER (OUTPATIENT)
Dept: RADIOLOGY | Facility: HOSPITAL | Age: 68
Discharge: HOME | End: 2023-06-07
Attending: INTERNAL MEDICINE
Payer: COMMERCIAL

## 2023-06-07 ENCOUNTER — TELEPHONE (OUTPATIENT)
Dept: FAMILY MEDICINE CLINIC | Facility: CLINIC | Age: 68
End: 2023-06-07

## 2023-06-07 VITALS
OXYGEN SATURATION: 97 % | WEIGHT: 230.8 LBS | SYSTOLIC BLOOD PRESSURE: 142 MMHG | BODY MASS INDEX: 40.89 KG/M2 | HEIGHT: 63 IN | DIASTOLIC BLOOD PRESSURE: 74 MMHG | HEART RATE: 94 BPM

## 2023-06-07 DIAGNOSIS — M81.0 AGE-RELATED OSTEOPOROSIS WITHOUT CURRENT PATHOLOGICAL FRACTURE: ICD-10-CM

## 2023-06-07 DIAGNOSIS — Z98.84 H/O BARIATRIC SURGERY: ICD-10-CM

## 2023-06-07 DIAGNOSIS — I10 PRIMARY HYPERTENSION: ICD-10-CM

## 2023-06-07 DIAGNOSIS — M25.551 RIGHT HIP PAIN: Primary | ICD-10-CM

## 2023-06-07 DIAGNOSIS — F11.20 CONTINUOUS OPIOID DEPENDENCE (HCC): ICD-10-CM

## 2023-06-07 DIAGNOSIS — G89.29 CHRONIC MIDLINE LOW BACK PAIN WITHOUT SCIATICA: ICD-10-CM

## 2023-06-07 DIAGNOSIS — F41.9 ANXIETY: ICD-10-CM

## 2023-06-07 DIAGNOSIS — G89.4 CHRONIC PAIN SYNDROME: ICD-10-CM

## 2023-06-07 DIAGNOSIS — E66.01 CLASS 3 SEVERE OBESITY DUE TO EXCESS CALORIES WITHOUT SERIOUS COMORBIDITY WITH BODY MASS INDEX (BMI) OF 40.0 TO 44.9 IN ADULT (HCC): ICD-10-CM

## 2023-06-07 DIAGNOSIS — M25.551 RIGHT HIP PAIN: ICD-10-CM

## 2023-06-07 DIAGNOSIS — M19.91 PRIMARY OSTEOARTHRITIS, UNSPECIFIED SITE: ICD-10-CM

## 2023-06-07 DIAGNOSIS — E78.5 DYSLIPIDEMIA: ICD-10-CM

## 2023-06-07 DIAGNOSIS — K21.9 GASTROESOPHAGEAL REFLUX DISEASE WITHOUT ESOPHAGITIS: ICD-10-CM

## 2023-06-07 DIAGNOSIS — M54.50 CHRONIC MIDLINE LOW BACK PAIN WITHOUT SCIATICA: ICD-10-CM

## 2023-06-07 DIAGNOSIS — E83.52 HYPERCALCEMIA: Primary | ICD-10-CM

## 2023-06-07 DIAGNOSIS — Z12.31 ENCOUNTER FOR SCREENING MAMMOGRAM FOR MALIGNANT NEOPLASM OF BREAST: ICD-10-CM

## 2023-06-07 PROBLEM — M43.16 SPONDYLOLISTHESIS OF LUMBAR REGION: Status: RESOLVED | Noted: 2018-07-23 | Resolved: 2023-06-07

## 2023-06-07 PROCEDURE — 77067 SCR MAMMO BI INCL CAD: CPT

## 2023-06-07 PROCEDURE — 77080 DXA BONE DENSITY AXIAL: CPT

## 2023-06-07 PROCEDURE — 99214 OFFICE O/P EST MOD 30 MIN: CPT | Performed by: INTERNAL MEDICINE

## 2023-06-07 PROCEDURE — 77063 BREAST TOMOSYNTHESIS BI: CPT

## 2023-06-07 RX ORDER — OXYCODONE HYDROCHLORIDE AND ACETAMINOPHEN 5; 325 MG/1; MG/1
1 TABLET ORAL EVERY 4 HOURS PRN
Qty: 150 TABLET | Refills: 0 | Status: SHIPPED | OUTPATIENT
Start: 2023-06-07

## 2023-06-07 NOTE — ASSESSMENT & PLAN NOTE
Patient with right hip pain movements cause increasing discomfort we will get an x-ray of the right hip area  Continue with physical therapy

## 2023-06-07 NOTE — ASSESSMENT & PLAN NOTE
Avoiding nonsteroidals because of the gastric surgery continue with the pain medication for the severe osteoarthritis

## 2023-06-07 NOTE — ASSESSMENT & PLAN NOTE
Labs shows cholesterol at 207 triglycerides 95 HDL 77 and   Patient admits to eating a lot of cheese    Recommend very strongly to cut back

## 2023-06-07 NOTE — ASSESSMENT & PLAN NOTE
Continues to experience pain in the low back area being followed in the physical therapy also continue pain meds

## 2023-06-07 NOTE — ASSESSMENT & PLAN NOTE
Patient BMI is 40 88 again recommend very strongly to cut back on carbohydrate intake and lifestyle modification  However because of the increasing pain discomfort in the low back and the right hip  Patient is not able to do much exercise

## 2023-06-07 NOTE — ASSESSMENT & PLAN NOTE
Blood pressure today 142/74 currently she is taking perindopril 8 mg 2 times a day we will continue the same try to monitor the blood pressures if possible at home

## 2023-06-07 NOTE — ASSESSMENT & PLAN NOTE
Patient currently on pain medication since not able to take any nonsteroidals because of the gastric surgery for weight reduction continue with the physical therapy and pain medications for now

## 2023-06-07 NOTE — ASSESSMENT & PLAN NOTE
Patient is currently taking Lexapro 10 mg we will continue the same appears to be helping with her anxiety symptoms

## 2023-06-07 NOTE — ASSESSMENT & PLAN NOTE
Patient with a calcium level of 10 4 history of calcium levels fluctuating in the past   Again check in few weeks time along with PTH and ionized calcium levels

## 2023-06-07 NOTE — PROGRESS NOTES
Name: Shirly Homans      : 1955      MRN: [id number]  Encounter Provider: Shannon Martinez MD  Encounter Date: 2023   Encounter department: [place] [id number]     1  Hypercalcemia  Assessment & Plan:  Patient with a calcium level of 10 4 history of calcium levels fluctuating in the past   Again check in few weeks time along with PTH and ionized calcium levels  Orders:  -     Comprehensive metabolic panel; Future  -     Calcium, ionized; Future  -     PTH, intact; Future    2  Anxiety  Assessment & Plan:  Patient is currently taking Lexapro 10 mg we will continue the same appears to be helping with her anxiety symptoms  3  Chronic pain syndrome  Assessment & Plan:  Patient is currently taking oxycodone 7 5 325 4 times a day  It appears that the strength medication is right now not available so we will going to change it to 5/325 5 times a day as needed  4  Class 3 severe obesity due to excess calories without serious comorbidity with body mass index (BMI) of 40 0 to 44 9 in adult [facility])  Assessment & Plan:  Patient BMI is 40 88 again recommend very strongly to cut back on carbohydrate intake and lifestyle modification  However because of the increasing pain discomfort in the low back and the right hip  Patient is not able to do much exercise  5  Continuous opioid dependence ([facility] [address] )  Assessment & Plan:  Patient currently on pain medication since not able to take any nonsteroidals because of the gastric surgery for weight reduction continue with the physical therapy and pain medications for now  6  Dyslipidemia  Assessment & Plan:  Labs shows cholesterol at 207 triglycerides 95 HDL 77 and   Patient admits to eating a lot of cheese  Recommend very strongly to cut back      7  Gastroesophageal reflux disease without esophagitis  Assessment & Plan:  Continue omeprazole 20 mg daily      8   H/O bariatric surgery  Assessment & Plan:  Patient status post bariatric surgery  9  Primary hypertension  Assessment & Plan:  Blood pressure today 142/74 currently she is taking perindopril 8 mg 2 times a day we will continue the same try to monitor the blood pressures if possible at home      10  Chronic midline low back pain without sciatica  Assessment & Plan:  Continues to experience pain in the low back area being followed in the physical therapy also continue pain meds      11  Primary osteoarthritis, unspecified site  Assessment & Plan:  Avoiding nonsteroidals because of the gastric surgery continue with the pain medication for the severe osteoarthritis      12  Right hip pain  Assessment & Plan:  Patient with right hip pain movements cause increasing discomfort we will get an x-ray of the right hip area  Continue with physical therapy  Subjective     Patient is coming here for evaluation regarding the pain in the right hip  Which she has been experiencing for the last few days and progressively increasing  Patient is having difficulty with ambulation also any movements of the hip causing discomfort and pain  Patient is attending the chronic pain group and she is also getting physical therapy but the pain is getting worse in the right hip area  Patient also had lab work done which is reviewed with her showed elevated calcium levels and mildly elevated cholesterol levels  Patient had mammogram and DEXA scan done today reports not available yet  Review of Systems   Constitutional: Negative for chills and fever  HENT: Negative for ear pain, sore throat and trouble swallowing  Eyes: Negative for pain and visual disturbance  Respiratory: Negative for cough and shortness of breath  Cardiovascular: Negative for chest pain and palpitations  Gastrointestinal: Negative for abdominal pain, constipation, diarrhea and vomiting  Genitourinary: Negative for dysuria and hematuria  Musculoskeletal: Positive for gait problem  "Negative for neck pain  Skin: Negative for color change and rash  Neurological: Positive for weakness  Negative for dizziness, seizures, syncope and headaches  Psychiatric/Behavioral: Negative for agitation, confusion and hallucinations  All other systems reviewed and are negative        Past Medical History:   Diagnosis Date   • Anxiety    • Apnea, sleep     patient states \"repeated test says no\"   • Arthritis     severe in the joints   • Chronic pain disorder     joints   • Depression    • DJD (degenerative joint disease)    • Fall 11/22/2018    tripped on a folded rug- left hamstring injury   • Foot injury     right foot-fx of bone-no repair-has severe arthritis in the foot and ankle   • GERD (gastroesophageal reflux disease)    • History of sleep apnea    • Hypercholesteremia    • Hypertension    • Insomnia    • Low back pain    • Obesity     Lost 70lb with gastric sleeve (2011)   • Osteoarthritis    • Wears glasses    • Wears partial dentures     upper and lower-but did lose the lower      Past Surgical History:   Procedure Laterality Date   • ANKLE ARTHROSCOPY Right    • APPENDECTOMY     • CHOLECYSTECTOMY     • COLONOSCOPY  10/26/2021   • DXA PROCEDURE (HISTORICAL)  10/13/2020   • GASTRECTOMY SLEEVE LAPAROSCOPIC  2011    lost 70 lb   • JOINT REPLACEMENT      blateral knee replacements   • MAMMO (HISTORICAL)  01/26/2022   • NERVE BLOCK Bilateral 12/29/2017    Procedure: L4, L5, S1 MEDIAL BRANCH BLOCK #1;  Surgeon: Fili Marvin MD;  Location: [facility] MAIN OR;  Service: Pain Management    • NERVE BLOCK Bilateral 01/12/2018    Procedure: BLOCK MEDIAL BRANCH L4, L5-S1  #2;  Surgeon: Fili Marvin MD;  Location: [facility] MAIN OR;  Service: Pain Management    • CT ARTHROPLASTY GLENOHUMERAL JOINT TOTAL SHOULDER Right 05/23/2019    Procedure: ARTHROPLASTY SHOULDER TOTAL , excision of deep cyst at shoulder, biceps tenotomy;  Surgeon: Mina Lares MD;  Location: [address] [place] [address];  Service: Orthopedics   • CT " INJECT SI JOINT ARTHRGRPHY&/ANES/STEROID W/LESA Right 10/23/2020    Procedure: Sacroiliac Joint injection (74808); Surgeon: Dougie Samano MD;  Location: [facility] MAIN OR;  Service: Pain Management    • OK TENDON SHEATH INCISION Right 2021    Procedure: RELEASE TRIGGER RING FINGER;  Surgeon: Maddy Bucio MD;  Location: [address] [place] [address];  Service: Orthopedics   • RADIOFREQUENCY ABLATION Right 2018    Procedure: Rt L4 L5 S1 RFA;  Surgeon: Dougie Samano MD;  Location: [facility] MAIN OR;  Service: Pain Management    • RADIOFREQUENCY ABLATION Left 2018    Procedure: L3-S1 Radio Frequency Ablation ([id number]);   Surgeon: Dougie Samano MD;  Location: [facility] MAIN OR;  Service: Pain Management    • RHIZOTOMY Left 2018    Procedure: L4, L5, S1 RFA;  Surgeon: Dougie Samano MD;  Location: [facility] MAIN OR;  Service: Pain Management    • TONSILLECTOMY       Family History   Problem Relation Age of Onset   • Other Mother         in a nursing home-fx of the leg   • Heart disease Father         CHF   • Alcohol abuse Father    • Cirrhosis Father    • Colon cancer Maternal Grandmother    • No Known Problems Maternal Grandfather    • No Known Problems Paternal Grandmother    • No Known Problems Paternal Grandfather    • Other Brother         lung abscess, pneumonia   • Colon cancer Maternal Aunt    • No Known Problems Maternal Aunt    • No Known Problems Maternal Aunt    • No Known Problems Maternal Uncle    • No Known Problems Paternal Aunt    • No Known Problems Paternal Uncle    • Breast cancer Cousin 48     Social History     Socioeconomic History   • Marital status:      Spouse name: None   • Number of children: None   • Years of education: None   • Highest education level: None   Occupational History   • None   Tobacco Use   • Smoking status: Former     Types: Cigarettes     Quit date:      Years since quittin 4   • Smokeless tobacco: Never   Vaping Use   • Vaping Use: Never used Substance and Sexual Activity   • Alcohol use: Never   • Drug use: Never   • Sexual activity: None   Other Topics Concern   • None   Social History Narrative   • None     Social Determinants of Health     Financial Resource Strain: Not on file   Food Insecurity: Not on file   Transportation Needs: Not on file   Physical Activity: Not on file   Stress: Not on file   Social Connections: Not on file   Intimate Partner Violence: Not on file   Housing Stability: Not on file     Current Outpatient Medications on File Prior to Visit   Medication Sig   • acetaminophen (TYLENOL) 650 mg CR tablet Take 650 mg by mouth every 8 (eight) hours as needed for mild pain   • amLODIPine (NORVASC) 10 mg tablet TAKE 1 TABLET BY MOUTH DAILY   • ascorbic acid (VITAMIN C) 500 mg tablet Take 500 mg by mouth every morning   • Coenzyme Q10 (CO Q 10) 100 MG CAPS Take by mouth every morning Last dose 5/13/19   • escitalopram (LEXAPRO) 10 mg tablet TAKE 1 AND 1/2 TABLETS BY  MOUTH DAILY AT BEDTIME   • Glucosamine-Chondroitin (MOVE FREE PO) Take 2 tablets by mouth every morning   • Melatonin 10 MG TABS Take by mouth daily at bedtime   • omeprazole (PriLOSEC) 20 mg delayed release capsule TAKE 1 CAPSULE BY MOUTH IN THE  MORNING   • oxyCODONE-acetaminophen (Percocet) 7 5-325 MG per tablet Take 1 tablet by mouth every 6 (six) hours as needed for moderate pain Max Daily Amount: 4 tablets   • perindopril (ACEON) 8 MG tablet 8 mg 2 (two) times a day   • triamcinolone (KENALOG) 0 5 % cream Apply topically 3 (three) times a day   • zinc gluconate 50 mg tablet Take 50 mg by mouth daily   • Cyanocobalamin (VITAMIN B 12 PO) Take 500 mcg by mouth every morning   • diphenhydrAMINE (BENADRYL) 25 mg tablet Take 25 mg by mouth every 8 (eight) hours as needed for itching      Allergies   Allergen Reactions   • Penicillins Anaphylaxis     Total body redness and fever   • Levaquin [Levofloxacin] Itching     Immunization History   Administered Date(s) Administered "  • COVID-19 MODERNA VACC 0 5 ML IM 05/12/2021, 05/12/2021, 06/12/2021, 06/12/2021   • INFLUENZA 10/14/2020, 10/28/2022   • Influenza, high dose seasonal 0 7 mL 10/28/2022   • Tetanus Toxoid, Unspecified 03/24/2014   • Zoster Vaccine Recombinant 10/29/2020, 01/05/2021       Objective     /74   Pulse 94   Ht 5' 3\" (1 6 m)   Wt 105 kg (230 lb 12 8 oz)   SpO2 97%   BMI 40 88 kg/m²     Physical Exam  Vitals and nursing note reviewed  Constitutional:       Appearance: Normal appearance  She is obese  She is not ill-appearing  Cardiovascular:      Rate and Rhythm: Normal rate and regular rhythm  Pulses: Normal pulses  Heart sounds: Normal heart sounds  Pulmonary:      Effort: Pulmonary effort is normal       Breath sounds: Normal breath sounds  Abdominal:      General: Bowel sounds are normal    Musculoskeletal:         General: Normal range of motion  Cervical back: Normal range of motion and neck supple  Right lower leg: No edema  Left lower leg: No edema  Skin:     General: Skin is warm and dry  Findings: No bruising, erythema or rash  Neurological:      General: No focal deficit present  Mental Status: She is alert and oriented to person, place, and time  Motor: Weakness present  Coordination: Coordination normal       Gait: Gait abnormal    Psychiatric:         Mood and Affect: Mood normal          Thought Content:  Thought content normal        Lab Results   Component Value Date    HCT 43 2 06/02/2023    HGB 13 9 06/02/2023    MCV 88 06/02/2023     06/02/2023    WBC 7 36 06/02/2023     Lab Results   Component Value Date    AGAP 13 06/02/2023    ALKPHOS 74 06/02/2023    ALT 15 06/02/2023    AST 23 06/02/2023    BUN 19 06/02/2023    CALCIUM 10 4 (H) 06/02/2023     06/02/2023    CO2 30 06/02/2023    CREATININE 0 48 (L) 06/02/2023    EGFR 101 06/02/2023    GLUC 94 10/07/2022    GLUF 99 06/02/2023    K 4 4 06/02/2023    SODIUM 143 06/02/2023 " TBILI 0 49 06/02/2023    TP 7 3 06/02/2023     Lab Results   Component Value Date    HGBA1C 5 5 06/02/2023     Lab Results   Component Value Date    CHOLESTEROL 207 (H) 06/02/2023     Lab Results   Component Value Date    HDL 77 06/02/2023     Lab Results   Component Value Date    TRIG 95 06/02/2023     Lab Results   Component Value Date    [place] 130 06/02/2023     Lab Results   Component Value Date    RZN0IYYAFHGT 2 169 06/02/2023         Deonte Abarca MD

## 2023-06-08 ENCOUNTER — OFFICE VISIT (OUTPATIENT)
Dept: PHYSICAL THERAPY | Facility: CLINIC | Age: 68
End: 2023-06-08
Payer: COMMERCIAL

## 2023-06-08 DIAGNOSIS — G89.29 CHRONIC MIDLINE LOW BACK PAIN, UNSPECIFIED WHETHER SCIATICA PRESENT: Primary | ICD-10-CM

## 2023-06-08 DIAGNOSIS — M54.50 CHRONIC MIDLINE LOW BACK PAIN, UNSPECIFIED WHETHER SCIATICA PRESENT: Primary | ICD-10-CM

## 2023-06-08 PROCEDURE — 97530 THERAPEUTIC ACTIVITIES: CPT | Performed by: PHYSICAL THERAPIST

## 2023-06-08 PROCEDURE — 97110 THERAPEUTIC EXERCISES: CPT | Performed by: PHYSICAL THERAPIST

## 2023-06-08 NOTE — PROGRESS NOTES
"        Daily Note         Today's date: 2023  Patient name: Rc Guillermo  : 1955  MRN: [id number]  Referring provider: Kendell Rooney MD  Dx:   Encounter Diagnosis     ICD-10-CM    1  Chronic midline low back pain, unspecified whether sciatica present  M54 50     G89 29           Subjective: Rc Guillermo reports she asked the doctor for a hip xray  Patient reports pain level entering today is 4/10 with pain meds  Objective: See treatment diary below    Assessment:  patient entered clinic with SPC due to right hip pain  patient required cuing for proper form with SPC  Patient demonstrated good sequencing after cuing  Added new there ex today with good tolerance  Plan: progress strengthening as tolerated and continue [facility] chronic pain group                 Eval/ Re-eval Auth #/ Referral # Total visits Start date  Expiration date Total active units  Total manual units  PT only or  PT+OT?   23 No auth no visit limit                                       Medical History        Past Medical History:   Diagnosis Date   • Anxiety     • Apnea, sleep       patient states \"repeated test says no\"   • Arthritis       severe in the joints   • Chronic pain disorder       joints   • Depression     • DJD (degenerative joint disease)     • Fall 2018     tripped on a folded rug- left hamstring injury   • Foot injury       right foot-fx of bone-no repair-has severe arthritis in the foot and ankle   • GERD (gastroesophageal reflux disease)     • History of sleep apnea     • Hypercholesteremia     • Hypertension     • Insomnia     • Low back pain     • Obesity         Lost 70lb with gastric sleeve ()   • Osteoarthritis     • Wears glasses     • Wears partial dentures       upper and lower-but did lose the lower                                                                                                                                              Reeval: 23  Insurance :   Pain " Group Pain Group  [facility] ortho   Visits: 10  [phone] 15   Manual: 23     IASTM left hamstring insertion with roll out stick        Belt hip mobs                                       Ther ex/NMR:         Manual stretching: hamstring, hip flexor, piriformis/glute Piriformis stretch 10 sec x 5   Piriformis 10 sec x 5    Manual : unable due to inc pain    Rec bike/  nustep lv 3 10 min NS   NS: 10 min lv 3   NS: 10 min lv 4   Heel slide with peanut      20x    LTR    20 x each      Seated hamstring stretch    10 sec x 5 adan  W/ SOS   Supine w/ SOS: 10 sec x 5          10 x 2      Prone quad stretch         Sit/stand with hip hinge         TA activation (stab cuff) Standin sec x 10      Standin sec x 10     + marching    10 x 2      + alternating LE extension    --               Glut set prone Standin sec x 20     Standin sec x 10       st: hip extension         Glute set + bridge    + hip add iso: x 10    + hip abd iso green band:  10 x  + hip add iso: x 10    + hip abd iso green band:  10 x   Step ups 6 in: 10 x 2     6 in: 10 x 2  Fwd: 6 in: 10 x 2    Leg press       55 lb 10 x 2  65 lb: 10 x 2    TRX squats      10 x 2    Therapeutic Activity:          Reevaluation performed                  Gait Training:       Instruct in [facility] and sequencing             HEP:                    Modalities                  ice         Total time:

## 2023-06-13 ENCOUNTER — OFFICE VISIT (OUTPATIENT)
Dept: PHYSICAL THERAPY | Facility: CLINIC | Age: 68
End: 2023-06-13
Payer: COMMERCIAL

## 2023-06-13 DIAGNOSIS — M54.50 CHRONIC MIDLINE LOW BACK PAIN, UNSPECIFIED WHETHER SCIATICA PRESENT: Primary | ICD-10-CM

## 2023-06-13 DIAGNOSIS — G89.29 CHRONIC MIDLINE LOW BACK PAIN, UNSPECIFIED WHETHER SCIATICA PRESENT: Primary | ICD-10-CM

## 2023-06-13 PROCEDURE — 97110 THERAPEUTIC EXERCISES: CPT

## 2023-06-13 PROCEDURE — 97112 NEUROMUSCULAR REEDUCATION: CPT

## 2023-06-13 NOTE — PROGRESS NOTES
"Daily Note     Today's date: 2023  Patient name: Jazzy Parmar  : 1955  MRN: [id number]  Referring provider: Cassi Nelson MD  Dx:   Encounter Diagnosis     ICD-10-CM    1  Chronic midline low back pain, unspecified whether sciatica present  M54 50     G89 29           Start Time: 1150  Stop Time: 1305  Total time in clinic (min): 75 minutes    Subjective: \"I had my DEXA and my results don't look good, especially in my hip  \" Patient presents to today's session with [facility] as a result  States she has not spoken to her physician yet about the results  Objective: See treatment diary below    Neuro Re-Ed:  Week 4: Patient educated on the pain neuromatrix or pain map via fMRI examples, and how various areas of the brain are involved in pain experience  These areas have alternative primary focus which is disturbed when the brain is producing pain (memory, focus, concentration, emotion, fine motor control, temperature), causing potential patient issues/struggles  Patient educated on importance of graded introduction to activity, including review of \"hurt does not equal harm\" when discussing progression of aerobic and functional activity progressions, pt expressed good understanding  Patient encouraged to journal body issues experienced as a result of pain, and problem solve strategies discussed at today's session which can improve \"pain maps\"  TherEx:  Aerobic Warm-Up: 10 min on NuStep, level 4    Circuit 1: 60\" on / 30\" off, 3 rounds   - Bridges   - Chops at cc   - Lat pull down          Assessment: Tolerated treatment well  Patient with onset of dizziness during bridges that subsided with rest and return to sitting  Additional sets of bridges performed with back support on bosu to improved tolerance  Patient demo good understanding of graded exposure topics covered during today's session  Patient will continue to benefit from skilled PT to improve functional mobility and activity tolerance   " "      Plan: Continue per plan of care              Eval/ Re-eval Auth #/ Referral # Total visits Start date  Expiration date Total active units  Total manual units  PT only or  PT+OT?   23 No auth no visit limit                                       Medical History        Past Medical History:   Diagnosis Date   • Anxiety     • Apnea, sleep       patient states \"repeated test says no\"   • Arthritis       severe in the joints   • Chronic pain disorder       joints   • Depression     • DJD (degenerative joint disease)     • Fall 2018     tripped on a folded rug- left hamstring injury   • Foot injury       right foot-fx of bone-no repair-has severe arthritis in the foot and ankle   • GERD (gastroesophageal reflux disease)     • History of sleep apnea     • Hypercholesteremia     • Hypertension     • Insomnia     • Low back pain     • Obesity         Lost 70lb with gastric sleeve ()   • Osteoarthritis     • Wears glasses     • Wears partial dentures       upper and lower-but did lose the lower                                                                                                                                              Reeval: 23  Insurance :  [facility]  [facility] ortho Pain Group   Visits:  13 [phone]   Manual: 23             Belt hip mobs                                       Ther ex/NMR:         Manual stretching: hamstring, hip flexor, piriformis/glute   Piriformis 10 sec x 5    Manual : unable due to inc pain     Rec bike/  nustep   NS: 10 min lv 3   NS: 10 min lv 4    Heel slide with peanut     20x     LTR   20 x each       Seated hamstring stretch   10 sec x 5 adan  W/ SOS   Supine w/ SOS: 10 sec x 5          10 x 2       Prone quad stretch         Sit/stand with hip hinge         TA activation (stab cuff)     Standin sec x 10      + marching   10 x 2       + alternating LE extension   --                Glut set prone  " Standin sec x 10        st: hip extension         Glute set + bridge   + hip add iso: x 10    + hip abd iso green band:  10 x  + hip add iso: x 10    + hip abd iso green band:  10 x    Step ups    6 in: 10 x 2  Fwd: 6 in: 10 x 2     Leg press      55 lb 10 x 2  65 lb: 10 x 2     TRX squats     10 x 2     Therapeutic Activity:          Reevaluation                   Gait Training:      Instruct in [facility] and sequencing              HEP:                    Modalities                  ice         Total time:

## 2023-06-15 ENCOUNTER — OFFICE VISIT (OUTPATIENT)
Dept: PHYSICAL THERAPY | Facility: CLINIC | Age: 68
End: 2023-06-15
Payer: COMMERCIAL

## 2023-06-15 DIAGNOSIS — M54.50 CHRONIC MIDLINE LOW BACK PAIN, UNSPECIFIED WHETHER SCIATICA PRESENT: Primary | ICD-10-CM

## 2023-06-15 DIAGNOSIS — G89.29 CHRONIC MIDLINE LOW BACK PAIN, UNSPECIFIED WHETHER SCIATICA PRESENT: Primary | ICD-10-CM

## 2023-06-15 PROCEDURE — 97110 THERAPEUTIC EXERCISES: CPT | Performed by: PHYSICAL THERAPIST

## 2023-06-15 NOTE — PROGRESS NOTES
"        Daily Note         Today's date: 6/15/2023  Patient name: Vilma Cassidy  : 1955  MRN: [id number]  Referring provider: Dariusz Cagle MD  Dx:   Encounter Diagnosis     ICD-10-CM    1  Chronic midline low back pain, unspecified whether sciatica present  M54 50     G89 29           Subjective: Vilma Cassidy reports no significant change in symptoms  Feels the pain management group is helpful  States she will get her xray in the next few days  Objective: See treatment diary below    Assessment:  patient tolreated session fair  improved form noted with supported squats  patient and therapist discussed PLOC agreed to d/c PT and only continue with pain group next session   [place] Plan: reeval  and d/c ortho PT next session   patient to continue with chronic pain group              Eval/ Re-eval Auth #/ Referral # Total visits Start date  Expiration date Total active units  Total manual units  PT only or  PT+OT?   23 No auth no visit limit                                       Medical History        Past Medical History:   Diagnosis Date   • Anxiety     • Apnea, sleep       patient states \"repeated test says no\"   • Arthritis       severe in the joints   • Chronic pain disorder       joints   • Depression     • DJD (degenerative joint disease)     • Fall 2018     tripped on a folded rug- left hamstring injury   • Foot injury       right foot-fx of bone-no repair-has severe arthritis in the foot and ankle   • GERD (gastroesophageal reflux disease)     • History of sleep apnea     • Hypercholesteremia     • Hypertension     • Insomnia     • Low back pain     • Obesity         Lost 70lb with gastric sleeve ()   • Osteoarthritis     • Wears glasses     • Wears partial dentures       upper and lower-but did lose the lower                                                                                                                                              Reeval: " 23  Insurance :  Pain Group  [facility] Group ortho Pain Group    Visits: 12 [phone]   Manual: 23             Belt hip mobs                                       Ther ex/NMR:         Manual stretching: hamstring, hip flexor, piriformis/glute  Piriformis 10 sec x 5    Manual : unable due to inc pain      Rec bike/  nustep  NS: 10 min lv 3   NS: 10 min lv 4  lv 4 5 min   25 miles   Heel slide with peanut    20x   20 x    LTR  20 x each        Seated hamstring stretch  10 sec x 5 adan  W/ SOS   Supine w/ SOS: 10 sec x 5    Supine: 10 sec x 5        10 x 2        Prone quad stretch         Sit/stand with hip hinge         TA activation (stab cuff)    Standin sec x 10       + marching  10 x 2        + alternating LE extension  --                 Glut set prone   Standin sec x 10     --    st: hip extension      --   Glute set + bridge  + hip add iso: x 10    + hip abd iso green band:  10 x  + hip add iso: x 10    + hip abd iso green band:  10 x  + hip add iso: x 15    + hip abd iso green band:  15 x   Step ups   6 in: 10 x 2  Fwd: 6 in: 10 x 2   Fwd: 6 in: 10 x 2    Leg press     55 lb 10 x 2  65 lb: 10 x 2   55 lb: 10 x 2  65 lb: 10 x 2    TRX squats    10 x 2   TRX: 10 x 2  Supported squats: 10 x 2   Therapeutic Activity:          Reevaluation                   Gait Training:     Instruct in [facility] and sequencing               HEP:                    Modalities                  ice         Total time:

## 2023-06-16 ENCOUNTER — HOSPITAL ENCOUNTER (OUTPATIENT)
Dept: RADIOLOGY | Facility: HOSPITAL | Age: 68
Discharge: HOME | End: 2023-06-16
Payer: COMMERCIAL

## 2023-06-16 DIAGNOSIS — M25.551 RIGHT HIP PAIN: ICD-10-CM

## 2023-06-16 PROCEDURE — 73502 X-RAY EXAM HIP UNI 2-3 VIEWS: CPT

## 2023-06-20 ENCOUNTER — OFFICE VISIT (OUTPATIENT)
Dept: PHYSICAL THERAPY | Facility: CLINIC | Age: 68
End: 2023-06-20
Payer: COMMERCIAL

## 2023-06-20 DIAGNOSIS — G89.29 CHRONIC MIDLINE LOW BACK PAIN, UNSPECIFIED WHETHER SCIATICA PRESENT: Primary | ICD-10-CM

## 2023-06-20 DIAGNOSIS — M54.50 CHRONIC MIDLINE LOW BACK PAIN, UNSPECIFIED WHETHER SCIATICA PRESENT: Primary | ICD-10-CM

## 2023-06-20 PROCEDURE — 97112 NEUROMUSCULAR REEDUCATION: CPT

## 2023-06-20 PROCEDURE — 97110 THERAPEUTIC EXERCISES: CPT

## 2023-06-20 NOTE — PROGRESS NOTES
"Daily Note     Today's date: 2023  Patient name: Owen Bragg  : 1955  MRN: [id number]  Referring provider: Gary Corbin MD  Dx:   Encounter Diagnosis     ICD-10-CM    1  Chronic midline low back pain, unspecified whether sciatica present  M54 50     G89 29           Start Time: 1145  Stop Time: 1305  Total time in clinic (min): 80 minutes    Subjective: Patient states she had x-ray of hips, which revealed mild arthritis  States she is fearful of falling and is considering using a scooter as a result  Objective: See treatment diary below    Neuro Re-Ed:  Week 5: Patient was educated regarding sympathetic nervoys system topics as they pertain to pain, including stress biology, fight or flight response, the role of adrenaline and cortisol in pain, the body's immune response and multiple output mechanisms  Metaphors were used to promote deep learning, and the role of self-care techniques useful in claming the sympathetic nervous system were addressed  Reviewed topics of comorbid conditions, as well as self-care, including topics of hypertension, DDD, fibromyalgia, diabetes, and smoking, as applicable  Patient able to self-identify factors that can be considered inflammatory and possible contributions to pain  TherEx:  Aerobic Warm-Up: 10 minutes on NuStep    Circuit 1: 60\" on / 30\" off, 3 rounds   - Forward lung w/ unilat support from [facility]   - Good mornings at wall with tidal tank   - OH press w/ 5 lb DB in each UE      Assessment: Tolerated treatment fair  Continued emphasis placed on functional mobility vs pain throughout today's session  Patient encouraged to identify functional goals for HEP, verbalized good understanding/agreement  Patient able to independently identify need for exercise modification, utilizing [facility] for unilateral support during forward lunges  Patient with fatigue post session   Patient will continue to benefit from skilled PT to improve functional mobility and " "activity tolerance  Plan: Continue per plan of care  Re-eval nv  Eval/ Re-eval Auth #/ Referral # Total visits Start date  Expiration date Total active units  Total manual units  PT only or  PT+OT?   23 No auth no visit limit                                       Medical History        Past Medical History:   Diagnosis Date   • Anxiety     • Apnea, sleep       patient states \"repeated test says no\"   • Arthritis       severe in the joints   • Chronic pain disorder       joints   • Depression     • DJD (degenerative joint disease)     • Fall 2018     tripped on a folded rug- left hamstring injury   • Foot injury       right foot-fx of bone-no repair-has severe arthritis in the foot and ankle   • GERD (gastroesophageal reflux disease)     • History of sleep apnea     • Hypercholesteremia     • Hypertension     • Insomnia     • Low back pain     • Obesity         Lost 70lb with gastric sleeve ()   • Osteoarthritis     • Wears glasses     • Wears partial dentures       upper and lower-but did lose the lower                                                                                                                                              Reeval: 23  Insurance :   [facility]   Visits: [phone]   Manual: 23             Belt hip mobs                                       Ther ex/NMR:         Manual stretching: hamstring, hip flexor, piriformis/glute Piriformis 10 sec x 5    Manual : unable due to inc pain       Rec bike/  nustep NS: 10 min lv 3   NS: 10 min lv 4  lv 4 5 min   25 miles    Heel slide with peanut   20x   20 x     LTR 20 x each         Seated hamstring stretch 10 sec x 5 adan  W/ SOS   Supine w/ SOS: 10 sec x 5    Supine: 10 sec x 5        10 x 2         Prone quad stretch         Sit/stand with hip hinge         TA activation (stab cuff) Standin sec x 10        + marching 10 x 2       " + alternating LE extension --                  Glut set prone Standin sec x 10     --     st: hip extension     --    Glute set + bridge + hip add iso: x 10    + hip abd iso green band:  10 x  + hip add iso: x 10    + hip abd iso green band:  10 x  + hip add iso: x 15    + hip abd iso green band:  15 x    Step ups 6 in: 10 x 2  Fwd: 6 in: 10 x 2   Fwd: 6 in: 10 x 2     Leg press    55 lb 10 x 2  65 lb: 10 x 2   55 lb: 10 x 2  65 lb: 10 x 2     TRX squats   10 x 2   TRX: 10 x 2  Supported squats: 10 x 2    Therapeutic Activity:          Reevaluation                   Gait Training:    Instruct in [facility] and sequencing                HEP:                    Modalities                  ice         Total time:

## 2023-06-22 ENCOUNTER — EVALUATION (OUTPATIENT)
Dept: PHYSICAL THERAPY | Facility: CLINIC | Age: 68
End: 2023-06-22
Payer: COMMERCIAL

## 2023-06-22 DIAGNOSIS — M54.50 CHRONIC MIDLINE LOW BACK PAIN, UNSPECIFIED WHETHER SCIATICA PRESENT: Primary | ICD-10-CM

## 2023-06-22 DIAGNOSIS — G89.29 CHRONIC MIDLINE LOW BACK PAIN, UNSPECIFIED WHETHER SCIATICA PRESENT: Primary | ICD-10-CM

## 2023-06-22 PROCEDURE — 97530 THERAPEUTIC ACTIVITIES: CPT | Performed by: PHYSICAL THERAPIST

## 2023-06-22 PROCEDURE — 97110 THERAPEUTIC EXERCISES: CPT | Performed by: PHYSICAL THERAPIST

## 2023-06-22 NOTE — PROGRESS NOTES
Daily Note/Progress Note      Today's date: 2023  Patient name: Ney Peter  : 1955  MRN: [id number]  Referring provider: Rosita Palacios MD  Dx:   Encounter Diagnosis     ICD-10-CM    1  Chronic midline low back pain, unspecified whether sciatica present  M54 50     G89 29           Subjective: Pt reports % improvement since SOC: 80%  The last 20% is due to pain when she sits too long  States she can now go up the stairs with her right leg  + dull ache in her groin  Pain level at rest:  -3 /10, pain level with ADLS:   occurs intermittently  At this time, the functional limitations include: unable to get on floor, ambulates with SPC due to FOF, pain with stair negotiation, difficulty getting out of a seated position         Objective: (23)(23)    Concurrent Complaints:  Red flags present: Neg   Negative for bladder dysfunction, bowel dysfunction and saddle (S4) numbness    Posture (status quo)    Sitting:+ slouched   Standing: right shoulder higher then left     LLD:neg in standing    Skin creases:elevated on left compared to right    Shift:neg    Scoliosis: mid back curved to left with correction in lumbar spine    Knees: right LE postures in ER and unweights right LE     Gait: reduced step length adan, reduced stance time on right (status quo)   Assistive device: none   Trunk movement: reduced    Stride length: reduced   Trendelenburg: mild adan   Foot drop: neg    Functional movements: (status quo)    Squat: unable to tap standard chair,  (+)pain (status quo)   Bridging: able to lift 50% of motion  (-) pain (status quo)    Transfers sit/stand: needs adan UE assistance (able to perform but with difficulty)   bed mobility independent    Lumbar AROM:    Flexion: minimal LOM (+)pain produced grabbing in low back and right groin (min LOM: + pressure in low back and  Left hamstring)( + pain and pressure in groin)   Extension: moderate LOM grabs in low back and right groin ( moderate LOM: limited by groin pain)(mod LOM + pain)   Side bend: Right: major LOM (+)pain  (mod LOM: + pain)((status quo)     Left: moderate LOM (+)pain  (mod LOM + pain)(status quo)    Rotation: right:min LOm + pain right LE ((status quo)      Left: wnl no pain (status quo)       Neurodynamic testing:    Passive SLR: neg adan    Hip/knee/ankle MMT: hip abd: 4+/5 (break test)( hip flexion: Right: 3-/5 against gravity)  Clamshells: 4+/5 adan (break test)  Hip/knee/ankle ROM:   Hip flexion left 90, Right: 85 (Right: 100 deg, left 114)(Right: 100 deg AROM)  Abd: left wnl   Right: 35 (status quo)   ER: left wnl Right: 35 deg (Right: 40 + pain)(Right: 38 PROM)  IR: left 25 deg  Right: 18 deg ( Right: 10 + pain)(Right: 8 PROM)    Dermatomes: grossly wnl to light touch adan LE    Myotomes   L2 (hip flexion): Right 4-/5  + pain Left: 4/5 (4/5 no pain)   L3 (knee extension): Right: 4+/5 Left: 4+/5   L4 (ankle dorsiflexion): Right: 4+/5 Left: 4+/5   L5 (hallux extension): Right: 4+/5  Left: 4+/5   S1 (ankle plantarflexion): Right: 4+/5 Left: 4+/5   S2 (hip extensors): Right: 4+/5 Left: 4+/5    TA facilitation: poor    Flexibility:   Hamstring: Right: good  Left: good   Hip flexors:Right: poor Left: poor ( able to achieve neutral) (status quo)    Quads: Right: poor Left: poor (status quo)   (piriformis:   Poor adan) (status quo)                 Assessment: Asif Montalvo demonstrates no significant improvement since last reeval objectively despite subjective report  At this time it does not warrant continuing ortho PT  Patient instructed to follow up with primary physician to obtain xray results  Patient will continue with chronic pain group x 3 more sessions then d/c servicces  The goal status of Asif Montalvo is indicated above   Plan: continue x 3 sessions then d/c services    6/22/23--> 7/31/23 chronic pain group              Eval/ Re-eval Auth #/ Referral # Total visits Start date  Expiration date Total active units  Total "manual units  PT only or  PT+OT?   23 No auth no visit limit                                       Medical History        Past Medical History:   Diagnosis Date   • Anxiety     • Apnea, sleep       patient states \"repeated test says no\"   • Arthritis       severe in the joints   • Chronic pain disorder       joints   • Depression     • DJD (degenerative joint disease)     • Fall 2018     tripped on a folded rug- left hamstring injury   • Foot injury       right foot-fx of bone-no repair-has severe arthritis in the foot and ankle   • GERD (gastroesophageal reflux disease)     • History of sleep apnea     • Hypercholesteremia     • Hypertension     • Insomnia     • Low back pain     • Obesity         Lost 70lb with gastric sleeve ()   • Osteoarthritis     • Wears glasses     • Wears partial dentures       upper and lower-but did lose the lower                                                                                                                                              Reeval: 23  Insurance :   [facility]  [facility]   Visits: 13 [phone]    Manual: 23              Belt hip mobs                                           Ther ex/NMR:          Manual stretching: hamstring, hip flexor, piriformis/glute Piriformis 10 sec x 5    Manual : unable due to inc pain        Rec bike/  nustep NS: 10 min lv 3   NS: 10 min lv 4  lv 4 5 min   25 miles  5  Min  lv 4  25 miles    Heel slide with peanut   20x   20 x      LTR 20 x each          Seated hamstring stretch 10 sec x 5 adan  W/ SOS   Supine w/ SOS: 10 sec x 5    Supine: 10 sec x 5    10 sec x 5       10 x 2          Prone quad stretch          Sit/stand with hip hinge          TA activation (stab cuff) Standin sec x 10         + marching 10 x 2          + alternating LE extension --                    Glut set prone Standin sec x 10     --      st: hip " extension     --     Glute set + bridge + hip add iso: x 10    + hip abd iso green band:  10 x  + hip add iso: x 10    + hip abd iso green band:  10 x  + hip add iso: x 15    + hip abd iso green band:  15 x  + hip add iso: x 15    + hip abd iso green band:  15 x   Step ups 6 in: 10 x 2  Fwd: 6 in: 10 x 2   Fwd: 6 in: 10 x 2    6 in: 10 x 2   Leg press    55 lb 10 x 2  65 lb: 10 x 2   55 lb: 10 x 2  65 lb: 10 x 2   55 lb: 10 x 5  65 lb: 10 x 3   TRX squats   10 x 2   TRX: 10 x 2  Supported squats: 10 x 2  TRX: 10 x 2  Supported squats: 10 x 2   Therapeutic Activity:           Reevaluation       performed              Gait Training:    Instruct in [facility] and sequencing                  HEP:                      Modalities                    ice          Total time:

## 2023-06-27 ENCOUNTER — OFFICE VISIT (OUTPATIENT)
Dept: PHYSICAL THERAPY | Facility: CLINIC | Age: 68
End: 2023-06-27
Payer: COMMERCIAL

## 2023-06-27 DIAGNOSIS — G89.29 CHRONIC MIDLINE LOW BACK PAIN, UNSPECIFIED WHETHER SCIATICA PRESENT: Primary | ICD-10-CM

## 2023-06-27 DIAGNOSIS — M54.50 CHRONIC MIDLINE LOW BACK PAIN, UNSPECIFIED WHETHER SCIATICA PRESENT: Primary | ICD-10-CM

## 2023-06-27 PROCEDURE — 97112 NEUROMUSCULAR REEDUCATION: CPT

## 2023-06-27 NOTE — PROGRESS NOTES
"Daily Note     Today's date: 2023  Patient name: Junita Koyanagi  : 1955  MRN: [id number]  Referring provider: Jasbir Brock MD  Dx:   Encounter Diagnosis     ICD-10-CM    1  Chronic midline low back pain, unspecified whether sciatica present  M54 50     G89 29           Start Time: 1145  Stop Time: 1315  Total time in clinic (min): 90 minutes    Subjective: Patient states she continues to experience significant groin pain on right  States she has follow up next week to review x-ray of hip  \"I'm thinking I might need a hip replacement, but I don't really want to do that  \"      Objective: See treatment diary below    Neuro Re-Ed:  Week 6: Patient educated about the relationship between tissue injury and pain, and that pain is an output by the brain  Included was the principle that tissue injury does not have to hurt, and that you can have pain without having tissue injury, particularly with  a hypersensitive nervous system  Patient educated regarding neurogenic inflammation concepts, including retrograde firing, persistent swelling, and immune response  Thoughts are also nerve impulses, thus emotional stress and worry/fear can keep tissues inflamed  Discussed topics of mindfulness and selective coping for stress modulation; pt expressed good understanding  Briefly reviewed positive affect and effects on stress and pain  Discussed definition of negativity bias and effect on chronic pain, as well as the 3:1 positivity ratio and how positivity improves overall well-being  Introduced the concept of mindfulness and acknowledging feelings/bodily sensations without judgment in order to promote productive reactions to pain and promote improved QOL long-term  Utilized body scanning as a mindfulness and self-regulation technique        TherEx:  Aerobic Warm-Up: 6 min on bike    Circuit 1: 60\" on / 30\" off, 2 rounds   - Crunches over bosu   - Standing chest press   - Heel raises     Circuit 2: 60\" on / 30\" off, " "2 rounds   - Triceps pull down   - Lateral stepping   - PB press into table      Assessment: Tolerated treatment well  Heavily discussed pain issues vs tissue issues with patient during today's session due to reports of continued global pain, including concern of herniated disc from >5 years ago  Patient req UE support during HR to promote safety and balance  Patient requested to hold continuous lateral stepping due to history of pain with activity, performed single step before returning to baseline instead without pain  Patient will continue to benefit from skilled PT to improve functional mobility and pain knowledge  Plan: Continue per plan of care              Eval/ Re-eval Auth #/ Referral # Total visits Start date  Expiration date Total active units  Total manual units  PT only or  PT+OT?   4/20/23 No auth no visit limit                                       Medical History        Past Medical History:   Diagnosis Date   • Anxiety     • Apnea, sleep       patient states \"repeated test says no\"   • Arthritis       severe in the joints   • Chronic pain disorder       joints   • Depression     • DJD (degenerative joint disease)     • Fall 11/22/2018     tripped on a folded rug- left hamstring injury   • Foot injury       right foot-fx of bone-no repair-has severe arthritis in the foot and ankle   • GERD (gastroesophageal reflux disease)     • History of sleep apnea     • Hypercholesteremia     • Hypertension     • Insomnia     • Low back pain     • Obesity         Lost 70lb with gastric sleeve (2011)   • Osteoarthritis     • Wears glasses     • Wears partial dentures       upper and lower-but did lose the lower                                                                                                                                              Reeval: 6/20/23  Insurance :  [facility]  [facility]   Visits: 14 [phone] 21   Manual: 6/6/23 6/8/23 6/13/23 6/17/23 " 6/20/23 6/22/23 6/27/23              Belt hip mobs                                           Ther ex/NMR:          Manual stretching: hamstring, hip flexor, piriformis/glute  Manual : unable due to inc pain         Rec bike/  nustep  NS: 10 min lv 4  lv 4 5 min   25 miles  5  Min  lv 4  25 miles     Heel slide with peanut  20x   20 x       LTR          Seated hamstring stretch  Supine w/ SOS: 10 sec x 5    Supine: 10 sec x 5    10 sec x 5                 Prone quad stretch          Sit/stand with hip hinge          TA activation (stab cuff)          + marching          + alternating LE extension                     Glut set prone    --       st: hip extension    --      Glute set + bridge  + hip add iso: x 10    + hip abd iso green band:  10 x  + hip add iso: x 15    + hip abd iso green band:  15 x  + hip add iso: x 15    + hip abd iso green band:  15 x    Step ups  Fwd: 6 in: 10 x 2   Fwd: 6 in: 10 x 2    6 in: 10 x 2    Leg press   55 lb 10 x 2  65 lb: 10 x 2   55 lb: 10 x 2  65 lb: 10 x 2   55 lb: 10 x 5  65 lb: 10 x 3    TRX squats  10 x 2   TRX: 10 x 2  Supported squats: 10 x 2  TRX: 10 x 2  Supported squats: 10 x 2    Therapeutic Activity:           Reevaluation      performed               Gait Training:   Instruct in [facility] and sequencing                   HEP:                      Modalities                    ice          Total time:

## 2023-06-29 ENCOUNTER — APPOINTMENT (OUTPATIENT)
Dept: PHYSICAL THERAPY | Facility: CLINIC | Age: 68
End: 2023-06-29
Payer: COMMERCIAL

## 2023-07-03 ENCOUNTER — OFFICE VISIT (OUTPATIENT)
Dept: FAMILY MEDICINE CLINIC | Facility: CLINIC | Age: 68
End: 2023-07-03
Payer: COMMERCIAL

## 2023-07-03 ENCOUNTER — OFFICE VISIT (OUTPATIENT)
Dept: PHYSICAL THERAPY | Facility: CLINIC | Age: 68
End: 2023-07-03
Payer: COMMERCIAL

## 2023-07-03 VITALS
DIASTOLIC BLOOD PRESSURE: 80 MMHG | BODY MASS INDEX: 42.35 KG/M2 | SYSTOLIC BLOOD PRESSURE: 136 MMHG | OXYGEN SATURATION: 97 % | WEIGHT: 239 LBS | HEIGHT: 63 IN | HEART RATE: 77 BPM

## 2023-07-03 DIAGNOSIS — G89.4 CHRONIC PAIN SYNDROME: ICD-10-CM

## 2023-07-03 DIAGNOSIS — G89.29 CHRONIC MIDLINE LOW BACK PAIN WITHOUT SCIATICA: ICD-10-CM

## 2023-07-03 DIAGNOSIS — G89.29 CHRONIC MIDLINE LOW BACK PAIN, UNSPECIFIED WHETHER SCIATICA PRESENT: Primary | ICD-10-CM

## 2023-07-03 DIAGNOSIS — E83.52 HYPERCALCEMIA: ICD-10-CM

## 2023-07-03 DIAGNOSIS — E66.01 CLASS 3 SEVERE OBESITY DUE TO EXCESS CALORIES WITHOUT SERIOUS COMORBIDITY WITH BODY MASS INDEX (BMI) OF 40.0 TO 44.9 IN ADULT (HCC): ICD-10-CM

## 2023-07-03 DIAGNOSIS — K21.9 GASTROESOPHAGEAL REFLUX DISEASE WITHOUT ESOPHAGITIS: ICD-10-CM

## 2023-07-03 DIAGNOSIS — E78.5 DYSLIPIDEMIA: ICD-10-CM

## 2023-07-03 DIAGNOSIS — F11.20 CONTINUOUS OPIOID DEPENDENCE (HCC): ICD-10-CM

## 2023-07-03 DIAGNOSIS — M19.91 PRIMARY OSTEOARTHRITIS, UNSPECIFIED SITE: ICD-10-CM

## 2023-07-03 DIAGNOSIS — Z98.84 H/O BARIATRIC SURGERY: ICD-10-CM

## 2023-07-03 DIAGNOSIS — M54.50 CHRONIC MIDLINE LOW BACK PAIN, UNSPECIFIED WHETHER SCIATICA PRESENT: Primary | ICD-10-CM

## 2023-07-03 DIAGNOSIS — I10 PRIMARY HYPERTENSION: ICD-10-CM

## 2023-07-03 DIAGNOSIS — M25.551 RIGHT HIP PAIN: Primary | ICD-10-CM

## 2023-07-03 DIAGNOSIS — F41.9 ANXIETY: ICD-10-CM

## 2023-07-03 DIAGNOSIS — M54.50 CHRONIC MIDLINE LOW BACK PAIN WITHOUT SCIATICA: ICD-10-CM

## 2023-07-03 PROCEDURE — 3079F DIAST BP 80-89 MM HG: CPT | Performed by: INTERNAL MEDICINE

## 2023-07-03 PROCEDURE — 1160F RVW MEDS BY RX/DR IN RCRD: CPT | Performed by: INTERNAL MEDICINE

## 2023-07-03 PROCEDURE — 3075F SYST BP GE 130 - 139MM HG: CPT | Performed by: INTERNAL MEDICINE

## 2023-07-03 PROCEDURE — 97110 THERAPEUTIC EXERCISES: CPT

## 2023-07-03 PROCEDURE — 1159F MED LIST DOCD IN RCRD: CPT | Performed by: INTERNAL MEDICINE

## 2023-07-03 PROCEDURE — 99214 OFFICE O/P EST MOD 30 MIN: CPT | Performed by: INTERNAL MEDICINE

## 2023-07-03 PROCEDURE — 97112 NEUROMUSCULAR REEDUCATION: CPT

## 2023-07-03 RX ORDER — OXYCODONE HYDROCHLORIDE AND ACETAMINOPHEN 5; 325 MG/1; MG/1
1 TABLET ORAL EVERY 4 HOURS PRN
Qty: 150 TABLET | Refills: 0 | Status: SHIPPED | OUTPATIENT
Start: 2023-07-03

## 2023-07-03 NOTE — ASSESSMENT & PLAN NOTE
Patient currently taking oxycodone 5/325 5 times a day as needed.   Patient was on 7.5 mg tablet before but it is not available in the market now and that she was taking 4 times a day

## 2023-07-03 NOTE — ASSESSMENT & PLAN NOTE
Patient with osteoarthritis of multiple joints avoiding nonsteroidals because of the gastric surgery currently taking pain medications.

## 2023-07-03 NOTE — PROGRESS NOTES
Daily Note     Today's date: 7/3/2023  Patient name: Bibiana Alejandre  : 1955  MRN: [id number]  Referring provider: Minerva Snowden MD  Dx:   Encounter Diagnosis     ICD-10-CM    1. Chronic midline low back pain, unspecified whether sciatica present  M54.50     G89.29           Start Time: 1545  Stop Time: 1700  Total time in clinic (min): 75 minutes    Subjective: "I'm going to have my right hip replaced. My options are that or to just go in a nursing home. This isn't going to get any better on it's own."       Objective: See treatment diary below    Neuro Re-Ed:  Week 7: Patient educated regarding the role of the primary somatosensory cortex in pain and body maps (which depend on movement and use of the body parts) and that for people with persistent pain, decreased use of the body and other biologic processes change the body maps. Laterality proficiency was discussed and addressed. Patient educated on proper lifting mechanics, will benefit from additional follow up in future sessions to review technique. TherEx:  Aerobic Warm-Up: 10 min on NuStep    Circuit 1: 60" on / 30" off, 2 rounds   - Ambulation in open environment   - TRX squats   - Open books at wall    Circuit 2: 60" on / 30" off, 2 rounds   - TRX rows   - Bilat shld ER   - Biceps curls      Assessment: Tolerated treatment fair. Patient remains in pre-contemplation phase in regards to readiness to change and understanding of Pain Neuroscience Education topics covered in group setting. Ambulation performed in open environment to strong tolerance, no LoB noted. Increased reliance on UEs noted with TRX squats. Plan: Continue per plan of care.   D/C nv.           Eval/ Re-eval Auth #/ Referral # Total visits Start date  Expiration date Total active units  Total manual units  PT only or  PT+OT?   23 No auth no visit limit                                       Medical History        Past Medical History:   Diagnosis Date   • Anxiety   • Apnea, sleep       patient states "repeated test says no"   • Arthritis       severe in the joints   • Chronic pain disorder       joints   • Depression     • DJD (degenerative joint disease)     • Fall 11/22/2018     tripped on a folded rug- left hamstring injury   • Foot injury       right foot-fx of bone-no repair-has severe arthritis in the foot and ankle   • GERD (gastroesophageal reflux disease)     • History of sleep apnea     • Hypercholesteremia     • Hypertension     • Insomnia     • Low back pain     • Obesity         Lost 70lb with gastric sleeve (2011)   • Osteoarthritis     • Wears glasses     • Wears partial dentures       upper and lower-but did lose the lower                                                                                                                                              Reeval: 6/20/23  Insurance :  [facility]  [facility]   Visits: 15 16 17 19 20 21 22   Manual: 6/8/23 6/13/23 6/17/23 6/20/23 6/22/23 6/27/23 7/3/23              Belt hip mobs                                           Ther ex/NMR:          Manual stretching: hamstring, hip flexor, piriformis/glute Manual : unable due to inc pain          Rec bike/  nustep NS: 10 min lv 4  lv 4 5 min . 25 miles  5  Min  lv 4 .25 miles      Heel slide with peanut 20x   20 x        LTR          Seated hamstring stretch Supine w/ SOS: 10 sec x 5    Supine: 10 sec x 5    10 sec x 5                  Prone quad stretch          Sit/stand with hip hinge          TA activation (stab cuff)          + marching          + alternating LE extension                     Glut set prone   --        st: hip extension   --       Glute set + bridge + hip add iso: x 10    + hip abd iso green band:  10 x  + hip add iso: x 15    + hip abd iso green band:  15 x  + hip add iso: x 15    + hip abd iso green band:  15 x     Step ups Fwd: 6 in: 10 x 2   Fwd: 6 in: 10 x 2    6 in: 10 x 2     Leg press  55 lb 10 x 2  65 lb: 10 x 2   55 lb: 10 x 2  65 lb: 10 x 2   55 lb: 10 x 5  65 lb: 10 x 3     TRX squats 10 x 2   TRX: 10 x 2  Supported squats: 10 x 2  TRX: 10 x 2  Supported squats: 10 x 2     Therapeutic Activity:           Reevaluation     performed                Gait Training:  Instruct in [facility] and sequencing                    HEP:                      Modalities                    ice          Total time:

## 2023-07-03 NOTE — ASSESSMENT & PLAN NOTE
Patient has not gone for the lab work as ordered the last time she is going to get it done prior to the next visit including PTH intact iron is calcium

## 2023-07-03 NOTE — PROGRESS NOTES
Office Visit Note  23     Liang Zambrano 76 y.o. female MRN: [id number]  : 1955    Assessment:     1. Right hip pain  Assessment & Plan:  Patient is having difficulty with ambulation now using a cane because of the pain in the right hip area. X-rays have revealed severe osteoarthritis in the right hip. Recommend patient be seen by the orthopedic surgeon for further evaluation. Patient at present time going for physical therapy will continue with the same. Orders:  -     Ambulatory Referral to Orthopedic Surgery; Future  -     oxyCODONE-acetaminophen (Percocet) 5-325 mg per tablet; Take 1 tablet by mouth every 4 (four) hours as needed for moderate pain Max Daily Amount: 6 tablets    2. Primary osteoarthritis, unspecified site  Assessment & Plan:  Patient with osteoarthritis of multiple joints avoiding nonsteroidals because of the gastric surgery currently taking pain medications. Orders:  -     Ambulatory Referral to Orthopedic Surgery; Future  -     oxyCODONE-acetaminophen (Percocet) 5-325 mg per tablet; Take 1 tablet by mouth every 4 (four) hours as needed for moderate pain Max Daily Amount: 6 tablets    3. Chronic midline low back pain without sciatica  Assessment & Plan:  Patient has some discomfort and pain in the left side in the back yesterday getting better today but history of low back pain for long. Of time. 4. Primary hypertension  Assessment & Plan:  Blood pressure today is 136/80 patient does not measure her blood pressures at home again emphasized regarding the same and bring the readings      5. Hypercalcemia  Assessment & Plan:  Patient has not gone for the lab work as ordered the last time she is going to get it done prior to the next visit including PTH intact iron is calcium      6. H/O bariatric surgery  Assessment & Plan:  Status post gastric surgery stable      7.  Gastroesophageal reflux disease without esophagitis  Assessment & Plan:  Patient on omeprazole 20 mg daily we will continue with the same      8. Dyslipidemia  Assessment & Plan:  Continue with diet HDL is at 77      9. Continuous opioid dependence ([address])  Assessment & Plan:  Patient with severe osteoarthritis in the right hip. Complains of increasing pain in the hip and also continues to experience pain in the low back area avoiding nonsteroidals because of the gastric surgery continue the pain medications. 10. Class 3 severe obesity due to excess calories without serious comorbidity with body mass index (BMI) of 40.0 to 44.9 in adult [facility])  Assessment & Plan:  Recommend to cut back on carbohydrate intake lifestyle modification      11. Chronic pain syndrome  Assessment & Plan:  Patient currently taking oxycodone 5/325 5 times a day as needed. Patient was on 7.5 mg tablet before but it is not available in the market now and that she was taking 4 times a day    Orders:  -     oxyCODONE-acetaminophen (Percocet) 5-325 mg per tablet; Take 1 tablet by mouth every 4 (four) hours as needed for moderate pain Max Daily Amount: 6 tablets    12. Anxiety  Assessment & Plan:  Continue Lexapro 10 mg                 Discussion Summary and Plan: Today's care plan and medications were reviewed with patient in detail and all their questions answered to their satisfaction. Chief Complaint   Patient presents with   • Follow-up      Subjective:  Patient with increasing pain in the right hip area especially with ambulation undergoing physical therapy however x-ray of the right hip has shown severe osteoarthritis. Patient presenting currently using the cane to help with ambulation. And she is also on pain medication oxycodone 5/325 5 times a day as needed we will continue with the same for now recommend follow-up with the orthopedics based on the recommendation make further decisions and follow-up. Medications reviewed labs reviewed patient did not go for the calcium levels as recommended yet.       The following portions of the patient's history were reviewed and updated as appropriate: allergies, current medications, past family history, past medical history, past social history, past surgical history and problem list.    Review of Systems   Constitutional: Negative for chills and fever. HENT: Negative for ear pain and sore throat. Eyes: Negative for pain and visual disturbance. Respiratory: Negative for cough and shortness of breath. Cardiovascular: Negative for chest pain and palpitations. Gastrointestinal: Negative for abdominal pain and vomiting. Genitourinary: Negative for dysuria and hematuria. Musculoskeletal: Positive for arthralgias and back pain. Skin: Negative for color change and rash. Neurological: Negative for seizures and syncope. All other systems reviewed and are negative.         Historical Information   Patient Active Problem List   Diagnosis   • Chronic pain syndrome   • Low back pain   • Class 3 severe obesity in adult [facility])   • H/O bariatric surgery   • Hypertension   • Gastroesophageal reflux disease   • Continuous opioid dependence ([address])   • Primary osteoarthritis   • Anxiety   • Hypercalcemia   • Dyslipidemia   • Dermatitis   • Right hip pain     Past Medical History:   Diagnosis Date   • Anxiety    • Apnea, sleep     patient states "repeated test says no"   • Arthritis     severe in the joints   • Chronic pain disorder     joints   • Depression    • DJD (degenerative joint disease)    • Fall 11/22/2018    tripped on a folded rug- left hamstring injury   • Foot injury     right foot-fx of bone-no repair-has severe arthritis in the foot and ankle   • GERD (gastroesophageal reflux disease)    • History of sleep apnea    • Hypercholesteremia    • Hypertension    • Insomnia    • Low back pain    • Obesity     Lost 70lb with gastric sleeve (2011)   • Osteoarthritis    • Wears glasses    • Wears partial dentures     upper and lower-but did lose the lower      Past Surgical History:   Procedure Laterality Date   • ANKLE ARTHROSCOPY Right    • APPENDECTOMY     • CHOLECYSTECTOMY     • COLONOSCOPY  10/26/2021   • DXA PROCEDURE (HISTORICAL)  10/13/2020   • GASTRECTOMY SLEEVE LAPAROSCOPIC  2011    lost 70 lb   • JOINT REPLACEMENT      blateral knee replacements   • MAMMO (HISTORICAL)  01/26/2022   • NERVE BLOCK Bilateral 12/29/2017    Procedure: L4, L5, S1 MEDIAL BRANCH BLOCK #1;  Surgeon: Debby Hdez MD;  Location: [address] [place] [address] OR;  Service: Pain Management    • NERVE BLOCK Bilateral 01/12/2018    Procedure: BLOCK MEDIAL BRANCH L4, L5-S1  #2;  Surgeon: Debby Hdez MD;  Location: [address] [place] [address] OR;  Service: Pain Management    • DE ARTHROPLASTY GLENOHUMERAL JOINT TOTAL SHOULDER Right 05/23/2019    Procedure: ARTHROPLASTY SHOULDER TOTAL , excision of deep cyst at shoulder, biceps tenotomy;  Surgeon: Mary Eastman MD;  Location: [facility];  Service: Orthopedics   • DE INJECT SI JOINT ARTHRGRPHY&/ANES/STEROID W/LESA Right 10/23/2020    Procedure: Sacroiliac Joint injection (99689); Surgeon: Debby Hdez MD;  Location: [facility] OR;  Service: Pain Management    • DE TENDON SHEATH INCISION Right 12/21/2021    Procedure: RELEASE TRIGGER RING FINGER;  Surgeon: Rossi Cruz MD;  Location: [facility];  Service: Orthopedics   • RADIOFREQUENCY ABLATION Right 03/16/2018    Procedure: Rt L4 L5 S1 RFA;  Surgeon: Debby Hdez MD;  Location: [address] [place] [address] OR;  Service: Pain Management    • RADIOFREQUENCY ABLATION Left 12/14/2018    Procedure: L3-S1 Radio Frequency Ablation ([id number]);   Surgeon: Debby Hdez MD;  Location: [facility] OR;  Service: Pain Management    • RHIZOTOMY Left 01/25/2018    Procedure: L4, L5, S1 RFA;  Surgeon: Debby Hdez MD;  Location: [facility] OR;  Service: Pain Management    • TONSILLECTOMY       Social History     Substance and Sexual Activity   Alcohol Use Never     Social History     Substance and Sexual Activity   Drug Use Never     Social History     Tobacco Use   Smoking Status Former   • Types: Cigarettes   • Quit date:    • Years since quittin.5   Smokeless Tobacco Never     Family History   Problem Relation Age of Onset   • Other Mother         in a nursing home-fx of the leg   • Heart disease Father         CHF   • Alcohol abuse Father    • Cirrhosis Father    • Colon cancer Maternal Grandmother    • No Known Problems Maternal Grandfather    • No Known Problems Paternal Grandmother    • No Known Problems Paternal Grandfather    • Other Brother         lung abscess, pneumonia   • Colon cancer Maternal Aunt    • No Known Problems Maternal Aunt    • No Known Problems Maternal Aunt    • No Known Problems Maternal Uncle    • No Known Problems Paternal Aunt    • No Known Problems Paternal Uncle    • Breast cancer Cousin 48     Health Maintenance Due   Topic   • Hepatitis C Screening    • Medicare Annual Wellness Visit (AWV)    • Pneumococcal Vaccine: 65+ Years (1 - PCV)   • COVID-19 Vaccine (5 - Moderna series)   • PT PLAN OF CARE    • Influenza Vaccine (1)      Meds/Allergies       Current Outpatient Medications:   •  acetaminophen (TYLENOL) 650 mg CR tablet, Take 650 mg by mouth every 8 (eight) hours as needed for mild pain, Disp: , Rfl:   •  amLODIPine (NORVASC) 10 mg tablet, TAKE 1 TABLET BY MOUTH DAILY, Disp: 90 tablet, Rfl: 3  •  ascorbic acid (VITAMIN C) 500 mg tablet, Take 500 mg by mouth every morning, Disp: , Rfl:   •  Coenzyme Q10 (CO Q 10) 100 MG CAPS, Take by mouth every morning Last dose 19, Disp: , Rfl:   •  Cyanocobalamin (VITAMIN B 12 PO), Take 500 mcg by mouth every morning, Disp: , Rfl:   •  diphenhydrAMINE (BENADRYL) 25 mg tablet, Take 25 mg by mouth every 8 (eight) hours as needed for itching , Disp: , Rfl:   •  escitalopram (LEXAPRO) 10 mg tablet, TAKE 1 AND 1/2 TABLETS BY  MOUTH DAILY AT BEDTIME, Disp: 135 tablet, Rfl: 1  •  Glucosamine-Chondroitin (MOVE FREE PO), Take 2 tablets by mouth every morning, Disp: , Rfl:   •  Melatonin 10 MG TABS, Take by mouth daily at bedtime, Disp: , Rfl:   •  omeprazole (PriLOSEC) 20 mg delayed release capsule, TAKE 1 CAPSULE BY MOUTH IN THE  MORNING, Disp: 90 capsule, Rfl: 1  •  oxyCODONE-acetaminophen (Percocet) 5-325 mg per tablet, Take 1 tablet by mouth every 4 (four) hours as needed for moderate pain Max Daily Amount: 6 tablets, Disp: 150 tablet, Rfl: 0  •  perindopril (ACEON) 8 MG tablet, 8 mg 2 (two) times a day, Disp: , Rfl:   •  triamcinolone (KENALOG) 0.5 % cream, Apply topically 3 (three) times a day, Disp: 30 g, Rfl: 0  •  zinc gluconate 50 mg tablet, Take 50 mg by mouth daily, Disp: , Rfl:       Objective:    Vitals:   /80 (BP Location: Right arm, Patient Position: Sitting, Cuff Size: Large)   Pulse 77   Ht 5' 3" (1.6 m)   Wt 108 kg (239 lb)   SpO2 97%   BMI 42.34 kg/m²   Body mass index is 42.34 kg/m². Vitals:    07/03/23 1305   Weight: 108 kg (239 lb)       Physical Exam  Vitals and nursing note reviewed. Constitutional:       Appearance: Normal appearance. Cardiovascular:      Rate and Rhythm: Normal rate and regular rhythm. Heart sounds: Normal heart sounds. Pulmonary:      Effort: Pulmonary effort is normal.      Breath sounds: Normal breath sounds. Musculoskeletal:      Cervical back: Normal range of motion and neck supple. Right lower leg: No edema. Left lower leg: No edema. Comments: Left hip movements causing discomfort and pain   Neurological:      Mental Status: She is alert and oriented to person, place, and time.          Lab Review   Appointment on 06/02/2023   Component Date Value Ref Range Status   • WBC 06/02/2023 7.36  4.31 - 10.16 Thousand/uL Final   • RBC 06/02/2023 4.89  3.81 - 5.12 Million/uL Final   • Hemoglobin 06/02/2023 13.9  11.5 - 15.4 g/dL Final   • Hematocrit 06/02/2023 43.2  34.8 - 46.1 % Final   • MCV 06/02/2023 88  82 - 98 fL Final   • MCH 06/02/2023 28.4  26.8 - 34.3 pg Final   • [facility] 06/02/2023 32.2  31.4 - 37.4 g/dL Final   • RDW 06/02/2023 13.6  11.6 - 15.1 % Final   • MPV 06/02/2023 10.6  8.9 - 12.7 fL Final   • Platelets 59/34/5166 325  149 - 390 Thousands/uL Final   • nRBC 06/02/2023 0  /100 WBCs Final   • Neutrophils Relative 06/02/2023 59  43 - 75 % Final   • Immat GRANS % 06/02/2023 0  0 - 2 % Final   • Lymphocytes Relative 06/02/2023 32  14 - 44 % Final   • Monocytes Relative 06/02/2023 7  4 - 12 % Final   • Eosinophils Relative 06/02/2023 2  0 - 6 % Final   • Basophils Relative 06/02/2023 0  0 - 1 % Final   • Neutrophils Absolute 06/02/2023 4.27  1.85 - 7.62 Thousands/µL Final   • Immature Grans Absolute 06/02/2023 0.03  0.00 - 0.20 Thousand/uL Final   • Lymphocytes Absolute 06/02/2023 2.35  0.60 - 4.47 Thousands/µL Final   • Monocytes Absolute 06/02/2023 0.53  0.17 - 1.22 Thousand/µL Final   • Eosinophils Absolute 06/02/2023 0.15  0.00 - 0.61 Thousand/µL Final   • Basophils Absolute 06/02/2023 0.03  0.00 - 0.10 Thousands/µL Final   • Sodium 06/02/2023 143  135 - 147 mmol/L Final   • Potassium 06/02/2023 4.4  3.5 - 5.3 mmol/L Final   • Chloride 06/02/2023 100  96 - 108 mmol/L Final   • CO2 06/02/2023 30  21 - 32 mmol/L Final   • ANION GAP 06/02/2023 13  4 - 13 mmol/L Final   • BUN 06/02/2023 19  5 - 25 mg/dL Final   • Creatinine 06/02/2023 0.48 (L)  0.60 - 1.30 mg/dL Final    Standardized to IDMS reference method   • Glucose, Fasting 06/02/2023 99  65 - 99 mg/dL Final   • Calcium 06/02/2023 10.4 (H)  8.4 - 10.2 mg/dL Final   • AST 06/02/2023 23  13 - 39 U/L Final   • ALT 06/02/2023 15  7 - 52 U/L Final    Specimen collection should occur prior to Sulfasalazine administration due to the potential for falsely depressed results.     • Alkaline Phosphatase 06/02/2023 74  34 - 104 U/L Final   • Total Protein 06/02/2023 7.3  6.4 - 8.4 g/dL Final   • Albumin 06/02/2023 4.3  3.5 - 5.0 g/dL Final   • Total Bilirubin 06/02/2023 0.49  0.20 - 1.00 mg/dL Final    Use of this assay is not recommended for patients undergoing treatment with eltrombopag due to the potential for falsely elevated results. N-acetyl-p-benzoquinone imine (metabolite of Acetaminophen) will generate erroneously low results in samples for patients that have taken an overdose of Acetaminophen. • eGFR 06/02/2023 101  ml/min/1.73sq m Final   • Hemoglobin A1C 06/02/2023 5.5  Normal 3.8-5.6%; PreDiabetic 5.7-6.4%; Diabetic >=6.5%; Glycemic control for adults with diabetes <7.0% % Final   • EAG 06/02/2023 111  mg/dl Final   • Cholesterol 06/02/2023 207 (H)  See Comment mg/dL Final    Cholesterol:         Pediatric <18 Years        Desirable          <170 mg/dL      Borderline High    170-199 mg/dL      High               >=200 mg/dL        Adult >=18 Years            Desirable         <200 mg/dL      Borderline High   200-239 mg/dL      High              >239 mg/dL     • Triglycerides 06/02/2023 95  See Comment mg/dL Final    Triglyceride:     0-9Y            <75mg/dL     10Y-17Y         <90 mg/dL       >=18Y     Normal          <150 mg/dL     Borderline High 150-199 mg/dL     High            200-499 mg/dL        Very High       >499 mg/dL    Specimen collection should occur prior to Metamizole administration due to the potential for falsely depressed results. • HDL, Direct 06/02/2023 77  >=50 mg/dL Final   • LDL Calculated 06/02/2023 111 (H)  0 - 100 mg/dL Final    LDL Cholesterol:     Optimal           <100 mg/dl     Near Optimal      100-129 mg/dl     Above Optimal       Borderline High 130-159 mg/dl       High            160-189 mg/dl       Very High       >189 mg/dl         This screening LDL is a calculated result. It does not have the accuracy of the Direct Measured LDL in the monitoring of patients with hyperlipidemia and/or statin therapy. Direct Measure LDL (EXH377) must be ordered separately in these patients.    • Non-HDL-Chol (CHOL-HDL) 06/02/2023 130  mg/dl Final   • TSH 3RD GENERATON 06/02/2023 2.169  0.450 - 4.500 uIU/mL Final    The recommended reference ranges for TSH during pregnancy are as follows:   First trimester 0.1 to 2.5 uIU/mL   Second trimester  0.2 to 3.0 uIU/mL   Third trimester 0.3 to 3.0 uIU/m    Note: Normal ranges may not apply to patients who are transgender, non-binary, or whose legal sex, sex at birth, and gender identity differ. Adult TSH (3rd generation) reference range follows the recommended guidelines of the [employer], January, 2020. • Color, UA 06/02/2023 Yellow   Final   • Clarity, UA 06/02/2023 Clear   Final   • Specific Gravity, UA 06/02/2023 1.015  1.000 - 1.030 Final   • pH, UA 06/02/2023 7.0  5.0, 5.5, 6.0, 6.5, 7.0, 7.5, 8.0, 8.5, 9.0 Final   • Leukocytes, UA 06/02/2023 Negative  Negative Final   • Nitrite, UA 06/02/2023 Negative  Negative Final   • Protein, UA 06/02/2023 Negative  Negative mg/dl Final   • Glucose, UA 06/02/2023 Negative  Negative mg/dl Final   • Ketones, UA 06/02/2023 Negative  Negative mg/dl Final   • Urobilinogen, UA 06/02/2023 0.2  0.2, 1.0 E.U./dl E.U./dl Final   • Bilirubin, UA 06/02/2023 Negative  Negative Final   • Occult Blood, UA 06/02/2023 Negative  Negative Final         Gio Sy MD        "This note has been constructed using a voice recognition system. Therefore there may be syntax, spelling, and/or grammatical errors.  Please call if you have any questions. "

## 2023-07-03 NOTE — ASSESSMENT & PLAN NOTE
Patient has some discomfort and pain in the left side in the back yesterday getting better today but history of low back pain for long. Of time.

## 2023-07-03 NOTE — ASSESSMENT & PLAN NOTE
Patient is having difficulty with ambulation now using a cane because of the pain in the right hip area. X-rays have revealed severe osteoarthritis in the right hip. Recommend patient be seen by the orthopedic surgeon for further evaluation. Patient at present time going for physical therapy will continue with the same.

## 2023-07-03 NOTE — ASSESSMENT & PLAN NOTE
Blood pressure today is 136/80 patient does not measure her blood pressures at home again emphasized regarding the same and bring the readings

## 2023-07-03 NOTE — ASSESSMENT & PLAN NOTE
Patient with severe osteoarthritis in the right hip. Complains of increasing pain in the hip and also continues to experience pain in the low back area avoiding nonsteroidals because of the gastric surgery continue the pain medications.

## 2023-07-06 ENCOUNTER — OFFICE VISIT (OUTPATIENT)
Dept: OBGYN CLINIC | Facility: CLINIC | Age: 68
End: 2023-07-06
Payer: COMMERCIAL

## 2023-07-06 VITALS
HEART RATE: 74 BPM | SYSTOLIC BLOOD PRESSURE: 135 MMHG | BODY MASS INDEX: 41 KG/M2 | WEIGHT: 231.4 LBS | DIASTOLIC BLOOD PRESSURE: 73 MMHG | HEIGHT: 63 IN

## 2023-07-06 DIAGNOSIS — M25.551 RIGHT HIP PAIN: ICD-10-CM

## 2023-07-06 DIAGNOSIS — M16.11 ARTHRITIS OF RIGHT HIP: Primary | ICD-10-CM

## 2023-07-06 DIAGNOSIS — M19.91 PRIMARY OSTEOARTHRITIS, UNSPECIFIED SITE: ICD-10-CM

## 2023-07-06 PROCEDURE — 99214 OFFICE O/P EST MOD 30 MIN: CPT | Performed by: PHYSICIAN ASSISTANT

## 2023-07-06 NOTE — PROGRESS NOTES
Assessment/Plan:  1. Arthritis of right hip        2. Right hip pain  Ambulatory Referral to Orthopedic Surgery      3. Primary osteoarthritis, unspecified site  Ambulatory Referral to Orthopedic Surgery          The patient does have severe right hip arthritis. We did discuss continued physical therapy and over-the-counter medications as needed for discomfort. We also discussed possible intra-articular steroid injection versus total hip arthroplasty. The patient would like to think about her options at this point. I did provide her with Dr. Buck Jiang information, should she decide she would like to discuss total hip arthroplasty. The patient is a non-smoker with a BMI of 41. She is aware her BMI will need to be under 40 for this procedure. We discussed weight loss in the office today. Subjective:   Bret Olson is a 76 y.o. female who presents today for evaluation of her right hip. She notes this has been bothering her for years, however seems to be getting progressively worse. She has been doing physical therapy with only mild improvements. She notes pain about the right groin, which is worse with activity and better with rest.  She has difficulty putting on socks and shoes and also has difficulty reaching down to  something off the ground. She notes limitations in range of motion of the hip. She denies any paresthesias of the right upper extremity. Patient has had a bilateral total knee arthroplasties as well as right total shoulder arthroplasty. Review of Systems   Constitutional: Negative. Negative for chills and fever. HENT: Negative. Negative for ear pain and sore throat. Eyes: Negative. Negative for pain and redness. Respiratory: Negative. Negative for shortness of breath and wheezing. Cardiovascular: Negative for chest pain and palpitations. Gastrointestinal: Negative. Negative for abdominal pain and blood in stool. Endocrine: Negative.   Negative for polydipsia and polyuria. Genitourinary: Negative. Negative for difficulty urinating and dysuria. Musculoskeletal:        As noted in HPI   Skin: Negative. Negative for pallor and rash. Neurological: Negative. Negative for dizziness and numbness. Hematological: Negative. Negative for adenopathy. Does not bruise/bleed easily. Psychiatric/Behavioral: Negative. Negative for confusion and suicidal ideas. Past Medical History:   Diagnosis Date   • Anxiety    • Apnea, sleep     patient states "repeated test says no"   • Arthritis     severe in the joints   • Chronic pain disorder     joints   • Depression    • DJD (degenerative joint disease)    • Fall 11/22/2018    tripped on a folded rug- left hamstring injury   • Foot injury     right foot-fx of bone-no repair-has severe arthritis in the foot and ankle   • Fractures yr. 2000 or before    left ankle   • GERD (gastroesophageal reflux disease)    • Hepatitis C not sure   • History of sleep apnea    • Hypercholesteremia    • Hypertension    • Insomnia    • Low back pain    • Obesity     Lost 70lb with gastric sleeve (2011)   • Osteoarthritis    • Stomach disorder     take meds. for this   • Wears glasses    • Wears partial dentures     upper and lower-but did lose the lower        Past Surgical History:   Procedure Laterality Date   • ANKLE ARTHROSCOPY Right    • ANKLE SURGERY  not sure    orthoscopic/rt.  ankle   • APPENDECTOMY     • BARIATRIC SURGERY  maybe 2011   • CHOLECYSTECTOMY     • COLONOSCOPY  10/26/2021   • DXA PROCEDURE (HISTORICAL)  10/13/2020   • GASTRECTOMY SLEEVE LAPAROSCOPIC  2011    lost 70 lb   • HAND SURGERY      rt. hand, trigger finger   • JOINT REPLACEMENT      blateral knee replacements   • KNEE SURGERY  2001 maybe   • MAMMO (HISTORICAL)  01/26/2022   • NERVE BLOCK Bilateral 12/29/2017    Procedure: L4, L5, S1 MEDIAL BRANCH BLOCK #1;  Surgeon: Sulema Remy MD;  Location: [address] [place] [address] [facility] MAIN OR;  Service: Pain Management    • NERVE BLOCK Bilateral 01/12/2018    Procedure: BLOCK MEDIAL BRANCH L4, L5-S1  #2;  Surgeon: Dagmar Sharpe MD;  Location: [facility] MAIN OR;  Service: Pain Management    • MT ARTHROPLASTY GLENOHUMERAL JOINT TOTAL SHOULDER Right 05/23/2019    Procedure: ARTHROPLASTY SHOULDER TOTAL , excision of deep cyst at shoulder, biceps tenotomy;  Surgeon: Danilo Malave MD;  Location: [facility];  Service: Orthopedics   • MT INJECT SI JOINT ARTHRGRPHY&/ANES/STEROID W/LESA Right 10/23/2020    Procedure: Sacroiliac Joint injection ([id number]); Surgeon: Dagmar Sharpe MD;  Location: [facility] MAIN OR;  Service: Pain Management    • MT TENDON SHEATH INCISION Right 12/21/2021    Procedure: RELEASE TRIGGER RING FINGER;  Surgeon: Sergio Sage MD;  Location: [facility];  Service: Orthopedics   • RADIOFREQUENCY ABLATION Right 03/16/2018    Procedure: Rt L4 L5 S1 RFA;  Surgeon: Dagmar Sharpe MD;  Location: [address] [place] [address] MAIN OR;  Service: Pain Management    • RADIOFREQUENCY ABLATION Left 12/14/2018    Procedure: L3-S1 Radio Frequency Ablation ([id number]);   Surgeon: Dagmar Sharpe MD;  Location: [facility] MAIN OR;  Service: Pain Management    • RHIZOTOMY Left 01/25/2018    Procedure: L4, L5, S1 RFA;  Surgeon: Dagmar Sharpe MD;  Location: [facility] MAIN OR;  Service: Pain Management    • SHOULDER SURGERY  April/2019    rt. total  replacement   • TONSILLECTOMY         Family History   Problem Relation Age of Onset   • Other Mother         in a nursing home-fx of the leg   • Heart disease Father         CHF   • Alcohol abuse Father    • Cirrhosis Father    • Colon cancer Maternal Grandmother    • No Known Problems Maternal Grandfather    • No Known Problems Paternal Grandmother    • No Known Problems Paternal Grandfather    • Other Brother         lung abscess, pneumonia   • Colon cancer Maternal Aunt    • No Known Problems Maternal Aunt    • No Known Problems Maternal Aunt    • No Known Problems Maternal Uncle    • No Known Problems Paternal Aunt • No Known Problems Paternal Uncle    • Breast cancer Cousin 48       Social History     Occupational History   • Not on file   Tobacco Use   • Smoking status: Former     Types: Cigarettes     Quit date: 2004     Years since quittin.5   • Smokeless tobacco: Never   Vaping Use   • Vaping Use: Never used   Substance and Sexual Activity   • Alcohol use: Not Currently     Comment: quit many yrs. ago   • Drug use: Not Currently     Types: Amphetamines, Marijuana     Comment: aprox. 35 years ago, maybe longer   • Sexual activity: Not Currently     Partners: Male     Birth control/protection: Abstinence     Comment: no sex for many yrs. (aprox.  20 yrs.)         Current Outpatient Medications:   •  acetaminophen (TYLENOL) 650 mg CR tablet, Take 650 mg by mouth every 8 (eight) hours as needed for mild pain, Disp: , Rfl:   •  amLODIPine (NORVASC) 10 mg tablet, TAKE 1 TABLET BY MOUTH DAILY, Disp: 90 tablet, Rfl: 3  •  ascorbic acid (VITAMIN C) 500 mg tablet, Take 500 mg by mouth every morning, Disp: , Rfl:   •  Coenzyme Q10 (CO Q 10) 100 MG CAPS, Take by mouth every morning Last dose 19, Disp: , Rfl:   •  Cyanocobalamin (VITAMIN B 12 PO), Take 500 mcg by mouth every morning, Disp: , Rfl:   •  diphenhydrAMINE (BENADRYL) 25 mg tablet, Take 25 mg by mouth every 8 (eight) hours as needed for itching , Disp: , Rfl:   •  escitalopram (LEXAPRO) 10 mg tablet, TAKE 1 AND 1/2 TABLETS BY  MOUTH DAILY AT BEDTIME, Disp: 135 tablet, Rfl: 1  •  Glucosamine-Chondroitin (MOVE FREE PO), Take 2 tablets by mouth every morning, Disp: , Rfl:   •  Melatonin 10 MG TABS, Take by mouth daily at bedtime, Disp: , Rfl:   •  omeprazole (PriLOSEC) 20 mg delayed release capsule, TAKE 1 CAPSULE BY MOUTH IN THE  MORNING, Disp: 90 capsule, Rfl: 1  •  oxyCODONE-acetaminophen (Percocet) 5-325 mg per tablet, Take 1 tablet by mouth every 4 (four) hours as needed for moderate pain Max Daily Amount: 6 tablets, Disp: 150 tablet, Rfl: 0  •  perindopril (ACEON) 8 MG tablet, 8 mg 2 (two) times a day, Disp: , Rfl:   •  triamcinolone (KENALOG) 0.5 % cream, Apply topically 3 (three) times a day, Disp: 30 g, Rfl: 0  •  zinc gluconate 50 mg tablet, Take 50 mg by mouth daily, Disp: , Rfl:     Allergies   Allergen Reactions   • Penicillins Anaphylaxis     Total body redness and fever   • Levaquin [Levofloxacin] Itching       Objective:  Vitals:    07/06/23 0942   BP: 135/73   Pulse: 74            Right Hip Exam     Tenderness   The patient is experiencing no tenderness. Range of Motion   Flexion: 90   External rotation: 20   Internal rotation: 0     Other   Erythema: absent  Sensation: normal  Pulse: present            Physical Exam  Constitutional:       General: She is not in acute distress. Appearance: She is well-developed. HENT:      Head: Normocephalic and atraumatic. Eyes:      General: No scleral icterus. Conjunctiva/sclera: Conjunctivae normal.   Neck:      Vascular: No JVD. Cardiovascular:      Rate and Rhythm: Normal rate. Pulmonary:      Effort: Pulmonary effort is normal. No respiratory distress. Skin:     General: Skin is warm. Neurological:      Mental Status: She is alert and oriented to person, place, and time. Coordination: Coordination normal.         I have personally reviewed pertinent films in PACS and my interpretation is as follows:  X-rays right hip from 6/16/2023: Severe right hip arthritis      This document was created using speech voice recognition software. Grammatical errors, random word insertions, pronoun errors, and incomplete sentences are an occasional consequence of this system due to software limitations, ambient noise, and hardware issues. Any formal questions or concerns about content, text, or information contained within the body of this dictation should be directly addressed to the provider for clarification.

## 2023-07-11 ENCOUNTER — APPOINTMENT (OUTPATIENT)
Dept: PHYSICAL THERAPY | Facility: CLINIC | Age: 68
End: 2023-07-11
Payer: COMMERCIAL

## 2023-08-01 ENCOUNTER — OFFICE VISIT (OUTPATIENT)
Dept: FAMILY MEDICINE CLINIC | Facility: CLINIC | Age: 68
End: 2023-08-01
Payer: COMMERCIAL

## 2023-08-01 VITALS
WEIGHT: 229 LBS | SYSTOLIC BLOOD PRESSURE: 130 MMHG | HEIGHT: 63 IN | HEART RATE: 97 BPM | OXYGEN SATURATION: 98 % | BODY MASS INDEX: 40.57 KG/M2 | DIASTOLIC BLOOD PRESSURE: 80 MMHG

## 2023-08-01 DIAGNOSIS — F11.20 CONTINUOUS OPIOID DEPENDENCE (HCC): ICD-10-CM

## 2023-08-01 DIAGNOSIS — I10 PRIMARY HYPERTENSION: ICD-10-CM

## 2023-08-01 DIAGNOSIS — E83.52 HYPERCALCEMIA: ICD-10-CM

## 2023-08-01 DIAGNOSIS — E66.01 CLASS 3 SEVERE OBESITY DUE TO EXCESS CALORIES WITHOUT SERIOUS COMORBIDITY WITH BODY MASS INDEX (BMI) OF 40.0 TO 44.9 IN ADULT (HCC): ICD-10-CM

## 2023-08-01 DIAGNOSIS — F41.9 ANXIETY: ICD-10-CM

## 2023-08-01 DIAGNOSIS — G89.4 CHRONIC PAIN SYNDROME: ICD-10-CM

## 2023-08-01 DIAGNOSIS — M25.551 RIGHT HIP PAIN: ICD-10-CM

## 2023-08-01 DIAGNOSIS — E78.5 DYSLIPIDEMIA: ICD-10-CM

## 2023-08-01 DIAGNOSIS — M19.91 PRIMARY OSTEOARTHRITIS, UNSPECIFIED SITE: Primary | ICD-10-CM

## 2023-08-01 DIAGNOSIS — Z98.84 H/O BARIATRIC SURGERY: ICD-10-CM

## 2023-08-01 DIAGNOSIS — K21.9 GASTROESOPHAGEAL REFLUX DISEASE WITHOUT ESOPHAGITIS: ICD-10-CM

## 2023-08-01 PROCEDURE — 99213 OFFICE O/P EST LOW 20 MIN: CPT | Performed by: INTERNAL MEDICINE

## 2023-08-01 RX ORDER — OXYCODONE AND ACETAMINOPHEN 7.5; 325 MG/1; MG/1
1 TABLET ORAL EVERY 4 HOURS PRN
Qty: 120 TABLET | Refills: 0 | Status: SHIPPED | OUTPATIENT
Start: 2023-08-01

## 2023-08-01 RX ORDER — OXYCODONE AND ACETAMINOPHEN 7.5; 325 MG/1; MG/1
1 TABLET ORAL EVERY 4 HOURS PRN
Qty: 30 TABLET | Refills: 0 | Status: SHIPPED | OUTPATIENT
Start: 2023-08-01 | End: 2023-08-01 | Stop reason: SDUPTHER

## 2023-08-01 RX ORDER — OXYCODONE AND ACETAMINOPHEN 7.5; 325 MG/1; MG/1
1 TABLET ORAL EVERY 4 HOURS PRN
Qty: 120 TABLET | Refills: 0 | Status: SHIPPED | OUTPATIENT
Start: 2023-08-01 | End: 2023-08-01 | Stop reason: CLARIF

## 2023-08-01 NOTE — ASSESSMENT & PLAN NOTE
Blood pressure today is 130/80 patient is currently taking perindopril 8 mg 2 times a day we will continue

## 2023-08-01 NOTE — ASSESSMENT & PLAN NOTE
Patient's calcium level was coming up slightly high did not go for the ionized calcium PTH intact and CMP given the prescription for the same again to get the blood test done.

## 2023-08-01 NOTE — ASSESSMENT & PLAN NOTE
Today's BMI is 40.57 she needs to lose some weight if they are presently do the surgery for the right hip. Patient is having difficulty with ambulation.

## 2023-08-01 NOTE — ASSESSMENT & PLAN NOTE
Right hip pain using cane during ambulation.   Patient seen by the orthopedic office going to be seen by the orthopedic surgeon next visit as he might need replacement physical therapy had helped to some extent

## 2023-08-01 NOTE — ASSESSMENT & PLAN NOTE
Not able to get the nonsteroidals because of the gastric surgery continue with the pain medication oxycodone

## 2023-08-01 NOTE — PROGRESS NOTES
Office Visit Note  23     Hadley Neal 76 y.o. female MRN: [id number]  : 1955    Assessment:     1. Primary osteoarthritis, unspecified site  Assessment & Plan:  Pain in multiple joints however the hip pain is bothering her more on the right side she would eventually require hip replacement      2. Anxiety  Assessment & Plan:  Patient is on Lexapro 10 mg daily we will continue with the same      3. Chronic pain syndrome  Assessment & Plan:  Patient with chronic pain syndrome on 7.5 mg oxycodone 4 times a day. Earlier he did not have the strength so had few more tablets of the 5 mg. 4. Class 3 severe obesity due to excess calories without serious comorbidity with body mass index (BMI) of 40.0 to 44.9 in adult [facility])  Assessment & Plan: Today's BMI is 40.57 she needs to lose some weight if they are presently do the surgery for the right hip. Patient is having difficulty with ambulation. 5. Continuous opioid dependence ([address])  Assessment & Plan:  Not able to get the nonsteroidals because of the gastric surgery continue with the pain medication oxycodone      6. Dyslipidemia  Assessment & Plan:  Continue with diet exercise lose weight currently not on any medication for the cholesterol      7. Gastroesophageal reflux disease without esophagitis  Assessment & Plan:  Patient on omeprazole 20 mg we will continue      8. H/O bariatric surgery  Assessment & Plan:  Status post gastric sleeve surgery stable      9. Hypercalcemia  Assessment & Plan:  Patient's calcium level was coming up slightly high did not go for the ionized calcium PTH intact and CMP given the prescription for the same again to get the blood test done. 10. Primary hypertension  Assessment & Plan:  Blood pressure today is 130/80 patient is currently taking perindopril 8 mg 2 times a day we will continue      11. Right hip pain  Assessment & Plan:  Right hip pain using cane during ambulation.   Patient seen by the orthopedic office going to be seen by the orthopedic surgeon next visit as he might need replacement physical therapy had helped to some extent               Discussion Summary and Plan: Today's care plan and medications were reviewed with patient in detail and all their questions answered to their satisfaction. Chief Complaint   Patient presents with   • Follow-up      Subjective:  Patient is coming here for a follow-up evaluation with regards to her symptoms of right hip pain which is progressively increasing. She was seen by the orthopedic surgeon who recommended evaluation for hip replacement she already had knee replacement both sides in the past.  History of low back pain also being followed by the pain management and also we are giving her the pain medication oxycodone. Medications reviewed labs reviewed patient did not go for the lab work yet for the [facility] going today. The following portions of the patient's history were reviewed and updated as appropriate: allergies, current medications, past family history, past medical history, past social history, past surgical history and problem list.    Review of Systems   Constitutional: Negative for chills and fever. HENT: Negative for ear pain and sore throat. Eyes: Negative for pain and visual disturbance. Respiratory: Negative for cough and shortness of breath. Cardiovascular: Negative for chest pain and palpitations. Gastrointestinal: Negative for abdominal pain and vomiting. Genitourinary: Negative for dysuria and hematuria. Musculoskeletal: Positive for arthralgias and back pain. Skin: Negative for color change and rash. Neurological: Negative for seizures and syncope. All other systems reviewed and are negative.         Historical Information   Patient Active Problem List   Diagnosis   • Chronic pain syndrome   • Low back pain   • Class 3 severe obesity in adult [facility])   • H/O bariatric surgery   • Hypertension   • Gastroesophageal reflux disease   • Continuous opioid dependence ([address])   • Primary osteoarthritis   • Anxiety   • Hypercalcemia   • Dyslipidemia   • Dermatitis   • Right hip pain     Past Medical History:   Diagnosis Date   • Anxiety    • Apnea, sleep     patient states "repeated test says no"   • Arthritis     severe in the joints   • Chronic pain disorder     joints   • Depression    • DJD (degenerative joint disease)    • Fall 11/22/2018    tripped on a folded rug- left hamstring injury   • Foot injury     right foot-fx of bone-no repair-has severe arthritis in the foot and ankle   • Fractures yr. 2000 or before    left ankle   • GERD (gastroesophageal reflux disease)    • Hepatitis C not sure   • History of sleep apnea    • Hypercholesteremia    • Hypertension    • Insomnia    • Low back pain    • Obesity     Lost 70lb with gastric sleeve (2011)   • Osteoarthritis    • Stomach disorder     take meds. for this   • Wears glasses    • Wears partial dentures     upper and lower-but did lose the lower      Past Surgical History:   Procedure Laterality Date   • ANKLE ARTHROSCOPY Right    • ANKLE SURGERY  not sure    orthoscopic/rt.  ankle   • APPENDECTOMY     • BARIATRIC SURGERY  maybe 2011   • CHOLECYSTECTOMY     • COLONOSCOPY  10/26/2021   • DXA PROCEDURE (HISTORICAL)  10/13/2020   • GASTRECTOMY SLEEVE LAPAROSCOPIC  2011    lost 70 lb   • HAND SURGERY      rt. hand, trigger finger   • JOINT REPLACEMENT      blateral knee replacements   • KNEE SURGERY  2001 maybe   • MAMMO (HISTORICAL)  01/26/2022   • NERVE BLOCK Bilateral 12/29/2017    Procedure: L4, L5, S1 MEDIAL BRANCH BLOCK #1;  Surgeon: Kassy Ivy MD;  Location: [address] [place] [address] [employer] MAIN OR;  Service: Pain Management    • NERVE BLOCK Bilateral 01/12/2018    Procedure: BLOCK MEDIAL BRANCH L4, L5-S1  #2;  Surgeon: Kassy Ivy MD;  Location: [address] [place] [address] [employer] MAIN OR;  Service: Pain Management    • WI ARTHROPLASTY GLENOHUMERAL JOINT TOTAL SHOULDER Right 05/23/2019    Procedure: ARTHROPLASTY SHOULDER TOTAL , excision of deep cyst at shoulder, biceps tenotomy;  Surgeon: Beatris Cogan, MD;  Location: [facility];  Service: Orthopedics   • MA INJECT SI JOINT ARTHRGRPHY&/ANES/STEROID W/LESA Right 10/23/2020    Procedure: Sacroiliac Joint injection (33509); Surgeon: Ian Almaguer MD;  Location: [facility] OR;  Service: Pain Management    • MA TENDON SHEATH INCISION Right 2021    Procedure: RELEASE TRIGGER RING FINGER;  Surgeon: Alma Hodgson MD;  Location: [facility];  Service: Orthopedics   • RADIOFREQUENCY ABLATION Right 2018    Procedure: Rt L4 L5 S1 RFA;  Surgeon: Ian Almaguer MD;  Location: [address] [place] [address] [employer] MAIN OR;  Service: Pain Management    • RADIOFREQUENCY ABLATION Left 2018    Procedure: L3-S1 Radio Frequency Ablation ([id number]); Surgeon: Ian Almaguer MD;  Location: [facility] OR;  Service: Pain Management    • RHIZOTOMY Left 2018    Procedure: L4, L5, S1 RFA;  Surgeon: Ian Almaguer MD;  Location: [facility] OR;  Service: Pain Management    • SHOULDER SURGERY  2019    rt. total  replacement   • TONSILLECTOMY       Social History     Substance and Sexual Activity   Alcohol Use Not Currently    Comment: quit many yrs. ago     Social History     Substance and Sexual Activity   Drug Use Not Currently   • Types: Amphetamines, Marijuana    Comment: aprox.  35 years ago, maybe longer     Social History     Tobacco Use   Smoking Status Former   • Types: Cigarettes   • Quit date: 2004   • Years since quittin.5   Smokeless Tobacco Never     Family History   Problem Relation Age of Onset   • Other Mother         in a nursing home-fx of the leg   • Heart disease Father         CHF   • Alcohol abuse Father    • Cirrhosis Father    • Colon cancer Maternal Grandmother    • No Known Problems Maternal Grandfather    • No Known Problems Paternal Grandmother    • No Known Problems Paternal Grandfather    • Other Brother         lung abscess, pneumonia   • Colon cancer Maternal Aunt    • No Known Problems Maternal Aunt    • No Known Problems Maternal Aunt    • No Known Problems Maternal Uncle    • No Known Problems Paternal Aunt    • No Known Problems Paternal Uncle    • Breast cancer Cousin 48     Health Maintenance Due   Topic   • Hepatitis C Screening    • Medicare Annual Wellness Visit (AWV)    • Pneumococcal Vaccine: 65+ Years (1 - PCV)   • COVID-19 Vaccine (5 - Moderna series)   • PT PLAN OF CARE    • Influenza Vaccine (1)      Meds/Allergies       Current Outpatient Medications:   •  acetaminophen (TYLENOL) 650 mg CR tablet, Take 650 mg by mouth every 8 (eight) hours as needed for mild pain, Disp: , Rfl:   •  amLODIPine (NORVASC) 10 mg tablet, TAKE 1 TABLET BY MOUTH DAILY, Disp: 90 tablet, Rfl: 3  •  ascorbic acid (VITAMIN C) 500 mg tablet, Take 500 mg by mouth every morning, Disp: , Rfl:   •  Coenzyme Q10 (CO Q 10) 100 MG CAPS, Take by mouth every morning Last dose 5/13/19, Disp: , Rfl:   •  Cyanocobalamin (VITAMIN B 12 PO), Take 500 mcg by mouth every morning, Disp: , Rfl:   •  diphenhydrAMINE (BENADRYL) 25 mg tablet, Take 25 mg by mouth every 8 (eight) hours as needed for itching , Disp: , Rfl:   •  escitalopram (LEXAPRO) 10 mg tablet, TAKE 1 AND 1/2 TABLETS BY  MOUTH DAILY AT BEDTIME, Disp: 135 tablet, Rfl: 1  •  Glucosamine-Chondroitin (MOVE FREE PO), Take 2 tablets by mouth every morning, Disp: , Rfl:   •  Melatonin 10 MG TABS, Take by mouth daily at bedtime, Disp: , Rfl:   •  omeprazole (PriLOSEC) 20 mg delayed release capsule, TAKE 1 CAPSULE BY MOUTH IN THE  MORNING, Disp: 90 capsule, Rfl: 1  •  oxyCODONE-acetaminophen (Percocet) 5-325 mg per tablet, Take 1 tablet by mouth every 4 (four) hours as needed for moderate pain Max Daily Amount: 6 tablets, Disp: 150 tablet, Rfl: 0  •  perindopril (ACEON) 8 MG tablet, 8 mg 2 (two) times a day, Disp: , Rfl:   •  triamcinolone (KENALOG) 0.5 % cream, Apply topically 3 (three) times a day, Disp: 30 g, Rfl: 0  •  zinc gluconate 50 mg tablet, Take 50 mg by mouth daily, Disp: , Rfl:       Objective:    Vitals:   /80 (BP Location: Right arm, Patient Position: Sitting, Cuff Size: Large)   Pulse 97   Ht 5' 3" (1.6 m)   Wt 104 kg (229 lb)   SpO2 98%   BMI 40.57 kg/m²   Body mass index is 40.57 kg/m². Vitals:    08/01/23 0959   Weight: 104 kg (229 lb)       Physical Exam  Vitals and nursing note reviewed. Constitutional:       Appearance: Normal appearance. Cardiovascular:      Rate and Rhythm: Normal rate and regular rhythm. Heart sounds: Normal heart sounds. Pulmonary:      Effort: Pulmonary effort is normal.      Breath sounds: Normal breath sounds. Musculoskeletal:      Cervical back: Normal range of motion and neck supple. Right lower leg: No edema. Left lower leg: No edema. Comments: Movements of the right hip causing discomfort and pain   Neurological:      Mental Status: She is alert.          Lab Review   Appointment on 06/02/2023   Component Date Value Ref Range Status   • WBC 06/02/2023 7.36  4.31 - 10.16 Thousand/uL Final   • RBC 06/02/2023 4.89  3.81 - 5.12 Million/uL Final   • Hemoglobin 06/02/2023 13.9  11.5 - 15.4 g/dL Final   • Hematocrit 06/02/2023 43.2  34.8 - 46.1 % Final   • MCV 06/02/2023 88  82 - 98 fL Final   • MCH 06/02/2023 28.4  26.8 - 34.3 pg Final   • MCHC 06/02/2023 32.2  31.4 - 37.4 g/dL Final   • RDW 06/02/2023 13.6  11.6 - 15.1 % Final   • MPV 06/02/2023 10.6  8.9 - 12.7 fL Final   • Platelets 78/95/9709 325  149 - 390 Thousands/uL Final   • nRBC 06/02/2023 0  /100 WBCs Final   • Neutrophils Relative 06/02/2023 59  43 - 75 % Final   • Immat GRANS % 06/02/2023 0  0 - 2 % Final   • Lymphocytes Relative 06/02/2023 32  14 - 44 % Final   • Monocytes Relative 06/02/2023 7  4 - 12 % Final   • Eosinophils Relative 06/02/2023 2  0 - 6 % Final   • Basophils Relative 06/02/2023 0  0 - 1 % Final   • Neutrophils Absolute 06/02/2023 4.27  1.85 - 7.62 Thousands/µL Final   • Immature Grans Absolute 06/02/2023 0.03  0.00 - 0.20 Thousand/uL Final   • Lymphocytes Absolute 06/02/2023 2.35  0.60 - 4.47 Thousands/µL Final   • Monocytes Absolute 06/02/2023 0.53  0.17 - 1.22 Thousand/µL Final   • Eosinophils Absolute 06/02/2023 0.15  0.00 - 0.61 Thousand/µL Final   • Basophils Absolute 06/02/2023 0.03  0.00 - 0.10 Thousands/µL Final   • Sodium 06/02/2023 143  135 - 147 mmol/L Final   • Potassium 06/02/2023 4.4  3.5 - 5.3 mmol/L Final   • Chloride 06/02/2023 100  96 - 108 mmol/L Final   • CO2 06/02/2023 30  21 - 32 mmol/L Final   • ANION GAP 06/02/2023 13  4 - 13 mmol/L Final   • BUN 06/02/2023 19  5 - 25 mg/dL Final   • Creatinine 06/02/2023 0.48 (L)  0.60 - 1.30 mg/dL Final    Standardized to IDMS reference method   • Glucose, Fasting 06/02/2023 99  65 - 99 mg/dL Final   • Calcium 06/02/2023 10.4 (H)  8.4 - 10.2 mg/dL Final   • AST 06/02/2023 23  13 - 39 U/L Final   • ALT 06/02/2023 15  7 - 52 U/L Final    Specimen collection should occur prior to Sulfasalazine administration due to the potential for falsely depressed results. • Alkaline Phosphatase 06/02/2023 74  34 - 104 U/L Final   • Total Protein 06/02/2023 7.3  6.4 - 8.4 g/dL Final   • Albumin 06/02/2023 4.3  3.5 - 5.0 g/dL Final   • Total Bilirubin 06/02/2023 0.49  0.20 - 1.00 mg/dL Final    Use of this assay is not recommended for patients undergoing treatment with eltrombopag due to the potential for falsely elevated results. N-acetyl-p-benzoquinone imine (metabolite of Acetaminophen) will generate erroneously low results in samples for patients that have taken an overdose of Acetaminophen. • eGFR 06/02/2023 101  ml/min/1.73sq m Final   • Hemoglobin A1C 06/02/2023 5.5  Normal 3.8-5.6%; PreDiabetic 5.7-6.4%;  Diabetic >=6.5%; Glycemic control for adults with diabetes <7.0% % Final   • EAG 06/02/2023 111  mg/dl Final   • Cholesterol 06/02/2023 207 (H)  See Comment mg/dL Final    Cholesterol:         Pediatric <18 Years        Desirable <170 mg/dL      Borderline High    170-199 mg/dL      High               >=200 mg/dL        Adult >=18 Years            Desirable         <200 mg/dL      Borderline High   200-239 mg/dL      High              >239 mg/dL     • Triglycerides 06/02/2023 95  See Comment mg/dL Final    Triglyceride:     0-9Y            <75mg/dL     10Y-17Y         <90 mg/dL       >=18Y     Normal          <150 mg/dL     Borderline High 150-199 mg/dL     High            200-499 mg/dL        Very High       >499 mg/dL    Specimen collection should occur prior to Metamizole administration due to the potential for falsely depressed results. • HDL, Direct 06/02/2023 77  >=50 mg/dL Final   • LDL Calculated 06/02/2023 111 (H)  0 - 100 mg/dL Final    LDL Cholesterol:     Optimal           <100 mg/dl     Near Optimal      100-129 mg/dl     Above Optimal       Borderline High 130-159 mg/dl       High            160-189 mg/dl       Very High       >189 mg/dl         This screening LDL is a calculated result. It does not have the accuracy of the Direct Measured LDL in the monitoring of patients with hyperlipidemia and/or statin therapy. Direct Measure LDL (KLD917) must be ordered separately in these patients. • Non-HDL-Chol (CHOL-HDL) 06/02/2023 130  mg/dl Final   • TSH 3RD GENERATON 06/02/2023 2.169  0.450 - 4.500 uIU/mL Final    The recommended reference ranges for TSH during pregnancy are as follows:   First trimester 0.1 to 2.5 uIU/mL   Second trimester  0.2 to 3.0 uIU/mL   Third trimester 0.3 to 3.0 uIU/m    Note: Normal ranges may not apply to patients who are transgender, non-binary, or whose legal sex, sex at birth, and gender identity differ. Adult TSH (3rd generation) reference range follows the recommended guidelines of the [employer], January, 2020.    • Color, UA 06/02/2023 Yellow   Final   • Clarity, UA 06/02/2023 Clear   Final   • Specific Gravity, UA 06/02/2023 1.015  1.000 - 1.030 Final   • pH, UA 06/02/2023 7.0  5.0, 5.5, 6.0, 6.5, 7.0, 7.5, 8.0, 8.5, 9.0 Final   • Leukocytes, UA 06/02/2023 Negative  Negative Final   • Nitrite, UA 06/02/2023 Negative  Negative Final   • Protein, UA 06/02/2023 Negative  Negative mg/dl Final   • Glucose, UA 06/02/2023 Negative  Negative mg/dl Final   • Ketones, UA 06/02/2023 Negative  Negative mg/dl Final   • Urobilinogen, UA 06/02/2023 0.2  0.2, 1.0 E.U./dl E.U./dl Final   • Bilirubin, UA 06/02/2023 Negative  Negative Final   • Occult Blood, UA 06/02/2023 Negative  Negative Final         Chey Sy MD        "This note has been constructed using a voice recognition system. Therefore there may be syntax, spelling, and/or grammatical errors.  Please call if you have any questions. "

## 2023-08-01 NOTE — ASSESSMENT & PLAN NOTE
Pain in multiple joints however the hip pain is bothering her more on the right side she would eventually require hip replacement

## 2023-08-01 NOTE — ASSESSMENT & PLAN NOTE
Patient with chronic pain syndrome on 7.5 mg oxycodone 4 times a day. Earlier he did not have the strength so had few more tablets of the 5 mg.

## 2023-08-02 DIAGNOSIS — I10 PRIMARY HYPERTENSION: Primary | ICD-10-CM

## 2023-08-03 RX ORDER — PERINDOPRIL ERBUMINE 8 MG/1
8 TABLET ORAL 2 TIMES DAILY
Qty: 180 TABLET | Refills: 3 | Status: SHIPPED | OUTPATIENT
Start: 2023-08-03

## 2023-08-09 ENCOUNTER — OFFICE VISIT (OUTPATIENT)
Dept: OBGYN CLINIC | Facility: CLINIC | Age: 68
End: 2023-08-09
Payer: COMMERCIAL

## 2023-08-09 VITALS
HEART RATE: 82 BPM | BODY MASS INDEX: 41.81 KG/M2 | HEIGHT: 62 IN | SYSTOLIC BLOOD PRESSURE: 158 MMHG | WEIGHT: 227.2 LBS | DIASTOLIC BLOOD PRESSURE: 78 MMHG

## 2023-08-09 DIAGNOSIS — M25.551 RIGHT HIP PAIN: ICD-10-CM

## 2023-08-09 DIAGNOSIS — M16.11 PRIMARY OSTEOARTHRITIS OF RIGHT HIP: Primary | ICD-10-CM

## 2023-08-09 DIAGNOSIS — Z71.3 ENCOUNTER FOR WEIGHT LOSS COUNSELING: ICD-10-CM

## 2023-08-09 PROCEDURE — 99214 OFFICE O/P EST MOD 30 MIN: CPT | Performed by: ORTHOPAEDIC SURGERY

## 2023-08-09 NOTE — PROGRESS NOTES
Assessment/Plan:  1. Primary osteoarthritis of right hip        2. Right hip pain        3. BMI 40.0-44.9, adult ([address])        4. Encounter for weight loss counseling          Aguilar Bradley is a pleasant 77 yo F here for right hip pain. She has been having right hip pain for the last 8 months. This has responded minimally to PT and Percocet. She is not interested in having an intra-articular steroid injection for the right hip. She has evidence of advanced right hip osteoarthritis. We discussed that for her to be a surgical candidate for a right hip TAHIRA, she would need to lose about 10 lbs and see us again in clinic. She is motivated to lose the weight on her own and declined referral to Weight Management. We will see her back when she has been able to lose the weight, or sooner if she decides she is interested in an injection. Subjective: 77 yo F with 3 year history of right hip and groin pain here for evaluation. Patient ID: Liberty Betancourt is a 76 y.o. female who presents with right hip and groin pain. Patient has history of right shoulder arthroplasty with Dr. Wisdom Officer and history of bilateral TKAs performed on the same day about 14 years ago by Dr. Trista Welch. She states that she has had groin pain for about three years now and has tried PT and lumbar steroid injections without relief. She feels that this is associated with right hip pain that she developed about 8 months ago. Patient denies any inciting traumatic events. She is managing the pain with Percocet. However, she is interested in more definitive management and is interested in possibly having a total hip replacement for her right hip. Review of Systems   Constitutional: Positive for activity change. HENT: Negative. Eyes: Negative. Respiratory: Negative. Cardiovascular: Negative. Gastrointestinal: Negative. Musculoskeletal: Positive for arthralgias and myalgias. Skin: Negative. Psychiatric/Behavioral: Negative.     All other systems reviewed and are negative. Past Medical History:   Diagnosis Date   • Anxiety    • Apnea, sleep     patient states "repeated test says no"   • Arthritis     severe in the joints   • Chronic pain disorder     joints   • Depression    • DJD (degenerative joint disease)    • Fall 11/22/2018    tripped on a folded rug- left hamstring injury   • Foot injury     right foot-fx of bone-no repair-has severe arthritis in the foot and ankle   • Fractures yr. 2000 or before    left ankle   • GERD (gastroesophageal reflux disease)    • Hepatitis C not sure   • History of sleep apnea    • Hypercholesteremia    • Hypertension    • Insomnia    • Low back pain    • Obesity     Lost 70lb with gastric sleeve (2011)   • Osteoarthritis    • Stomach disorder     take meds. for this   • Wears glasses    • Wears partial dentures     upper and lower-but did lose the lower        Past Surgical History:   Procedure Laterality Date   • ANKLE ARTHROSCOPY Right    • ANKLE SURGERY  not sure    orthoscopic/rt.  ankle   • APPENDECTOMY     • BARIATRIC SURGERY  maybe 2011   • CHOLECYSTECTOMY     • COLONOSCOPY  10/26/2021   • DXA PROCEDURE (HISTORICAL)  10/13/2020   • GASTRECTOMY SLEEVE LAPAROSCOPIC  2011    lost 70 lb   • HAND SURGERY      rt. hand, trigger finger   • JOINT REPLACEMENT      blateral knee replacements   • KNEE SURGERY  2001 maybe   • MAMMO (HISTORICAL)  01/26/2022   • NERVE BLOCK Bilateral 12/29/2017    Procedure: L4, L5, S1 MEDIAL BRANCH BLOCK #1;  Surgeon: Michelle Castillo MD;  Location: [address] [place] [address] MAIN OR;  Service: Pain Management    • NERVE BLOCK Bilateral 01/12/2018    Procedure: BLOCK MEDIAL BRANCH L4, L5-S1  #2;  Surgeon: Michelle Castillo MD;  Location: [address] [place] [address] MAIN OR;  Service: Pain Management    • OR ARTHROPLASTY GLENOHUMERAL JOINT TOTAL SHOULDER Right 05/23/2019    Procedure: ARTHROPLASTY SHOULDER TOTAL , excision of deep cyst at shoulder, biceps tenotomy;  Surgeon: Ariela Knight MD;  Location: [facility] Service: Orthopedics   • KS INJECT SI JOINT ARTHRGRPHY&/ANES/STEROID W/LESA Right 10/23/2020    Procedure: Sacroiliac Joint injection (61420); Surgeon: Sulema Remy MD;  Location: [facility] MAIN OR;  Service: Pain Management    • KS TENDON SHEATH INCISION Right 2021    Procedure: RELEASE TRIGGER RING FINGER;  Surgeon: Jose Luis Castañeda MD;  Location: [facility];  Service: Orthopedics   • RADIOFREQUENCY ABLATION Right 2018    Procedure: Rt L4 L5 S1 RFA;  Surgeon: Sulema Remy MD;  Location: [address] [place] [address] [facility] MAIN OR;  Service: Pain Management    • RADIOFREQUENCY ABLATION Left 2018    Procedure: L3-S1 Radio Frequency Ablation ([id number]);   Surgeon: Sulema Remy MD;  Location: [facility] OR;  Service: Pain Management    • RHIZOTOMY Left 2018    Procedure: L4, L5, S1 RFA;  Surgeon: Sulema Remy MD;  Location: [facility] OR;  Service: Pain Management    • SHOULDER SURGERY  2019    rt. total  replacement   • TONSILLECTOMY         Family History   Problem Relation Age of Onset   • Other Mother         in a nursing home-fx of the leg   • Heart disease Father         CHF   • Alcohol abuse Father    • Cirrhosis Father    • Colon cancer Maternal Grandmother    • No Known Problems Maternal Grandfather    • No Known Problems Paternal Grandmother    • No Known Problems Paternal Grandfather    • Other Brother         lung abscess, pneumonia   • Colon cancer Maternal Aunt    • No Known Problems Maternal Aunt    • No Known Problems Maternal Aunt    • No Known Problems Maternal Uncle    • No Known Problems Paternal Aunt    • No Known Problems Paternal Uncle    • Breast cancer Cousin 48       Social History     Occupational History   • Not on file   Tobacco Use   • Smoking status: Former     Types: Cigarettes     Quit date: 2004     Years since quittin.6   • Smokeless tobacco: Never   Vaping Use   • Vaping Use: Never used   Substance and Sexual Activity   • Alcohol use: Not Currently Comment: quit many yrs. ago   • Drug use: Not Currently     Types: Amphetamines, Marijuana     Comment: aprox. 35 years ago, maybe longer   • Sexual activity: Not Currently     Partners: Male     Birth control/protection: Abstinence     Comment: no sex for many yrs. (aprox.  20 yrs.)         Current Outpatient Medications:   •  acetaminophen (TYLENOL) 650 mg CR tablet, Take 650 mg by mouth every 8 (eight) hours as needed for mild pain, Disp: , Rfl:   •  amLODIPine (NORVASC) 10 mg tablet, TAKE 1 TABLET BY MOUTH DAILY, Disp: 90 tablet, Rfl: 3  •  ascorbic acid (VITAMIN C) 500 mg tablet, Take 500 mg by mouth every morning, Disp: , Rfl:   •  Coenzyme Q10 (CO Q 10) 100 MG CAPS, Take by mouth every morning Last dose 5/13/19, Disp: , Rfl:   •  Cyanocobalamin (VITAMIN B 12 PO), Take 500 mcg by mouth every morning, Disp: , Rfl:   •  diphenhydrAMINE (BENADRYL) 25 mg tablet, Take 25 mg by mouth every 8 (eight) hours as needed for itching , Disp: , Rfl:   •  escitalopram (LEXAPRO) 10 mg tablet, TAKE 1 AND 1/2 TABLETS BY  MOUTH DAILY AT BEDTIME, Disp: 135 tablet, Rfl: 1  •  Glucosamine-Chondroitin (MOVE FREE PO), Take 2 tablets by mouth every morning, Disp: , Rfl:   •  Melatonin 10 MG TABS, Take by mouth daily at bedtime, Disp: , Rfl:   •  omeprazole (PriLOSEC) 20 mg delayed release capsule, TAKE 1 CAPSULE BY MOUTH IN THE  MORNING, Disp: 90 capsule, Rfl: 1  •  oxyCODONE-acetaminophen (Percocet) 7.5-325 MG per tablet, Take 1 tablet by mouth every 4 (four) hours as needed for moderate pain Max Daily Amount: 6 tablets, Disp: 120 tablet, Rfl: 0  •  perindopril (ACEON) 8 MG tablet, TAKE 1 TABLET BY MOUTH  TWICE DAILY, Disp: 180 tablet, Rfl: 3  •  triamcinolone (KENALOG) 0.5 % cream, Apply topically 3 (three) times a day, Disp: 30 g, Rfl: 0  •  zinc gluconate 50 mg tablet, Take 50 mg by mouth daily, Disp: , Rfl:     Allergies   Allergen Reactions   • Penicillins Anaphylaxis     Total body redness and fever   • Levaquin [Levofloxacin] Itching       Objective:  Vitals:    08/09/23 1310   BP: 158/78   Pulse: 82       Body mass index is 41.56 kg/m². Right Hip Exam     Tenderness   The patient is experiencing tenderness in the anterior and lateral.    Range of Motion   Abduction: 40   Adduction: 10   Extension: 0   Flexion: 100   External rotation: 30   Internal rotation: 10     Muscle Strength   Abduction: 5/5   Adduction: 5/5   Flexion: 5/5     Tests   ABDOULAYE: positive    Other   Erythema: absent  Scars: absent  Sensation: normal  Pulse: present    Comments:  Large overhanging pannus            Physical Exam  Vitals and nursing note reviewed. Constitutional:       Appearance: Normal appearance. HENT:      Head: Normocephalic and atraumatic. Right Ear: External ear normal.      Left Ear: External ear normal.   Eyes:      Extraocular Movements: Extraocular movements intact. Conjunctiva/sclera: Conjunctivae normal.   Cardiovascular:      Rate and Rhythm: Normal rate. Pulses: Normal pulses. Pulmonary:      Effort: Pulmonary effort is normal.   Abdominal:      General: Abdomen is flat. Palpations: Abdomen is soft. Musculoskeletal:      Cervical back: Normal range of motion and neck supple. Comments: See ortho exam   Skin:     General: Skin is warm and dry. Capillary Refill: Capillary refill takes less than 2 seconds. Neurological:      General: No focal deficit present. Mental Status: She is alert and oriented to person, place, and time. Psychiatric:         Mood and Affect: Mood normal.         Behavior: Behavior normal.          I have personally reviewed pertinent films in PACS. XR right hip demonstrates severe osteoarthritis of bilateral hips right worse than left. This document was created using speech voice recognition software.    Grammatical errors, random word insertions, pronoun errors, and incomplete sentences are an occasional consequence of this system due to software limitations, ambient noise, and hardware issues. Any formal questions or concerns about content, text, or information contained within the body of this dictation should be directly addressed to the provider for clarification.

## 2023-08-18 ENCOUNTER — APPOINTMENT (OUTPATIENT)
Dept: LAB | Facility: HOSPITAL | Age: 68
End: 2023-08-18
Attending: INTERNAL MEDICINE
Payer: COMMERCIAL

## 2023-08-18 DIAGNOSIS — E83.52 HYPERCALCEMIA: ICD-10-CM

## 2023-08-18 LAB
ALBUMIN SERPL BCP-MCNC: 4.2 G/DL (ref 3.5–5)
ALP SERPL-CCNC: 74 U/L (ref 34–104)
ALT SERPL W P-5'-P-CCNC: 16 U/L (ref 7–52)
ANION GAP SERPL CALCULATED.3IONS-SCNC: 7 MMOL/L
AST SERPL W P-5'-P-CCNC: 23 U/L (ref 13–39)
BILIRUB SERPL-MCNC: 0.38 MG/DL (ref 0.2–1)
BUN SERPL-MCNC: 10 MG/DL (ref 5–25)
CA-I BLD-SCNC: 1.24 MMOL/L (ref 1.12–1.32)
CALCIUM SERPL-MCNC: 10.4 MG/DL (ref 8.4–10.2)
CHLORIDE SERPL-SCNC: 105 MMOL/L (ref 96–108)
CO2 SERPL-SCNC: 29 MMOL/L (ref 21–32)
CREAT SERPL-MCNC: 0.55 MG/DL (ref 0.6–1.3)
GFR SERPL CREATININE-BSD FRML MDRD: 96 ML/MIN/1.73SQ M
GLUCOSE P FAST SERPL-MCNC: 96 MG/DL (ref 65–99)
POTASSIUM SERPL-SCNC: 4.5 MMOL/L (ref 3.5–5.3)
PROT SERPL-MCNC: 7.1 G/DL (ref 6.4–8.4)
PTH-INTACT SERPL-MCNC: 61.7 PG/ML (ref 12–88)
SODIUM SERPL-SCNC: 141 MMOL/L (ref 135–147)

## 2023-08-18 PROCEDURE — 83970 ASSAY OF PARATHORMONE: CPT

## 2023-08-18 PROCEDURE — 80053 COMPREHEN METABOLIC PANEL: CPT

## 2023-08-18 PROCEDURE — 36415 COLL VENOUS BLD VENIPUNCTURE: CPT

## 2023-08-18 PROCEDURE — 82330 ASSAY OF CALCIUM: CPT

## 2023-08-22 ENCOUNTER — RA CDI HCC (OUTPATIENT)
Dept: OTHER | Facility: HOSPITAL | Age: 68
End: 2023-08-22

## 2023-08-22 NOTE — PROGRESS NOTES
[address] [facility] coding opportunities       Chart reviewed, no opportunity found: [id number]        Patients Insurance     Medicare Insurance: [employer]

## 2023-08-28 ENCOUNTER — OFFICE VISIT (OUTPATIENT)
Dept: FAMILY MEDICINE CLINIC | Facility: CLINIC | Age: 68
End: 2023-08-28
Payer: COMMERCIAL

## 2023-08-28 VITALS
HEIGHT: 62 IN | HEART RATE: 91 BPM | DIASTOLIC BLOOD PRESSURE: 80 MMHG | WEIGHT: 217.6 LBS | OXYGEN SATURATION: 97 % | SYSTOLIC BLOOD PRESSURE: 170 MMHG | BODY MASS INDEX: 40.04 KG/M2

## 2023-08-28 DIAGNOSIS — M16.11 PRIMARY OSTEOARTHRITIS OF RIGHT HIP: ICD-10-CM

## 2023-08-28 DIAGNOSIS — F41.9 ANXIETY: ICD-10-CM

## 2023-08-28 DIAGNOSIS — L30.9 DERMATITIS: ICD-10-CM

## 2023-08-28 DIAGNOSIS — Z00.00 ROUTINE GENERAL MEDICAL EXAMINATION AT A HEALTH CARE FACILITY: ICD-10-CM

## 2023-08-28 DIAGNOSIS — G89.29 CHRONIC MIDLINE LOW BACK PAIN WITHOUT SCIATICA: ICD-10-CM

## 2023-08-28 DIAGNOSIS — E78.5 DYSLIPIDEMIA: ICD-10-CM

## 2023-08-28 DIAGNOSIS — G89.4 CHRONIC PAIN SYNDROME: ICD-10-CM

## 2023-08-28 DIAGNOSIS — E83.52 HYPERCALCEMIA: ICD-10-CM

## 2023-08-28 DIAGNOSIS — F11.20 CONTINUOUS OPIOID DEPENDENCE (HCC): ICD-10-CM

## 2023-08-28 DIAGNOSIS — M54.50 CHRONIC MIDLINE LOW BACK PAIN WITHOUT SCIATICA: ICD-10-CM

## 2023-08-28 DIAGNOSIS — M25.551 RIGHT HIP PAIN: ICD-10-CM

## 2023-08-28 DIAGNOSIS — I10 PRIMARY HYPERTENSION: Primary | ICD-10-CM

## 2023-08-28 DIAGNOSIS — Z98.84 H/O BARIATRIC SURGERY: ICD-10-CM

## 2023-08-28 DIAGNOSIS — K21.9 GASTROESOPHAGEAL REFLUX DISEASE WITHOUT ESOPHAGITIS: ICD-10-CM

## 2023-08-28 DIAGNOSIS — E66.01 CLASS 3 SEVERE OBESITY DUE TO EXCESS CALORIES WITHOUT SERIOUS COMORBIDITY WITH BODY MASS INDEX (BMI) OF 40.0 TO 44.9 IN ADULT (HCC): ICD-10-CM

## 2023-08-28 PROCEDURE — G0439 PPPS, SUBSEQ VISIT: HCPCS | Performed by: INTERNAL MEDICINE

## 2023-08-28 PROCEDURE — 99213 OFFICE O/P EST LOW 20 MIN: CPT | Performed by: INTERNAL MEDICINE

## 2023-08-28 RX ORDER — OXYCODONE AND ACETAMINOPHEN 7.5; 325 MG/1; MG/1
1 TABLET ORAL EVERY 4 HOURS PRN
Qty: 120 TABLET | Refills: 0 | Status: SHIPPED | OUTPATIENT
Start: 2023-08-28

## 2023-08-28 RX ORDER — TRIAMCINOLONE ACETONIDE 5 MG/G
CREAM TOPICAL 3 TIMES DAILY
Qty: 30 G | Refills: 1 | Status: SHIPPED | OUTPATIENT
Start: 2023-08-28

## 2023-08-28 NOTE — ASSESSMENT & PLAN NOTE
Patient with osteoarthritis in multiple joints but more so now with the right side for which she requires the right hip replacement

## 2023-08-28 NOTE — ASSESSMENT & PLAN NOTE
Patient continues to feel the pain in the low back area being followed by the pain management also will renew the pain medication for the same.

## 2023-08-28 NOTE — ASSESSMENT & PLAN NOTE
Blood pressure today is high at 170/80 was normal last time at 130/80 currently on perindopril 8 mg 2 times a day along with amlodipine 10 mg daily we will continue with the same monitor the blood pressure and then will reevaluate.   Get some blood pressure readings at home and bring the machine

## 2023-08-28 NOTE — ASSESSMENT & PLAN NOTE
Patient with chronic pain syndrome of his low back area and also with right hip and also aches and pains in the joints status post gastric bypass surgery cannot give any nonsteroidals we will continue with the oxycodone 7.5 mg 4 times a day as needed. As well as the hip patient is going to be seen by the orthopedics soon she may go for surgery since she has lost weight for hip replacement on the right side.

## 2023-08-28 NOTE — ASSESSMENT & PLAN NOTE
Patient's BMI came down to 39.80 it was 40.57 she has lost about 12 pounds of weight recommend continue to lose weight watch diet she cannot do much exercise because of the pain follow-up with orthopedic for possible hip replacement

## 2023-08-28 NOTE — PROGRESS NOTES
Assessment and Plan:     Problem List Items Addressed This Visit        Digestive    Gastroesophageal reflux disease     Continue omeprazole 20 mg daily            Cardiovascular and Mediastinum    Hypertension - Primary     Blood pressure today is high at 170/80 was normal last time at 130/80 currently on perindopril 8 mg 2 times a day along with amlodipine 10 mg daily we will continue with the same monitor the blood pressure and then will reevaluate. Get some blood pressure readings at home and bring the machine            Musculoskeletal and Integument    Primary osteoarthritis     Patient with osteoarthritis in multiple joints but more so now with the right side for which she requires the right hip replacement         Dermatitis     Patient still has mild rash on the right shoulder area renew the triamcinolone cream.            Other    Chronic pain syndrome     Patient with chronic pain syndrome of his low back area and also with right hip and also aches and pains in the joints status post gastric bypass surgery cannot give any nonsteroidals we will continue with the oxycodone 7.5 mg 4 times a day as needed. As well as the hip patient is going to be seen by the orthopedics soon she may go for surgery since she has lost weight for hip replacement on the right side. Low back pain     Patient continues to feel the pain in the low back area being followed by the pain management also will renew the pain medication for the same.          Class 3 severe obesity in adult [facility])     Patient's BMI came down to 39.80 it was 40.57 she has lost about 12 pounds of weight recommend continue to lose weight watch diet she cannot do much exercise because of the pain follow-up with orthopedic for possible hip replacement         H/O bariatric surgery     Patient status post gastric sleeve in 2011 stable         Continuous opioid dependence ([address])     Continue oxycodone since patient cannot take nonsteroidals for the pain         Anxiety     Patient will continue 10 mg of Lexapro which appears to be helping. Hypercalcemia     Patient with hypercalcemia with repeated the calcium levels came back slightly slightly high at 10.4 however her PTH and ionized calcium levels came back normal we will continue to monitor. Dyslipidemia     Lipid profile reviewed diet controlled high cholesterol levels. Right hip pain   Other Visit Diagnoses     Routine general medical examination at a health care facility              Depression Screening and Follow-up Plan: Patient was screened for depression during today's encounter. They screened negative with a PHQ-2 score of 1. Preventive health issues were discussed with patient, and age appropriate screening tests were ordered as noted in patient's After Visit Summary. Personalized health advice and appropriate referrals for health education or preventive services given if needed, as noted in patient's After Visit Summary. History of Present Illness:     Patient presents for a Medicare Wellness Visit    Patient is coming here for a follow-up evaluation with regards to her symptoms with right hip pain for which she was seen by the orthopedics and who has recommended surgery since the physical therapy did not help patient was supposed to lose about 10 pounds but she already lost 12 pounds and she is going to see him soon and surgery may be scheduled also. Patient continues to experience pain in the right hip increasing with activity. Reviewed all the medications reviewed the lab work discussed with patient at length regarding diet exercise. Patient Care Team:  Pablito Carroll MD as PCP - General (Internal Medicine)  Ursula Goetz MD     Review of Systems:     Review of Systems   Constitutional: Negative for chills and fever. HENT: Negative for ear pain and sore throat. Eyes: Negative for pain and visual disturbance.    Respiratory: Negative for cough and shortness of breath. Cardiovascular: Negative for chest pain and palpitations. Gastrointestinal: Negative for abdominal pain and vomiting. Genitourinary: Negative for dysuria and hematuria. Musculoskeletal: Positive for arthralgias and back pain. Right hip pain   Skin: Negative for color change and rash. Neurological: Negative for seizures and syncope. All other systems reviewed and are negative. Problem List:     Patient Active Problem List   Diagnosis   • Chronic pain syndrome   • Low back pain   • Class 3 severe obesity in adult [facility])   • H/O bariatric surgery   • Hypertension   • Gastroesophageal reflux disease   • Continuous opioid dependence (HCC)   • Primary osteoarthritis   • Anxiety   • Hypercalcemia   • Dyslipidemia   • Dermatitis   • Right hip pain      Past Medical and Surgical History:     Past Medical History:   Diagnosis Date   • Anxiety    • Apnea, sleep     patient states "repeated test says no"   • Arthritis     severe in the joints   • Chronic pain disorder     joints   • Depression    • DJD (degenerative joint disease)    • Fall 11/22/2018    tripped on a folded rug- left hamstring injury   • Foot injury     right foot-fx of bone-no repair-has severe arthritis in the foot and ankle   • Fractures yr. 2000 or before    left ankle   • GERD (gastroesophageal reflux disease)    • Hepatitis C not sure   • History of sleep apnea    • Hypercholesteremia    • Hypertension    • Insomnia    • Low back pain    • Obesity     Lost 70lb with gastric sleeve (2011)   • Osteoarthritis    • Stomach disorder     take meds. for this   • Wears glasses    • Wears partial dentures     upper and lower-but did lose the lower      Past Surgical History:   Procedure Laterality Date   • ANKLE ARTHROSCOPY Right    • ANKLE SURGERY  not sure    orthoscopic/rt.  ankle   • APPENDECTOMY     • BARIATRIC SURGERY  maybe 2011   • CHOLECYSTECTOMY     • COLONOSCOPY  10/26/2021   • DXA PROCEDURE (HISTORICAL) 10/13/2020   • GASTRECTOMY SLEEVE LAPAROSCOPIC  2011    lost 70 lb   • HAND SURGERY      rt. hand, trigger finger   • JOINT REPLACEMENT      blateral knee replacements   • KNEE SURGERY  2001 maybe   • MAMMO (HISTORICAL)  01/26/2022   • NERVE BLOCK Bilateral 12/29/2017    Procedure: L4, L5, S1 MEDIAL BRANCH BLOCK #1;  Surgeon: Brigida Villela MD;  Location: [address] [place] [address] MAIN OR;  Service: Pain Management    • NERVE BLOCK Bilateral 01/12/2018    Procedure: BLOCK MEDIAL BRANCH L4, L5-S1  #2;  Surgeon: Brigida Villeal MD;  Location: [address] [place] [address] MAIN OR;  Service: Pain Management    • HI ARTHROPLASTY GLENOHUMERAL JOINT TOTAL SHOULDER Right 05/23/2019    Procedure: ARTHROPLASTY SHOULDER TOTAL , excision of deep cyst at shoulder, biceps tenotomy;  Surgeon: Laney Travis MD;  Location: [facility]);  Service: Orthopedics   • HI INJECT SI JOINT ARTHRGRPHY&/ANES/STEROID W/LESA Right 10/23/2020    Procedure: Sacroiliac Joint injection (99862); Surgeon: Brigida Villela MD;  Location: [facility] MAIN OR;  Service: Pain Management    • HI TENDON SHEATH INCISION Right 12/21/2021    Procedure: RELEASE TRIGGER RING FINGER;  Surgeon: Myke Vergara MD;  Location: [facility]);  Service: Orthopedics   • RADIOFREQUENCY ABLATION Right 03/16/2018    Procedure: Rt L4 L5 S1 RFA;  Surgeon: Brigida Villela MD;  Location: [address] [place] [address] MAIN OR;  Service: Pain Management    • RADIOFREQUENCY ABLATION Left 12/14/2018    Procedure: L3-S1 Radio Frequency Ablation ([id number]);   Surgeon: Brigida Villela MD;  Location: [facility] MAIN OR;  Service: Pain Management    • RHIZOTOMY Left 01/25/2018    Procedure: L4, L5, S1 RFA;  Surgeon: Brigida Villela MD;  Location: [facility] MAIN OR;  Service: Pain Management    • SHOULDER SURGERY  April/2019    rt. total  replacement   • TONSILLECTOMY        Family History:     Family History   Problem Relation Age of Onset   • Other Mother         in a nursing home-fx of the leg   • Heart disease Father         CHF   • Alcohol abuse Father    • Cirrhosis Father    • Colon cancer Maternal Grandmother    • No Known Problems Maternal Grandfather    • No Known Problems Paternal Grandmother    • No Known Problems Paternal Grandfather    • Other Brother         lung abscess, pneumonia   • Colon cancer Maternal Aunt    • No Known Problems Maternal Aunt    • No Known Problems Maternal Aunt    • No Known Problems Maternal Uncle    • No Known Problems Paternal Aunt    • No Known Problems Paternal Uncle    • Breast cancer Cousin 48      Social History:     Social History     Socioeconomic History   • Marital status:      Spouse name: None   • Number of children: None   • Years of education: None   • Highest education level: None   Occupational History   • None   Tobacco Use   • Smoking status: Former     Types: Cigarettes     Quit date: 2004     Years since quittin.6   • Smokeless tobacco: Never   Vaping Use   • Vaping Use: Never used   Substance and Sexual Activity   • Alcohol use: Not Currently     Comment: quit many yrs. ago   • Drug use: Not Currently     Types: Amphetamines, Marijuana     Comment: aprox. 35 years ago, maybe longer   • Sexual activity: Not Currently     Partners: Male     Birth control/protection: Abstinence     Comment: no sex for many yrs. (aprox. 20 yrs.)   Other Topics Concern   • None   Social History Narrative   • None     Social Determinants of Health     Financial Resource Strain: Low Risk  (2023)    Overall Financial Resource Strain (CARDIA)    • Difficulty of Paying Living Expenses: Not very hard   Recent Concern: Financial Resource Strain - Medium Risk (2023)    Overall Financial Resource Strain (CARDIA)    • Difficulty of Paying Living Expenses: Somewhat hard   Food Insecurity: Not on file   Transportation Needs: No Transportation Needs (2023)    PRAPARE - Transportation    • Lack of Transportation (Medical): No    • Lack of Transportation (Non-Medical):  No   Physical Activity: Not on file Stress: Not on file   Social Connections: Not on file   Intimate Partner Violence: Not on file   Housing Stability: Not on file      Medications and Allergies:     Current Outpatient Medications   Medication Sig Dispense Refill   • acetaminophen (TYLENOL) 650 mg CR tablet Take 650 mg by mouth every 8 (eight) hours as needed for mild pain     • amLODIPine (NORVASC) 10 mg tablet TAKE 1 TABLET BY MOUTH DAILY 90 tablet 3   • ascorbic acid (VITAMIN C) 500 mg tablet Take 500 mg by mouth every morning     • Coenzyme Q10 (CO Q 10) 100 MG CAPS Take by mouth every morning Last dose 5/13/19     • Cyanocobalamin (VITAMIN B 12 PO) Take 500 mcg by mouth every morning     • diphenhydrAMINE (BENADRYL) 25 mg tablet Take 25 mg by mouth every 8 (eight) hours as needed for itching      • escitalopram (LEXAPRO) 10 mg tablet TAKE 1 AND 1/2 TABLETS BY  MOUTH DAILY AT BEDTIME 135 tablet 1   • Glucosamine-Chondroitin (MOVE FREE PO) Take 2 tablets by mouth every morning     • Melatonin 10 MG TABS Take by mouth daily at bedtime     • omeprazole (PriLOSEC) 20 mg delayed release capsule TAKE 1 CAPSULE BY MOUTH IN THE  MORNING 90 capsule 1   • oxyCODONE-acetaminophen (Percocet) 7.5-325 MG per tablet Take 1 tablet by mouth every 4 (four) hours as needed for moderate pain Max Daily Amount: 6 tablets 120 tablet 0   • perindopril (ACEON) 8 MG tablet TAKE 1 TABLET BY MOUTH  TWICE DAILY 180 tablet 3   • triamcinolone (KENALOG) 0.5 % cream Apply topically 3 (three) times a day 30 g 0   • zinc gluconate 50 mg tablet Take 50 mg by mouth daily       No current facility-administered medications for this visit.      Allergies   Allergen Reactions   • Penicillins Anaphylaxis     Total body redness and fever   • Levaquin [Levofloxacin] Itching      Immunizations:     Immunization History   Administered Date(s) Administered   • COVID-19 MODERNA VACC 0.5 ML IM 05/12/2021, 05/12/2021, 06/12/2021, 06/12/2021   • INFLUENZA 10/14/2020, 10/28/2022   • Influenza, high dose seasonal 0.7 mL 10/28/2022   • Tetanus Toxoid, Unspecified 03/24/2014   • Zoster Vaccine Recombinant 10/29/2020, 01/05/2021      Health Maintenance:         Topic Date Due   • Hepatitis C Screening  Never done   • Breast Cancer Screening: Mammogram  06/07/2024   • Colorectal Cancer Screening  10/25/2026         Topic Date Due   • Pneumococcal Vaccine: 65+ Years (1 - PCV) Never done   • COVID-19 Vaccine (5 - Moderna series) 08/07/2021   • Influenza Vaccine (1) 09/01/2023      Medicare Screening Tests and Risk Assessments:     Jessica Peterson is here for her Subsequent Wellness visit. Last Medicare Wellness visit information reviewed, patient interviewed and updates made to the record today. Health Risk Assessment:   Patient rates overall health as fair. Patient feels that their physical health rating is slightly worse. Patient is satisfied with their life. Eyesight was rated as same. Hearing was rated as same. Patient feels that their emotional and mental health rating is same. Patients states they are sometimes angry. Patient states they are sometimes unusually tired/fatigued. Pain experienced in the last 7 days has been a lot. Patient's pain rating has been 9/10. Patient states that she has experienced no weight loss or gain in last 6 months. Depression Screening:   PHQ-2 Score: 1      Fall Risk Screening: In the past year, patient has experienced: no history of falling in past year      Urinary Incontinence Screening:   Patient has not leaked urine accidently in the last six months. Home Safety:  Patient does not have trouble with stairs inside or outside of their home. Patient has working smoke alarms and has working carbon monoxide detector. Home safety hazards include: household clutter. The patient feels very safe in her home. Nutrition:   Current diet is Low Carb and Limited junk food.  currently on diet to lose weight for surgery of hip replacement    Medications:   Patient is currently taking over-the-counter supplements. OTC medications include: see medication list. Patient is able to manage medications. Activities of Daily Living (ADLs)/Instrumental Activities of Daily Living (IADLs):   Walk and transfer into and out of bed and chair?: Yes  Dress and groom yourself?: Yes    Bathe or shower yourself?: Yes    Feed yourself? Yes  Do your laundry/housekeeping?: Yes  Manage your money, pay your bills and track your expenses?: Yes  Make your own meals?: Yes    Do your own shopping?: Yes    ADL comments: The patient is able to take care of day-to-day activities. Previous Hospitalizations:   Any hospitalizations or ED visits within the last 12 months?: No      Advance Care Planning:   Living will: No    Durable POA for healthcare: No    Advanced directive: No    Advanced directive counseling given: Yes    Five wishes given: No    Patient declined ACP directive: No    End of Life Decisions reviewed with patient: Yes    Provider agrees with end of life decisions: Yes      Comments: Discussed with patient at length regarding advance directives living will and [address] [place] [address]. Patient states she has a power of  but it is for the finances. Discussed end-of-life decisions including tube feeding intubation etc. and I have given her the papers regarding the same for her to review fill them up and bring them back to me after discussing with her friends and family.     Cognitive Screening:   Provider or family/friend/caregiver concerned regarding cognition?: No    PREVENTIVE SCREENINGS      Cardiovascular Screening:    General: Screening Not Indicated, History Lipid Disorder and Screening Current      Diabetes Screening:     General: Screening Current      Colorectal Cancer Screening:     General: Screening Current      Breast Cancer Screening:     General: Screening Current      Cervical Cancer Screening:    General: Screening Not Indicated      Osteoporosis Screening: General: History Osteoporosis and Screening Current      Abdominal Aortic Aneurysm (AAA) Screening:        General: Screening Not Indicated      Lung Cancer Screening:     General: Screening Not Indicated      Hepatitis C Screening:    General: Screening Not Indicated and History Hepatitis C    Screening, Brief Intervention, and Referral to Treatment (SBIRT)    Screening  Typical number of drinks in a day: 0  Typical number of drinks in a week: 0  Interpretation: Low risk drinking behavior. AUDIT-C Screenin) How often did you have a drink containing alcohol in the past year? never  2) How many drinks did you have on a typical day when you were drinking in the past year? 0  3) How often did you have 6 or more drinks on one occasion in the past year? never    AUDIT-C Score: 0  Interpretation: Score 0-2 (female): Negative screen for alcohol misuse    Single Item Drug Screening:  How often have you used an illegal drug (including marijuana) or a prescription medication for non-medical reasons in the past year? never    Single Item Drug Screen Score: 0  Interpretation: Negative screen for possible drug use disorder    Brief Intervention  Alcohol & drug use screenings were reviewed. No concerns regarding substance use disorder identified. Review of Current Opioid Use    Opioid Risk Tool (ORT) Interpretation: Complete Opioid Risk Tool (ORT)    Other Counseling Topics:   Car/seat belt/driving safety and calcium and vitamin D intake. No results found. Physical Exam:     /80 (BP Location: Right arm, Patient Position: Sitting, Cuff Size: Large)   Pulse 91   Ht 5' 2" (1.575 m)   Wt 98.7 kg (217 lb 9.6 oz)   SpO2 97%   BMI 39.80 kg/m²     Physical Exam  Vitals and nursing note reviewed. Constitutional:       General: She is not in acute distress. Appearance: She is well-developed. HENT:      Head: Normocephalic and atraumatic.    Eyes:      Conjunctiva/sclera: Conjunctivae normal. Cardiovascular:      Rate and Rhythm: Normal rate and regular rhythm. Heart sounds: No murmur heard. Pulmonary:      Effort: Pulmonary effort is normal. No respiratory distress. Breath sounds: Normal breath sounds. Abdominal:      Palpations: Abdomen is soft. Tenderness: There is no abdominal tenderness. Musculoskeletal:         General: No swelling. Cervical back: Neck supple. Comments: Right hip movements causing discomfort and pain. Skin:     General: Skin is warm and dry. Capillary Refill: Capillary refill takes less than 2 seconds. Neurological:      Mental Status: She is alert.    Psychiatric:         Mood and Affect: Mood normal.          Rosario Medellin MD

## 2023-08-28 NOTE — ASSESSMENT & PLAN NOTE
Patient with hypercalcemia with repeated the calcium levels came back slightly slightly high at 10.4 however her PTH and ionized calcium levels came back normal we will continue to monitor.

## 2023-08-31 ENCOUNTER — APPOINTMENT (OUTPATIENT)
Dept: RADIOLOGY | Facility: CLINIC | Age: 68
End: 2023-08-31
Payer: COMMERCIAL

## 2023-08-31 ENCOUNTER — OFFICE VISIT (OUTPATIENT)
Dept: OBGYN CLINIC | Facility: CLINIC | Age: 68
End: 2023-08-31
Payer: COMMERCIAL

## 2023-08-31 VITALS
BODY MASS INDEX: 38.45 KG/M2 | HEIGHT: 63 IN | DIASTOLIC BLOOD PRESSURE: 76 MMHG | SYSTOLIC BLOOD PRESSURE: 128 MMHG | WEIGHT: 217 LBS | HEART RATE: 76 BPM

## 2023-08-31 DIAGNOSIS — F11.20 CONTINUOUS OPIOID DEPENDENCE (HCC): ICD-10-CM

## 2023-08-31 DIAGNOSIS — Z98.84 H/O BARIATRIC SURGERY: ICD-10-CM

## 2023-08-31 DIAGNOSIS — M25.551 RIGHT HIP PAIN: ICD-10-CM

## 2023-08-31 DIAGNOSIS — G89.4 CHRONIC PAIN SYNDROME: ICD-10-CM

## 2023-08-31 DIAGNOSIS — K21.9 GASTROESOPHAGEAL REFLUX DISEASE WITHOUT ESOPHAGITIS: ICD-10-CM

## 2023-08-31 DIAGNOSIS — Z01.818 PRE-OPERATIVE EXAMINATION: ICD-10-CM

## 2023-08-31 DIAGNOSIS — M16.11 PRIMARY OSTEOARTHRITIS OF RIGHT HIP: ICD-10-CM

## 2023-08-31 DIAGNOSIS — B18.2 CHRONIC HEPATITIS C WITHOUT HEPATIC COMA (HCC): ICD-10-CM

## 2023-08-31 DIAGNOSIS — M16.11 PRIMARY OSTEOARTHRITIS OF RIGHT HIP: Primary | ICD-10-CM

## 2023-08-31 DIAGNOSIS — I10 PRIMARY HYPERTENSION: ICD-10-CM

## 2023-08-31 PROBLEM — M65.341 ACQUIRED TRIGGER FINGER OF RIGHT RING FINGER: Status: ACTIVE | Noted: 2023-08-31

## 2023-08-31 PROCEDURE — 73502 X-RAY EXAM HIP UNI 2-3 VIEWS: CPT

## 2023-08-31 PROCEDURE — 99215 OFFICE O/P EST HI 40 MIN: CPT | Performed by: ORTHOPAEDIC SURGERY

## 2023-08-31 RX ORDER — FOLIC ACID 1 MG/1
1 TABLET ORAL DAILY
Qty: 30 TABLET | Refills: 0 | Status: SHIPPED | OUTPATIENT
Start: 2023-08-31 | End: 2023-09-30

## 2023-08-31 RX ORDER — ZINC SULFATE 50(220)MG
220 CAPSULE ORAL DAILY
Qty: 30 CAPSULE | Refills: 0 | Status: SHIPPED | OUTPATIENT
Start: 2023-08-31

## 2023-08-31 RX ORDER — ACETAMINOPHEN 325 MG/1
975 TABLET ORAL ONCE
OUTPATIENT
Start: 2023-08-31 | End: 2023-08-31

## 2023-08-31 RX ORDER — CHLORHEXIDINE GLUCONATE 0.12 MG/ML
15 RINSE ORAL ONCE
OUTPATIENT
Start: 2023-08-31 | End: 2023-08-31

## 2023-08-31 RX ORDER — MELATONIN
1000 DAILY
Qty: 30 TABLET | Refills: 0 | Status: SHIPPED | OUTPATIENT
Start: 2023-08-31

## 2023-08-31 RX ORDER — GABAPENTIN 300 MG/1
300 CAPSULE ORAL ONCE
OUTPATIENT
Start: 2023-08-31 | End: 2023-08-31

## 2023-08-31 RX ORDER — FERROUS SULFATE TAB EC 324 MG (65 MG FE EQUIVALENT) 324 (65 FE) MG
324 TABLET DELAYED RESPONSE ORAL
Qty: 30 TABLET | Refills: 0 | Status: SHIPPED | OUTPATIENT
Start: 2023-08-31

## 2023-08-31 NOTE — PROGRESS NOTES
Assessment/Plan:  1. Primary osteoarthritis of right hip  XR hip/pelv 2-3 vws right if performed    Case request operating room: ARTHROPLASTY HIP TOTAL ANTERIOR,NAVIGATED - overnight    Comprehensive metabolic panel    Hemoglobin A1C W/EAG Estimation    CBC and differential    Protime-INR    APTT    MRSA culture    Ambulatory referral to Family Practice    Ambulatory referral to Physical Therapy    EKG 12 lead    Ambulatory referral to Cardiology    Case request operating room: ARTHROPLASTY HIP TOTAL ANTERIOR,NAVIGATED - overnight    cholecalciferol (VITAMIN D3) 1,000 units tablet    zinc sulfate (ZINCATE) 220 mg capsule    ferrous sulfate 324 (65 Fe) mg    folic acid (FOLVITE) 1 mg tablet    ascorbic Acid (VITAMIN C) 500 MG CPCR      2. Right hip pain  XR hip/pelv 2-3 vws right if performed    Case request operating room: ARTHROPLASTY HIP TOTAL ANTERIOR,NAVIGATED - overnight    Comprehensive metabolic panel    Hemoglobin A1C W/EAG Estimation    CBC and differential    Protime-INR    APTT    MRSA culture    Ambulatory referral to Family Practice    Ambulatory referral to Physical Therapy    EKG 12 lead    Ambulatory referral to Cardiology    Case request operating room: ARTHROPLASTY HIP TOTAL ANTERIOR,NAVIGATED - overnight    cholecalciferol (VITAMIN D3) 1,000 units tablet    zinc sulfate (ZINCATE) 220 mg capsule    ferrous sulfate 324 (65 Fe) mg    folic acid (FOLVITE) 1 mg tablet    ascorbic Acid (VITAMIN C) 500 MG CPCR      3. H/O bariatric surgery  Ambulatory referral to [facility]    Ambulatory referral to Cardiology      4. Continuous opioid dependence [facility])  Ambulatory referral to [facility]    Ambulatory referral to Cardiology      5. Chronic pain syndrome  Ambulatory referral to [facility]    Ambulatory referral to Cardiology      6. Primary hypertension  Ambulatory referral to [facility]    Ambulatory referral to Cardiology      7.  Gastroesophageal reflux disease without esophagitis Ambulatory referral to [facility]    Ambulatory referral to Cardiology      8. Chronic hepatitis C without hepatic coma [facility])  Ambulatory referral to [facility]    Ambulatory referral to Cardiology      9. Pre-operative examination  Comprehensive metabolic panel    Hemoglobin A1C W/EAG Estimation    CBC and differential    Protime-INR    APTT    MRSA culture    Ambulatory referral to [facility]    Ambulatory referral to Physical Therapy    EKG 12 lead    Ambulatory referral to Cardiology        Scribe Attestation    I,:  Deshawn Davis MA am acting as a scribe while in the presence of the attending physician.:       I,:  Beverley Garcias DO personally performed the services described in this documentation    as scribed in my presence.:         71-year-old female who presents to the office today for follow up evaluation of her severe right hip pain secondary to her underlying severe right hip osteoarthritis. X-rays were reviewed in the office today which demonstrate severe end-stage right hip osteoarthritis. The patient has tried and failed non operative treatment options at this time. Based on the progression of her underlying disease and persistent pain despite nonoperative management including activity modification, maintenance of appropriate weight, exercise program and assistive device and Oxycodone. The patient is unable to take NSAIDs due to history of bariatric surgery. I explained that she would benefit from right total hip arthroplasty. The pre kwame and postoperative expectations were discussed here in the office today. The risks and benefits of undergoing right total hip arthroplasty were discussed at length and consents were signed and placed in the chart. Please see risk discussion below. She denies a history of DVT/PE, MRSA infection, diabetes, malignancy, gi bleeding peptic ulcer, smoking and turmeric use. She is not a smoker or excessive drinker and is not using turmeric.  She understands she requires preoperative clearance from her  PCP and Cardiology. She is an excellent candidate for outpatient physical therapy postoperatively. she will be placed on our overnight  discharge pathway. She will meet with my surgery scheduler today to pick a date for her procedure and make preoperative arrangements. All of her questions and concerns were addressed today. We will see her back at time of surgery. The patient will follow up one week postoperatively for a wound check. We discussed weaning from chronic pain medication so we can better manager her pain postoperatively. Subjective: right hip pain    Patient ID: Ruthine Mcardle is a 76 y.o. female who presents to the office today for evaluation of right hip pain. The patient states this has been ongoing for years and progressively getting worse. She notes pain into her groin which is increased with activities and picking objects up off the floor. She describes the pain as constant, sharp, shooting and achy. She has tried formal therapy without relief. She is unable to take NSAIDs due to history of bariatric surgery. She does take Oxycodone for pain. She states her pain is a 10 out of 10 on the pain scale. She ambulate with a cane secondary to her right hip pain. She denies prior surgery on her hip. The patient is interested in discussing surgical intervention. She recently lost 12 lbs since her last visit. Review of Systems   Constitutional: Positive for activity change. Negative for chills and fever. HENT: Negative for drooling and sneezing. Eyes: Negative for redness. Respiratory: Negative for cough and wheezing. Gastrointestinal: Negative for nausea and vomiting. Musculoskeletal: Positive for arthralgias. Negative for joint swelling and myalgias. Neurological: Negative for weakness and numbness. Psychiatric/Behavioral: Negative for behavioral problems. The patient is not nervous/anxious.           Past Medical History:   Diagnosis Date   • Anxiety    • Apnea, sleep     patient states "repeated test says no"   • Arthritis     severe in the joints   • Chronic pain disorder     joints   • Depression    • DJD (degenerative joint disease)    • Fall 11/22/2018    tripped on a folded rug- left hamstring injury   • Foot injury     right foot-fx of bone-no repair-has severe arthritis in the foot and ankle   • Fractures yr. 2000 or before    left ankle   • GERD (gastroesophageal reflux disease)    • Hepatitis C not sure   • History of sleep apnea    • Hypercholesteremia    • Hypertension    • Insomnia    • Low back pain    • Obesity     Lost 70lb with gastric sleeve (2011)   • Osteoarthritis    • Stomach disorder     take meds. for this   • Wears glasses    • Wears partial dentures     upper and lower-but did lose the lower        Past Surgical History:   Procedure Laterality Date   • ANKLE ARTHROSCOPY Right    • ANKLE SURGERY  not sure    orthoscopic/rt.  ankle   • APPENDECTOMY     • BARIATRIC SURGERY  maybe 2011   • CHOLECYSTECTOMY     • COLONOSCOPY  10/26/2021   • DXA PROCEDURE (HISTORICAL)  10/13/2020   • GASTRECTOMY SLEEVE LAPAROSCOPIC  2011    lost 70 lb   • HAND SURGERY      rt. hand, trigger finger   • JOINT REPLACEMENT      blateral knee replacements   • KNEE SURGERY  2001 maybe   • MAMMO (HISTORICAL)  01/26/2022   • NERVE BLOCK Bilateral 12/29/2017    Procedure: L4, L5, S1 MEDIAL BRANCH BLOCK #1;  Surgeon: Nevaeh Higgins MD;  Location: [address] [place] [address] [facility] MAIN OR;  Service: Pain Management    • NERVE BLOCK Bilateral 01/12/2018    Procedure: BLOCK MEDIAL BRANCH L4, L5-S1  #2;  Surgeon: Nevaeh Higgins MD;  Location: [facility] MAIN OR;  Service: Pain Management    • GA ARTHROPLASTY GLENOHUMERAL JOINT TOTAL SHOULDER Right 05/23/2019    Procedure: ARTHROPLASTY SHOULDER TOTAL , excision of deep cyst at shoulder, biceps tenotomy;  Surgeon: Montse Pierre MD;  Location: [facility];  Service: Orthopedics   • GA INJECT SI JOINT ARTHRGRPHY&/ANES/STEROID W/LESA Right 10/23/2020    Procedure: Sacroiliac Joint injection (31687); Surgeon: Brigida Villela MD;  Location: [facility] MAIN OR;  Service: Pain Management    • DC TENDON SHEATH INCISION Right 2021    Procedure: RELEASE TRIGGER RING FINGER;  Surgeon: Myke Vergara MD;  Location: [facility];  Service: Orthopedics   • RADIOFREQUENCY ABLATION Right 2018    Procedure: Rt L4 L5 S1 RFA;  Surgeon: Brigida Villela MD;  Location: [address] [place] [address] MAIN OR;  Service: Pain Management    • RADIOFREQUENCY ABLATION Left 2018    Procedure: L3-S1 Radio Frequency Ablation ([id number]); Surgeon: Brigida Villela MD;  Location: [facility] MAIN OR;  Service: Pain Management    • RHIZOTOMY Left 2018    Procedure: L4, L5, S1 RFA;  Surgeon: Brigida Villela MD;  Location: [facility] MAIN OR;  Service: Pain Management    • SHOULDER SURGERY  2019    rt. total  replacement   • TONSILLECTOMY         Family History   Problem Relation Age of Onset   • Other Mother         in a nursing home-fx of the leg   • Heart disease Father         CHF   • Alcohol abuse Father    • Cirrhosis Father    • Colon cancer Maternal Grandmother    • No Known Problems Maternal Grandfather    • No Known Problems Paternal Grandmother    • No Known Problems Paternal Grandfather    • Other Brother         lung abscess, pneumonia   • Colon cancer Maternal Aunt    • No Known Problems Maternal Aunt    • No Known Problems Maternal Aunt    • No Known Problems Maternal Uncle    • No Known Problems Paternal Aunt    • No Known Problems Paternal Uncle    • Breast cancer Cousin 48       Social History     Occupational History   • Not on file   Tobacco Use   • Smoking status: Former     Types: Cigarettes     Quit date: 2004     Years since quittin.6   • Smokeless tobacco: Never   Vaping Use   • Vaping Use: Never used   Substance and Sexual Activity   • Alcohol use: Not Currently     Comment: quit many yrs.  ago   • Drug use: Not Currently     Types: Amphetamines, Marijuana     Comment: aprox. 35 years ago, maybe longer   • Sexual activity: Not Currently     Partners: Male     Birth control/protection: Abstinence     Comment: no sex for many yrs. (aprox.  20 yrs.)         Current Outpatient Medications:   •  ascorbic Acid (VITAMIN C) 500 MG CPCR, Take 1 capsule (500 mg total) by mouth 2 (two) times a day, Disp: 60 capsule, Rfl: 0  •  cholecalciferol (VITAMIN D3) 1,000 units tablet, Take 1 tablet (1,000 Units total) by mouth daily, Disp: 30 tablet, Rfl: 0  •  ferrous sulfate 324 (65 Fe) mg, Take 1 tablet (324 mg total) by mouth daily before breakfast, Disp: 30 tablet, Rfl: 0  •  folic acid (FOLVITE) 1 mg tablet, Take 1 tablet (1 mg total) by mouth daily, Disp: 30 tablet, Rfl: 0  •  zinc sulfate (ZINCATE) 220 mg capsule, Take 1 capsule (220 mg total) by mouth daily, Disp: 30 capsule, Rfl: 0  •  acetaminophen (TYLENOL) 650 mg CR tablet, Take 650 mg by mouth every 8 (eight) hours as needed for mild pain, Disp: , Rfl:   •  amLODIPine (NORVASC) 10 mg tablet, TAKE 1 TABLET BY MOUTH DAILY, Disp: 90 tablet, Rfl: 3  •  ascorbic acid (VITAMIN C) 500 mg tablet, Take 500 mg by mouth every morning, Disp: , Rfl:   •  Coenzyme Q10 (CO Q 10) 100 MG CAPS, Take by mouth every morning Last dose 5/13/19, Disp: , Rfl:   •  Cyanocobalamin (VITAMIN B 12 PO), Take 500 mcg by mouth every morning, Disp: , Rfl:   •  diphenhydrAMINE (BENADRYL) 25 mg tablet, Take 25 mg by mouth every 8 (eight) hours as needed for itching , Disp: , Rfl:   •  escitalopram (LEXAPRO) 10 mg tablet, TAKE 1 AND 1/2 TABLETS BY  MOUTH DAILY AT BEDTIME, Disp: 135 tablet, Rfl: 1  •  Glucosamine-Chondroitin (MOVE FREE PO), Take 2 tablets by mouth every morning, Disp: , Rfl:   •  Melatonin 10 MG TABS, Take by mouth daily at bedtime, Disp: , Rfl:   •  omeprazole (PriLOSEC) 20 mg delayed release capsule, TAKE 1 CAPSULE BY MOUTH IN THE  MORNING, Disp: 90 capsule, Rfl: 1  •  oxyCODONE-acetaminophen (Percocet) 7.5-325 MG per tablet, Take 1 tablet by mouth every 4 (four) hours as needed for moderate pain Max Daily Amount: 6 tablets, Disp: 120 tablet, Rfl: 0  •  perindopril (ACEON) 8 MG tablet, TAKE 1 TABLET BY MOUTH  TWICE DAILY, Disp: 180 tablet, Rfl: 3  •  triamcinolone (KENALOG) 0.5 % cream, Apply topically 3 (three) times a day, Disp: 30 g, Rfl: 1  •  zinc gluconate 50 mg tablet, Take 50 mg by mouth daily, Disp: , Rfl:     Allergies   Allergen Reactions   • Penicillins Anaphylaxis     Total body redness and fever   • Levaquin [Levofloxacin] Itching       Objective:  Vitals:    08/31/23 0849   BP: 128/76   Pulse: 76       Body mass index is 38.44 kg/m². Right Hip Exam     Tenderness   The patient is experiencing tenderness in the anterior and lateral.    Range of Motion   Abduction: 40   Adduction: 10   Extension: 0   Flexion: 100   External rotation: 30   Internal rotation: 10     Muscle Strength   Abduction: 5/5   Adduction: 5/5   Flexion: 5/5     Tests   ABDOULAYE: positive    Other   Erythema: absent  Scars: absent  Sensation: normal  Pulse: present    Comments:  Significant overhanging pannus            Physical Exam  Vitals and nursing note reviewed. Constitutional:       Appearance: Normal appearance. She is well-developed. HENT:      Head: Normocephalic and atraumatic. Nose: Nose normal.   Eyes:      General:         Right eye: No discharge. Left eye: No discharge. Extraocular Movements: Extraocular movements intact. Conjunctiva/sclera: Conjunctivae normal.   Cardiovascular:      Rate and Rhythm: Normal rate. Pulmonary:      Effort: Pulmonary effort is normal. No respiratory distress. Musculoskeletal:      Cervical back: Normal range of motion and neck supple. Comments: As noted in HPI   Skin:     General: Skin is warm and dry. Neurological:      Mental Status: She is alert and oriented to person, place, and time.    Psychiatric:         Behavior: Behavior normal. Thought Content: Thought content normal.         Judgment: Judgment normal.         I have personally reviewed pertinent films in PACS. X-rays right hip performed in the office today demonstrate severe end-stage right hip osteoarthritis with bone-on-bone contact, sclerosis, peripheral osteophyte formation. The patient was counseled in detail regarding the diagnosis, the treatment options available, the prognosis of each treatment option, the potential risks and complications. These are, but are not limited to; deep vein thrombosis, pulmonary embolism, neurologic and vascular injury, infection, instability, leg length discrepancy, dislocation, hematoma, reflex sympathetic dystrophy, loss of range of motion, ankylosis of the knee, fracture, screw or prosthetic perforation, chronic pain, acute pain, chronic leg pain and edema, loosening, death, heart attack, and stroke. The patient's questions were answered in detail. The patient demonstrates understanding of these risks and wishes to proceed with surgery. This document was created using speech voice recognition software. Grammatical errors, random word insertions, pronoun errors, and incomplete sentences are an occasional consequence of this system due to software limitations, ambient noise, and hardware issues. Any formal questions or concerns about content, text, or information contained within the body of this dictation should be directly addressed to the provider for clarification.

## 2023-09-18 ENCOUNTER — TELEPHONE (OUTPATIENT)
Dept: OBGYN CLINIC | Facility: HOSPITAL | Age: 68
End: 2023-09-18

## 2023-09-18 NOTE — TELEPHONE ENCOUNTER
Preoperative Elective Admission Assessment- spoke to patient    Previous TKA's    Living Situation:    Who does pt live with: lives alone  What kind of home: multi-level  How do they enter the home: front  How many levels in home: 2 levels    # of steps to enter home: 4 to enter   # of steps to second floor: 12-14 to 2nd floor   Are there handrails: Yes  Are there landings: Yes  Sleeping arrangement: first/entry floor  Where is Bathroom: First floor bathroom 1/2 bath  Where is the tub or shower: Upstairs full bathroom with a step in tub (claw foot) with bench   Dogs or ther pets: Dogs     First Floor Setup:   Is there a bathroom: Yes  Where would pt sleep: couch that reclines     DME: frame walker, cane and 3-in-1 bedside commode  We discussed clearing pathways in the home and making sure there is accessibly to use the walker, for example, removing throw rugs. Patient's Current Level of Function: Ambulates with cane and ADLs: Independent    Post-op Caregiver: friend, Keke Coco, won't have overnight support but Keke Coco there during the day and neighbor can assist  Currently receive any HHC/aides/community supports: No     Post-op Transport: friend  To/from hospital: friend  To/from PT 2-3x/week: friend  Uses community transport now: No     Outpatient Physical Therapy Site:  Site: [facility]  pre and post-op appts scheduled? Yes     Medication Management: self  Preferred Pharmacy for Post-op Medications: [id number] - [address]  Blood Management Vitamin Regimen: Pt confirms taking as prescribed  Post-op anticoagulant: to be determined by surgical team postoperatively     DC Plan: Pt plans to be discharged home    Barriers to DC identified preoperatively: none identified    BMI: 38.44    2 outstanding patient IQ tasks, patient made aware and will completed. Resent tasks today.      Patient Education:  Pt educated on post-op pain, early mobilization (POD0), LOS goals, OP PT goals, and preoperative bathing. Patient educated that our goal is to appropriately discharge patient based off their post-op function while striving to maintain maximal independence. The goal is to discharge patient to home and for them to attend outpatient physical therapy.     Assigned to care team? Yes

## 2023-09-19 ENCOUNTER — APPOINTMENT (OUTPATIENT)
Dept: LAB | Facility: HOSPITAL | Age: 68
End: 2023-09-19
Payer: COMMERCIAL

## 2023-09-19 DIAGNOSIS — Z01.818 PRE-OPERATIVE EXAMINATION: ICD-10-CM

## 2023-09-19 DIAGNOSIS — M25.551 RIGHT HIP PAIN: ICD-10-CM

## 2023-09-19 DIAGNOSIS — M16.11 PRIMARY OSTEOARTHRITIS OF RIGHT HIP: ICD-10-CM

## 2023-09-19 LAB
ALBUMIN SERPL BCP-MCNC: 4.3 G/DL (ref 3.5–5)
ALP SERPL-CCNC: 77 U/L (ref 34–104)
ALT SERPL W P-5'-P-CCNC: 15 U/L (ref 7–52)
ANION GAP SERPL CALCULATED.3IONS-SCNC: 5 MMOL/L
APTT PPP: 28 SECONDS (ref 23–37)
AST SERPL W P-5'-P-CCNC: 20 U/L (ref 13–39)
BASOPHILS # BLD AUTO: 0.06 THOUSANDS/ÂΜL (ref 0–0.1)
BASOPHILS NFR BLD AUTO: 1 % (ref 0–1)
BILIRUB SERPL-MCNC: 0.41 MG/DL (ref 0.2–1)
BUN SERPL-MCNC: 13 MG/DL (ref 5–25)
CALCIUM SERPL-MCNC: 10.4 MG/DL (ref 8.4–10.2)
CHLORIDE SERPL-SCNC: 104 MMOL/L (ref 96–108)
CO2 SERPL-SCNC: 31 MMOL/L (ref 21–32)
CREAT SERPL-MCNC: 0.48 MG/DL (ref 0.6–1.3)
EOSINOPHIL # BLD AUTO: 0.16 THOUSAND/ÂΜL (ref 0–0.61)
EOSINOPHIL NFR BLD AUTO: 2 % (ref 0–6)
ERYTHROCYTE [DISTWIDTH] IN BLOOD BY AUTOMATED COUNT: 13.9 % (ref 11.6–15.1)
EST. AVERAGE GLUCOSE BLD GHB EST-MCNC: 120 MG/DL
GFR SERPL CREATININE-BSD FRML MDRD: 101 ML/MIN/1.73SQ M
GLUCOSE P FAST SERPL-MCNC: 91 MG/DL (ref 65–99)
HBA1C MFR BLD: 5.8 %
HCT VFR BLD AUTO: 44.7 % (ref 34.8–46.1)
HGB BLD-MCNC: 14.4 G/DL (ref 11.5–15.4)
IMM GRANULOCYTES # BLD AUTO: 0.02 THOUSAND/UL (ref 0–0.2)
IMM GRANULOCYTES NFR BLD AUTO: 0 % (ref 0–2)
INR PPP: 0.93 (ref 0.84–1.19)
LYMPHOCYTES # BLD AUTO: 2.52 THOUSANDS/ÂΜL (ref 0.6–4.47)
LYMPHOCYTES NFR BLD AUTO: 34 % (ref 14–44)
MCH RBC QN AUTO: 29.3 PG (ref 26.8–34.3)
MCHC RBC AUTO-ENTMCNC: 32.2 G/DL (ref 31.4–37.4)
MCV RBC AUTO: 91 FL (ref 82–98)
MONOCYTES # BLD AUTO: 0.59 THOUSAND/ÂΜL (ref 0.17–1.22)
MONOCYTES NFR BLD AUTO: 8 % (ref 4–12)
NEUTROPHILS # BLD AUTO: 4.16 THOUSANDS/ÂΜL (ref 1.85–7.62)
NEUTS SEG NFR BLD AUTO: 55 % (ref 43–75)
NRBC BLD AUTO-RTO: 0 /100 WBCS
PLATELET # BLD AUTO: 316 THOUSANDS/UL (ref 149–390)
PMV BLD AUTO: 10.7 FL (ref 8.9–12.7)
POTASSIUM SERPL-SCNC: 4.3 MMOL/L (ref 3.5–5.3)
PROT SERPL-MCNC: 7.1 G/DL (ref 6.4–8.4)
PROTHROMBIN TIME: 12.6 SECONDS (ref 11.6–14.5)
RBC # BLD AUTO: 4.91 MILLION/UL (ref 3.81–5.12)
SODIUM SERPL-SCNC: 140 MMOL/L (ref 135–147)
WBC # BLD AUTO: 7.51 THOUSAND/UL (ref 4.31–10.16)

## 2023-09-19 PROCEDURE — 85025 COMPLETE CBC W/AUTO DIFF WBC: CPT

## 2023-09-19 PROCEDURE — 85730 THROMBOPLASTIN TIME PARTIAL: CPT

## 2023-09-19 PROCEDURE — 83036 HEMOGLOBIN GLYCOSYLATED A1C: CPT

## 2023-09-19 PROCEDURE — 87081 CULTURE SCREEN ONLY: CPT

## 2023-09-19 PROCEDURE — 80053 COMPREHEN METABOLIC PANEL: CPT

## 2023-09-19 PROCEDURE — 85610 PROTHROMBIN TIME: CPT

## 2023-09-19 PROCEDURE — 36415 COLL VENOUS BLD VENIPUNCTURE: CPT

## 2023-09-20 LAB — MRSA NOSE QL CULT: NORMAL

## 2023-09-25 ENCOUNTER — OFFICE VISIT (OUTPATIENT)
Dept: FAMILY MEDICINE CLINIC | Facility: CLINIC | Age: 68
End: 2023-09-25
Payer: COMMERCIAL

## 2023-09-25 VITALS
SYSTOLIC BLOOD PRESSURE: 130 MMHG | OXYGEN SATURATION: 99 % | HEART RATE: 80 BPM | BODY MASS INDEX: 38.09 KG/M2 | HEIGHT: 63 IN | DIASTOLIC BLOOD PRESSURE: 76 MMHG | WEIGHT: 215 LBS

## 2023-09-25 DIAGNOSIS — F41.9 ANXIETY AND DEPRESSION: ICD-10-CM

## 2023-09-25 DIAGNOSIS — K21.9 GASTROESOPHAGEAL REFLUX DISEASE WITHOUT ESOPHAGITIS: ICD-10-CM

## 2023-09-25 DIAGNOSIS — G89.4 CHRONIC PAIN SYNDROME: ICD-10-CM

## 2023-09-25 DIAGNOSIS — I10 PRIMARY HYPERTENSION: ICD-10-CM

## 2023-09-25 DIAGNOSIS — Z23 NEED FOR VACCINATION: ICD-10-CM

## 2023-09-25 DIAGNOSIS — E66.01 CLASS 3 SEVERE OBESITY DUE TO EXCESS CALORIES WITHOUT SERIOUS COMORBIDITY WITH BODY MASS INDEX (BMI) OF 40.0 TO 44.9 IN ADULT (HCC): ICD-10-CM

## 2023-09-25 DIAGNOSIS — F32.A ANXIETY AND DEPRESSION: ICD-10-CM

## 2023-09-25 DIAGNOSIS — E78.5 DYSLIPIDEMIA: ICD-10-CM

## 2023-09-25 DIAGNOSIS — R73.03 PREDIABETES: ICD-10-CM

## 2023-09-25 DIAGNOSIS — F11.20 CONTINUOUS OPIOID DEPENDENCE (HCC): ICD-10-CM

## 2023-09-25 DIAGNOSIS — E83.52 HYPERCALCEMIA: ICD-10-CM

## 2023-09-25 DIAGNOSIS — M16.11 PRIMARY OSTEOARTHRITIS OF RIGHT HIP: ICD-10-CM

## 2023-09-25 DIAGNOSIS — Z01.818 PREOPERATIVE CLEARANCE: Primary | ICD-10-CM

## 2023-09-25 PROCEDURE — G0008 ADMIN INFLUENZA VIRUS VAC: HCPCS

## 2023-09-25 PROCEDURE — 99214 OFFICE O/P EST MOD 30 MIN: CPT | Performed by: INTERNAL MEDICINE

## 2023-09-25 PROCEDURE — 90662 IIV NO PRSV INCREASED AG IM: CPT

## 2023-09-25 RX ORDER — OXYCODONE AND ACETAMINOPHEN 7.5; 325 MG/1; MG/1
1 TABLET ORAL EVERY 4 HOURS PRN
Qty: 120 TABLET | Refills: 0 | Status: SHIPPED | OUTPATIENT
Start: 2023-09-25

## 2023-09-25 NOTE — ASSESSMENT & PLAN NOTE
Patient blood pressure is much better today at 130/76 patient has been gradually losing weight continue with perindopril 8 mg 2 times a day and amlodipine 10 mg daily

## 2023-09-25 NOTE — ASSESSMENT & PLAN NOTE
Patient's hemoglobin A1c is coming up at 5.8 emphasized regarding cutting back carbohydrate intake calorie intake we will follow-up with repeat hemoglobin A1c in few months patient has been trying to follow diet and lose weight he lost about 3 pounds in last 1 month time

## 2023-09-25 NOTE — ASSESSMENT & PLAN NOTE
Patient cholesterol level is 207 triglycerides 95 HDL 77 LDL at 111 recommend strict diet to bring the cholesterol down.

## 2023-09-25 NOTE — ASSESSMENT & PLAN NOTE
Patient with chronic pain syndrome in the low back area now with severe pain in the right hip area we will follow hip replacement patient with no acute problems at this present time to contraindicate surgery

## 2023-09-25 NOTE — ASSESSMENT & PLAN NOTE
Patient's calcium level is coming up high at 10.4 but her ionized calcium and PTH are normal we will monitor

## 2023-09-25 NOTE — PROGRESS NOTES
Office Visit Note  23     Sayra Almeida 76 y.o. female MRN: [id number]  : 1955    Assessment:     1. Preoperative clearance  Assessment & Plan:  Patient is scheduled for right hip replacement for severe osteoarthritis on  medications reviewed labs reviewed. EKG is pending. Patient is cleared for surgery. Patient should be cleared by the cardiology also      2. Chronic pain syndrome  Assessment & Plan:  Patient with chronic pain syndrome in the low back area now with severe pain in the right hip area we will follow hip replacement patient with no acute problems at this present time to contraindicate surgery    Orders:  -     oxyCODONE-acetaminophen (Percocet) 7.5-325 MG per tablet; Take 1 tablet by mouth every 4 (four) hours as needed for moderate pain Max Daily Amount: 6 tablets    3. Class 3 severe obesity due to excess calories without serious comorbidity with body mass index (BMI) of 40.0 to 44.9 in [facility])  Assessment & Plan:  Patient BMI is slightly better at 38.09 she is trying to follow a strict diet to lose weight currently her weight is 215 pounds. She is watching her diet trying to cut back on calorie intake carbohydrate intake      4. Continuous opioid dependence ([address])  Assessment & Plan:  Not able to give nonsteroidals because of the gastric bypass. Discussed with patient regarding cutting down the pain medication after the hip surgery when she starts feeling better      5. Anxiety and depression  Assessment & Plan:  Patient is on Lexapro 10 mg daily appears to be helping we will continue the same. 6. Dyslipidemia  Assessment & Plan:  Patient cholesterol level is 207 triglycerides 95 HDL 77 LDL at 111 recommend strict diet to bring the cholesterol down. 7. Gastroesophageal reflux disease without esophagitis  Assessment & Plan:  Patient on omeprazole 20 mg daily we will continue      8.  Primary hypertension  Assessment & Plan:  Patient blood pressure is much better today at 130/76 patient has been gradually losing weight continue with perindopril 8 mg 2 times a day and amlodipine 10 mg daily      9. Hypercalcemia  Assessment & Plan:  Patient's calcium level is coming up high at 10.4 but her ionized calcium and PTH are normal we will monitor      10. Need for vaccination  -     FLUZONE HIGH-DOSE: influenza vaccine, high-dose, preservative-free 0.7 mL    11. Primary osteoarthritis of right hip  Assessment & Plan:  Patient with right hip pain progressively increasing x-rays revealing severe osteoarthritis seen by the orthopedic recommended surgery scheduled for the same on October 16 movements of the hip causing increasing discomfort and pain      12. Prediabetes  Assessment & Plan:  Patient's hemoglobin A1c is coming up at 5.8 emphasized regarding cutting back carbohydrate intake calorie intake we will follow-up with repeat hemoglobin A1c in few months patient has been trying to follow diet and lose weight he lost about 3 pounds in last 1 month time               Discussion Summary and Plan: Today's care plan and medications were reviewed with patient in detail and all their questions answered to their satisfaction. Chief Complaint   Patient presents with   • Follow-up      Subjective:  Patient is coming here for evaluation regarding preop clearance he is going to have right hip replacement on October 16. History of hypertension and also dyslipidemia. Currently taking pain medication to relieve the pain in the hip and the back area. Medication reviewed labs reviewed. Patient with increasing pain in the right hip. With difficulty with ambulation I reviewed the reports from the orthopedic surgeon. Patient is going to have an appointment with a cardiologist also prior to the surgery for clearance.       The following portions of the patient's history were reviewed and updated as appropriate: allergies, current medications, past family history, past medical history, past social history, past surgical history and problem list.    Review of Systems   Constitutional: Negative for chills and fever. HENT: Negative for ear pain and sore throat. Eyes: Negative for pain and visual disturbance. Respiratory: Negative for cough and shortness of breath. Cardiovascular: Negative for chest pain and palpitations. Gastrointestinal: Negative for abdominal pain and vomiting. Genitourinary: Negative for dysuria and hematuria. Musculoskeletal: Positive for arthralgias and back pain. Right hip pain   Skin: Negative for color change and rash. Neurological: Negative for seizures and syncope. All other systems reviewed and are negative. Historical Information   Patient Active Problem List   Diagnosis   • Chronic pain syndrome   • Low back pain   • Class 3 severe obesity in adult [facility])   • H/O bariatric surgery   • Hypertension   • Gastroesophageal reflux disease   • Continuous opioid dependence (HCC)   • Primary osteoarthritis   • Anxiety   • Hypercalcemia   • Dyslipidemia   • Dermatitis   • Right hip pain   • Anxiety and depression   • Acquired trigger finger of right ring finger   • Primary osteoarthritis of right hip   • Prediabetes   • Preoperative clearance     Past Medical History:   Diagnosis Date   • Anxiety    • Apnea, sleep     patient states "repeated test says no"   • Arthritis     severe in the joints   • Chronic pain disorder     joints   • Depression    • DJD (degenerative joint disease)    • Fall 11/22/2018    tripped on a folded rug- left hamstring injury   • Foot injury     right foot-fx of bone-no repair-has severe arthritis in the foot and ankle   • Fractures yr.  2000 or before    left ankle   • GERD (gastroesophageal reflux disease)    • Hepatitis C not sure   • History of sleep apnea    • Hypercholesteremia    • Hypertension    • Insomnia    • Low back pain    • Obesity     Lost 70lb with gastric sleeve (2011)   • Osteoarthritis    • Stomach disorder     take meds. for this   • Wears glasses    • Wears partial dentures     upper and lower-but did lose the lower      Past Surgical History:   Procedure Laterality Date   • ANKLE ARTHROSCOPY Right    • ANKLE SURGERY  not sure    orthoscopic/rt. ankle   • APPENDECTOMY     • BARIATRIC SURGERY  maybe 2011   • CHOLECYSTECTOMY     • COLONOSCOPY  10/26/2021   • DXA PROCEDURE (HISTORICAL)  10/13/2020   • GASTRECTOMY SLEEVE LAPAROSCOPIC  2011    lost 70 lb   • HAND SURGERY      rt. hand, trigger finger   • JOINT REPLACEMENT      blateral knee replacements   • KNEE SURGERY  2001 maybe   • MAMMO (HISTORICAL)  01/26/2022   • NERVE BLOCK Bilateral 12/29/2017    Procedure: L4, L5, S1 MEDIAL BRANCH BLOCK #1;  Surgeon: Maria M Brannon MD;  Location: [address] [place] [address] MAIN OR;  Service: Pain Management    • NERVE BLOCK Bilateral 01/12/2018    Procedure: BLOCK MEDIAL BRANCH L4, L5-S1  #2;  Surgeon: Maria M Brannon MD;  Location: [address] [place] [address] MAIN OR;  Service: Pain Management    • MT ARTHROPLASTY GLENOHUMERAL JOINT TOTAL SHOULDER Right 05/23/2019    Procedure: ARTHROPLASTY SHOULDER TOTAL , excision of deep cyst at shoulder, biceps tenotomy;  Surgeon: Shawanda Goodwin MD;  Location: [facility] [place];  Service: Orthopedics   • MT INJECT SI JOINT ARTHRGRPHY&/ANES/STEROID W/LESA Right 10/23/2020    Procedure: Sacroiliac Joint injection (25562); Surgeon: Maria M Brannon MD;  Location: [facility] MAIN OR;  Service: Pain Management    • MT TENDON SHEATH INCISION Right 12/21/2021    Procedure: RELEASE TRIGGER RING FINGER;  Surgeon: Brady Guillory MD;  Location: [facility] [place];  Service: Orthopedics   • RADIOFREQUENCY ABLATION Right 03/16/2018    Procedure: Rt L4 L5 S1 RFA;  Surgeon: Maria M Brannon MD;  Location: [address] [place] [address] MAIN OR;  Service: Pain Management    • RADIOFREQUENCY ABLATION Left 12/14/2018    Procedure: L3-S1 Radio Frequency Ablation ([id number]);   Surgeon: Maria M Brannon MD;  Location: [facility] MAIN OR;  Service: Pain Management    • RHIZOTOMY Left 2018    Procedure: L4, L5, S1 RFA;  Surgeon: Amrik Barahona MD;  Location: [facility] MAIN OR;  Service: Pain Management    • SHOULDER SURGERY  2019    rt. total  replacement   • TONSILLECTOMY       Social History     Substance and Sexual Activity   Alcohol Use Not Currently    Comment: quit many yrs. ago     Social History     Substance and Sexual Activity   Drug Use Not Currently   • Types: Amphetamines, Marijuana    Comment: aprox.  35 years ago, maybe longer     Social History     Tobacco Use   Smoking Status Former   • Types: Cigarettes   • Quit date: 2004   • Years since quittin.7   • Passive exposure: Past   Smokeless Tobacco Never     Family History   Problem Relation Age of Onset   • Other Mother         in a nursing home-fx of the leg   • Heart disease Father         CHF   • Alcohol abuse Father    • Cirrhosis Father    • Colon cancer Maternal Grandmother    • No Known Problems Maternal Grandfather    • No Known Problems Paternal Grandmother    • No Known Problems Paternal Grandfather    • Other Brother         lung abscess, pneumonia   • Colon cancer Maternal Aunt    • No Known Problems Maternal Aunt    • No Known Problems Maternal Aunt    • No Known Problems Maternal Uncle    • No Known Problems Paternal Aunt    • No Known Problems Paternal Uncle    • Breast cancer Cousin 48     Health Maintenance Due   Topic   • Pneumococcal Vaccine: 65+ Years (1 - PCV)   • Hepatitis A Vaccine (1 of 2 - Risk 2-dose series)   • Hepatitis B Vaccine (1 of 3 - Risk 3-dose series)   • COVID-19 Vaccine (5 - Moderna series)   • PT PLAN OF CARE       Meds/Allergies       Current Outpatient Medications:   •  acetaminophen (TYLENOL) 650 mg CR tablet, Take 650 mg by mouth every 8 (eight) hours as needed for mild pain, Disp: , Rfl:   •  amLODIPine (NORVASC) 10 mg tablet, TAKE 1 TABLET BY MOUTH DAILY, Disp: 90 tablet, Rfl: 3  •  ascorbic Acid (VITAMIN C) 500 MG CPCR, Take 1 capsule (500 mg total) by mouth 2 (two) times a day, Disp: 60 capsule, Rfl: 0  •  ascorbic acid (VITAMIN C) 500 mg tablet, Take 500 mg by mouth every morning, Disp: , Rfl:   •  cholecalciferol (VITAMIN D3) 1,000 units tablet, Take 1 tablet (1,000 Units total) by mouth daily, Disp: 30 tablet, Rfl: 0  •  Coenzyme Q10 (CO Q 10) 100 MG CAPS, Take by mouth every morning Last dose 5/13/19, Disp: , Rfl:   •  Cyanocobalamin (VITAMIN B 12 PO), Take 500 mcg by mouth every morning, Disp: , Rfl:   •  diphenhydrAMINE (BENADRYL) 25 mg tablet, Take 25 mg by mouth every 8 (eight) hours as needed for itching , Disp: , Rfl:   •  escitalopram (LEXAPRO) 10 mg tablet, TAKE 1 AND 1/2 TABLETS BY  MOUTH DAILY AT BEDTIME, Disp: 135 tablet, Rfl: 1  •  ferrous sulfate 324 (65 Fe) mg, Take 1 tablet (324 mg total) by mouth daily before breakfast, Disp: 30 tablet, Rfl: 0  •  folic acid (FOLVITE) 1 mg tablet, Take 1 tablet (1 mg total) by mouth daily, Disp: 30 tablet, Rfl: 0  •  Glucosamine-Chondroitin (MOVE FREE PO), Take 2 tablets by mouth every morning, Disp: , Rfl:   •  Melatonin 10 MG TABS, Take by mouth daily at bedtime, Disp: , Rfl:   •  omeprazole (PriLOSEC) 20 mg delayed release capsule, TAKE 1 CAPSULE BY MOUTH IN THE  MORNING, Disp: 90 capsule, Rfl: 1  •  oxyCODONE-acetaminophen (Percocet) 7.5-325 MG per tablet, Take 1 tablet by mouth every 4 (four) hours as needed for moderate pain Max Daily Amount: 6 tablets, Disp: 120 tablet, Rfl: 0  •  perindopril (ACEON) 8 MG tablet, TAKE 1 TABLET BY MOUTH  TWICE DAILY, Disp: 180 tablet, Rfl: 3  •  triamcinolone (KENALOG) 0.5 % cream, Apply topically 3 (three) times a day, Disp: 30 g, Rfl: 1  •  zinc gluconate 50 mg tablet, Take 50 mg by mouth daily, Disp: , Rfl:   •  zinc sulfate (ZINCATE) 220 mg capsule, Take 1 capsule (220 mg total) by mouth daily, Disp: 30 capsule, Rfl: 0      Objective:    Vitals:   /76 (BP Location: Right arm, Patient Position: Sitting, Cuff Size: Large)   Pulse 80   Ht 5' 3" (1.6 m)   Wt 97.5 kg (215 lb)   SpO2 99%   BMI 38.09 kg/m²   Body mass index is 38.09 kg/m². Vitals:    09/25/23 1546   Weight: 97.5 kg (215 lb)       Physical Exam  Vitals and nursing note reviewed. Constitutional:       Appearance: Normal appearance. Cardiovascular:      Rate and Rhythm: Normal rate and regular rhythm. Heart sounds: Normal heart sounds. Pulmonary:      Effort: Pulmonary effort is normal.      Breath sounds: Normal breath sounds. Abdominal:      Palpations: Abdomen is soft. Musculoskeletal:      Cervical back: Normal range of motion and neck supple. Right lower leg: No edema. Left lower leg: No edema. Comments: Movements of the right hip causing discomfort and pain   Neurological:      Mental Status: She is alert and oriented to person, place, and time. Lab Review   Appointment on 09/19/2023   Component Date Value Ref Range Status   • Sodium 09/19/2023 140  135 - 147 mmol/L Final   • Potassium 09/19/2023 4.3  3.5 - 5.3 mmol/L Final   • Chloride 09/19/2023 104  96 - 108 mmol/L Final   • CO2 09/19/2023 31  21 - 32 mmol/L Final   • ANION GAP 09/19/2023 5  mmol/L Final   • BUN 09/19/2023 13  5 - 25 mg/dL Final   • Creatinine 09/19/2023 0.48 (L)  0.60 - 1.30 mg/dL Final    Standardized to IDMS reference method   • Glucose, Fasting 09/19/2023 91  65 - 99 mg/dL Final   • Calcium 09/19/2023 10.4 (H)  8.4 - 10.2 mg/dL Final   • AST 09/19/2023 20  13 - 39 U/L Final   • ALT 09/19/2023 15  7 - 52 U/L Final    Specimen collection should occur prior to Sulfasalazine administration due to the potential for falsely depressed results.     • Alkaline Phosphatase 09/19/2023 77  34 - 104 U/L Final   • Total Protein 09/19/2023 7.1  6.4 - 8.4 g/dL Final   • Albumin 09/19/2023 4.3  3.5 - 5.0 g/dL Final   • Total Bilirubin 09/19/2023 0.41  0.20 - 1.00 mg/dL Final    Use of this assay is not recommended for patients undergoing treatment with eltrombopag due to the potential for falsely elevated results. N-acetyl-p-benzoquinone imine (metabolite of Acetaminophen) will generate erroneously low results in samples for patients that have taken an overdose of Acetaminophen. • eGFR 09/19/2023 101  ml/min/1.73sq m Final   • WBC 09/19/2023 7.51  4.31 - 10.16 Thousand/uL Final   • RBC 09/19/2023 4.91  3.81 - 5.12 Million/uL Final   • Hemoglobin 09/19/2023 14.4  11.5 - 15.4 g/dL Final   • Hematocrit 09/19/2023 44.7  34.8 - 46.1 % Final   • MCV 09/19/2023 91  82 - 98 fL Final   • MCH 09/19/2023 29.3  26.8 - 34.3 pg Final   • MCHC 09/19/2023 32.2  31.4 - 37.4 g/dL Final   • RDW 09/19/2023 13.9  11.6 - 15.1 % Final   • MPV 09/19/2023 10.7  8.9 - 12.7 fL Final   • Platelets 96/65/2661 316  149 - 390 Thousands/uL Final   • nRBC 09/19/2023 0  /100 WBCs Final   • Neutrophils Relative 09/19/2023 55  43 - 75 % Final   • Immat GRANS % 09/19/2023 0  0 - 2 % Final   • Lymphocytes Relative 09/19/2023 34  14 - 44 % Final   • Monocytes Relative 09/19/2023 8  4 - 12 % Final   • Eosinophils Relative 09/19/2023 2  0 - 6 % Final   • Basophils Relative 09/19/2023 1  0 - 1 % Final   • Neutrophils Absolute 09/19/2023 4.16  1.85 - 7.62 Thousands/µL Final   • Immature Grans Absolute 09/19/2023 0.02  0.00 - 0.20 Thousand/uL Final   • Lymphocytes Absolute 09/19/2023 2.52  0.60 - 4.47 Thousands/µL Final   • Monocytes Absolute 09/19/2023 0.59  0.17 - 1.22 Thousand/µL Final   • Eosinophils Absolute 09/19/2023 0.16  0.00 - 0.61 Thousand/µL Final   • Basophils Absolute 09/19/2023 0.06  0.00 - 0.10 Thousands/µL Final   • Protime 09/19/2023 12.6  11.6 - 14.5 seconds Final   • INR 09/19/2023 0.93  0.84 - 1.19 Final   • PTT 09/19/2023 28  23 - 37 seconds Final    Therapeutic Heparin Range =  60-90 seconds   • MRSA Culture Only 09/19/2023 No Methicillin Resistant Staphlyococcus aureus (MRSA) isolated   Final   • Hemoglobin A1C 09/19/2023 5.8 (H)  Normal 4.0-5.6%; PreDiabetic 5.7-6.4%;  Diabetic >=6.5%; Glycemic control for adults with diabetes <7.0% % Final   • EAG 09/19/2023 120  mg/dl Final   Appointment on 08/18/2023   Component Date Value Ref Range Status   • Sodium 08/18/2023 141  135 - 147 mmol/L Final   • Potassium 08/18/2023 4.5  3.5 - 5.3 mmol/L Final   • Chloride 08/18/2023 105  96 - 108 mmol/L Final   • CO2 08/18/2023 29  21 - 32 mmol/L Final   • ANION GAP 08/18/2023 7  mmol/L Final   • BUN 08/18/2023 10  5 - 25 mg/dL Final   • Creatinine 08/18/2023 0.55 (L)  0.60 - 1.30 mg/dL Final    Standardized to IDMS reference method   • Glucose, Fasting 08/18/2023 96  65 - 99 mg/dL Final   • Calcium 08/18/2023 10.4 (H)  8.4 - 10.2 mg/dL Final   • AST 08/18/2023 23  13 - 39 U/L Final   • ALT 08/18/2023 16  7 - 52 U/L Final    Specimen collection should occur prior to Sulfasalazine administration due to the potential for falsely depressed results. • Alkaline Phosphatase 08/18/2023 74  34 - 104 U/L Final   • Total Protein 08/18/2023 7.1  6.4 - 8.4 g/dL Final   • Albumin 08/18/2023 4.2  3.5 - 5.0 g/dL Final   • Total Bilirubin 08/18/2023 0.38  0.20 - 1.00 mg/dL Final    Use of this assay is not recommended for patients undergoing treatment with eltrombopag due to the potential for falsely elevated results. N-acetyl-p-benzoquinone imine (metabolite of Acetaminophen) will generate erroneously low results in samples for patients that have taken an overdose of Acetaminophen. • eGFR 08/18/2023 96  ml/min/1.73sq m Final   • Calcium, Ionized 08/18/2023 1.24  1.12 - 1.32 mmol/L Final   • PTH 08/18/2023 61.7  12.0 - 88.0 pg/mL Final         Enedina Calvillo MD        "This note has been constructed using a voice recognition system. Therefore there may be syntax, spelling, and/or grammatical errors.  Please call if you have any questions. "

## 2023-09-25 NOTE — ASSESSMENT & PLAN NOTE
Patient with right hip pain progressively increasing x-rays revealing severe osteoarthritis seen by the orthopedic recommended surgery scheduled for the same on October 16 movements of the hip causing increasing discomfort and pain

## 2023-09-25 NOTE — ASSESSMENT & PLAN NOTE
Not able to give nonsteroidals because of the gastric bypass.   Discussed with patient regarding cutting down the pain medication after the hip surgery when she starts feeling better

## 2023-09-25 NOTE — ASSESSMENT & PLAN NOTE
Patient BMI is slightly better at 38.09 she is trying to follow a strict diet to lose weight currently her weight is 215 pounds.   She is watching her diet trying to cut back on calorie intake carbohydrate intake

## 2023-09-25 NOTE — ASSESSMENT & PLAN NOTE
Patient is scheduled for right hip replacement for severe osteoarthritis on October 16 medications reviewed labs reviewed. EKG is pending. Patient is cleared for surgery.   Patient should be cleared by the cardiology also

## 2023-09-27 RX ORDER — CLINDAMYCIN HYDROCHLORIDE 300 MG/1
600 CAPSULE ORAL ONCE
COMMUNITY
Start: 2023-09-08

## 2023-09-27 NOTE — PRE-PROCEDURE INSTRUCTIONS
Pre-Surgery Instructions:   Medication Instructions   • acetaminophen (TYLENOL) 650 mg CR tablet Hold day of surgery. • amLODIPine (NORVASC) 10 mg tablet Take day of surgery. • ascorbic Acid (VITAMIN C) 500 MG CPCR Hold day of surgery. • cholecalciferol (VITAMIN D3) 1,000 units tablet Hold day of surgery. • clindamycin (CLEOCIN) 300 MG capsule Hold day of surgery. • Coenzyme Q10 (CO Q 10) 100 MG CAPS Stop taking 7 days prior to surgery. • Cyanocobalamin (VITAMIN B 12 PO) Stop taking 7 days prior to surgery. • diphenhydrAMINE (BENADRYL) 25 mg tablet Hold day of surgery. • ferrous sulfate 324 (65 Fe) mg Hold day of surgery. • folic acid (FOLVITE) 1 mg tablet Hold day of surgery. • Glucosamine-Chondroitin (MOVE FREE PO) Stop taking 7 days prior to surgery. • Melatonin 10 MG TABS Take night before surgery   • omeprazole (PriLOSEC) 20 mg delayed release capsule Take day of surgery. • oxyCODONE-acetaminophen (Percocet) 7.5-325 MG per tablet Uses PRN- OK to take day of surgery   • perindopril (ACEON) 8 MG tablet Hold day of surgery. • triamcinolone (KENALOG) 0.5 % cream Hold day of surgery. • zinc sulfate (ZINCATE) 220 mg capsule Hold day of surgery. Medication instructions for day surgery reviewed. Please use only a sip of water to take your instructed medications. Avoid all over the counter vitamins, supplements and NSAIDS for one week prior to surgery per anesthesia guidelines. Tylenol is ok to take as needed. Pt to follow Dr. Ibarra Her instructions. Pt to arrive to the [facility] and proceed to the SDS unit 2nd floor. You will receive a call one business day prior to surgery with an arrival time and hospital directions. If your surgery is scheduled on a Monday, the hospital will be calling you on the Friday prior to your surgery. If you have not heard from anyone by 8pm, please call the hospital supervisor through the hospital  at [phone]. Ana Every [phone]).     Do not eat or drink anything after midnight the night before your surgery, including candy, mints, lifesavers, or chewing gum. Do not drink alcohol 24hrs before your surgery. Try not to smoke at least 24hrs before your surgery. Follow the pre surgery showering instructions as listed in the [facility] Surgical Experience Booklet” or otherwise provided by your surgeon's office. Do not shave the surgical area 24 hours before surgery. Do not apply any lotions, creams, including makeup, cologne, deodorant, or perfumes after showering on the day of your surgery. No contact lenses, eye make-up, or artificial eyelashes. Remove nail polish, including gel polish, and any artificial, gel, or acrylic nails if possible. Remove all jewelry including rings and body piercing jewelry. Wear causal clothing that is easy to take on and off. Consider your type of surgery. Keep any valuables, jewelry, piercings at home. Please bring any specially ordered equipment (sling, braces) if indicated. Arrange for a responsible person to drive you to and from the hospital on the day of your surgery. Visitor Guidelines discussed. Call the surgeon's office with any new illnesses, exposures, or additional questions prior to surgery. Please reference your [facility] Surgical Experience Booklet” for additional information to prepare for your upcoming surgery.

## 2023-10-04 DIAGNOSIS — L30.9 DERMATITIS: ICD-10-CM

## 2023-10-04 RX ORDER — TRIAMCINOLONE ACETONIDE 5 MG/G
CREAM TOPICAL 3 TIMES DAILY PRN
Qty: 15 G | Refills: 2 | Status: SHIPPED | OUTPATIENT
Start: 2023-10-04

## 2023-10-05 ENCOUNTER — CONSULT (OUTPATIENT)
Dept: CARDIOLOGY CLINIC | Facility: CLINIC | Age: 68
End: 2023-10-05
Payer: COMMERCIAL

## 2023-10-05 VITALS
WEIGHT: 214 LBS | OXYGEN SATURATION: 97 % | BODY MASS INDEX: 39.38 KG/M2 | HEIGHT: 62 IN | DIASTOLIC BLOOD PRESSURE: 60 MMHG | SYSTOLIC BLOOD PRESSURE: 152 MMHG | HEART RATE: 83 BPM

## 2023-10-05 DIAGNOSIS — G89.4 CHRONIC PAIN SYNDROME: ICD-10-CM

## 2023-10-05 DIAGNOSIS — R73.03 PREDIABETES: ICD-10-CM

## 2023-10-05 DIAGNOSIS — K21.9 GASTROESOPHAGEAL REFLUX DISEASE WITHOUT ESOPHAGITIS: ICD-10-CM

## 2023-10-05 DIAGNOSIS — F32.A ANXIETY AND DEPRESSION: ICD-10-CM

## 2023-10-05 DIAGNOSIS — G89.29 CHRONIC MIDLINE LOW BACK PAIN WITHOUT SCIATICA: ICD-10-CM

## 2023-10-05 DIAGNOSIS — E78.5 DYSLIPIDEMIA: ICD-10-CM

## 2023-10-05 DIAGNOSIS — M54.50 CHRONIC MIDLINE LOW BACK PAIN WITHOUT SCIATICA: ICD-10-CM

## 2023-10-05 DIAGNOSIS — Z98.84 H/O BARIATRIC SURGERY: ICD-10-CM

## 2023-10-05 DIAGNOSIS — F41.9 ANXIETY AND DEPRESSION: ICD-10-CM

## 2023-10-05 DIAGNOSIS — M25.551 RIGHT HIP PAIN: ICD-10-CM

## 2023-10-05 DIAGNOSIS — I10 ESSENTIAL HYPERTENSION: ICD-10-CM

## 2023-10-05 DIAGNOSIS — M16.11 PRIMARY OSTEOARTHRITIS OF RIGHT HIP: ICD-10-CM

## 2023-10-05 DIAGNOSIS — F11.20 CONTINUOUS OPIOID DEPENDENCE (HCC): ICD-10-CM

## 2023-10-05 DIAGNOSIS — I10 ELEVATED BLOOD PRESSURE READING IN OFFICE WITH WHITE COAT SYNDROME, WITH DIAGNOSIS OF HYPERTENSION: ICD-10-CM

## 2023-10-05 DIAGNOSIS — E66.9 CLASS 2 OBESITY: ICD-10-CM

## 2023-10-05 DIAGNOSIS — Z01.810 PREOPERATIVE CARDIOVASCULAR EXAMINATION: Primary | ICD-10-CM

## 2023-10-05 PROBLEM — E66.812 CLASS 2 OBESITY: Status: ACTIVE | Noted: 2023-10-05

## 2023-10-05 PROBLEM — B18.2 CHRONIC HEPATITIS C WITHOUT HEPATIC COMA (HCC): Status: ACTIVE | Noted: 2023-10-05

## 2023-10-05 PROCEDURE — 99214 OFFICE O/P EST MOD 30 MIN: CPT | Performed by: INTERNAL MEDICINE

## 2023-10-05 PROCEDURE — 93000 ELECTROCARDIOGRAM COMPLETE: CPT | Performed by: INTERNAL MEDICINE

## 2023-10-05 NOTE — H&P (VIEW-ONLY)
Cardiology Office Consultation   Daun Dance 76 y.o. female MRN: [id number]  10/05/23  10:00 AM        Assessment/Plan     1. Very low cardiac risk for right total hip arthroplasty scheduled for 10/16/2023.  2.  No evidence of active cardiac disease with no cardiac symptoms and stable ECG pattern for 4-1/2 years. 3.  Longstanding and usually controlled essential hypertension with white coat syndrome in office today. 4.  Chronic and currently untreated dyslipidemia with intermediate 10-year ASCVD risk of 13.1%. 5.  History of penicillin anaphylaxis many years ago. 6.  Stable chronic anxiety and depression. 7.  Class 2 obesity with 18 pound weight loss in approximately 6 months and previous sleeve gastrectomy on 11/14/2011.  8.  Chronic pain syndrome with opioid prescription dependence, chronic low back pain and more recent severe right hip pain from primary osteoarthritis of right hip. 9.  Prediabetes mellitus with A1c of 5.8% recently. 10.  History of GERD without esophagitis. 11.  Reported history of hepatitis C in the remote past.        Plan and   Patient Instructions     1. There is no cardiac contraindication to proposed right total hip arthroplasty scheduled for 10/16/2023.  2. There is no indication for further cardiac testing at this time. 3. As patient's blood pressures have been well controlled at home and at other doctor visits, we assume that the mild elevation of her systolic pressure today is due to whitecoat syndrome. 4. There is no need for additional cardiology follow-up at this time.       Current Outpatient Medications   Medication Sig Dispense Refill   • acetaminophen (TYLENOL) 650 mg CR tablet Take 650 mg by mouth every 8 (eight) hours as needed for mild pain     • amLODIPine (NORVASC) 10 mg tablet TAKE 1 TABLET BY MOUTH DAILY (Patient taking differently: Take 10 mg by mouth every morning) 90 tablet 3   • cholecalciferol (VITAMIN D3) 1,000 units tablet Take 1 tablet (1,000 Units total) by mouth daily 30 tablet 0   • clindamycin (CLEOCIN) 300 MG capsule Take 600 mg by mouth 1 (one) time One hour before a procedure/dental work     • Coenzyme Q10 (CO Q 10) 100 MG CAPS Take by mouth daily at bedtime Last dose 5/13/19     • Cyanocobalamin (VITAMIN B 12 PO) Take 500 mcg by mouth every morning     • diphenhydrAMINE (BENADRYL) 25 mg tablet Take 25 mg by mouth every 8 (eight) hours as needed for itching      • escitalopram (LEXAPRO) 10 mg tablet TAKE 1 AND 1/2 TABLETS BY  MOUTH DAILY AT BEDTIME 135 tablet 1   • ferrous sulfate 324 (65 Fe) mg Take 1 tablet (324 mg total) by mouth daily before breakfast 30 tablet 0   • Glucosamine-Chondroitin (MOVE FREE PO) Take 2 tablets by mouth every morning     • Melatonin 10 MG TABS Take by mouth daily at bedtime     • omeprazole (PriLOSEC) 20 mg delayed release capsule TAKE 1 CAPSULE BY MOUTH IN THE  MORNING 90 capsule 1   • oxyCODONE-acetaminophen (Percocet) 7.5-325 MG per tablet Take 1 tablet by mouth every 4 (four) hours as needed for moderate pain Max Daily Amount: 6 tablets 120 tablet 0   • perindopril (ACEON) 8 MG tablet TAKE 1 TABLET BY MOUTH  TWICE DAILY 180 tablet 3   • triamcinolone (KENALOG) 0.5 % cream Apply topically 3 (three) times a day as needed for rash 15 g 2   • zinc sulfate (ZINCATE) 220 mg capsule Take 1 capsule (220 mg total) by mouth daily 30 capsule 0   • ascorbic Acid (VITAMIN C) 500 MG CPCR Take 1 capsule (500 mg total) by mouth 2 (two) times a day 60 capsule 0   • folic acid (FOLVITE) 1 mg tablet Take 1 tablet (1 mg total) by mouth daily 30 tablet 0     No current facility-administered medications for this visit.          History of Present Illness     Physician Requesting Consult:   Nicole Enriquez, DO/Orthopedic surgery      Reason for Consult / Principal Problem: Preoperative cardiovascular risk assessment      HPI:     Miguelito Emmanuel is a 76y.o. year old female who presents for cardiology consultation in anticipation of right total hip arthroplasty currently scheduled for 10/16/2023 at [address] [address] by Dr. Tonja Byrd. The patient's background includes longstanding essential hypertension, dyslipidemia, chronic pain syndrome with chronic low back pain and currently severe pain in right hip area, history of morbid and now class 2 obesity with prior gastric sleeve procedure in , continuous prescription opioid dependence, primary osteoarthritis of right hip. The patient once was hospitalized with a severe anaphylactic reaction to penicillin about 44 years ago. The patient denies any recent chest discomfort, dyspnea with activity or at rest, orthopnea, paroxysmal nocturnal dyspnea, palpitations, edema, syncope, dizziness, lightheadedness, cough, sputum production, wheezing, fever, chills. Her past surgical history includes bilateral total knee arthroplasty on 2009, right shoulder replacement on 2019, sleeve gastrectomy on 2011, remote cholecystectomy, appendectomy, and various nerve block, nerve ablation and rhizotomy procedures for her chronic low back pain as well as some additional minor surgeries listed below. The patient's family history included the death of her father at age 62 with history of hepatic cirrhosis and alcoholism as well as myocardial infarction and stroke at the age of 52. He  at age 62. Mother was healthy but  at age 80. She lost her brother at age 40 of pneumonia and lung abscess with background of schizophrenia. Social history includes the absence of cigarette smoking for at least 20 years, avoidance of alcoholic beverages for at least 30 years, and avoidance of illicit drug use for 55-90 years. She is dependent on prescription opioids for pain relief. She has been retired for 8 years and was a direct care attendant at [employer]. She lived with her mother for about 27 years and currently lives alone at home with her dog in the family home.   She has no children and was  for 1 year at age 32. Historical Information   Past Medical History:   Diagnosis Date   • Anxiety    • Apnea, sleep     patient states "repeated test says no"   • Arthritis     severe in the joints   • Chronic pain disorder     joints   • Depression    • DJD (degenerative joint disease)    • Fall 11/22/2018    tripped on a folded rug- left hamstring injury   • Foot injury     right foot-fx of bone-no repair-has severe arthritis in the foot and ankle   • Fractures yr. 2000 or before    left ankle   • GERD (gastroesophageal reflux disease)    • Hepatitis C not sure   • History of sleep apnea    • Hypercholesteremia    • Hypertension    • Insomnia    • Low back pain    • Obesity     Lost 70lb with gastric sleeve (2011)   • Osteoarthritis    • Stomach disorder     take meds. for this   • Use of cane as ambulatory aid    • Wears glasses    • Wears partial dentures     upper and lower-but did lose the lower        Past Surgical History:  Past Surgical History:   Procedure Laterality Date   • ANKLE ARTHROSCOPY Right    • ANKLE SURGERY  not sure    orthoscopic/rt.  ankle   • APPENDECTOMY     • BARIATRIC SURGERY  maybe 2011   • CHOLECYSTECTOMY     • COLONOSCOPY  10/26/2021   • DXA PROCEDURE (HISTORICAL)  10/13/2020   • GASTRECTOMY SLEEVE LAPAROSCOPIC  2011    lost 70 lb   • HAND SURGERY      rt. hand, trigger finger   • JOINT REPLACEMENT Bilateral     knee replacements   • KNEE SURGERY  2001 maybe   • MAMMO (HISTORICAL)  01/26/2022   • NERVE BLOCK Bilateral 12/29/2017    Procedure: L4, L5, S1 MEDIAL BRANCH BLOCK #1;  Surgeon: Nevaeh Higgins MD;  Location: [address] [place] [address] MAIN OR;  Service: Pain Management    • NERVE BLOCK Bilateral 01/12/2018    Procedure: BLOCK MEDIAL BRANCH L4, L5-S1  #2;  Surgeon: Nevaeh Higgins MD;  Location: [address] [place] [address] MAIN OR;  Service: Pain Management    • ME ARTHROPLASTY GLENOHUMERAL JOINT TOTAL SHOULDER Right 05/23/2019    Procedure: ARTHROPLASTY SHOULDER TOTAL , excision of deep cyst at shoulder, biceps tenotomy;  Surgeon: Desire Martell MD;  Location: [facility];  Service: Orthopedics   • AK INJECT SI JOINT ARTHRGRPHY&/ANES/STEROID W/LESA Right 10/23/2020    Procedure: Sacroiliac Joint injection (53992); Surgeon: Manny Ruvalcaba MD;  Location: [facility] MAIN OR;  Service: Pain Management    • AK TENDON SHEATH INCISION Right 2021    Procedure: RELEASE TRIGGER RING FINGER;  Surgeon: Carmen Sarkar MD;  Location: [facility];  Service: Orthopedics   • RADIOFREQUENCY ABLATION Right 2018    Procedure: Rt L4 L5 S1 RFA;  Surgeon: Manny Ruvalcaba MD;  Location: [address] [place] [address] MAIN OR;  Service: Pain Management    • RADIOFREQUENCY ABLATION Left 2018    Procedure: L3-S1 Radio Frequency Ablation ([id number]); Surgeon: Manny Ruvalcaba MD;  Location: [facility] MAIN OR;  Service: Pain Management    • RHIZOTOMY Left 2018    Procedure: L4, L5, S1 RFA;  Surgeon: Manny Ruvalcaba MD;  Location: [facility] MAIN OR;  Service: Pain Management    • SHOULDER SURGERY  2019    rt. total  replacement   • TONSILLECTOMY       Social History     Substance and Sexual Activity   Alcohol Use Not Currently    Comment: quit many yrs. ago     Social History     Substance and Sexual Activity   Drug Use Not Currently   • Types: Amphetamines, Marijuana    Comment: aprox.  35 years ago, maybe longer     Social History     Tobacco Use   Smoking Status Former   • Types: Cigarettes   • Quit date: 2004   • Years since quittin.7   • Passive exposure: Past   Smokeless Tobacco Never     Family History   Problem Relation Age of Onset   • Other Mother         in a nursing home-fx of the leg   • Heart disease Father         CHF   • Alcohol abuse Father    • Cirrhosis Father    • Colon cancer Maternal Grandmother    • No Known Problems Maternal Grandfather    • No Known Problems Paternal Grandmother    • No Known Problems Paternal Grandfather    • Other Brother         lung abscess, pneumonia   • Colon cancer Maternal Aunt    • No Known Problems Maternal Aunt    • No Known Problems Maternal Aunt    • No Known Problems Maternal Uncle    • No Known Problems Paternal Aunt    • No Known Problems Paternal Uncle    • Breast cancer Cousin 48       Meds/Allergies   Prior to Admission medications    Medication Sig Start Date End Date Taking?  Authorizing Provider   acetaminophen (TYLENOL) 650 mg CR tablet Take 650 mg by mouth every 8 (eight) hours as needed for mild pain   Yes Historical Provider, MD   amLODIPine (NORVASC) 10 mg tablet TAKE 1 TABLET BY MOUTH DAILY  Patient taking differently: Take 10 mg by mouth every morning 5/12/23  Yes Claudeen Ranks Annam, MD   cholecalciferol (VITAMIN D3) 1,000 units tablet Take 1 tablet (1,000 Units total) by mouth daily 8/31/23  Yes Jun Lyman PA-C   clindamycin (CLEOCIN) 300 MG capsule Take 600 mg by mouth 1 (one) time One hour before a procedure/dental work 9/8/23  Yes Historical Provider, MD   Coenzyme Q10 (CO Q 10) 100 MG CAPS Take by mouth daily at bedtime Last dose 5/13/19   Yes Historical Provider, MD   Cyanocobalamin (VITAMIN B 12 PO) Take 500 mcg by mouth every morning   Yes Historical Provider, MD   diphenhydrAMINE (BENADRYL) 25 mg tablet Take 25 mg by mouth every 8 (eight) hours as needed for itching    Yes Historical Provider, MD   escitalopram (LEXAPRO) 10 mg tablet TAKE 1 AND 1/2 TABLETS BY  MOUTH DAILY AT BEDTIME 3/17/23  Yes Claudeen Ranks Annam, MD   ferrous sulfate 324 (65 Fe) mg Take 1 tablet (324 mg total) by mouth daily before breakfast 8/31/23  Yes Jame Lewis PA-C   Glucosamine-Chondroitin (MOVE FREE PO) Take 2 tablets by mouth every morning   Yes Historical Provider, MD   Melatonin 10 MG TABS Take by mouth daily at bedtime   Yes Historical Provider, MD   omeprazole (PriLOSEC) 20 mg delayed release capsule TAKE 1 CAPSULE BY MOUTH IN THE  MORNING 5/13/23  Yes Claudeen Ranks Annam, MD   oxyCODONE-acetaminophen (Percocet) 7.5-325 MG per tablet Take 1 tablet by mouth every 4 (four) hours as needed for moderate pain Max Daily Amount: 6 tablets 9/25/23  Yes Gustabo Sy MD   perindopril (ACEON) 8 MG tablet TAKE 1 TABLET BY MOUTH  TWICE DAILY 8/3/23  Yes Kushal Sy MD   triamcinolone (KENALOG) 0.5 % cream Apply topically 3 (three) times a day as needed for rash 10/4/23  Yes Gustabo Sy MD   zinc sulfate (ZINCATE) 220 mg capsule Take 1 capsule (220 mg total) by mouth daily 8/31/23  Yes Sherman Oh PA-C   ascorbic Acid (VITAMIN C) 500 MG CPCR Take 1 capsule (500 mg total) by mouth 2 (two) times a day 8/31/23 9/30/23  Sherman Oh PA-C   folic acid (FOLVITE) 1 mg tablet Take 1 tablet (1 mg total) by mouth daily 8/31/23 9/30/23  Sherman Oh PA-C     Allergies   Allergen Reactions   • Penicillins Anaphylaxis     Total body redness and fever   • Levaquin [Levofloxacin] Itching   • Tomato - Food Allergy Rash       Cardiovascular ROS:   More than 10 systems reviewed and all systems negative, except as noted in history of present illness  . Objective     VITALS:   Blood pressure 152/60, pulse 83, height 5' 2" (1.575 m), weight 97.1 kg (214 lb), SpO2 97 %. Body mass index is 39.14 kg/m². 18 pound weight loss in approximately 6 months    Physical Exam      General-Well-developed well-nourished markedly obese  female in no acute distress. Patient is alert, oriented X3 and cooperative. Skin-Warm and dry with no pallor, rashes, ecchymoses, lesions. HEENT-Normocephalic. Pupils equally round and reactive to light and accommodation. Extraocular muscles intact. Mucous membranes pink and moist. Sclerae nonicteric. Optic fundi poorly seen. Neck-No jugular venous distention, AJR, masses, thyromegaly, adenopathy, bruits. Lungs-No rales, rhonchi, wheezes. Heart-No murmur, gallop, rub, click, heave, thrill. Abdomen-Normal bowel sounds with no masses, organomegaly, guarding, tenderness, or rigidity. Marked abdominal obesity noted.   Extremities-No cyanosis, clubbing, edema, pulse decrease. Neurological-No focal neurological signs. EKG 10/05/23:     Normal sinus rhythm at 83 bpm  Diffuse nonspecific ST abnormalities  Abnormal ECG with no major change since 5/13/2019      IMAGING:    No Chest XR results available for this patient. LAB REVIEW:    Lab Results   Component Value Date    SODIUM 140 09/19/2023    K 4.3 09/19/2023     09/19/2023    CO2 31 09/19/2023    BUN 13 09/19/2023    CREATININE 0.48 (L) 09/19/2023    GLUF 91 09/19/2023    CALCIUM 10.4 (H) 09/19/2023    AST 20 09/19/2023    ALT 15 09/19/2023    ALKPHOS 77 09/19/2023    EGFR 101 09/19/2023     Lab Results   Component Value Date    CHOLESTEROL 207 (H) 06/02/2023     Lab Results   Component Value Date    HDL 77 06/02/2023       Lab Results   Component Value Date    LDLCALC 111 (H) 06/02/2023     No components found for: "[facility]"  Lab Results   Component Value Date    VGQ8XTOEMBSC 2.169 06/02/2023     Lab Results   Component Value Date    TRIG 95 06/02/2023     10-year ASCVD RISK: 13.1%, intermediate risk    CBC, PT/PTT, MRSA culture 9/19/2023: All normal or negative  A1c and estimated average glucose 9/19/2023: 5.8% and 120        Total office time spent today 49 minutes.      Ana Rivera MD

## 2023-10-05 NOTE — PATIENT INSTRUCTIONS
There is no cardiac contraindication to proposed right total hip arthroplasty scheduled for 10/16/2023. There is no indication for further cardiac testing at this time. As patient's blood pressures have been well controlled at home and at other doctor visits, we assume that the mild elevation of her systolic pressure today is due to whitecoat syndrome. There is no need for additional cardiology follow-up at this time.

## 2023-10-05 NOTE — LETTER
Cardiology Pre Operative Clearance      PRE OPERATIVE CARDIAC RISK ASSESSMENT    10/05/23    Rosendo Hernández  1955  [id number]    Date of Surgery: 10/16/2023    Type of Surgery: ARTHROPLASTY HIP TOTAL ANTERIOR,NAVIGATED - overnight (Right: Hip)    Surgeon: Adam Frye DO    Anticoagulation: None    Physician Comment: No cardiac contraindication to proposed right total hip arthroplasty scheduled for 10/16/2023. Electronically Signed: Gregorio Veronica.   Mina Sapp MD

## 2023-10-05 NOTE — PROGRESS NOTES
Cardiology Office Consultation   Sayra Almeida 76 y.o. female MRN: [id number]  10/05/23  10:00 AM        Assessment/Plan     1. Very low cardiac risk for right total hip arthroplasty scheduled for 10/16/2023.  2.  No evidence of active cardiac disease with no cardiac symptoms and stable ECG pattern for 4-1/2 years. 3.  Longstanding and usually controlled essential hypertension with white coat syndrome in office today. 4.  Chronic and currently untreated dyslipidemia with intermediate 10-year ASCVD risk of 13.1%. 5.  History of penicillin anaphylaxis many years ago. 6.  Stable chronic anxiety and depression. 7.  Class 2 obesity with 18 pound weight loss in approximately 6 months and previous sleeve gastrectomy on 11/14/2011.  8.  Chronic pain syndrome with opioid prescription dependence, chronic low back pain and more recent severe right hip pain from primary osteoarthritis of right hip. 9.  Prediabetes mellitus with A1c of 5.8% recently. 10.  History of GERD without esophagitis. 11.  Reported history of hepatitis C in the remote past.        Plan and   Patient Instructions     1. There is no cardiac contraindication to proposed right total hip arthroplasty scheduled for 10/16/2023.  2. There is no indication for further cardiac testing at this time. 3. As patient's blood pressures have been well controlled at home and at other doctor visits, we assume that the mild elevation of her systolic pressure today is due to whitecoat syndrome. 4. There is no need for additional cardiology follow-up at this time.       Current Outpatient Medications   Medication Sig Dispense Refill   • acetaminophen (TYLENOL) 650 mg CR tablet Take 650 mg by mouth every 8 (eight) hours as needed for mild pain     • amLODIPine (NORVASC) 10 mg tablet TAKE 1 TABLET BY MOUTH DAILY (Patient taking differently: Take 10 mg by mouth every morning) 90 tablet 3   • cholecalciferol (VITAMIN D3) 1,000 units tablet Take 1 tablet (1,000 Units total) by mouth daily 30 tablet 0   • clindamycin (CLEOCIN) 300 MG capsule Take 600 mg by mouth 1 (one) time One hour before a procedure/dental work     • Coenzyme Q10 (CO Q 10) 100 MG CAPS Take by mouth daily at bedtime Last dose 5/13/19     • Cyanocobalamin (VITAMIN B 12 PO) Take 500 mcg by mouth every morning     • diphenhydrAMINE (BENADRYL) 25 mg tablet Take 25 mg by mouth every 8 (eight) hours as needed for itching      • escitalopram (LEXAPRO) 10 mg tablet TAKE 1 AND 1/2 TABLETS BY  MOUTH DAILY AT BEDTIME 135 tablet 1   • ferrous sulfate 324 (65 Fe) mg Take 1 tablet (324 mg total) by mouth daily before breakfast 30 tablet 0   • Glucosamine-Chondroitin (MOVE FREE PO) Take 2 tablets by mouth every morning     • Melatonin 10 MG TABS Take by mouth daily at bedtime     • omeprazole (PriLOSEC) 20 mg delayed release capsule TAKE 1 CAPSULE BY MOUTH IN THE  MORNING 90 capsule 1   • oxyCODONE-acetaminophen (Percocet) 7.5-325 MG per tablet Take 1 tablet by mouth every 4 (four) hours as needed for moderate pain Max Daily Amount: 6 tablets 120 tablet 0   • perindopril (ACEON) 8 MG tablet TAKE 1 TABLET BY MOUTH  TWICE DAILY 180 tablet 3   • triamcinolone (KENALOG) 0.5 % cream Apply topically 3 (three) times a day as needed for rash 15 g 2   • zinc sulfate (ZINCATE) 220 mg capsule Take 1 capsule (220 mg total) by mouth daily 30 capsule 0   • ascorbic Acid (VITAMIN C) 500 MG CPCR Take 1 capsule (500 mg total) by mouth 2 (two) times a day 60 capsule 0   • folic acid (FOLVITE) 1 mg tablet Take 1 tablet (1 mg total) by mouth daily 30 tablet 0     No current facility-administered medications for this visit.          History of Present Illness     Physician Requesting Consult:   Randell An DO/Orthopedic surgery      Reason for Consult / Principal Problem: Preoperative cardiovascular risk assessment      HPI:     Ruthine Mcardle is a 76y.o. year old female who presents for cardiology consultation in anticipation of right total hip arthroplasty currently scheduled for 10/16/2023 at [facility] by Dr. Ale Alarcon. The patient's background includes longstanding essential hypertension, dyslipidemia, chronic pain syndrome with chronic low back pain and currently severe pain in right hip area, history of morbid and now class 2 obesity with prior gastric sleeve procedure in , continuous prescription opioid dependence, primary osteoarthritis of right hip. The patient once was hospitalized with a severe anaphylactic reaction to penicillin about 44 years ago. The patient denies any recent chest discomfort, dyspnea with activity or at rest, orthopnea, paroxysmal nocturnal dyspnea, palpitations, edema, syncope, dizziness, lightheadedness, cough, sputum production, wheezing, fever, chills. Her past surgical history includes bilateral total knee arthroplasty on 2009, right shoulder replacement on 2019, sleeve gastrectomy on 2011, remote cholecystectomy, appendectomy, and various nerve block, nerve ablation and rhizotomy procedures for her chronic low back pain as well as some additional minor surgeries listed below. The patient's family history included the death of her father at age 62 with history of hepatic cirrhosis and alcoholism as well as myocardial infarction and stroke at the age of 52. He  at age 62. Mother was healthy but  at age 80. She lost her brother at age 40 of pneumonia and lung abscess with background of schizophrenia. Social history includes the absence of cigarette smoking for at least 20 years, avoidance of alcoholic beverages for at least 30 years, and avoidance of illicit drug use for 02-59 years. She is dependent on prescription opioids for pain relief. She has been retired for 8 years and was a direct care attendant at [employer]. She lived with her mother for about 27 years and currently lives alone at home with her dog in the family home.   She has no children and was  for 1 year at age 32. Historical Information   Past Medical History:   Diagnosis Date   • Anxiety    • Apnea, sleep     patient states "repeated test says no"   • Arthritis     severe in the joints   • Chronic pain disorder     joints   • Depression    • DJD (degenerative joint disease)    • Fall 11/22/2018    tripped on a folded rug- left hamstring injury   • Foot injury     right foot-fx of bone-no repair-has severe arthritis in the foot and ankle   • Fractures yr. 2000 or before    left ankle   • GERD (gastroesophageal reflux disease)    • Hepatitis C not sure   • History of sleep apnea    • Hypercholesteremia    • Hypertension    • Insomnia    • Low back pain    • Obesity     Lost 70lb with gastric sleeve (2011)   • Osteoarthritis    • Stomach disorder     take meds. for this   • Use of cane as ambulatory aid    • Wears glasses    • Wears partial dentures     upper and lower-but did lose the lower        Past Surgical History:  Past Surgical History:   Procedure Laterality Date   • ANKLE ARTHROSCOPY Right    • ANKLE SURGERY  not sure    orthoscopic/rt.  ankle   • APPENDECTOMY     • BARIATRIC SURGERY  maybe 2011   • CHOLECYSTECTOMY     • COLONOSCOPY  10/26/2021   • DXA PROCEDURE (HISTORICAL)  10/13/2020   • GASTRECTOMY SLEEVE LAPAROSCOPIC  2011    lost 70 lb   • HAND SURGERY      rt. hand, trigger finger   • JOINT REPLACEMENT Bilateral     knee replacements   • KNEE SURGERY  2001 maybe   • MAMMO (HISTORICAL)  01/26/2022   • NERVE BLOCK Bilateral 12/29/2017    Procedure: L4, L5, S1 MEDIAL BRANCH BLOCK #1;  Surgeon: Dorothea Hammonds MD;  Location: [facility] MAIN OR;  Service: Pain Management    • NERVE BLOCK Bilateral 01/12/2018    Procedure: BLOCK MEDIAL BRANCH L4, L5-S1  #2;  Surgeon: Dorothea Hammonds MD;  Location: [facility] MAIN OR;  Service: Pain Management    • MD ARTHROPLASTY GLENOHUMERAL JOINT TOTAL SHOULDER Right 05/23/2019    Procedure: ARTHROPLASTY SHOULDER TOTAL , excision of deep cyst at shoulder, biceps tenotomy;  Surgeon: Laney Travis MD;  Location: [facility];  Service: Orthopedics   • SC INJECT SI JOINT ARTHRGRPHY&/ANES/STEROID W/LESA Right 10/23/2020    Procedure: Sacroiliac Joint injection (22051); Surgeon: Brigida Villela MD;  Location: [facility] MAIN OR;  Service: Pain Management    • SC TENDON SHEATH INCISION Right 2021    Procedure: RELEASE TRIGGER RING FINGER;  Surgeon: Myke Vergara MD;  Location: [facility];  Service: Orthopedics   • RADIOFREQUENCY ABLATION Right 2018    Procedure: Rt L4 L5 S1 RFA;  Surgeon: Brigida Villela MD;  Location: [address] [place] [address] [employer] MAIN OR;  Service: Pain Management    • RADIOFREQUENCY ABLATION Left 2018    Procedure: L3-S1 Radio Frequency Ablation ([id number]); Surgeon: Brigida Villela MD;  Location: [facility] OR;  Service: Pain Management    • RHIZOTOMY Left 2018    Procedure: L4, L5, S1 RFA;  Surgeon: Brigida Villela MD;  Location: [facility] [employer] [facility] OR;  Service: Pain Management    • SHOULDER SURGERY  2019    rt. total  replacement   • TONSILLECTOMY       Social History     Substance and Sexual Activity   Alcohol Use Not Currently    Comment: quit many yrs. ago     Social History     Substance and Sexual Activity   Drug Use Not Currently   • Types: Amphetamines, Marijuana    Comment: aprox.  35 years ago, maybe longer     Social History     Tobacco Use   Smoking Status Former   • Types: Cigarettes   • Quit date: 2004   • Years since quittin.7   • Passive exposure: Past   Smokeless Tobacco Never     Family History   Problem Relation Age of Onset   • Other Mother         in a nursing home-fx of the leg   • Heart disease Father         CHF   • Alcohol abuse Father    • Cirrhosis Father    • Colon cancer Maternal Grandmother    • No Known Problems Maternal Grandfather    • No Known Problems Paternal Grandmother    • No Known Problems Paternal Grandfather    • Other Brother         lung abscess, pneumonia   • Colon cancer Maternal Aunt    • No Known Problems Maternal Aunt    • No Known Problems Maternal Aunt    • No Known Problems Maternal Uncle    • No Known Problems Paternal Aunt    • No Known Problems Paternal Uncle    • Breast cancer Cousin 48       Meds/Allergies   Prior to Admission medications    Medication Sig Start Date End Date Taking?  Authorizing Provider   acetaminophen (TYLENOL) 650 mg CR tablet Take 650 mg by mouth every 8 (eight) hours as needed for mild pain   Yes Historical Provider, MD   amLODIPine (NORVASC) 10 mg tablet TAKE 1 TABLET BY MOUTH DAILY  Patient taking differently: Take 10 mg by mouth every morning 5/12/23  Yes Chelsea Sy MD   cholecalciferol (VITAMIN D3) 1,000 units tablet Take 1 tablet (1,000 Units total) by mouth daily 8/31/23  Yes Nitin Singh PA-C   clindamycin (CLEOCIN) 300 MG capsule Take 600 mg by mouth 1 (one) time One hour before a procedure/dental work 9/8/23  Yes Historical Provider, MD   Coenzyme Q10 (CO Q 10) 100 MG CAPS Take by mouth daily at bedtime Last dose 5/13/19   Yes Historical Provider, MD   Cyanocobalamin (VITAMIN B 12 PO) Take 500 mcg by mouth every morning   Yes Historical Provider, MD   diphenhydrAMINE (BENADRYL) 25 mg tablet Take 25 mg by mouth every 8 (eight) hours as needed for itching    Yes Historical Provider, MD   escitalopram (LEXAPRO) 10 mg tablet TAKE 1 AND 1/2 TABLETS BY  MOUTH DAILY AT BEDTIME 3/17/23  Yes Chelsea Sy MD   ferrous sulfate 324 (65 Fe) mg Take 1 tablet (324 mg total) by mouth daily before breakfast 8/31/23  Yes Jame Lewis PA-C   Glucosamine-Chondroitin (MOVE FREE PO) Take 2 tablets by mouth every morning   Yes Historical Provider, MD   Melatonin 10 MG TABS Take by mouth daily at bedtime   Yes Historical Provider, MD   omeprazole (PriLOSEC) 20 mg delayed release capsule TAKE 1 CAPSULE BY MOUTH IN THE  MORNING 5/13/23  Yes Chelsea Sy MD   oxyCODONE-acetaminophen (Percocet) 7.5-325 MG per tablet Take 1 tablet by mouth every 4 (four) hours as needed for moderate pain Max Daily Amount: 6 tablets 9/25/23  Yes Gustabo Sy MD   perindopril (ACEON) 8 MG tablet TAKE 1 TABLET BY MOUTH  TWICE DAILY 8/3/23  Yes Odalis Sy MD   triamcinolone (KENALOG) 0.5 % cream Apply topically 3 (three) times a day as needed for rash 10/4/23  Yes Gustabo Sy MD   zinc sulfate (ZINCATE) 220 mg capsule Take 1 capsule (220 mg total) by mouth daily 8/31/23  Yes [facility]   ascorbic Acid (VITAMIN C) 500 MG CPCR Take 1 capsule (500 mg total) by mouth 2 (two) times a day 8/31/23 9/30/23  [facility]   folic acid (FOLVITE) 1 mg tablet Take 1 tablet (1 mg total) by mouth daily 8/31/23 9/30/23  [facility]     Allergies   Allergen Reactions   • Penicillins Anaphylaxis     Total body redness and fever   • Levaquin [Levofloxacin] Itching   • Tomato - Food Allergy Rash       Cardiovascular ROS:   More than 10 systems reviewed and all systems negative, except as noted in history of present illness  . Objective     VITALS:   Blood pressure 152/60, pulse 83, height 5' 2" (1.575 m), weight 97.1 kg (214 lb), SpO2 97 %. Body mass index is 39.14 kg/m². 18 pound weight loss in approximately 6 months    Physical Exam      General-Well-developed well-nourished markedly obese  female in no acute distress. Patient is alert, oriented X3 and cooperative. Skin-Warm and dry with no pallor, rashes, ecchymoses, lesions. HEENT-Normocephalic. Pupils equally round and reactive to light and accommodation. Extraocular muscles intact. Mucous membranes pink and moist. Sclerae nonicteric. Optic fundi poorly seen. Neck-No jugular venous distention, AJR, masses, thyromegaly, adenopathy, bruits. Lungs-No rales, rhonchi, wheezes. Heart-No murmur, gallop, rub, click, heave, thrill. Abdomen-Normal bowel sounds with no masses, organomegaly, guarding, tenderness, or rigidity. Marked abdominal obesity noted.   Extremities-No cyanosis, clubbing, edema, pulse decrease. Neurological-No focal neurological signs. EKG 10/05/23:     Normal sinus rhythm at 83 bpm  Diffuse nonspecific ST abnormalities  Abnormal ECG with no major change since 5/13/2019      IMAGING:    No Chest XR results available for this patient. LAB REVIEW:    Lab Results   Component Value Date    SODIUM 140 09/19/2023    K 4.3 09/19/2023     09/19/2023    CO2 31 09/19/2023    BUN 13 09/19/2023    CREATININE 0.48 (L) 09/19/2023    GLUF 91 09/19/2023    CALCIUM 10.4 (H) 09/19/2023    AST 20 09/19/2023    ALT 15 09/19/2023    ALKPHOS 77 09/19/2023    EGFR 101 09/19/2023     Lab Results   Component Value Date    CHOLESTEROL 207 (H) 06/02/2023     Lab Results   Component Value Date    HDL 77 06/02/2023       Lab Results   Component Value Date    LDLCALC 111 (H) 06/02/2023     No components found for: "[facility]"  Lab Results   Component Value Date    ZWF7QNIOQHFF 2.169 06/02/2023     Lab Results   Component Value Date    TRIG 95 06/02/2023     10-year ASCVD RISK: 13.1%, intermediate risk    CBC, PT/PTT, MRSA culture 9/19/2023: All normal or negative  A1c and estimated average glucose 9/19/2023: 5.8% and 120        Total office time spent today 49 minutes.      Terence Krishnamurthy MD

## 2023-10-09 ENCOUNTER — EVALUATION (OUTPATIENT)
Dept: PHYSICAL THERAPY | Facility: CLINIC | Age: 68
End: 2023-10-09
Payer: COMMERCIAL

## 2023-10-09 DIAGNOSIS — M16.11 PRIMARY OSTEOARTHRITIS OF RIGHT HIP: Primary | ICD-10-CM

## 2023-10-09 DIAGNOSIS — Z01.818 PRE-OPERATIVE EXAMINATION: ICD-10-CM

## 2023-10-09 DIAGNOSIS — M25.551 RIGHT HIP PAIN: ICD-10-CM

## 2023-10-09 PROCEDURE — 97161 PT EVAL LOW COMPLEX 20 MIN: CPT | Performed by: PHYSICAL THERAPIST

## 2023-10-09 NOTE — PROGRESS NOTES
PT Evaluation     Today's date: 10/9/2023  Patient name: Sanjay Byrd  : 1955  MRN: [id number]  Referring provider: Nanci Chavez  Dx:   Encounter Diagnosis     ICD-10-CM    1. Primary osteoarthritis of right hip  M16.11 Ambulatory referral to Physical Therapy      2. Right hip pain  M25.551 Ambulatory referral to Physical Therapy      3. Pre-operative examination  Z01.818 Ambulatory referral to Physical Therapy                     Assessment  Assessment details:   CASE SUMMARY:   Sanjay Byrd is a 76y.o. year old female who presents to OPPT upon referral from ortho  secondary to pre op right THR scheduled for 10/16/23. Alina Cole reports onset of symptoms  as being chronic. Overall symptom progression at this time is worsening. Previous injury to this area: none  Previous treatment includes: PT  Diagnostic testing includes: xray. PMHx includes: See chart for full details with medications, allergies, past family history, past medical history, social history, past surgical history and problem list. All topics were reviewed. Functionally, at baseline, Alina Cole is independent  Currently, Alina Cole is limited in the following activities: ambulation difficult, stairs difficult, diffiuclty sleepin. Alina Cole is lives alone in a 2 story home with 4 stairs to enter and flight stairs inside with + railings. Recreational activities include: none . Sanjay Byrd is retired.      Patient goal(s) for physical therapy is: to walk with less pain      The following is a summary of Alina Cole objective status:    Impairments:   Postural dysfunction  Reduced ROM  Reduced strength  + TTP and increased soft tissue density  Reduced flexibility  Gait/elevation dysfunction  Reduced stability  Transfers impairments  Reduced activity tolerance including above stated functional limitations    Patient's clinical presentation is consistent with their referring diagnosis of: Primary osteoarthritis of right hip  (primary encounter diagnosis)  Plan: Ambulatory referral to Physical Therapy    Right hip pain  Plan: Ambulatory referral to Physical Therapy    Pre-operative examination  Plan: Ambulatory referral to Physical Therapy      PLOC was discussed and agreed upon with patient. Patient was educated on: total joint packet, gait training with RW, safe sequencing  In stair negotiation, post op ice /elevation and HEP for post op exercises as well as DVT prevention and infection awareness. .     Patient vocalized a good understanding of  PLOC and HEP issued. Deya Dale would benefit from skilled physical therapy services to address their aforementioned functional limitations and progress towards prior level of function, to meet stated goals,  and independence with home exercise program.  Prognosis for successful rehab outcome is good with consistent participation in therapy and carryover of HEP and PLOC. FOTO score TBD after surgery.    Other impairment: reduced stability    Goals  Pre op goals:   +patient will be independent in basic HEP for post op (1 session) MET      STG: (TBD post op)  + Patient will report a +++% improvement in symptoms (2-3 weeks)   + Patient will demonstrate a good quad set independently  (2-3 weeks)   + Patient will be independent in basic HEP (2-3 weeks)  + Patient will demonstrate reduced gait deviations ambulating on level surfaces with assistive device    (2-3 week  + Patient will demonstrate an increase in range of motion   Hip/knee +++ to  +++ (3 weeks)  + Patient will report increased ease sleeping due to a reduction in pain (2-3 weeks)      LTG:  + Patient will report a +++% improvement in symptoms (4-6 weeks)   + Patient will increase FOTO score to expected score (4-6 sessions)   + Patient will be independent in comprehensive HEP (4-6 weeks)  + Patient will demonstrate ambulating on level surfaces Without an assistive device(4-6 week)  + Patient will negotiate stairs reciprically with use of one railing (6-8 weeks)  + Patient will demonstrate an increase in range of motion hip/knee AROM ++  to  +++  (4-6 weeks)  + Patient will demonstrate increase  MMT of hip/knee to  4+/5 for maximum function. (4-6 weeks)  + Patient will increase SLS time to +++ seconds (2-3 weeks)    Plan  Plan details: reeval next session after surgery. Post-op:(to be amended as needed)   1-3x week 4-8 weeks HEP development, stretching per objective finding , strengthening t/o LE, A/AA/PROM, joint mobilizations prn,  posture education, STM/MI as needed per objective findings to reduce muscle tension and reduced edema, neuro reeducation, gait and balance training, Ktape prn, STM/MI prn, PLOC discussed and agreed upon with patient all within doctors precautions.  .    Plan of Care beginning date: 10/9/2023  Plan of Care expiration date: 2023  Treatment plan discussed with: patient        Subjective    Objective     Static Posture     Comments  Posture:reduced weightbearing on right     Gait:  + SPC: reduced stance time on right     functional activities:    Squat:needs UE assistance with getting out of chair      MMT:   Hip flexion: Right:3-/5 + pain   Left: 4-/5   IR: Right: 4/5  Left: 4/5    Er: Right: 4/5    Left:     hip abduction: Right: 4/5    Left: 4/5         ROM:   Flexion: Right:  80 deg  + pain Left:95   Abduction: Right: wnl + pain Left:wnl   IR: Right: 8 + pain Left: wnl    ER: Right: 27  + pain Left: wnl   Adduction: Right:min LOM + pain  Left:wnl     Knee:   ROM: Right:wnl  left: wnl   MMT: Right: 4+/5 flex,ext    Left: 4+/5 flex , ext      TU:94 sec                Eval/ Re-eval Auth #/ Referral # Total visits Start date  Expiration date Total active units  Total manual units  PT only or  PT+OT?   10/9/23                                                            Past Medical History:   Diagnosis Date   • Anxiety    • Apnea, sleep     patient states "repeated test says no"   • Arthritis     severe in the joints   • Chronic pain disorder     joints   • Depression    • DJD (degenerative joint disease)    • Fall 11/22/2018    tripped on a folded rug- left hamstring injury   • Foot injury     right foot-fx of bone-no repair-has severe arthritis in the foot and ankle   • Fractures yr. 2000 or before    left ankle   • GERD (gastroesophageal reflux disease)    • Hepatitis C not sure   • History of sleep apnea    • Hypercholesteremia    • Hypertension    • Insomnia    • Low back pain    • Obesity     Lost 70lb with gastric sleeve (2011)   • Osteoarthritis    • Stomach disorder     take meds. for this   • Use of cane as ambulatory aid    • Wears glasses    • Wears partial dentures     upper and lower-but did lose the lower          Specialty Daily Treatment Diary       Insurance:  Pre op        Visits: 1       Manual: 10/9/23               STM/MI: prn                                Protocol:          Ther ex:         PROM: right hip        Man Flexibility: hip flexor, piriformis, glutes                Rec bike/nustep        Heel slides instruct       Ball squeeze        SLR + TA        Seated knee extension/flexion instruct       LAQ instruct       Supine hip abduction  instruct       Quad set  instruct       Glute set  instruct       Glute set + bridge                Therapeutic Activity:        Reevaluation        Posture Education                Gait training:                 HEP:                Modalities:        MH        Ice

## 2023-10-12 ENCOUNTER — ANESTHESIA EVENT (OUTPATIENT)
Dept: PERIOP | Facility: HOSPITAL | Age: 68
End: 2023-10-12
Payer: COMMERCIAL

## 2023-10-13 NOTE — DISCHARGE INSTR - AVS FIRST PAGE
Direct Anterior Total Hip Replacement     WHAT YOU SHOULD KNOW:   Minimally invasive total hip replacement is surgery to replace a damaged hip joint with an implant. AFTER YOU LEAVE:     Medicines:   Anticoagulants  are a type of blood thinner medicine that helps prevent clots. Clots can cause strokes, heart attacks, and death. These medicines may cause you to bleed or bruise more easily. You will be on aspirin 81mg twice a day for 6 weeks. Along with this, you will be given Protonix 40 mg to take daily for 6 weeks    Watch for bleeding from your gums or nose. Watch for blood in your urine and bowel movements. Use a soft washcloth and a soft toothbrush. If you shave, use an electric razor. Avoid activities that can cause bruising or bleeding. Tell your healthcare provider about all medicines you take because many medicines cannot be used with anticoagulants. Do not start or stop any medicines unless your healthcare provider tells you to. Tell your dentist and other healthcare providers that you take anticoagulants. Wear a bracelet or necklace that says you take this medicine. Prescription pain medicine  You should take 975mg of over the counter acetaminophen (tylenol) every 8 hours for baseline pain control. You will also be given oxycodone 5mg tablets. Take 1-2 by mouth every 4 to 6 hours as needed for pain. Due to the [facility], you will also be given a prescription for Narcan    Stool softeners  make it easier for you to have a bowel movement. You may need this medicine to treat or prevent constipation. You will be given Colace 100mg to take twice a day    Take your medicine as directed. Contact your orthopedist if you think your medicine is not helping or if you have side effects. Tell him if you are allergic to any medicine. Keep a list of the medicines, vitamins, and herbs you take. Include the amounts, and when and why you take them.  Bring the list or the pill bottles to follow-up visits. Carry your medicine list with you in case of an emergency. Follow up with your orthopedist as directed: You may need to return to have your wound checked. Write down your questions so you remember to ask them during your visits. Please schedule an appointment to see Dr. Eleanor Torres 1 week after surgery for a wound check    Physical therapy:  A physical therapist teaches you exercises to help improve movement and strength, and to decrease pain. You will begin outpatient physical therapy later this week as planned. Wound Care:  Please leave the Mepilex dressing in place for 7 days after surgery. After 7 days, you may remove the dressing and leave the incision open to air. If the incision is uncomfortable under clothing after the Mepilex is removed, you may cover it with a a dry sterile dressing, but should remain dry at all times. Once the dressing is off, please keep the incision dry for another week until your follow up appointment    Self-care:   Use a cane, walker, or crutches as directed. These devices will help decrease your risk of falling. Your therapist will guide you. Use ice:  Ice helps decrease swelling and pain. Ice may also help prevent tissue damage. Use an ice pack or put crushed ice in a plastic bag. Cover it with a towel, and place it on your knee for 15 to 20 minutes every hour as directed. Prevent dislocation of your hip implant:      Avoid extremes of external rotation of the leg    Contact your orthopedist if:  You have a fever over 101.0°F.    You have trouble moving or bending your joint. You have increased pain and swelling in your joint, even after you take pain medicine. You have back pain or lower leg pain when you bend your foot upwards. You have questions or concerns about your condition or care. Blood soaks through/saturates your bandage. Your incision comes apart. Your incision is red, swollen, or draining pus.     You cannot walk or move your joint, or the limb is numb. Your leg feels warm, tender, and painful. It may look swollen and red. Seek immediate care or call 911 if: You feel like you are going to faint. You have a seizure or feel confused. You feel lightheaded, short of breath, and have chest pain. You have chest pain when you take a deep breath or cough. You may cough up blood. © 2014 [facility] is for End User's use only and may not be sold, redistributed or otherwise used for commercial purposes. All illustrations and images included in CareNotes® are the copyrighted property of [employer] or [employer]. The above information is an  only. It is not intended as medical advice for individual conditions or treatments. Talk to your doctor, nurse or pharmacist before following any medical regimen to see if it is safe and effective for you.

## 2023-10-16 ENCOUNTER — APPOINTMENT (OUTPATIENT)
Dept: RADIOLOGY | Facility: HOSPITAL | Age: 68
End: 2023-10-16
Payer: COMMERCIAL

## 2023-10-16 ENCOUNTER — ANESTHESIA (OUTPATIENT)
Dept: PERIOP | Facility: HOSPITAL | Age: 68
End: 2023-10-16
Payer: COMMERCIAL

## 2023-10-16 ENCOUNTER — HOSPITAL ENCOUNTER (OUTPATIENT)
Facility: HOSPITAL | Age: 68
Setting detail: OUTPATIENT SURGERY
Discharge: HOME | End: 2023-10-17
Attending: ORTHOPAEDIC SURGERY | Admitting: ORTHOPAEDIC SURGERY
Payer: COMMERCIAL

## 2023-10-16 DIAGNOSIS — Z96.641 STATUS POST TOTAL REPLACEMENT OF RIGHT HIP: Primary | ICD-10-CM

## 2023-10-16 LAB — GLUCOSE SERPL-MCNC: 134 MG/DL (ref 65–140)

## 2023-10-16 PROCEDURE — 97163 PT EVAL HIGH COMPLEX 45 MIN: CPT

## 2023-10-16 PROCEDURE — C1776 JOINT DEVICE (IMPLANTABLE): HCPCS | Performed by: ORTHOPAEDIC SURGERY

## 2023-10-16 PROCEDURE — 97110 THERAPEUTIC EXERCISES: CPT

## 2023-10-16 PROCEDURE — 82948 REAGENT STRIP/BLOOD GLUCOSE: CPT

## 2023-10-16 PROCEDURE — 73501 X-RAY EXAM HIP UNI 1 VIEW: CPT

## 2023-10-16 DEVICE — EMPHASYS POLYETHYLENE LINER AOX NEUTRAL 50MM 36MM
Type: IMPLANTABLE DEVICE | Site: HIP | Status: FUNCTIONAL
Brand: EMPHASYS

## 2023-10-16 DEVICE — EMPHASYS ACETABULAR SHELL THREE-HOLE 50MM CEMENTLESS
Type: IMPLANTABLE DEVICE | Site: HIP | Status: FUNCTIONAL
Brand: EMPHASYS

## 2023-10-16 DEVICE — ACTIS DUOFIX HIP PROSTHESIS (FEMORAL STEM 12/14 TAPER CEMENTLESS SIZE 5 STD COLLAR)  CE
Type: IMPLANTABLE DEVICE | Site: HIP | Status: FUNCTIONAL
Brand: ACTIS

## 2023-10-16 DEVICE — BIOLOX DELTA CERAMIC FEMORAL HEAD +1.5 36MM DIA 12/14 TAPER
Type: IMPLANTABLE DEVICE | Site: HIP | Status: FUNCTIONAL
Brand: BIOLOX DELTA

## 2023-10-16 RX ORDER — PROPOFOL 10 MG/ML
INJECTION, EMULSION INTRAVENOUS AS NEEDED
Status: DISCONTINUED | OUTPATIENT
Start: 2023-10-16 | End: 2023-10-16

## 2023-10-16 RX ORDER — MAGNESIUM HYDROXIDE/ALUMINUM HYDROXICE/SIMETHICONE 120; 1200; 1200 MG/30ML; MG/30ML; MG/30ML
30 SUSPENSION ORAL EVERY 6 HOURS PRN
Status: DISCONTINUED | OUTPATIENT
Start: 2023-10-16 | End: 2023-10-17 | Stop reason: HOSPADM

## 2023-10-16 RX ORDER — MAGNESIUM HYDROXIDE 1200 MG/15ML
LIQUID ORAL AS NEEDED
Status: DISCONTINUED | OUTPATIENT
Start: 2023-10-16 | End: 2023-10-16 | Stop reason: HOSPADM

## 2023-10-16 RX ORDER — AMLODIPINE BESYLATE 10 MG/1
10 TABLET ORAL EVERY MORNING
Status: DISCONTINUED | OUTPATIENT
Start: 2023-10-17 | End: 2023-10-17 | Stop reason: HOSPADM

## 2023-10-16 RX ORDER — MIDAZOLAM HYDROCHLORIDE 2 MG/2ML
INJECTION, SOLUTION INTRAMUSCULAR; INTRAVENOUS AS NEEDED
Status: DISCONTINUED | OUTPATIENT
Start: 2023-10-16 | End: 2023-10-16

## 2023-10-16 RX ORDER — OXYCODONE HYDROCHLORIDE 5 MG/1
10 TABLET ORAL EVERY 4 HOURS PRN
Status: DISCONTINUED | OUTPATIENT
Start: 2023-10-16 | End: 2023-10-17 | Stop reason: HOSPADM

## 2023-10-16 RX ORDER — ONDANSETRON 2 MG/ML
4 INJECTION INTRAMUSCULAR; INTRAVENOUS ONCE AS NEEDED
Status: DISCONTINUED | OUTPATIENT
Start: 2023-10-16 | End: 2023-10-16 | Stop reason: HOSPADM

## 2023-10-16 RX ORDER — BISACODYL 10 MG
10 SUPPOSITORY, RECTAL RECTAL DAILY PRN
Status: DISCONTINUED | OUTPATIENT
Start: 2023-10-16 | End: 2023-10-17 | Stop reason: HOSPADM

## 2023-10-16 RX ORDER — HYDROMORPHONE HCL/PF 1 MG/ML
0.5 SYRINGE (ML) INJECTION EVERY 2 HOUR PRN
Status: DISCONTINUED | OUTPATIENT
Start: 2023-10-16 | End: 2023-10-17 | Stop reason: HOSPADM

## 2023-10-16 RX ORDER — EPHEDRINE SULFATE 50 MG/ML
INJECTION INTRAVENOUS AS NEEDED
Status: DISCONTINUED | OUTPATIENT
Start: 2023-10-16 | End: 2023-10-16

## 2023-10-16 RX ORDER — PANTOPRAZOLE SODIUM 40 MG/1
40 TABLET, DELAYED RELEASE ORAL DAILY
Status: DISCONTINUED | OUTPATIENT
Start: 2023-10-17 | End: 2023-10-17 | Stop reason: HOSPADM

## 2023-10-16 RX ORDER — DOCUSATE SODIUM 100 MG/1
100 CAPSULE, LIQUID FILLED ORAL 2 TIMES DAILY
Status: DISCONTINUED | OUTPATIENT
Start: 2023-10-16 | End: 2023-10-17 | Stop reason: HOSPADM

## 2023-10-16 RX ORDER — CHLORHEXIDINE GLUCONATE ORAL RINSE 1.2 MG/ML
15 SOLUTION DENTAL ONCE
Status: COMPLETED | OUTPATIENT
Start: 2023-10-16 | End: 2023-10-16

## 2023-10-16 RX ORDER — DEXAMETHASONE SODIUM PHOSPHATE 10 MG/ML
INJECTION, SOLUTION INTRAMUSCULAR; INTRAVENOUS AS NEEDED
Status: DISCONTINUED | OUTPATIENT
Start: 2023-10-16 | End: 2023-10-16

## 2023-10-16 RX ORDER — OXYCODONE HCL 10 MG/1
10 TABLET, FILM COATED, EXTENDED RELEASE ORAL ONCE
Status: COMPLETED | OUTPATIENT
Start: 2023-10-16 | End: 2023-10-16

## 2023-10-16 RX ORDER — LIDOCAINE HYDROCHLORIDE 10 MG/ML
INJECTION, SOLUTION EPIDURAL; INFILTRATION; INTRACAUDAL; PERINEURAL AS NEEDED
Status: DISCONTINUED | OUTPATIENT
Start: 2023-10-16 | End: 2023-10-16

## 2023-10-16 RX ORDER — ONDANSETRON 2 MG/ML
INJECTION INTRAMUSCULAR; INTRAVENOUS AS NEEDED
Status: DISCONTINUED | OUTPATIENT
Start: 2023-10-16 | End: 2023-10-16

## 2023-10-16 RX ORDER — HYDROMORPHONE HCL/PF 1 MG/ML
0.5 SYRINGE (ML) INJECTION
Status: DISCONTINUED | OUTPATIENT
Start: 2023-10-16 | End: 2023-10-16 | Stop reason: HOSPADM

## 2023-10-16 RX ORDER — CLINDAMYCIN PHOSPHATE 900 MG/50ML
900 INJECTION INTRAVENOUS ONCE
Status: COMPLETED | OUTPATIENT
Start: 2023-10-16 | End: 2023-10-16

## 2023-10-16 RX ORDER — OXYCODONE HYDROCHLORIDE 5 MG/1
5 TABLET ORAL EVERY 4 HOURS PRN
Status: DISCONTINUED | OUTPATIENT
Start: 2023-10-16 | End: 2023-10-17 | Stop reason: HOSPADM

## 2023-10-16 RX ORDER — MEPERIDINE HYDROCHLORIDE 25 MG/ML
12.5 INJECTION INTRAMUSCULAR; INTRAVENOUS; SUBCUTANEOUS
Status: DISCONTINUED | OUTPATIENT
Start: 2023-10-16 | End: 2023-10-16 | Stop reason: HOSPADM

## 2023-10-16 RX ORDER — ONDANSETRON 2 MG/ML
4 INJECTION INTRAMUSCULAR; INTRAVENOUS EVERY 6 HOURS PRN
Status: DISCONTINUED | OUTPATIENT
Start: 2023-10-16 | End: 2023-10-17 | Stop reason: HOSPADM

## 2023-10-16 RX ORDER — SODIUM CHLORIDE 9 MG/ML
75 INJECTION, SOLUTION INTRAVENOUS CONTINUOUS
Status: DISCONTINUED | OUTPATIENT
Start: 2023-10-16 | End: 2023-10-17

## 2023-10-16 RX ORDER — SODIUM CHLORIDE, SODIUM LACTATE, POTASSIUM CHLORIDE, CALCIUM CHLORIDE 600; 310; 30; 20 MG/100ML; MG/100ML; MG/100ML; MG/100ML
125 INJECTION, SOLUTION INTRAVENOUS CONTINUOUS
Status: DISCONTINUED | OUTPATIENT
Start: 2023-10-16 | End: 2023-10-16

## 2023-10-16 RX ORDER — BUPIVACAINE HYDROCHLORIDE 7.5 MG/ML
INJECTION, SOLUTION INTRASPINAL AS NEEDED
Status: DISCONTINUED | OUTPATIENT
Start: 2023-10-16 | End: 2023-10-16

## 2023-10-16 RX ORDER — CLINDAMYCIN PHOSPHATE 900 MG/50ML
900 INJECTION INTRAVENOUS EVERY 8 HOURS
Status: DISPENSED | OUTPATIENT
Start: 2023-10-16 | End: 2023-10-17

## 2023-10-16 RX ORDER — ACETAMINOPHEN 325 MG/1
975 TABLET ORAL EVERY 8 HOURS
Status: DISCONTINUED | OUTPATIENT
Start: 2023-10-16 | End: 2023-10-17 | Stop reason: HOSPADM

## 2023-10-16 RX ORDER — ASPIRIN 81 MG/1
81 TABLET, CHEWABLE ORAL 2 TIMES DAILY
Status: DISCONTINUED | OUTPATIENT
Start: 2023-10-16 | End: 2023-10-17 | Stop reason: HOSPADM

## 2023-10-16 RX ORDER — PROPOFOL 10 MG/ML
INJECTION, EMULSION INTRAVENOUS CONTINUOUS PRN
Status: DISCONTINUED | OUTPATIENT
Start: 2023-10-16 | End: 2023-10-16

## 2023-10-16 RX ORDER — ALBUMIN, HUMAN INJ 5% 5 %
SOLUTION INTRAVENOUS CONTINUOUS PRN
Status: DISCONTINUED | OUTPATIENT
Start: 2023-10-16 | End: 2023-10-16

## 2023-10-16 RX ORDER — GABAPENTIN 100 MG/1
200 CAPSULE ORAL 2 TIMES DAILY
Status: DISCONTINUED | OUTPATIENT
Start: 2023-10-16 | End: 2023-10-17 | Stop reason: HOSPADM

## 2023-10-16 RX ORDER — TRANEXAMIC ACID 10 MG/ML
1000 INJECTION, SOLUTION INTRAVENOUS ONCE
Status: COMPLETED | OUTPATIENT
Start: 2023-10-16 | End: 2023-10-16

## 2023-10-16 RX ORDER — GABAPENTIN 300 MG/1
300 CAPSULE ORAL ONCE
Status: COMPLETED | OUTPATIENT
Start: 2023-10-16 | End: 2023-10-16

## 2023-10-16 RX ORDER — ACETAMINOPHEN 325 MG/1
975 TABLET ORAL ONCE
Status: COMPLETED | OUTPATIENT
Start: 2023-10-16 | End: 2023-10-16

## 2023-10-16 RX ORDER — DEXAMETHASONE SODIUM PHOSPHATE 4 MG/ML
10 INJECTION, SOLUTION INTRA-ARTICULAR; INTRALESIONAL; INTRAMUSCULAR; INTRAVENOUS; SOFT TISSUE ONCE
Status: DISCONTINUED | OUTPATIENT
Start: 2023-10-17 | End: 2023-10-17 | Stop reason: HOSPADM

## 2023-10-16 RX ORDER — FENTANYL CITRATE/PF 50 MCG/ML
25 SYRINGE (ML) INJECTION
Status: DISCONTINUED | OUTPATIENT
Start: 2023-10-16 | End: 2023-10-16 | Stop reason: HOSPADM

## 2023-10-16 RX ADMIN — OXYCODONE HYDROCHLORIDE 10 MG: 5 TABLET ORAL at 20:31

## 2023-10-16 RX ADMIN — ESCITALOPRAM 15 MG: 5 TABLET, FILM COATED ORAL at 21:48

## 2023-10-16 RX ADMIN — SODIUM CHLORIDE, SODIUM LACTATE, POTASSIUM CHLORIDE, AND CALCIUM CHLORIDE: .6; .31; .03; .02 INJECTION, SOLUTION INTRAVENOUS at 13:03

## 2023-10-16 RX ADMIN — GABAPENTIN 200 MG: 100 CAPSULE ORAL at 18:01

## 2023-10-16 RX ADMIN — ALBUMIN (HUMAN): 12.5 INJECTION, SOLUTION INTRAVENOUS at 14:10

## 2023-10-16 RX ADMIN — EPHEDRINE SULFATE 15 MG: 50 INJECTION, SOLUTION INTRAVENOUS at 14:17

## 2023-10-16 RX ADMIN — DEXAMETHASONE SODIUM PHOSPHATE 10 MG: 10 INJECTION, SOLUTION INTRAMUSCULAR; INTRAVENOUS at 13:17

## 2023-10-16 RX ADMIN — CLINDAMYCIN IN 5 PERCENT DEXTROSE 900 MG: 18 INJECTION, SOLUTION INTRAVENOUS at 22:36

## 2023-10-16 RX ADMIN — EPHEDRINE SULFATE 10 MG: 50 INJECTION, SOLUTION INTRAVENOUS at 13:33

## 2023-10-16 RX ADMIN — ONDANSETRON 4 MG: 2 INJECTION INTRAMUSCULAR; INTRAVENOUS at 13:17

## 2023-10-16 RX ADMIN — PROPOFOL 70 MG: 10 INJECTION, EMULSION INTRAVENOUS at 13:11

## 2023-10-16 RX ADMIN — TRANEXAMIC ACID 1000 MG: 10 INJECTION, SOLUTION INTRAVENOUS at 13:11

## 2023-10-16 RX ADMIN — SODIUM CHLORIDE, SODIUM LACTATE, POTASSIUM CHLORIDE, AND CALCIUM CHLORIDE 125 ML/HR: .6; .31; .03; .02 INJECTION, SOLUTION INTRAVENOUS at 16:30

## 2023-10-16 RX ADMIN — BUPIVACAINE HYDROCHLORIDE IN DEXTROSE 1.4 ML: 7.5 INJECTION, SOLUTION SUBARACHNOID at 13:09

## 2023-10-16 RX ADMIN — MIDAZOLAM 1 MG: 1 INJECTION INTRAMUSCULAR; INTRAVENOUS at 13:06

## 2023-10-16 RX ADMIN — CLINDAMYCIN PHOSPHATE 900 MG: 18 INJECTION, SOLUTION INTRAMUSCULAR; INTRAVENOUS at 13:06

## 2023-10-16 RX ADMIN — DOCUSATE SODIUM 100 MG: 100 CAPSULE, LIQUID FILLED ORAL at 18:01

## 2023-10-16 RX ADMIN — CHLORHEXIDINE GLUCONATE 15 ML: 1.2 RINSE ORAL at 12:00

## 2023-10-16 RX ADMIN — LIDOCAINE HYDROCHLORIDE 50 MG: 10 INJECTION, SOLUTION EPIDURAL; INFILTRATION; INTRACAUDAL; PERINEURAL at 13:11

## 2023-10-16 RX ADMIN — ASPIRIN 81 MG CHEWABLE TABLET 81 MG: 81 TABLET CHEWABLE at 21:48

## 2023-10-16 RX ADMIN — ACETAMINOPHEN 975 MG: 325 TABLET ORAL at 12:00

## 2023-10-16 RX ADMIN — ACETAMINOPHEN 975 MG: 325 TABLET ORAL at 18:01

## 2023-10-16 RX ADMIN — MIDAZOLAM 1 MG: 1 INJECTION INTRAMUSCULAR; INTRAVENOUS at 13:03

## 2023-10-16 RX ADMIN — OXYCODONE HYDROCHLORIDE 10 MG: 10 TABLET, FILM COATED, EXTENDED RELEASE ORAL at 12:00

## 2023-10-16 RX ADMIN — SODIUM CHLORIDE 75 ML/HR: 0.9 INJECTION, SOLUTION INTRAVENOUS at 17:25

## 2023-10-16 RX ADMIN — EPHEDRINE SULFATE 10 MG: 50 INJECTION, SOLUTION INTRAVENOUS at 13:17

## 2023-10-16 RX ADMIN — EPHEDRINE SULFATE 10 MG: 50 INJECTION, SOLUTION INTRAVENOUS at 13:21

## 2023-10-16 RX ADMIN — PROPOFOL 100 MCG/KG/MIN: 10 INJECTION, EMULSION INTRAVENOUS at 13:11

## 2023-10-16 RX ADMIN — GABAPENTIN 300 MG: 300 CAPSULE ORAL at 12:00

## 2023-10-16 NOTE — PHYSICAL THERAPY NOTE
PT EVALUATION     10/16/23 1545   PT Last Visit   PT Visit Date 10/16/23   Note Type   Note type Evaluation   Pain Assessment   Pain Assessment Tool 0-10   Pain Score No Pain   Restrictions/Precautions   Weight Bearing Precautions Per Order Yes   RLE Weight Bearing Per Order WBAT   Other Precautions Pain; Fall Risk   Home Living   Type of [address] Two level; Able to live on main level with bedroom/bathroom; Bed/bath upstairs  (4 SHANNAN with post to hold onto;  pt plans to sleep in her recliner on the first floor.)   Home Equipment Cane;Walker   Prior Function   Level of [place] Independent with ADLs; Independent with functional mobility  (ambulates with a cane)   Lives With Alone   Receives Help From Friend(s)   Vocational Retired   [employer]   Additional Pertinent History Pt is POD zero s/p R TAHIRA. Cognition   Overall Cognitive Status WFL   Subjective   Subjective "I'm ready to go!"   RLE Assessment   RLE Assessment   (ROM WFL, MMT hip 3-/5, knee 4/5, ankle 4/5)   LLE Assessment   LLE Assessment WNL   Bed Mobility   Supine to Sit 3  Moderate assistance   Additional items Increased time required;Verbal cues;LE management   Transfers   Sit to Stand 4  Minimal assistance   Stand to Sit 4  Minimal assistance   Stand pivot 4  Minimal assistance   Additional items   (with walker)   Ambulation/Elevation   Gait pattern   (slow imelda)   Gait Assistance 4  Minimal assist   Assistive Device Rolling walker   Distance 30 feet   Balance   Static Sitting Fair +   Static Standing Fair   Ambulatory Fair   Activity Tolerance   Activity Tolerance Patient tolerated treatment well   Assessment   Prognosis Good   Problem List Decreased strength;Decreased range of motion; Impaired balance;Decreased mobility;Pain   Assessment Patient is an 76y.o. year old female seen for Physical Therapy evaluation. Patient admitted s/p R TAHIRA.   Comorbidities affecting patient's physical performance include: OA, anxiety, depression, obesity. Personal factors affecting patient at time of initial evaluation include: lives in 2 story house, stairs to enter home, inability to ambulate household distances, limited home support, and anxiety. Prior to admission, patient was independent with functional mobility with a cane and independent with ADLS. Please find objective findings from Physical Therapy assessment regarding body systems outlined above with impairments and limitations including weakness, impaired balance, gait deviations, decreased activity tolerance, decreased functional mobility tolerance, and fall risk. The Barthel Index was used as a functional outcome tool presenting with a score of Barthel Index Score: 40 today indicating marked limitations of functional mobility and ADLS. Patient's clinical presentation is currently unstable/unpredictable as seen in patient's presentation of increased fall risk, new onset of impairment of functional mobility, and new onset of weakness. Pt would benefit from continued Physical Therapy treatment to address deficits as defined above and maximize level of functional mobility. As demonstrated by objective findings, the assigned level of complexity for this evaluation is high. The patient's AM-PAC Basic Mobility Inpatient Short Form Raw Score is 15. A Raw score of less than or equal to 16 suggests the patient may benefit from discharge to post-acute rehabilitation services, however anticipate pt will make rapid gains so recommend pt go home with OP PT and support from friends. Goals   Patient Goals "walk without pain, drive my car"   STG Expiration Date 10/23/23   Short Term Goal #1 1. Independent bed mobility 2. Independent home exercise program 3. Independent ambulation indoor level surfaces with a steady gait with rolling walker 100 feet 4. Independent home exercise program 5.   Independent up-and-down 4 steps the patient can enter and exit her home   LTG Expiration Date 10/30/23   Long Term Goal #1 1. Improve right lower extremity strength at least 4 out of 5 2. Independent ambulation outdoor level surfaces with least restrictive device with a steady nonantalgic gait 250 feet 3. Independent up-and-down 1 flight of steps the patient can get to her second floor bedroom   Plan   Treatment/Interventions Gait training;Patient/family training; Therapeutic exercise;Elevations;LE strengthening/ROM; Functional transfer training   PT Frequency Twice a day   Discharge Recommendation   PT Discharge Recommendation Home with outpatient rehabilitation   Equipment Recommended   (Pt has a cane and a walker)   AM-PAC Basic Mobility Inpatient   Turning in Flat Bed Without Bedrails 3   Lying on Back to Sitting on Edge of Flat Bed Without Bedrails 2   Moving Bed to Chair 3   Standing Up From Chair Using Arms 3   Walk in Room 3   Climb 3-5 Stairs With Railing 1   Basic Mobility Inpatient Raw Score 15   Basic Mobility Standardized Score 36.97   Highest Level Of Mobility   -[facility] Goal 4: Move to chair/commode   -HL Achieved 7: Walk 25 feet or more   Barthel Index   Feeding 10   Bathing 0   Grooming Score 0   Dressing Score 5   Bladder Score 0   Bowels Score 10   Toilet Use Score 5   Transfers (Bed/Chair) Score 10   Mobility (Level Surface) Score 0   Stairs Score 0   Barthel Index Score 40   Additional Treatment Session   Start Time 1730   End Time 1745   Treatment Assessment S: "I have been exercising so my hip is still strong!" O: x 10 each ankle pumps, quad set, glut set, SAQ, LAQ, hip abd/add, heel slides. Ice to hip after PT. A: Patient tolerated postop day 0 exercise status post right total hip replacement very well today. Anticipate that if cleared medically patient will be able to go home tomorrow to start outpatient physical therapy later this week. P: Plan to review home exercise program, continue gait training with rolling walker in the hallway, and practice stair climbing at next PT session.    End of Consult Patient Position at End of Consult All needs within reach; Bedside chair   Licensure   [place] License Number  [facility] [id number]

## 2023-10-16 NOTE — PLAN OF CARE
Problem: MOBILITY - ADULT  Goal: Maintain or return to baseline ADL function  Description: INTERVENTIONS:  -  Assess patient's ability to carry out ADLs; assess patient's baseline for ADL function and identify physical deficits which impact ability to perform ADLs (bathing, care of mouth/teeth, toileting, grooming, dressing, etc.)  - Assess/evaluate cause of self-care deficits   - Assess range of motion  - Assess patient's mobility; develop plan if impaired  - Assess patient's need for assistive devices and provide as appropriate  - Encourage maximum independence but intervene and supervise when necessary  - Involve family in performance of ADLs  - Assess for home care needs following discharge   - Consider OT consult to assist with ADL evaluation and planning for discharge  - Provide patient education as appropriate  Outcome: Progressing  Goal: Maintains/Returns to pre admission functional level  Description: INTERVENTIONS:  - Perform BMAT or MOVE assessment daily.   - Set and communicate daily mobility goal to care team and patient/family/caregiver. - Collaborate with rehabilitation services on mobility goals if consulted  - Perform Range of Motion 3 times a day. - Reposition patient every 2 hours.   - Dangle patient 3 times a day  - Stand patient 3 times a day  - Ambulate patient 3 times a day  - Out of bed to chair 3 times a day   - Out of bed for meals 3 times a day  - Out of bed for toileting  - Record patient progress and toleration of activity level   Outcome: Progressing     Problem: GENITOURINARY - ADULT  Goal: Urinary catheter remains patent  Description: INTERVENTIONS:  - Assess patency of urinary catheter  - If patient has a chronic hernandez, consider changing catheter if non-functioning  - Follow guidelines for intermittent irrigation of non-functioning urinary catheter  Outcome: Progressing     Problem: SKIN/TISSUE INTEGRITY - ADULT  Goal: Incision(s), wounds(s) or drain site(s) healing without S/S of infection  Description: INTERVENTIONS  - Assess and document dressing, incision, wound bed, drain sites and surrounding tissue  - Provide patient and family education  - Perform skin care/dressing changes as ordered  Outcome: Progressing     Problem: MUSCULOSKELETAL - ADULT  Goal: Maintain or return mobility to safest level of function  Description: INTERVENTIONS:  - Assess patient's ability to carry out ADLs; assess patient's baseline for ADL function and identify physical deficits which impact ability to perform ADLs (bathing, care of mouth/teeth, toileting, grooming, dressing, etc.)  - Assess/evaluate cause of self-care deficits   - Assess range of motion  - Assess patient's mobility  - Assess patient's need for assistive devices and provide as appropriate  - Encourage maximum independence but intervene and supervise when necessary  - Involve family in performance of ADLs  - Assess for home care needs following discharge   - Consider OT consult to assist with ADL evaluation and planning for discharge  - Provide patient education as appropriate  Outcome: Progressing  Goal: Maintain proper alignment of affected body part  Description: INTERVENTIONS:  - Support, maintain and protect limb and body alignment  - Provide patient/ family with appropriate education  Outcome: Progressing     Problem: PAIN - ADULT  Goal: Verbalizes/displays adequate comfort level or baseline comfort level  Description: Interventions:  - Encourage patient to monitor pain and request assistance  - Assess pain using appropriate pain scale  - Administer analgesics based on type and severity of pain and evaluate response  - Implement non-pharmacological measures as appropriate and evaluate response  - Consider cultural and social influences on pain and pain management  - Notify physician/advanced practitioner if interventions unsuccessful or patient reports new pain  Outcome: Progressing     Problem: INFECTION - ADULT  Goal: Absence or prevention of progression during hospitalization  Description: INTERVENTIONS:  - Assess and monitor for signs and symptoms of infection  - Monitor lab/diagnostic results  - Monitor all insertion sites, i.e. indwelling lines, tubes, and drains  - Monitor endotracheal if appropriate and nasal secretions for changes in amount and color  - [employer] appropriate cooling/warming therapies per order  - Administer medications as ordered  - Instruct and encourage patient and family to use good hand hygiene technique  - Identify and instruct in appropriate isolation precautions for identified infection/condition  Outcome: Progressing     Problem: DISCHARGE PLANNING  Goal: Discharge to home or other facility with appropriate resources  Description: INTERVENTIONS:  - Identify barriers to discharge w/patient and caregiver  - Arrange for needed discharge resources and transportation as appropriate  - Identify discharge learning needs (meds, wound care, etc.)  - Arrange for interpretive services to assist at discharge as needed  - Refer to Case Management Department for coordinating discharge planning if the patient needs post-hospital services based on physician/advanced practitioner order or complex needs related to functional status, cognitive ability, or social support system  Outcome: Progressing

## 2023-10-16 NOTE — INTERVAL H&P NOTE
H&P reviewed. After examining the patient I find no changes in the patients condition since the H&P had been written. Patient was seen and examined this morning. The patient states that her activity related right hip and groin pain continues to be a significant source of pain and discomfort for her. Pain is severe in the right groin. Please see exam below. Patient denies any recent fever, chills, nausea, vomiting, headache, chest pain, trouble breathing. The patient has been seen and cleared by her medical subspecialist in preparation for the procedure today. She will be a good candidate for aspirin 81 mg with Protonix postoperatively for DVT prophylaxis. She will be a good candidate for outpatient physical therapy following her discharge from the hospital.  All questions addressed this morning. Proceed the OR for direct anterior right total of arthroplasty. Vital signs will be reviewed prior to the case    Right Hip Exam      Tenderness   The patient is experiencing tenderness in the anterior and lateral.     Range of Motion   Abduction: 40   Adduction: 10   Extension: 0   Flexion: 100   External rotation: 30   Internal rotation: 10      Muscle Strength   Abduction: 5/5   Adduction: 5/5   Flexion: 5/5      Tests   ABDOULAYE: positive     Other   Erythema: absent  Scars: absent  Sensation: normal  Pulse: present    Significant overhanging pannus    Tali Berry D.O.   Division of Adult Reconstruction  Department of Orthopaedic Surgery  [facility]

## 2023-10-16 NOTE — PLAN OF CARE
Problem: PHYSICAL THERAPY ADULT  Goal: Performs mobility at highest level of function for planned discharge setting. See evaluation for individualized goals. Description: Treatment/Interventions: Gait training, Patient/family training, Therapeutic exercise, Elevations, LE strengthening/ROM, Functional transfer training  Equipment Recommended:  (Pt has a cane and a walker)       See flowsheet documentation for full assessment, interventions and recommendations. Note: Prognosis: Good  Problem List: Decreased strength, Decreased range of motion, Impaired balance, Decreased mobility, Pain  Assessment: Patient is an 76y.o. year old female seen for Physical Therapy evaluation. Patient admitted s/p R TAHIRA. Comorbidities affecting patient's physical performance include: OA, anxiety, depression, obesity. Personal factors affecting patient at time of initial evaluation include: lives in 2 story house, stairs to enter home, inability to ambulate household distances, limited home support, and anxiety. Prior to admission, patient was independent with functional mobility with a cane and independent with ADLS. Please find objective findings from Physical Therapy assessment regarding body systems outlined above with impairments and limitations including weakness, impaired balance, gait deviations, decreased activity tolerance, decreased functional mobility tolerance, and fall risk. The Barthel Index was used as a functional outcome tool presenting with a score of Barthel Index Score: 40 today indicating marked limitations of functional mobility and ADLS. Patient's clinical presentation is currently unstable/unpredictable as seen in patient's presentation of increased fall risk, new onset of impairment of functional mobility, and new onset of weakness. Pt would benefit from continued Physical Therapy treatment to address deficits as defined above and maximize level of functional mobility.  As demonstrated by objective findings, the assigned level of complexity for this evaluation is high. The patient's AM-PAC Basic Mobility Inpatient Short Form Raw Score is 15. A Raw score of less than or equal to 16 suggests the patient may benefit from discharge to post-acute rehabilitation services. Please also refer to the recommendation of the Physical Therapist for safe discharge planning. PT Discharge Recommendation: Home with outpatient rehabilitation    See flowsheet documentation for full assessment.

## 2023-10-16 NOTE — PROGRESS NOTES
Progress Note - Orthopedics   Zachary Castro 76 y.o. female MRN: [id number]  Unit/Bed#: OR POOL Encounter: [id number]    Assessment:  1)POD#0 s/p DA R TAHIRA    Plan:  Clinda 900mg IV x 2 for 24 hours postop  DVT prophylaxis: ASA 81mg PO BID with Protonix/SCD's/Ambulation  WBAT RLE  PT/OT- WBAT RLE  Analgesia PRN  Follow up AM labs  D/c hernandez in AM POD #1  Dressing- monitor for drainage  Discharge planning -disposition pending progress with PT postoperatively. Plan for discharge to home to start outpatient physical therapy later this week. Weight bearing: WBAT RLE    VTE Pharmacologic Prophylaxis: ASA 81mg PO BID with protonix  VTE Mechanical Prophylaxis: sequential compression device    Subjective: Patient seen and examined postoperatively in PACU. Pain controlled. Events of surgery discussed with the patient and the patient's friend via voicemail. Vitals: Blood pressure 99/51, pulse 67, temperature (!) 97.2 °F (36.2 °C), resp. rate 20, height 5' 2" (1.575 m), weight 97.1 kg (214 lb), SpO2 94 %. ,Body mass index is 39.14 kg/m². Intake/Output Summary (Last 24 hours) at 10/16/2023 1608  Last data filed at 10/16/2023 1538  Gross per 24 hour   Intake 1350 ml   Output 430 ml   Net 920 ml       Invasive Devices       Peripheral Intravenous Line  Duration             Peripheral IV 10/16/23 Dorsal (posterior); Right Wrist <1 day              Drain  Duration             Urethral Catheter Latex 16 Fr. <1 day                    Physical Exam: NAD  Ortho Exam: RLE: Dsg c/d/i, thigh soft, +LFCN SILT, +Fem nerve motor, +DF/PF/EHL, +L3-S1 SILT, DP2+, foot warm    Lab, Imaging and other studies: PO XR R hip: Reviewed and acceptable    Lucila Lozada D.O.   Division of Adult Reconstruction  Department of Orthopaedic Surgery  [facility]

## 2023-10-16 NOTE — ANESTHESIA PREPROCEDURE EVALUATION
Procedure:  ARTHROPLASTY HIP TOTAL ANTERIOR,NAVIGATED - overnight (Right: Hip)    Relevant Problems   CARDIO   (+) Elevated blood pressure reading in office with white coat syndrome, with diagnosis of hypertension   (+) Hypertension      GI/HEPATIC   (+) Chronic hepatitis C without hepatic coma (HCC)   (+) Gastroesophageal reflux disease   (+) H/O bariatric surgery      MUSCULOSKELETAL   (+) Chronic midline low back pain without sciatica   (+) Primary osteoarthritis   (+) Primary osteoarthritis of right hip      NEURO/PSYCH   (+) Anxiety   (+) Anxiety and depression   (+) Chronic midline low back pain without sciatica   (+) Chronic pain syndrome   (+) Continuous opioid dependence (HCC)        Physical Exam    Airway    Mallampati score: II  TM Distance: >3 FB  Neck ROM: full     Dental        Cardiovascular  Cardiovascular exam normal    Pulmonary  Pulmonary exam normal     Other Findings      Anesthesia Plan  ASA Score- 3     Anesthesia Type- spinal with ASA Monitors. Additional Monitors:     Airway Plan:            Plan Factors-Exercise tolerance (METS): >4 METS. Chart reviewed. Existing labs reviewed. Patient summary reviewed. Patient is not a current smoker. Obstructive sleep apnea risk education given perioperatively. Induction-     Postoperative Plan-     Informed Consent- Anesthetic plan and risks discussed with patient. I personally reviewed this patient with the CRNA. Discussed and agreed on the Anesthesia Plan with the CRNA. Praveen Carter

## 2023-10-16 NOTE — OP NOTE
OPERATIVE REPORT  PATIENT NAME: Jessie Torres  : 1955  MRN: [id number]  Pt Location:  [facility] OR ROOM 03    Surgery Date: 10/16/2023    Surgeon(s) and Role:     * Judi Helms DO - Primary     * Vianey Soares PA-C - Assisting     * Renee Robertson MD -PGY-3 - Assisting      * Gonsalo Schmitz,  ATC/OTC - 2nd Assist    Preop Diagnosis:  Primary osteoarthritis of right hip [M16.11]  Right hip pain [M25.551]    Post-Op Diagnosis Codes:     * Primary osteoarthritis of right hip [M16.11]     * Right hip pain [M25.551]    Procedure(s):  Right - ARTHROPLASTY HIP TOTAL ANTERIOR. NAVIGATED    Specimens:  * No specimens in log *    Estimated Blood Loss:   310 mL    Drains:  Urethral Catheter Latex 16 Fr. (Active)   Reasons to continue Urinary Catheter  Post-operative urological requirements 10/16/23 1326   Collection Container Standard drainage bag 10/16/23 1326   Securement Method Securing device (Describe) 10/16/23 1326   Number of days: 0       Anesthesia Type:   Spinal with sedation, postoperative Karthikeyan block with Exparel    Intravenous fluids: 1 L, 250 cc albumin    Antibiotics: Clindamycin 900 mg    Urine output: Baker: 125 cc    Implant Name Type Inv.  Item Serial No.  Lot No. LRB No. Used Action   ACETABULAR SHELL 3HL 50MM CMNTLS EMPHASYS - [id number]  ACETABULAR SHELL 3HL 50MM CMNTLS EMPHASYS  DEPUY [id number] Right 1 Implanted   LINER AOX NEUTRAL 50 X 36MM EMPHASYS - [id number]  LINER AOX NEUTRAL 50 X 36MM EMPHASYS  DEPUY [id number] Right 1 Implanted   STEM FEM SZ 5 TAPER CMNTLS STD COLLAR ACTIS - [id number]  STEM FEM SZ 5 TAPER CMNTLS STD COLLAR ACTIS  DEPUY [id number] Right 1 Implanted   HEAD FEMORAL 12/14 TPR 36MM +1.5MM ARTICULEZE BIOLOX DELTA - [id number]  HEAD FEMORAL 12/14 TPR 36MM +1.5MM ARTICULEZE Almeta Neas  DEPUY [id number] Right 1 Implanted     Operative Indications:  Primary osteoarthritis of right hip [M16.11]  Right hip pain [M25.551]  Patient is a 70-year-old female known to me for treatment of activity related right hip and groin pain. She has attempted multiple forms of conservative measure treatment and all have failed to provide long-lasting relief of her discomfort. With failed conservative treatment, persistent activity related pain, and end-stage osteoarthritis on x-ray recommended that she undergo direct anterior right total of arthroplasty. She was agreeable to this. Consents were signed and placed in the chart. She was optimized by her medical subspecialist in preparation for the procedure. Patient was optimized and deemed cleared for the procedure and underwent direct anterior right total arthroplasty on 10/16/2023. Operative Findings:  Intraoperatively the patient was found to have grade 4 degenerative change of both sides of the hip articulation. The hip navigation software was utilized throughout the case. A press-fit acetabular bone was implanted in the pelvis and approximately 48 degrees of inclination and 19 degrees of anteversion with excellent press-fit. The polyethylene liner was locked into place. The press-fit femoral component was implanted down to the right proximal femur into its final position where it had excellent rotational and axial stability. No fracture occurred. The final construct had excellent stability to 50 degrees of internal rotation, 110 degrees of external rotation, and 50 degrees of external rotation with the leg lowered to the floor. The final construct lengthened the leg approximately 6 mm in accordance with the preoperative template and plan. The patient tolerated the procedure well. There were no complications. Complications:   None    Hip Approach: Direct anterior    Procedure and Technique:  The patient was identified and marked in the preoperative holding area, and the correct operative extremity and intended procedure was confirmed. The consents were visualized and confirmed for correct procedure and laterality.  The patient was then taken back to the operating room where there were administered spinal anesthesia by the anesthesia staff. The patient was administered 900 mg of clindamycin intravenously by the anesthesia staff. The patient was then transferred to the [facility] table for the procedure. After the patient was appropriately positioned, a AP of the pelvis was obtained using fluoroscopy to evaluate preoperative leg lengths. It was found that the right lower extremity was approximately 6 mm shorter than the left lower extremity. The abdominal pannus was taped superiorly out of the way. The right lower extremity was prepped and draped in a standard sterile fashion. After a timeout was performed and all members of the operating room team were in agreement of the correct patient, and correct intended procedure the bony landmarks were identified using marking pen. Tranexamic acid was administered by anesthesia. A modified Garces-Schmitt incision was delineated obliquely starting 2 cm distal and 2 cm laterally to the ASIS. Sharp dissection was carried down through the subcutaneous tissues to the investing fascia of the tensor fascia rosemarie muscle. Electrocautery was used to maintain hemostasis in the subcutaneous tissues. The investing fascia of the tensor fascia rosemarie was incised in line with the fibers in this compartment was entered bluntly and the muscle was retracted laterally. The perforating circumflex femoral vessels were carefully identified and cauterized using electrocautery and the Aquamantis. Dissection was then carried down to the right hip capsule, and the pre-capsular fat was excised. A capsulotomy was carried out across the edge of the acetabulum superiorly down the femoral neck and into the intra-articular trochanteric line. Both sides of the capsulotomy were tagged with a Ethibond suture. Dissection was carried out medially around the medial calcar. It was also carried out laterally to expose the saddle.  The femoral neck osteotomy was templated and carried out using an oscillating saw and finished with a osteotome in accordance with preoperative templating. The femoral head was extracted from the acetabulum using a corkscrew. There were grade 4 changes diffusely with osteophytes peripherally. The femoral head was sized. There were grade 4 changes diffusely in the acetabulum. The acetabulum was cleaned of debris and pulvinar, the FRANCIA was well-visualized, the remaining labrum was removed, and reaming began. The acetabulum was reamed starting with a size 44 reamer and carried up in 1-2 mm increments until a size 49 was reached. The hip navigation software was utilized throughout the case. A size 49 acetabular trial was impacted into the acetabulum and demonstrated an appropriate fit. The appropriate abduction and anteversion of approximately 46° and 20° respectively was confirmed using fluoroscopy. The trial implant was removed, bone was touched with a size 50 reamer, and a size 50 [place] Emphasys cluster hole final acetabular component was impacted into place under direct visualization with fluoroscopy. This demonstrated excellent scratch fit. Its final position was in 48 degrees of inclination and 19 degrees of anteversion. The final 36 mm/50 mm AOX highly cross-linked polyethylene insert was impacted into the acetabular component. The liner was locked in its position on visual and tactile inspection. Attention was then turned back to the proximal femur. The appropriate proximal femoral releases were utilized as needed to mobilize the femur, ensuring not to release the obturator externus. The soft tissue was removed from the region between the femoral neck and the greater trochanter a box osteotome was used to cut into the lateral aspect of the proximal femur. The small starter broach was then inserted into the proximal femur adding proximally 5° of anteversion while preparing the proximal femur.  Broaching was carried up in sequence until a size 5 broach demonstrated excellent fit and rotational stability. The calcar planer was used to bring the femoral neck osteotomy coplanar with the broach. Trialing was performed. A trial construct with a standard neck and a +1.5 36 mm femoral head showed excellent stability on 110 degrees of external rotation, 50 degrees of internal rotation, and 50 degrees of external rotation with extension of the hip to the floor. Leg lengths were evaluated on fluoroscopy images and hip navigation software. This construct lengthened the operative extremity approximately 6 mm in accordance with the preoperative template and plan. The trial components were removed from the proximal femur and the final Depuy Actis size 5 standard offset femoral component was inserted into the femur by hand and impacted into place. The trunnion was cleaned and dried and the final Biolox delta ceramic +1.5 36 mm head was impacted upon the final femoral stem. Hip was reduced and taken through stability testing. The patient demonstrated excellent stability with 110 degrees of external rotation, 50 degrees of internal rotation, and 50 degrees of external rotation and extension of the hip to the floor. There was no lateral subluxation of the hip on manual testing. Leg lengths were again evaluated using fluoroscopy and the leg lengths were unchanged from trialing. The wound was copiously irrigated with Irrisept followed by normal saline solution, the capsule was closed using a #2 Ethibond suture. The wound was again irrigated. The investing fascia of the tensor fascia rosemarie muscle was closed using a bidirectional barbed #2 barbed suture. The deep subcutaneous tissues were reapproximated using an undyed 0 Vicryl, the superficial subcutaneous tissues were reapproximated using an undyed 2-0 Vicryl, the skin edges were reapproximated using a 3-0 bidirectional barbed Monocryl suture, and the skin edges sealed with Exofin. After the skin adhesive had dried a silver impregnated Mepilex dressing was applied over the surgical incision. The patient was awakened from spinal anesthesia with sedation and transferred to PACU in stable condition. Kenia Dorman DO, was present for the entire procedure and was scrubbed for and performed all the key and essential components of the procedure. Portions of the procedure performed by the resident were done to under my direct supervision and completed to my satisfaction.      Patient Disposition:  PACU         SIGNATURE: Adela Escalante DO  DATE: October 16, 2023  TIME: 3:59 PM

## 2023-10-16 NOTE — ANESTHESIA PROCEDURE NOTES
Spinal Block    Patient location during procedure: OR  Start time: 10/16/2023 1:09 PM  Reason for block: at surgeon's request and primary anesthetic  Staffing  Performed by: Karla Paulino MD  Authorized by: Karla Paulino MD    Preanesthetic Checklist  Completed: patient identified, IV checked, site marked, risks and benefits discussed, surgical consent, monitors and equipment checked, pre-op evaluation and timeout performed  Spinal Block  Patient position: sitting  Prep: ChloraPrep  Patient monitoring: heart rate, continuous pulse ox and frequent blood pressure checks  Approach: midline  Location: L3-4  Injection technique: single-shot  Needle  Needle type: pencil-tip   Needle gauge: 25 G  Needle length: 10 cm  Assessment  Sensory level: T10  Events: cerebrospinal fluid  Injection Assessment:  negative aspiration for heme, no paresthesia on injection and positive aspiration for clear CSF.   Post-procedure:  site cleaned

## 2023-10-16 NOTE — ANESTHESIA POSTPROCEDURE EVALUATION
Post-Op Assessment Note    CV Status:  Stable  Pain Score: 0    Pain management: adequate     Mental Status:  Awake   Hydration Status:  Stable   PONV Controlled:  None   Airway Patency:  Patent      Post Op Vitals Reviewed: Yes      Staff: CRNA   Comments: spontaneously breathing, simple mask to O2, HOB @ 30 degrees, fully endorsed to recovery w/o AC        No notable events documented.     BP   132/65   Temp      Pulse  72   Resp   15   SpO2   99

## 2023-10-17 VITALS
RESPIRATION RATE: 18 BRPM | HEART RATE: 72 BPM | HEIGHT: 62 IN | TEMPERATURE: 97.6 F | SYSTOLIC BLOOD PRESSURE: 138 MMHG | OXYGEN SATURATION: 94 % | DIASTOLIC BLOOD PRESSURE: 65 MMHG | BODY MASS INDEX: 39.38 KG/M2 | WEIGHT: 214 LBS

## 2023-10-17 PROBLEM — Z96.641 STATUS POST TOTAL REPLACEMENT OF RIGHT HIP: Status: ACTIVE | Noted: 2023-10-17

## 2023-10-17 LAB
ANION GAP SERPL CALCULATED.3IONS-SCNC: 6 MMOL/L
BUN SERPL-MCNC: 13 MG/DL (ref 5–25)
CALCIUM SERPL-MCNC: 9.1 MG/DL (ref 8.4–10.2)
CHLORIDE SERPL-SCNC: 106 MMOL/L (ref 96–108)
CO2 SERPL-SCNC: 26 MMOL/L (ref 21–32)
CREAT SERPL-MCNC: 0.38 MG/DL (ref 0.6–1.3)
ERYTHROCYTE [DISTWIDTH] IN BLOOD BY AUTOMATED COUNT: 13.8 % (ref 11.6–15.1)
GFR SERPL CREATININE-BSD FRML MDRD: 109 ML/MIN/1.73SQ M
GLUCOSE SERPL-MCNC: 157 MG/DL (ref 65–140)
HCT VFR BLD AUTO: 34.3 % (ref 34.8–46.1)
HGB BLD-MCNC: 11.1 G/DL (ref 11.5–15.4)
MCH RBC QN AUTO: 29.2 PG (ref 26.8–34.3)
MCHC RBC AUTO-ENTMCNC: 32.4 G/DL (ref 31.4–37.4)
MCV RBC AUTO: 90 FL (ref 82–98)
PLATELET # BLD AUTO: 245 THOUSANDS/UL (ref 149–390)
PMV BLD AUTO: 10.8 FL (ref 8.9–12.7)
POTASSIUM SERPL-SCNC: 4 MMOL/L (ref 3.5–5.3)
RBC # BLD AUTO: 3.8 MILLION/UL (ref 3.81–5.12)
SODIUM SERPL-SCNC: 138 MMOL/L (ref 135–147)
WBC # BLD AUTO: 13.17 THOUSAND/UL (ref 4.31–10.16)

## 2023-10-17 PROCEDURE — 97530 THERAPEUTIC ACTIVITIES: CPT

## 2023-10-17 PROCEDURE — 97167 OT EVAL HIGH COMPLEX 60 MIN: CPT

## 2023-10-17 PROCEDURE — 97535 SELF CARE MNGMENT TRAINING: CPT

## 2023-10-17 PROCEDURE — 80048 BASIC METABOLIC PNL TOTAL CA: CPT | Performed by: PHYSICIAN ASSISTANT

## 2023-10-17 PROCEDURE — 97116 GAIT TRAINING THERAPY: CPT

## 2023-10-17 PROCEDURE — 97110 THERAPEUTIC EXERCISES: CPT

## 2023-10-17 PROCEDURE — 85027 COMPLETE CBC AUTOMATED: CPT | Performed by: PHYSICIAN ASSISTANT

## 2023-10-17 RX ORDER — DOCUSATE SODIUM 100 MG/1
100 CAPSULE, LIQUID FILLED ORAL 2 TIMES DAILY
Qty: 60 CAPSULE | Refills: 0 | Status: SHIPPED | OUTPATIENT
Start: 2023-10-17

## 2023-10-17 RX ORDER — ASPIRIN 81 MG/1
81 TABLET, CHEWABLE ORAL 2 TIMES DAILY
Qty: 82 TABLET | Refills: 0 | Status: SHIPPED | OUTPATIENT
Start: 2023-10-17

## 2023-10-17 RX ORDER — NALOXONE HYDROCHLORIDE 4 MG/.1ML
SPRAY NASAL
Qty: 1 EACH | Refills: 0 | Status: SHIPPED | OUTPATIENT
Start: 2023-10-17 | End: 2024-10-16

## 2023-10-17 RX ORDER — PANTOPRAZOLE SODIUM 40 MG/1
40 TABLET, DELAYED RELEASE ORAL DAILY
Qty: 41 TABLET | Refills: 0 | Status: SHIPPED | OUTPATIENT
Start: 2023-10-17

## 2023-10-17 RX ORDER — ACETAMINOPHEN 325 MG/1
975 TABLET ORAL EVERY 8 HOURS
Refills: 0
Start: 2023-10-17

## 2023-10-17 RX ORDER — OXYCODONE HYDROCHLORIDE 5 MG/1
TABLET ORAL
Qty: 60 TABLET | Refills: 0 | Status: SHIPPED | OUTPATIENT
Start: 2023-10-17 | End: 2023-10-19 | Stop reason: SDUPTHER

## 2023-10-17 RX ADMIN — AMLODIPINE BESYLATE 10 MG: 10 TABLET ORAL at 10:22

## 2023-10-17 RX ADMIN — OXYCODONE HYDROCHLORIDE 10 MG: 5 TABLET ORAL at 11:38

## 2023-10-17 RX ADMIN — HYDROMORPHONE HYDROCHLORIDE 0.5 MG: 1 INJECTION, SOLUTION INTRAMUSCULAR; INTRAVENOUS; SUBCUTANEOUS at 08:49

## 2023-10-17 RX ADMIN — OXYCODONE HYDROCHLORIDE 10 MG: 5 TABLET ORAL at 07:26

## 2023-10-17 RX ADMIN — ACETAMINOPHEN 975 MG: 325 TABLET ORAL at 00:07

## 2023-10-17 RX ADMIN — OXYCODONE HYDROCHLORIDE 10 MG: 5 TABLET ORAL at 02:52

## 2023-10-17 RX ADMIN — DOCUSATE SODIUM 100 MG: 100 CAPSULE, LIQUID FILLED ORAL at 09:48

## 2023-10-17 RX ADMIN — PANTOPRAZOLE SODIUM 40 MG: 40 TABLET, DELAYED RELEASE ORAL at 09:49

## 2023-10-17 RX ADMIN — ACETAMINOPHEN 975 MG: 325 TABLET ORAL at 09:48

## 2023-10-17 RX ADMIN — HYDROMORPHONE HYDROCHLORIDE 0.5 MG: 1 INJECTION, SOLUTION INTRAMUSCULAR; INTRAVENOUS; SUBCUTANEOUS at 04:41

## 2023-10-17 RX ADMIN — HYDROMORPHONE HYDROCHLORIDE 0.5 MG: 1 INJECTION, SOLUTION INTRAMUSCULAR; INTRAVENOUS; SUBCUTANEOUS at 10:59

## 2023-10-17 RX ADMIN — GABAPENTIN 200 MG: 100 CAPSULE ORAL at 09:49

## 2023-10-17 RX ADMIN — ASPIRIN 81 MG CHEWABLE TABLET 81 MG: 81 TABLET CHEWABLE at 09:49

## 2023-10-17 NOTE — PHYSICAL THERAPY NOTE
PT TREATMENT     10/17/23 1020   PT Last Visit   PT Visit Date 10/17/23   Note Type   Note Type BID visit/treatment   Pain Assessment   Pain Assessment Tool 0-10   Pain Score 8   Pain Location/Orientation Orientation: Right;Location: Hip  (Pt had pain meds prior to PT)   Restrictions/Precautions   RLE Weight Bearing Per Order WBAT   Other Precautions Fall Risk;Pain   General   Chart Reviewed Yes   Cognition   Overall Cognitive Status WFL   Subjective   Subjective "My hip is so sore today"   Transfers   Sit to Stand 5  Supervision   Stand to Sit 5  Supervision   Stand pivot 5  Supervision   Additional items   (with walker)   Toilet transfer 5  Supervision   Additional items Commode   Ambulation/Elevation   Gait pattern   (antalgic R but improved with practice.)   Gait Assistance 5  Supervision   Assistive Device Rolling walker   Distance 2x50 feet, x 25 feet   Stair Management Assistance 5  Supervision   Stair Management Technique One rail R;With cane   Activity Tolerance   Activity Tolerance Patient tolerated treatment well;Patient limited by fatigue;Patient limited by pain   Exercises    x 10 each ankle pumps, quad set, glut set, SAQ, LAQ, AAROM hip abd/add, AAROM heel slides. Ice to hip after PT. Assessment   Prognosis Good   Problem List Decreased strength;Decreased range of motion; Impaired balance;Decreased mobility;Pain   Assessment Patient demonstrates good progress towards all goals postop day 1 status post right total hip replacement. Patient is able to ambulate on level surfaces with supervision assist with a steady gait. Patient is able to negotiate the steps with a rail and a cane with supervision assist.  Patient is ready to go home from the physical therapy point of view to start outpatient physical therapy later this week. The patient's AM-PAC Basic Mobility Inpatient Short Form Raw Score is 18. A Raw score of greater than 16 suggests the patient may benefit from discharge to home. Plan   Treatment/Interventions Therapeutic exercise;Elevations;LE strengthening/ROM; Functional transfer training;ADL retraining;Gait training;Bed mobility; Equipment eval/education;Patient/family training;Spoke to nursing;Spoke to MD   Progress Progressing toward goals   PT Frequency Twice a day   Discharge Recommendation   PT Discharge Recommendation Home with outpatient rehabilitation   Equipment Recommended   (Pt has a cane and a walker)   AM-PAC Basic Mobility Inpatient   Turning in Flat Bed Without Bedrails 3   Lying on Back to Sitting on Edge of Flat Bed Without Bedrails 3   Moving Bed to Chair 3   Standing Up From Chair Using Arms 3   Walk in Room 3   Climb 3-5 Stairs With Railing 3   Basic Mobility Inpatient Raw Score 18   Basic Mobility Standardized Score 41.05   Highest Level Of Mobility   JH-HLM Goal 6: Walk 10 steps or more   JH-HLM Achieved 7: Walk 25 feet or more   End of Consult   Patient Position at End of Consult All needs within reach; Bedside chair   Licensure   [place] License Number  [employer] PT [id number]

## 2023-10-17 NOTE — PLAN OF CARE
Problem: OCCUPATIONAL THERAPY ADULT  Goal: Performs self-care activities at highest level of function for planned discharge setting. See evaluation for individualized goals. Description: Treatment Interventions: ADL retraining, Functional transfer training, Endurance training, Patient/family training, Equipment evaluation/education, Compensatory technique education, Continued evaluation, Energy conservation, Activityengagement          See flowsheet documentation for full assessment, interventions and recommendations. Note: Limitation: Decreased ADL status, Decreased endurance, Decreased self-care trans, Decreased high-level ADLs (impaired pain, activity tolerance, standing tolerance, standing balance, sitting balance,R LE ROM / strength deficits)     Assessment: Pt is a 65yo female admitted to [facility] s/p elective R TAHIRA on 10/16/23 w/ . Pt reports I w/ ADL/ IADL at baseline home alone in [address] [place] [address] w/ 4 SHANNAN using cane for functional mobility. Upon eval, pt alert and oriented. Able to follow directions during ADLs. Pt required S to complete sit <> stand, S toilet transfer to commode, min A LBD, mod I UBD, S grooming, and S functional mobility using RW w/ + time. Pt benefits from cues for walker mgmt during grooming tasks at sink and for sequencing during functional mobility. Pt is completing ADLs below baseline level of I and would benefit from OT in acute care to maximize functional independence. Recommend DC home w/ OPPT when medically stable. Will continue to follow 3-5X / week in acute care. OT Discharge Recommendation: Home with outpatient rehabilitation (DC home w/ friend support / assist and OPPT.  No OT needs anticipated at DC)

## 2023-10-17 NOTE — OCCUPATIONAL THERAPY NOTE
Occupational Therapy Evaluation     Patient Name: Domingo BERRY'KYLE Date: 10/17/2023  Problem List  Principal Problem:    Status post total replacement of right hip    Past Medical History  Past Medical History:   Diagnosis Date    Anxiety     Apnea, sleep     patient states "repeated test says no"    Arthritis     severe in the joints    Chronic pain disorder     joints    Depression     DJD (degenerative joint disease)     Fall 11/22/2018    tripped on a folded rug- left hamstring injury    Foot injury     right foot-fx of bone-no repair-has severe arthritis in the foot and ankle    Fractures yr. 2000 or before    left ankle    GERD (gastroesophageal reflux disease)     Hepatitis C not sure    History of sleep apnea     Hypercholesteremia     Hypertension     Insomnia     Low back pain     Obesity     Lost 70lb with gastric sleeve (2011)    Osteoarthritis     Stomach disorder     take meds. for this    Use of cane as ambulatory aid     Wears glasses     Wears partial dentures     upper and lower-but did lose the lower      Past Surgical History  Past Surgical History:   Procedure Laterality Date    ANKLE ARTHROSCOPY Right     ANKLE SURGERY  not sure    orthoscopic/rt.  ankle    APPENDECTOMY      BARIATRIC SURGERY  maybe 2011    CHOLECYSTECTOMY      COLONOSCOPY  10/26/2021    DXA PROCEDURE (HISTORICAL)  10/13/2020    GASTRECTOMY SLEEVE LAPAROSCOPIC  2011    lost 70 lb    HAND SURGERY      rt. hand, trigger finger    JOINT REPLACEMENT Bilateral     knee replacements    KNEE SURGERY  2001 maybe    MAMMO (HISTORICAL)  01/26/2022    NERVE BLOCK Bilateral 12/29/2017    Procedure: L4, L5, S1 MEDIAL BRANCH BLOCK #1;  Surgeon: Oliva Elizalde MD;  Location: [address] [place] [address] MAIN OR;  Service: Pain Management     NERVE BLOCK Bilateral 01/12/2018    Procedure: BLOCK MEDIAL BRANCH L4, L5-S1  #2;  Surgeon: Oliva Elizalde MD;  Location: [facility] MAIN OR;  Service: Pain Management     NE ARTHROPLASTY GLENOHUMERAL JOINT TOTAL SHOULDER Right 05/23/2019    Procedure: ARTHROPLASTY SHOULDER TOTAL , excision of deep cyst at shoulder, biceps tenotomy;  Surgeon: Zoraida Guzman MD;  Location: [facility];  Service: Orthopedics    AR INJECT SI JOINT ARTHRGRPHY&/ANES/STEROID W/LESA Right 10/23/2020    Procedure: Sacroiliac Joint injection (51411); Surgeon: Mer Barros MD;  Location: [facility] MAIN OR;  Service: Pain Management     AR TENDON SHEATH INCISION Right 12/21/2021    Procedure: RELEASE TRIGGER RING FINGER;  Surgeon: Adrianne Pichardo MD;  Location: [facility];  Service: Orthopedics    RADIOFREQUENCY ABLATION Right 03/16/2018    Procedure: Rt L4 L5 S1 RFA;  Surgeon: Mer Barros MD;  Location: [address] [place] [address] MAIN OR;  Service: Pain Management     RADIOFREQUENCY ABLATION Left 12/14/2018    Procedure: L3-S1 Radio Frequency Ablation ([id number]); Surgeon: Mer Barros MD;  Location: [facility] MAIN OR;  Service: Pain Management     RHIZOTOMY Left 01/25/2018    Procedure: L4, L5, S1 RFA;  Surgeon: Mer Barros MD;  Location: [facility] MAIN OR;  Service: Pain Management     SHOULDER SURGERY  April/2019    rt. total  replacement    TONSILLECTOMY           10/17/23 0928   OT Last Visit   OT Visit Date 10/17/23  (Tuesday)   Note Type   Note type Evaluation  (and tx note 0918-0928)   Pain Assessment   Pain Assessment Tool 0-10   Pain Score 10 - Worst Possible Pain  (upon arrival seated OOB in chair ---> 5/10 post eval/ tx)   Pain Location/Orientation Orientation: Right;Location: Hip;Location: Leg   Effect of Pain on Daily Activities limits pace and activity tolerance during ADLs   Patient's Stated Pain Goal No pain   Hospital Pain Intervention(s) Cold applied;Repositioned; Ambulation/increased activity; Emotional support  (RN, Mike Chowdary / Maddy June medicated prior to eval)   Restrictions/Precautions   Weight Bearing Precautions Per Order Yes   RLE Weight Bearing Per Order WBAT  (s/p R ant TAHIRA on 10/16/23 w/ Dr. Gael Rosa)   Other Precautions WBS; Fall Risk;Pain  (Conor on room air)   Home Living   Type of [address] [place] [address]  Two level;1/2 bath on main level; Other (Comment); Bed/bath upstairs;Stairs to enter with rails  (4 SHANNAN w/ post to hold onto)   Bathroom Shower/Tub Tub/shower unit  (claw foot tub)   Bathroom Toilet Standard   Bathroom Equipment Tub transfer bench;Commode   Bathroom Accessibility Accessible  (full bath up flight of stairs)   Home Equipment Walker;Cane;Reacher;Sock aid;Long-handled shoehorn   Additional Comments Pt reports living alone in 2 SH w/ ~4 SHANNAN to front door   Prior Function   Level of [place] Independent with ADLs; Independent with functional mobility; Independent with IADLS   Lives With (S)  Alone   Receives Help From Friend(s)   IADLs Independent with driving; Independent with meal prep; Independent with medication management   Falls in the last 6 months 0   Vocational Retired   Comments Pt reports I w/ ADLs / IADLs at baseline using cane for functional mobility   Lifestyle   Autonomy Pt reports I w/ ADL/ IADL at baseline using cane   Reciprocal Relationships Pt reports living alone. Supportive friends   Service to Others Pt reports retired and worked at [address] Pt emotional about loss of her dog thsi past Saturday. Pt reports taking care of the house and enjoys spending time w/ friends / family   General   Additional Pertinent History Pt is s/p elective R TAHIRA on 10/16/23 w/ Dr. Tonja Byrd. per orthopedics, pt is WBAT R LE   Family/Caregiver Present No   Additional General Comments Significant PMH impacting her occupational performance includes chronic pain, obesity s/p gastric sleeve (2011), anxiety/depression, osteoarthritis R hip, HTN, hx R foot / ankle fracture s/p operative fixation, B TKA, R total shoulder (05/23/19).  Personal and environmental factors impacting performance includes multi level home, increased pain, difficulty managing stairs, difficulty completing IADLs; supports / strengths include independent at baseline, supportive friends, able to stay on first floor. ADL   Where Assessed Chair  (vs seated on toilet in bathroom)   [address]  (finished breakfast prior to therapist arrival)   Grooming Assistance 6  Modified Independent   Grooming Deficit Setup; Increased time to complete  (seated after set- up due to decreased standing tolerance /+ Pain)   UB Bathing Assistance Unable to assess  (anticipate mod I seated after set- up for + time based on fxal obs skills, clinical judgement)   LB Bathing Assistance Unable to assess  (anticipate mod I seated for + time, set- up, use of LHAE based on fxal obs skills, clinical judgement)   UB Dressing Assistance 6  Modified independent   UB Dressing Deficit Setup; Increased time to complete  (don bra and t-shirt)   LB Dressing Assistance 4  Minimal Assistance   LB Dressing Deficit Setup; Requires assistive device for steadying;Steadying;Verbal cueing;Supervision/safety; Increased time to complete; Thread RLE into pants; Thread RLE into underwear;Don/doff R shoe  (pt reports use of LHAE (reacher, sock aide and long shoe horn))   Toileting Assistance  5  Supervision/Setup   Toileting Deficit Setup;Supervison/safety; Bedside commode  (commode over toilet in bathroom)   Additional Comments Educated pt on tech to complete LBD seated threading R LE first. Pt verbalized understanding using LHAE as needed   Bed Mobility   Supine to Sit Unable to assess   Sit to Supine Unable to assess   Additional Comments Pt seated OOB In chair upon arrival and on commode over toilet in bathroom at end of eval   Transfers   Sit to Stand 4  Minimal assistance  (min A initially ---> S additional sit to stand)   Additional items Assist x 1;Bedrails;Armrests; Increased time required;Verbal cues  (instruction for hand placement)   Stand to Sit 4  Minimal assistance   Additional items Assist x 1;Bedrails;Armrests; Increased time required;Verbal cues  (hand placement, controlled decent)   Toilet transfer 5  Supervision   Additional items Assist x 1; Increased time required;Verbal cues; Commode;Standard toilet; Bedrails;Armrests  (commode over toilet in bathroom)   Car transfer   (see treatment note below)   Additional Comments Pt performed sit <> stand 3X during eval. Pt required 1 seated rest break during LBD while managing clothing up and over hips Required seated rest break after pulling underwear up due to pain and LOB. Functional Mobility   Functional Mobility 5  Supervision   Additional Comments + time and cues for sequencing using RW to bathroom   Additional items Rolling walker   Balance   Static Sitting Fair +   Dynamic Sitting Fair   Static Standing Fair   Ambulatory Fair  (using RW)   Activity Tolerance   Activity Tolerance Patient limited by pain   Medical Staff Made Aware spoke w/ PTJeb   Nurse Made Aware spoke w/ RN Lesa/ charge RNCarolina   RUE Assessment   RUE Assessment   (javier to complete ADL w/ ROM s/p R total shoulder (2019))   RUE Strength   RUE Overall Strength Within Functional Limits - able to perform ADL tasks with strength   LUE Assessment   LUE Assessment WFL   LUE Strength   LUE Overall Strength Within Functional Limits - able to perform ADL tasks with strength   Hand Function   Gross Motor Coordination Functional   Fine Motor Coordination Functional   Hand Function Comments Pt reports L hand dominance   Sensation   Light Touch No apparent deficits  (responded to light touch B UE)   Vision-Basic Assessment   Current Vision Wears glasses all the time   Cognition   Overall Cognitive Status [facility]   Arousal/Participation Alert; Cooperative   Attention Within functional limits   Orientation Level Oriented X4   Memory Decreased recall of precautions  (+ time, cues to conssitently demo understanding hand placement)   Following Commands Follows multistep commands without difficulty   Comments Identified pt by full name and birthdate. Agreeable and motivated to participate. Assessment   Limitation Decreased ADL status; Decreased endurance;Decreased self-care trans;Decreased high-level ADLs  (impaired pain, activity tolerance, standing tolerance, standing balance, sitting balance,R LE ROM / strength deficits)   Assessment Pt is a 65yo female admitted to [facility] s/p elective R TAHIRA on 10/16/23 w/ . Pt reports I w/ ADL/ IADL at baseline home alone in 2 [address] [place] [address] w/ 4 SHANNAN using cane for functional mobility. Upon eval, pt alert and oriented. Able to follow directions during ADLs. Pt required S to complete sit <> stand, S toilet transfer to commode, min A LBD, mod I UBD, S grooming, and S functional mobility using RW w/ + time. Pt benefits from cues for walker mgmt during grooming tasks at sink and for sequencing during functional mobility. Pt is completing ADLs below baseline level of I and would benefit from OT in acute care to maximize functional independence. Recommend DC home w/ OPPT when medically stable. Will continue to follow 3-5X / week in acute care. Goals   Patient Goals Pt stated that she would like to return home and get around, walk w/ out pain   Plan   Treatment Interventions ADL retraining;Functional transfer training; Endurance training;Patient/family training;Equipment evaluation/education; Compensatory technique education;Continued evaluation; Energy conservation; Activityengagement   Goal Expiration Date 10/24/23   OT Treatment Day 0  (Tuesday 10/17/23)   OT Frequency 3-5x/wk   Discharge Recommendation   OT Discharge Recommendation Home with outpatient rehabilitation  (DC home w/ friend support / assist and OPPT.  No OT needs anticipated at DC)   AM-PAC Daily Activity Inpatient   Lower Body Dressing 3   Bathing 3   Toileting 3   Upper Body Dressing 4   Grooming 4   Eating 4   Daily Activity Raw Score 21   Daily Activity Standardized Score (Calc for Raw Score >=11) 44.27   AM-PAC Applied Cognition Inpatient Following a Speech/Presentation 4   Understanding Ordinary Conversation 4   Taking Medications 4   Remembering Where Things Are Placed or Put Away 4   Remembering List of 4-5 Errands 4   Taking Care of Complicated Tasks 4   Applied Cognition Raw Score 24   Applied Cognition Standardized Score 62.21   Barthel Index   Feeding 10   Bathing 0   Grooming Score 5   Dressing Score 5   Bladder Score 10   Bowels Score 10   Toilet Use Score 5   Transfers (Bed/Chair) Score 10   Mobility (Level Surface) Score 0   Stairs Score 0   Barthel Index Score 55   Additional Treatment Session   Start Time 0843   End Time 8767   Treatment Assessment Pt seen for skilled OT tx session day 1 focusing on challenging standing tolerance, pt education, and activity engagement to maximize functional independence to return home. Pt agreeable and motivated to participate reporting she would like to wash her face, brush her teeth and put her partial dentures in. Pt stood from commode over toilet in bathroom w/ S. Able to manage clothing up w/ S. Pt engaged in functional mobility to bathroom sink using RW w/ S and + time. Therapist educated pt on walker mgmt at sink. Pt engagedin grooming standing w/ S after set- up. Tolerated standing 5 minutes w/ fair balance. Therapist educated pt on tech to complete car transfer. Pt verbalized understanding. Pt seated OOB in chair at end of session w/ needs met, call bell in reach. Provided w/ ice / cold pack. Conitnue to recommend DC home w/ OPPT when medically stable for discharge from acute care. Will continue to follow   Additional Treatment Day 1  (Tuesday 10/17/23)   End of Consult   Education Provided Yes   Patient Position at End of Consult Bedside chair; All needs within reach   Nurse Communication Nurse aware of consult   Licensure   [address] [place] [address] Number  Sarah Felipe OTR/L [id number]       The patient's raw score on the AM-PAC Daily Activity Inpatient Short Form is 21.  A raw score of greater than or equal to 19 suggests the patient may benefit from discharge to home. Please refer to the recommendation of the Occupational Therapist for safe discharge planning.     Pt goals to be met by 10/21/23 to maximize independence w/ ADLs and improve engagement to return home alone includes:    -Pt will consistently engage in functional mobility household distances w/ mod I using LRAD to return home alone    -Pt will complete LBD w/ mod I using LHAE as needed    -Pt will complete bed mobility supine <> sit w/ mod I in preparation for ADLs    -Pt will complete functional transfers to bed, chair, and toilet using LRAD, DME as needed w/ mod I    -Pt will complete functional tub transfer using LRAD, DME as needed w/ S to max I w/ bathing    -Pt will demonstrate improved functional standing tolerance for at least 10 minutes using LRAD as needed w/ at least fair + balance while engaged in grooming/ bathing w/ mod I standing at sink  to max I w/ light IADLs, meal prep to return home alone    Jose L Bennett, OTR/L  [id number]  [id number]

## 2023-10-17 NOTE — PROGRESS NOTES
Progress Note - Orthopedics   Hanane Brian 76 y.o. female MRN: [id number]  Unit/Bed#: [address] [place] [address] Encounter: [id number]    Assessment:  1)POD#1 s/p Direct Anterior Right TAHIRA    Plan:  Clinda 900mg IV x 2 for 24 hours postop - completed  DVT prophylaxis: ASA 81mg PO BID with Protonix/SCD's/Ambulation  PT/OT- WBAT RLE  Analgesia PRN  Follow up AM labs - stable, DC IVF  Baker Dc - monitor for void  Dressing- monitor for drainage, Mepilex may remain in place 7 days  Discharge planning -she has met all discharge criteria with nursing and therapy. All discharge instructions patient questions were discussed in detail at bedside prior to discharge. She will begin outpatient physical therapy later this week. She will follow-up with Dr. Timothy Montemayor in 1 week for a wound check    Weight bearing: WBAT RLE    VTE Pharmacologic Prophylaxis: ASA 81mg PO BID with protonix  VTE Mechanical Prophylaxis: sequential compression device    Subjective: Patient seen and examined postoperatively in this afternoon. Pain controlled. No overnight events. Able to work with PT/OT. Denies CP    Vitals: Blood pressure 138/65, pulse 72, temperature 97.6 °F (36.4 °C), resp. rate 18, height 5' 2" (1.575 m), weight 97.1 kg (214 lb), SpO2 94 %. ,Body mass index is 39.14 kg/m². Intake/Output Summary (Last 24 hours) at 10/17/2023 1255  Last data filed at 10/17/2023 0600  Gross per 24 hour   Intake 2675 ml   Output 1405 ml   Net 1270 ml       Invasive Devices       Peripheral Intravenous Line  Duration             Peripheral IV 10/16/23 Dorsal (posterior); Right Wrist 1 day                    Physical Exam: NAD, A&Ox3, resting in chair  Ortho Exam: RLE: right anterior hip dsg c/d/i, thigh soft, +LFCN SILT, +Fem nerve motor, +DF/PF/EHL, +L3-S1 SILT, DP2+, foot warm, SCDs in place    Lab, Imaging and other studies: PO XR R hip: Reviewed and acceptable    Lab Results   Component Value Date    WBC 13.17 (H) 10/17/2023    HGB 11.1 (L) 10/17/2023    HCT 34.3 (L) 10/17/2023    MCV 90 10/17/2023     10/17/2023     Lab Results   Component Value Date    SODIUM 138 10/17/2023    K 4.0 10/17/2023     10/17/2023    CO2 26 10/17/2023    AGAP 6 10/17/2023    BUN 13 10/17/2023    CREATININE 0.38 (L) 10/17/2023    GLUC 157 (H) 10/17/2023    GLUF 91 09/19/2023    CALCIUM 9.1 10/17/2023    AST 20 09/19/2023    ALT 15 09/19/2023    ALKPHOS 77 09/19/2023    TP 7.1 09/19/2023    TBILI 0.41 09/19/2023    EGFR 109 10/17/2023     Gigi Guerra PA-C

## 2023-10-17 NOTE — PHYSICAL THERAPY NOTE
PT TREATMENT     10/17/23 1305   PT Last Visit   PT Visit Date 10/17/23   Note Type   Note Type BID visit/treatment   Pain Assessment   Pain Assessment Tool 0-10   Pain Score 7   Pain Location/Orientation Orientation: Right;Location: Hip  (Pt had pain meds prior to PT)   Restrictions/Precautions   RLE Weight Bearing Per Order WBAT   Other Precautions Pain; Fall Risk   General   Chart Reviewed Yes   Cognition   Overall Cognitive Status WFL   Subjective   Subjective "my hip is so sore"   Transfers   Sit to Stand 7  Independent   Stand to Sit 7  Independent   Stand pivot 7  Independent   Additional items   (with walker)   Toilet transfer 5  Supervision   Ambulation/Elevation   Gait pattern   (antalgic R, slow imelda, multiple standing rest breaks)   Gait Assistance 6  Modified independent   Assistive Device Rolling walker   Distance 150 feet   Balance   Static Sitting Fair +   Static Standing Fair +   Ambulatory Fair +   Higher level balance   (with walker)   Assessment   Prognosis Good   Problem List Decreased strength;Decreased range of motion; Impaired balance;Decreased mobility;Pain   Assessment Patient demonstrates good progress towards all goals postop day 1 status post right total hip replacement. Patient is ambulating with modified independence with a rolling walker on level surfaces. Patient is independent with a home exercise program designed to improve her right lower extremity strength and range of motion. Patient is ready to go home from physical therapy point of view to start outpatient physical therapy later this week. The patient's AM-PAC Basic Mobility Inpatient Short Form Raw Score is 23. A Raw score of greater than 16 suggests the patient may benefit from discharge to home. Plan   Treatment/Interventions Therapeutic exercise;Elevations;LE strengthening/ROM; Functional transfer training;Gait training;Bed mobility; Equipment eval/education;Patient/family training   Progress Progressing toward goals   PT Frequency Twice a day   Discharge Recommendation   PT Discharge Recommendation Home with outpatient rehabilitation   Equipment Recommended   (Pt has a cane and a walker. )   AM-PAC Basic Mobility Inpatient   Turning in Flat Bed Without Bedrails 4   Lying on Back to Sitting on Edge of Flat Bed Without Bedrails 4   Moving Bed to Chair 4   Standing Up From Chair Using Arms 4   Walk in Room 4   Climb 3-5 Stairs With Railing 3   Basic Mobility Inpatient Raw Score 23   Basic Mobility Standardized Score 50.88   Highest Level Of Mobility   JH-HLM Goal 7: Walk 25 feet or more   JH-HLM Achieved 7: Walk 25 feet or more   End of Consult   Patient Position at End of Consult All needs within reach; Bedside chair   Licensure   [place] License Number  Troubelice [id number]

## 2023-10-17 NOTE — PLAN OF CARE
Problem: PAIN - ADULT  Goal: Verbalizes/displays adequate comfort level or baseline comfort level  Description: Interventions:  - Encourage patient to monitor pain and request assistance  - Assess pain using appropriate pain scale  - Administer analgesics based on type and severity of pain and evaluate response  - Implement non-pharmacological measures as appropriate and evaluate response  - Consider cultural and social influences on pain and pain management  - Notify physician/advanced practitioner if interventions unsuccessful or patient reports new pain  Outcome: Progressing     Problem: INFECTION - ADULT  Goal: Absence or prevention of progression during hospitalization  Description: INTERVENTIONS:  - Assess and monitor for signs and symptoms of infection  - Monitor lab/diagnostic results  - Monitor all insertion sites, i.e. indwelling lines, tubes, and drains  - Monitor endotracheal if appropriate and nasal secretions for changes in amount and color  - [place] appropriate cooling/warming therapies per order  - Administer medications as ordered  - Instruct and encourage patient and family to use good hand hygiene technique  - Identify and instruct in appropriate isolation precautions for identified infection/condition  Outcome: Progressing     Problem: DISCHARGE PLANNING  Goal: Discharge to home or other facility with appropriate resources  Description: INTERVENTIONS:  - Identify barriers to discharge w/patient and caregiver  - Arrange for needed discharge resources and transportation as appropriate  - Identify discharge learning needs (meds, wound care, etc.)  - Arrange for interpretive services to assist at discharge as needed  - Refer to Case Management Department for coordinating discharge planning if the patient needs post-hospital services based on physician/advanced practitioner order or complex needs related to functional status, cognitive ability, or social support system  Outcome: Progressing

## 2023-10-18 ENCOUNTER — TELEPHONE (OUTPATIENT)
Dept: OBGYN CLINIC | Facility: HOSPITAL | Age: 68
End: 2023-10-18

## 2023-10-18 NOTE — TELEPHONE ENCOUNTER
Patient contacted for a postoperative follow up assessment. Patient reports doing "OK"  Patient states current pain level of a 3/10  when sitting and 8/10 when walking with RW. Patient states they have moderate pain and it is alleviated with medication regimen. Patient denies increase in swelling and dressing is clean, dry and intact. Patient is icing the site regularly. We reviewed patients AVS medication list. Patient is taking Tylenol 1000mg every 8 hours, Oxycodone 5mg PRN, ASA 81mg BID, Colace 100mg BID. Patient has not yet had a BM but is passing gas. Patient denies nausea, vomiting, abdominal pain, chest pain, shortness of breath, fever, dizziness and calf pain. Patient confirmed post-op appointment with surgeon on 10/25   . Patient does not have any other questions or concerns at this time. Pt was encouraged to call with any questions, concerns or issues.

## 2023-10-19 ENCOUNTER — TELEPHONE (OUTPATIENT)
Age: 68
End: 2023-10-19

## 2023-10-19 ENCOUNTER — OFFICE VISIT (OUTPATIENT)
Dept: PHYSICAL THERAPY | Facility: CLINIC | Age: 68
End: 2023-10-19
Payer: COMMERCIAL

## 2023-10-19 DIAGNOSIS — Z01.818 PRE-OPERATIVE EXAMINATION: ICD-10-CM

## 2023-10-19 DIAGNOSIS — M16.11 PRIMARY OSTEOARTHRITIS OF RIGHT HIP: Primary | ICD-10-CM

## 2023-10-19 DIAGNOSIS — M25.551 RIGHT HIP PAIN: ICD-10-CM

## 2023-10-19 DIAGNOSIS — Z96.641 STATUS POST TOTAL REPLACEMENT OF RIGHT HIP: ICD-10-CM

## 2023-10-19 PROCEDURE — 97530 THERAPEUTIC ACTIVITIES: CPT | Performed by: PHYSICAL THERAPIST

## 2023-10-19 PROCEDURE — 97110 THERAPEUTIC EXERCISES: CPT | Performed by: PHYSICAL THERAPIST

## 2023-10-19 RX ORDER — OXYCODONE HYDROCHLORIDE 5 MG/1
TABLET ORAL
Qty: 60 TABLET | Refills: 0 | Status: SHIPPED | OUTPATIENT
Start: 2023-10-22

## 2023-10-19 NOTE — PROGRESS NOTES
PT Reevaluation     Today's date: 10/9/2023  Patient name: Camilo Garcia  : 1955  MRN: [id number]  Referring provider: Jethro Pulliam  Dx:   Encounter Diagnosis     ICD-10-CM    1. Primary osteoarthritis of right hip  M16.11 Ambulatory referral to Physical Therapy      2. Right hip pain  M25.551 Ambulatory referral to Physical Therapy      3. Pre-operative examination  Z01.818 Ambulatory referral to Physical Therapy                     Assessment  Assessment details:   CASE SUMMARY:   Camilo Garcia is a 76y.o. year old female who presents to OPPT upon referral from ortho  secondary to pre op right THR scheduled for 10/16/23. Cheli Kuo reports onset of symptoms  as being chronic. Overall symptom progression at this time is worsening. Previous injury to this area: none  Previous treatment includes: PT  Diagnostic testing includes: xray. PMHx includes: See chart for full details with medications, allergies, past family history, past medical history, social history, past surgical history and problem list. All topics were reviewed. Functionally, at baseline, Cheli Kuo is independent  Currently, Cheli Kuo is limited in the following activities: ambulation difficult, stairs difficult, diffiuclty sleepin. Cheli Kuo is lives alone in a 2 story home with 4 stairs to enter and flight stairs inside with + railings. Recreational activities include: none . Camilo Garcia is retired. Patient goal(s) for physical therapy is: to walk with less pain    10/19/23: patient entered post right THR on 10/16/23. Was discharged to home the next day. States pain level entering today is 10 with ambulation at rest 3/10. Pain meds taken at 9 am.   No complications with the surgery. Follow up with  next week. Main complaint is burning on lateral aspect of thigh and radiating down to knee. Function: is sleeping well in chair, states all weightbearing activities are limited.      The following is a summary of Cheli Kuo objective status:    Impairments:   Postural dysfunction  Reduced ROM  Reduced strength  + TTP and increased soft tissue density  Reduced flexibility  Gait/elevation dysfunction  Reduced stability  Transfers impairments  Reduced activity tolerance including above stated functional limitations    Patient's clinical presentation is consistent with their referring diagnosis of: Primary osteoarthritis of right hip  (primary encounter diagnosis)  Plan: Ambulatory referral to Physical Therapy    Right hip pain  Plan: Ambulatory referral to Physical Therapy    Pre-operative examination  Plan: Ambulatory referral to Physical Therapy      PLOC was discussed and agreed upon with patient. Patient was educated on: ice /elevation and HEP reviewed for post op exercises . Patient vocalized a good understanding of  PLOC and HEP issued. Kushal Omer would benefit from skilled physical therapy services to address their aforementioned functional limitations and progress towards prior level of function, to meet stated goals,  and independence with home exercise program.  Prognosis for successful rehab outcome is good with consistent participation in therapy and carryover of HEP and PLOC. FOTO score TBD after surgery.    Other impairment: reduced stability    Goals    STG:   + Patient will report a 35% improvement in symptoms (2-3 weeks)   + Patient will be independent in basic HEP (2-3 weeks)  + Patient will demonstrate reduced gait deviations ambulating on level surfaces with assistive device    (2-3 week)  + Patient will demonstrate an increase in range of motion   hip flexion 90 deg (3 weeks)  + Patient will report increased ease walking due to a reduction in pain (2-3 weeks)      LTG:  + Patient will report a 65% improvement in symptoms (4-6 weeks)   + Patient will increase FOTO score to expected score (4-6 sessions)   + Patient will be independent in comprehensive HEP (4-6 weeks)  + Patient will demonstrate ambulating on level surfaces Without an assistive device(4-6 week)  + Patient will negotiate stairs reciprically with use of one railing  (6-8 weeks)  + Patient will demonstrate an increase in range of motion hip AAROM all directions  to  wfl  (4-6 weeks)  + Patient will demonstrate increase  MMT of hip/knee to  4/5 for maximum function. (4-6 weeks)      Plan    Post-op:   1-3x week 4-8 weeks HEP development, stretching per objective finding , strengthening t/o LE, A/AA/PROM, joint mobilizations prn,  posture education, STM/MI as needed per objective findings to reduce muscle tension and reduced edema, neuro reeducation, gait and balance training, Ktape prn, STM/MI prn, PLOC discussed and agreed upon with patient all within doctors precautions. .    Plan of Care beginning date: 10/19/2023  Plan of Care expiration date: 2023  Treatment plan discussed with: patient    Objective (10/19/23)    Static Posture     Comments  Posture:reduced weightbearing on right     Gait:  + SPC: reduced stance time on right ( + RW: reduced step length adan, reduced stance joey aquilino right)    (Inspection: + dressing still in place, no redness no drainage on dressing.  No signs of infection)    functional activities:    Squat:needs UE assistance with getting out of chair (status quo)      MMT:   Hip flexion: Right:3-/5 + pain   Left: 4-/5 (Right: 3-/5)   IR: Right: 4/5  Left: 4/5 (Right: 3-/5)   Er: Right: 4/5    Left: 4/5 (Right: 3-/5)    hip abduction: Right: 4/5    Left: 4/5 (Right: 3-/5)        ROM:   Flexion: Right:  80 deg  + pain Left:95 (Right: 65 AAROM)   Abduction: Right: wnl + pain Left:wnl (Right: 20 deg)   IR: Right: 8 + pain Left: wnl ( Right: 15 deg AAROM)   ER: Right: 27  + pain Left: wnl (Right:  30 deg AAROM)   Adduction: Right:min LOM + pain  Left:wnl ( .Right: neutral)    Knee:   ROM: Right:wnl  left: wnl   MMT: Right: 4+/5 flex,ext    Left: 4+/5 flex , ext      TU:94 sec (1:15 min:sec)               Eval/ Re-eval Auth #/ Referral # Total visits Start date  Expiration date Total active units  Total manual units  PT only or  PT+OT?   10/9/23                                                            Past Medical History:   Diagnosis Date    Anxiety     Apnea, sleep     patient states "repeated test says no"    Arthritis     severe in the joints    Chronic pain disorder     joints    Depression     DJD (degenerative joint disease)     Fall 11/22/2018    tripped on a folded rug- left hamstring injury    Foot injury     right foot-fx of bone-no repair-has severe arthritis in the foot and ankle    Fractures yr. 2000 or before    left ankle    GERD (gastroesophageal reflux disease)     Hepatitis C not sure    History of sleep apnea     Hypercholesteremia     Hypertension     Insomnia     Low back pain     Obesity     Lost 70lb with gastric sleeve (2011)    Osteoarthritis     Stomach disorder     take meds. for this    Use of cane as ambulatory aid     Wears glasses     Wears partial dentures     upper and lower-but did lose the lower          Specialty Daily Treatment Diary       Insurance:  Pre op  Post op      Visits: 1 2      Manual: 10/9/23 10/19/23              STM/MI: prn                                Protocol:          Ther ex:         PROM: right hip  performed      Man Flexibility: hip flexor, piriformis, glutes                Rec bike/nustep        Heel slides instruct W/slideboard      Ball squeeze  Sitting 5 sec x 15       SLR + TA  --      Seated knee extension/flexion instruct       LAQ instruct 15 x       Supine hip abduction  instruct W/ slideboard: 10 x 2      Quad set  instruct 5 sec x 10       Glute set  instruct 5 sec x 15       Glute set + bridge  --              Therapeutic Activity:        Reevaluation  performed      Posture Education                Gait training:                 HEP:                Modalities:        MH        Ice

## 2023-10-19 NOTE — TELEPHONE ENCOUNTER
Pt contacted Call Center requested refill of their medication.        Medication Name: Oxycodone        Dosage of Med: 5ml       Frequency of Med: 4 hours      Remaining Medication: Enough till Saturday       [facility]        Pt. Preferred Callback Phone Number: [phone]      Thank you.

## 2023-10-23 ENCOUNTER — OFFICE VISIT (OUTPATIENT)
Dept: PHYSICAL THERAPY | Facility: CLINIC | Age: 68
End: 2023-10-23
Payer: COMMERCIAL

## 2023-10-23 DIAGNOSIS — M25.551 RIGHT HIP PAIN: ICD-10-CM

## 2023-10-23 DIAGNOSIS — M16.11 PRIMARY OSTEOARTHRITIS OF RIGHT HIP: Primary | ICD-10-CM

## 2023-10-23 DIAGNOSIS — Z01.818 PRE-OPERATIVE EXAMINATION: ICD-10-CM

## 2023-10-23 PROCEDURE — 97530 THERAPEUTIC ACTIVITIES: CPT | Performed by: PHYSICAL THERAPIST

## 2023-10-23 PROCEDURE — 97110 THERAPEUTIC EXERCISES: CPT | Performed by: PHYSICAL THERAPIST

## 2023-10-23 NOTE — PROGRESS NOTES
Daily Note         Today's date: 10/23/2023  Patient name: Domingo Ramos  : 1955  MRN: [id number]  Referring provider: Lydia Jeronimo*  Dx:   Encounter Diagnosis     ICD-10-CM    1. Primary osteoarthritis of right hip  M16.11       2. Right hip pain  M25.551       3. Pre-operative examination  Z01.818           Subjective: Domingo Ramos reports no new complaints. She states the burning on lateral thigh is much improved and she is trying tor educe her use of pain medication. Objective: See treatment diary below    Assessment:   patient had good tolerance to program this session. She needed cuing to increase step length wth gait when ambulating with RW. Patient also needed cuing to avoid hip circumduction when performing step taps and placing right foot on step. Latrice Nicholson she was able to correct with cuing . The goal of the current treatment is to address patient's functional limitations as well as objective findings. Plan:  progress as tolerated and improve ROM, strength and function. Eval/ Re-eval Auth #/ Referral # Total visits Start date  Expiration date Total active units  Total manual units  PT only or  PT+OT?   10/9/23 No auth, no visit limit, $15 copay                                                           Past Medical History:   Diagnosis Date    Anxiety     Apnea, sleep     patient states "repeated test says no"    Arthritis     severe in the joints    Chronic pain disorder     joints    Depression     DJD (degenerative joint disease)     Fall 2018    tripped on a folded rug- left hamstring injury    Foot injury     right foot-fx of bone-no repair-has severe arthritis in the foot and ankle    Fractures yr.  2000 or before    left ankle    GERD (gastroesophageal reflux disease)     Hepatitis C not sure    History of sleep apnea     Hypercholesteremia     Hypertension     Insomnia     Low back pain     Obesity     Lost 70lb with gastric sleeve () Osteoarthritis     Stomach disorder     take meds. for this    Use of cane as ambulatory aid     Wears glasses     Wears partial dentures     upper and lower-but did lose the lower          Specialty Daily Treatment Diary     Surgery: 10/16/23 right THR  Insurance:  Pre op  Post op  Bandage removal?? Visits: 1 2 3     Manual: 10/9/23 10/19/23 10/23/23             STM/MI: prn                                Protocol:          Ther ex/NMR:         PROM: right hip  performed Performed rotation in sitting     Man Flexibility: hip flexor, piriformis, glutes   ---             Rec bike/nustep   NS: 5 min lv 1      Heel slides instruct W/slideboard W/ peanut: 10 x-15 x  W/slideboard: 10 x     Ball squeeze  Sitting 5 sec x 15  Sittin sec x 15      SLR + TA  --  AAROM:     Seated knee extension/flexion instruct       Hip rolls AROM   IR/ER: 10 x 2     LAQ instruct 15 x  15 x      Supine hip abduction  instruct W/ slideboard: 10 x 2 W/ slideboard: 10 x       Quad set  instruct 5 sec x 10  5 sec x 10       Glute set  instruct 5 sec x 15  5 sec x 15     Glute set + bridge  -- -- ++    Gagan heel raises   2 x 10       Step taps   4 in:10 x 2     Mini squats on railing   10 x 2      Sit/stand from edge of mat table    5 x 2 to RW             Therapeutic Activity:        Reevaluation  performed      Posture Education                Gait training:                 HEP:                Modalities:        MH        Ice

## 2023-10-24 ENCOUNTER — APPOINTMENT (OUTPATIENT)
Dept: PHYSICAL THERAPY | Facility: CLINIC | Age: 68
End: 2023-10-24
Payer: COMMERCIAL

## 2023-10-25 ENCOUNTER — APPOINTMENT (OUTPATIENT)
Dept: PHYSICAL THERAPY | Facility: CLINIC | Age: 68
End: 2023-10-25
Payer: COMMERCIAL

## 2023-10-25 ENCOUNTER — OFFICE VISIT (OUTPATIENT)
Dept: OBGYN CLINIC | Facility: CLINIC | Age: 68
End: 2023-10-25

## 2023-10-25 VITALS
DIASTOLIC BLOOD PRESSURE: 71 MMHG | HEIGHT: 62 IN | SYSTOLIC BLOOD PRESSURE: 145 MMHG | BODY MASS INDEX: 41.22 KG/M2 | WEIGHT: 224 LBS | HEART RATE: 80 BPM

## 2023-10-25 DIAGNOSIS — Z96.641 AFTERCARE FOLLOWING RIGHT HIP JOINT REPLACEMENT SURGERY: ICD-10-CM

## 2023-10-25 DIAGNOSIS — Z96.641 STATUS POST TOTAL REPLACEMENT OF RIGHT HIP: Primary | ICD-10-CM

## 2023-10-25 DIAGNOSIS — Z47.1 AFTERCARE FOLLOWING RIGHT HIP JOINT REPLACEMENT SURGERY: ICD-10-CM

## 2023-10-25 PROCEDURE — 99024 POSTOP FOLLOW-UP VISIT: CPT | Performed by: ORTHOPAEDIC SURGERY

## 2023-10-25 RX ORDER — OXYCODONE HYDROCHLORIDE 5 MG/1
TABLET ORAL
Qty: 60 TABLET | Refills: 0 | Status: SHIPPED | OUTPATIENT
Start: 2023-10-25 | End: 2023-11-03 | Stop reason: ALTCHOICE

## 2023-10-25 NOTE — PROGRESS NOTES
Assessment/Plan:  1. Status post total replacement of right hip        2. Aftercare following right hip joint replacement surgery          Scribe Attestation      I,:  Everardo Quintero am acting as a scribe while in the presence of the attending physician.:       I,:  Arcenio Franco, DO personally performed the services described in this documentation    as scribed in my presence.:           Jamie Darby is a very pleasant 79-year-old female who returns today for follow-up evaluation and wound check 9 days status post right total hip arthroplasty. I am very pleased with her clinical presentation today in the office. Her operative wound is healing nicely and was redressed with a Mepilex dressing today. She should continue with aspirin for DVT prophylaxis and should continue with her efforts at physical therapy. We will see her back in 1 week for her 2-week postoperative appointment with x-ray on arrival.    Subjective: Follow-up evaluation and wound check 9 days status post right total hip arthroplasty    Patient ID: Sayra Almeida is a 76 y.o. female who returns today for follow-up evaluation and wound check 9 days status post right total hip arthroplasty. She reports that she has been doing well. She has been participating in physical therapy and has been using aspirin for DVT prophylaxis. She is using a walker for ambulatory assistance. Her pain has been well controlled with her narcotics. She denies any new injury or trauma. Review of Systems   Constitutional:  Positive for activity change. Negative for chills, fever and unexpected weight change. HENT:  Negative for hearing loss, nosebleeds and sore throat. Eyes:  Negative for pain, redness and visual disturbance. Respiratory:  Negative for cough, shortness of breath and wheezing. Cardiovascular:  Negative for chest pain, palpitations and leg swelling. Gastrointestinal:  Negative for abdominal pain, nausea and vomiting.    Endocrine: Negative for polydipsia and polyuria. Genitourinary:  Negative for dysuria and hematuria. Musculoskeletal:  Positive for myalgias. Negative for arthralgias and joint swelling. See HPI   Skin:  Negative for rash and wound. Neurological:  Negative for dizziness, numbness and headaches. Psychiatric/Behavioral:  Negative for decreased concentration and suicidal ideas. The patient is not nervous/anxious. Past Medical History:   Diagnosis Date    Anxiety     Apnea, sleep     patient states "repeated test says no"    Arthritis     severe in the joints    Chronic pain disorder     joints    Depression     DJD (degenerative joint disease)     Fall 11/22/2018    tripped on a folded rug- left hamstring injury    Foot injury     right foot-fx of bone-no repair-has severe arthritis in the foot and ankle    Fractures yr. 2000 or before    left ankle    GERD (gastroesophageal reflux disease)     Hepatitis C not sure    History of sleep apnea     Hypercholesteremia     Hypertension     Insomnia     Low back pain     Obesity     Lost 70lb with gastric sleeve (2011)    Osteoarthritis     Stomach disorder     take meds. for this    Use of cane as ambulatory aid     Wears glasses     Wears partial dentures     upper and lower-but did lose the lower        Past Surgical History:   Procedure Laterality Date    ANKLE ARTHROSCOPY Right     ANKLE SURGERY  not sure    orthoscopic/rt.  ankle    APPENDECTOMY      BARIATRIC SURGERY  maybe 2011    CHOLECYSTECTOMY      COLONOSCOPY  10/26/2021    DXA PROCEDURE (HISTORICAL)  10/13/2020    GASTRECTOMY SLEEVE LAPAROSCOPIC  2011    lost 70 lb    HAND SURGERY      rt. hand, trigger finger    JOINT REPLACEMENT Bilateral     knee replacements    KNEE SURGERY  2001 maybe    MAMMO (HISTORICAL)  01/26/2022    NERVE BLOCK Bilateral 12/29/2017    Procedure: L4, L5, S1 MEDIAL BRANCH BLOCK #1;  Surgeon: Brigida Villela MD;  Location: [facility] MAIN OR;  Service: Pain Management     NERVE BLOCK Bilateral 01/12/2018    Procedure: BLOCK MEDIAL BRANCH L4, L5-S1  #2;  Surgeon: Rainer Davey MD;  Location: [address] [place] [address] MAIN OR;  Service: Pain Management     AR ARTHROPLASTY GLENOHUMERAL JOINT TOTAL SHOULDER Right 05/23/2019    Procedure: ARTHROPLASTY SHOULDER TOTAL , excision of deep cyst at shoulder, biceps tenotomy;  Surgeon: Chiquis Patricio MD;  Location: [facility];  Service: Orthopedics    AR ARTHRP ACETBLR/PROX FEM PROSTC AGRFT/ALGRFT Right 10/16/2023    Procedure: ARTHROPLASTY HIP TOTAL ANTERIOR,NAVIGATED - overnight;  Surgeon: Indra Yarbrough DO;  Location: [facility];  Service: Orthopedics    AR INJECT SI JOINT ARTHRGRPHY&/ANES/STEROID W/LESA Right 10/23/2020    Procedure: Sacroiliac Joint injection (98350); Surgeon: Rainer Davey MD;  Location: [facility] MAIN OR;  Service: Pain Management     AR TENDON SHEATH INCISION Right 12/21/2021    Procedure: RELEASE TRIGGER RING FINGER;  Surgeon: Zeke Wright MD;  Location: [facility];  Service: Orthopedics    RADIOFREQUENCY ABLATION Right 03/16/2018    Procedure: Rt L4 L5 S1 RFA;  Surgeon: Rainer Davey MD;  Location: [address] [place] [address] MAIN OR;  Service: Pain Management     RADIOFREQUENCY ABLATION Left 12/14/2018    Procedure: L3-S1 Radio Frequency Ablation ([id number]);   Surgeon: Rainer Davey MD;  Location: [facility] MAIN OR;  Service: Pain Management     RHIZOTOMY Left 01/25/2018    Procedure: L4, L5, S1 RFA;  Surgeon: Rainer Davey MD;  Location: [facility] MAIN OR;  Service: Pain Management     SHOULDER SURGERY  April/2019    rt. total  replacement    TONSILLECTOMY         Family History   Problem Relation Age of Onset    Other Mother         in a nursing home-fx of the leg    Heart disease Father         CHF    Alcohol abuse Father     Cirrhosis Father     Colon cancer Maternal Grandmother     No Known Problems Maternal Grandfather     No Known Problems Paternal Grandmother     No Known Problems Paternal Grandfather     Other Brother         lung abscess, pneumonia Colon cancer Maternal Aunt     No Known Problems Maternal Aunt     No Known Problems Maternal Aunt     No Known Problems Maternal Uncle     No Known Problems Paternal Aunt     No Known Problems Paternal Uncle     Breast cancer Cousin 48       Social History     Occupational History    Not on file   Tobacco Use    Smoking status: Former     Types: Cigarettes     Quit date: 2004     Years since quittin.8     Passive exposure: Past    Smokeless tobacco: Never   Vaping Use    Vaping Use: Never used   Substance and Sexual Activity    Alcohol use: Not Currently     Comment: quit many yrs. ago    Drug use: Not Currently     Types: Amphetamines, Marijuana     Comment: aprox. 35 years ago, maybe longer    Sexual activity: Not Currently     Partners: Male     Birth control/protection: Abstinence, Post-menopausal     Comment: no sex for many yrs. (aprox.  20 yrs.)         Current Outpatient Medications:     acetaminophen (TYLENOL) 325 mg tablet, Take 3 tablets (975 mg total) by mouth every 8 (eight) hours, Disp: , Rfl: 0    amLODIPine (NORVASC) 10 mg tablet, TAKE 1 TABLET BY MOUTH DAILY (Patient taking differently: Take 10 mg by mouth every morning), Disp: 90 tablet, Rfl: 3    aspirin 81 mg chewable tablet, Chew 1 tablet (81 mg total) 2 (two) times a day, Disp: 82 tablet, Rfl: 0    clindamycin (CLEOCIN) 300 MG capsule, Take 600 mg by mouth 1 (one) time One hour before a procedure/dental work, Disp: , Rfl:     Cyanocobalamin (VITAMIN B 12 PO), Take 500 mcg by mouth every morning, Disp: , Rfl:     diphenhydrAMINE (BENADRYL) 25 mg tablet, Take 25 mg by mouth every 8 (eight) hours as needed for itching , Disp: , Rfl:     docusate sodium (COLACE) 100 mg capsule, Take 1 capsule (100 mg total) by mouth 2 (two) times a day, Disp: 60 capsule, Rfl: 0    escitalopram (LEXAPRO) 10 mg tablet, TAKE 1 AND 1/2 TABLETS BY  MOUTH DAILY AT BEDTIME, Disp: 135 tablet, Rfl: 1    Melatonin 10 MG TABS, Take by mouth daily at bedtime, Disp: , Rfl:     naloxone (NARCAN) 4 mg/0.1 mL nasal spray, Administer 1 spray into a nostril. If no response after 2-3 minutes, give another dose in the other nostril using a new spray., Disp: 1 each, Rfl: 0    oxyCODONE (Roxicodone) 5 immediate release tablet, Take 1-2 tablets by mouth every 4-6 hours as needed for pain Do not start before October 22, 2023., Disp: 60 tablet, Rfl: 0    pantoprazole (PROTONIX) 40 mg tablet, Take 1 tablet (40 mg total) by mouth daily, Disp: 41 tablet, Rfl: 0    perindopril (ACEON) 8 MG tablet, TAKE 1 TABLET BY MOUTH  TWICE DAILY, Disp: 180 tablet, Rfl: 3    triamcinolone (KENALOG) 0.5 % cream, Apply topically 3 (three) times a day as needed for rash, Disp: 15 g, Rfl: 2    Allergies   Allergen Reactions    Penicillins Anaphylaxis     Total body redness and fever    Levaquin [Levofloxacin] Itching    Tomato - Food Allergy Rash       Objective:  Vitals:    10/25/23 0954   BP: 145/71   Pulse: 80       Body mass index is 40.97 kg/m². Right Hip Exam     Other   Erythema: absent  Scars: present  Sensation: normal  Pulse: present    Comments:  Healing anterior operative incision that is clean, dry, intact and shows no evidence of infection. There is 1 small area of delayed healing which was redressed with Steri-Strips and a Mepilex dressing today. Physical Exam  Vitals and nursing note reviewed. Constitutional:       Appearance: Normal appearance. She is well-developed. HENT:      Head: Normocephalic and atraumatic. Right Ear: External ear normal.      Left Ear: External ear normal.   Eyes:      General: No scleral icterus. Extraocular Movements: Extraocular movements intact. Conjunctiva/sclera: Conjunctivae normal.   Cardiovascular:      Rate and Rhythm: Normal rate. Pulmonary:      Effort: Pulmonary effort is normal. No respiratory distress. Musculoskeletal:      Cervical back: Normal range of motion and neck supple.       Comments: See Ortho exam   Skin: General: Skin is warm and dry. Neurological:      General: No focal deficit present. Mental Status: She is alert and oriented to person, place, and time. Psychiatric:         Behavior: Behavior normal.       This document was created using speech voice recognition software. Grammatical errors, random word insertions, pronoun errors, and incomplete sentences are an occasional consequence of this system due to software limitations, ambient noise, and hardware issues. Any formal questions or concerns about content, text, or information contained within the body of this dictation should be directly addressed to the provider for clarification.

## 2023-10-26 ENCOUNTER — APPOINTMENT (OUTPATIENT)
Dept: PHYSICAL THERAPY | Facility: CLINIC | Age: 68
End: 2023-10-26
Payer: COMMERCIAL

## 2023-10-26 ENCOUNTER — OFFICE VISIT (OUTPATIENT)
Dept: PHYSICAL THERAPY | Facility: CLINIC | Age: 68
End: 2023-10-26
Payer: COMMERCIAL

## 2023-10-26 DIAGNOSIS — Z01.818 PRE-OPERATIVE EXAMINATION: ICD-10-CM

## 2023-10-26 DIAGNOSIS — M16.11 PRIMARY OSTEOARTHRITIS OF RIGHT HIP: Primary | ICD-10-CM

## 2023-10-26 DIAGNOSIS — M25.551 RIGHT HIP PAIN: ICD-10-CM

## 2023-10-26 PROCEDURE — 97112 NEUROMUSCULAR REEDUCATION: CPT | Performed by: PHYSICAL THERAPIST

## 2023-10-26 PROCEDURE — 97110 THERAPEUTIC EXERCISES: CPT | Performed by: PHYSICAL THERAPIST

## 2023-10-26 NOTE — PROGRESS NOTES
Daily Note         Today's date: 10/26/2023  Patient name: Jessie Torres  : 1955  MRN: [id number]  Referring provider: Seth Bustillos*  Dx:   Encounter Diagnosis     ICD-10-CM    1. Primary osteoarthritis of right hip  M16.11       2. Right hip pain  M25.551       3. Pre-operative examination  Z01.818           Subjective: Jessie oTrres reports she saw the dr yesterday who removed the bandage and replaced it. Was pleased with the healing. Has a follow up next wed. States she took her portable toilet up stairs yesterday  on her own. Objective: See treatment diary below    Assessment:   patient needed AAROM for SLR. Was highly challenged with minor pain . No pain with Sidestepping and new there ex added to program today. Was able to perform step taps adan . Patient was able to progress to step ups with minimal pulling with adan UE. Had difficulty with lifting leg onto step. .       Plan:  progress program as patient tolerates        Eval/ Re-eval Auth #/ Referral # Total visits Start date  Expiration date Total active units  Total manual units  PT only or  PT+OT?   10/9/23 No auth, no visit limit, $15 copay                                                           Past Medical History:   Diagnosis Date    Anxiety     Apnea, sleep     patient states "repeated test says no"    Arthritis     severe in the joints    Chronic pain disorder     joints    Depression     DJD (degenerative joint disease)     Fall 2018    tripped on a folded rug- left hamstring injury    Foot injury     right foot-fx of bone-no repair-has severe arthritis in the foot and ankle    Fractures yr.  or before    left ankle    GERD (gastroesophageal reflux disease)     Hepatitis C not sure    History of sleep apnea     Hypercholesteremia     Hypertension     Insomnia     Low back pain     Obesity     Lost 70lb with gastric sleeve ()    Osteoarthritis     Stomach disorder     take meds.  for this Use of cane as ambulatory aid     Wears glasses     Wears partial dentures     upper and lower-but did lose the lower          Specialty Daily Treatment Diary     Surgery: 10/16/23 right THR  Insurance:  Pre op  Post op      Visits: 1 2 3 4    Manual: 10/9/23 10/19/23 10/23/23 10/26/23            STM/MI: prn                                Protocol:          Ther ex/NMR:         PROM: right hip  performed Performed rotation in sitting --    Man Flexibility: hip flexor, piriformis, glutes   ---             Rec bike/nustep   NS: 5 min lv 1  5 min lv 3 NS    Heel slides instruct W/slideboard W/ peanut: 10 x-15 x  W/slideboard: 10 x W/ peanut: 15 x     Ball squeeze  Sitting 5 sec x 15  Sittin sec x 15  Supine: 5 sec x15     SLR + TA  --  AAROM: 10 x , 5 x    Hip rolls AROM   IR/ER: 10 x 2     LAQ instruct 15 x  15 x  20 x     Supine hip abduction  instruct W/ slideboard: 10 x 2 W/ slideboard: 10 x       Quad set  instruct 5 sec x 10  5 sec x 10   5 sec x 15      Glute set  instruct 5 sec x 15  5 sec x 15 5 sec x 15    Glute set + bridge  -- --         Gagan heel raises   2 x 10   2 x 10      Step taps   4 in:10 x 2 Taps: 10 x 2 gagan  Step ups: 4 in 10 x 2     Mini squats on railing   10 x 2  10 x 2     Sit/stand from edge of mat table    5 x 2 to RW     sidestepping    16 feet x 2 cycles    Standing hip flexor stretch     ++   Therapeutic Activity:        Reevaluation  performed      Posture Education                Gait training:         Hurdles step over     4 hurdles: fwd, lat 3 rounds     HEP:                Modalities:                Ice

## 2023-10-30 ENCOUNTER — OFFICE VISIT (OUTPATIENT)
Dept: PHYSICAL THERAPY | Facility: CLINIC | Age: 68
End: 2023-10-30
Payer: COMMERCIAL

## 2023-10-30 DIAGNOSIS — M25.551 RIGHT HIP PAIN: ICD-10-CM

## 2023-10-30 DIAGNOSIS — M16.11 PRIMARY OSTEOARTHRITIS OF RIGHT HIP: ICD-10-CM

## 2023-10-30 DIAGNOSIS — Z01.818 PRE-OPERATIVE EXAMINATION: Primary | ICD-10-CM

## 2023-10-30 PROCEDURE — 97110 THERAPEUTIC EXERCISES: CPT | Performed by: PHYSICAL THERAPIST

## 2023-10-30 PROCEDURE — 97112 NEUROMUSCULAR REEDUCATION: CPT | Performed by: PHYSICAL THERAPIST

## 2023-10-30 NOTE — PROGRESS NOTES
Daily Note         Today's date: 10/30/2023  Patient name: Manolo   : 1955  MRN: [id number]  Referring provider: Veena Espinal MD  Dx:   Encounter Diagnosis     ICD-10-CM    1. Pre-operative examination  Z01.818       2. Right hip pain  M25.551       3. Primary osteoarthritis of right hip  M16.11           Subjective: Manolo  reports she is doing well at home. Still uses the walker for community ambulation. States she is reducing her pain meds. Objective: See treatment diary below    Assessment:   patient needs cuing to avoid hip circumduction with stepping over hurdles. Patient was able to self correct. . Still needs AAROM for SLR at this time due to weakness in her hip flexors. Patient still using RW for ambulation in clinic, but reports reducing use at home. Added leg press without difficulty, but still requires UE support with stepping over hurdles. Improved ability to clear hurdles due to improved hip flexion! Plan:  cotninue to progress ROM , gait, balance and strength        Eval/ Re-eval Auth #/ Referral # Total visits Start date  Expiration date Total active units  Total manual units  PT only or  PT+OT?   10/9/23 No auth, no visit limit, $15 copay                                                           Past Medical History:   Diagnosis Date    Anxiety     Apnea, sleep     patient states "repeated test says no"    Arthritis     severe in the joints    Chronic pain disorder     joints    Depression     DJD (degenerative joint disease)     Fall 2018    tripped on a folded rug- left hamstring injury    Foot injury     right foot-fx of bone-no repair-has severe arthritis in the foot and ankle    Fractures yr.  2000 or before    left ankle    GERD (gastroesophageal reflux disease)     Hepatitis C not sure    History of sleep apnea     Hypercholesteremia     Hypertension     Insomnia     Low back pain     Obesity     Lost 70lb with gastric sleeve () Osteoarthritis     Stomach disorder     take meds. for this    Use of cane as ambulatory aid     Wears glasses     Wears partial dentures     upper and lower-but did lose the lower          Specialty Daily Treatment Diary     Surgery: 10/16/23 right THR  Insurance:  Pre op  Post op      Visits: 1 2 3 4 5   Manual: 10/9/23 10/19/23 10/23/23 10/26/23 10/30/23           STM/MI: prn                                Protocol:          Ther ex/NMR:         PROM: right hip  performed Performed rotation in sitting --    Man Flexibility: hip flexor, piriformis, glutes   ---             Rec bike/nustep   NS: 5 min lv 1  5 min lv 3 NS NS: lv 1 6 min    Heel slides instruct W/slideboard W/ peanut: 10 x-15 x  W/slideboard: 10 x W/ peanut: 15 x  W/ peanut: 15 x    Ball squeeze  Sitting 5 sec x 15  Sittin sec x 15  Supine: 5 sec x15  Supine: 5 sec 15   SLR + TA  --  AAROM: 10 x , 5 x 10 x 2 AAROM    Hip rolls AROM   IR/ER: 10 x 2     LAQ instruct 15 x  15 x  20 x  5 sec x 20    Supine hip abduction  instruct W/ slideboard: 10 x 2 W/ slideboard: 10 x       Quad set  instruct 5 sec x 10  5 sec x 10   5 sec x 15   --   Glute set  instruct 5 sec x 15  5 sec x 15 5 sec x 15 D/c    Glute set + bridge  -- --         Gagan heel raises   2 x 10   2 x 10   2 x 10     Step taps   4 in:10 x 2 Taps: 10 x 2 gagan  Step ups: 4 in 10 x 2  6 in: 10 x 2    Mini squats on railing   10 x 2  10 x 2  10 x 2    Sit/stand from edge of mat table    5 x 2 to RW  8 x 2    sidestepping    16 feet x 2 cycles --   Standing hip flexor stretch     5 sec x 10     Leg press      Gagan: 55 lb 10 x 2    Therapeutic Activity:        Reevaluation  performed      Posture Education                Gait training:         Hurdles step over     4 hurdles: fwd, lat 3 rounds  4 hurdles: fwd, lat 3 rounds    HEP:                Modalities:                Ice

## 2023-10-31 ENCOUNTER — APPOINTMENT (OUTPATIENT)
Dept: PHYSICAL THERAPY | Facility: CLINIC | Age: 68
End: 2023-10-31
Payer: COMMERCIAL

## 2023-11-01 ENCOUNTER — OFFICE VISIT (OUTPATIENT)
Dept: PHYSICAL THERAPY | Facility: CLINIC | Age: 68
End: 2023-11-01
Payer: COMMERCIAL

## 2023-11-01 ENCOUNTER — OFFICE VISIT (OUTPATIENT)
Dept: OBGYN CLINIC | Facility: CLINIC | Age: 68
End: 2023-11-01

## 2023-11-01 ENCOUNTER — APPOINTMENT (OUTPATIENT)
Dept: RADIOLOGY | Facility: CLINIC | Age: 68
End: 2023-11-01
Payer: COMMERCIAL

## 2023-11-01 VITALS
HEART RATE: 81 BPM | WEIGHT: 219 LBS | SYSTOLIC BLOOD PRESSURE: 155 MMHG | HEIGHT: 62 IN | BODY MASS INDEX: 40.3 KG/M2 | DIASTOLIC BLOOD PRESSURE: 81 MMHG

## 2023-11-01 DIAGNOSIS — Z96.641 AFTERCARE FOLLOWING RIGHT HIP JOINT REPLACEMENT SURGERY: ICD-10-CM

## 2023-11-01 DIAGNOSIS — Z01.818 PRE-OPERATIVE EXAMINATION: ICD-10-CM

## 2023-11-01 DIAGNOSIS — M16.11 PRIMARY OSTEOARTHRITIS OF RIGHT HIP: Primary | ICD-10-CM

## 2023-11-01 DIAGNOSIS — Z47.1 AFTERCARE FOLLOWING RIGHT HIP JOINT REPLACEMENT SURGERY: ICD-10-CM

## 2023-11-01 DIAGNOSIS — Z96.641 STATUS POST TOTAL REPLACEMENT OF RIGHT HIP: Primary | ICD-10-CM

## 2023-11-01 DIAGNOSIS — M25.551 RIGHT HIP PAIN: ICD-10-CM

## 2023-11-01 DIAGNOSIS — Z96.641 STATUS POST TOTAL REPLACEMENT OF RIGHT HIP: ICD-10-CM

## 2023-11-01 PROCEDURE — 99024 POSTOP FOLLOW-UP VISIT: CPT | Performed by: ORTHOPAEDIC SURGERY

## 2023-11-01 PROCEDURE — 73502 X-RAY EXAM HIP UNI 2-3 VIEWS: CPT

## 2023-11-01 PROCEDURE — 97110 THERAPEUTIC EXERCISES: CPT | Performed by: PHYSICAL THERAPIST

## 2023-11-01 NOTE — PROGRESS NOTES
Assessment/Plan:  1. Status post total replacement of right hip  XR hip/pelv 2-3 vws right if performed      2. Aftercare following right hip joint replacement surgery          Scribe Attestation      I,:  Caryn Aase Ditmars am acting as a scribe while in the presence of the attending physician.:       I,:  Sandi Salinas, DO personally performed the services described in this documentation    as scribed in my presence.:           Andreas Orellana is a pleasant 76 y.o. female who is approx. 2 weeks s/p right anterior total hip arthroplasty. Overall she is doing well. Her Mepilex dressing was removed today. She does have an area of delayed wound healing to the groin crease, which is healing by secondary intension. She has a fibrosis wound bed. There is no sign of infection. No new steri strips were needed today. She was placed into a new Mepilex dressing, which will remain clean, dry and intact over the next week. She will continue ASA 2x a day for DVT prophylaxis. She will continue PT per her current plan of care. Continue Tylenol and Oxycodone for pain control. She may call the office if a refill of Oxycodone is needed. I will see her back in 1 weeks time for a wound check. Subjective: 2 week follow up s/p right total hip arthroplasty     Patient ID: Manolo Kam is a 76 y.o. female who presents to the office approx. 2 weeks s/p right anterior total hip arthroplasty. Overall Andreas Orellana is doing well. She is attending PT. She is taking ASA 2x a day for DVT prophylaxis. She is weaning the Oxycodone and will be seeing her pain management physican on Friday. Review of Systems   Constitutional:  Negative for chills, fever and unexpected weight change. HENT:  Negative for hearing loss, nosebleeds and sore throat. Eyes:  Negative for pain, redness and visual disturbance. Respiratory:  Negative for cough, shortness of breath and wheezing. Cardiovascular:  Negative for chest pain, palpitations and leg swelling. Gastrointestinal:  Negative for abdominal pain, nausea and vomiting. Endocrine: Negative for polydipsia and polyuria. Genitourinary:  Negative for difficulty urinating and hematuria. Musculoskeletal:  Negative for arthralgias, joint swelling and myalgias. Skin:  Negative for rash and wound. Neurological:  Negative for dizziness, numbness and headaches. Psychiatric/Behavioral:  Negative for decreased concentration, dysphoric mood and suicidal ideas. The patient is not nervous/anxious. Past Medical History:   Diagnosis Date    Anxiety     Apnea, sleep     patient states "repeated test says no"    Arthritis     severe in the joints    Chronic pain disorder     joints    Depression     DJD (degenerative joint disease)     Fall 11/22/2018    tripped on a folded rug- left hamstring injury    Foot injury     right foot-fx of bone-no repair-has severe arthritis in the foot and ankle    Fractures yr. 2000 or before    left ankle    GERD (gastroesophageal reflux disease)     Hepatitis C not sure    History of sleep apnea     Hypercholesteremia     Hypertension     Insomnia     Low back pain     Obesity     Lost 70lb with gastric sleeve (2011)    Osteoarthritis     Stomach disorder     take meds. for this    Use of cane as ambulatory aid     Wears glasses     Wears partial dentures     upper and lower-but did lose the lower        Past Surgical History:   Procedure Laterality Date    ANKLE ARTHROSCOPY Right     ANKLE SURGERY  not sure    orthoscopic/rt.  ankle    APPENDECTOMY      BARIATRIC SURGERY  maybe 2011    CHOLECYSTECTOMY      COLONOSCOPY  10/26/2021    DXA PROCEDURE (HISTORICAL)  10/13/2020    GASTRECTOMY SLEEVE LAPAROSCOPIC  2011    lost 70 lb    HAND SURGERY      rt. hand, trigger finger    JOINT REPLACEMENT Bilateral     knee replacements    KNEE SURGERY  2001 maybe    MAMMO (HISTORICAL)  01/26/2022    NERVE BLOCK Bilateral 12/29/2017    Procedure: L4, L5, S1 MEDIAL BRANCH BLOCK #1;  Surgeon: Raul Nelson MD;  Location: [facility] MAIN OR;  Service: Pain Management     NERVE BLOCK Bilateral 01/12/2018    Procedure: BLOCK MEDIAL BRANCH L4, L5-S1  #2;  Surgeon: Raul Nelson MD;  Location: [facility] MAIN OR;  Service: Pain Management     KY ARTHROPLASTY GLENOHUMERAL JOINT TOTAL SHOULDER Right 05/23/2019    Procedure: ARTHROPLASTY SHOULDER TOTAL , excision of deep cyst at shoulder, biceps tenotomy;  Surgeon: Leo Wright MD;  Location: [facility];  Service: Orthopedics    KY ARTHRP ACETBLR/PROX FEM PROSTC AGRFT/ALGRFT Right 10/16/2023    Procedure: ARTHROPLASTY HIP TOTAL ANTERIOR,NAVIGATED - overnight;  Surgeon: Estrada Pearson DO;  Location: [facility];  Service: Orthopedics    KY INJECT SI JOINT ARTHRGRPHY&/ANES/STEROID W/LESA Right 10/23/2020    Procedure: Sacroiliac Joint injection (01130); Surgeon: Raul Nelson MD;  Location: [facility] MAIN OR;  Service: Pain Management     KY TENDON SHEATH INCISION Right 12/21/2021    Procedure: RELEASE TRIGGER RING FINGER;  Surgeon: Thony Heller MD;  Location: [facility];  Service: Orthopedics    RADIOFREQUENCY ABLATION Right 03/16/2018    Procedure: Rt L4 L5 S1 RFA;  Surgeon: Raul Nelson MD;  Location: [facility] MAIN OR;  Service: Pain Management     RADIOFREQUENCY ABLATION Left 12/14/2018    Procedure: L3-S1 Radio Frequency Ablation ([id number]);   Surgeon: Raul Nelson MD;  Location: [facility] MAIN OR;  Service: Pain Management     RHIZOTOMY Left 01/25/2018    Procedure: L4, L5, S1 RFA;  Surgeon: Raul Nelson MD;  Location: [facility] MAIN OR;  Service: Pain Management     SHOULDER SURGERY  April/2019    rt. total  replacement    TONSILLECTOMY         Family History   Problem Relation Age of Onset    Other Mother         in a nursing home-fx of the leg    Heart disease Father         CHF    Alcohol abuse Father     Cirrhosis Father     Colon cancer Maternal Grandmother     No Known Problems Maternal Grandfather     No Known Problems Paternal Grandmother No Known Problems Paternal Grandfather     Other Brother         lung abscess, pneumonia    Colon cancer Maternal Aunt     No Known Problems Maternal Aunt     No Known Problems Maternal Aunt     No Known Problems Maternal Uncle     No Known Problems Paternal Aunt     No Known Problems Paternal Uncle     Breast cancer Cousin 48       Social History     Occupational History    Not on file   Tobacco Use    Smoking status: Former     Types: Cigarettes     Quit date: 2004     Years since quittin.8     Passive exposure: Past    Smokeless tobacco: Never   Vaping Use    Vaping Use: Never used   Substance and Sexual Activity    Alcohol use: Not Currently     Comment: quit many yrs. ago    Drug use: Not Currently     Types: Amphetamines, Marijuana     Comment: aprox. 35 years ago, maybe longer    Sexual activity: Not Currently     Partners: Male     Birth control/protection: Abstinence, Post-menopausal     Comment: no sex for many yrs. (aprox.  20 yrs.)         Current Outpatient Medications:     acetaminophen (TYLENOL) 325 mg tablet, Take 3 tablets (975 mg total) by mouth every 8 (eight) hours, Disp: , Rfl: 0    amLODIPine (NORVASC) 10 mg tablet, TAKE 1 TABLET BY MOUTH DAILY (Patient taking differently: Take 10 mg by mouth every morning), Disp: 90 tablet, Rfl: 3    aspirin 81 mg chewable tablet, Chew 1 tablet (81 mg total) 2 (two) times a day, Disp: 82 tablet, Rfl: 0    clindamycin (CLEOCIN) 300 MG capsule, Take 600 mg by mouth 1 (one) time One hour before a procedure/dental work, Disp: , Rfl:     Cyanocobalamin (VITAMIN B 12 PO), Take 500 mcg by mouth every morning, Disp: , Rfl:     diphenhydrAMINE (BENADRYL) 25 mg tablet, Take 25 mg by mouth every 8 (eight) hours as needed for itching , Disp: , Rfl:     docusate sodium (COLACE) 100 mg capsule, Take 1 capsule (100 mg total) by mouth 2 (two) times a day, Disp: 60 capsule, Rfl: 0    escitalopram (LEXAPRO) 10 mg tablet, TAKE 1 AND 1/2 TABLETS BY  MOUTH DAILY AT BEDTIME, Disp: 135 tablet, Rfl: 1    Melatonin 10 MG TABS, Take by mouth daily at bedtime, Disp: , Rfl:     naloxone (NARCAN) 4 mg/0.1 mL nasal spray, Administer 1 spray into a nostril. If no response after 2-3 minutes, give another dose in the other nostril using a new spray., Disp: 1 each, Rfl: 0    oxyCODONE (Roxicodone) 5 immediate release tablet, Take 1-2 tablets by mouth every 4-6 hours as needed for pain, Disp: 60 tablet, Rfl: 0    pantoprazole (PROTONIX) 40 mg tablet, Take 1 tablet (40 mg total) by mouth daily, Disp: 41 tablet, Rfl: 0    perindopril (ACEON) 8 MG tablet, TAKE 1 TABLET BY MOUTH  TWICE DAILY, Disp: 180 tablet, Rfl: 3    triamcinolone (KENALOG) 0.5 % cream, Apply topically 3 (three) times a day as needed for rash, Disp: 15 g, Rfl: 2    Allergies   Allergen Reactions    Penicillins Anaphylaxis     Total body redness and fever    Levaquin [Levofloxacin] Itching    Tomato - Food Allergy Rash       Objective:  Vitals:    11/01/23 1155   BP: 155/81   Pulse: 81       Body mass index is 40.06 kg/m². Right Hip Exam     Tenderness   The patient is experiencing no tenderness. Range of Motion   Abduction:  40   Adduction:  30   Flexion:  100   External rotation:  50   Internal rotation:  30     Other   Erythema: absent  Scars: present  Sensation: normal  Pulse: present    Comments:  Anterior incision is healing well, there is an area of delayed wound healing to the groin crease, which is healing by secondary intention   Fibrosis wound bed   No warmth or erythema   No concern for infection             Physical Exam  Vitals and nursing note reviewed. Constitutional:       Appearance: Normal appearance. She is well-developed. HENT:      Head: Normocephalic. Right Ear: External ear normal.      Left Ear: External ear normal.      Nose: Nose normal.   Eyes:      Extraocular Movements: Extraocular movements intact.       Conjunctiva/sclera: Conjunctivae normal.   Cardiovascular:      Rate and Rhythm: Normal rate. Pulses: Normal pulses. Pulmonary:      Effort: Pulmonary effort is normal. No respiratory distress. Abdominal:      Palpations: Abdomen is soft. Musculoskeletal:      Cervical back: Normal range of motion. Comments: See ortho exam    Skin:     General: Skin is warm and dry. Capillary Refill: Capillary refill takes less than 2 seconds. Neurological:      General: No focal deficit present. Mental Status: She is alert and oriented to person, place, and time. Psychiatric:         Mood and Affect: Mood normal.         Behavior: Behavior normal.         Thought Content: Thought content normal.         Judgment: Judgment normal.         I have personally reviewed pertinent films in PACS. X-rays of the right hip demonstrate well aligned, well position right total hip arthroplasty. No lytic or blastic lesions. No fracture or dislocation. No signs of failure. This document was created using speech voice recognition software. Grammatical errors, random word insertions, pronoun errors, and incomplete sentences are an occasional consequence of this system due to software limitations, ambient noise, and hardware issues. Any formal questions or concerns about content, text, or information contained within the body of this dictation should be directly addressed to the provider for clarification.

## 2023-11-01 NOTE — PROGRESS NOTES
Daily Note         Today's date: 2023  Patient name: Kera Bull  : 1955  MRN: [id number]  Referring provider: Kavin Willams*  Dx:   Encounter Diagnosis     ICD-10-CM    1. Primary osteoarthritis of right hip  M16.11       2. Right hip pain  M25.551       3. Pre-operative examination  Z01.818           Subjective: Kera Bull reports she saw the ortho prior to todays session and he was pleased with progress. States he recovered the incision for 1 more week. Patient described increased soreness in hip after dr appointment       Objective: See treatment diary below    Assessment:   patient deferred exercises or inc reps as per treatment diary due to increased pain this session. Patient needed assistance getting leg onto leg press due to weakenss and pain with active hip flexion. Patient needed only minor cuing for gait training with SPC. Distant supervision needed . The goal of the current treatment is to address patient's functional limitations as well as objective findings. Plan:  progress as tolerated based on patient tolerance. Eval/ Re-eval Auth #/ Referral # Total visits Start date  Expiration date Total active units  Total manual units  PT only or  PT+OT?   10/9/23 No auth, no visit limit, $15 copay                                                           Past Medical History:   Diagnosis Date    Anxiety     Apnea, sleep     patient states "repeated test says no"    Arthritis     severe in the joints    Chronic pain disorder     joints    Depression     DJD (degenerative joint disease)     Fall 2018    tripped on a folded rug- left hamstring injury    Foot injury     right foot-fx of bone-no repair-has severe arthritis in the foot and ankle    Fractures yr.  2000 or before    left ankle    GERD (gastroesophageal reflux disease)     Hepatitis C not sure    History of sleep apnea     Hypercholesteremia     Hypertension     Insomnia     Low back pain Obesity     Lost 70lb with gastric sleeve ()    Osteoarthritis     Stomach disorder     take meds. for this    Use of cane as ambulatory aid     Wears glasses     Wears partial dentures     upper and lower-but did lose the lower          Specialty Daily Treatment Diary     Surgery: 10/16/23 right THR  Insurance:  [address] [facility] op       Visits: 2 3 4 5 6   Manual: 10/19/23 10/23/23 10/26/23 10/30/23 11/1/23           STM/MI: prn                                Protocol:          Ther ex/NMR:         PROM: right hip performed Performed rotation in sitting --  Performed    Man Flexibility: hip flexor, piriformis, glutes  ---              Rec bike/nustep  NS: 5 min lv 1  5 min lv 3 NS NS: lv 1 6 min  NS: lv 1 7 min    Heel slides W/slideboard W/ peanut: 10 x-15 x  W/slideboard: 10 x W/ peanut: 15 x  W/ peanut: 15 x  --   Ball squeeze Sitting 5 sec x 15  Sittin sec x 15  Supine: 5 sec x15  Supine: 5 sec 15 5 sec x 20   SLR + TA --  AAROM: 10 x , 5 x 10 x 2 AAROM  Held due to pain   Hip rolls AROM  IR/ER: 10 x 2   10 x 2    LAQ 15 x  15 x  20 x  5 sec x 20  x 10   Supine hip abduction  W/ slideboard: 10 x 2 W/ slideboard: 10 x        Quad set  5 sec x 10  5 sec x 10   5 sec x 15   -- 5 sec x20 with half roll    Glute set + bridge -- --       10 x 2    Gagan heel raises  2 x 10   2 x 10   2 x 10   2 x 10     Step taps  4 in:10 x 2 Taps: 10 x 2 gagan  Step ups: 4 in 10 x 2  6 in: 10 x 2  6 in: 3x step ups held due to pain      Mini squats on railing  10 x 2  10 x 2  10 x 2  10 x 2   Sit/stand from edge of mat table   5 x 2 to RW  8 x 2  10 x 2    sidestepping   16 feet x 2 cycles -- 16 feet x 3 cycles    Standing hip flexor stretch    5 sec x 10   5 sec x 10    Leg press     Gagan: 55 lb 10 x 2  Gagan: 55 lb: 10 x 2   retrowalking     Next visit    Therapeutic Activity:        Reevaluation performed       Posture Education                Gait training:      With [facility] instruct   Hurdles step over    4 hurdles: fwd, lat 3 rounds  4 hurdles: fwd, lat 3 rounds     HEP:                Modalities:                Ice

## 2023-11-02 ENCOUNTER — APPOINTMENT (OUTPATIENT)
Dept: PHYSICAL THERAPY | Facility: CLINIC | Age: 68
End: 2023-11-02
Payer: COMMERCIAL

## 2023-11-03 ENCOUNTER — OFFICE VISIT (OUTPATIENT)
Dept: FAMILY MEDICINE CLINIC | Facility: CLINIC | Age: 68
End: 2023-11-03
Payer: COMMERCIAL

## 2023-11-03 ENCOUNTER — APPOINTMENT (OUTPATIENT)
Dept: PHYSICAL THERAPY | Facility: CLINIC | Age: 68
End: 2023-11-03
Payer: COMMERCIAL

## 2023-11-03 VITALS
SYSTOLIC BLOOD PRESSURE: 140 MMHG | HEART RATE: 83 BPM | BODY MASS INDEX: 40.3 KG/M2 | OXYGEN SATURATION: 97 % | HEIGHT: 62 IN | WEIGHT: 219 LBS | DIASTOLIC BLOOD PRESSURE: 72 MMHG

## 2023-11-03 DIAGNOSIS — I10 PRIMARY HYPERTENSION: ICD-10-CM

## 2023-11-03 DIAGNOSIS — E78.5 DYSLIPIDEMIA: ICD-10-CM

## 2023-11-03 DIAGNOSIS — K21.9 GASTROESOPHAGEAL REFLUX DISEASE WITHOUT ESOPHAGITIS: ICD-10-CM

## 2023-11-03 DIAGNOSIS — F11.20 CONTINUOUS OPIOID DEPENDENCE (HCC): ICD-10-CM

## 2023-11-03 DIAGNOSIS — E66.01 CLASS 3 SEVERE OBESITY DUE TO EXCESS CALORIES WITHOUT SERIOUS COMORBIDITY WITH BODY MASS INDEX (BMI) OF 40.0 TO 44.9 IN ADULT (HCC): ICD-10-CM

## 2023-11-03 DIAGNOSIS — M16.11 PRIMARY OSTEOARTHRITIS OF RIGHT HIP: ICD-10-CM

## 2023-11-03 DIAGNOSIS — G89.4 CHRONIC PAIN SYNDROME: Primary | ICD-10-CM

## 2023-11-03 DIAGNOSIS — F32.A ANXIETY AND DEPRESSION: ICD-10-CM

## 2023-11-03 DIAGNOSIS — G89.29 CHRONIC MIDLINE LOW BACK PAIN WITHOUT SCIATICA: ICD-10-CM

## 2023-11-03 DIAGNOSIS — M54.50 CHRONIC MIDLINE LOW BACK PAIN WITHOUT SCIATICA: ICD-10-CM

## 2023-11-03 DIAGNOSIS — Z98.84 H/O BARIATRIC SURGERY: ICD-10-CM

## 2023-11-03 DIAGNOSIS — F41.9 ANXIETY AND DEPRESSION: ICD-10-CM

## 2023-11-03 DIAGNOSIS — Z96.641 STATUS POST TOTAL REPLACEMENT OF RIGHT HIP: ICD-10-CM

## 2023-11-03 PROCEDURE — 99214 OFFICE O/P EST MOD 30 MIN: CPT | Performed by: INTERNAL MEDICINE

## 2023-11-03 RX ORDER — OXYCODONE AND ACETAMINOPHEN 7.5; 325 MG/1; MG/1
1 TABLET ORAL EVERY 6 HOURS PRN
Qty: 120 TABLET | Refills: 0 | Status: SHIPPED | OUTPATIENT
Start: 2023-11-03

## 2023-11-03 NOTE — ASSESSMENT & PLAN NOTE
Emphasized very strongly to follow strict diet her weight has gone up to 219 pounds with a BMI of 40.06 emphasized regarding diet exercise lifestyle modification cutting back on calorie and carbohydrate intake

## 2023-11-03 NOTE — ASSESSMENT & PLAN NOTE
Patient with osteoarthritis of multiple joints status post right hip replacement on pain medication not able to give nonsteroidals because of the gastric bypass surgery

## 2023-11-03 NOTE — ASSESSMENT & PLAN NOTE
Patient with chronic pain syndrome with low back area now with status post surgery in the right hip resume the medication for pain oxycodone 7.5 mg with 325 acetaminophen 4 times a day as needed discussed with patient regarding pain management

## 2023-11-03 NOTE — ASSESSMENT & PLAN NOTE
Has been under a lot of stress especially post hip surgery today's blood pressure 140/72 currently she is taking amlodipine 10 mg daily along with perindopril 8 mg daily we will continue the same for now

## 2023-11-03 NOTE — PROGRESS NOTES
Office Visit Note  23     Parisa Miller 76 y.o. female MRN: [id number]  : 1955    Assessment:     1. Chronic pain syndrome  Assessment & Plan:  Patient has been on oxycodone 5 mg 2 tablets 4 times a day as needed which was given with orthopedics post hip replacement surgery we will change that medication to 7.5 mg oxycodone every 6 hours as needed    Orders:  -     oxyCODONE-acetaminophen (Percocet) 7.5-325 MG per tablet; Take 1 tablet by mouth every 6 (six) hours as needed for moderate pain Max Daily Amount: 4 tablets    2. Anxiety and depression  Assessment & Plan:  Continue Lexapro 10 mg daily it is helping with her anxiety and depression. 3. Chronic midline low back pain without sciatica  Assessment & Plan:  Patient with chronic pain syndrome with low back area now with status post surgery in the right hip resume the medication for pain oxycodone 7.5 mg with 325 acetaminophen 4 times a day as needed discussed with patient regarding pain management    Orders:  -     oxyCODONE-acetaminophen (Percocet) 7.5-325 MG per tablet; Take 1 tablet by mouth every 6 (six) hours as needed for moderate pain Max Daily Amount: 4 tablets    4. Class 3 severe obesity due to excess calories without serious comorbidity with body mass index (BMI) of 40.0 to 44.9 in adult [facility])  Assessment & Plan:  Emphasized very strongly to follow strict diet her weight has gone up to 219 pounds with a BMI of 40.06 emphasized regarding diet exercise lifestyle modification cutting back on calorie and carbohydrate intake      5. Continuous opioid dependence ([address])    6. Dyslipidemia  Assessment & Plan:  Follow-up with repeat lipid profile at a later date if necessary start her on medication      7. Gastroesophageal reflux disease without esophagitis  Assessment & Plan:  Patient is currently taking pantoprazole 40 mg daily we will continue the same      8.  H/O bariatric surgery  Assessment & Plan:  Patient status post gastric sleeve in 2011 stable      9. Primary hypertension  Assessment & Plan:  Has been under a lot of stress especially post hip surgery today's blood pressure 140/72 currently she is taking amlodipine 10 mg daily along with perindopril 8 mg daily we will continue the same for now      10. Primary osteoarthritis of right hip  Assessment & Plan:  Patient with osteoarthritis of multiple joints status post right hip replacement on pain medication not able to give nonsteroidals because of the gastric bypass surgery    Orders:  -     oxyCODONE-acetaminophen (Percocet) 7.5-325 MG per tablet; Take 1 tablet by mouth every 6 (six) hours as needed for moderate pain Max Daily Amount: 4 tablets    11. Status post total replacement of right hip  Assessment & Plan:  Patient recently underwent right hip surgery anterior approach replacement doing well ambulating with walker continue with the physical therapy will monitor pain medications    Orders:  -     oxyCODONE-acetaminophen (Percocet) 7.5-325 MG per tablet; Take 1 tablet by mouth every 6 (six) hours as needed for moderate pain Max Daily Amount: 4 tablets               Discussion Summary and Plan: Today's care plan and medications were reviewed with patient in detail and all their questions answered to their satisfaction. No chief complaint on file. Subjective:  Patient is coming here for a follow-up evaluation with regards to symptoms of right hip pain for which she underwent total hip replacement about 20 days back receiving physical therapy now doing better she has some swelling in the lower extremities especially on the right side which is also coming down. Denies any symptoms of chest pains palpitation shortness of breath. I reviewed the reports of the surgery. Reports of the office visits with the orthopedics and the physical therapy notes.   Patient continues to have some pain but better than before she was taking oxycodone 5 mg tablets 2 of them every 4 hours as needed. But according to her she does not need that much now. Patient is ambulating with the help of a walker. Medications reviewed labs reviewed. We will renew her pain medications I reviewed the PMED        The following portions of the patient's history were reviewed and updated as appropriate: allergies, current medications, past family history, past medical history, past social history, past surgical history and problem list.    Review of Systems   Constitutional:  Negative for chills and fever. HENT:  Negative for ear pain and sore throat. Eyes:  Negative for pain and visual disturbance. Respiratory:  Negative for cough and shortness of breath. Cardiovascular:  Negative for chest pain and palpitations. Gastrointestinal:  Negative for abdominal pain and vomiting. Genitourinary:  Negative for dysuria and hematuria. Musculoskeletal:  Positive for arthralgias and back pain. Skin:  Negative for color change and rash. Neurological:  Negative for seizures and syncope. All other systems reviewed and are negative.         Historical Information   Patient Active Problem List   Diagnosis    Chronic pain syndrome    Chronic midline low back pain without sciatica    Class 3 severe obesity in adult [facility])    H/O bariatric surgery    Hypertension    Gastroesophageal reflux disease    Continuous opioid dependence (HCC)    Primary osteoarthritis    Anxiety    Hypercalcemia    Dyslipidemia    Dermatitis    Right hip pain    Anxiety and depression    Acquired trigger finger of right ring finger    Primary osteoarthritis of right hip    Prediabetes    Preoperative clearance    Elevated blood pressure reading in office with white coat syndrome, with diagnosis of hypertension    Preoperative cardiovascular examination    Chronic hepatitis C without hepatic coma ([address])    Class 2 obesity    Status post total replacement of right hip     Past Medical History:   Diagnosis Date    Anxiety     Apnea, sleep patient states "repeated test says no"    Arthritis     severe in the joints    Chronic pain disorder     joints    Depression     DJD (degenerative joint disease)     Fall 11/22/2018    tripped on a folded rug- left hamstring injury    Foot injury     right foot-fx of bone-no repair-has severe arthritis in the foot and ankle    Fractures yr. 2000 or before    left ankle    GERD (gastroesophageal reflux disease)     Hepatitis C not sure    History of sleep apnea     Hypercholesteremia     Hypertension     Insomnia     Low back pain     Obesity     Lost 70lb with gastric sleeve (2011)    Osteoarthritis     Stomach disorder     take meds. for this    Use of cane as ambulatory aid     Wears glasses     Wears partial dentures     upper and lower-but did lose the lower      Past Surgical History:   Procedure Laterality Date    ANKLE ARTHROSCOPY Right     ANKLE SURGERY  not sure    orthoscopic/rt.  ankle    APPENDECTOMY      BARIATRIC SURGERY  maybe 2011    CHOLECYSTECTOMY      COLONOSCOPY  10/26/2021    DXA PROCEDURE (HISTORICAL)  10/13/2020    GASTRECTOMY SLEEVE LAPAROSCOPIC  2011    lost 70 lb    HAND SURGERY      rt. hand, trigger finger    JOINT REPLACEMENT Bilateral     knee replacements    KNEE SURGERY  2001 maybe    MAMMO (HISTORICAL)  01/26/2022    NERVE BLOCK Bilateral 12/29/2017    Procedure: L4, L5, S1 MEDIAL BRANCH BLOCK #1;  Surgeon: Yas Stevens MD;  Location: [address] [place] [address] [facility] OR;  Service: Pain Management     NERVE BLOCK Bilateral 01/12/2018    Procedure: BLOCK MEDIAL BRANCH L4, L5-S1  #2;  Surgeon: Yas Stevens MD;  Location: [address] [place] [address] [facility] OR;  Service: Pain Management     UT ARTHROPLASTY GLENOHUMERAL JOINT TOTAL SHOULDER Right 05/23/2019    Procedure: ARTHROPLASTY SHOULDER TOTAL , excision of deep cyst at shoulder, biceps tenotomy;  Surgeon: Sabas Torres MD;  Location: [facility];  Service: Orthopedics    UT ARTHRP ACETBLR/PROX FEM PROSTC AGRFT/ALGRFT Right 10/16/2023    Procedure: ARTHROPLASTY HIP TOTAL ANTERIOR,NAVIGATED - overnight;  Surgeon: Indra Yarbrough DO;  Location: [facility];  Service: Orthopedics    OH INJECT SI JOINT ARTHRGRPHY&/ANES/STEROID W/LESA Right 10/23/2020    Procedure: Sacroiliac Joint injection (89412); Surgeon: Rainer Davey MD;  Location: [facility] MAIN OR;  Service: Pain Management     OH TENDON SHEATH INCISION Right 2021    Procedure: RELEASE TRIGGER RING FINGER;  Surgeon: Zeke Wright MD;  Location: [facility];  Service: Orthopedics    RADIOFREQUENCY ABLATION Right 2018    Procedure: Rt L4 L5 S1 RFA;  Surgeon: Rainer Davey MD;  Location: [address] [place] [address] MAIN OR;  Service: Pain Management     RADIOFREQUENCY ABLATION Left 2018    Procedure: L3-S1 Radio Frequency Ablation ([id number]); Surgeon: Rainer Davey MD;  Location: [facility] MAIN OR;  Service: Pain Management     RHIZOTOMY Left 2018    Procedure: L4, L5, S1 RFA;  Surgeon: Rainer Davey MD;  Location: [facility] MAIN OR;  Service: Pain Management     SHOULDER SURGERY  2019    rt. total  replacement    TONSILLECTOMY       Social History     Substance and Sexual Activity   Alcohol Use Not Currently    Comment: quit many yrs. ago     Social History     Substance and Sexual Activity   Drug Use Not Currently    Types: Amphetamines, Marijuana    Comment: aprox.  35 years ago, maybe longer     Social History     Tobacco Use   Smoking Status Former    Types: Cigarettes    Quit date: 2004    Years since quittin.8    Passive exposure: Past   Smokeless Tobacco Never     Family History   Problem Relation Age of Onset    Other Mother         in a nursing home-fx of the leg    Heart disease Father         CHF    Alcohol abuse Father     Cirrhosis Father     Colon cancer Maternal Grandmother     No Known Problems Maternal Grandfather     No Known Problems Paternal Grandmother     No Known Problems Paternal Grandfather     Other Brother         lung abscess, pneumonia    Colon cancer Maternal Aunt     No Known Problems Maternal Aunt     No Known Problems Maternal Aunt     No Known Problems Maternal Uncle     No Known Problems Paternal Aunt     No Known Problems Paternal Uncle     Breast cancer Cousin 48     Health Maintenance Due   Topic    Pneumococcal Vaccine: 65+ Years (1 - PCV)    Hepatitis A Vaccine (1 of 2 - Risk 2-dose series)    Hepatitis B Vaccine (1 of 3 - Risk 3-dose series)    COVID-19 Vaccine (5 - Moderna series)    BMI: Followup Plan       Meds/Allergies       Current Outpatient Medications:     acetaminophen (TYLENOL) 325 mg tablet, Take 3 tablets (975 mg total) by mouth every 8 (eight) hours, Disp: , Rfl: 0    amLODIPine (NORVASC) 10 mg tablet, TAKE 1 TABLET BY MOUTH DAILY (Patient taking differently: Take 10 mg by mouth every morning), Disp: 90 tablet, Rfl: 3    aspirin 81 mg chewable tablet, Chew 1 tablet (81 mg total) 2 (two) times a day, Disp: 82 tablet, Rfl: 0    clindamycin (CLEOCIN) 300 MG capsule, Take 600 mg by mouth 1 (one) time One hour before a procedure/dental work, Disp: , Rfl:     Cyanocobalamin (VITAMIN B 12 PO), Take 500 mcg by mouth every morning, Disp: , Rfl:     diphenhydrAMINE (BENADRYL) 25 mg tablet, Take 25 mg by mouth every 8 (eight) hours as needed for itching , Disp: , Rfl:     docusate sodium (COLACE) 100 mg capsule, Take 1 capsule (100 mg total) by mouth 2 (two) times a day, Disp: 60 capsule, Rfl: 0    escitalopram (LEXAPRO) 10 mg tablet, TAKE 1 AND 1/2 TABLETS BY  MOUTH DAILY AT BEDTIME, Disp: 135 tablet, Rfl: 1    Melatonin 10 MG TABS, Take by mouth daily at bedtime, Disp: , Rfl:     naloxone (NARCAN) 4 mg/0.1 mL nasal spray, Administer 1 spray into a nostril.  If no response after 2-3 minutes, give another dose in the other nostril using a new spray., Disp: 1 each, Rfl: 0    oxyCODONE-acetaminophen (Percocet) 7.5-325 MG per tablet, Take 1 tablet by mouth every 6 (six) hours as needed for moderate pain Max Daily Amount: 4 tablets, Disp: 120 tablet, Rfl: 0    pantoprazole (PROTONIX) 40 mg tablet, Take 1 tablet (40 mg total) by mouth daily, Disp: 41 tablet, Rfl: 0    perindopril (ACEON) 8 MG tablet, TAKE 1 TABLET BY MOUTH  TWICE DAILY, Disp: 180 tablet, Rfl: 3    triamcinolone (KENALOG) 0.5 % cream, Apply topically 3 (three) times a day as needed for rash (Patient not taking: Reported on 11/3/2023), Disp: 15 g, Rfl: 2      Objective:    Vitals:   /72 (BP Location: Left arm, Patient Position: Sitting, Cuff Size: Large)   Pulse 83   Ht 5' 2" (1.575 m)   Wt 99.3 kg (219 lb)   SpO2 97%   BMI 40.06 kg/m²   Body mass index is 40.06 kg/m². Vitals:    11/03/23 1051   Weight: 99.3 kg (219 lb)       Physical Exam  Vitals and nursing note reviewed. Constitutional:       Appearance: Normal appearance. Cardiovascular:      Rate and Rhythm: Normal rate and regular rhythm. Heart sounds: Normal heart sounds. Pulmonary:      Effort: Pulmonary effort is normal.      Breath sounds: Normal breath sounds. Musculoskeletal:      Cervical back: Normal range of motion and neck supple. Right lower leg: No edema. Left lower leg: No edema. Comments: Right hip movements causing discomfort and pain dressing is still intact on the incision line   Skin:     General: Skin is warm and dry. Neurological:      Mental Status: She is alert and oriented to person, place, and time.    Psychiatric:         Behavior: Behavior normal.         Lab Review   Admission on 10/16/2023, Discharged on 10/17/2023   Component Date Value Ref Range Status    POC Glucose 10/16/2023 134  65 - 140 mg/dl Final    WBC 10/17/2023 13.17 (H)  4.31 - 10.16 Thousand/uL Final    RBC 10/17/2023 3.80 (L)  3.81 - 5.12 Million/uL Final    Hemoglobin 10/17/2023 11.1 (L)  11.5 - 15.4 g/dL Final    Hematocrit 10/17/2023 34.3 (L)  34.8 - 46.1 % Final    MCV 10/17/2023 90  82 - 98 fL Final    MCH 10/17/2023 29.2  26.8 - 34.3 pg Final    MCHC 10/17/2023 32.4  31.4 - 37.4 g/dL Final    RDW 10/17/2023 13.8  11.6 - 15.1 % Final    Platelets 80/79/3803 245  149 - 390 Thousands/uL Final    MPV 10/17/2023 10.8  8.9 - 12.7 fL Final    Sodium 10/17/2023 138  135 - 147 mmol/L Final    Potassium 10/17/2023 4.0  3.5 - 5.3 mmol/L Final    Chloride 10/17/2023 106  96 - 108 mmol/L Final    CO2 10/17/2023 26  21 - 32 mmol/L Final    ANION GAP 10/17/2023 6  mmol/L Final    BUN 10/17/2023 13  5 - 25 mg/dL Final    Creatinine 10/17/2023 0.38 (L)  0.60 - 1.30 mg/dL Final    Standardized to IDMS reference method    Glucose 10/17/2023 157 (H)  65 - 140 mg/dL Final    If the patient is fasting, the ADA then defines impaired fasting glucose as > 100 mg/dL and diabetes as > or equal to 123 mg/dL. Calcium 10/17/2023 9.1  8.4 - 10.2 mg/dL Final    eGFR 10/17/2023 109  ml/min/1.73sq m Final   Appointment on 09/19/2023   Component Date Value Ref Range Status    Sodium 09/19/2023 140  135 - 147 mmol/L Final    Potassium 09/19/2023 4.3  3.5 - 5.3 mmol/L Final    Chloride 09/19/2023 104  96 - 108 mmol/L Final    CO2 09/19/2023 31  21 - 32 mmol/L Final    ANION GAP 09/19/2023 5  mmol/L Final    BUN 09/19/2023 13  5 - 25 mg/dL Final    Creatinine 09/19/2023 0.48 (L)  0.60 - 1.30 mg/dL Final    Standardized to IDMS reference method    Glucose, Fasting 09/19/2023 91  65 - 99 mg/dL Final    Calcium 09/19/2023 10.4 (H)  8.4 - 10.2 mg/dL Final    AST 09/19/2023 20  13 - 39 U/L Final    ALT 09/19/2023 15  7 - 52 U/L Final    Specimen collection should occur prior to Sulfasalazine administration due to the potential for falsely depressed results. Alkaline Phosphatase 09/19/2023 77  34 - 104 U/L Final    Total Protein 09/19/2023 7.1  6.4 - 8.4 g/dL Final    Albumin 09/19/2023 4.3  3.5 - 5.0 g/dL Final    Total Bilirubin 09/19/2023 0.41  0.20 - 1.00 mg/dL Final    Use of this assay is not recommended for patients undergoing treatment with eltrombopag due to the potential for falsely elevated results.   N-acetyl-p-benzoquinone imine (metabolite of Acetaminophen) will generate erroneously low results in samples for patients that have taken an overdose of Acetaminophen. eGFR 09/19/2023 101  ml/min/1.73sq m Final    WBC 09/19/2023 7.51  4.31 - 10.16 Thousand/uL Final    RBC 09/19/2023 4.91  3.81 - 5.12 Million/uL Final    Hemoglobin 09/19/2023 14.4  11.5 - 15.4 g/dL Final    Hematocrit 09/19/2023 44.7  34.8 - 46.1 % Final    MCV 09/19/2023 91  82 - 98 fL Final    MCH 09/19/2023 29.3  26.8 - 34.3 pg Final    MCHC 09/19/2023 32.2  31.4 - 37.4 g/dL Final    RDW 09/19/2023 13.9  11.6 - 15.1 % Final    MPV 09/19/2023 10.7  8.9 - 12.7 fL Final    Platelets 66/94/6772 316  149 - 390 Thousands/uL Final    nRBC 09/19/2023 0  /100 WBCs Final    Neutrophils Relative 09/19/2023 55  43 - 75 % Final    Immat GRANS % 09/19/2023 0  0 - 2 % Final    Lymphocytes Relative 09/19/2023 34  14 - 44 % Final    Monocytes Relative 09/19/2023 8  4 - 12 % Final    Eosinophils Relative 09/19/2023 2  0 - 6 % Final    Basophils Relative 09/19/2023 1  0 - 1 % Final    Neutrophils Absolute 09/19/2023 4.16  1.85 - 7.62 Thousands/µL Final    Immature Grans Absolute 09/19/2023 0.02  0.00 - 0.20 Thousand/uL Final    Lymphocytes Absolute 09/19/2023 2.52  0.60 - 4.47 Thousands/µL Final    Monocytes Absolute 09/19/2023 0.59  0.17 - 1.22 Thousand/µL Final    Eosinophils Absolute 09/19/2023 0.16  0.00 - 0.61 Thousand/µL Final    Basophils Absolute 09/19/2023 0.06  0.00 - 0.10 Thousands/µL Final    Protime 09/19/2023 12.6  11.6 - 14.5 seconds Final    INR 09/19/2023 0.93  0.84 - 1.19 Final    PTT 09/19/2023 28  23 - 37 seconds Final    Therapeutic Heparin Range =  60-90 seconds    MRSA Culture Only 09/19/2023 No Methicillin Resistant Staphlyococcus aureus (MRSA) isolated   Final    Hemoglobin A1C 09/19/2023 5.8 (H)  Normal 4.0-5.6%; PreDiabetic 5.7-6.4%;  Diabetic >=6.5%; Glycemic control for adults with diabetes <7.0% % Final    EAG 09/19/2023 120  mg/dl Final         Alisson Dangelo MD        "This note has been constructed using a voice recognition system. Therefore there may be syntax, spelling, and/or grammatical errors.  Please call if you have any questions. "

## 2023-11-03 NOTE — ASSESSMENT & PLAN NOTE
Patient has been on oxycodone 5 mg 2 tablets 4 times a day as needed which was given with orthopedics post hip replacement surgery we will change that medication to 7.5 mg oxycodone every 6 hours as needed

## 2023-11-03 NOTE — ASSESSMENT & PLAN NOTE
Patient recently underwent right hip surgery anterior approach replacement doing well ambulating with walker continue with the physical therapy will monitor pain medications

## 2023-11-06 ENCOUNTER — APPOINTMENT (OUTPATIENT)
Dept: PHYSICAL THERAPY | Facility: CLINIC | Age: 68
End: 2023-11-06
Payer: COMMERCIAL

## 2023-11-07 ENCOUNTER — APPOINTMENT (OUTPATIENT)
Dept: PHYSICAL THERAPY | Facility: CLINIC | Age: 68
End: 2023-11-07
Payer: COMMERCIAL

## 2023-11-07 ENCOUNTER — OFFICE VISIT (OUTPATIENT)
Dept: PHYSICAL THERAPY | Facility: CLINIC | Age: 68
End: 2023-11-07
Payer: COMMERCIAL

## 2023-11-07 DIAGNOSIS — M16.11 PRIMARY OSTEOARTHRITIS OF RIGHT HIP: ICD-10-CM

## 2023-11-07 DIAGNOSIS — M25.551 RIGHT HIP PAIN: ICD-10-CM

## 2023-11-07 DIAGNOSIS — Z01.818 PRE-OPERATIVE EXAMINATION: Primary | ICD-10-CM

## 2023-11-07 PROCEDURE — 97112 NEUROMUSCULAR REEDUCATION: CPT | Performed by: PHYSICAL THERAPIST

## 2023-11-07 PROCEDURE — 97110 THERAPEUTIC EXERCISES: CPT | Performed by: PHYSICAL THERAPIST

## 2023-11-07 NOTE — PROGRESS NOTES
Daily Note         Today's date: 2023  Patient name: Zachary Castro  : 1955  MRN: [id number]  Referring provider: Harrison Alvarado MD  Dx:   Encounter Diagnosis     ICD-10-CM    1. Pre-operative examination  Z01.818       2. Right hip pain  M25.551       3. Primary osteoarthritis of right hip  M16.11           Subjective: Zachary Castro reports her swelling is much improved. Objective: See treatment diary below    Assessment:   much improved tolerance to program today. New there ex was added without difficulty. Patient was bale to progress program per treatment diary without an increase in pain. Needed UE support with single limb balance activities. Weakness noted with SLR. Patient needed AAROM to perform. Plan:  progress ROM,strength and gait        Eval/ Re-eval Auth #/ Referral # Total visits Start date  Expiration date Total active units  Total manual units  PT only or  PT+OT?   10/9/23 No auth, no visit limit, $15 copay                                                           Past Medical History:   Diagnosis Date    Anxiety     Apnea, sleep     patient states "repeated test says no"    Arthritis     severe in the joints    Chronic pain disorder     joints    Depression     DJD (degenerative joint disease)     Fall 2018    tripped on a folded rug- left hamstring injury    Foot injury     right foot-fx of bone-no repair-has severe arthritis in the foot and ankle    Fractures yr. 2000 or before    left ankle    GERD (gastroesophageal reflux disease)     Hepatitis C not sure    History of sleep apnea     Hypercholesteremia     Hypertension     Insomnia     Low back pain     Obesity     Lost 70lb with gastric sleeve ()    Osteoarthritis     Stomach disorder     take meds.  for this    Use of cane as ambulatory aid     Wears glasses     Wears partial dentures     upper and lower-but did lose the lower          Specialty Daily Treatment Diary     Surgery: 10/16/23 right THR  Insurance:         Visits: 3 4 5 6 7   Manual: 10/23/23 10/26/23 10/30/23 11/1/23 11/7/23           STM/MI: prn                                Protocol:          Ther ex/NMR:         PROM: right hip Performed rotation in sitting --  Performed     Man Flexibility: hip flexor, piriformis, glutes ---               Rec bike/nustep NS: 5 min lv 1  5 min lv 3 NS NS: lv 1 6 min  NS: lv 1 7 min  NS: 6 min lv 4    Heel slides W/ peanut: 10 x-15 x  W/slideboard: 10 x W/ peanut: 15 x  W/ peanut: 15 x  -- W/ peanut: 20 x   Ball squeeze Sittin sec x 15  Supine: 5 sec x15  Supine: 5 sec 15 5 sec x 20 5 sec x 10 with bridging    SLR + TA  AAROM: 10 x , 5 x 10 x 2 AAROM  Held due to pain AAROM: 10 x 2 rounds    Hip rolls AROM IR/ER: 10 x 2   10 x 2  10 x 2   LAQ 15 x  20 x  5 sec x 20  x 10 2 lb 10 x 2   Supine hip abduction  W/ slideboard: 10 x      Standing: 10 x 3   Quad set  5 sec x 10   5 sec x 15   -- 5 sec x20 with half roll  5 sec x 20 w/ half roll    Glute set + bridge --       10 x 2  Standard: 10 x    Gagan heel raises 2 x 10   2 x 10   2 x 10   2 x 10   10 x 2   Step taps 4 in:10 x 2 Taps: 10 x 2 gagan  Step ups: 4 in 10 x 2  6 in: 10 x 2  6 in: 3x step ups held due to pain    D/c    Step ups     6 in : 10 x 2   Mini squats on railing 10 x 2  10 x 2  10 x 2  10 x 2 10 x 2    Sit/stand from edge of mat table  5 x 2 to RW  8 x 2  10 x 2  --   sidestepping  16 feet x 2 cycles -- 16 feet x 3 cycles  16 feet x 3 cycles   Standing hip flexor stretch   5 sec x 10   5 sec x 10     Leg press    Gagan: 55 lb 10 x 2  Gagan: 55 lb: 10 x 2 55 lb 10 x 3    retrowalking    Next visit  At bar: 16 feet x 3 cycles   SLS     5 sec x 10    Therapeutic Activity:        Reevaluation        Posture Education                Gait training:     With [facility] instruct    Hurdles step over   4 hurdles: fwd, lat 3 rounds  4 hurdles: fwd, lat 3 rounds   -   HEP:                Modalities:                Ice

## 2023-11-08 ENCOUNTER — OFFICE VISIT (OUTPATIENT)
Dept: OBGYN CLINIC | Facility: CLINIC | Age: 68
End: 2023-11-08

## 2023-11-08 VITALS
BODY MASS INDEX: 40.3 KG/M2 | WEIGHT: 219 LBS | HEART RATE: 72 BPM | HEIGHT: 62 IN | DIASTOLIC BLOOD PRESSURE: 72 MMHG | SYSTOLIC BLOOD PRESSURE: 155 MMHG

## 2023-11-08 DIAGNOSIS — Z96.641 STATUS POST TOTAL REPLACEMENT OF RIGHT HIP: Primary | ICD-10-CM

## 2023-11-08 DIAGNOSIS — Z47.1 AFTERCARE FOLLOWING RIGHT HIP JOINT REPLACEMENT SURGERY: ICD-10-CM

## 2023-11-08 DIAGNOSIS — Z96.641 AFTERCARE FOLLOWING RIGHT HIP JOINT REPLACEMENT SURGERY: ICD-10-CM

## 2023-11-08 DIAGNOSIS — Z48.89 ENCOUNTER FOR POST SURGICAL WOUND CHECK: ICD-10-CM

## 2023-11-08 PROCEDURE — 99024 POSTOP FOLLOW-UP VISIT: CPT | Performed by: ORTHOPAEDIC SURGERY

## 2023-11-08 NOTE — PROGRESS NOTES
Assessment/Plan:  1. Status post total replacement of right hip        2. Aftercare following right hip joint replacement surgery        3. Encounter for post surgical wound check          Scribe Attestation      I,:  Doroteo Nolsaco am acting as a scribe while in the presence of the attending physician.:       I,:  Pamela Lazcano, DO personally performed the services described in this documentation    as scribed in my presence.:           Tristan Gu is a pleasant 76 y.o. female 3 weeks s/p anterior right hip arthoplasty. She presents for a post operative wound check. Her Mepilex dressing was removed, incision continues to heal well. No signs of infection. A new Mepilex dressing was placed today, she may remove bandaging in 1 week. At that time patient may get wet, pat dry, do not scrub, do not soak. Continue efforts in physical therapy. She will continue ASA 2x a day for DVT prophylaxis. Follow up in 3 weeks at which point patient will be 6 weeks status post surgery. Subjective: 3 week s/p right total hip arthroplasty    Patient ID: Zaheer Guzman is a 76 y.o. female who presents 3 weeks status post right anterior total hip arthroplasty. DOS 10/16/2023. Patient is here for a postoperative wound check. She states she is doing very well overall. She is attending physical therapy and has progressed from walker to cane for ambulation assistance. Patient has been weaning off the oxycodone for pain management. She is compliant with ASA 2 times a day for DVT prophylaxis. She denies any fever, chills or sign of infection. Review of Systems   Constitutional:  Negative for chills and fever. HENT:  Negative for ear pain and sore throat. Eyes:  Negative for pain and visual disturbance. Respiratory:  Negative for cough and shortness of breath. Cardiovascular:  Negative for chest pain and palpitations. Gastrointestinal:  Negative for abdominal pain and vomiting.    Genitourinary:  Negative for dysuria and hematuria. Musculoskeletal:  Negative for arthralgias and back pain. Skin:  Negative for color change and rash. Neurological:  Negative for seizures and syncope. All other systems reviewed and are negative. Past Medical History:   Diagnosis Date    Anxiety     Apnea, sleep     patient states "repeated test says no"    Arthritis     severe in the joints    Chronic pain disorder     joints    Depression     DJD (degenerative joint disease)     Fall 11/22/2018    tripped on a folded rug- left hamstring injury    Foot injury     right foot-fx of bone-no repair-has severe arthritis in the foot and ankle    Fractures yr. 2000 or before    left ankle    GERD (gastroesophageal reflux disease)     Hepatitis C not sure    History of sleep apnea     Hypercholesteremia     Hypertension     Insomnia     Low back pain     Obesity     Lost 70lb with gastric sleeve (2011)    Osteoarthritis     Stomach disorder     take meds. for this    Use of cane as ambulatory aid     Wears glasses     Wears partial dentures     upper and lower-but did lose the lower        Past Surgical History:   Procedure Laterality Date    ANKLE ARTHROSCOPY Right     ANKLE SURGERY  not sure    orthoscopic/rt.  ankle    APPENDECTOMY      BARIATRIC SURGERY  maybe 2011    CHOLECYSTECTOMY      COLONOSCOPY  10/26/2021    DXA PROCEDURE (HISTORICAL)  10/13/2020    GASTRECTOMY SLEEVE LAPAROSCOPIC  2011    lost 70 lb    HAND SURGERY      rt. hand, trigger finger    JOINT REPLACEMENT Bilateral     knee replacements    KNEE SURGERY  2001 maybe    MAMMO (HISTORICAL)  01/26/2022    NERVE BLOCK Bilateral 12/29/2017    Procedure: L4, L5, S1 MEDIAL BRANCH BLOCK #1;  Surgeon: Honey Sanchez MD;  Location: [address] [place] [address] MAIN OR;  Service: Pain Management     NERVE BLOCK Bilateral 01/12/2018    Procedure: BLOCK MEDIAL BRANCH L4, L5-S1  #2;  Surgeon: Honey Sanchez MD;  Location: [facility] MAIN OR;  Service: Pain Management     NM ARTHROPLASTY GLENOHUMERAL JOINT TOTAL SHOULDER Right 05/23/2019    Procedure: ARTHROPLASTY SHOULDER TOTAL , excision of deep cyst at shoulder, biceps tenotomy;  Surgeon: Conner Mosquera MD;  Location: [facility];  Service: Orthopedics    NE ARTHRP ACETBLR/PROX FEM PROSTC AGRFT/ALGRFT Right 10/16/2023    Procedure: ARTHROPLASTY HIP TOTAL ANTERIOR,NAVIGATED - overnight;  Surgeon: Lucila Yepez DO;  Location: [facility];  Service: Orthopedics    NE INJECT SI JOINT ARTHRGRPHY&/ANES/STEROID W/LESA Right 10/23/2020    Procedure: Sacroiliac Joint injection (29505); Surgeon: Ursula Goetz MD;  Location: [facility] OR;  Service: Pain Management     NE TENDON SHEATH INCISION Right 12/21/2021    Procedure: RELEASE TRIGGER RING FINGER;  Surgeon: Taiwo Almeida MD;  Location: [facility];  Service: Orthopedics    RADIOFREQUENCY ABLATION Right 03/16/2018    Procedure: Rt L4 L5 S1 RFA;  Surgeon: Ursula Goetz MD;  Location: [address] [place] [address] OR;  Service: Pain Management     RADIOFREQUENCY ABLATION Left 12/14/2018    Procedure: L3-S1 Radio Frequency Ablation (50785 [id number]);   Surgeon: Ursula Goetz MD;  Location: [facility] OR;  Service: Pain Management     RHIZOTOMY Left 01/25/2018    Procedure: L4, L5, S1 RFA;  Surgeon: Ursula Goetz MD;  Location: [facility] OR;  Service: Pain Management     SHOULDER SURGERY  April/2019    rt. total  replacement    TONSILLECTOMY         Family History   Problem Relation Age of Onset    Other Mother         in a nursing home-fx of the leg    Heart disease Father         CHF    Alcohol abuse Father     Cirrhosis Father     Colon cancer Maternal Grandmother     No Known Problems Maternal Grandfather     No Known Problems Paternal Grandmother     No Known Problems Paternal Grandfather     Other Brother         lung abscess, pneumonia    Colon cancer Maternal Aunt     No Known Problems Maternal Aunt     No Known Problems Maternal Aunt     No Known Problems Maternal Uncle     No Known Problems Paternal Aunt     No Known Problems Paternal Uncle     Breast cancer Cousin 48       Social History     Occupational History    Not on file   Tobacco Use    Smoking status: Former     Types: Cigarettes     Quit date: 2004     Years since quittin.8     Passive exposure: Past    Smokeless tobacco: Never   Vaping Use    Vaping Use: Never used   Substance and Sexual Activity    Alcohol use: Not Currently     Comment: quit many yrs. ago    Drug use: Not Currently     Types: Amphetamines, Marijuana     Comment: aprox. 35 years ago, maybe longer    Sexual activity: Not Currently     Partners: Male     Birth control/protection: Abstinence, Post-menopausal     Comment: no sex for many yrs. (aprox. 20 yrs.)         Current Outpatient Medications:     acetaminophen (TYLENOL) 325 mg tablet, Take 3 tablets (975 mg total) by mouth every 8 (eight) hours, Disp: , Rfl: 0    amLODIPine (NORVASC) 10 mg tablet, TAKE 1 TABLET BY MOUTH DAILY (Patient taking differently: Take 10 mg by mouth every morning), Disp: 90 tablet, Rfl: 3    aspirin 81 mg chewable tablet, Chew 1 tablet (81 mg total) 2 (two) times a day, Disp: 82 tablet, Rfl: 0    clindamycin (CLEOCIN) 300 MG capsule, Take 600 mg by mouth 1 (one) time One hour before a procedure/dental work, Disp: , Rfl:     Cyanocobalamin (VITAMIN B 12 PO), Take 500 mcg by mouth every morning, Disp: , Rfl:     diphenhydrAMINE (BENADRYL) 25 mg tablet, Take 25 mg by mouth every 8 (eight) hours as needed for itching , Disp: , Rfl:     docusate sodium (COLACE) 100 mg capsule, Take 1 capsule (100 mg total) by mouth 2 (two) times a day, Disp: 60 capsule, Rfl: 0    escitalopram (LEXAPRO) 10 mg tablet, TAKE 1 AND 1/2 TABLETS BY  MOUTH DAILY AT BEDTIME, Disp: 135 tablet, Rfl: 1    Melatonin 10 MG TABS, Take by mouth daily at bedtime, Disp: , Rfl:     naloxone (NARCAN) 4 mg/0.1 mL nasal spray, Administer 1 spray into a nostril.  If no response after 2-3 minutes, give another dose in the other nostril using a new spray., Disp: 1 each, Rfl: 0    oxyCODONE-acetaminophen (Percocet) 7.5-325 MG per tablet, Take 1 tablet by mouth every 6 (six) hours as needed for moderate pain Max Daily Amount: 4 tablets, Disp: 120 tablet, Rfl: 0    pantoprazole (PROTONIX) 40 mg tablet, Take 1 tablet (40 mg total) by mouth daily, Disp: 41 tablet, Rfl: 0    perindopril (ACEON) 8 MG tablet, TAKE 1 TABLET BY MOUTH  TWICE DAILY, Disp: 180 tablet, Rfl: 3    triamcinolone (KENALOG) 0.5 % cream, Apply topically 3 (three) times a day as needed for rash (Patient not taking: Reported on 11/3/2023), Disp: 15 g, Rfl: 2    Allergies   Allergen Reactions    Penicillins Anaphylaxis     Total body redness and fever    Levaquin [Levofloxacin] Itching       Objective:  Vitals:    11/08/23 1447   BP: 155/72   Pulse: 72       Body mass index is 40.06 kg/m². Right Hip Exam     Other   Erythema: absent  Sensation: normal  Pulse: present    Comments:  Anterior incision is healing well  Clean dry and intact, no sign of infection             Physical Exam  Vitals and nursing note reviewed. Constitutional:       Appearance: Normal appearance. HENT:      Head: Normocephalic. Right Ear: External ear normal.      Left Ear: External ear normal.   Eyes:      Extraocular Movements: Extraocular movements intact. Conjunctiva/sclera: Conjunctivae normal.   Cardiovascular:      Rate and Rhythm: Normal rate. Pulses: Normal pulses. Pulmonary:      Effort: Pulmonary effort is normal.      Breath sounds: Normal breath sounds. Abdominal:      General: Abdomen is flat. Musculoskeletal:         General: Normal range of motion. Cervical back: Normal range of motion and neck supple. Comments: See Ortho exam   Skin:     General: Skin is warm and dry. Neurological:      General: No focal deficit present. Mental Status: She is alert.    Psychiatric:         Mood and Affect: Mood normal.         Behavior: Behavior normal.         I have personally reviewed pertinent films in PACS. This document was created using speech voice recognition software. Grammatical errors, random word insertions, pronoun errors, and incomplete sentences are an occasional consequence of this system due to software limitations, ambient noise, and hardware issues. Any formal questions or concerns about content, text, or information contained within the body of this dictation should be directly addressed to the provider for clarification.

## 2023-11-09 ENCOUNTER — OFFICE VISIT (OUTPATIENT)
Dept: PHYSICAL THERAPY | Facility: CLINIC | Age: 68
End: 2023-11-09
Payer: COMMERCIAL

## 2023-11-09 DIAGNOSIS — M25.551 RIGHT HIP PAIN: Primary | ICD-10-CM

## 2023-11-09 DIAGNOSIS — M16.11 PRIMARY OSTEOARTHRITIS OF RIGHT HIP: ICD-10-CM

## 2023-11-09 PROCEDURE — 97110 THERAPEUTIC EXERCISES: CPT | Performed by: PHYSICAL THERAPIST

## 2023-11-09 PROCEDURE — 97112 NEUROMUSCULAR REEDUCATION: CPT | Performed by: PHYSICAL THERAPIST

## 2023-11-09 NOTE — PROGRESS NOTES
Daily Note         Today's date: 2023  Patient name: Laure Vinson  : 1955  MRN: [id number]  Referring provider: Angie Arteaga MD  Dx:   Encounter Diagnosis     ICD-10-CM    1. Right hip pain  M25.551       2. Primary osteoarthritis of right hip  M16.11           Subjective: Kushal Sr has no new complaints today. Some right hip soreness. Objective: See treatment diary below      Assessment:  Pt would benefit from continued PT. She is progressing nicely. She has some difficulty with bed mobility and bed transfers due to weakness lifting her leg. She was able to perform self SLR with small reps though today. Plan:  progress ROM,strength and gait        Eval/ Re-eval Auth #/ Referral # Total visits Start date  Expiration date Total active units  Total manual units  PT only or  PT+OT?   10/9/23 No auth, no visit limit, $15 copay                                                           Past Medical History:   Diagnosis Date    Anxiety     Apnea, sleep     patient states "repeated test says no"    Arthritis     severe in the joints    Chronic pain disorder     joints    Depression     DJD (degenerative joint disease)     Fall 2018    tripped on a folded rug- left hamstring injury    Foot injury     right foot-fx of bone-no repair-has severe arthritis in the foot and ankle    Fractures yr. 2000 or before    left ankle    GERD (gastroesophageal reflux disease)     Hepatitis C not sure    History of sleep apnea     Hypercholesteremia     Hypertension     Insomnia     Low back pain     Obesity     Lost 70lb with gastric sleeve ()    Osteoarthritis     Stomach disorder     take meds.  for this    Use of cane as ambulatory aid     Wears glasses     Wears partial dentures     upper and lower-but did lose the lower          Specialty Daily Treatment Diary     Surgery: 10/16/23 right THR  Insurance:         Visits: 4 5 6 7 8   Manual: 10/26/23 10/30/23 11/1/23 11/7/23 11/9/23 STM/MI: prn                                Protocol:          Ther ex/NMR:         PROM: right hip --  Performed      Man Flexibility: hip flexor, piriformis, glutes                Rec bike/nustep 5 min lv 3 NS NS: lv 1 6 min  NS: lv 1 7 min  NS: 6 min lv 4  NS 6 min   Heel slides W/ peanut: 15 x  W/ peanut: 15 x  -- W/ peanut: 20 x 20   Ball squeeze Supine: 5 sec x15  Supine: 5 sec 15 5 sec x 20 5 sec x 10 with bridging  20  5s   SLR + TA AAROM: 10 x , 5 x 10 x 2 AAROM  Held due to pain AAROM: 10 x 2 rounds  2x5 AROM - no assist   Hip rolls AROM   10 x 2  10 x 2 20   LAQ 20 x  5 sec x 20  x 10 2 lb 10 x 2 20   Supine hip abduction     Standing: 10 x 3    Quad set  5 sec x 15   -- 5 sec x20 with half roll  5 sec x 20 w/ half roll  Qset w Gset 20x   Glute set + bridge       10 x 2  Standard: 10 x  2x10   Gagan heel raises 2 x 10   2 x 10   2 x 10   10 x 2 20   Step taps Taps: 10 x 2 gagan  Step ups: 4 in 10 x 2  6 in: 10 x 2  6 in: 3x step ups held due to pain    D/c  --   Step ups    6 in : 10 x 2 20  6"   Mini squats on railing 10 x 2  10 x 2  10 x 2 10 x 2  20   Sit/stand from edge of mat table   8 x 2  10 x 2  --    sidestepping 16 feet x 2 cycles -- 16 feet x 3 cycles  16 feet x 3 cycles 16 ft x 3   Standing hip flexor stretch  5 sec x 10   5 sec x 10      Leg press   Gagan: 55 lb 10 x 2  Gagan: 55 lb: 10 x 2 55 lb 10 x 3  55 to 65#  3x10   retrowalking   Next visit  At bar: 16 feet x 3 cycles At bar 16 ft x 3   SLS    5 sec x 10  10   Therapeutic Activity:        Reevaluation        Posture Education                Gait training:    With [facility] instruct     Hurdles step over  4 hurdles: fwd, lat 3 rounds  4 hurdles: fwd, lat 3 rounds   -    HEP:                Modalities:                Ice

## 2023-11-14 ENCOUNTER — APPOINTMENT (OUTPATIENT)
Dept: PHYSICAL THERAPY | Facility: CLINIC | Age: 68
End: 2023-11-14
Payer: COMMERCIAL

## 2023-11-14 ENCOUNTER — OFFICE VISIT (OUTPATIENT)
Dept: PHYSICAL THERAPY | Facility: CLINIC | Age: 68
End: 2023-11-14
Payer: COMMERCIAL

## 2023-11-14 DIAGNOSIS — M16.11 PRIMARY OSTEOARTHRITIS OF RIGHT HIP: ICD-10-CM

## 2023-11-14 DIAGNOSIS — M25.551 RIGHT HIP PAIN: Primary | ICD-10-CM

## 2023-11-14 DIAGNOSIS — Z01.818 PRE-OPERATIVE EXAMINATION: ICD-10-CM

## 2023-11-14 PROCEDURE — 97112 NEUROMUSCULAR REEDUCATION: CPT | Performed by: PHYSICAL THERAPIST

## 2023-11-14 PROCEDURE — 97110 THERAPEUTIC EXERCISES: CPT | Performed by: PHYSICAL THERAPIST

## 2023-11-14 NOTE — PROGRESS NOTES
Daily Note         Today's date: 2023  Patient name: Maximilian Juares  : 1955  MRN: [id number]  Referring provider: Addie Douglas MD  Dx:   Encounter Diagnosis     ICD-10-CM    1. Right hip pain  M25.551       2. Primary osteoarthritis of right hip  M16.11       3. Pre-operative examination  Z01.818           Subjective: Maximilian Juares reports she started driving without issue and removed bandage this am.  Area was well healed and started showering. Objective: See treatment diary below    Assessment:   incision healing well, no areas of redness no drainage, no sign of infection. Patient able to lift leg in SLR without assistance today. Patient needs UE support with single limb balance. Patient needs cuing to increase step length during retrowalking and forward walking. Patient needed adan UE support close by for LOB during tandem walking on foam balance beam. Patient entered without SPC and needed cuing to increase step length. Plan: Progress treatment as tolerated. Progress strength and gait. Eval/ Re-eval Auth #/ Referral # Total visits Start date  Expiration date Total active units  Total manual units  PT only or  PT+OT?   10/9/23 No auth, no visit limit, $15 copay                                                           Past Medical History:   Diagnosis Date    Anxiety     Apnea, sleep     patient states "repeated test says no"    Arthritis     severe in the joints    Chronic pain disorder     joints    Depression     DJD (degenerative joint disease)     Fall 2018    tripped on a folded rug- left hamstring injury    Foot injury     right foot-fx of bone-no repair-has severe arthritis in the foot and ankle    Fractures yr.  2000 or before    left ankle    GERD (gastroesophageal reflux disease)     Hepatitis C not sure    History of sleep apnea     Hypercholesteremia     Hypertension     Insomnia     Low back pain     Obesity     Lost 70lb with gastric sleeve (2011)    Osteoarthritis     Stomach disorder     take meds. for this    Use of cane as ambulatory aid     Wears glasses     Wears partial dentures     upper and lower-but did lose the lower          Specialty Daily Treatment Diary     Surgery: 10/16/23 right THR  Insurance:         Visits: 5 6 7 8 10   Manual: 10/30/23 11/1/23 11/7/23 11/9/23 11/14/23           STM/MI: prn                                Protocol:          Ther ex/NMR:         PROM: right hip  Performed       Man Flexibility: hip flexor, piriformis, glutes                Rec bike/nustep NS: lv 1 6 min  NS: lv 1 7 min  NS: 6 min lv 4  NS 6 min NS: 6 min lv 3    Heel slides W/ peanut: 15 x  -- W/ peanut: 20 x 20 30 x 5 sec    Ball squeeze Supine: 5 sec 15 5 sec x 20 5 sec x 10 with bridging  20  5s + bridging: 10 x   SLR + TA 10 x 2 AAROM  Held due to pain AAROM: 10 x 2 rounds  2x5 AROM - no assist AROM 5 x 2    Hip rolls AROM  10 x 2  10 x 2 20 D/c    LAQ 5 sec x 20  x 10 2 lb 10 x 2 20 2 lb: 10 x 3    Supine hip abduction    Standing: 10 x 3     Quad set  -- 5 sec x20 with half roll  5 sec x 20 w/ half roll  Qset w Gset 20x SAQ: 10 x 2   Glute set + bridge  10 x 2  Standard: 10 x  2x10 W/ glute set: 10 x   Gagan heel raises 2 x 10   2 x 10   10 x 2 20 20 x   Step ups   6 in : 10 x 2 20  6" 6 in: 10 x 2    Mini squats on railing 10 x 2  10 x 2 10 x 2  20 10 x 3    Sit/stand from edge of mat table  8 x 2  10 x 2  --  10 x 2   sidestepping -- 16 feet x 3 cycles  16 feet x 3 cycles 16 ft x 3 16 feet x 3 cycles   Standing hip flexor stretch 5 sec x 10   5 sec x 10    5 sec x 10     Leg press  Gagan: 55 lb 10 x 2  Gagan: 55 lb: 10 x 2 55 lb 10 x 3  55 to 65#  3x10 65 lb: 10 x 3   retrowalking  Next visit  At bar: 16 feet x 3 cycles At bar 16 ft x 3 16 feet x 3 cycles    SLS   5 sec x 10  10 5 sec x 10     Step downs     6 in:  Next visit    Tandem walk on foam     6 feet x 3 cycles    Therapeutic Activity:        Reevaluation        Posture Education Gait training:   With [facility] instruct      Hurdles step over  4 hurdles: alex, lat 3 rounds   -     HEP:                Modalities:        GABY        Ice

## 2023-11-16 ENCOUNTER — APPOINTMENT (OUTPATIENT)
Dept: PHYSICAL THERAPY | Facility: CLINIC | Age: 68
End: 2023-11-16
Payer: COMMERCIAL

## 2023-11-16 ENCOUNTER — OFFICE VISIT (OUTPATIENT)
Dept: PHYSICAL THERAPY | Facility: CLINIC | Age: 68
End: 2023-11-16
Payer: COMMERCIAL

## 2023-11-16 DIAGNOSIS — M16.11 PRIMARY OSTEOARTHRITIS OF RIGHT HIP: ICD-10-CM

## 2023-11-16 DIAGNOSIS — M25.551 RIGHT HIP PAIN: Primary | ICD-10-CM

## 2023-11-16 DIAGNOSIS — Z01.818 PRE-OPERATIVE EXAMINATION: ICD-10-CM

## 2023-11-16 PROCEDURE — 97112 NEUROMUSCULAR REEDUCATION: CPT | Performed by: PHYSICAL THERAPIST

## 2023-11-16 PROCEDURE — 97110 THERAPEUTIC EXERCISES: CPT | Performed by: PHYSICAL THERAPIST

## 2023-11-16 NOTE — PROGRESS NOTES
Daily Note         Today's date: 2023  Patient name: Dean Hsieh  : 1955  MRN: [id number]  Referring provider: Chin Ogden MD  Dx:   Encounter Diagnosis     ICD-10-CM    1. Right hip pain  M25.551       2. Primary osteoarthritis of right hip  M16.11       3. Pre-operative examination  Z01.818           Subjective: Dean Hsieh reports her incision started drainage last night       Objective: See treatment diary below    Assessment:   noted per patient report and through observation, patient + for drainage on fold of incision  . Yahaira Delgado, PT contacted surgeon via tiger text and PT was instructed to cover area with sterile gauze and to follow up with ortho tomorrow. Area was covered and appointment was made with ortho. Patient needs cuing to increase step length. Plan:  add hip extension stretch manually next session. Eval/ Re-eval Auth #/ Referral # Total visits Start date  Expiration date Total active units  Total manual units  PT only or  PT+OT?   10/9/23 No auth, no visit limit, $15 copay                                                           Past Medical History:   Diagnosis Date    Anxiety     Apnea, sleep     patient states "repeated test says no"    Arthritis     severe in the joints    Chronic pain disorder     joints    Depression     DJD (degenerative joint disease)     Fall 2018    tripped on a folded rug- left hamstring injury    Foot injury     right foot-fx of bone-no repair-has severe arthritis in the foot and ankle    Fractures yr. 2000 or before    left ankle    GERD (gastroesophageal reflux disease)     Hepatitis C not sure    History of sleep apnea     Hypercholesteremia     Hypertension     Insomnia     Low back pain     Obesity     Lost 70lb with gastric sleeve ()    Osteoarthritis     Stomach disorder     take meds.  for this    Use of cane as ambulatory aid     Wears glasses     Wears partial dentures     upper and lower-but did lose the lower          Specialty Daily Treatment Diary     Surgery: 10/16/23 right THR  Insurance:          Visits: 5 6 7 8 10 11   Manual: 10/30/23 11/1/23 11/7/23 11/9/23 11/14/23 11/16/23            STM/MI: prn               Dressing placed on wound per Jame's orders. Sterile dressing. Protocol:           Ther ex/NMR:          PROM: right hip  Performed        Man Flexibility: hip flexor, piriformis, glutes                  Rec bike/nustep NS: lv 1 6 min  NS: lv 1 7 min  NS: 6 min lv 4  NS 6 min NS: 6 min lv 3  NS: 6 min lv 4    Heel slides W/ peanut: 15 x  -- W/ peanut: 20 x 20 30 x 5 sec     Ball squeeze Supine: 5 sec 15 5 sec x 20 5 sec x 10 with bridging  20  5s + bridging: 10 x + bridging: 10 x   SLR + TA 10 x 2 AAROM  Held due to pain AAROM: 10 x 2 rounds  2x5 AROM - no assist AROM 5 x 2  AROM: 5 x 3    LAQ 5 sec x 20  x 10 2 lb 10 x 2 20 2 lb: 10 x 3  2 lb 10 x 3    Supine hip abduction    Standing: 10 x 3      Quad set  -- 5 sec x20 with half roll  5 sec x 20 w/ half roll  Qset w Gset 20x SAQ: 10 x 2    Glute set + bridge  10 x 2  Standard: 10 x  2x10 W/ glute set: 10 x 10 x 2    Gagan heel raises 2 x 10   2 x 10   10 x 2 20 20 x 10 x 5 sec hold    Step ups   6 in : 10 x 2 20  6" 6 in: 10 x 2  6 in: 12 x 2    Mini squats on railing 10 x 2  10 x 2 10 x 2  20 10 x 3  10 x 3    Sit/stand from edge of mat table  8 x 2  10 x 2  --  10 x 2    sidestepping -- 16 feet x 3 cycles  16 feet x 3 cycles 16 ft x 3 16 feet x 3 cycles 8 feet x 6 cycles    Standing hip flexor stretch 5 sec x 10   5 sec x 10    5 sec x 10   5 sec x 10     Leg press  Gagan: 55 lb 10 x 2  Gagan: 55 lb: 10 x 2 55 lb 10 x 3  55 to 65#  3x10 65 lb: 10 x 3 75 lb: 10 x 2 85 lb: 10 x    retrowalking  Next visit  At bar: 16 feet x 3 cycles At bar 16 ft x 3 16 feet x 3 cycles  16 feet x 3 cycles   SLS   5 sec x 10  10 5 sec x 10   5 sec x 10     Step downs     6 in:  Next visit  6 in: 10 x    Tandem walk on foam     6 feet x 3 cycles  6 feet x 3 cycles    Therapeutic Activity:         Reevaluation         Posture Education                  Gait training:   With [facility] instruct       Hurdles step over  4 hurdles: alex, lat 3 rounds   -      HEP:                  Modalities:         GABY         Ice

## 2023-11-17 ENCOUNTER — OFFICE VISIT (OUTPATIENT)
Dept: OBGYN CLINIC | Facility: CLINIC | Age: 68
End: 2023-11-17

## 2023-11-17 VITALS
SYSTOLIC BLOOD PRESSURE: 154 MMHG | BODY MASS INDEX: 40.3 KG/M2 | HEART RATE: 81 BPM | DIASTOLIC BLOOD PRESSURE: 81 MMHG | HEIGHT: 62 IN | WEIGHT: 219 LBS

## 2023-11-17 DIAGNOSIS — Z47.1 AFTERCARE FOLLOWING RIGHT HIP JOINT REPLACEMENT SURGERY: ICD-10-CM

## 2023-11-17 DIAGNOSIS — Z96.641 STATUS POST TOTAL REPLACEMENT OF RIGHT HIP: ICD-10-CM

## 2023-11-17 DIAGNOSIS — Z96.641 AFTERCARE FOLLOWING RIGHT HIP JOINT REPLACEMENT SURGERY: ICD-10-CM

## 2023-11-17 DIAGNOSIS — T81.89XD DELAYED SURGICAL WOUND HEALING, SUBSEQUENT ENCOUNTER: Primary | ICD-10-CM

## 2023-11-17 PROCEDURE — 99024 POSTOP FOLLOW-UP VISIT: CPT | Performed by: ORTHOPAEDIC SURGERY

## 2023-11-17 NOTE — PROGRESS NOTES
Assessment/Plan:  1. Delayed surgical wound healing, subsequent encounter        2. Status post total replacement of right hip        3. Aftercare following right hip joint replacement surgery          Scribe Attestation      I,:  Edel Spence PA-C am acting as a scribe while in the presence of the attending physician.:       I,:  Mook Lama DO personally performed the services described in this documentation    as scribed in my presence.:           Iliana Luis is a pleasant 69-year-old presenting today for follow-up 4 weeks after a direct anterior right total hip arthroplasty. She does have a small approximately 1 cm area of delayed wound healing at the inguinal crease. This does have a fibrous bed with no sign of infection. No spitting stitches were appreciated. The remainder of the incision has healed very nicely. We encouraged her to purchase Medihoney and to use his on the wound twice a day with a large Band-Aid. She may continue her efforts at therapy and at home without restriction. We would like her to keep this area of the inguinal crease dry. She will return in 2 weeks for reevaluation, and can call if there are any concerns prior to that time. All questions addressed    Subjective: Wound check status post direct anterior right total of arthroplasty    Patient ID: Mayte Calzada is a 76 y.o. female presenting today for a surgical wound check 4 weeks after a right total hip arthroplasty. We were notified from physical therapy yesterday that the patient had noted some drainage and a small wound at the inguinal crease. She has been keeping this dry and covering with a dry sterile dressing. She denies any fevers or chills. She has not noted any purulent drainage    Review of Systems   Constitutional: Negative. HENT: Negative. Eyes: Negative. Respiratory: Negative. Cardiovascular: Negative. Gastrointestinal: Negative. Endocrine: Negative. Genitourinary: Negative. Musculoskeletal:  Positive for arthralgias, joint swelling and myalgias. Skin:  Positive for wound. Allergic/Immunologic: Negative. Neurological: Negative. Hematological: Negative. Psychiatric/Behavioral: Negative. Past Medical History:   Diagnosis Date    Anxiety     Apnea, sleep     patient states "repeated test says no"    Arthritis     severe in the joints    Chronic pain disorder     joints    Depression     DJD (degenerative joint disease)     Fall 11/22/2018    tripped on a folded rug- left hamstring injury    Foot injury     right foot-fx of bone-no repair-has severe arthritis in the foot and ankle    Fractures yr. 2000 or before    left ankle    GERD (gastroesophageal reflux disease)     Hepatitis C not sure    History of sleep apnea     Hypercholesteremia     Hypertension     Insomnia     Low back pain     Obesity     Lost 70lb with gastric sleeve (2011)    Osteoarthritis     Stomach disorder     take meds. for this    Use of cane as ambulatory aid     Wears glasses     Wears partial dentures     upper and lower-but did lose the lower      Past Surgical History:   Procedure Laterality Date    ANKLE ARTHROSCOPY Right     ANKLE SURGERY  not sure    orthoscopic/rt.  ankle    APPENDECTOMY      BARIATRIC SURGERY  maybe 2011    CHOLECYSTECTOMY      COLONOSCOPY  10/26/2021    DXA PROCEDURE (HISTORICAL)  10/13/2020    GASTRECTOMY SLEEVE LAPAROSCOPIC  2011    lost 70 lb    HAND SURGERY      rt. hand, trigger finger    JOINT REPLACEMENT Bilateral     knee replacements    KNEE SURGERY  2001 maybe    MAMMO (HISTORICAL)  01/26/2022    NERVE BLOCK Bilateral 12/29/2017    Procedure: L4, L5, S1 MEDIAL BRANCH BLOCK #1;  Surgeon: Britney Albert MD;  Location: [address] [place] [address] MAIN OR;  Service: Pain Management     NERVE BLOCK Bilateral 01/12/2018    Procedure: BLOCK MEDIAL BRANCH L4, L5-S1  #2;  Surgeon: Britney Albert MD;  Location: [address] [place] [address] MAIN OR;  Service: Pain Management     CO ARTHROPLASTY GLENOHUMERAL JOINT TOTAL SHOULDER Right 05/23/2019    Procedure: ARTHROPLASTY SHOULDER TOTAL , excision of deep cyst at shoulder, biceps tenotomy;  Surgeon: Tresa Alejandre MD;  Location: [facility];  Service: Orthopedics    CO ARTHRP ACETBLR/PROX FEM PROSTC AGRFT/ALGRFT Right 10/16/2023    Procedure: ARTHROPLASTY HIP TOTAL ANTERIOR,NAVIGATED - overnight;  Surgeon: Leon Paula DO;  Location: [facility];  Service: Orthopedics    CO INJECT SI JOINT ARTHRGRPHY&/ANES/STEROID W/LESA Right 10/23/2020    Procedure: Sacroiliac Joint injection (94894); Surgeon: Britney Albert MD;  Location: [facility] MAIN OR;  Service: Pain Management     CO TENDON SHEATH INCISION Right 12/21/2021    Procedure: RELEASE TRIGGER RING FINGER;  Surgeon: Sujata Elias MD;  Location: [facility];  Service: Orthopedics    RADIOFREQUENCY ABLATION Right 03/16/2018    Procedure: Rt L4 L5 S1 RFA;  Surgeon: Britney Albert MD;  Location: [address] [place] [address] MAIN OR;  Service: Pain Management     RADIOFREQUENCY ABLATION Left 12/14/2018    Procedure: L3-S1 Radio Frequency Ablation ([id number]);   Surgeon: Britney Albert MD;  Location: [facility] MAIN OR;  Service: Pain Management     RHIZOTOMY Left 01/25/2018    Procedure: L4, L5, S1 RFA;  Surgeon: Britney Albert MD;  Location: [facility] MAIN OR;  Service: Pain Management     SHOULDER SURGERY  April/2019    rt. total  replacement    TONSILLECTOMY         Family History   Problem Relation Age of Onset    Other Mother         in a nursing home-fx of the leg    Heart disease Father         CHF    Alcohol abuse Father     Cirrhosis Father     Colon cancer Maternal Grandmother     No Known Problems Maternal Grandfather     No Known Problems Paternal Grandmother     No Known Problems Paternal Grandfather     Other Brother         lung abscess, pneumonia    Colon cancer Maternal Aunt     No Known Problems Maternal Aunt     No Known Problems Maternal Aunt     No Known Problems Maternal Uncle     No Known Problems Paternal Aunt     No Known Problems Paternal Uncle     Breast cancer Cousin 48       Social History     Occupational History    Not on file   Tobacco Use    Smoking status: Former     Types: Cigarettes     Quit date: 2004     Years since quittin.8     Passive exposure: Past    Smokeless tobacco: Never   Vaping Use    Vaping Use: Never used   Substance and Sexual Activity    Alcohol use: Not Currently     Comment: quit many yrs. ago    Drug use: Not Currently     Types: Amphetamines, Marijuana     Comment: aprox. 35 years ago, maybe longer    Sexual activity: Not Currently     Partners: Male     Birth control/protection: Abstinence, Post-menopausal     Comment: no sex for many yrs. (aprox. 20 yrs.)         Current Outpatient Medications:     acetaminophen (TYLENOL) 325 mg tablet, Take 3 tablets (975 mg total) by mouth every 8 (eight) hours, Disp: , Rfl: 0    amLODIPine (NORVASC) 10 mg tablet, TAKE 1 TABLET BY MOUTH DAILY (Patient taking differently: Take 10 mg by mouth every morning), Disp: 90 tablet, Rfl: 3    aspirin 81 mg chewable tablet, Chew 1 tablet (81 mg total) 2 (two) times a day, Disp: 82 tablet, Rfl: 0    clindamycin (CLEOCIN) 300 MG capsule, Take 600 mg by mouth 1 (one) time One hour before a procedure/dental work, Disp: , Rfl:     Cyanocobalamin (VITAMIN B 12 PO), Take 500 mcg by mouth every morning, Disp: , Rfl:     diphenhydrAMINE (BENADRYL) 25 mg tablet, Take 25 mg by mouth every 8 (eight) hours as needed for itching , Disp: , Rfl:     docusate sodium (COLACE) 100 mg capsule, Take 1 capsule (100 mg total) by mouth 2 (two) times a day, Disp: 60 capsule, Rfl: 0    escitalopram (LEXAPRO) 10 mg tablet, TAKE 1 AND 1/2 TABLETS BY  MOUTH DAILY AT BEDTIME, Disp: 135 tablet, Rfl: 1    Melatonin 10 MG TABS, Take by mouth daily at bedtime, Disp: , Rfl:     naloxone (NARCAN) 4 mg/0.1 mL nasal spray, Administer 1 spray into a nostril. If no response after 2-3 minutes, give another dose in the other nostril using a new spray. , Disp: 1 each, Rfl: 0    oxyCODONE-acetaminophen (Percocet) 7.5-325 MG per tablet, Take 1 tablet by mouth every 6 (six) hours as needed for moderate pain Max Daily Amount: 4 tablets, Disp: 120 tablet, Rfl: 0    pantoprazole (PROTONIX) 40 mg tablet, Take 1 tablet (40 mg total) by mouth daily, Disp: 41 tablet, Rfl: 0    perindopril (ACEON) 8 MG tablet, TAKE 1 TABLET BY MOUTH  TWICE DAILY, Disp: 180 tablet, Rfl: 3    triamcinolone (KENALOG) 0.5 % cream, Apply topically 3 (three) times a day as needed for rash (Patient not taking: Reported on 11/3/2023), Disp: 15 g, Rfl: 2    Allergies   Allergen Reactions    Penicillins Anaphylaxis     Total body redness and fever    Levaquin [Levofloxacin] Itching       Objective:  Vitals:    11/17/23 1500   BP: 154/81   Pulse: 81       Body mass index is 40.06 kg/m². Right Hip Exam     Tenderness   The patient is experiencing no tenderness. Other   Erythema: absent  Scars: present  Sensation: normal  Pulse: present    Comments:  Anterior incision is healing well except for approximately 10 to 15 mm area in the inguinal crease. There is evidence of a fibrous bed. Nothing was able to be expressed from this area. There is no purulence or foul odor. There is no surrounding erythema or warmth. Clean dry and intact, no sign of infection   Band-Aids were placed over the area in question            Physical Exam  Vitals and nursing note reviewed. Constitutional:       Appearance: Normal appearance. She is well-developed. Comments: Body mass index is 40.06 kg/m². HENT:      Head: Normocephalic and atraumatic. Right Ear: External ear normal.      Left Ear: External ear normal.   Eyes:      Extraocular Movements: Extraocular movements intact. Conjunctiva/sclera: Conjunctivae normal.   Cardiovascular:      Rate and Rhythm: Normal rate. Pulses: Normal pulses. Pulmonary:      Effort: Pulmonary effort is normal.   Abdominal:      Palpations: Abdomen is soft. Musculoskeletal:      Cervical back: Normal range of motion. Comments: See ortho exam   Skin:     General: Skin is warm and dry. Neurological:      General: No focal deficit present. Mental Status: She is alert and oriented to person, place, and time. Mental status is at baseline. Psychiatric:         Mood and Affect: Mood normal.         Behavior: Behavior normal.         Thought Content: Thought content normal.         Judgment: Judgment normal.       This document was created using speech voice recognition software. Grammatical errors, random word insertions, pronoun errors, and incomplete sentences are an occasional consequence of this system due to software limitations, ambient noise, and hardware issues. Any formal questions or concerns about content, text, or information contained within the body of this dictation should be directly addressed to the provider for clarification.

## 2023-11-21 ENCOUNTER — EVALUATION (OUTPATIENT)
Dept: PHYSICAL THERAPY | Facility: CLINIC | Age: 68
End: 2023-11-21
Payer: COMMERCIAL

## 2023-11-21 ENCOUNTER — APPOINTMENT (OUTPATIENT)
Dept: PHYSICAL THERAPY | Facility: CLINIC | Age: 68
End: 2023-11-21
Payer: COMMERCIAL

## 2023-11-21 ENCOUNTER — RA CDI HCC (OUTPATIENT)
Dept: OTHER | Facility: HOSPITAL | Age: 68
End: 2023-11-21

## 2023-11-21 DIAGNOSIS — M16.11 PRIMARY OSTEOARTHRITIS OF RIGHT HIP: ICD-10-CM

## 2023-11-21 DIAGNOSIS — Z01.818 PRE-OPERATIVE EXAMINATION: ICD-10-CM

## 2023-11-21 DIAGNOSIS — M25.551 RIGHT HIP PAIN: Primary | ICD-10-CM

## 2023-11-21 PROCEDURE — 97110 THERAPEUTIC EXERCISES: CPT | Performed by: PHYSICAL THERAPIST

## 2023-11-21 PROCEDURE — 97530 THERAPEUTIC ACTIVITIES: CPT | Performed by: PHYSICAL THERAPIST

## 2023-11-21 NOTE — PROGRESS NOTES
Daily Note/Progress Note      Today's date: 2023  Patient name: Maximilian Juares  : 1955  MRN: [id number]  Referring provider: Dr. Amee Borrego  Dx:   Encounter Diagnosis     ICD-10-CM    1. Right hip pain  M25.551       2. Primary osteoarthritis of right hip  M16.11       3. Pre-operative examination  Z01.818           Subjective: Pt reports % improvement since SOC: 70%. The last 30% is due to she still experiences aching in the anterior proximal thigh "where they moved that nerve" states she did two hours of blowing leaves on  and hurt her hamstring on left. Pain level at rest:  2 /10, pain level with ADLS:  ,  . At this time, the functional limitations include: difficulty with yard work, (she lives alone) does her own cleaning,  prolong standing, diff with stair negotiation. States wound is improving, is being consistent with medihoney. Drainage is reduced.       Objective: See treatment diary below    Goals    STG:  (23)  + Patient will report a 35% improvement in symptoms (2-3 weeks) (met)   + Patient will be independent in basic HEP (2-3 weeks) (met)   + Patient will demonstrate reduced gait deviations ambulating on level surfaces with assistive device    (2-3 week)met   + Patient will demonstrate an increase in range of motion   hip flexion 90 deg (3 weeks) met   + Patient will report increased ease walking due to a reduction in pain (2-3 weeks) met       LTG:  + Patient will report a 65% improvement in symptoms (4-6 weeks) met ( new goal: patient report 85% improvement since [place]: 4-6 weeks)  + Patient will increase FOTO score to expected score (4-6 sessions) not met   + Patient will be independent in comprehensive HEP (4-6 weeks) not met   + Patient will demonstrate ambulating on level surfaces Without an assistive device(4-6 week) met ( new goal: patient will ambulate on level surfaces without an assistive device with no deviaitons 4 weeks)  + Patient will negotiate stairs reciprically with use of one railing  (6-8 weeks) not met  + Patient will demonstrate an increase in range of motion hip AAROM all directions  to  wfl  (4-6 weeks) not met   + Patient will demonstrate increase  MMT of hip/knee to  4/5 for maximum function. (4-6 weeks) not met        Objective (10/19/23)(23)    Static Posture     Comments  Posture:reduced weightbearing on right     Gait:  + SPC: reduced stance time on right ( + RW: reduced step length adan, reduced stance joey aquilino right) ( no assistive device at home: reduced step length, reduced hip flexion right greater then left, reduced hip extension on right)    (Inspection: + dressing still in place, no redness no drainage on dressing. No signs of infection)    functional activities:    Squat:needs UE assistance with getting out of chair (status quo)      MMT:   Hip flexion: Right:3-/5 + pain   Left: 4-/5 (Right: 3-/5)(4-/5   IR: Right: 4/5  Left: 4/5 (Right: 3-/5)(4-/5)   Er: Right: 4/5    Left: 4/5 (Right: 3-/5) (4-/5)   hip abduction: Right: 4/5    Left: 4/5 (Right: 3-/5)(4-/5)        ROM:   Flexion: Right:  80 deg  + pain Left:95 (Right: 65 AAROM)(Right: 105 AAROM)   Abduction: Right: wnl + pain Left:wnl (Right: 20 deg) (Right: wnl)   IR: Right: 8 + pain Left: wnl ( Right: 15 deg AAROM)(25 AAROM)   ER: Right: 27  + pain Left: wnl (Right:  30 deg AAROM) (40 AAROM)   Adduction: Right:min LOM + pain  Left:wnl ( .Right: neutral)    Knee:   ROM: Right:wnl  left: wnl   MMT: Right: 4+/5 flex,ext    Left: 4+/5 flex , ext      TU:94 sec (1:15 min:sec)(18:97 sec)    Assessment: Charanjit Paulino demonstrates improvement since [facility]. Patient right hip ROM significantly improved, but gait deviations and weakness are still present. Patient will continue to benefit from skilled PT to resolve objective findings and maximize function since patient lives alone with no outside assistance. The goal status of Charanjit Paulino is indicated above .     Plan: 11/21/23-->12/31/23  1-3x week 4-8 weeks HEP development, stretching per objective finding , strengthening t/o LE, A/AA/PROM, joint mobilizations prn,  posture education, STM/MI as needed per objective findings to reduce muscle tension and reduced edema, neuro reeducation, gait and balance training, Ktape prn, STM/MI prn, PLOC discussed and agreed upon with patient all within doctors precautions. .        Eval/ Re-eval Auth #/ Referral # Total visits Start date  Expiration date Total active units  Total manual units  PT only or  PT+OT?   10/9/23 No auth, no visit limit, $15 copay                                                           Past Medical History:   Diagnosis Date    Anxiety     Apnea, sleep     patient states "repeated test says no"    Arthritis     severe in the joints    Chronic pain disorder     joints    Depression     DJD (degenerative joint disease)     Fall 11/22/2018    tripped on a folded rug- left hamstring injury    Foot injury     right foot-fx of bone-no repair-has severe arthritis in the foot and ankle    Fractures yr. 2000 or before    left ankle    GERD (gastroesophageal reflux disease)     Hepatitis C not sure    History of sleep apnea     Hypercholesteremia     Hypertension     Insomnia     Low back pain     Obesity     Lost 70lb with gastric sleeve (2011)    Osteoarthritis     Stomach disorder     take meds. for this    Use of cane as ambulatory aid     Wears glasses     Wears partial dentures     upper and lower-but did lose the lower          Specialty Daily Treatment Diary     Surgery: 10/16/23 right THR  Insurance: Foto performed   Visits: 7 8 10 11 12   Manual: 11/7/23 11/9/23 11/14/23 11/16/23 11/21/23           STM/MI: prn            Dressing placed on wound per Jame's orders. Sterile dressing. Protocol:          Ther ex/NMR:         PROM: right hip        Man Flexibility: hip flexor, piriformis, glutes                Rec bike/nustep NS: 6 min lv 4  NS 6 min NS: 6 min lv 3  NS: 6 min lv 4  NS: 6 min    Heel slides W/ peanut: 20 x 20 30 x 5 sec      Ball squeeze 5 sec x 10 with bridging  20  5s + bridging: 10 x + bridging: 10 x + hi padd iso: 10 x 2  + hip abd iso: 10 x    SLR + TA AAROM: 10 x 2 rounds  2x5 AROM - no assist AROM 5 x 2  AROM: 5 x 3  5 x 3   LAQ 2 lb 10 x 2 20 2 lb: 10 x 3  2 lb 10 x 3  2 lb 10 x 2   Supine hip abduction  Standing: 10 x 3       Quad set  5 sec x 20 w/ half roll  Qset w Gset 20x SAQ: 10 x 2     Glute set + bridge Standard: 10 x  2x10 W/ glute set: 10 x 10 x 2  See above   Gagan heel raises 10 x 2 20 20 x 10 x 5 sec hold     Step ups 6 in : 10 x 2 20  6" 6 in: 10 x 2  6 in: 12 x 2     Mini squats on railing 10 x 2  20 10 x 3  10 x 3     Sit/stand from edge of mat table  --  10 x 2  10 x 2   sidestepping 16 feet x 3 cycles 16 ft x 3 16 feet x 3 cycles 8 feet x 6 cycles     Standing hip flexor stretch   5 sec x 10   5 sec x 10   5 sec x 10     Leg press  55 lb 10 x 3  55 to 65#  3x10 65 lb: 10 x 3 75 lb: 10 x 2 85 lb: 10 x  75 lb 10 x 2  85 lb 10 x 2    retrowalking At bar: 16 feet x 3 cycles At bar 16 ft x 3 16 feet x 3 cycles  16 feet x 3 cycles 16 feet x 3 cycles   SLS 5 sec x 10  10 5 sec x 10   5 sec x 10   5 sec x 10     Step downs   6 in:  Next visit  6 in: 10 x     Tandem walk on foam   6 feet x 3 cycles  6 feet x 3 cycles     Therapeutic Activity:        Reevaluation     performed   Posture Education                Gait training:         Hurdles step over  -       HEP:                Modalities:        GABY        Ice

## 2023-11-21 NOTE — PROGRESS NOTES
[address] [facility] coding opportunities       Chart reviewed, no opportunity found: [address]        Patients Insurance     Medicare Insurance: [employer]

## 2023-11-28 ENCOUNTER — OFFICE VISIT (OUTPATIENT)
Dept: PHYSICAL THERAPY | Facility: CLINIC | Age: 68
End: 2023-11-28
Payer: COMMERCIAL

## 2023-11-28 ENCOUNTER — TELEPHONE (OUTPATIENT)
Age: 68
End: 2023-11-28

## 2023-11-28 DIAGNOSIS — M25.551 RIGHT HIP PAIN: Primary | ICD-10-CM

## 2023-11-28 DIAGNOSIS — Z01.818 PRE-OPERATIVE EXAMINATION: ICD-10-CM

## 2023-11-28 DIAGNOSIS — M16.11 PRIMARY OSTEOARTHRITIS OF RIGHT HIP: ICD-10-CM

## 2023-11-28 PROCEDURE — 97110 THERAPEUTIC EXERCISES: CPT | Performed by: PHYSICAL THERAPIST

## 2023-11-28 PROCEDURE — 97530 THERAPEUTIC ACTIVITIES: CPT | Performed by: PHYSICAL THERAPIST

## 2023-11-28 NOTE — PROGRESS NOTES
Daily Note      Today's date: 2023  Patient name: Ulises Webber  : 1955  MRN: [id number]  Referring provider: Carina Hernandez*  Dx:   Encounter Diagnosis     ICD-10-CM    1. Right hip pain  M25.551       2. Primary osteoarthritis of right hip  M16.11       3. Pre-operative examination  Z01.818           Subjective: Pt reports tenderness in right hamstring over weekend. Objective: See treatment diary below    Assessment: Pt tolerated treatment well. Pt introduced to hamstring stretch in sitting without c/o exacerbation of symptoms. , Pt would benefit from continued PT. , and Pt required verbal cues in order to perform therex with proper form. Wound observed, dressing changed and medihoney applied. No sign of infection, no drainage, no redness,  wound still present. Patient to follow up with ortho on Thursday for a wound check    Plan:  progress as toelrated. Eval/ Re-eval Auth #/ Referral # Total visits Start date  Expiration date Total active units  Total manual units  PT only or  PT+OT?   10/9/23 No auth, no visit limit, $15 copay                                                           Past Medical History:   Diagnosis Date    Anxiety     Apnea, sleep     patient states "repeated test says no"    Arthritis     severe in the joints    Chronic pain disorder     joints    Depression     DJD (degenerative joint disease)     Fall 2018    tripped on a folded rug- left hamstring injury    Foot injury     right foot-fx of bone-no repair-has severe arthritis in the foot and ankle    Fractures yr. 2000 or before    left ankle    GERD (gastroesophageal reflux disease)     Hepatitis C not sure    History of sleep apnea     Hypercholesteremia     Hypertension     Insomnia     Low back pain     Obesity     Lost 70lb with gastric sleeve ()    Osteoarthritis     Stomach disorder     take meds.  for this    Use of cane as ambulatory aid     Wears glasses     Wears partial dentures     upper and lower-but did lose the lower          Specialty Daily Treatment Diary     Surgery: 10/16/23 right THR  Insurance: Foto performed    Visits: 8 10 11 12 13   Manual: 11/9/23 11/14/23 11/16/23 11/21/23 11/28/23           STM/MI: prn           Dressing placed on wound per Jame's orders. Sterile dressing. Protocol:          Ther ex/NMR:         PROM: right hip        Man Flexibility: hip flexor, piriformis, glutes        Seated hamstring stretch     10 sec x 5     Rec bike/nustep NS 6 min NS: 6 min lv 3  NS: 6 min lv 4  NS: 6 min  Rec bike: lv 5 10 min    Heel slides 20 30 x 5 sec       Ball squeeze 20  5s + bridging: 10 x + bridging: 10 x + hip add iso: 10 x 2  + hip abd iso: 10 x  + hip add iso: 10 x 2  + hip abd iso: 10 x red    SLR + TA 2x5 AROM - no assist AROM 5 x 2  AROM: 5 x 3  5 x 3 8 x 2   LAQ 20 2 lb: 10 x 3  2 lb 10 x 3  2 lb 10 x 2 2 lb 5 sec x 10 x 2   Supine hip abduction         Quad set  Qset w Gset 20x SAQ: 10 x 2      Glute set + bridge 2x10 W/ glute set: 10 x 10 x 2  See above    Gagan heel raises 20 20 x 10 x 5 sec hold   5 sec x 10 x 2    Step ups 20  6" 6 in: 10 x 2  6 in: 12 x 2   ---   Mini squats on railing 20 10 x 3  10 x 3   10 x 3    Sit/stand from edge of mat table   10 x 2  10 x 2 10 x 3   sidestepping 16 ft x 3 16 feet x 3 cycles 8 feet x 6 cycles   16 feet x 2 x 3 cycles   Standing hip flexor stretch  5 sec x 10   5 sec x 10   5 sec x 10   5 sec x 10     Leg press  55 to 65#  3x10 65 lb: 10 x 3 75 lb: 10 x 2 85 lb: 10 x  75 lb 10 x 2  85 lb 10 x 2  85 lb 10 x 3   retrowalking At bar 16 ft x 3 16 feet x 3 cycles  16 feet x 3 cycles 16 feet x 3 cycles 16 feet x 3 cycles   SLS 10 5 sec x 10   5 sec x 10   5 sec x 10   --   Step downs  6 in:  Next visit  6 in: 10 x   --    Tandem walk on foam  6 feet x 3 cycles  6 feet x 3 cycles   6 feet x 3 cycles    Marching      16 feet x 2 x 3 cycles   Lat step ups     6 in 10 x 2    Therapeutic Activity: Reevaluation    performed    Posture Education                Gait training:         Hurdles step over         HEP:                Modalities:                Ice

## 2023-11-28 NOTE — TELEPHONE ENCOUNTER
Patient called in because she received a call about rescheduling her appointment on  11/30 and stated its for her medication refill for pain medication. Earliest appointment isn't until February. Patient would like to be seen before that and if unable to schedule patient would need a refill on her pain medication until she is able to be seen.

## 2023-11-28 NOTE — PROGRESS NOTES
Daily Note     Today's date: 2023  Patient name: Zaheer Guzman  : 1955  MRN: [id number]  Referring provider: Jewel Gotti*  Dx:   Encounter Diagnosis     ICD-10-CM    1. Right hip pain  M25.551       2. Primary osteoarthritis of right hip  M16.11       3. Pre-operative examination  Z01.818                      Subjective: ***      Objective: See treatment diary below      Assessment: Tolerated treatment {Tolerated treatment :3672610645}. Patient {assessment:7188084187}      Plan: {PLAN:0226442091}     Eval/ Re-eval Auth #/ Referral # Total visits Start date  Expiration date Total active units  Total manual units  PT only or  PT+OT?   10/9/23 No auth, no visit limit, $15 copay                                                           Past Medical History:   Diagnosis Date    Anxiety     Apnea, sleep     patient states "repeated test says no"    Arthritis     severe in the joints    Chronic pain disorder     joints    Depression     DJD (degenerative joint disease)     Fall 2018    tripped on a folded rug- left hamstring injury    Foot injury     right foot-fx of bone-no repair-has severe arthritis in the foot and ankle    Fractures yr. 2000 or before    left ankle    GERD (gastroesophageal reflux disease)     Hepatitis C not sure    History of sleep apnea     Hypercholesteremia     Hypertension     Insomnia     Low back pain     Obesity     Lost 70lb with gastric sleeve ()    Osteoarthritis     Stomach disorder     take meds. for this    Use of cane as ambulatory aid     Wears glasses     Wears partial dentures     upper and lower-but did lose the lower          Specialty Daily Treatment Diary     Surgery: 10/16/23 right THR  Insurance: Foto performed   Visits: 7 8 10 11 12   Manual: 23           STM/MI: prn            Dressing placed on wound per Jame's orders. Sterile dressing. Protocol:          Ther ex/NMR: PROM: right hip        Man Flexibility: hip flexor, piriformis, glutes                Rec bike/nustep NS: 6 min lv 4  NS 6 min NS: 6 min lv 3  NS: 6 min lv 4  NS: 6 min    Heel slides W/ peanut: 20 x 20 30 x 5 sec      Ball squeeze 5 sec x 10 with bridging  20  5s + bridging: 10 x + bridging: 10 x + hi padd iso: 10 x 2  + hip abd iso: 10 x    SLR + TA AAROM: 10 x 2 rounds  2x5 AROM - no assist AROM 5 x 2  AROM: 5 x 3  5 x 3   LAQ 2 lb 10 x 2 20 2 lb: 10 x 3  2 lb 10 x 3  2 lb 10 x 2   Supine hip abduction  Standing: 10 x 3       Quad set  5 sec x 20 w/ half roll  Qset w Gset 20x SAQ: 10 x 2     Glute set + bridge Standard: 10 x  2x10 W/ glute set: 10 x 10 x 2  See above   Gagan heel raises 10 x 2 20 20 x 10 x 5 sec hold     Step ups 6 in : 10 x 2 20  6" 6 in: 10 x 2  6 in: 12 x 2     Mini squats on railing 10 x 2  20 10 x 3  10 x 3     Sit/stand from edge of mat table  --  10 x 2  10 x 2   sidestepping 16 feet x 3 cycles 16 ft x 3 16 feet x 3 cycles 8 feet x 6 cycles     Standing hip flexor stretch   5 sec x 10   5 sec x 10   5 sec x 10     Leg press  55 lb 10 x 3  55 to 65#  3x10 65 lb: 10 x 3 75 lb: 10 x 2 85 lb: 10 x  75 lb 10 x 2  85 lb 10 x 2    retrowalking At bar: 16 feet x 3 cycles At bar 16 ft x 3 16 feet x 3 cycles  16 feet x 3 cycles 16 feet x 3 cycles   SLS 5 sec x 10  10 5 sec x 10   5 sec x 10   5 sec x 10     Step downs   6 in:  Next visit  6 in: 10 x     Tandem walk on foam   6 feet x 3 cycles  6 feet x 3 cycles     Therapeutic Activity:        Reevaluation     performed   Posture Education                Gait training:         Hurdles step over  -       HEP:                Modalities:                Ice

## 2023-11-29 DIAGNOSIS — M54.50 CHRONIC MIDLINE LOW BACK PAIN WITHOUT SCIATICA: ICD-10-CM

## 2023-11-29 DIAGNOSIS — G89.4 CHRONIC PAIN SYNDROME: ICD-10-CM

## 2023-11-29 DIAGNOSIS — G89.29 CHRONIC MIDLINE LOW BACK PAIN WITHOUT SCIATICA: ICD-10-CM

## 2023-11-29 DIAGNOSIS — Z96.641 STATUS POST TOTAL REPLACEMENT OF RIGHT HIP: ICD-10-CM

## 2023-11-29 DIAGNOSIS — M16.11 PRIMARY OSTEOARTHRITIS OF RIGHT HIP: ICD-10-CM

## 2023-11-29 RX ORDER — OXYCODONE AND ACETAMINOPHEN 7.5; 325 MG/1; MG/1
1 TABLET ORAL EVERY 6 HOURS PRN
Qty: 120 TABLET | Refills: 0 | Status: SHIPPED | OUTPATIENT
Start: 2023-11-29

## 2023-11-29 NOTE — TELEPHONE ENCOUNTER
Dipesh Albright is calling asking if her refill for oxycodone can be called in? Please call patient back at [phone]. Thank you.

## 2023-11-30 ENCOUNTER — OFFICE VISIT (OUTPATIENT)
Dept: OBGYN CLINIC | Facility: CLINIC | Age: 68
End: 2023-11-30

## 2023-11-30 ENCOUNTER — APPOINTMENT (OUTPATIENT)
Dept: PHYSICAL THERAPY | Facility: CLINIC | Age: 68
End: 2023-11-30
Payer: COMMERCIAL

## 2023-11-30 ENCOUNTER — OFFICE VISIT (OUTPATIENT)
Dept: PHYSICAL THERAPY | Facility: CLINIC | Age: 68
End: 2023-11-30
Payer: COMMERCIAL

## 2023-11-30 VITALS
DIASTOLIC BLOOD PRESSURE: 79 MMHG | WEIGHT: 219 LBS | HEART RATE: 71 BPM | SYSTOLIC BLOOD PRESSURE: 151 MMHG | HEIGHT: 62 IN | BODY MASS INDEX: 40.3 KG/M2

## 2023-11-30 DIAGNOSIS — M25.551 RIGHT HIP PAIN: Primary | ICD-10-CM

## 2023-11-30 DIAGNOSIS — Z01.818 PRE-OPERATIVE EXAMINATION: ICD-10-CM

## 2023-11-30 DIAGNOSIS — Z96.641 AFTERCARE FOLLOWING RIGHT HIP JOINT REPLACEMENT SURGERY: ICD-10-CM

## 2023-11-30 DIAGNOSIS — Z96.641 STATUS POST TOTAL REPLACEMENT OF RIGHT HIP: Primary | ICD-10-CM

## 2023-11-30 DIAGNOSIS — M16.11 PRIMARY OSTEOARTHRITIS OF RIGHT HIP: ICD-10-CM

## 2023-11-30 DIAGNOSIS — T81.89XD DELAYED SURGICAL WOUND HEALING, SUBSEQUENT ENCOUNTER: ICD-10-CM

## 2023-11-30 DIAGNOSIS — Z47.1 AFTERCARE FOLLOWING RIGHT HIP JOINT REPLACEMENT SURGERY: ICD-10-CM

## 2023-11-30 PROCEDURE — 99024 POSTOP FOLLOW-UP VISIT: CPT | Performed by: ORTHOPAEDIC SURGERY

## 2023-11-30 PROCEDURE — 97110 THERAPEUTIC EXERCISES: CPT | Performed by: PHYSICAL THERAPIST

## 2023-11-30 NOTE — PROGRESS NOTES
Daily Note         Today's date: 2023  Patient name: Kera Bull  : 1955  MRN: [id number]  Referring provider: Kavin Willams*  Dx:   Encounter Diagnosis     ICD-10-CM    1. Right hip pain  M25.551       2. Primary osteoarthritis of right hip  M16.11       3. Pre-operative examination  Z01.818           Subjective: Kera Bull reports she is feeling well. Reports he saw dr Lorri Sheth this am about her wound, states she said to follow back up with him in 6 weeks. Objective: See treatment diary below    Assessment:  Pt would benefit from continued PT. , Pt demonstrated good form with therex today. , and Pt required verbal cues in order to ambulate with improved step length and appropriate heel. toe ambulation. Patient is able to self correct with cuing. . The goal of the current treatment is to address patient's functional limitations as well as objective findings. Plan:  progressing towards d/c        Eval/ Re-eval Auth #/ Referral # Total visits Start date  Expiration date Total active units  Total manual units  PT only or  PT+OT?   10/9/23 No auth, no visit limit, $15 copay                                                           Past Medical History:   Diagnosis Date    Anxiety     Apnea, sleep     patient states "repeated test says no"    Arthritis     severe in the joints    Chronic pain disorder     joints    Depression     DJD (degenerative joint disease)     Fall 2018    tripped on a folded rug- left hamstring injury    Foot injury     right foot-fx of bone-no repair-has severe arthritis in the foot and ankle    Fractures yr. 2000 or before    left ankle    GERD (gastroesophageal reflux disease)     Hepatitis C not sure    History of sleep apnea     Hypercholesteremia     Hypertension     Insomnia     Low back pain     Obesity     Lost 70lb with gastric sleeve ()    Osteoarthritis     Stomach disorder     take meds.  for this    Use of cane as ambulatory aid     Wears glasses     Wears partial dentures     upper and lower-but did lose the lower          Specialty Daily Treatment Diary     Surgery: 10/16/23 right THR  Insurance: Foto performed     Visits: 8 10 11 12 13 14   Manual: 11/9/23 11/14/23 11/16/23 11/21/23 11/28/23 11/30/23            STM/MI: prn            Dressing placed on wound per Jame's orders. Sterile dressing. Protocol:           Ther ex/NMR:          PROM: right hip         Man Flexibility: hip flexor, piriformis, glutes         Seated hamstring stretch     10 sec x 5   10 sec x 5     Rec bike/nustep NS 6 min NS: 6 min lv 3  NS: 6 min lv 4  NS: 6 min  Rec bike: lv 5 10 min  NS:  5 min    Heel slides 20 30 x 5 sec        Ball squeeze 20  5s + bridging: 10 x + bridging: 10 x + hip add iso: 10 x 2  + hip abd iso: 10 x  + hip add iso: 10 x 2  + hip abd iso: 10 x red  + hip add iso: 10 x 2  + hip abd iso: 10 x red    SLR + TA 2x5 AROM - no assist AROM 5 x 2  AROM: 5 x 3  5 x 3 8 x 2 8 x 2    LAQ 20 2 lb: 10 x 3  2 lb 10 x 3  2 lb 10 x 2 2 lb 5 sec x 10 x 2    Supine hip abduction          Quad set  Qset w Gset 20x SAQ: 10 x 2       Glute set + bridge 2x10 W/ glute set: 10 x 10 x 2  See above     Gagan heel raises 20 20 x 10 x 5 sec hold   5 sec x 10 x 2  5 sec x 10 x 2    Step ups 20  6" 6 in: 10 x 2  6 in: 12 x 2   ---    Mini squats on railing 20 10 x 3  10 x 3   10 x 3  10 x 3    Sit/stand from edge of mat table   10 x 2  10 x 2 10 x 3    sidestepping 16 ft x 3 16 feet x 3 cycles 8 feet x 6 cycles   16 feet x 2 x 3 cycles Yellow: 10 feet x 4 cycles    Standing hip flexor stretch  5 sec x 10   5 sec x 10   5 sec x 10   5 sec x 10   5 sec x 10     Leg press  55 to 65#  3x10 65 lb: 10 x 3 75 lb: 10 x 2 85 lb: 10 x  75 lb 10 x 2  85 lb 10 x 2  85 lb 10 x 3 85 lb    retrowalking At bar 16 ft x 3 16 feet x 3 cycles  16 feet x 3 cycles 16 feet x 3 cycles 16 feet x 3 cycles Yellow band: 16 feet x 3 cycles    SLS 10 5 sec x 10   5 sec x 10   5 sec x 10   -- ++   Step downs  6 in:  Next visit  6 in: 10 x   --  6 in:    Tandem walk on foam  6 feet x 3 cycles  6 feet x 3 cycles   6 feet x 3 cycles     Marching      16 feet x 2 x 3 cycles Yellow band: 16 feet x 2 x 3 cycles    Lat step ups     6 in 10 x 2  6 in: 10 x 2    Therapeutic Activity:         Reevaluation    performed     Posture Education                  Gait training:          Hurdles step over          HEP:                  Modalities:         MH         Ice

## 2023-11-30 NOTE — PROGRESS NOTES
Assessment/Plan:  1. Status post total replacement of right hip        2. Aftercare following right hip joint replacement surgery        3. Delayed surgical wound healing, subsequent encounter          Scribe Attestation      I,:  Poornima Bates am acting as a scribe while in the presence of the attending physician.:       I,:  Destiney Osorio, DO personally performed the services described in this documentation    as scribed in my presence.:           Kathy Candelaria is a pleasant 35-year-old female who returns today for follow-up evaluation 6 weeks status post right total hip arthroplasty. I am pleased with her clinical presentation today in the office. There is 1 persistent area of delayed healing about the central aspect of her incision which is approximately 3 mm and improved since her last visit. I did encourage her to continue with Medihoney and Band-Aid until her wound is fully healed. She will continue with physical therapy with discharge to home exercise program as appropriate. She no longer requires DVT prophylaxis. All of her questions and concerns were addressed today. We will see her back in 6 weeks for repeat evaluation with new x-ray on arrival.    Subjective: Follow-up evaluation 6 weeks status post right total hip arthroplasty    Patient ID: Hollie Linder is a 76 y.o. female who returns today for follow-up evaluation 6 weeks status post right total hip arthroplasty. She reports that she has been doing well. She has been compliant with using Medihoney and a Band-Aid until this past Tuesday when she discontinued this. She does report that her operative wound is improved. She denies any pain about her hip or groin or limitation with her activity and is very pleased with her progress. She denies any new injury or trauma. Review of Systems   Constitutional:  Positive for activity change. Negative for chills, fever and unexpected weight change.    HENT:  Negative for hearing loss, nosebleeds and sore throat. Eyes:  Negative for pain, redness and visual disturbance. Respiratory:  Negative for cough, shortness of breath and wheezing. Cardiovascular:  Negative for chest pain, palpitations and leg swelling. Gastrointestinal:  Negative for abdominal pain, nausea and vomiting. Endocrine: Negative for polydipsia and polyuria. Genitourinary:  Negative for dysuria and hematuria. Musculoskeletal:  Positive for myalgias. Negative for arthralgias and joint swelling. See HPI   Skin:  Negative for rash and wound. Neurological:  Negative for dizziness, numbness and headaches. Psychiatric/Behavioral:  Negative for decreased concentration and suicidal ideas. The patient is not nervous/anxious. Past Medical History:   Diagnosis Date    Anxiety     Apnea, sleep     patient states "repeated test says no"    Arthritis     severe in the joints    Chronic pain disorder     joints    Depression     DJD (degenerative joint disease)     Fall 11/22/2018    tripped on a folded rug- left hamstring injury    Foot injury     right foot-fx of bone-no repair-has severe arthritis in the foot and ankle    Fractures yr. 2000 or before    left ankle    GERD (gastroesophageal reflux disease)     Hepatitis C not sure    History of sleep apnea     Hypercholesteremia     Hypertension     Insomnia     Low back pain     Obesity     Lost 70lb with gastric sleeve (2011)    Osteoarthritis     Stomach disorder     take meds. for this    Use of cane as ambulatory aid     Wears glasses     Wears partial dentures     upper and lower-but did lose the lower        Past Surgical History:   Procedure Laterality Date    ANKLE ARTHROSCOPY Right     ANKLE SURGERY  not sure    orthoscopic/rt.  ankle    APPENDECTOMY      BARIATRIC SURGERY  maybe 2011    CHOLECYSTECTOMY      COLONOSCOPY  10/26/2021    DXA PROCEDURE (HISTORICAL)  10/13/2020    GASTRECTOMY SLEEVE LAPAROSCOPIC  2011    lost 70 lb    HAND SURGERY      rt. hand, trigger finger    JOINT REPLACEMENT Bilateral     knee replacements    KNEE SURGERY  2001 maybe    MAMMO (HISTORICAL)  01/26/2022    NERVE BLOCK Bilateral 12/29/2017    Procedure: L4, L5, S1 MEDIAL BRANCH BLOCK #1;  Surgeon: Donato Snellen, MD;  Location: [address] [place] [address] MAIN OR;  Service: Pain Management     NERVE BLOCK Bilateral 01/12/2018    Procedure: BLOCK MEDIAL BRANCH L4, L5-S1  #2;  Surgeon: Donato Snellen, MD;  Location: [address] [place] [address] MAIN OR;  Service: Pain Management     PA ARTHROPLASTY GLENOHUMERAL JOINT TOTAL SHOULDER Right 05/23/2019    Procedure: ARTHROPLASTY SHOULDER TOTAL , excision of deep cyst at shoulder, biceps tenotomy;  Surgeon: Ange Wolff MD;  Location: [facility];  Service: Orthopedics    PA ARTHRP ACETBLR/PROX FEM PROSTC AGRFT/ALGRFT Right 10/16/2023    Procedure: ARTHROPLASTY HIP TOTAL ANTERIOR,NAVIGATED - overnight;  Surgeon: Mary Beth Cortés DO;  Location: [facility];  Service: Orthopedics    PA INJECT SI JOINT ARTHRGRPHY&/ANES/STEROID W/LESA Right 10/23/2020    Procedure: Sacroiliac Joint injection (35429); Surgeon: Donato Snellen, MD;  Location: [facility] OR;  Service: Pain Management     PA TENDON SHEATH INCISION Right 12/21/2021    Procedure: RELEASE TRIGGER RING FINGER;  Surgeon: Og Cameron MD;  Location: [facility];  Service: Orthopedics    RADIOFREQUENCY ABLATION Right 03/16/2018    Procedure: Rt L4 L5 S1 RFA;  Surgeon: Donato Snellen, MD;  Location: [address] [place] [address] MAIN OR;  Service: Pain Management     RADIOFREQUENCY ABLATION Left 12/14/2018    Procedure: L3-S1 Radio Frequency Ablation ([id number]);   Surgeon: Donato Snellen, MD;  Location: [facility] OR;  Service: Pain Management     RHIZOTOMY Left 01/25/2018    Procedure: L4, L5, S1 RFA;  Surgeon: Donato Snellen, MD;  Location: [facility] OR;  Service: Pain Management     SHOULDER SURGERY  April/2019    rt. total  replacement    TONSILLECTOMY         Family History   Problem Relation Age of Onset    Other Mother         in a nursing home-fx of the leg    Heart disease Father         CHF    Alcohol abuse Father     Cirrhosis Father     Colon cancer Maternal Grandmother     No Known Problems Maternal Grandfather     No Known Problems Paternal Grandmother     No Known Problems Paternal Grandfather     Other Brother         lung abscess, pneumonia    Colon cancer Maternal Aunt     No Known Problems Maternal Aunt     No Known Problems Maternal Aunt     No Known Problems Maternal Uncle     No Known Problems Paternal Aunt     No Known Problems Paternal Uncle     Breast cancer Cousin 48       Social History     Occupational History    Not on file   Tobacco Use    Smoking status: Former     Types: Cigarettes     Quit date: 2004     Years since quittin.9     Passive exposure: Past    Smokeless tobacco: Never   Vaping Use    Vaping Use: Never used   Substance and Sexual Activity    Alcohol use: Not Currently     Comment: quit many yrs. ago    Drug use: Not Currently     Types: Amphetamines, Marijuana     Comment: aprox. 35 years ago, maybe longer    Sexual activity: Not Currently     Partners: Male     Birth control/protection: Abstinence, Post-menopausal     Comment: no sex for many yrs. (aprox.  20 yrs.)         Current Outpatient Medications:     acetaminophen (TYLENOL) 325 mg tablet, Take 3 tablets (975 mg total) by mouth every 8 (eight) hours, Disp: , Rfl: 0    amLODIPine (NORVASC) 10 mg tablet, TAKE 1 TABLET BY MOUTH DAILY (Patient taking differently: Take 10 mg by mouth every morning), Disp: 90 tablet, Rfl: 3    clindamycin (CLEOCIN) 300 MG capsule, Take 600 mg by mouth 1 (one) time One hour before a procedure/dental work, Disp: , Rfl:     Cyanocobalamin (VITAMIN B 12 PO), Take 500 mcg by mouth every morning, Disp: , Rfl:     diphenhydrAMINE (BENADRYL) 25 mg tablet, Take 25 mg by mouth every 8 (eight) hours as needed for itching , Disp: , Rfl:     escitalopram (LEXAPRO) 10 mg tablet, TAKE 1 AND 1/2 TABLETS BY  MOUTH DAILY AT BEDTIME, Disp: 135 tablet, Rfl: 1    Melatonin 10 MG TABS, Take by mouth daily at bedtime, Disp: , Rfl:     naloxone (NARCAN) 4 mg/0.1 mL nasal spray, Administer 1 spray into a nostril. If no response after 2-3 minutes, give another dose in the other nostril using a new spray., Disp: 1 each, Rfl: 0    oxyCODONE-acetaminophen (Percocet) 7.5-325 MG per tablet, Take 1 tablet by mouth every 6 (six) hours as needed for moderate pain Max Daily Amount: 4 tablets, Disp: 120 tablet, Rfl: 0    perindopril (ACEON) 8 MG tablet, TAKE 1 TABLET BY MOUTH  TWICE DAILY, Disp: 180 tablet, Rfl: 3    aspirin 81 mg chewable tablet, Chew 1 tablet (81 mg total) 2 (two) times a day, Disp: 82 tablet, Rfl: 0    docusate sodium (COLACE) 100 mg capsule, Take 1 capsule (100 mg total) by mouth 2 (two) times a day, Disp: 60 capsule, Rfl: 0    pantoprazole (PROTONIX) 40 mg tablet, Take 1 tablet (40 mg total) by mouth daily, Disp: 41 tablet, Rfl: 0    triamcinolone (KENALOG) 0.5 % cream, Apply topically 3 (three) times a day as needed for rash (Patient not taking: Reported on 11/3/2023), Disp: 15 g, Rfl: 2    Allergies   Allergen Reactions    Penicillins Anaphylaxis     Total body redness and fever    Levaquin [Levofloxacin] Itching       Objective:  Vitals:    11/30/23 0940   BP: 151/79   Pulse: 71       Body mass index is 40.06 kg/m². Right Hip Exam     Tenderness   The patient is experiencing no tenderness. Range of Motion   Abduction:  45   Adduction:  20   Flexion:  120   External rotation:  50   Internal rotation:  30     Tests   ABDOULAYE: negative    Other   Erythema: absent  Scars: present  Sensation: normal  Pulse: present    Comments:  Small 3mm area of delayed healing in the central portion of the incision at the inguinal crease. There is a good fibrous wound bed without drainage. Redressed with a bandaid today. Stinchfield: negative  FADIR: negative            Physical Exam  Vitals and nursing note reviewed.    Constitutional:       Appearance: Normal appearance. She is well-developed. HENT:      Head: Normocephalic and atraumatic. Right Ear: External ear normal.      Left Ear: External ear normal.   Eyes:      General: No scleral icterus. Extraocular Movements: Extraocular movements intact. Conjunctiva/sclera: Conjunctivae normal.   Cardiovascular:      Rate and Rhythm: Normal rate. Pulmonary:      Effort: Pulmonary effort is normal. No respiratory distress. Musculoskeletal:      Cervical back: Normal range of motion and neck supple. Comments: See Ortho exam   Skin:     General: Skin is warm and dry. Neurological:      General: No focal deficit present. Mental Status: She is alert and oriented to person, place, and time. Psychiatric:         Behavior: Behavior normal.           This document was created using speech voice recognition software. Grammatical errors, random word insertions, pronoun errors, and incomplete sentences are an occasional consequence of this system due to software limitations, ambient noise, and hardware issues. Any formal questions or concerns about content, text, or information contained within the body of this dictation should be directly addressed to the provider for clarification.

## 2023-12-05 ENCOUNTER — OFFICE VISIT (OUTPATIENT)
Dept: PHYSICAL THERAPY | Facility: CLINIC | Age: 68
End: 2023-12-05
Payer: COMMERCIAL

## 2023-12-05 DIAGNOSIS — Z01.818 PRE-OPERATIVE EXAMINATION: ICD-10-CM

## 2023-12-05 DIAGNOSIS — M16.11 PRIMARY OSTEOARTHRITIS OF RIGHT HIP: ICD-10-CM

## 2023-12-05 DIAGNOSIS — M25.551 RIGHT HIP PAIN: Primary | ICD-10-CM

## 2023-12-05 PROCEDURE — 97112 NEUROMUSCULAR REEDUCATION: CPT | Performed by: PHYSICAL THERAPIST

## 2023-12-05 PROCEDURE — 97110 THERAPEUTIC EXERCISES: CPT | Performed by: PHYSICAL THERAPIST

## 2023-12-05 NOTE — PROGRESS NOTES
Daily Note         Today's date: 2023  Patient name: Rosendo Hernández  : 1955  MRN: [id number]  Referring provider: Rickey Arteaga*  Dx:   Encounter Diagnosis     ICD-10-CM    1. Right hip pain  M25.551       2. Primary osteoarthritis of right hip  M16.11       3. Pre-operative examination  Z01.818           Subjective: Rosendo Hernández reports no new complaints, ready to transitoin to [facility]       Objective: See treatment diary below    Assessment:   patient needed cuing to perform longer stride lengths. Patient was able to perform near wall with occasional LOB noted , but was able to self correct. Good ROM noted all hip motions. . The goal of the current treatment is to address patient's functional limitations as well as objective findings. Plan:  beginning transition to [address] [place] [address] #/ Referral # Total visits Start date  Expiration date Total active units  Total manual units  PT only or  PT+OT?   10/9/23 No auth, no visit limit, $15 copay                                                           Past Medical History:   Diagnosis Date    Anxiety     Apnea, sleep     patient states "repeated test says no"    Arthritis     severe in the joints    Chronic pain disorder     joints    Depression     DJD (degenerative joint disease)     Fall 2018    tripped on a folded rug- left hamstring injury    Foot injury     right foot-fx of bone-no repair-has severe arthritis in the foot and ankle    Fractures yr. 2000 or before    left ankle    GERD (gastroesophageal reflux disease)     Hepatitis C not sure    History of sleep apnea     Hypercholesteremia     Hypertension     Insomnia     Low back pain     Obesity     Lost 70lb with gastric sleeve ()    Osteoarthritis     Stomach disorder     take meds.  for this    Use of cane as ambulatory aid     Wears glasses     Wears partial dentures     upper and lower-but did lose the lower          Specialty Daily Treatment Diary     Surgery: 10/16/23 right THR  Insurance: Foto performed      Visits: 10 11 12 13 14 15   Manual: 11/14/23 11/16/23 11/21/23 11/28/23 11/30/23 12/5/23            STM/MI: prn           Dressing placed on wound per Jame's orders. Sterile dressing. Protocol:           Ther ex/NMR:          PROM: right hip      performed   Man Flexibility: hip flexor, piriformis, glutes      Hip flexor stretch 10 sec x 5     Seated hamstring stretch    10 sec x 5   10 sec x 5   Supine: 10 sec x 5  gagan w/ SOS   Rec bike/nustep NS: 6 min lv 3  NS: 6 min lv 4  NS: 6 min  Rec bike: lv 5 10 min  NS:  5 min  10 min NS   Heel slides 30 x 5 sec         Ball squeeze + bridging: 10 x + bridging: 10 x + hip add iso: 10 x 2  + hip abd iso: 10 x  + hip add iso: 10 x 2  + hip abd iso: 10 x red  + hip add iso: 10 x 2  + hip abd iso: 10 x red  Standard: 10 x 2    SLR + TA AROM 5 x 2  AROM: 5 x 3  5 x 3 8 x 2 8 x 2  8 x 2   LAQ 2 lb: 10 x 3  2 lb 10 x 3  2 lb 10 x 2 2 lb 5 sec x 10 x 2     Supine hip abduction          Quad set  SAQ: 10 x 2        Glute set + bridge W/ glute set: 10 x 10 x 2  See above      Gagan heel raises 20 x 10 x 5 sec hold   5 sec x 10 x 2  5 sec x 10 x 2     Step ups 6 in: 10 x 2  6 in: 12 x 2   ---     Mini squats on railing 10 x 3  10 x 3   10 x 3  10 x 3  TRX: 10 x 2   Sit/stand from edge of mat table  10 x 2  10 x 2 10 x 3  10 x 3   sidestepping 16 feet x 3 cycles 8 feet x 6 cycles   16 feet x 2 x 3 cycles Yellow: 10 feet x 4 cycles  16 feet x 3 cycles    Standing hip flexor stretch 5 sec x 10   5 sec x 10   5 sec x 10   5 sec x 10   5 sec x 10   5 sec x 10     Leg press  65 lb: 10 x 3 75 lb: 10 x 2 85 lb: 10 x  75 lb 10 x 2  85 lb 10 x 2  85 lb 10 x 3 85 lb  85 lb 10 x 3   retrowalking 16 feet x 3 cycles  16 feet x 3 cycles 16 feet x 3 cycles 16 feet x 3 cycles Yellow band: 16 feet x 3 cycles  16 feet x 3 cycles    SLS 5 sec x 10   5 sec x 10   5 sec x 10   -- ++    Step downs 6 in:  Next visit  6 in: 10 x   --  6 in:  6 in 10 x 2   Tandem walk on foam 6 feet x 3 cycles  6 feet x 3 cycles   6 feet x 3 cycles   6 feet x 3 cycles    Marching     16 feet x 2 x 3 cycles Yellow band: 16 feet x 2 x 3 cycles  16 feet x 2 x 3 cycles    Lat step ups    6 in 10 x 2  6 in: 10 x 2  6 in 10 x 2   Therapeutic Activity:         Reevaluation   performed      Posture Education                  Gait training:          Hurdles step over       Exaggerated gait: instruction.   50 feet x 6    HEP:                  Modalities:         MH         Ice

## 2023-12-07 ENCOUNTER — OFFICE VISIT (OUTPATIENT)
Dept: PHYSICAL THERAPY | Facility: CLINIC | Age: 68
End: 2023-12-07
Payer: COMMERCIAL

## 2023-12-07 DIAGNOSIS — Z01.818 PRE-OPERATIVE EXAMINATION: ICD-10-CM

## 2023-12-07 DIAGNOSIS — M16.11 PRIMARY OSTEOARTHRITIS OF RIGHT HIP: ICD-10-CM

## 2023-12-07 DIAGNOSIS — F41.9 ANXIETY: ICD-10-CM

## 2023-12-07 DIAGNOSIS — M25.551 RIGHT HIP PAIN: Primary | ICD-10-CM

## 2023-12-07 PROCEDURE — 97110 THERAPEUTIC EXERCISES: CPT

## 2023-12-07 PROCEDURE — 97140 MANUAL THERAPY 1/> REGIONS: CPT

## 2023-12-07 PROCEDURE — 97112 NEUROMUSCULAR REEDUCATION: CPT

## 2023-12-07 RX ORDER — ESCITALOPRAM OXALATE 10 MG/1
TABLET ORAL
Qty: 135 TABLET | Refills: 1 | Status: SHIPPED | OUTPATIENT
Start: 2023-12-07

## 2023-12-07 NOTE — PROGRESS NOTES
Daily Note     Today's date: 2023  Patient name: David Leija  : 1955  MRN: [id number]  Referring provider: Latoya Byrd*  Dx:   Encounter Diagnosis     ICD-10-CM    1. Right hip pain  M25.551       2. Primary osteoarthritis of right hip  M16.11       3. Pre-operative examination  Z01.818                      Subjective: Pt offers no new complaints at this time. Objective: See treatment diary below      Assessment: Pt is progressing well, will continue to benefit from skilled PT to maximize functional mobility. Plan: Continue per plan of care. Eval/ Re-eval Auth #/ Referral # Total visits Start date  Expiration date Total active units  Total manual units  PT only or  PT+OT?   10/9/23 No auth, no visit limit, $15 copay                                                           Past Medical History:   Diagnosis Date    Anxiety     Apnea, sleep     patient states "repeated test says no"    Arthritis     severe in the joints    Chronic pain disorder     joints    Depression     DJD (degenerative joint disease)     Fall 2018    tripped on a folded rug- left hamstring injury    Foot injury     right foot-fx of bone-no repair-has severe arthritis in the foot and ankle    Fractures yr. 2000 or before    left ankle    GERD (gastroesophageal reflux disease)     Hepatitis C not sure    History of sleep apnea     Hypercholesteremia     Hypertension     Insomnia     Low back pain     Obesity     Lost 70lb with gastric sleeve ()    Osteoarthritis     Stomach disorder     take meds. for this    Use of cane as ambulatory aid     Wears glasses     Wears partial dentures     upper and lower-but did lose the lower          Specialty Daily Treatment Diary     Surgery: 10/16/23 right THR  Insurance:        Visits: 13 14 15 16   Manual: 23          STM/MI: prn                            Protocol:         Ther ex/NMR:        PROM: right hip   performed performed   Man Flexibility: hip flexor, piriformis, glutes   Hip flexor stretch 10 sec x 5   Hip flexor stretch 10 sec x 5   Seated hamstring stretch 10 sec x 5   10 sec x 5   Supine: 10 sec x 5  gagan w/ SOS    Rec bike/nustep Rec bike: lv 5 10 min  NS:  5 min  10 min NS 10 min NS   Heel slides       Ball squeeze + hip add iso: 10 x 2  + hip abd iso: 10 x red  + hip add iso: 10 x 2  + hip abd iso: 10 x red  Standard: 10 x 2  Standard 10x2   SLR + TA 8 x 2 8 x 2  8 x 2 10x2   LAQ 2 lb 5 sec x 10 x 2      Supine hip abduction        Quad set        Glute set + bridge       Gagan heel raises 5 sec x 10 x 2  5 sec x 10 x 2   5" 10x2   Step ups ---      Mini squats on railing 10 x 3  10 x 3  TRX: 10 x 2 TRX 2x10   Sit/stand from edge of mat table  10 x 3  10 x 3 10x3   sidestepping 16 feet x 2 x 3 cycles Yellow: 10 feet x 4 cycles  16 feet x 3 cycles  16 feet x 3 cycles    Standing hip flexor stretch 5 sec x 10   5 sec x 10   5 sec x 10   5 sec x10   Leg press  85 lb 10 x 3 85 lb  85 lb 10 x 3 85# 10x3   retrowalking 16 feet x 3 cycles Yellow band: 16 feet x 3 cycles  16 feet x 3 cycles  16 feet x 3 cycles    SLS -- ++     Step downs --  6 in:  6 in 10 x 2 6" 10x2   Tandem walk on foam 6 feet x 3 cycles   6 feet x 3 cycles  16 feet x 3 cycles    Marching  16 feet x 2 x 3 cycles Yellow band: 16 feet x 2 x 3 cycles  16 feet x 2 x 3 cycles  16 feet x 3 cycles    Lat step ups 6 in 10 x 2  6 in: 10 x 2  6 in 10 x 2 6" 10x2   Therapeutic Activity:       Reevaluation       Posture Education              Gait training:        Hurdles step over    Exaggerated gait: instruction.   50 feet x 6     HEP:              Modalities:              Ice

## 2023-12-12 ENCOUNTER — OFFICE VISIT (OUTPATIENT)
Dept: PHYSICAL THERAPY | Facility: CLINIC | Age: 68
End: 2023-12-12
Payer: COMMERCIAL

## 2023-12-12 DIAGNOSIS — M16.11 PRIMARY OSTEOARTHRITIS OF RIGHT HIP: ICD-10-CM

## 2023-12-12 DIAGNOSIS — Z01.818 PRE-OPERATIVE EXAMINATION: ICD-10-CM

## 2023-12-12 DIAGNOSIS — M25.551 RIGHT HIP PAIN: Primary | ICD-10-CM

## 2023-12-12 PROCEDURE — 97110 THERAPEUTIC EXERCISES: CPT | Performed by: PHYSICAL THERAPIST

## 2023-12-12 PROCEDURE — 97112 NEUROMUSCULAR REEDUCATION: CPT | Performed by: PHYSICAL THERAPIST

## 2023-12-12 NOTE — PROGRESS NOTES
Daily Note         Today's date: 2023  Patient name: Sanjay Byrd  : 1955  MRN: [id number]  Referring provider: Johnnie Corona*  Dx:   Encounter Diagnosis     ICD-10-CM    1. Right hip pain  M25.551       2. Primary osteoarthritis of right hip  M16.11       3. Pre-operative examination  Z01.818           Subjective: Sanjay Byrd reports she has no limitations at home functionally. No issues with stairs. States she is walking better. Objective: See treatment diary below    Assessment:   progressed program this session as per treatment flow sheet. Excellent tolerance to all new activities. Trialed lateral and forward step ups to 8 in but was unable to perform, too challenging. Therefore returned to 6 inch step. Patient needed adan UE support with step ups on BOSU to maintain stability. Inspection noted of wound which is found to be healing well, no drainage, no sign of infection. But observed was skin tears near incision wihc was actively bleeding from patient "ripping" bandage  off that was covering wound. Bandage changed and advised patient to avoid "ripping" off due to sensitivity of the skin in that area. Patient vocalized understanding. Plan:  transition to to d/c next 1-2 sessions. Eval/ Re-eval Auth #/ Referral # Total visits Start date  Expiration date Total active units  Total manual units  PT only or  PT+OT?   10/9/23 No auth, no visit limit, $15 copay                                                           Past Medical History:   Diagnosis Date    Anxiety     Apnea, sleep     patient states "repeated test says no"    Arthritis     severe in the joints    Chronic pain disorder     joints    Depression     DJD (degenerative joint disease)     Fall 2018    tripped on a folded rug- left hamstring injury    Foot injury     right foot-fx of bone-no repair-has severe arthritis in the foot and ankle    Fractures yr.   or before    left ankle    GERD (gastroesophageal reflux disease)     Hepatitis C not sure    History of sleep apnea     Hypercholesteremia     Hypertension     Insomnia     Low back pain     Obesity     Lost 70lb with gastric sleeve (2011)    Osteoarthritis     Stomach disorder     take meds. for this    Use of cane as ambulatory aid     Wears glasses     Wears partial dentures     upper and lower-but did lose the lower          Specialty Daily Treatment Diary     Surgery: 10/16/23 right THR  Insurance:         Visits: 13 14 15 16 17   Manual: 11/28/23 11/30/23 12/5/23 12/7/23 12/12/23           STM/MI: prn                                Protocol:          Ther ex/NMR:         PROM: right hip   performed performed    Man Flexibility: hip flexor, piriformis, glutes   Hip flexor stretch 10 sec x 5   Hip flexor stretch 10 sec x 5    Seated hamstring stretch 10 sec x 5   10 sec x 5   Supine: 10 sec x 5  gagan w/ SOS     Rec bike/nustep Rec bike: lv 5 10 min  NS:  5 min  10 min NS 10 min NS NS: 7 min lv 5, lv 4 3 min    Ball squeeze + hip add iso: 10 x 2  + hip abd iso: 10 x red  + hip add iso: 10 x 2  + hip abd iso: 10 x red  Standard: 10 x 2  Standard 10x2    SLR + TA 8 x 2 8 x 2  8 x 2 10x2 10 x 3   LAQ 2 lb 5 sec x 10 x 2       Supine hip abduction         Quad set         Glute set + bridge        Gagan heel raises 5 sec x 10 x 2  5 sec x 10 x 2   5" 10x2 On half roll: ---   Step ups ---    On bosu: 10 x 2    Mini squats on railing 10 x 3  10 x 3  TRX: 10 x 2 TRX 2x10 TRX: 10 x 2    Sit/stand from edge of mat table  10 x 3  10 x 3 10x3    sidestepping 16 feet x 2 x 3 cycles Yellow: 10 feet x 4 cycles  16 feet x 3 cycles  16 feet x 3 cycles  Red 16 feet x 2 cycles   Standing hip flexor stretch 5 sec x 10   5 sec x 10   5 sec x 10   5 sec x10 5 sec x 10     Leg press  85 lb 10 x 3 85 lb  85 lb 10 x 3 85# 10x3 85 lb 10 x   95 lb 10 x 2    retrowalking 16 feet x 3 cycles Yellow band: 16 feet x 3 cycles  16 feet x 3 cycles  16 feet x 3 cycles Red band: 16 feet x 3 cycles    SLS -- ++      Step downs --  6 in:  6 in 10 x 2 6" 10x2  ---    Tandem walk on foam 6 feet x 3 cycles   6 feet x 3 cycles  16 feet x 3 cycles  6 feet x 4 cycles on foam    Marching  16 feet x 2 x 3 cycles Yellow band: 16 feet x 2 x 3 cycles  16 feet x 2 x 3 cycles  16 feet x 3 cycles  Red band: 16 feet x 3 cycles    Lat step ups 6 in 10 x 2  6 in: 10 x 2  6 in 10 x 2 6" 10x2 6 in 10 x 2   Therapeutic Activity:        Reevaluation        Posture Education                Gait training:         Hurdles step over    Exaggerated gait: instruction.   50 feet x 6      HEP:                Modalities:                Ice

## 2023-12-14 ENCOUNTER — OFFICE VISIT (OUTPATIENT)
Dept: PHYSICAL THERAPY | Facility: CLINIC | Age: 68
End: 2023-12-14
Payer: COMMERCIAL

## 2023-12-14 DIAGNOSIS — M16.11 PRIMARY OSTEOARTHRITIS OF RIGHT HIP: ICD-10-CM

## 2023-12-14 DIAGNOSIS — M25.551 RIGHT HIP PAIN: Primary | ICD-10-CM

## 2023-12-14 DIAGNOSIS — Z01.818 PRE-OPERATIVE EXAMINATION: ICD-10-CM

## 2023-12-14 PROCEDURE — 97530 THERAPEUTIC ACTIVITIES: CPT | Performed by: PHYSICAL THERAPIST

## 2023-12-14 PROCEDURE — 97110 THERAPEUTIC EXERCISES: CPT | Performed by: PHYSICAL THERAPIST

## 2023-12-14 NOTE — PROGRESS NOTES
Daily Note/Progress Note      Today's date: 2023  Patient name: Robert Roldan  : 1955  MRN: [id number]  Referring provider: Gary Celestin*  Dx:   Encounter Diagnosis     ICD-10-CM    1. Right hip pain  M25.551       2. Primary osteoarthritis of right hip  M16.11       3. Pre-operative examination  Z01.818           Subjective: Pt reports % improvement since SOC: 85%. The last 15% is due to pain can still occur in incision area. Pain level at rest:  0 /10, pain level with ADLS:  0 /71. At this time, the functional limitations include: walking prolong distances is limited, no difficulty with stairs and no problems sleeping from the hip.     Objective: See treatment diary below      Goals    STG:  (23)(23)  + Patient will report a 35% improvement in symptoms (2-3 weeks) (met)   + Patient will be independent in basic HEP (2-3 weeks) (met)   + Patient will demonstrate reduced gait deviations ambulating on level surfaces with assistive device    (2-3 week)met   + Patient will demonstrate an increase in range of motion   hip flexion 90 deg (3 weeks) met   + Patient will report increased ease walking due to a reduction in pain (2-3 weeks) met       LTG:  + Patient will report a 65% improvement in symptoms (4-6 weeks) met ( new goal: patient report 85% improvement since [facility]: 4-6 weeks)(met)   + Patient will increase FOTO score to expected score (4-6 sessions) not met (met)   + Patient will be independent in comprehensive HEP (4-6 weeks) not met (met)   + Patient will demonstrate ambulating on level surfaces Without an assistive device(4-6 week) met ( new goal: patient will ambulate on level surfaces without an assistive device with no deviaitons 4 weeks)  (not met, deviations goal)   + Patient will negotiate stairs reciprically with use of one railing  (6-8 weeks) not met (met)  + Patient will demonstrate an increase in range of motion hip AAROM all directions  to  wfl  (4-6 weeks) not met ( met)  + Patient will demonstrate increase  MMT of hip/knee to  4/5 for maximum function. (4-6 weeks) not met (met)        Objective (10/19/23)(23)(23)    Static Posture     Comments  Posture:reduced weightbearing on right ( wnl)    Gait:  + SPC: reduced stance time on right ( + RW: reduced step length adan, reduced stance joey aquilino right) ( no assistive device at home: reduced step length, reduced hip flexion right greater then left, reduced hip extension on right) (no assistive device, reduced step length but corrects with cuing)    (Inspection: + dressing still in place, no redness no drainage on dressing. No signs of infection)( still noted is healing area beneath skin fold, no active drainage observed last session, no redness no scent, no sign of infection, size of pencil eraser top)    functional activities:    Squat:needs UE assistance with getting out of chair (status quo)    MMT:   Hip flexion: Right:3-/5 + pain   Left: 4-/5 (Right: 3-/5)(4-/5)( 4/5)   IR: Right: 4/5  Left: 4/5 (Right: 3-/5)(4-/5)(4/5)   Er: Right: 4/5    Left: 4/5 (Right: 3-/5) (4-/5) (4/5)   hip abduction: Right: 4/5    Left: 4/5 (Right: 3-/5)(4-/5) (4/5)        ROM:   Flexion: Right:  80 deg  + pain Left:95 (Right: 65 AAROM)(Right: 105 AAROM)(AROM 105)   Abduction: Right: wnl + pain Left:wnl (Right: 20 deg) (Right: wnl)   IR: Right: 8 + pain Left: wnl ( Right: 15 deg AAROM)(25 AAROM)(24 AROM)   ER: Right: 27  + pain Left: wnl (Right:  30 deg AAROM) (40 AAROM)(38arom)   Adduction: Right:min LOM + pain  Left:wnl ( .Right: neutral)    Knee:   ROM: Right:wnl  left: wnl   MMT: Right: 4+/5 flex,ext    Left: 4+/5 flex , ext      TU:94 sec (1:15 min:sec)(18:97 sec)(13:11 sec)        Assessment: Boris Kruger demonstrates significant improvement in ROM and strength and resolution of gait deviations. Patient demonstrates a good working knowledge of HEP and at this time no longer requires PT.   .  The goal status of Angelina Evanst Teri Ross is indicated above . Plan:  d/c PT services. Eval/ Re-eval Auth #/ Referral # Total visits Start date  Expiration date Total active units  Total manual units  PT only or  PT+OT?   10/9/23 No auth, no visit limit, $15 copay                                                           Past Medical History:   Diagnosis Date    Anxiety     Apnea, sleep     patient states "repeated test says no"    Arthritis     severe in the joints    Chronic pain disorder     joints    Depression     DJD (degenerative joint disease)     Fall 11/22/2018    tripped on a folded rug- left hamstring injury    Foot injury     right foot-fx of bone-no repair-has severe arthritis in the foot and ankle    Fractures yr. 2000 or before    left ankle    GERD (gastroesophageal reflux disease)     Hepatitis C not sure    History of sleep apnea     Hypercholesteremia     Hypertension     Insomnia     Low back pain     Obesity     Lost 70lb with gastric sleeve (2011)    Osteoarthritis     Stomach disorder     take meds. for this    Use of cane as ambulatory aid     Wears glasses     Wears partial dentures     upper and lower-but did lose the lower          Specialty Daily Treatment Diary     Surgery: 10/16/23 right THR  Insurance: Foto performed   Visits: 13 14 15 16 17 18   Manual: 11/28/23 11/30/23 12/5/23 12/7/23 12/12/23 12/14/23            STM/MI: prn                                    Protocol:           Ther ex/NMR:          PROM: right hip   performed performed  performed   Man Flexibility: hip flexor, piriformis, glutes   Hip flexor stretch 10 sec x 5   Hip flexor stretch 10 sec x 5  10 sec x 5     Seated hamstring stretch 10 sec x 5   10 sec x 5   Supine: 10 sec x 5  adan w/ SOS      Rec bike/nustep Rec bike: lv 5 10 min  NS:  5 min  10 min NS 10 min NS NS: 7 min lv 5, lv 4 3 min  NS; lv 5 7m in , lv 4 3 min    Ball squeeze + hip add iso: 10 x 2  + hip abd iso: 10 x red  + hip add iso: 10 x 2  + hip abd iso: 10 x red Standard: 10 x 2  Standard 10x2  + hip add 2 x 10     SLR + TA 8 x 2 8 x 2  8 x 2 10x2 10 x 3 10 x 3   LAQ 2 lb 5 sec x 10 x 2        Supine hip abduction          Quad set          Glute set + bridge         Gagan heel raises 5 sec x 10 x 2  5 sec x 10 x 2   5" 10x2 On half roll: ---    Step ups ---    On bosu: 10 x 2  On bosu 2 x 10     Mini squats on railing 10 x 3  10 x 3  TRX: 10 x 2 TRX 2x10 TRX: 10 x 2     Sit/stand from edge of mat table  10 x 3  10 x 3 10x3  10 x 3    sidestepping 16 feet x 2 x 3 cycles Yellow: 10 feet x 4 cycles  16 feet x 3 cycles  16 feet x 3 cycles  Red 16 feet x 2 cycles    Standing hip flexor stretch 5 sec x 10   5 sec x 10   5 sec x 10   5 sec x10 5 sec x 10   5 sec x 10     Leg press  85 lb 10 x 3 85 lb  85 lb 10 x 3 85# 10x3 85 lb 10 x   95 lb 10 x 2    95 lb 10 x 3    retrowalking 16 feet x 3 cycles Yellow band: 16 feet x 3 cycles  16 feet x 3 cycles  16 feet x 3 cycles  Red band: 16 feet x 3 cycles     SLS -- ++       Step downs --  6 in:  6 in 10 x 2 6" 10x2  ---     Tandem walk on foam 6 feet x 3 cycles   6 feet x 3 cycles  16 feet x 3 cycles  6 feet x 4 cycles on foam     Marching  16 feet x 2 x 3 cycles Yellow band: 16 feet x 2 x 3 cycles  16 feet x 2 x 3 cycles  16 feet x 3 cycles  Red band: 16 feet x 3 cycles     Lat step ups 6 in 10 x 2  6 in: 10 x 2  6 in 10 x 2 6" 10x2 6 in 10 x 2 6 in: 10 x 3   Therapeutic Activity:         Reevaluation      performed   Posture Education                  Gait training:          Hurdles step over    Exaggerated gait: instruction.   50 feet x 6       HEP:                  Modalities:         MH         Ice

## 2023-12-19 ENCOUNTER — APPOINTMENT (OUTPATIENT)
Dept: PHYSICAL THERAPY | Facility: CLINIC | Age: 68
End: 2023-12-19
Payer: COMMERCIAL

## 2023-12-21 ENCOUNTER — RA CDI HCC (OUTPATIENT)
Dept: OTHER | Facility: HOSPITAL | Age: 68
End: 2023-12-21

## 2023-12-21 ENCOUNTER — APPOINTMENT (OUTPATIENT)
Dept: PHYSICAL THERAPY | Facility: CLINIC | Age: 68
End: 2023-12-21
Payer: COMMERCIAL

## 2023-12-28 ENCOUNTER — OFFICE VISIT (OUTPATIENT)
Dept: FAMILY MEDICINE CLINIC | Facility: CLINIC | Age: 68
End: 2023-12-28
Payer: COMMERCIAL

## 2023-12-28 VITALS
OXYGEN SATURATION: 96 % | DIASTOLIC BLOOD PRESSURE: 72 MMHG | WEIGHT: 218 LBS | HEIGHT: 62 IN | BODY MASS INDEX: 40.12 KG/M2 | HEART RATE: 97 BPM | SYSTOLIC BLOOD PRESSURE: 150 MMHG

## 2023-12-28 DIAGNOSIS — K59.00 CONSTIPATION, UNSPECIFIED CONSTIPATION TYPE: ICD-10-CM

## 2023-12-28 DIAGNOSIS — Z78.9 TAKES DIETARY SUPPLEMENTS: ICD-10-CM

## 2023-12-28 DIAGNOSIS — F41.9 ANXIETY AND DEPRESSION: ICD-10-CM

## 2023-12-28 DIAGNOSIS — E83.52 HYPERCALCEMIA: ICD-10-CM

## 2023-12-28 DIAGNOSIS — G89.29 CHRONIC MIDLINE LOW BACK PAIN WITHOUT SCIATICA: ICD-10-CM

## 2023-12-28 DIAGNOSIS — F32.A ANXIETY AND DEPRESSION: ICD-10-CM

## 2023-12-28 DIAGNOSIS — E78.5 DYSLIPIDEMIA: ICD-10-CM

## 2023-12-28 DIAGNOSIS — I10 PRIMARY HYPERTENSION: Primary | Chronic | ICD-10-CM

## 2023-12-28 DIAGNOSIS — Z98.84 H/O BARIATRIC SURGERY: ICD-10-CM

## 2023-12-28 DIAGNOSIS — M54.50 CHRONIC MIDLINE LOW BACK PAIN WITHOUT SCIATICA: ICD-10-CM

## 2023-12-28 DIAGNOSIS — E55.9 VITAMIN D DEFICIENCY: ICD-10-CM

## 2023-12-28 DIAGNOSIS — Z96.641 STATUS POST TOTAL REPLACEMENT OF RIGHT HIP: ICD-10-CM

## 2023-12-28 DIAGNOSIS — L30.9 DERMATITIS: ICD-10-CM

## 2023-12-28 DIAGNOSIS — E66.9 CLASS 2 OBESITY: ICD-10-CM

## 2023-12-28 DIAGNOSIS — Z13.29 SCREENING FOR THYROID DISORDER: ICD-10-CM

## 2023-12-28 DIAGNOSIS — F11.20 CONTINUOUS OPIOID DEPENDENCE (HCC): ICD-10-CM

## 2023-12-28 DIAGNOSIS — Z13.0 SCREENING FOR DEFICIENCY ANEMIA: ICD-10-CM

## 2023-12-28 DIAGNOSIS — K21.9 GASTROESOPHAGEAL REFLUX DISEASE WITHOUT ESOPHAGITIS: ICD-10-CM

## 2023-12-28 DIAGNOSIS — G89.4 CHRONIC PAIN SYNDROME: ICD-10-CM

## 2023-12-28 DIAGNOSIS — M16.11 PRIMARY OSTEOARTHRITIS OF RIGHT HIP: ICD-10-CM

## 2023-12-28 DIAGNOSIS — I10 ELEVATED BLOOD PRESSURE READING IN OFFICE WITH WHITE COAT SYNDROME, WITH DIAGNOSIS OF HYPERTENSION: ICD-10-CM

## 2023-12-28 DIAGNOSIS — R73.03 PRE-DIABETES: ICD-10-CM

## 2023-12-28 PROCEDURE — 99214 OFFICE O/P EST MOD 30 MIN: CPT | Performed by: INTERNAL MEDICINE

## 2023-12-28 RX ORDER — OMEPRAZOLE 20 MG/1
20 CAPSULE, DELAYED RELEASE ORAL DAILY
Start: 2023-12-28

## 2023-12-28 RX ORDER — OXYCODONE AND ACETAMINOPHEN 7.5; 325 MG/1; MG/1
1 TABLET ORAL EVERY 6 HOURS PRN
Qty: 120 TABLET | Refills: 0 | Status: SHIPPED | OUTPATIENT
Start: 2023-12-28

## 2023-12-28 RX ORDER — MELATONIN
1000 DAILY
Start: 2023-12-28

## 2023-12-28 RX ORDER — ZINC GLUCONATE 50 MG
50 TABLET ORAL DAILY
Start: 2023-12-28

## 2023-12-28 RX ORDER — MELATONIN/PYRIDOXINE HCL (B6) 5 MG-10 MG
100 TABLET,IMMED, EXTENDED RELEASE, BIPHASIC ORAL DAILY
Start: 2023-12-28

## 2023-12-28 RX ORDER — RIBOFLAVIN (VITAMIN B2) 100 MG
100 TABLET ORAL DAILY
Start: 2023-12-28

## 2023-12-28 NOTE — ASSESSMENT & PLAN NOTE
Patient on pain medication prolonged.  Of time not able to give nonsteroidals because of the gastric bypass surgery history of low back pain multiple joint pains we will try to taper it down if possible on the pain medications.

## 2023-12-28 NOTE — ASSESSMENT & PLAN NOTE
BMI is 39.87 emphasized regarding diet exercise lifestyle modification cutting back carbohydrate intake and lose weight

## 2023-12-28 NOTE — ASSESSMENT & PLAN NOTE
Patient with chronic low back pain on oxycodone 7.5 mg we will continue the same patient not able to manage her day-to-day activities without the pain medication.

## 2023-12-28 NOTE — ASSESSMENT & PLAN NOTE
Patient with severe osteoarthritis in several joints underwent bilateral knee replacement right shoulder replacement and right hip replacement and continues to experience pain in the low back area discussed with the patient regarding pain management home she had seen in the past.

## 2023-12-28 NOTE — ASSESSMENT & PLAN NOTE
Pantoprazole is being discontinued currently patient is taking omeprazole 20 mg daily will continue

## 2023-12-28 NOTE — PROGRESS NOTES
Name: Zonia Taylor      : 1955      MRN: [id number]  Encounter Provider: Gustabo Sy MD  Encounter Date: 2023   Encounter department: [facility] [place]    Assessment & Plan     1. Primary hypertension  Assessment & Plan:  Continue amlodipine 10 mg perindopril 8 mg check the blood pressures at home and bring the machine.  Patient with elevated blood pressure readings in the doctor's offices today it is 150/72 will monitor the readings from home and adjust medications if necessary      2. Gastroesophageal reflux disease without esophagitis  Assessment & Plan:  Pantoprazole is being discontinued currently patient is taking omeprazole 20 mg daily will continue    Orders:  -     omeprazole (PriLOSEC) 20 mg delayed release capsule; Take 1 capsule (20 mg total) by mouth daily    3. Dermatitis    4. Dyslipidemia  Assessment & Plan:  Follow-up with a lipid profile start her on medication based on the results    Orders:  -     Coenzyme Q10 (CoQ-10) 100 MG capsule; Take 1 capsule (100 mg total) by mouth daily  -     Lipid panel; Future    5. Takes dietary supplements  -     zinc gluconate 50 mg tablet; Take 1 tablet (50 mg total) by mouth daily  -     Ascorbic Acid (vitamin C) 100 MG tablet; Take 1 tablet (100 mg total) by mouth daily  -     cholecalciferol (VITAMIN D3) 1,000 units tablet; Take 1 tablet (1,000 Units total) by mouth daily    6. Constipation, unspecified constipation type  -     psyllium (METAMUCIL) 58.6 % powder; Take by mouth daily    7. Anxiety and depression  Assessment & Plan:  Patient is currently taking Lexapro 10 mg 1-1/2 tablet daily appears to be helping with her anxiety symptoms and depression symptoms      8. Chronic pain syndrome  Assessment & Plan:  Patient with severe osteoarthritis in several joints underwent bilateral knee replacement right shoulder replacement and right hip replacement and continues to experience pain in the low back area discussed with the  patient regarding pain management home she had seen in the past.    Orders:  -     oxyCODONE-acetaminophen (Percocet) 7.5-325 MG per tablet; Take 1 tablet by mouth every 6 (six) hours as needed for moderate pain Max Daily Amount: 4 tablets    9. Class 2 obesity  Assessment & Plan:  BMI is 39.87 emphasized regarding diet exercise lifestyle modification cutting back carbohydrate intake and lose weight      10. Continuous opioid dependence (HCC)  Assessment & Plan:  Patient on pain medication prolonged.  Of time not able to give nonsteroidals because of the gastric bypass surgery history of low back pain multiple joint pains we will try to taper it down if possible on the pain medications.      11. Elevated blood pressure reading in office with white coat syndrome, with diagnosis of hypertension  Assessment & Plan:  Recommend very strongly to check the blood pressures at home papers given regarding how to check them and bring the machine today's blood pressure 150/72      12. H/O bariatric surgery    13. Hypercalcemia  Assessment & Plan:  Follow-up with repeat CMP      14. Primary osteoarthritis of right hip  Assessment & Plan:  On pain medications avoiding nonsteroidals status post gastric sleeve    Orders:  -     oxyCODONE-acetaminophen (Percocet) 7.5-325 MG per tablet; Take 1 tablet by mouth every 6 (six) hours as needed for moderate pain Max Daily Amount: 4 tablets    15. Status post total replacement of right hip  Assessment & Plan:  Status post right hip replacement stable    Orders:  -     oxyCODONE-acetaminophen (Percocet) 7.5-325 MG per tablet; Take 1 tablet by mouth every 6 (six) hours as needed for moderate pain Max Daily Amount: 4 tablets    16. Chronic midline low back pain without sciatica  Assessment & Plan:  Patient with chronic low back pain on oxycodone 7.5 mg we will continue the same patient not able to manage her day-to-day activities without the pain medication.    Orders:  -      oxyCODONE-acetaminophen (Percocet) 7.5-325 MG per tablet; Take 1 tablet by mouth every 6 (six) hours as needed for moderate pain Max Daily Amount: 4 tablets    17. Screening for thyroid disorder  -     TSH, 3rd generation with Free T4 reflex; Future; Expected date: 12/28/2023    18. Screening for deficiency anemia  -     CBC and differential; Future    19. Pre-diabetes  -     Comprehensive metabolic panel; Future  -     Hemoglobin A1C; Future; Expected date: 12/28/2023  -     UA w Reflex to Microscopic w Reflex to Culture; Future; Expected date: 12/28/2023    20. Vitamin D deficiency  -     Vitamin D 25 hydroxy; Future           Subjective      Patient has coming here for a follow-up evaluation regarding osteoarthritis status post right hip replacement 2-1/2 months back doing much better after receiving physical therapy patient able to ambulate much better pain is much less.  Reviewed all the reports of the operation also physical therapy notes.  Denies any symptoms of chest pain palpitation shortness of breath all medication reviewed in detail including the supplements updated them.      Review of Systems   Constitutional:  Negative for chills and fever.   HENT:  Negative for ear pain and sore throat.    Eyes:  Negative for pain and visual disturbance.   Respiratory:  Negative for cough and shortness of breath.    Cardiovascular:  Negative for chest pain and palpitations.   Gastrointestinal:  Negative for abdominal pain and vomiting.   Genitourinary:  Negative for dysuria and hematuria.   Musculoskeletal:  Positive for arthralgias and back pain.   Skin:  Negative for color change and rash.   Neurological:  Negative for seizures and syncope.   All other systems reviewed and are negative.      Current Outpatient Medications on File Prior to Visit   Medication Sig    acetaminophen (TYLENOL) 325 mg tablet Take 3 tablets (975 mg total) by mouth every 8 (eight) hours    amLODIPine (NORVASC) 10 mg tablet TAKE 1 TABLET BY  "MOUTH DAILY (Patient taking differently: Take 10 mg by mouth every morning)    clindamycin (CLEOCIN) 300 MG capsule Take 600 mg by mouth 1 (one) time One hour before a procedure/dental work    Cyanocobalamin (VITAMIN B 12 PO) Take 500 mcg by mouth every morning    diphenhydrAMINE (BENADRYL) 25 mg tablet Take 25 mg by mouth every 8 (eight) hours as needed for itching     escitalopram (LEXAPRO) 10 mg tablet TAKE 1 AND 1/2 TABLETS BY MOUTH  DAILY AT BEDTIME    Melatonin 10 MG TABS Take by mouth daily at bedtime    naloxone (NARCAN) 4 mg/0.1 mL nasal spray Administer 1 spray into a nostril. If no response after 2-3 minutes, give another dose in the other nostril using a new spray.    perindopril (ACEON) 8 MG tablet TAKE 1 TABLET BY MOUTH  TWICE DAILY    [DISCONTINUED] oxyCODONE-acetaminophen (Percocet) 7.5-325 MG per tablet Take 1 tablet by mouth every 6 (six) hours as needed for moderate pain Max Daily Amount: 4 tablets    [DISCONTINUED] aspirin 81 mg chewable tablet Chew 1 tablet (81 mg total) 2 (two) times a day    [DISCONTINUED] docusate sodium (COLACE) 100 mg capsule Take 1 capsule (100 mg total) by mouth 2 (two) times a day    [DISCONTINUED] pantoprazole (PROTONIX) 40 mg tablet Take 1 tablet (40 mg total) by mouth daily    [DISCONTINUED] triamcinolone (KENALOG) 0.5 % cream Apply topically 3 (three) times a day as needed for rash (Patient not taking: Reported on 11/3/2023)       Objective     /72 (BP Location: Left arm, Patient Position: Sitting, Cuff Size: Standard)   Pulse 97   Ht 5' 2\" (1.575 m)   Wt 98.9 kg (218 lb)   SpO2 96%   BMI 39.87 kg/m²     Physical Exam  Vitals and nursing note reviewed.   Constitutional:       Appearance: Normal appearance.   Cardiovascular:      Rate and Rhythm: Normal rate and regular rhythm.      Heart sounds: Normal heart sounds.   Pulmonary:      Effort: Pulmonary effort is normal.      Breath sounds: Normal breath sounds.   Abdominal:      General: There is no " distension.      Palpations: Abdomen is soft.   Musculoskeletal:      Cervical back: Normal range of motion and neck supple.      Right lower leg: No edema.      Left lower leg: No edema.      Comments: Patient status post right hip replacement movements causing mild discomfort otherwise stable   Neurological:      General: No focal deficit present.      Mental Status: She is alert and oriented to person, place, and time.   Psychiatric:         Mood and Affect: Mood normal.         Behavior: Behavior normal.       Gustabo Sy MD

## 2023-12-28 NOTE — ASSESSMENT & PLAN NOTE
Patient is currently taking Lexapro 10 mg 1-1/2 tablet daily appears to be helping with her anxiety symptoms and depression symptoms

## 2023-12-28 NOTE — ASSESSMENT & PLAN NOTE
Continue amlodipine 10 mg perindopril 8 mg check the blood pressures at home and bring the machine.  Patient with elevated blood pressure readings in the doctor's offices today it is 150/72 will monitor the readings from home and adjust medications if necessary

## 2023-12-28 NOTE — ASSESSMENT & PLAN NOTE
Recommend very strongly to check the blood pressures at home papers given regarding how to check them and bring the machine today's blood pressure 150/72

## 2024-01-11 ENCOUNTER — APPOINTMENT (OUTPATIENT)
Dept: RADIOLOGY | Facility: CLINIC | Age: 69
End: 2024-01-11
Payer: COMMERCIAL

## 2024-01-11 ENCOUNTER — OFFICE VISIT (OUTPATIENT)
Dept: FAMILY MEDICINE CLINIC | Facility: CLINIC | Age: 69
End: 2024-01-11
Payer: COMMERCIAL

## 2024-01-11 ENCOUNTER — OFFICE VISIT (OUTPATIENT)
Dept: OBGYN CLINIC | Facility: CLINIC | Age: 69
End: 2024-01-11

## 2024-01-11 VITALS
HEIGHT: 62 IN | WEIGHT: 219.6 LBS | SYSTOLIC BLOOD PRESSURE: 165 MMHG | OXYGEN SATURATION: 97 % | DIASTOLIC BLOOD PRESSURE: 84 MMHG | BODY MASS INDEX: 40.41 KG/M2 | HEART RATE: 86 BPM

## 2024-01-11 VITALS
WEIGHT: 219.6 LBS | SYSTOLIC BLOOD PRESSURE: 167 MMHG | DIASTOLIC BLOOD PRESSURE: 87 MMHG | HEART RATE: 81 BPM | HEIGHT: 62 IN | BODY MASS INDEX: 40.41 KG/M2

## 2024-01-11 DIAGNOSIS — F41.9 ANXIETY AND DEPRESSION: ICD-10-CM

## 2024-01-11 DIAGNOSIS — T81.89XD DELAYED SURGICAL WOUND HEALING, SUBSEQUENT ENCOUNTER: ICD-10-CM

## 2024-01-11 DIAGNOSIS — Z96.641 STATUS POST TOTAL REPLACEMENT OF RIGHT HIP: ICD-10-CM

## 2024-01-11 DIAGNOSIS — Z96.641 STATUS POST TOTAL REPLACEMENT OF RIGHT HIP: Primary | ICD-10-CM

## 2024-01-11 DIAGNOSIS — I10 PRIMARY HYPERTENSION: Primary | ICD-10-CM

## 2024-01-11 DIAGNOSIS — Z96.641 AFTERCARE FOLLOWING RIGHT HIP JOINT REPLACEMENT SURGERY: ICD-10-CM

## 2024-01-11 DIAGNOSIS — M54.50 CHRONIC MIDLINE LOW BACK PAIN WITHOUT SCIATICA: ICD-10-CM

## 2024-01-11 DIAGNOSIS — Z47.1 AFTERCARE FOLLOWING RIGHT HIP JOINT REPLACEMENT SURGERY: ICD-10-CM

## 2024-01-11 DIAGNOSIS — G89.4 CHRONIC PAIN SYNDROME: ICD-10-CM

## 2024-01-11 DIAGNOSIS — G89.29 CHRONIC MIDLINE LOW BACK PAIN WITHOUT SCIATICA: ICD-10-CM

## 2024-01-11 DIAGNOSIS — F11.20 CONTINUOUS OPIOID DEPENDENCE (HCC): ICD-10-CM

## 2024-01-11 DIAGNOSIS — F32.A ANXIETY AND DEPRESSION: ICD-10-CM

## 2024-01-11 DIAGNOSIS — E83.52 HYPERCALCEMIA: ICD-10-CM

## 2024-01-11 PROCEDURE — 99024 POSTOP FOLLOW-UP VISIT: CPT | Performed by: ORTHOPAEDIC SURGERY

## 2024-01-11 PROCEDURE — 73502 X-RAY EXAM HIP UNI 2-3 VIEWS: CPT

## 2024-01-11 PROCEDURE — 99213 OFFICE O/P EST LOW 20 MIN: CPT | Performed by: INTERNAL MEDICINE

## 2024-01-11 RX ORDER — CARVEDILOL 6.25 MG/1
6.25 TABLET ORAL 2 TIMES DAILY WITH MEALS
Qty: 60 TABLET | Refills: 2 | Status: SHIPPED | OUTPATIENT
Start: 2024-01-11

## 2024-01-11 NOTE — PROGRESS NOTES
Assessment/Plan:  1. Status post total replacement of right hip  XR hip/pelv 2-3 vws right if performed      2. Aftercare following right hip joint replacement surgery        3. Delayed surgical wound healing, subsequent encounter          Scribe Attestation      I,:  Theodora Rodriguez am acting as a scribe while in the presence of the attending physician.:       I,:  Ovi Jaime, DO personally performed the services described in this documentation    as scribed in my presence.:           Zonia is a pleasant 60-year-old female who returns today for follow-up evaluation 3-months status post right total hip arthroplasty.  I am pleased with her clinical presentation in the office today.  Small area of delayed wound healing that was present at her last visit appears to have healed very well and patient may discontinue dressing the area. She should continue performing home exercise program as needed.  At this time, she may participate in all activities as desired. Zonia understands that she requires prophylactic antibiotics for any future dental visits and will call the office when the need arises.  Her questions and concerns were addressed today.  We will see her back in 9 months for repeat evaluation with x-rays of the right hip.     Subjective: Postoperative evaluation 3-month status post right total hip arthroplasty    Patient ID: Zonia Taylor is a 68 y.o. female who presents today for postoperative evaluation 3-month status post right total hip arthroplasty.  Patient reports she has been doing well.  Denies paresthesias, secondary injury or trauma, pain.  Denies fevers, chills, is able to ambulate without assistive device. She is not taking medication for her pain at this time. She has discontinued use of Medihoney and notes incision drainage has ceased.  She has completed physical therapy.    Review of Systems   Constitutional:  Negative for chills and fever.   HENT:  Negative for ear pain and sore throat.   "  Eyes:  Negative for pain and visual disturbance.   Respiratory:  Negative for cough and shortness of breath.    Cardiovascular:  Negative for chest pain and palpitations.   Gastrointestinal:  Negative for abdominal pain and vomiting.   Genitourinary:  Negative for dysuria and hematuria.   Musculoskeletal:  Negative for arthralgias and back pain.   Skin:  Negative for color change and rash.   Neurological:  Negative for seizures and syncope.   All other systems reviewed and are negative.        Past Medical History:   Diagnosis Date    Anxiety     Apnea, sleep     patient states \"repeated test says no\"    Arthritis     severe in the joints    Chronic pain disorder     joints    Depression     DJD (degenerative joint disease)     Fall 11/22/2018    tripped on a folded rug- left hamstring injury    Foot injury     right foot-fx of bone-no repair-has severe arthritis in the foot and ankle    Fractures yr. 2000 or before    left ankle    GERD (gastroesophageal reflux disease)     Hepatitis C not sure    History of sleep apnea     Hypercholesteremia     Hypertension     Insomnia     Low back pain     Obesity     Lost 70lb with gastric sleeve (2011)    Osteoarthritis     Stomach disorder     take meds. for this    Use of cane as ambulatory aid     Wears glasses     Wears partial dentures     upper and lower-but did lose the lower        Past Surgical History:   Procedure Laterality Date    ANKLE ARTHROSCOPY Right     ANKLE SURGERY  not sure    orthoscopic/rt. ankle    APPENDECTOMY      BARIATRIC SURGERY  maybe 2011    CHOLECYSTECTOMY      COLONOSCOPY  10/26/2021    DXA PROCEDURE (HISTORICAL)  10/13/2020    GASTRECTOMY SLEEVE LAPAROSCOPIC  2011    lost 70 lb    HAND SURGERY      rt. hand, trigger finger    JOINT REPLACEMENT Bilateral     knee replacements    KNEE SURGERY  2001 maybe    MAMMO (HISTORICAL)  01/26/2022    NERVE BLOCK Bilateral 12/29/2017    Procedure: L4, L5, S1 MEDIAL BRANCH BLOCK #1;  Surgeon: Lloyd " MD Joshua;  Location: [facility] MAIN OR;  Service: Pain Management     NERVE BLOCK Bilateral 01/12/2018    Procedure: BLOCK MEDIAL BRANCH L4, L5-S1  #2;  Surgeon: Lloyd Lewis MD;  Location: [facility] MAIN OR;  Service: Pain Management     MN ARTHROPLASTY GLENOHUMERAL JOINT TOTAL SHOULDER Right 05/23/2019    Procedure: ARTHROPLASTY SHOULDER TOTAL , excision of deep cyst at shoulder, biceps tenotomy;  Surgeon: Dileep Thomas MD;  Location: [facility] MAIN OR;  Service: Orthopedics    MN ARTHRP ACETBLR/PROX FEM PROSTC AGRFT/ALGRFT Right 10/16/2023    Procedure: ARTHROPLASTY HIP TOTAL ANTERIOR,NAVIGATED - overnight;  Surgeon: Ovi Jaime DO;  Location: [facility] MAIN OR;  Service: Orthopedics    MN INJECT SI JOINT ARTHRGRPHY&/ANES/STEROID W/LESA Right 10/23/2020    Procedure: Sacroiliac Joint injection (15070);  Surgeon: Lloyd Lewis MD;  Location: [facility] MAIN OR;  Service: Pain Management     MN TENDON SHEATH INCISION Right 12/21/2021    Procedure: RELEASE TRIGGER RING FINGER;  Surgeon: Dileep Thomas MD;  Location: [facility] MAIN OR;  Service: Orthopedics    RADIOFREQUENCY ABLATION Right 03/16/2018    Procedure: Rt L4 L5 S1 RFA;  Surgeon: Lloyd Lewis MD;  Location: [facility] MAIN OR;  Service: Pain Management     RADIOFREQUENCY ABLATION Left 12/14/2018    Procedure: L3-S1 Radio Frequency Ablation ([id number]);  Surgeon: Lloyd Lewis MD;  Location: [facility] MAIN OR;  Service: Pain Management     RHIZOTOMY Left 01/25/2018    Procedure: L4, L5, S1 RFA;  Surgeon: Lolyd Lewis MD;  Location: [facility] MAIN OR;  Service: Pain Management     SHOULDER SURGERY  April/2019    rt. total  replacement    TONSILLECTOMY         Family History   Problem Relation Age of Onset    Other Mother         in a nursing home-fx of the leg    Heart disease Father         CHF    Alcohol abuse Father     Cirrhosis Father     Colon cancer Maternal Grandmother     No Known Problems Maternal Grandfather     No Known Problems Paternal Grandmother      No Known Problems Paternal Grandfather     Other Brother         lung abscess, pneumonia    Colon cancer Maternal Aunt     No Known Problems Maternal Aunt     No Known Problems Maternal Aunt     No Known Problems Maternal Uncle     No Known Problems Paternal Aunt     No Known Problems Paternal Uncle     Breast cancer Cousin 50       Social History     Occupational History    Not on file   Tobacco Use    Smoking status: Former     Current packs/day: 0.00     Types: Cigarettes     Quit date: 2004     Years since quittin.0     Passive exposure: Past    Smokeless tobacco: Never   Vaping Use    Vaping status: Never Used   Substance and Sexual Activity    Alcohol use: Not Currently     Comment: quit many yrs. ago    Drug use: Not Currently     Types: Amphetamines, Marijuana     Comment: aprox. 35 years ago, maybe longer    Sexual activity: Not Currently     Partners: Male     Birth control/protection: Abstinence, Post-menopausal     Comment: no sex for many yrs. (aprox. 20 yrs.)         Current Outpatient Medications:     acetaminophen (TYLENOL) 325 mg tablet, Take 3 tablets (975 mg total) by mouth every 8 (eight) hours, Disp: , Rfl: 0    amLODIPine (NORVASC) 10 mg tablet, TAKE 1 TABLET BY MOUTH DAILY (Patient taking differently: Take 10 mg by mouth every morning), Disp: 90 tablet, Rfl: 3    Ascorbic Acid (vitamin C) 100 MG tablet, Take 1 tablet (100 mg total) by mouth daily, Disp: , Rfl:     cholecalciferol (VITAMIN D3) 1,000 units tablet, Take 1 tablet (1,000 Units total) by mouth daily, Disp: , Rfl:     clindamycin (CLEOCIN) 300 MG capsule, Take 600 mg by mouth 1 (one) time One hour before a procedure/dental work, Disp: , Rfl:     Coenzyme Q10 (CoQ-10) 100 MG capsule, Take 1 capsule (100 mg total) by mouth daily, Disp: , Rfl:     Cyanocobalamin (VITAMIN B 12 PO), Take 500 mcg by mouth every morning, Disp: , Rfl:     diphenhydrAMINE (BENADRYL) 25 mg tablet, Take 25 mg by mouth every 8 (eight) hours as needed  for itching , Disp: , Rfl:     escitalopram (LEXAPRO) 10 mg tablet, TAKE 1 AND 1/2 TABLETS BY MOUTH  DAILY AT BEDTIME, Disp: 135 tablet, Rfl: 1    Melatonin 10 MG TABS, Take by mouth daily at bedtime, Disp: , Rfl:     naloxone (NARCAN) 4 mg/0.1 mL nasal spray, Administer 1 spray into a nostril. If no response after 2-3 minutes, give another dose in the other nostril using a new spray., Disp: 1 each, Rfl: 0    omeprazole (PriLOSEC) 20 mg delayed release capsule, Take 1 capsule (20 mg total) by mouth daily, Disp: , Rfl:     oxyCODONE-acetaminophen (Percocet) 7.5-325 MG per tablet, Take 1 tablet by mouth every 6 (six) hours as needed for moderate pain Max Daily Amount: 4 tablets, Disp: 120 tablet, Rfl: 0    perindopril (ACEON) 8 MG tablet, TAKE 1 TABLET BY MOUTH  TWICE DAILY, Disp: 180 tablet, Rfl: 3    psyllium (METAMUCIL) 58.6 % powder, Take by mouth daily, Disp: , Rfl:     zinc gluconate 50 mg tablet, Take 1 tablet (50 mg total) by mouth daily, Disp: , Rfl:     Allergies   Allergen Reactions    Penicillins Anaphylaxis     Total body redness and fever    Levaquin [Levofloxacin] Itching       Objective:  Vitals:    01/11/24 0833   BP: 167/87   Pulse: 81       Body mass index is 40.17 kg/m².    Right Hip Exam     Tenderness   The patient is experiencing no tenderness.     Range of Motion   Abduction:  50   Adduction:  20   Flexion:  120   External rotation:  80   Internal rotation:  30     Muscle Strength   Abduction: 5/5   Adduction: 5/5   Flexion: 5/5     Tests   ABDOULAYE: negative    Other   Erythema: absent  Scars: present (well healed anterior surgical incision)  Sensation: normal  Pulse: present    Comments:    Stinch[place]: negative  FADIR: negative            Physical Exam  Vitals and nursing note reviewed.   Constitutional:       General: She is not in acute distress.     Appearance: Normal appearance.   HENT:      Head: Normocephalic and atraumatic.      Right Ear: External ear normal.      Left Ear: External ear  normal.      Nose: Nose normal.   Eyes:      Extraocular Movements: Extraocular movements intact.      Conjunctiva/sclera: Conjunctivae normal.   Cardiovascular:      Rate and Rhythm: Normal rate and regular rhythm.      Pulses: Normal pulses.   Pulmonary:      Effort: Pulmonary effort is normal. No respiratory distress.   Abdominal:      Palpations: Abdomen is soft.   Musculoskeletal:      Cervical back: Normal range of motion and neck supple.   Skin:     General: Skin is warm and dry.      Capillary Refill: Capillary refill takes less than 2 seconds.   Neurological:      General: No focal deficit present.      Mental Status: She is alert and oriented to person, place, and time.   Psychiatric:         Mood and Affect: Mood normal.         Behavior: Behavior normal.         Thought Content: Thought content normal.         Judgment: Judgment normal.         I have personally reviewed pertinent films in PACS.    X-rays of the right hip obtained today reviewed and demonstrate: Well-positioned well aligned right total hip arthroplasty without signs of failure.    This document was created using speech voice recognition software.   Grammatical errors, random word insertions, pronoun errors, and incomplete sentences are an occasional consequence of this system due to software limitations, ambient noise, and hardware issues.   Any formal questions or concerns about content, text, or information contained within the body of this dictation should be directly addressed to the provider for clarification.

## 2024-01-11 NOTE — ASSESSMENT & PLAN NOTE
Blood pressures running on the higher side even though she is taking amlodipine 10 mg daily and perindopril 8 mg twice a day.  Will add carvedilol 6.25 mg twice a day to the current medications today's blood pressure initially 165/80 4 repeat 1 156/84.  Heart rate is around 70/min.  Avoiding diuretics since it can raise the calcium levels which are running on the higher side

## 2024-01-18 ENCOUNTER — APPOINTMENT (OUTPATIENT)
Dept: LAB | Facility: HOSPITAL | Age: 69
End: 2024-01-18
Attending: INTERNAL MEDICINE
Payer: COMMERCIAL

## 2024-01-18 ENCOUNTER — RA CDI HCC (OUTPATIENT)
Dept: OTHER | Facility: HOSPITAL | Age: 69
End: 2024-01-18

## 2024-01-18 DIAGNOSIS — Z13.29 SCREENING FOR THYROID DISORDER: ICD-10-CM

## 2024-01-18 DIAGNOSIS — E78.5 DYSLIPIDEMIA: ICD-10-CM

## 2024-01-18 DIAGNOSIS — E55.9 VITAMIN D DEFICIENCY: ICD-10-CM

## 2024-01-18 DIAGNOSIS — Z13.0 SCREENING FOR DEFICIENCY ANEMIA: ICD-10-CM

## 2024-01-18 DIAGNOSIS — R73.03 PRE-DIABETES: ICD-10-CM

## 2024-01-18 LAB
25(OH)D3 SERPL-MCNC: 30.6 NG/ML (ref 30–100)
ALBUMIN SERPL BCP-MCNC: 4.1 G/DL (ref 3.5–5)
ALP SERPL-CCNC: 85 U/L (ref 34–104)
ALT SERPL W P-5'-P-CCNC: 14 U/L (ref 7–52)
ANION GAP SERPL CALCULATED.3IONS-SCNC: 7 MMOL/L
AST SERPL W P-5'-P-CCNC: 19 U/L (ref 13–39)
BACTERIA UR QL AUTO: NORMAL /HPF
BASOPHILS # BLD AUTO: 0.03 THOUSANDS/ÂΜL (ref 0–0.1)
BASOPHILS NFR BLD AUTO: 1 % (ref 0–1)
BILIRUB SERPL-MCNC: 0.3 MG/DL (ref 0.2–1)
BILIRUB UR QL STRIP: NEGATIVE
BUN SERPL-MCNC: 14 MG/DL (ref 5–25)
CALCIUM SERPL-MCNC: 9.7 MG/DL (ref 8.4–10.2)
CHLORIDE SERPL-SCNC: 105 MMOL/L (ref 96–108)
CHOLEST SERPL-MCNC: 176 MG/DL
CLARITY UR: CLEAR
CO2 SERPL-SCNC: 29 MMOL/L (ref 21–32)
COLOR UR: ABNORMAL
CREAT SERPL-MCNC: 0.48 MG/DL (ref 0.6–1.3)
EOSINOPHIL # BLD AUTO: 0.22 THOUSAND/ÂΜL (ref 0–0.61)
EOSINOPHIL NFR BLD AUTO: 4 % (ref 0–6)
ERYTHROCYTE [DISTWIDTH] IN BLOOD BY AUTOMATED COUNT: 13.6 % (ref 11.6–15.1)
EST. AVERAGE GLUCOSE BLD GHB EST-MCNC: 117 MG/DL
GFR SERPL CREATININE-BSD FRML MDRD: 101 ML/MIN/1.73SQ M
GLUCOSE P FAST SERPL-MCNC: 90 MG/DL (ref 65–99)
GLUCOSE UR STRIP-MCNC: NEGATIVE MG/DL
HBA1C MFR BLD: 5.7 %
HCT VFR BLD AUTO: 40.1 % (ref 34.8–46.1)
HDLC SERPL-MCNC: 71 MG/DL
HGB BLD-MCNC: 12.2 G/DL (ref 11.5–15.4)
HGB UR QL STRIP.AUTO: ABNORMAL
IMM GRANULOCYTES # BLD AUTO: 0.01 THOUSAND/UL (ref 0–0.2)
IMM GRANULOCYTES NFR BLD AUTO: 0 % (ref 0–2)
KETONES UR STRIP-MCNC: NEGATIVE MG/DL
LDLC SERPL CALC-MCNC: 87 MG/DL (ref 0–100)
LEUKOCYTE ESTERASE UR QL STRIP: NEGATIVE
LYMPHOCYTES # BLD AUTO: 2.23 THOUSANDS/ÂΜL (ref 0.6–4.47)
LYMPHOCYTES NFR BLD AUTO: 39 % (ref 14–44)
MCH RBC QN AUTO: 26.3 PG (ref 26.8–34.3)
MCHC RBC AUTO-ENTMCNC: 30.4 G/DL (ref 31.4–37.4)
MCV RBC AUTO: 87 FL (ref 82–98)
MONOCYTES # BLD AUTO: 0.53 THOUSAND/ÂΜL (ref 0.17–1.22)
MONOCYTES NFR BLD AUTO: 9 % (ref 4–12)
NEUTROPHILS # BLD AUTO: 2.74 THOUSANDS/ÂΜL (ref 1.85–7.62)
NEUTS SEG NFR BLD AUTO: 47 % (ref 43–75)
NITRITE UR QL STRIP: NEGATIVE
NON-SQ EPI CELLS URNS QL MICRO: NORMAL /HPF
NONHDLC SERPL-MCNC: 105 MG/DL
NRBC BLD AUTO-RTO: 0 /100 WBCS
OTHER STN SPEC: NORMAL
PH UR STRIP.AUTO: 6 [PH]
PLATELET # BLD AUTO: 302 THOUSANDS/UL (ref 149–390)
PMV BLD AUTO: 10.6 FL (ref 8.9–12.7)
POTASSIUM SERPL-SCNC: 4.3 MMOL/L (ref 3.5–5.3)
PROT SERPL-MCNC: 7 G/DL (ref 6.4–8.4)
PROT UR STRIP-MCNC: NEGATIVE MG/DL
RBC # BLD AUTO: 4.63 MILLION/UL (ref 3.81–5.12)
RBC #/AREA URNS AUTO: NORMAL /HPF
SODIUM SERPL-SCNC: 141 MMOL/L (ref 135–147)
SP GR UR STRIP.AUTO: 1.02 (ref 1–1.03)
TRIGL SERPL-MCNC: 92 MG/DL
TSH SERPL DL<=0.05 MIU/L-ACNC: 3.34 UIU/ML (ref 0.45–4.5)
UROBILINOGEN UR QL STRIP.AUTO: 0.2 E.U./DL
WBC # BLD AUTO: 5.76 THOUSAND/UL (ref 4.31–10.16)
WBC #/AREA URNS AUTO: NORMAL /HPF

## 2024-01-18 PROCEDURE — 80053 COMPREHEN METABOLIC PANEL: CPT

## 2024-01-18 PROCEDURE — 84443 ASSAY THYROID STIM HORMONE: CPT

## 2024-01-18 PROCEDURE — 36415 COLL VENOUS BLD VENIPUNCTURE: CPT

## 2024-01-18 PROCEDURE — 85025 COMPLETE CBC W/AUTO DIFF WBC: CPT

## 2024-01-18 PROCEDURE — 83036 HEMOGLOBIN GLYCOSYLATED A1C: CPT

## 2024-01-18 PROCEDURE — 80061 LIPID PANEL: CPT

## 2024-01-18 PROCEDURE — 82306 VITAMIN D 25 HYDROXY: CPT

## 2024-01-18 PROCEDURE — 81001 URINALYSIS AUTO W/SCOPE: CPT

## 2024-01-18 NOTE — PROGRESS NOTES
HCC coding opportunities          Chart Reviewed number of suggestions sent to Provider: 1     Patients Insurance     Medicare Insurance: [employer]vantage        E66.01

## 2024-01-24 ENCOUNTER — OFFICE VISIT (OUTPATIENT)
Dept: FAMILY MEDICINE CLINIC | Facility: CLINIC | Age: 69
End: 2024-01-24
Payer: COMMERCIAL

## 2024-01-24 VITALS — HEIGHT: 62 IN | HEART RATE: 85 BPM | OXYGEN SATURATION: 98 % | WEIGHT: 220 LBS | BODY MASS INDEX: 40.48 KG/M2

## 2024-01-24 DIAGNOSIS — M16.11 PRIMARY OSTEOARTHRITIS OF RIGHT HIP: ICD-10-CM

## 2024-01-24 DIAGNOSIS — F11.20 CONTINUOUS OPIOID DEPENDENCE (HCC): ICD-10-CM

## 2024-01-24 DIAGNOSIS — B18.2 CHRONIC HEPATITIS C WITHOUT HEPATIC COMA (HCC): ICD-10-CM

## 2024-01-24 DIAGNOSIS — K21.9 GASTROESOPHAGEAL REFLUX DISEASE WITHOUT ESOPHAGITIS: ICD-10-CM

## 2024-01-24 DIAGNOSIS — F32.A ANXIETY AND DEPRESSION: ICD-10-CM

## 2024-01-24 DIAGNOSIS — E83.52 HYPERCALCEMIA: ICD-10-CM

## 2024-01-24 DIAGNOSIS — G89.4 CHRONIC PAIN SYNDROME: ICD-10-CM

## 2024-01-24 DIAGNOSIS — E78.5 DYSLIPIDEMIA: ICD-10-CM

## 2024-01-24 DIAGNOSIS — F41.9 ANXIETY AND DEPRESSION: ICD-10-CM

## 2024-01-24 DIAGNOSIS — G89.29 CHRONIC MIDLINE LOW BACK PAIN WITHOUT SCIATICA: ICD-10-CM

## 2024-01-24 DIAGNOSIS — Z96.641 STATUS POST TOTAL REPLACEMENT OF RIGHT HIP: ICD-10-CM

## 2024-01-24 DIAGNOSIS — E66.9 CLASS 2 OBESITY: ICD-10-CM

## 2024-01-24 DIAGNOSIS — M54.50 CHRONIC MIDLINE LOW BACK PAIN WITHOUT SCIATICA: ICD-10-CM

## 2024-01-24 DIAGNOSIS — I10 PRIMARY HYPERTENSION: Primary | ICD-10-CM

## 2024-01-24 DIAGNOSIS — E66.01 MORBID OBESITY (HCC): ICD-10-CM

## 2024-01-24 PROCEDURE — 99214 OFFICE O/P EST MOD 30 MIN: CPT | Performed by: INTERNAL MEDICINE

## 2024-01-24 RX ORDER — OMEPRAZOLE 20 MG/1
20 CAPSULE, DELAYED RELEASE ORAL DAILY
Qty: 90 CAPSULE | Refills: 1 | Status: SHIPPED | OUTPATIENT
Start: 2024-01-24

## 2024-01-24 RX ORDER — OXYCODONE AND ACETAMINOPHEN 7.5; 325 MG/1; MG/1
1 TABLET ORAL EVERY 6 HOURS PRN
Qty: 120 TABLET | Refills: 0 | Status: SHIPPED | OUTPATIENT
Start: 2024-01-24

## 2024-01-24 RX ORDER — LOSARTAN POTASSIUM 100 MG/1
100 TABLET ORAL DAILY
Qty: 30 TABLET | Refills: 1 | Status: SHIPPED | OUTPATIENT
Start: 2024-01-24

## 2024-01-24 RX ORDER — CARVEDILOL 12.5 MG/1
12.5 TABLET ORAL 2 TIMES DAILY WITH MEALS
Qty: 60 TABLET | Refills: 1 | Status: SHIPPED | OUTPATIENT
Start: 2024-01-24

## 2024-01-24 NOTE — PROGRESS NOTES
Office Visit Note  24     Zonia Taylor 68 y.o. female MRN: [id number]  : 1955    Assessment:     1. Primary hypertension  Assessment & Plan:  Patient is on perindopril 8 mg twice a day for many years we will change it to losartan 100 mg daily increase the dose of carvedilol to 12.5 mg twice a day and amlodipine 10 mg daily if necessary will add 10 mg of Lasix next visit.  May also consider adding prazosin    Orders:  -     carvedilol (COREG) 12.5 mg tablet; Take 1 tablet (12.5 mg total) by mouth 2 (two) times a day with meals  -     losartan (COZAAR) 100 MG tablet; Take 1 tablet (100 mg total) by mouth daily    2. Morbid obesity (HCC)    3. Chronic hepatitis C without hepatic coma (HCC)    4. Anxiety and depression  Assessment & Plan:  Continue Lexapro 15 mg daily      5. Chronic midline low back pain without sciatica  Assessment & Plan:  Patient with chronic pain syndrome on oxycodone 7.5 mg every 6 hours we will continue    Orders:  -     oxyCODONE-acetaminophen (Percocet) 7.5-325 MG per tablet; Take 1 tablet by mouth every 6 (six) hours as needed for moderate pain Max Daily Amount: 4 tablets    6. Chronic pain syndrome  Assessment & Plan:  Patient status post gastric sleeve avoiding nonsteroidals currently taking pain medications 4 times a day as needed patient underwent bilateral knee replacement right shoulder replacement right hip replacement continues to experience low back pain    Orders:  -     oxyCODONE-acetaminophen (Percocet) 7.5-325 MG per tablet; Take 1 tablet by mouth every 6 (six) hours as needed for moderate pain Max Daily Amount: 4 tablets    7. Class 2 obesity  Assessment & Plan:  Emphasized regarding diet exercise lose weight      8. Continuous opioid dependence (HCC)    9. Hypercalcemia  Assessment & Plan:  Calcium level came back normal at 9.7 will follow with periodic labs      10. Gastroesophageal reflux disease without esophagitis  Assessment & Plan:  Continue  pantoprazole      11. Dyslipidemia    12. Primary osteoarthritis of right hip  -     oxyCODONE-acetaminophen (Percocet) 7.5-325 MG per tablet; Take 1 tablet by mouth every 6 (six) hours as needed for moderate pain Max Daily Amount: 4 tablets    13. Status post total replacement of right hip  -     oxyCODONE-acetaminophen (Percocet) 7.5-325 MG per tablet; Take 1 tablet by mouth every 6 (six) hours as needed for moderate pain Max Daily Amount: 4 tablets               Discussion Summary and Plan:  Today's care plan and medications were reviewed with patient in detail and all their questions answered to their satisfaction.    Chief Complaint   Patient presents with    Follow-up      Subjective:  Patient is coming here for a follow-up evaluation regarding hypertension blood pressures at home are still running high with the current medications she is taking including carvedilol 6.25 mg twice a day amlodipine 10 mg daily and also Aceon 8 mg twice a day.  Patient has no dizziness occasional headaches mild.  No chest pains palpitation shortness of breath.    Labs reviewed calcium level came back normal.  Will adjust the medications for the blood pressure will continue amlodipine 10 mg daily stop the ACR and start her on losartan 100 mg daily increase the dose of the carvedilol to 12.5 mg twice a day.  Will also consider next visit prazosin or low-dose diuretic Lasix 10 mg.  Discussed with patient at length explained all the medications        The following portions of the patient's history were reviewed and updated as appropriate: allergies, current medications, past family history, past medical history, past social history, past surgical history and problem list.    Review of Systems   Constitutional:  Negative for chills and fever.   HENT:  Negative for ear pain and sore throat.    Eyes:  Negative for pain and visual disturbance.   Respiratory:  Negative for cough and shortness of breath.    Cardiovascular:  Negative for  "chest pain and palpitations.   Gastrointestinal:  Negative for abdominal pain and vomiting.   Genitourinary:  Negative for dysuria and hematuria.   Musculoskeletal:  Negative for arthralgias and back pain.   Skin:  Negative for color change and rash.   Neurological:  Negative for seizures and syncope.   All other systems reviewed and are negative.        Historical Information   Patient Active Problem List   Diagnosis    Chronic pain syndrome    Chronic midline low back pain without sciatica    H/O bariatric surgery    Hypertension    Gastroesophageal reflux disease    Continuous opioid dependence (HCC)    Primary osteoarthritis    Hypercalcemia    Dyslipidemia    Right hip pain    Anxiety and depression    Acquired trigger finger of right ring finger    Primary osteoarthritis of right hip    Elevated blood pressure reading in office with white coat syndrome, with diagnosis of hypertension    Chronic hepatitis C without hepatic coma (HCC)    Class 2 obesity    Status post total replacement of right hip    Morbid obesity (HCC)     Past Medical History:   Diagnosis Date    Anxiety     Apnea, sleep     patient states \"repeated test says no\"    Arthritis     severe in the joints    Chronic pain disorder     joints    Depression     DJD (degenerative joint disease)     Fall 11/22/2018    tripped on a folded rug- left hamstring injury    Foot injury     right foot-fx of bone-no repair-has severe arthritis in the foot and ankle    Fractures yr. 2000 or before    left ankle    GERD (gastroesophageal reflux disease)     Hepatitis C not sure    History of sleep apnea     Hypercholesteremia     Hypertension     Insomnia     Low back pain     Obesity     Lost 70lb with gastric sleeve (2011)    Osteoarthritis     Stomach disorder     take meds. for this    Use of cane as ambulatory aid     Wears glasses     Wears partial dentures     upper and lower-but did lose the lower      Past Surgical History:   Procedure Laterality Date "    ANKLE ARTHROSCOPY Right     ANKLE SURGERY  not sure    orthoscopic/rt. ankle    APPENDECTOMY      BARIATRIC SURGERY  maybe 2011    CHOLECYSTECTOMY      COLONOSCOPY  10/26/2021    DXA PROCEDURE (HISTORICAL)  10/13/2020    GASTRECTOMY SLEEVE LAPAROSCOPIC  2011    lost 70 lb    HAND SURGERY      rt. hand, trigger finger    JOINT REPLACEMENT Bilateral     knee replacements    KNEE SURGERY  2001 maybe    MAMMO (HISTORICAL)  01/26/2022    NERVE BLOCK Bilateral 12/29/2017    Procedure: L4, L5, S1 MEDIAL BRANCH BLOCK #1;  Surgeon: Lloyd Lewis MD;  Location: [facility] MAIN OR;  Service: Pain Management     NERVE BLOCK Bilateral 01/12/2018    Procedure: BLOCK MEDIAL BRANCH L4, L5-S1  #2;  Surgeon: Lloyd Lewis MD;  Location: [facility] MAIN OR;  Service: Pain Management     NH ARTHROPLASTY GLENOHUMERAL JOINT TOTAL SHOULDER Right 05/23/2019    Procedure: ARTHROPLASTY SHOULDER TOTAL , excision of deep cyst at shoulder, biceps tenotomy;  Surgeon: Dileep Thomas MD;  Location: [facility] MAIN OR;  Service: Orthopedics    NH ARTHRP ACETBLR/PROX FEM PROSTC AGRFT/ALGRFT Right 10/16/2023    Procedure: ARTHROPLASTY HIP TOTAL ANTERIOR,NAVIGATED - overnight;  Surgeon: Ovi Jaime DO;  Location: [facility] MAIN OR;  Service: Orthopedics    NH INJECT SI JOINT ARTHRGRPHY&/ANES/STEROID W/LESA Right 10/23/2020    Procedure: Sacroiliac Joint injection (37997);  Surgeon: Lloyd Lewis MD;  Location: [facility] MAIN OR;  Service: Pain Management     NH TENDON SHEATH INCISION Right 12/21/2021    Procedure: RELEASE TRIGGER RING FINGER;  Surgeon: Dileep Thomas MD;  Location: [facility] MAIN OR;  Service: Orthopedics    RADIOFREQUENCY ABLATION Right 03/16/2018    Procedure: Rt L4 L5 S1 RFA;  Surgeon: Lloyd Lewis MD;  Location: [facility] MAIN OR;  Service: Pain Management     RADIOFREQUENCY ABLATION Left 12/14/2018    Procedure: L3-S1 Radio Frequency Ablation ([id number]);  Surgeon: Lloyd Lewis MD;  Location: [facility] MAIN OR;  Service: Pain Management      RHIZOTOMY Left 2018    Procedure: L4, L5, S1 RFA;  Surgeon: Lloyd Lewis MD;  Location: [facility] MAIN OR;  Service: Pain Management     SHOULDER SURGERY  2019    rt. total  replacement    TONSILLECTOMY       Social History     Substance and Sexual Activity   Alcohol Use Not Currently    Comment: quit many yrs. ago     Social History     Substance and Sexual Activity   Drug Use Not Currently    Types: Amphetamines, Marijuana    Comment: aprox. 35 years ago, maybe longer     Social History     Tobacco Use   Smoking Status Former    Current packs/day: 0.00    Types: Cigarettes    Quit date: 2004    Years since quittin.0    Passive exposure: Past   Smokeless Tobacco Never     Family History   Problem Relation Age of Onset    Other Mother         in a nursing home-fx of the leg    Heart disease Father         CHF    Alcohol abuse Father     Cirrhosis Father     Colon cancer Maternal Grandmother     No Known Problems Maternal Grandfather     No Known Problems Paternal Grandmother     No Known Problems Paternal Grandfather     Other Brother         lung abscess, pneumonia    Colon cancer Maternal Aunt     No Known Problems Maternal Aunt     No Known Problems Maternal Aunt     No Known Problems Maternal Uncle     No Known Problems Paternal Aunt     No Known Problems Paternal Uncle     Breast cancer Cousin 50     Health Maintenance Due   Topic    Pneumococcal Vaccine: 65+ Years (1 - PCV)    Hepatitis A Vaccine (1 of 2 - Risk 2-dose series)    Hepatitis B Vaccine (1 of 3 - Risk 3-dose series)    COVID-19 Vaccine ( season)    PT PLAN OF CARE       Meds/Allergies       Current Outpatient Medications:     acetaminophen (TYLENOL) 325 mg tablet, Take 3 tablets (975 mg total) by mouth every 8 (eight) hours, Disp: , Rfl: 0    amLODIPine (NORVASC) 10 mg tablet, TAKE 1 TABLET BY MOUTH DAILY (Patient taking differently: Take 10 mg by mouth every morning), Disp: 90 tablet, Rfl: 3    Ascorbic Acid  "(vitamin C) 100 MG tablet, Take 1 tablet (100 mg total) by mouth daily, Disp: , Rfl:     carvedilol (COREG) 12.5 mg tablet, Take 1 tablet (12.5 mg total) by mouth 2 (two) times a day with meals, Disp: 60 tablet, Rfl: 1    cholecalciferol (VITAMIN D3) 1,000 units tablet, Take 1 tablet (1,000 Units total) by mouth daily, Disp: , Rfl:     clindamycin (CLEOCIN) 300 MG capsule, Take 600 mg by mouth 1 (one) time One hour before a procedure/dental work, Disp: , Rfl:     Coenzyme Q10 (CoQ-10) 100 MG capsule, Take 1 capsule (100 mg total) by mouth daily, Disp: , Rfl:     Cyanocobalamin (VITAMIN B 12 PO), Take 500 mcg by mouth every morning, Disp: , Rfl:     diphenhydrAMINE (BENADRYL) 25 mg tablet, Take 25 mg by mouth every 8 (eight) hours as needed for itching , Disp: , Rfl:     escitalopram (LEXAPRO) 10 mg tablet, TAKE 1 AND 1/2 TABLETS BY MOUTH  DAILY AT BEDTIME, Disp: 135 tablet, Rfl: 1    losartan (COZAAR) 100 MG tablet, Take 1 tablet (100 mg total) by mouth daily, Disp: 30 tablet, Rfl: 1    Melatonin 10 MG TABS, Take by mouth daily at bedtime, Disp: , Rfl:     naloxone (NARCAN) 4 mg/0.1 mL nasal spray, Administer 1 spray into a nostril. If no response after 2-3 minutes, give another dose in the other nostril using a new spray., Disp: 1 each, Rfl: 0    omeprazole (PriLOSEC) 20 mg delayed release capsule, Take 1 capsule (20 mg total) by mouth daily, Disp: , Rfl:     oxyCODONE-acetaminophen (Percocet) 7.5-325 MG per tablet, Take 1 tablet by mouth every 6 (six) hours as needed for moderate pain Max Daily Amount: 4 tablets, Disp: 120 tablet, Rfl: 0    psyllium (METAMUCIL) 58.6 % powder, Take by mouth daily, Disp: , Rfl:     zinc gluconate 50 mg tablet, Take 1 tablet (50 mg total) by mouth daily, Disp: , Rfl:       Objective:    Vitals:   Pulse 85   Ht 5' 2\" (1.575 m)   Wt 99.8 kg (220 lb)   SpO2 98%   BMI 40.24 kg/m²   Body mass index is 40.24 kg/m².  Vitals:    01/24/24 1437   Weight: 99.8 kg (220 lb)       Physical " Exam  Vitals and nursing note reviewed.   Constitutional:       Appearance: Normal appearance.   Cardiovascular:      Rate and Rhythm: Normal rate and regular rhythm.      Heart sounds: Normal heart sounds.   Pulmonary:      Effort: Pulmonary effort is normal.      Breath sounds: Normal breath sounds.   Musculoskeletal:      Cervical back: Normal range of motion and neck supple.      Right lower leg: No edema.      Left lower leg: No edema.   Skin:     General: Skin is warm and dry.   Neurological:      Mental Status: She is alert and oriented to person, place, and time.   Psychiatric:         Mood and Affect: Mood normal.         Behavior: Behavior normal.         Lab Review   Appointment on 01/18/2024   Component Date Value Ref Range Status    WBC 01/18/2024 5.76  4.31 - 10.16 Thousand/uL Final    RBC 01/18/2024 4.63  3.81 - 5.12 Million/uL Final    Hemoglobin 01/18/2024 12.2  11.5 - 15.4 g/dL Final    Hematocrit 01/18/2024 40.1  34.8 - 46.1 % Final    MCV 01/18/2024 87  82 - 98 fL Final    MCH 01/18/2024 26.3 (L)  26.8 - 34.3 pg Final    MCHC 01/18/2024 30.4 (L)  31.4 - 37.4 g/dL Final    RDW 01/18/2024 13.6  11.6 - 15.1 % Final    MPV 01/18/2024 10.6  8.9 - 12.7 fL Final    Platelets 01/18/2024 302  149 - 390 Thousands/uL Final    nRBC 01/18/2024 0  /100 WBCs Final    Neutrophils Relative 01/18/2024 47  43 - 75 % Final    Immat GRANS % 01/18/2024 0  0 - 2 % Final    Lymphocytes Relative 01/18/2024 39  14 - 44 % Final    Monocytes Relative 01/18/2024 9  4 - 12 % Final    Eosinophils Relative 01/18/2024 4  0 - 6 % Final    Basophils Relative 01/18/2024 1  0 - 1 % Final    Neutrophils Absolute 01/18/2024 2.74  1.85 - 7.62 Thousands/µL Final    Immature Grans Absolute 01/18/2024 0.01  0.00 - 0.20 Thousand/uL Final    Lymphocytes Absolute 01/18/2024 2.23  0.60 - 4.47 Thousands/µL Final    Monocytes Absolute 01/18/2024 0.53  0.17 - 1.22 Thousand/µL Final    Eosinophils Absolute 01/18/2024 0.22  0.00 - 0.61 Thousand/µL  Final    Basophils Absolute 01/18/2024 0.03  0.00 - 0.10 Thousands/µL Final    Sodium 01/18/2024 141  135 - 147 mmol/L Final    Potassium 01/18/2024 4.3  3.5 - 5.3 mmol/L Final    Chloride 01/18/2024 105  96 - 108 mmol/L Final    CO2 01/18/2024 29  21 - 32 mmol/L Final    ANION GAP 01/18/2024 7  mmol/L Final    BUN 01/18/2024 14  5 - 25 mg/dL Final    Creatinine 01/18/2024 0.48 (L)  0.60 - 1.30 mg/dL Final    Standardized to IDMS reference method    Glucose, Fasting 01/18/2024 90  65 - 99 mg/dL Final    Calcium 01/18/2024 9.7  8.4 - 10.2 mg/dL Final    AST 01/18/2024 19  13 - 39 U/L Final    ALT 01/18/2024 14  7 - 52 U/L Final    Specimen collection should occur prior to Sulfasalazine administration due to the potential for falsely depressed results.     Alkaline Phosphatase 01/18/2024 85  34 - 104 U/L Final    Total Protein 01/18/2024 7.0  6.4 - 8.4 g/dL Final    Albumin 01/18/2024 4.1  3.5 - 5.0 g/dL Final    Total Bilirubin 01/18/2024 0.30  0.20 - 1.00 mg/dL Final    Use of this assay is not recommended for patients undergoing treatment with eltrombopag due to the potential for falsely elevated results.  N-acetyl-p-benzoquinone imine (metabolite of Acetaminophen) will generate erroneously low results in samples for patients that have taken an overdose of Acetaminophen.    eGFR 01/18/2024 101  ml/min/1.73sq m Final    Hemoglobin A1C 01/18/2024 5.7 (H)  Normal 4.0-5.6%; PreDiabetic 5.7-6.4%; Diabetic >=6.5%; Glycemic control for adults with diabetes <7.0% % Final    EAG 01/18/2024 117  mg/dl Final    Cholesterol 01/18/2024 176  See Comment mg/dL Final    Cholesterol:         Pediatric <18 Years        Desirable          <170 mg/dL      Borderline High    170-199 mg/dL      High               >=200 mg/dL        Adult >=18 Years            Desirable         <200 mg/dL      Borderline High   200-239 mg/dL      High              >239 mg/dL      Triglycerides 01/18/2024 92  See Comment mg/dL Final    Triglyceride:      0-9Y            <75mg/dL     10Y-17Y         <90 mg/dL       >=18Y     Normal          <150 mg/dL     Borderline High 150-199 mg/dL     High            200-499 mg/dL        Very High       >499 mg/dL    Specimen collection should occur prior to Metamizole administration due to the potential for falsely depressed results.    HDL, Direct 01/18/2024 71  >=50 mg/dL Final    LDL Calculated 01/18/2024 87  0 - 100 mg/dL Final    LDL Cholesterol:     Optimal           <100 mg/dl     Near Optimal      100-129 mg/dl     Above Optimal       Borderline High 130-159 mg/dl       High            160-189 mg/dl       Very High       >189 mg/dl         This screening LDL is a calculated result.   It does not have the accuracy of the Direct Measured LDL in the monitoring of patients with hyperlipidemia and/or statin therapy.   Direct Measure LDL (ATE236) must be ordered separately in these patients.    Non-HDL-Chol (CHOL-HDL) 01/18/2024 105  mg/dl Final    TSH 3RD GENERATON 01/18/2024 3.343  0.450 - 4.500 uIU/mL Final    The recommended reference ranges for TSH during pregnancy are as follows:   First trimester 0.100 to 2.500 uIU/mL   Second trimester  0.200 to 3.000 uIU/mL   Third trimester 0.300 to 3.000 uIU/m    Note: Normal ranges may not apply to patients who are transgender, non-binary, or whose legal sex, sex at birth, and gender identity differ.  Adult TSH (3rd generation) reference range follows the recommended guidelines of the American Thyroid Association, January, 2020.    Color, UA 01/18/2024 Light Yellow   Final    Clarity, UA 01/18/2024 Clear   Final    Specific Gravity, UA 01/18/2024 1.020  1.000 - 1.030 Final    pH, UA 01/18/2024 6.0  5.0, 5.5, 6.0, 6.5, 7.0, 7.5, 8.0, 8.5, 9.0 Final    Leukocytes, UA 01/18/2024 Negative  Negative Final    Nitrite, UA 01/18/2024 Negative  Negative Final    Protein, UA 01/18/2024 Negative  Negative mg/dl Final    Glucose, UA 01/18/2024 Negative  Negative mg/dl Final    Ketones, UA  "01/18/2024 Negative  Negative mg/dl Final    Urobilinogen, UA 01/18/2024 0.2  0.2, 1.0 E.U./dl E.U./dl Final    Bilirubin, UA 01/18/2024 Negative  Negative Final    Occult Blood, UA 01/18/2024 Trace-Intact (A)  Negative Final    Vit D, 25-Hydroxy 01/18/2024 30.6  30.0 - 100.0 ng/mL Final    Vitamin D guidelines established by Clinical Guidelines Subcommittee  of the Endocrine Society Task Force, 2011    Deficiency <20ng/ml   Insufficiency 20-30ng/ml   Sufficient  ng/ml     RBC, UA 01/18/2024 1-2  None Seen, 0-1, 1-2, 2-4, 0-5 /hpf Final    WBC, UA 01/18/2024 0-1  None Seen, 0-1, 1-2, 0-5, 2-4 /hpf Final    Epithelial Cells 01/18/2024 Occasional  None Seen, Occasional /hpf Final    Bacteria, UA 01/18/2024 Occasional  None Seen, Occasional /hpf Final    OTHER OBSERVATIONS 01/18/2024 Yeast Cells Present   Final    SEE OTHER OBSERVATIONS RESULTS!!!         Gustabo Sy MD        \"This note has been constructed using a voice recognition system.Therefore there may be syntax, spelling, and/or grammatical errors. Please call if you have any questions. \"  "

## 2024-01-24 NOTE — ASSESSMENT & PLAN NOTE
Patient status post gastric sleeve avoiding nonsteroidals currently taking pain medications 4 times a day as needed patient underwent bilateral knee replacement right shoulder replacement right hip replacement continues to experience low back pain

## 2024-01-24 NOTE — ASSESSMENT & PLAN NOTE
Patient is on perindopril 8 mg twice a day for many years we will change it to losartan 100 mg daily increase the dose of carvedilol to 12.5 mg twice a day and amlodipine 10 mg daily if necessary will add 10 mg of Lasix next visit.  May also consider adding prazosin

## 2024-02-08 ENCOUNTER — TELEPHONE (OUTPATIENT)
Dept: FAMILY MEDICINE CLINIC | Facility: CLINIC | Age: 69
End: 2024-02-08

## 2024-02-08 DIAGNOSIS — I10 PRIMARY HYPERTENSION: Primary | ICD-10-CM

## 2024-02-08 RX ORDER — LOSARTAN POTASSIUM AND HYDROCHLOROTHIAZIDE 12.5; 1 MG/1; MG/1
1 TABLET ORAL DAILY
Qty: 30 TABLET | Refills: 5 | Status: SHIPPED | OUTPATIENT
Start: 2024-02-08

## 2024-02-08 NOTE — PROGRESS NOTES
Patient blood pressure at home is still running high currently on losartan 100 mg daily with amlodipine 10 mg daily we will change the losartan to 100 mg/12.5 HCTZ until I see her in the office in the next few days time.

## 2024-02-16 ENCOUNTER — TELEPHONE (OUTPATIENT)
Dept: FAMILY MEDICINE CLINIC | Facility: CLINIC | Age: 69
End: 2024-02-16

## 2024-02-16 DIAGNOSIS — I10 PRIMARY HYPERTENSION: Primary | ICD-10-CM

## 2024-02-16 RX ORDER — DOXAZOSIN MESYLATE 1 MG/1
1 TABLET ORAL
Qty: 30 TABLET | Refills: 1 | Status: SHIPPED | OUTPATIENT
Start: 2024-02-16

## 2024-02-16 NOTE — PROGRESS NOTES
Patient blood pressure is still running high we will add doxazosin 1 mg tablet daily and we will see her in the office next week meanwhile she will continue with the rest of the medications.  Patient has been given instructions by the medical assistant

## 2024-02-16 NOTE — TELEPHONE ENCOUNTER
SPOKE TO PT ADVISE THE DR SENT A NEW MEDICATION TO TAKE  ADVISE HER OF MEDICATION SENT AND IF SHE HAS ANY POAIN OR ISSUE SHE SHOULD GO TO THE ER UNTIL  SHE CAN SEE DR. LINK

## 2024-02-16 NOTE — TELEPHONE ENCOUNTER
Pt called to say that since her bp meds was adjusted last week, her systolic number did not change at all. She said the bottom number is good. The top numbers have been up over 170. She takes it twice a day. She would like a call back today because she wants to know before the weekend. She said her bp cuff is accurate. This morning it was 178/83 and that is average for her.

## 2024-02-21 NOTE — PROGRESS NOTES
Office Visit Note  24     Zonia Taylor 69 y.o. female MRN: [id number]  : 1955    Assessment:     1. Elevated blood pressure reading in office with white coat syndrome, with diagnosis of hypertension  Assessment & Plan:  Blood pressure today initially was 156/60 7 repeat 1 came back at 130/66 after he started on doxazosin 1 mg at bedtime patient is also on amlodipine 10 mg daily carvedilol 12.5 mg twice a day losartan HCTZ 100/12.5 daily.  Recommend continue to monitor blood pressures at home which still shows on the higher side I asked her to bring the blood pressure machine      2. Anxiety and depression  Assessment & Plan:  Patient with anxiety and depression symptoms doing better with Lexapro 15 mg daily      3. Chronic midline low back pain without sciatica  Assessment & Plan:  Patient with chronic pain syndrome currently taking oxycodone every 6 hours 7.5 mg history of midline low back pain without sciatica without taking medication she has difficulty functioning    Orders:  -     [facility] Prescribed Meds and Special Instructions  -     Amphetamines, Methamphetamines  -     Butalbital  -     Phenobarbital  -     Secobarbital  -     Alprazolam  -     Clonazepam  -     Diazepam  -     Lorazepam  -     Gabapentin  -     Pregabalin  -     Cocaine  -     Heroin  -     Buprenorphine  -     Levorphanol  -     Meperidine  -     Naltrexone  -     Fentanyl  -     Methadone  -     Oxycodone  -     Tapentadol  -     THC  -     Tramadol  -     Codeine, Hydrocodone, Hydropmorphone, Morphine  -     Bath Salts  -     Ethyl Glucuronide/Ethyl Sulfate  -     Kratom  -     Spice  -     Methylphenidate  -     Phentermine  -     Validity Oxidant  -     Validity Creatinine  -     Validity pH  -     Validity Specific  -     Xylazine Definitive Test  -     oxyCODONE-acetaminophen (Percocet) 7.5-325 MG per tablet; Take 1 tablet by mouth every 6 (six) hours as needed for moderate pain Max Daily Amount: 4  tablets    4. Chronic pain syndrome  Assessment & Plan:  History of gastric sleeve avoiding nonsteroidals status post bilateral knee replacement right shoulder replacement right hip replacement low back pain we will continue the pain medication for now    Orders:  -     [facility] Prescribed Meds and Special Instructions  -     Amphetamines, Methamphetamines  -     Butalbital  -     Phenobarbital  -     Secobarbital  -     Alprazolam  -     Clonazepam  -     Diazepam  -     Lorazepam  -     Gabapentin  -     Pregabalin  -     Cocaine  -     Heroin  -     Buprenorphine  -     Levorphanol  -     Meperidine  -     Naltrexone  -     Fentanyl  -     Methadone  -     Oxycodone  -     Tapentadol  -     THC  -     Tramadol  -     Codeine, Hydrocodone, Hydropmorphone, Morphine  -     Bath Salts  -     Ethyl Glucuronide/Ethyl Sulfate  -     Kratom  -     Spice  -     Methylphenidate  -     Phentermine  -     Validity Oxidant  -     Validity Creatinine  -     Validity pH  -     Validity Specific  -     Xylazine Definitive Test  -     oxyCODONE-acetaminophen (Percocet) 7.5-325 MG per tablet; Take 1 tablet by mouth every 6 (six) hours as needed for moderate pain Max Daily Amount: 4 tablets    5. Class 2 obesity  Assessment & Plan:  In spite of having gastric sleeve patient still is obese with BMI 40.24 emphasized regarding diet exercise lifestyle modification lose weight      6. Dyslipidemia  Assessment & Plan:  Follow-up with a lipid profile later      7. Gastroesophageal reflux disease without esophagitis  Assessment & Plan:  Patient is on pantoprazole we will continue the same      8. H/O bariatric surgery    9. Hypercalcemia  Assessment & Plan:  Patient calcium level was running high we stopped the diuretic hydrochlorothiazide then her blood pressure started to go up we placed her back on losartan HCTZ we will recheck the calcium levels later if it is still running high will need to adjust the dose of the doxazosin and  can discontinue HCTZ and give plain losartan 100 mg      10. Primary osteoarthritis of right hip  Assessment & Plan:  On oxycodone pain medication will continue avoiding nonsteroidals    Orders:  -     oxyCODONE-acetaminophen (Percocet) 7.5-325 MG per tablet; Take 1 tablet by mouth every 6 (six) hours as needed for moderate pain Max Daily Amount: 4 tablets    11. Status post total replacement of right hip  -     oxyCODONE-acetaminophen (Percocet) 7.5-325 MG per tablet; Take 1 tablet by mouth every 6 (six) hours as needed for moderate pain Max Daily Amount: 4 tablets               Discussion Summary and Plan:  Today's care plan and medications were reviewed with patient in detail and all their questions answered to their satisfaction.    Chief Complaint   Patient presents with    Follow-up    Primary hypertension      Subjective:  HPI    The following portions of the patient's history were reviewed and updated as appropriate: allergies, current medications, past family history, past medical history, past social history, past surgical history and problem list.    Review of Systems   Constitutional:  Negative for chills and fever.   HENT:  Negative for ear pain and sore throat.    Eyes:  Negative for pain and visual disturbance.   Respiratory:  Negative for cough and shortness of breath.    Cardiovascular:  Negative for chest pain and palpitations.   Gastrointestinal:  Negative for abdominal pain and vomiting.   Genitourinary:  Negative for dysuria and hematuria.   Musculoskeletal:  Negative for arthralgias and back pain.   Skin:  Negative for color change and rash.   Neurological:  Negative for seizures and syncope.   All other systems reviewed and are negative.        Historical Information   Patient Active Problem List   Diagnosis    Chronic pain syndrome    Chronic midline low back pain without sciatica    H/O bariatric surgery    Hypertension    Gastroesophageal reflux disease    Continuous opioid dependence (HCC)  "   Primary osteoarthritis    Hypercalcemia    Dyslipidemia    Right hip pain    Anxiety and depression    Acquired trigger finger of right ring finger    Primary osteoarthritis of right hip    Elevated blood pressure reading in office with white coat syndrome, with diagnosis of hypertension    Chronic hepatitis C without hepatic coma (HCC)    Class 2 obesity    Status post total replacement of right hip    Morbid obesity (HCC)     Past Medical History:   Diagnosis Date    Anxiety     Apnea, sleep     patient states \"repeated test says no\"    Arthritis     severe in the joints    Chronic pain disorder     joints    Depression     DJD (degenerative joint disease)     Fall 11/22/2018    tripped on a folded rug- left hamstring injury    Foot injury     right foot-fx of bone-no repair-has severe arthritis in the foot and ankle    Fractures yr. 2000 or before    left ankle    GERD (gastroesophageal reflux disease)     Hepatitis C not sure    History of sleep apnea     Hypercholesteremia     Hypertension     Insomnia     Low back pain     Obesity     Lost 70lb with gastric sleeve (2011)    Osteoarthritis     Stomach disorder     take meds. for this    Use of cane as ambulatory aid     Wears glasses     Wears partial dentures     upper and lower-but did lose the lower      Past Surgical History:   Procedure Laterality Date    ANKLE ARTHROSCOPY Right     ANKLE SURGERY  not sure    orthoscopic/rt. ankle    APPENDECTOMY      BARIATRIC SURGERY  maybe 2011    CHOLECYSTECTOMY      COLONOSCOPY  10/26/2021    DXA PROCEDURE (HISTORICAL)  10/13/2020    GASTRECTOMY SLEEVE LAPAROSCOPIC  2011    lost 70 lb    HAND SURGERY      rt. hand, trigger finger    JOINT REPLACEMENT Bilateral     knee replacements    KNEE SURGERY  2001 maybe    MAMMO (HISTORICAL)  01/26/2022    NERVE BLOCK Bilateral 12/29/2017    Procedure: L4, L5, S1 MEDIAL BRANCH BLOCK #1;  Surgeon: Lloyd Lewis MD;  Location: [facility] MAIN OR;  Service: Pain Management     " NERVE BLOCK Bilateral 01/12/2018    Procedure: BLOCK MEDIAL BRANCH L4, L5-S1  #2;  Surgeon: Lloyd Lewis MD;  Location: [facility] MAIN OR;  Service: Pain Management     DC ARTHROPLASTY GLENOHUMERAL JOINT TOTAL SHOULDER Right 05/23/2019    Procedure: ARTHROPLASTY SHOULDER TOTAL , excision of deep cyst at shoulder, biceps tenotomy;  Surgeon: Dileep Thomas MD;  Location: [facility] MAIN OR;  Service: Orthopedics    DC ARTHRP ACETBLR/PROX FEM PROSTC AGRFT/ALGRFT Right 10/16/2023    Procedure: ARTHROPLASTY HIP TOTAL ANTERIOR,NAVIGATED - overnight;  Surgeon: Ovi Jaime DO;  Location: [facility] MAIN OR;  Service: Orthopedics    DC INJECT SI JOINT ARTHRGRPHY&/ANES/STEROID W/LESA Right 10/23/2020    Procedure: Sacroiliac Joint injection (43714);  Surgeon: Lloyd Lewis MD;  Location: [facility] MAIN OR;  Service: Pain Management     DC TENDON SHEATH INCISION Right 12/21/2021    Procedure: RELEASE TRIGGER RING FINGER;  Surgeon: Dileep Thomas MD;  Location: [facility] MAIN OR;  Service: Orthopedics    RADIOFREQUENCY ABLATION Right 03/16/2018    Procedure: Rt L4 L5 S1 RFA;  Surgeon: Lloyd Lewis MD;  Location: [facility] MAIN OR;  Service: Pain Management     RADIOFREQUENCY ABLATION Left 12/14/2018    Procedure: L3-S1 Radio Frequency Ablation ([id number]);  Surgeon: Lloyd Lewis MD;  Location: [facility] MAIN OR;  Service: Pain Management     RHIZOTOMY Left 01/25/2018    Procedure: L4, L5, S1 RFA;  Surgeon: Lloyd Lewis MD;  Location: [facility] MAIN OR;  Service: Pain Management     SHOULDER SURGERY  April/2019    rt. total  replacement    TONSILLECTOMY       Social History     Substance and Sexual Activity   Alcohol Use Not Currently    Comment: quit many yrs. ago     Social History     Substance and Sexual Activity   Drug Use Not Currently    Types: Amphetamines, Marijuana    Comment: aprox. 35 years ago, maybe longer     Social History     Tobacco Use   Smoking Status Former    Current packs/day: 0.00    Types: Cigarettes    Quit date:  2004    Years since quittin.1    Passive exposure: Past   Smokeless Tobacco Never     Family History   Problem Relation Age of Onset    Other Mother         in a nursing home-fx of the leg    Heart disease Father         CHF    Alcohol abuse Father     Cirrhosis Father     Colon cancer Maternal Grandmother     No Known Problems Maternal Grandfather     No Known Problems Paternal Grandmother     No Known Problems Paternal Grandfather     Other Brother         lung abscess, pneumonia    Colon cancer Maternal Aunt     No Known Problems Maternal Aunt     No Known Problems Maternal Aunt     No Known Problems Maternal Uncle     No Known Problems Paternal Aunt     No Known Problems Paternal Uncle     Breast cancer Cousin 50     Health Maintenance Due   Topic    Pneumococcal Vaccine: 65+ Years (1 of 2 - PCV)    Hepatitis A Vaccine (1 of 2 - Risk 2-dose series)    Hepatitis B Vaccine (1 of 3 - Risk 3-dose series)    COVID-19 Vaccine ( season)    PT PLAN OF CARE       Meds/Allergies       Current Outpatient Medications:     acetaminophen (TYLENOL) 325 mg tablet, Take 3 tablets (975 mg total) by mouth every 8 (eight) hours, Disp: , Rfl: 0    amLODIPine (NORVASC) 10 mg tablet, TAKE 1 TABLET BY MOUTH DAILY, Disp: 90 tablet, Rfl: 3    Ascorbic Acid (vitamin C) 100 MG tablet, Take 1 tablet (100 mg total) by mouth daily, Disp: , Rfl:     carvedilol (COREG) 12.5 mg tablet, Take 1 tablet (12.5 mg total) by mouth 2 (two) times a day with meals, Disp: 60 tablet, Rfl: 1    cholecalciferol (VITAMIN D3) 1,000 units tablet, Take 1 tablet (1,000 Units total) by mouth daily, Disp: , Rfl:     clindamycin (CLEOCIN) 300 MG capsule, Take 600 mg by mouth 1 (one) time One hour before a procedure/dental work, Disp: , Rfl:     Coenzyme Q10 (CoQ-10) 100 MG capsule, Take 1 capsule (100 mg total) by mouth daily, Disp: , Rfl:     Cyanocobalamin (VITAMIN B 12 PO), Take 500 mcg by mouth every morning, Disp: , Rfl:     diphenhydrAMINE  "(BENADRYL) 25 mg tablet, Take 25 mg by mouth every 8 (eight) hours as needed for itching , Disp: , Rfl:     doxazosin (CARDURA) 1 mg tablet, Take 1 tablet (1 mg total) by mouth daily at bedtime, Disp: 30 tablet, Rfl: 1    escitalopram (LEXAPRO) 10 mg tablet, TAKE 1 AND 1/2 TABLETS BY MOUTH  DAILY AT BEDTIME, Disp: 135 tablet, Rfl: 1    losartan-hydrochlorothiazide (HYZAAR) 100-12.5 MG per tablet, Take 1 tablet by mouth daily, Disp: 30 tablet, Rfl: 5    Melatonin 10 MG TABS, Take by mouth daily at bedtime, Disp: , Rfl:     naloxone (NARCAN) 4 mg/0.1 mL nasal spray, Administer 1 spray into a nostril. If no response after 2-3 minutes, give another dose in the other nostril using a new spray., Disp: 1 each, Rfl: 0    omeprazole (PriLOSEC) 20 mg delayed release capsule, Take 1 capsule (20 mg total) by mouth daily, Disp: 90 capsule, Rfl: 1    oxyCODONE-acetaminophen (Percocet) 7.5-325 MG per tablet, Take 1 tablet by mouth every 6 (six) hours as needed for moderate pain Max Daily Amount: 4 tablets, Disp: 120 tablet, Rfl: 0    psyllium (METAMUCIL) 58.6 % powder, Take by mouth daily, Disp: , Rfl:     zinc gluconate 50 mg tablet, Take 1 tablet (50 mg total) by mouth daily, Disp: , Rfl:       Objective:    Vitals:   /67 (BP Location: Right arm, Patient Position: Sitting, Cuff Size: Standard) Comment: Repeat blood pressure is 130/66  Pulse 70   Ht 5' 2\" (1.575 m)   Wt 99.8 kg (220 lb)   SpO2 96%   BMI 40.24 kg/m²   Body mass index is 40.24 kg/m².  Vitals:    02/22/24 1256   Weight: 99.8 kg (220 lb)       Physical Exam  Vitals and nursing note reviewed.   Constitutional:       Appearance: Normal appearance.   Cardiovascular:      Rate and Rhythm: Normal rate and regular rhythm.      Heart sounds: Normal heart sounds.   Pulmonary:      Effort: Pulmonary effort is normal.      Breath sounds: Normal breath sounds.   Abdominal:      Palpations: Abdomen is soft.   Musculoskeletal:      Cervical back: Normal range of motion " and neck supple.      Right lower leg: No edema.      Left lower leg: No edema.      Comments: Spine movements causing discomfort and pain   Skin:     General: Skin is warm and dry.   Neurological:      Mental Status: She is alert and oriented to person, place, and time.   Psychiatric:         Mood and Affect: Mood normal.         Behavior: Behavior normal.         Lab Review   Appointment on 01/18/2024   Component Date Value Ref Range Status    WBC 01/18/2024 5.76  4.31 - 10.16 Thousand/uL Final    RBC 01/18/2024 4.63  3.81 - 5.12 Million/uL Final    Hemoglobin 01/18/2024 12.2  11.5 - 15.4 g/dL Final    Hematocrit 01/18/2024 40.1  34.8 - 46.1 % Final    MCV 01/18/2024 87  82 - 98 fL Final    MCH 01/18/2024 26.3 (L)  26.8 - 34.3 pg Final    MCHC 01/18/2024 30.4 (L)  31.4 - 37.4 g/dL Final    RDW 01/18/2024 13.6  11.6 - 15.1 % Final    MPV 01/18/2024 10.6  8.9 - 12.7 fL Final    Platelets 01/18/2024 302  149 - 390 Thousands/uL Final    nRBC 01/18/2024 0  /100 WBCs Final    Neutrophils Relative 01/18/2024 47  43 - 75 % Final    Immat GRANS % 01/18/2024 0  0 - 2 % Final    Lymphocytes Relative 01/18/2024 39  14 - 44 % Final    Monocytes Relative 01/18/2024 9  4 - 12 % Final    Eosinophils Relative 01/18/2024 4  0 - 6 % Final    Basophils Relative 01/18/2024 1  0 - 1 % Final    Neutrophils Absolute 01/18/2024 2.74  1.85 - 7.62 Thousands/µL Final    Immature Grans Absolute 01/18/2024 0.01  0.00 - 0.20 Thousand/uL Final    Lymphocytes Absolute 01/18/2024 2.23  0.60 - 4.47 Thousands/µL Final    Monocytes Absolute 01/18/2024 0.53  0.17 - 1.22 Thousand/µL Final    Eosinophils Absolute 01/18/2024 0.22  0.00 - 0.61 Thousand/µL Final    Basophils Absolute 01/18/2024 0.03  0.00 - 0.10 Thousands/µL Final    Sodium 01/18/2024 141  135 - 147 mmol/L Final    Potassium 01/18/2024 4.3  3.5 - 5.3 mmol/L Final    Chloride 01/18/2024 105  96 - 108 mmol/L Final    CO2 01/18/2024 29  21 - 32 mmol/L Final    ANION GAP 01/18/2024 7  mmol/L  Final    BUN 01/18/2024 14  5 - 25 mg/dL Final    Creatinine 01/18/2024 0.48 (L)  0.60 - 1.30 mg/dL Final    Standardized to IDMS reference method    Glucose, Fasting 01/18/2024 90  65 - 99 mg/dL Final    Calcium 01/18/2024 9.7  8.4 - 10.2 mg/dL Final    AST 01/18/2024 19  13 - 39 U/L Final    ALT 01/18/2024 14  7 - 52 U/L Final    Specimen collection should occur prior to Sulfasalazine administration due to the potential for falsely depressed results.     Alkaline Phosphatase 01/18/2024 85  34 - 104 U/L Final    Total Protein 01/18/2024 7.0  6.4 - 8.4 g/dL Final    Albumin 01/18/2024 4.1  3.5 - 5.0 g/dL Final    Total Bilirubin 01/18/2024 0.30  0.20 - 1.00 mg/dL Final    Use of this assay is not recommended for patients undergoing treatment with eltrombopag due to the potential for falsely elevated results.  N-acetyl-p-benzoquinone imine (metabolite of Acetaminophen) will generate erroneously low results in samples for patients that have taken an overdose of Acetaminophen.    eGFR 01/18/2024 101  ml/min/1.73sq m Final    Hemoglobin A1C 01/18/2024 5.7 (H)  Normal 4.0-5.6%; PreDiabetic 5.7-6.4%; Diabetic >=6.5%; Glycemic control for adults with diabetes <7.0% % Final    EAG 01/18/2024 117  mg/dl Final    Cholesterol 01/18/2024 176  See Comment mg/dL Final    Cholesterol:         Pediatric <18 Years        Desirable          <170 mg/dL      Borderline High    170-199 mg/dL      High               >=200 mg/dL        Adult >=18 Years            Desirable         <200 mg/dL      Borderline High   200-239 mg/dL      High              >239 mg/dL      Triglycerides 01/18/2024 92  See Comment mg/dL Final    Triglyceride:     0-9Y            <75mg/dL     10Y-17Y         <90 mg/dL       >=18Y     Normal          <150 mg/dL     Borderline High 150-199 mg/dL     High            200-499 mg/dL        Very High       >499 mg/dL    Specimen collection should occur prior to Metamizole administration due to the potential for falsely  depressed results.    HDL, Direct 01/18/2024 71  >=50 mg/dL Final    LDL Calculated 01/18/2024 87  0 - 100 mg/dL Final    LDL Cholesterol:     Optimal           <100 mg/dl     Near Optimal      100-129 mg/dl     Above Optimal       Borderline High 130-159 mg/dl       High            160-189 mg/dl       Very High       >189 mg/dl         This screening LDL is a calculated result.   It does not have the accuracy of the Direct Measured LDL in the monitoring of patients with hyperlipidemia and/or statin therapy.   Direct Measure LDL (TIW030) must be ordered separately in these patients.    Non-HDL-Chol (CHOL-HDL) 01/18/2024 105  mg/dl Final    TSH 3RD GENERATON 01/18/2024 3.343  0.450 - 4.500 uIU/mL Final    The recommended reference ranges for TSH during pregnancy are as follows:   First trimester 0.100 to 2.500 uIU/mL   Second trimester  0.200 to 3.000 uIU/mL   Third trimester 0.300 to 3.000 uIU/m    Note: Normal ranges may not apply to patients who are transgender, non-binary, or whose legal sex, sex at birth, and gender identity differ.  Adult TSH (3rd generation) reference range follows the recommended guidelines of the American Thyroid Association, January, 2020.    Color, UA 01/18/2024 Light Yellow   Final    Clarity, UA 01/18/2024 Clear   Final    Specific Gravity, UA 01/18/2024 1.020  1.000 - 1.030 Final    pH, UA 01/18/2024 6.0  5.0, 5.5, 6.0, 6.5, 7.0, 7.5, 8.0, 8.5, 9.0 Final    Leukocytes, UA 01/18/2024 Negative  Negative Final    Nitrite, UA 01/18/2024 Negative  Negative Final    Protein, UA 01/18/2024 Negative  Negative mg/dl Final    Glucose, UA 01/18/2024 Negative  Negative mg/dl Final    Ketones, UA 01/18/2024 Negative  Negative mg/dl Final    Urobilinogen, UA 01/18/2024 0.2  0.2, 1.0 E.U./dl E.U./dl Final    Bilirubin, UA 01/18/2024 Negative  Negative Final    Occult Blood, UA 01/18/2024 Trace-Intact (A)  Negative Final    Vit D, 25-Hydroxy 01/18/2024 30.6  30.0 - 100.0 ng/mL Final    Vitamin D  "guidelines established by Clinical Guidelines Subcommittee  of the Endocrine Society Task Force, 2011    Deficiency <20ng/ml   Insufficiency 20-30ng/ml   Sufficient  ng/ml     RBC, UA 01/18/2024 1-2  None Seen, 0-1, 1-2, 2-4, 0-5 /hpf Final    WBC, UA 01/18/2024 0-1  None Seen, 0-1, 1-2, 0-5, 2-4 /hpf Final    Epithelial Cells 01/18/2024 Occasional  None Seen, Occasional /hpf Final    Bacteria, UA 01/18/2024 Occasional  None Seen, Occasional /hpf Final    OTHER OBSERVATIONS 01/18/2024 Yeast Cells Present   Final    SEE OTHER OBSERVATIONS RESULTS!!!         Gustabo Sy MD        \"This note has been constructed using a voice recognition system.Therefore there may be syntax, spelling, and/or grammatical errors. Please call if you have any questions. \"  "

## 2024-02-22 ENCOUNTER — TELEPHONE (OUTPATIENT)
Dept: FAMILY MEDICINE CLINIC | Facility: CLINIC | Age: 69
End: 2024-02-22

## 2024-02-22 ENCOUNTER — OFFICE VISIT (OUTPATIENT)
Dept: FAMILY MEDICINE CLINIC | Facility: CLINIC | Age: 69
End: 2024-02-22
Payer: COMMERCIAL

## 2024-02-22 ENCOUNTER — TELEPHONE (OUTPATIENT)
Age: 69
End: 2024-02-22

## 2024-02-22 VITALS
WEIGHT: 220 LBS | SYSTOLIC BLOOD PRESSURE: 156 MMHG | OXYGEN SATURATION: 96 % | DIASTOLIC BLOOD PRESSURE: 67 MMHG | BODY MASS INDEX: 40.48 KG/M2 | HEIGHT: 62 IN | HEART RATE: 70 BPM

## 2024-02-22 DIAGNOSIS — G89.29 CHRONIC MIDLINE LOW BACK PAIN WITHOUT SCIATICA: ICD-10-CM

## 2024-02-22 DIAGNOSIS — G89.4 CHRONIC PAIN SYNDROME: ICD-10-CM

## 2024-02-22 DIAGNOSIS — E83.52 HYPERCALCEMIA: ICD-10-CM

## 2024-02-22 DIAGNOSIS — Z98.84 H/O BARIATRIC SURGERY: ICD-10-CM

## 2024-02-22 DIAGNOSIS — M16.11 PRIMARY OSTEOARTHRITIS OF RIGHT HIP: ICD-10-CM

## 2024-02-22 DIAGNOSIS — M54.50 CHRONIC MIDLINE LOW BACK PAIN WITHOUT SCIATICA: ICD-10-CM

## 2024-02-22 DIAGNOSIS — F32.A ANXIETY AND DEPRESSION: ICD-10-CM

## 2024-02-22 DIAGNOSIS — K21.9 GASTROESOPHAGEAL REFLUX DISEASE WITHOUT ESOPHAGITIS: ICD-10-CM

## 2024-02-22 DIAGNOSIS — E78.5 DYSLIPIDEMIA: ICD-10-CM

## 2024-02-22 DIAGNOSIS — I10 ELEVATED BLOOD PRESSURE READING IN OFFICE WITH WHITE COAT SYNDROME, WITH DIAGNOSIS OF HYPERTENSION: Primary | ICD-10-CM

## 2024-02-22 DIAGNOSIS — F41.9 ANXIETY AND DEPRESSION: ICD-10-CM

## 2024-02-22 DIAGNOSIS — Z96.641 STATUS POST TOTAL REPLACEMENT OF RIGHT HIP: ICD-10-CM

## 2024-02-22 DIAGNOSIS — E66.9 CLASS 2 OBESITY: ICD-10-CM

## 2024-02-22 PROCEDURE — 99214 OFFICE O/P EST MOD 30 MIN: CPT | Performed by: INTERNAL MEDICINE

## 2024-02-22 RX ORDER — OXYCODONE AND ACETAMINOPHEN 7.5; 325 MG/1; MG/1
1 TABLET ORAL EVERY 6 HOURS PRN
Qty: 120 TABLET | Refills: 0 | Status: CANCELLED | OUTPATIENT
Start: 2024-02-22

## 2024-02-22 RX ORDER — OXYCODONE AND ACETAMINOPHEN 7.5; 325 MG/1; MG/1
1 TABLET ORAL EVERY 6 HOURS PRN
Qty: 120 TABLET | Refills: 0 | Status: SHIPPED | OUTPATIENT
Start: 2024-02-22

## 2024-02-22 NOTE — TELEPHONE ENCOUNTER
Pt called her pharmacy and they do not have rx for oxycodone.  She needs it sent today she stated she will need to pick it up tomorrow ([facility])

## 2024-02-22 NOTE — ASSESSMENT & PLAN NOTE
Patient has been on pain medication for a long time avoiding nonsteroidals because of the gastric bypass surgery without pain medications patient has difficulty with day-to-day activities

## 2024-02-22 NOTE — ASSESSMENT & PLAN NOTE
Patient with chronic pain syndrome currently taking oxycodone every 6 hours 7.5 mg history of midline low back pain without sciatica without taking medication she has difficulty functioning

## 2024-02-22 NOTE — TELEPHONE ENCOUNTER
Patient returned call, no message in chart called and spoke to [facility]-clinical staff and warm transfer was complete.

## 2024-02-22 NOTE — ASSESSMENT & PLAN NOTE
In spite of having gastric sleeve patient still is obese with BMI 40.24 emphasized regarding diet exercise lifestyle modification lose weight

## 2024-02-22 NOTE — ASSESSMENT & PLAN NOTE
Patient calcium level was running high we stopped the diuretic hydrochlorothiazide then her blood pressure started to go up we placed her back on losartan HCTZ we will recheck the calcium levels later if it is still running high will need to adjust the dose of the doxazosin and can discontinue HCTZ and give plain losartan 100 mg

## 2024-02-22 NOTE — ASSESSMENT & PLAN NOTE
History of gastric sleeve avoiding nonsteroidals status post bilateral knee replacement right shoulder replacement right hip replacement low back pain we will continue the pain medication for now

## 2024-02-22 NOTE — ASSESSMENT & PLAN NOTE
Blood pressure today initially was 156/60 7 repeat 1 came back at 130/66 after he started on doxazosin 1 mg at bedtime patient is also on amlodipine 10 mg daily carvedilol 12.5 mg twice a day losartan HCTZ 100/12.5 daily.  Recommend continue to monitor blood pressures at home which still shows on the higher side I asked her to bring the blood pressure machine

## 2024-02-29 DIAGNOSIS — K02.9 DENTAL CARIES: Primary | ICD-10-CM

## 2024-02-29 RX ORDER — CLINDAMYCIN HYDROCHLORIDE 300 MG/1
CAPSULE ORAL
Qty: 6 CAPSULE | Refills: 0 | Status: SHIPPED | OUTPATIENT
Start: 2024-02-29 | End: 2024-03-01 | Stop reason: SDUPTHER

## 2024-02-29 NOTE — TELEPHONE ENCOUNTER
Reason for call:   [x] Refill   [] Prior Auth  [] Other:     Office:   [x] PCP/Provider -   [] Specialty/Provider -     Medication: ckubdamycin 300 mg, 2 tab before dental work, patient would like more than 2 caps      [facility] [email or web address]    Does the patient have enough for 3 days?   [] Yes   [x] No - Send as HP to POD

## 2024-03-01 ENCOUNTER — TELEPHONE (OUTPATIENT)
Age: 69
End: 2024-03-01

## 2024-03-01 DIAGNOSIS — K02.9 DENTAL CARIES: ICD-10-CM

## 2024-03-01 RX ORDER — CLINDAMYCIN HYDROCHLORIDE 300 MG/1
CAPSULE ORAL
Qty: 6 CAPSULE | Refills: 0 | Status: SHIPPED | OUTPATIENT
Start: 2024-03-01 | End: 2024-03-07

## 2024-03-01 NOTE — TELEPHONE ENCOUNTER
Clindamycin rx that was sent does not have directions that state how many capsules patient is to take.  Please update and re-send

## 2024-03-06 LAB
4-HYDROXY XYLAZINE: NEGATIVE NG/ML
6MAM UR QL CFM: NEGATIVE NG/ML
7AMINOCLONAZEPAM UR QL CFM: NEGATIVE NG/ML
A-OH ALPRAZ UR QL CFM: NEGATIVE NG/ML
ACCEPTABLE CREAT UR QL: NORMAL MG/DL
ACCEPTIBLE SP GR UR QL: NORMAL
AMPHET UR QL CFM: NEGATIVE NG/ML
BUPRENORPHINE UR QL CFM: NEGATIVE NG/ML
BUTALBITAL UR QL CFM: NEGATIVE NG/ML
BZE UR QL CFM: NEGATIVE NG/ML
CODEINE UR QL CFM: NEGATIVE NG/ML
EDDP UR QL CFM: NEGATIVE NG/ML
ETHYL GLUCURONIDE UR QL CFM: NEGATIVE NG/ML
ETHYL SULFATE UR QL SCN: NEGATIVE NG/ML
EUTYLONE UR QL: NEGATIVE NG/ML
FENTANYL UR QL CFM: NEGATIVE NG/ML
GLIADIN IGG SER IA-ACNC: NEGATIVE NG/ML
HYDROCODONE UR QL CFM: NEGATIVE NG/ML
HYDROMORPHONE UR QL CFM: NEGATIVE NG/ML
LORAZEPAM UR QL CFM: NEGATIVE NG/ML
ME-PHENIDATE UR QL CFM: NEGATIVE NG/ML
MEPERIDINE UR QL CFM: NEGATIVE NG/ML
METHADONE UR QL CFM: NEGATIVE NG/ML
METHAMPHET UR QL CFM: NEGATIVE NG/ML
MORPHINE UR QL CFM: NEGATIVE NG/ML
NALTREXONE UR QL CFM: NEGATIVE NG/ML
NITRITE UR QL: NORMAL UG/ML
NORBUPRENORPHINE UR QL CFM: NEGATIVE NG/ML
NORDIAZEPAM UR QL CFM: NEGATIVE NG/ML
NORFENTANYL UR QL CFM: NEGATIVE NG/ML
NORHYDROCODONE UR QL CFM: NEGATIVE NG/ML
NORMEPERIDINE UR QL CFM: NEGATIVE NG/ML
NOROXYCODONE UR QL CFM: NORMAL NG/ML
OXAZEPAM UR QL CFM: NEGATIVE NG/ML
OXYCODONE UR QL CFM: NORMAL NG/ML
OXYMORPHONE UR QL CFM: NORMAL NG/ML
PARA-FLUOROFENTANYL QUANTIFICATION: NORMAL NG/ML
PHENOBARB UR QL CFM: NEGATIVE NG/ML
RESULT ALL_PRESCRIBED MEDS AND SPECIAL INSTRUCTIONS: NORMAL
SECOBARBITAL UR QL CFM: NEGATIVE NG/ML
SL AMB 4-ANPP QUANTIFICATION: NORMAL NG/ML
SL AMB 5F-ADB-M7 METABOLITE QUANTIFICATION: NEGATIVE NG/ML
SL AMB 7-OH-MITRAGYNINE (KRATOM ALKALOID) QUANTIFICATION: NEGATIVE NG/ML
SL AMB AB-FUBINACA-M3 METABOLITE QUANTIFICATION: NEGATIVE NG/ML
SL AMB ACETYL FENTANYL QUANTIFICATION: NORMAL NG/ML
SL AMB ACETYL NORFENTANYL QUANTIFICATION: NORMAL NG/ML
SL AMB ACRYL FENTANYL QUANTIFICATION: NORMAL NG/ML
SL AMB CARFENTANIL QUANTIFICATION: NORMAL NG/ML
SL AMB CTHC (MARIJUANA METABOLITE) QUANTIFICATION: NEGATIVE NG/ML
SL AMB DEXTRORPHAN (DEXTROMETHORPHAN METABOLITE) QUANT: NEGATIVE NG/ML
SL AMB GABAPENTIN QUANTIFICATION: NEGATIVE
SL AMB JWH018 METABOLITE QUANTIFICATION: NEGATIVE NG/ML
SL AMB JWH073 METABOLITE QUANTIFICATION: NEGATIVE NG/ML
SL AMB MDMB-FUBINACA-M1 METABOLITE QUANTIFICATION: NEGATIVE NG/ML
SL AMB METHYLONE QUANTIFICATION: NEGATIVE NG/ML
SL AMB N-DESMETHYL-TRAMADOL QUANTIFICATION: NEGATIVE NG/ML
SL AMB PHENTERMINE QUANTIFICATION: NEGATIVE NG/ML
SL AMB PREGABALIN QUANTIFICATION: NEGATIVE
SL AMB RCS4 METABOLITE QUANTIFICATION: NEGATIVE NG/ML
SL AMB RITALINIC ACID QUANTIFICATION: NEGATIVE NG/ML
SMOOTH MUSCLE AB TITR SER IF: NEGATIVE NG/ML
SPECIMEN DRAWN SERPL: NEGATIVE NG/ML
SPECIMEN PH ACCEPTABLE UR: NORMAL
TAPENTADOL UR QL CFM: NEGATIVE NG/ML
TEMAZEPAM UR QL CFM: NEGATIVE NG/ML
TRAMADOL UR QL CFM: NEGATIVE NG/ML
URATE/CREAT 24H UR: NEGATIVE NG/ML
XYLAZINE QUANTIFICATION: NEGATIVE NG/ML

## 2024-03-14 ENCOUNTER — RA CDI HCC (OUTPATIENT)
Dept: OTHER | Facility: HOSPITAL | Age: 69
End: 2024-03-14

## 2024-03-19 DIAGNOSIS — I10 PRIMARY HYPERTENSION: ICD-10-CM

## 2024-03-19 RX ORDER — CARVEDILOL 12.5 MG/1
12.5 TABLET ORAL 2 TIMES DAILY WITH MEALS
Qty: 60 TABLET | Refills: 1 | Status: SHIPPED | OUTPATIENT
Start: 2024-03-19

## 2024-03-20 NOTE — PROGRESS NOTES
Office Visit Note  24     Zonia Taylor 69 y.o. female MRN: [id number]  : 1955    Assessment:     1. Primary hypertension  Assessment & Plan:  As mentioned above continue losartan HCTZ carvedilol Cardura and amlodipine recommend bring the list of medications she is taking continue follow-up currently at present dose if necessary we may discontinue the HCTZ if the calcium level comes back high.      2. Anxiety and depression  Assessment & Plan:  Patient with history of anxiety and depression currently taking Lexapro 15 mg doing much better we will continue with the same.      3. Chronic midline low back pain without sciatica  Assessment & Plan:  Patient with low back pain chronic pain syndrome was seen in the past by the pain management also currently taking oxycodone as needed every 6 hours without medication she has difficulty with moving around.    Orders:  -     oxyCODONE-acetaminophen (Percocet) 7.5-325 MG per tablet; Take 1 tablet by mouth every 6 (six) hours as needed for moderate pain Max Daily Amount: 4 tablets    4. Chronic pain syndrome  Assessment & Plan:  Patient avoiding nonsteroidals because of the gastric sleeve currently taking oxycodone for the pain she also has bilateral knee replacement right shoulder replacement and right hip replacement along with the low back pain    Orders:  -     oxyCODONE-acetaminophen (Percocet) 7.5-325 MG per tablet; Take 1 tablet by mouth every 6 (six) hours as needed for moderate pain Max Daily Amount: 4 tablets    5. Class 2 obesity  Assessment & Plan:  Patient BMI is at 40.42 s/p gastric sleeve again emphasized regarding diet exercise lifestyle modification to lose weight.      6. Continuous opioid dependence (HCC)  Assessment & Plan:  Avoiding nonsteroidals because of the gastric bypass surgery patient is dependent on this medication without that she is having difficulty to move around      7. Dyslipidemia  Assessment & Plan:  Follow-up with a lipid  profile with the next blood work      8. Elevated blood pressure reading in office with white coat syndrome, with diagnosis of hypertension  Assessment & Plan:  Patient blood pressure has been fluctuating at home with old machine she has been running higher readings she brought her machine is better old when 146/67 with a new blood pressure machine at home it is 125/67 and with my machine it is 122/66.  Will continue with the current medications including amlodipine Cardura Coreg and losartan HCTZ for now      9. Gastroesophageal reflux disease without esophagitis  Assessment & Plan:  Currently patient is taking pantoprazole without taking the medication she gets increasing heartburn      10. H/O bariatric surgery  Assessment & Plan:  Status post gastric sleeve in 2011 stable      11. Hypercalcemia  Assessment & Plan:  Follow-up with repeat calcium level patient is on HCTZ which may be a contributing factor      12. Primary osteoarthritis of right hip  Assessment & Plan:  Patient with osteoarthritis of various joints especially the right hip status post right hip replacement stable continue pain medication oxycodone not able to take the nonsteroidals status post gastric bypass    Orders:  -     oxyCODONE-acetaminophen (Percocet) 7.5-325 MG per tablet; Take 1 tablet by mouth every 6 (six) hours as needed for moderate pain Max Daily Amount: 4 tablets    13. Status post total replacement of right hip  -     oxyCODONE-acetaminophen (Percocet) 7.5-325 MG per tablet; Take 1 tablet by mouth every 6 (six) hours as needed for moderate pain Max Daily Amount: 4 tablets    14. Dental caries  -     clindamycin (CLEOCIN) 300 MG capsule; 2 capsules One hour before a procedure/dental work               Discussion Summary and Plan:  Today's care plan and medications were reviewed with patient in detail and all their questions answered to their satisfaction.    Chief Complaint   Patient presents with    Follow-up       Subjective:  Patient is coming here for a follow-up evaluation with regard to her hypertension she has been noting that blood pressure has been running again on the higher side with the older machine.  Last visit when she was here her blood pressure was stable currently she is on amlodipine carvedilol Cardura and losartan HCTZ.  Denies any symptoms of headache dizziness chest pain palpitation shortness of breath.  Right hip pain is better compared to before.  Medication reviewed labs reviewed.        The following portions of the patient's history were reviewed and updated as appropriate: allergies, current medications, past family history, past medical history, past social history, past surgical history and problem list.    Review of Systems   Constitutional:  Negative for chills and fever.   HENT:  Positive for nosebleeds. Negative for ear pain and sore throat.    Eyes:  Negative for pain and visual disturbance.   Respiratory:  Negative for cough and shortness of breath.    Cardiovascular:  Negative for chest pain and palpitations.   Gastrointestinal:  Negative for abdominal pain and vomiting.   Genitourinary:  Negative for dysuria and hematuria.   Musculoskeletal:  Negative for arthralgias and back pain.   Skin:  Negative for color change and rash.   Neurological:  Negative for seizures and syncope.   All other systems reviewed and are negative.        Historical Information   Patient Active Problem List   Diagnosis    Chronic pain syndrome    Chronic midline low back pain without sciatica    H/O bariatric surgery    Hypertension    Gastroesophageal reflux disease    Continuous opioid dependence (HCC)    Primary osteoarthritis    Hypercalcemia    Dyslipidemia    Right hip pain    Anxiety and depression    Acquired trigger finger of right ring finger    Primary osteoarthritis of right hip    Elevated blood pressure reading in office with white coat syndrome, with diagnosis of hypertension    Chronic hepatitis C  "without hepatic coma (HCC)    Class 2 obesity    Status post total replacement of right hip     Past Medical History:   Diagnosis Date    Anxiety     Apnea, sleep     patient states \"repeated test says no\"    Arthritis     severe in the joints    Chronic pain disorder     joints    Depression     DJD (degenerative joint disease)     Fall 11/22/2018    tripped on a folded rug- left hamstring injury    Foot injury     right foot-fx of bone-no repair-has severe arthritis in the foot and ankle    Fractures yr. 2000 or before    left ankle    GERD (gastroesophageal reflux disease)     Hepatitis C not sure    History of sleep apnea     Hypercholesteremia     Hypertension     Insomnia     Low back pain     Obesity     Lost 70lb with gastric sleeve (2011)    Osteoarthritis     Stomach disorder     take meds. for this    Use of cane as ambulatory aid     Wears glasses     Wears partial dentures     upper and lower-but did lose the lower      Past Surgical History:   Procedure Laterality Date    ANKLE ARTHROSCOPY Right     ANKLE SURGERY  not sure    orthoscopic/rt. ankle    APPENDECTOMY      BARIATRIC SURGERY  maybe 2011    CHOLECYSTECTOMY      COLONOSCOPY  10/26/2021    DXA PROCEDURE (HISTORICAL)  10/13/2020    GASTRECTOMY SLEEVE LAPAROSCOPIC  2011    lost 70 lb    HAND SURGERY      rt. hand, trigger finger    JOINT REPLACEMENT Bilateral     knee replacements    KNEE SURGERY  2001 maybe    MAMMO (HISTORICAL)  01/26/2022    NERVE BLOCK Bilateral 12/29/2017    Procedure: L4, L5, S1 MEDIAL BRANCH BLOCK #1;  Surgeon: Lloyd Lewis MD;  Location: [facility] MAIN OR;  Service: Pain Management     NERVE BLOCK Bilateral 01/12/2018    Procedure: BLOCK MEDIAL BRANCH L4, L5-S1  #2;  Surgeon: Lloyd Lewis MD;  Location: [facility] MAIN OR;  Service: Pain Management     MT ARTHROPLASTY GLENOHUMERAL JOINT TOTAL SHOULDER Right 05/23/2019    Procedure: ARTHROPLASTY SHOULDER TOTAL , excision of deep cyst at shoulder, biceps tenotomy;  Surgeon: " Dileep Thomas MD;  Location: [facility] MAIN OR;  Service: Orthopedics    NJ ARTHRP ACETBLR/PROX FEM PROSTC AGRFT/ALGRFT Right 10/16/2023    Procedure: ARTHROPLASTY HIP TOTAL ANTERIOR,NAVIGATED - overnight;  Surgeon: Ovi Jaime DO;  Location: [facility] MAIN OR;  Service: Orthopedics    NJ INJECT SI JOINT ARTHRGRPHY&/ANES/STEROID W/LESA Right 10/23/2020    Procedure: Sacroiliac Joint injection (96327);  Surgeon: Lloyd Lewis MD;  Location: [facility] MAIN OR;  Service: Pain Management     NJ TENDON SHEATH INCISION Right 2021    Procedure: RELEASE TRIGGER RING FINGER;  Surgeon: Dileep Thomas MD;  Location: [facility] MAIN OR;  Service: Orthopedics    RADIOFREQUENCY ABLATION Right 2018    Procedure: Rt L4 L5 S1 RFA;  Surgeon: Lloyd Lewis MD;  Location: [facility] MAIN OR;  Service: Pain Management     RADIOFREQUENCY ABLATION Left 2018    Procedure: L3-S1 Radio Frequency Ablation ([id number]);  Surgeon: Lloyd Lewis MD;  Location: [facility] MAIN OR;  Service: Pain Management     RHIZOTOMY Left 2018    Procedure: L4, L5, S1 RFA;  Surgeon: Lloyd Lewis MD;  Location: [facility] MAIN OR;  Service: Pain Management     SHOULDER SURGERY  2019    rt. total  replacement    TONSILLECTOMY       Social History     Substance and Sexual Activity   Alcohol Use Not Currently    Comment: quit many yrs. ago     Social History     Substance and Sexual Activity   Drug Use Not Currently    Types: Amphetamines, Marijuana    Comment: aprox. 35 years ago, maybe longer     Social History     Tobacco Use   Smoking Status Former    Current packs/day: 0.00    Types: Cigarettes    Quit date: 2004    Years since quittin.2    Passive exposure: Past   Smokeless Tobacco Never     Family History   Problem Relation Age of Onset    Other Mother         in a nursing home-fx of the leg    Heart disease Father         CHF    Alcohol abuse Father     Cirrhosis Father     Colon cancer Maternal Grandmother     No Known Problems  Maternal Grandfather     No Known Problems Paternal Grandmother     No Known Problems Paternal Grandfather     Other Brother         lung abscess, pneumonia    Colon cancer Maternal Aunt     No Known Problems Maternal Aunt     No Known Problems Maternal Aunt     No Known Problems Maternal Uncle     No Known Problems Paternal Aunt     No Known Problems Paternal Uncle     Breast cancer Cousin 50     Health Maintenance Due   Topic    Pneumococcal Vaccine: 65+ Years (1 of 2 - PCV)    Hepatitis A Vaccine (1 of 2 - Risk 2-dose series)    Hepatitis B Vaccine (1 of 3 - Risk 3-dose series)    COVID-19 Vaccine (5 - 2023-24 season)    PT PLAN OF CARE     Breast Cancer Screening: Mammogram       Meds/Allergies       Current Outpatient Medications:     acetaminophen (TYLENOL) 325 mg tablet, Take 3 tablets (975 mg total) by mouth every 8 (eight) hours, Disp: , Rfl: 0    amLODIPine (NORVASC) 10 mg tablet, TAKE 1 TABLET BY MOUTH DAILY, Disp: 90 tablet, Rfl: 3    Ascorbic Acid (vitamin C) 100 MG tablet, Take 1 tablet (100 mg total) by mouth daily, Disp: , Rfl:     carvedilol (COREG) 12.5 mg tablet, TAKE ONE TABLET BY MOUTH TWICE A DAY WITH MEALS, Disp: 60 tablet, Rfl: 1    cholecalciferol (VITAMIN D3) 1,000 units tablet, Take 1 tablet (1,000 Units total) by mouth daily, Disp: , Rfl:     clindamycin (CLEOCIN) 300 MG capsule, 2 capsules One hour before a procedure/dental work, Disp: 20 capsule, Rfl: 0    Coenzyme Q10 (CoQ-10) 100 MG capsule, Take 1 capsule (100 mg total) by mouth daily, Disp: , Rfl:     Cyanocobalamin (VITAMIN B 12 PO), Take 500 mcg by mouth every morning, Disp: , Rfl:     diphenhydrAMINE (BENADRYL) 25 mg tablet, Take 25 mg by mouth every 8 (eight) hours as needed for itching , Disp: , Rfl:     doxazosin (CARDURA) 1 mg tablet, Take 1 tablet (1 mg total) by mouth daily at bedtime, Disp: 30 tablet, Rfl: 1    escitalopram (LEXAPRO) 10 mg tablet, TAKE 1 AND 1/2 TABLETS BY MOUTH  DAILY AT BEDTIME, Disp: 135 tablet, Rfl:  "1    losartan-hydrochlorothiazide (HYZAAR) 100-12.5 MG per tablet, Take 1 tablet by mouth daily, Disp: 30 tablet, Rfl: 5    Melatonin 10 MG TABS, Take by mouth daily at bedtime, Disp: , Rfl:     naloxone (NARCAN) 4 mg/0.1 mL nasal spray, Administer 1 spray into a nostril. If no response after 2-3 minutes, give another dose in the other nostril using a new spray., Disp: 1 each, Rfl: 0    omeprazole (PriLOSEC) 20 mg delayed release capsule, Take 1 capsule (20 mg total) by mouth daily, Disp: 90 capsule, Rfl: 1    oxyCODONE-acetaminophen (Percocet) 7.5-325 MG per tablet, Take 1 tablet by mouth every 6 (six) hours as needed for moderate pain Max Daily Amount: 4 tablets, Disp: 120 tablet, Rfl: 0    psyllium (METAMUCIL) 58.6 % powder, Take by mouth daily, Disp: , Rfl:     zinc gluconate 50 mg tablet, Take 1 tablet (50 mg total) by mouth daily, Disp: , Rfl:       Objective:    Vitals:   Pulse 74   Ht 5' 2\" (1.575 m)   Wt 100 kg (221 lb)   SpO2 98%   BMI 40.42 kg/m²   Body mass index is 40.42 kg/m².  Vitals:    03/21/24 1002   Weight: 100 kg (221 lb)       Physical Exam  Vitals and nursing note reviewed.   Constitutional:       Appearance: Normal appearance. She is obese.   Cardiovascular:      Rate and Rhythm: Normal rate and regular rhythm.      Heart sounds: Normal heart sounds.   Pulmonary:      Effort: Pulmonary effort is normal.      Breath sounds: Normal breath sounds.   Abdominal:      Palpations: Abdomen is soft.   Musculoskeletal:      Cervical back: Normal range of motion and neck supple.      Right lower leg: No edema.      Left lower leg: No edema.      Comments: Spine movements and right hip movements causing some discomfort   Skin:     General: Skin is warm and dry.   Neurological:      Mental Status: She is alert and oriented to person, place, and time.   Psychiatric:         Mood and Affect: Mood normal.         Behavior: Behavior normal.         Lab Review   Office Visit on 02/22/2024   Component Date " Value Ref Range Status    RESULT ALL_PRESC MEDS SP INSTRNS 02/22/2024 Not Applicable   Final    Amphetamine Quantification 02/22/2024 negative  100 ng/mL Final    Methamphetamine Quantification 02/22/2024 negative  100 ng/mL Final    Butalbital Quantification 02/22/2024 negative  200 ng/mL Final    Phenobarbital Quantification 02/22/2024 negative  200 ng/mL Final    Secobarbital Quantification 02/22/2024 negative  200 ng/mL Final    Alpha-Hydroxyalprazolam Quantifica* 02/22/2024 negative  20 ng/mL Final    7-Amino-Clonazepam Quantification * 02/22/2024 negative  20 ng/mL Final    Nordiazepam Quantification 02/22/2024 negative  40 ng/mL Final    Temazepam Quantification 02/22/2024 negative  50 ng/mL Final    Oxazepam Quantification 02/22/2024 negative  40 ng/mL Final    Lorazepam Quantification 02/22/2024 negative  40 ng/mL Final    Gabapentin Quantification 02/22/2024 negative  1000 Final    PREGABALIN QUANTIFICATION 02/22/2024 negative  400 Final    Cocaine metabolite Quantification 02/22/2024 negative  50 ng/mL Final    6-CLIFF (Heroin metabolite) Quantifi* 02/22/2024 negative  10 ng/mL Final    Buprenorphine Quantification 02/22/2024 negative  5 ng/mL Final    Norbuprenorphine Quantification 02/22/2024 negative  20 ng/mL Final    Dextrorphan (Dextromethorphan meta* 02/22/2024 negative  50 ng/mL Final    Meperidine Quantification 02/22/2024 negative  50 ng/mL Final    Normeperidine Quantification 02/22/2024 negative  50 ng/mL Final    Naltrexone Quantification 02/22/2024 negative  10 ng/mL Final    NALTREXOL (NALTREXONE METABOLITE) * 02/22/2024 negative  10 ng/mL Final    Fentanyl Quantification 02/22/2024 negative  1 ng/mL Final    Norfentanyl Quantification 02/22/2024 negative  8 ng/mL Final    4-ANPP Quantification 02/22/2024 Fen Neg  2 ng/mL Final    Acetyl fentanyl Quantification 02/22/2024 Fen Neg  2 ng/mL Final    Acetyl norfentanyl Quantification 02/22/2024 Fen Neg  5 ng/mL Final    Acryl fentanyl  Quantification 02/22/2024 Fen Neg  1 ng/mL Final    Carfentanil Quantification 02/22/2024 Fen Neg  2 ng/mL Final    Para-fluorofentanyl Quantification 02/22/2024 Fen Neg  1 ng/mL Final    Methadone Quantification 02/22/2024 negative  100 ng/mL Final    EDDP (Methadone metabolite) Quanti* 02/22/2024 negative  100 ng/mL Final    Oxycodone Quantification 02/22/2024 positive-3411.420  50 ng/mL Final    Noroxycodone Quantification 02/22/2024 positive-> 6400  50 ng/mL Final    Oxymorphone Quantification 02/22/2024 positive-2614.197  50 ng/mL Final    Tapentadol Quantification 02/22/2024 negative  50 ng/mL Final    cTHC (Marijuana metabolite) Quanti* 02/22/2024 negative  15 ng/mL Final    Tramadol Quantification 02/22/2024 negative  100 ng/mL Final    O-desmethyl-tramadol Quantification 02/22/2024 negative  100 ng/mL Final    N-DESMETHYL-TRAMADOL QUANTIFICATION 02/22/2024 negative  100 ng/mL Final    Codeine Quantification 02/22/2024 negative  50 ng/mL Final    Morphine Quantification 02/22/2024 negative  50 ng/mL Final    Hydrocodone Quantification 02/22/2024 negative  50 ng/mL Final    Norhydrocodone Quantification 02/22/2024 negative  50 ng/mL Final    Hydromorphone Quantification 02/22/2024 negative  50 ng/mL Final    EUTYLONE QUANTIFICATION 02/22/2024 negative  10 ng/mL Final    METHYLONE QUANTIFICATION 02/22/2024 negative  3 ng/mL Final    Ethyl Glucuronide Quantification 02/22/2024 negative  500 ng/mL Final    Ethyl Sulfate Quantification 02/22/2024 negative  500 ng/mL Final    Mitragynine (Kratom alkaloid) Mateo* 02/22/2024 negative  1 ng/mL Final    9-JY-Qpufjsytimv (Kratom alkaloid)* 02/22/2024 negative  1 ng/mL Final    5F-ADB-M7 02/22/2024 negative  10 ng/mL Final    VT-[id number] METABOLITE QUANTIFI* 02/22/2024 negative  10 ng/mL Final    FFGZ-[id number]-U9 METABOLITE QUANTI* 02/22/2024 negative  10 ng/mL Final    YEF855 metabolite Quantification 02/22/2024 negative  10 ng/mL Final    GNX667 metabolite  "Quantification 02/22/2024 negative  10 ng/mL Final    RCS4 METABOLITE QUANTIFICATION 02/22/2024 negative  10 ng/mL Final    XLR11/ METABOLITE QUANTIFICAT* 02/22/2024 negative  10 ng/mL Final    Methylphenidate Quantification 02/22/2024 negative  50 ng/mL Final    RITALINIC ACID QUANTIFICATION 02/22/2024 negative  50 ng/mL Final    PHENTERMINE QUANTIFICATION 02/22/2024 negative  50 ng/mL Final    OXIDANT 02/22/2024 normal-0  <200 ug/mL ug/mL Final    CREATININE 02/22/2024 normal-194.8  >20 mg/dL mg/dL Final    pH 02/22/2024 normal-6.0  4.5 - 9.5 Final    SPECIFIC GRAVITY URINE 02/22/2024 normal-1.024  1.003 - 1.035 Final    XYLAZINE 02/22/2024 negative  10 ng/mL Final    4-HYDROXY XYLAZINE 02/22/2024 negative  10 ng/mL Final         Gustabo Sy MD        \"This note has been constructed using a voice recognition system.Therefore there may be syntax, spelling, and/or grammatical errors. Please call if you have any questions. \"  "

## 2024-03-21 ENCOUNTER — OFFICE VISIT (OUTPATIENT)
Dept: FAMILY MEDICINE CLINIC | Facility: CLINIC | Age: 69
End: 2024-03-21
Payer: COMMERCIAL

## 2024-03-21 VITALS
HEART RATE: 74 BPM | SYSTOLIC BLOOD PRESSURE: 122 MMHG | DIASTOLIC BLOOD PRESSURE: 66 MMHG | OXYGEN SATURATION: 98 % | HEIGHT: 62 IN | WEIGHT: 221 LBS | BODY MASS INDEX: 40.67 KG/M2

## 2024-03-21 DIAGNOSIS — F32.A ANXIETY AND DEPRESSION: ICD-10-CM

## 2024-03-21 DIAGNOSIS — M16.11 PRIMARY OSTEOARTHRITIS OF RIGHT HIP: ICD-10-CM

## 2024-03-21 DIAGNOSIS — E78.5 DYSLIPIDEMIA: ICD-10-CM

## 2024-03-21 DIAGNOSIS — I10 PRIMARY HYPERTENSION: Primary | ICD-10-CM

## 2024-03-21 DIAGNOSIS — E83.52 HYPERCALCEMIA: ICD-10-CM

## 2024-03-21 DIAGNOSIS — F11.20 CONTINUOUS OPIOID DEPENDENCE (HCC): ICD-10-CM

## 2024-03-21 DIAGNOSIS — E66.9 CLASS 2 OBESITY: ICD-10-CM

## 2024-03-21 DIAGNOSIS — Z98.84 H/O BARIATRIC SURGERY: ICD-10-CM

## 2024-03-21 DIAGNOSIS — K02.9 DENTAL CARIES: ICD-10-CM

## 2024-03-21 DIAGNOSIS — K21.9 GASTROESOPHAGEAL REFLUX DISEASE WITHOUT ESOPHAGITIS: ICD-10-CM

## 2024-03-21 DIAGNOSIS — I10 ELEVATED BLOOD PRESSURE READING IN OFFICE WITH WHITE COAT SYNDROME, WITH DIAGNOSIS OF HYPERTENSION: ICD-10-CM

## 2024-03-21 DIAGNOSIS — G89.29 CHRONIC MIDLINE LOW BACK PAIN WITHOUT SCIATICA: ICD-10-CM

## 2024-03-21 DIAGNOSIS — G89.4 CHRONIC PAIN SYNDROME: ICD-10-CM

## 2024-03-21 DIAGNOSIS — M54.50 CHRONIC MIDLINE LOW BACK PAIN WITHOUT SCIATICA: ICD-10-CM

## 2024-03-21 DIAGNOSIS — F41.9 ANXIETY AND DEPRESSION: ICD-10-CM

## 2024-03-21 DIAGNOSIS — Z96.641 STATUS POST TOTAL REPLACEMENT OF RIGHT HIP: ICD-10-CM

## 2024-03-21 PROBLEM — E66.01 MORBID OBESITY (HCC): Status: RESOLVED | Noted: 2024-01-24 | Resolved: 2024-03-21

## 2024-03-21 PROCEDURE — 99214 OFFICE O/P EST MOD 30 MIN: CPT | Performed by: INTERNAL MEDICINE

## 2024-03-21 PROCEDURE — G2211 COMPLEX E/M VISIT ADD ON: HCPCS | Performed by: INTERNAL MEDICINE

## 2024-03-21 RX ORDER — CLINDAMYCIN HYDROCHLORIDE 300 MG/1
CAPSULE ORAL
Qty: 20 CAPSULE | Refills: 0 | Status: SHIPPED | OUTPATIENT
Start: 2024-03-21 | End: 2024-03-27

## 2024-03-21 RX ORDER — OXYCODONE AND ACETAMINOPHEN 7.5; 325 MG/1; MG/1
1 TABLET ORAL EVERY 6 HOURS PRN
Qty: 120 TABLET | Refills: 0 | Status: SHIPPED | OUTPATIENT
Start: 2024-03-21

## 2024-03-21 NOTE — ASSESSMENT & PLAN NOTE
Currently patient is taking pantoprazole without taking the medication she gets increasing heartburn

## 2024-03-21 NOTE — ASSESSMENT & PLAN NOTE
Patient with osteoarthritis of various joints especially the right hip status post right hip replacement stable continue pain medication oxycodone not able to take the nonsteroidals status post gastric bypass

## 2024-03-21 NOTE — ASSESSMENT & PLAN NOTE
As mentioned above continue losartan HCTZ carvedilol Cardura and amlodipine recommend bring the list of medications she is taking continue follow-up currently at present dose if necessary we may discontinue the HCTZ if the calcium level comes back high.

## 2024-03-21 NOTE — ASSESSMENT & PLAN NOTE
Patient avoiding nonsteroidals because of the gastric sleeve currently taking oxycodone for the pain she also has bilateral knee replacement right shoulder replacement and right hip replacement along with the low back pain

## 2024-03-21 NOTE — ASSESSMENT & PLAN NOTE
Patient BMI is at 40.42 s/p gastric sleeve again emphasized regarding diet exercise lifestyle modification to lose weight.

## 2024-03-21 NOTE — ASSESSMENT & PLAN NOTE
Patient with low back pain chronic pain syndrome was seen in the past by the pain management also currently taking oxycodone as needed every 6 hours without medication she has difficulty with moving around.

## 2024-03-21 NOTE — ASSESSMENT & PLAN NOTE
Avoiding nonsteroidals because of the gastric bypass surgery patient is dependent on this medication without that she is having difficulty to move around

## 2024-03-21 NOTE — ASSESSMENT & PLAN NOTE
Patient with history of anxiety and depression currently taking Lexapro 15 mg doing much better we will continue with the same.

## 2024-03-21 NOTE — ASSESSMENT & PLAN NOTE
Patient blood pressure has been fluctuating at home with old machine she has been running higher readings she brought her machine is better old when 146/67 with a new blood pressure machine at home it is 125/67 and with my machine it is 122/66.  Will continue with the current medications including amlodipine Cardura Coreg and losartan HCTZ for now

## 2024-04-09 DIAGNOSIS — I10 PRIMARY HYPERTENSION: ICD-10-CM

## 2024-04-09 RX ORDER — DOXAZOSIN MESYLATE 1 MG/1
1 TABLET ORAL
Qty: 30 TABLET | Refills: 5 | Status: SHIPPED | OUTPATIENT
Start: 2024-04-09

## 2024-04-16 NOTE — PROGRESS NOTES
Name: Zonia Taylor      : 1955      MRN: [id number]  Encounter Provider: Gustabo Sy MD  Encounter Date: 2024   Encounter department: [facility] [place]    Assessment & Plan     1. Primary hypertension  Assessment & Plan:  As mentioned in the history of present illness patient blood pressure is stable today it is 126/70 on the right arm and 130/76 on the left we will continue with losartan/HCTZ Cardura amlodipine and doxazosin later on I will change the combination may start her on Tribenzor      2. Anxiety and depression  Assessment & Plan:  Currently patient on Lexapro 15 mg daily will continue      3. Chronic midline low back pain without sciatica  Assessment & Plan:  Chronic midline low back pain on oxycodone every 6 hours as needed without taking the pain medication she has difficulty to move around and do her day-to-day activities patient lives alone    Orders:  -     oxyCODONE-acetaminophen (Percocet) 7.5-325 MG per tablet; Take 1 tablet by mouth every 6 (six) hours as needed for moderate pain Max Daily Amount: 4 tablets    4. Chronic pain syndrome  Assessment & Plan:  Patient with gastric sleeve avoiding nonsteroidals currently on oxycodone    Orders:  -     oxyCODONE-acetaminophen (Percocet) 7.5-325 MG per tablet; Take 1 tablet by mouth every 6 (six) hours as needed for moderate pain Max Daily Amount: 4 tablets    5. Class 2 obesity  Assessment & Plan:  BMI is 40.46 we will continue to recommend lifestyle modification cutting back on carbohydrate intake calorie intake patient s/p gastric sleeve      6. Continuous opioid dependence (HCC)    7. Dyslipidemia  Assessment & Plan:  Continue low-cholesterol diet with the lab work      8. Elevated blood pressure reading in office with white coat syndrome, with diagnosis of hypertension  Assessment & Plan:  Patient has been stable in the office also now      9. Gastroesophageal reflux disease without esophagitis    10. H/O  bariatric surgery    11. Primary osteoarthritis of right hip  Assessment & Plan:  Continues with aches and pains in the joints continue oxycodone avoiding nonsteroidals patient s/p gastric sleeve    Orders:  -     oxyCODONE-acetaminophen (Percocet) 7.5-325 MG per tablet; Take 1 tablet by mouth every 6 (six) hours as needed for moderate pain Max Daily Amount: 4 tablets    12. Status post total replacement of right hip  -     oxyCODONE-acetaminophen (Percocet) 7.5-325 MG per tablet; Take 1 tablet by mouth every 6 (six) hours as needed for moderate pain Max Daily Amount: 4 tablets           Subjective      Patient is coming here for a follow-up evaluation with regards to symptoms of hypertension also patient with chronic pain syndrome denies any symptoms of chest pain palpitation shortness of breath patient blood pressure has been fluctuating according to her at home between the 2 arms and the left arm it is on the higher side by about 20 points at times.  No headache dizziness.  Patient is on amlodipine losartan HCTZ carvedilol and doxazosin      Review of Systems   Constitutional:  Negative for chills and fever.   HENT:  Negative for ear pain and sore throat.    Eyes:  Negative for pain and visual disturbance.   Respiratory:  Negative for cough and shortness of breath.    Cardiovascular:  Negative for chest pain and palpitations.   Gastrointestinal:  Negative for abdominal pain and vomiting.   Genitourinary:  Negative for dysuria and hematuria.   Musculoskeletal:  Positive for arthralgias. Negative for back pain.   Skin:  Negative for color change and rash.   Neurological:  Negative for seizures and syncope.   All other systems reviewed and are negative.      Current Outpatient Medications on File Prior to Visit   Medication Sig   • acetaminophen (TYLENOL) 325 mg tablet Take 3 tablets (975 mg total) by mouth every 8 (eight) hours   • amLODIPine (NORVASC) 10 mg tablet TAKE 1 TABLET BY MOUTH DAILY   • Ascorbic Acid  "(vitamin C) 100 MG tablet Take 1 tablet (100 mg total) by mouth daily   • carvedilol (COREG) 12.5 mg tablet TAKE ONE TABLET BY MOUTH TWICE A DAY WITH MEALS   • cholecalciferol (VITAMIN D3) 1,000 units tablet Take 1 tablet (1,000 Units total) by mouth daily   • Coenzyme Q10 (CoQ-10) 100 MG capsule Take 1 capsule (100 mg total) by mouth daily   • Cyanocobalamin (VITAMIN B 12 PO) Take 500 mcg by mouth every morning   • diphenhydrAMINE (BENADRYL) 25 mg tablet Take 25 mg by mouth every 8 (eight) hours as needed for itching    • doxazosin (CARDURA) 1 mg tablet TAKE ONE TABLET DAILY AT BEDTIME   • escitalopram (LEXAPRO) 10 mg tablet TAKE 1 AND 1/2 TABLETS BY MOUTH  DAILY AT BEDTIME   • losartan-hydrochlorothiazide (HYZAAR) 100-12.5 MG per tablet Take 1 tablet by mouth daily   • Melatonin 10 MG TABS Take by mouth daily at bedtime   • naloxone (NARCAN) 4 mg/0.1 mL nasal spray Administer 1 spray into a nostril. If no response after 2-3 minutes, give another dose in the other nostril using a new spray.   • omeprazole (PriLOSEC) 20 mg delayed release capsule Take 1 capsule (20 mg total) by mouth daily   • psyllium (METAMUCIL) 58.6 % powder Take by mouth daily   • zinc gluconate 50 mg tablet Take 1 tablet (50 mg total) by mouth daily   • [DISCONTINUED] oxyCODONE-acetaminophen (Percocet) 7.5-325 MG per tablet Take 1 tablet by mouth every 6 (six) hours as needed for moderate pain Max Daily Amount: 4 tablets       Objective     /70 (BP Location: Right arm, Patient Position: Sitting, Cuff Size: Large) Comment: Left arm 130/76  Pulse 71   Ht 5' 2\" (1.575 m)   Wt 100 kg (221 lb 3.2 oz)   SpO2 95%   BMI 40.46 kg/m²     Physical Exam  Vitals and nursing note reviewed.   Constitutional:       Appearance: Normal appearance.   Cardiovascular:      Rate and Rhythm: Normal rate and regular rhythm.      Heart sounds: Normal heart sounds.   Pulmonary:      Effort: Pulmonary effort is normal.      Breath sounds: Normal breath sounds. "   Abdominal:      Palpations: Abdomen is soft.   Musculoskeletal:      Cervical back: Normal range of motion and neck supple.      Right lower leg: No edema.      Left lower leg: No edema.      Comments: Lumbar spine movements and right hip movements causing some discomfort   Neurological:      General: No focal deficit present.      Mental Status: She is alert and oriented to person, place, and time.       Gustabo Sy MD

## 2024-04-17 ENCOUNTER — OFFICE VISIT (OUTPATIENT)
Dept: FAMILY MEDICINE CLINIC | Facility: CLINIC | Age: 69
End: 2024-04-17
Payer: COMMERCIAL

## 2024-04-17 VITALS
OXYGEN SATURATION: 95 % | HEART RATE: 71 BPM | HEIGHT: 62 IN | DIASTOLIC BLOOD PRESSURE: 70 MMHG | WEIGHT: 221.2 LBS | BODY MASS INDEX: 40.7 KG/M2 | SYSTOLIC BLOOD PRESSURE: 126 MMHG

## 2024-04-17 DIAGNOSIS — F41.9 ANXIETY AND DEPRESSION: ICD-10-CM

## 2024-04-17 DIAGNOSIS — M16.11 PRIMARY OSTEOARTHRITIS OF RIGHT HIP: ICD-10-CM

## 2024-04-17 DIAGNOSIS — I10 PRIMARY HYPERTENSION: Primary | ICD-10-CM

## 2024-04-17 DIAGNOSIS — F11.20 CONTINUOUS OPIOID DEPENDENCE (HCC): ICD-10-CM

## 2024-04-17 DIAGNOSIS — Z98.84 H/O BARIATRIC SURGERY: ICD-10-CM

## 2024-04-17 DIAGNOSIS — K21.9 GASTROESOPHAGEAL REFLUX DISEASE WITHOUT ESOPHAGITIS: ICD-10-CM

## 2024-04-17 DIAGNOSIS — G89.29 CHRONIC MIDLINE LOW BACK PAIN WITHOUT SCIATICA: ICD-10-CM

## 2024-04-17 DIAGNOSIS — G89.4 CHRONIC PAIN SYNDROME: ICD-10-CM

## 2024-04-17 DIAGNOSIS — M54.50 CHRONIC MIDLINE LOW BACK PAIN WITHOUT SCIATICA: ICD-10-CM

## 2024-04-17 DIAGNOSIS — I10 ELEVATED BLOOD PRESSURE READING IN OFFICE WITH WHITE COAT SYNDROME, WITH DIAGNOSIS OF HYPERTENSION: ICD-10-CM

## 2024-04-17 DIAGNOSIS — E78.5 DYSLIPIDEMIA: ICD-10-CM

## 2024-04-17 DIAGNOSIS — F32.A ANXIETY AND DEPRESSION: ICD-10-CM

## 2024-04-17 DIAGNOSIS — Z96.641 STATUS POST TOTAL REPLACEMENT OF RIGHT HIP: ICD-10-CM

## 2024-04-17 DIAGNOSIS — E66.9 CLASS 2 OBESITY: ICD-10-CM

## 2024-04-17 PROCEDURE — 99213 OFFICE O/P EST LOW 20 MIN: CPT | Performed by: INTERNAL MEDICINE

## 2024-04-17 PROCEDURE — G2211 COMPLEX E/M VISIT ADD ON: HCPCS | Performed by: INTERNAL MEDICINE

## 2024-04-17 RX ORDER — OXYCODONE AND ACETAMINOPHEN 7.5; 325 MG/1; MG/1
1 TABLET ORAL EVERY 6 HOURS PRN
Qty: 120 TABLET | Refills: 0 | Status: SHIPPED | OUTPATIENT
Start: 2024-04-17

## 2024-04-17 NOTE — ASSESSMENT & PLAN NOTE
As mentioned in the history of present illness patient blood pressure is stable today it is 126/70 on the right arm and 130/76 on the left we will continue with losartan/HCTZ Cardura amlodipine and doxazosin later on I will change the combination may start her on Tribenzor

## 2024-04-17 NOTE — ASSESSMENT & PLAN NOTE
Continues with aches and pains in the joints continue oxycodone avoiding nonsteroidals patient s/p gastric sleeve

## 2024-04-17 NOTE — ASSESSMENT & PLAN NOTE
BMI is 40.46 we will continue to recommend lifestyle modification cutting back on carbohydrate intake calorie intake patient s/p gastric sleeve

## 2024-04-17 NOTE — ASSESSMENT & PLAN NOTE
Chronic midline low back pain on oxycodone every 6 hours as needed without taking the pain medication she has difficulty to move around and do her day-to-day activities patient lives alone

## 2024-05-09 ENCOUNTER — RA CDI HCC (OUTPATIENT)
Dept: OTHER | Facility: HOSPITAL | Age: 69
End: 2024-05-09

## 2024-05-09 DIAGNOSIS — F41.9 ANXIETY: ICD-10-CM

## 2024-05-10 RX ORDER — ESCITALOPRAM OXALATE 10 MG/1
TABLET ORAL
Qty: 135 TABLET | Refills: 1 | Status: SHIPPED | OUTPATIENT
Start: 2024-05-10

## 2024-05-15 NOTE — PROGRESS NOTES
Office Visit Note  24     Zonia Taylor 69 y.o. female MRN: [id number]  : 1955    Assessment:     1. Chronic midline low back pain without sciatica  Assessment & Plan:  Continues to experience pain in the low back area takes pain medication oxycodone.  He currently if she does not take the medication she has difficulty to move around we will continue with the same medication renewed  Orders:  -     oxyCODONE-acetaminophen (Percocet) 7.5-325 MG per tablet; Take 1 tablet by mouth every 6 (six) hours as needed for moderate pain Max Daily Amount: 4 tablets  2. Infection of left hand  Assessment & Plan:  Patient has a small wound she developed accidentally left palm area she had some clindamycin at home she started taking it starting to feel better on exam there is some swelling noted in the palmar aspect with small open area we will monitor it closely for now.  3. Chronic pain syndrome  Assessment & Plan:  Avoiding nonsteroidals since patient had gastric sleeve continue with oxycodone  Orders:  -     oxyCODONE-acetaminophen (Percocet) 7.5-325 MG per tablet; Take 1 tablet by mouth every 6 (six) hours as needed for moderate pain Max Daily Amount: 4 tablets  4. Primary osteoarthritis of right hip  Assessment & Plan:  Patient's status post right hip replacement stable  Orders:  -     oxyCODONE-acetaminophen (Percocet) 7.5-325 MG per tablet; Take 1 tablet by mouth every 6 (six) hours as needed for moderate pain Max Daily Amount: 4 tablets  5. Status post total replacement of right hip  -     oxyCODONE-acetaminophen (Percocet) 7.5-325 MG per tablet; Take 1 tablet by mouth every 6 (six) hours as needed for moderate pain Max Daily Amount: 4 tablets  6. Anxiety and depression  Assessment & Plan:  Continue with Lexapro 15 mg daily appears to be helping well with her anxiety and depression symptoms  7. Screening mammogram for breast cancer  -     Mammo screening bilateral w 3d & cad; Future; Expected date:  05/16/2024  8. Gastroesophageal reflux disease without esophagitis  Assessment & Plan:  Continue pantoprazole  9. Class 2 obesity  Assessment & Plan:  BMI 41.15 slightly went up emphasized regarding diet exercise lifestyle modification  10. Continuous opioid dependence (HCC)  Assessment & Plan:  Patient is on oxycodone every 6 hours as needed avoiding nonsteroidals patient with gastric bypass surgery in the past  11. H/O bariatric surgery  12. Hypercalcemia  Assessment & Plan:  Follow-up with repeat calcium level    13. Primary hypertension  Assessment & Plan:  Blood pressure today is 128/76 continue with carvedilol amlodipine Cardura  14. Dyslipidemia  Assessment & Plan:  Patient on low-cholesterol diet we will follow-up with repeat lipid profile later             Discussion Summary and Plan:  Today's care plan and medications were reviewed with patient in detail and all their questions answered to their satisfaction.    Chief Complaint   Patient presents with   • Follow-up      Subjective:  Patient is coming here for a follow-up evaluation with regards to symptoms of chronic low back pain right hip pain which is getting better on pain medications without taking pain medication she has difficulty to move around.    Patient accidentally injured her left hand on the palmar aspect got red and swollen she had clindamycin at home which she has started taking it appears to be getting better but still has some swelling.  Denies symptoms of any chest pain palpitation shortness of breath no fever no nausea vomiting.  Medications reviewed labs reviewed.        The following portions of the patient's history were reviewed and updated as appropriate: allergies, current medications, past family history, past medical history, past social history, past surgical history and problem list.    Review of Systems   Constitutional:  Negative for chills and fever.   HENT:  Negative for ear pain and sore throat.    Eyes:  Negative for  "pain and visual disturbance.   Respiratory:  Negative for cough and shortness of breath.    Cardiovascular:  Negative for chest pain and palpitations.   Gastrointestinal:  Negative for abdominal pain and vomiting.   Genitourinary:  Negative for dysuria and hematuria.   Musculoskeletal:  Negative for arthralgias and back pain.   Skin:  Negative for color change and rash.        Noted to have a small ulcer on the left palm area   Neurological:  Negative for seizures and syncope.   All other systems reviewed and are negative.        Historical Information   Patient Active Problem List   Diagnosis   • Chronic pain syndrome   • Chronic midline low back pain without sciatica   • H/O bariatric surgery   • Hypertension   • Gastroesophageal reflux disease   • Continuous opioid dependence (HCC)   • Primary osteoarthritis   • Hypercalcemia   • Dyslipidemia   • Right hip pain   • Anxiety and depression   • Acquired trigger finger of right ring finger   • Primary osteoarthritis of right hip   • Elevated blood pressure reading in office with white coat syndrome, with diagnosis of hypertension   • Chronic hepatitis C without hepatic coma (HCC)   • Class 2 obesity   • Status post total replacement of right hip   • Infection of left hand     Past Medical History:   Diagnosis Date   • Anxiety    • Apnea, sleep     patient states \"repeated test says no\"   • Arthritis     severe in the joints   • Chronic pain disorder     joints   • Depression    • DJD (degenerative joint disease)    • Fall 11/22/2018    tripped on a folded rug- left hamstring injury   • Foot injury     right foot-fx of bone-no repair-has severe arthritis in the foot and ankle   • Fractures yr. 2000 or before    left ankle   • GERD (gastroesophageal reflux disease)    • Hepatitis C not sure   • History of sleep apnea    • Hypercholesteremia    • Hypertension    • Insomnia    • Low back pain    • Obesity     Lost 70lb with gastric sleeve (2011)   • Osteoarthritis    • " Stomach disorder     take meds. for this   • Use of cane as ambulatory aid    • Wears glasses    • Wears partial dentures     upper and lower-but did lose the lower      Past Surgical History:   Procedure Laterality Date   • ANKLE ARTHROSCOPY Right    • ANKLE SURGERY  not sure    orthoscopic/rt. ankle   • APPENDECTOMY     • BARIATRIC SURGERY  maybe 2011   • CHOLECYSTECTOMY     • COLONOSCOPY  10/26/2021   • DXA PROCEDURE (HISTORICAL)  10/13/2020   • GASTRECTOMY SLEEVE LAPAROSCOPIC  2011    lost 70 lb   • HAND SURGERY      rt. hand, trigger finger   • JOINT REPLACEMENT Bilateral     knee replacements   • KNEE SURGERY  2001 maybe   • MAMMO (HISTORICAL)  01/26/2022   • NERVE BLOCK Bilateral 12/29/2017    Procedure: L4, L5, S1 MEDIAL BRANCH BLOCK #1;  Surgeon: Lloyd Lewis MD;  Location: [facility] MAIN OR;  Service: Pain Management    • NERVE BLOCK Bilateral 01/12/2018    Procedure: BLOCK MEDIAL BRANCH L4, L5-S1  #2;  Surgeon: Lloyd Lewis MD;  Location: [facility] MAIN OR;  Service: Pain Management    • NY ARTHROPLASTY GLENOHUMERAL JOINT TOTAL SHOULDER Right 05/23/2019    Procedure: ARTHROPLASTY SHOULDER TOTAL , excision of deep cyst at shoulder, biceps tenotomy;  Surgeon: Dileep Thomas MD;  Location: [facility] MAIN OR;  Service: Orthopedics   • NY ARTHRP ACETBLR/PROX FEM PROSTC AGRFT/ALGRFT Right 10/16/2023    Procedure: ARTHROPLASTY HIP TOTAL ANTERIOR,NAVIGATED - overnight;  Surgeon: Ovi Jaime DO;  Location: [facility] MAIN OR;  Service: Orthopedics   • NY INJECT SI JOINT ARTHRGRPHY&/ANES/STEROID W/LESA Right 10/23/2020    Procedure: Sacroiliac Joint injection (62437);  Surgeon: Lloyd Lewis MD;  Location: [facility] MAIN OR;  Service: Pain Management    • NY TENDON SHEATH INCISION Right 12/21/2021    Procedure: RELEASE TRIGGER RING FINGER;  Surgeon: Dileep Thomas MD;  Location: [facility] MAIN OR;  Service: Orthopedics   • RADIOFREQUENCY ABLATION Right 03/16/2018    Procedure: Rt L4 L5 S1 RFA;  Surgeon: Lloyd Lewis MD;   Location: [facility] MAIN OR;  Service: Pain Management    • RADIOFREQUENCY ABLATION Left 2018    Procedure: L3-S1 Radio Frequency Ablation ([id number]);  Surgeon: Lloyd Lewis MD;  Location: [facility] MAIN OR;  Service: Pain Management    • RHIZOTOMY Left 2018    Procedure: L4, L5, S1 RFA;  Surgeon: Lloyd Lewis MD;  Location: [facility] MAIN OR;  Service: Pain Management    • SHOULDER SURGERY  2019    rt. total  replacement   • TONSILLECTOMY       Social History     Substance and Sexual Activity   Alcohol Use Not Currently    Comment: quit many yrs. ago     Social History     Substance and Sexual Activity   Drug Use Not Currently   • Types: Amphetamines, Marijuana    Comment: aprox. 35 years ago, maybe longer     Social History     Tobacco Use   Smoking Status Former   • Current packs/day: 0.00   • Types: Cigarettes   • Quit date: 2004   • Years since quittin.3   • Passive exposure: Past   Smokeless Tobacco Never     Family History   Problem Relation Age of Onset   • Other Mother         in a nursing home-fx of the leg   • Heart disease Father         CHF   • Alcohol abuse Father    • Cirrhosis Father    • Colon cancer Maternal Grandmother    • No Known Problems Maternal Grandfather    • No Known Problems Paternal Grandmother    • No Known Problems Paternal Grandfather    • Other Brother         lung abscess, pneumonia   • Colon cancer Maternal Aunt    • No Known Problems Maternal Aunt    • No Known Problems Maternal Aunt    • No Known Problems Maternal Uncle    • No Known Problems Paternal Aunt    • No Known Problems Paternal Uncle    • Breast cancer Cousin 50     Health Maintenance Due   Topic   • Pneumococcal Vaccine: 65+ Years (1 of 2 - PCV)   • Hepatitis A Vaccine (1 of 2 - Risk 2-dose series)   • RSV Vaccine Age 60+ Years (1 - 1-dose 60+ series)   • Hepatitis B Vaccine (1 of 3 - Risk 3-dose series)   • COVID-19 Vaccine ( season)   • PT PLAN OF CARE    • Breast Cancer  Screening: Mammogram       Meds/Allergies       Current Outpatient Medications:   •  acetaminophen (TYLENOL) 325 mg tablet, Take 3 tablets (975 mg total) by mouth every 8 (eight) hours, Disp: , Rfl: 0  •  amLODIPine (NORVASC) 10 mg tablet, TAKE 1 TABLET BY MOUTH DAILY, Disp: 90 tablet, Rfl: 3  •  Ascorbic Acid (vitamin C) 100 MG tablet, Take 1 tablet (100 mg total) by mouth daily, Disp: , Rfl:   •  carvedilol (COREG) 12.5 mg tablet, TAKE ONE TABLET BY MOUTH TWICE A DAY WITH MEALS, Disp: 60 tablet, Rfl: 5  •  cholecalciferol (VITAMIN D3) 1,000 units tablet, Take 1 tablet (1,000 Units total) by mouth daily, Disp: , Rfl:   •  Coenzyme Q10 (CoQ-10) 100 MG capsule, Take 1 capsule (100 mg total) by mouth daily, Disp: , Rfl:   •  Cyanocobalamin (VITAMIN B 12 PO), Take 500 mcg by mouth every morning, Disp: , Rfl:   •  diphenhydrAMINE (BENADRYL) 25 mg tablet, Take 25 mg by mouth every 8 (eight) hours as needed for itching , Disp: , Rfl:   •  doxazosin (CARDURA) 1 mg tablet, TAKE ONE TABLET DAILY AT BEDTIME, Disp: 30 tablet, Rfl: 5  •  escitalopram (LEXAPRO) 10 mg tablet, TAKE 1 AND 1/2 TABLETS BY MOUTH  DAILY AT BEDTIME, Disp: 135 tablet, Rfl: 1  •  losartan-hydrochlorothiazide (HYZAAR) 100-12.5 MG per tablet, Take 1 tablet by mouth daily, Disp: 30 tablet, Rfl: 5  •  Melatonin 10 MG TABS, Take by mouth daily at bedtime, Disp: , Rfl:   •  naloxone (NARCAN) 4 mg/0.1 mL nasal spray, Administer 1 spray into a nostril. If no response after 2-3 minutes, give another dose in the other nostril using a new spray., Disp: 1 each, Rfl: 0  •  omeprazole (PriLOSEC) 20 mg delayed release capsule, Take 1 capsule (20 mg total) by mouth daily, Disp: 90 capsule, Rfl: 1  •  oxyCODONE-acetaminophen (Percocet) 7.5-325 MG per tablet, Take 1 tablet by mouth every 6 (six) hours as needed for moderate pain Max Daily Amount: 4 tablets, Disp: 120 tablet, Rfl: 0  •  psyllium (METAMUCIL) 58.6 % powder, Take by mouth daily, Disp: , Rfl:   •  zinc  "gluconate 50 mg tablet, Take 1 tablet (50 mg total) by mouth daily, Disp: , Rfl:       Objective:    Vitals:   /76 (BP Location: Right arm, Patient Position: Sitting, Cuff Size: Standard)   Pulse 77   Ht 5' 2\" (1.575 m)   Wt 102 kg (225 lb)   SpO2 95%   BMI 41.15 kg/m²   Body mass index is 41.15 kg/m².  Vitals:    05/16/24 1318   Weight: 102 kg (225 lb)       Physical Exam  Vitals and nursing note reviewed.   Constitutional:       Appearance: Normal appearance.   Cardiovascular:      Rate and Rhythm: Normal rate and regular rhythm.      Heart sounds: Normal heart sounds.   Pulmonary:      Effort: Pulmonary effort is normal.      Breath sounds: Normal breath sounds.   Abdominal:      General: Abdomen is flat.      Palpations: Abdomen is soft.   Musculoskeletal:      Cervical back: Normal range of motion and neck supple.      Right lower leg: No edema.      Left lower leg: No edema.      Comments: Right hip movements causing some discomfort SLR about 30 degrees both sides   Skin:     General: Skin is warm and dry.   Neurological:      General: No focal deficit present.      Mental Status: She is alert and oriented to person, place, and time.   Psychiatric:         Mood and Affect: Mood normal.         Behavior: Behavior normal.         Lab Review   No visits with results within 2 Month(s) from this visit.   Latest known visit with results is:   Office Visit on 02/22/2024   Component Date Value Ref Range Status   • RESULT ALL_PRESC MEDS SP INSTRNS 02/22/2024 Not Applicable   Final   • Amphetamine Quantification 02/22/2024 negative  100 ng/mL Final   • Methamphetamine Quantification 02/22/2024 negative  100 ng/mL Final   • Butalbital Quantification 02/22/2024 negative  200 ng/mL Final   • Phenobarbital Quantification 02/22/2024 negative  200 ng/mL Final   • Secobarbital Quantification 02/22/2024 negative  200 ng/mL Final   • Alpha-Hydroxyalprazolam Quantifica* 02/22/2024 negative  20 ng/mL Final   • " 7-Amino-Clonazepam Quantification * 02/22/2024 negative  20 ng/mL Final   • Nordiazepam Quantification 02/22/2024 negative  40 ng/mL Final   • Temazepam Quantification 02/22/2024 negative  50 ng/mL Final   • Oxazepam Quantification 02/22/2024 negative  40 ng/mL Final   • Lorazepam Quantification 02/22/2024 negative  40 ng/mL Final   • Gabapentin Quantification 02/22/2024 negative  1000 Final   • PREGABALIN QUANTIFICATION 02/22/2024 negative  400 Final   • Cocaine metabolite Quantification 02/22/2024 negative  50 ng/mL Final   • 6-CLIFF (Heroin metabolite) Quantifi* 02/22/2024 negative  10 ng/mL Final   • Buprenorphine Quantification 02/22/2024 negative  5 ng/mL Final   • Norbuprenorphine Quantification 02/22/2024 negative  20 ng/mL Final   • Dextrorphan (Dextromethorphan meta* 02/22/2024 negative  50 ng/mL Final   • Meperidine Quantification 02/22/2024 negative  50 ng/mL Final   • Normeperidine Quantification 02/22/2024 negative  50 ng/mL Final   • Naltrexone Quantification 02/22/2024 negative  10 ng/mL Final   • NALTREXOL (NALTREXONE METABOLITE) * 02/22/2024 negative  10 ng/mL Final   • Fentanyl Quantification 02/22/2024 negative  1 ng/mL Final   • Norfentanyl Quantification 02/22/2024 negative  8 ng/mL Final   • 4-ANPP Quantification 02/22/2024 Fen Neg  2 ng/mL Final   • Acetyl fentanyl Quantification 02/22/2024 Fen Neg  2 ng/mL Final   • Acetyl norfentanyl Quantification 02/22/2024 Fen Neg  5 ng/mL Final   • Acryl fentanyl Quantification 02/22/2024 Fen Neg  1 ng/mL Final   • Carfentanil Quantification 02/22/2024 Fen Neg  2 ng/mL Final   • Para-fluorofentanyl Quantification 02/22/2024 Fen Neg  1 ng/mL Final   • Methadone Quantification 02/22/2024 negative  100 ng/mL Final   • EDDP (Methadone metabolite) Quanti* 02/22/2024 negative  100 ng/mL Final   • Oxycodone Quantification 02/22/2024 positive-3411.420  50 ng/mL Final   • Noroxycodone Quantification 02/22/2024 positive-> 6400  50 ng/mL Final   • Oxymorphone  Quantification 02/22/2024 positive-2614.197  50 ng/mL Final   • Tapentadol Quantification 02/22/2024 negative  50 ng/mL Final   • cTHC (Marijuana metabolite) Quanti* 02/22/2024 negative  15 ng/mL Final   • Tramadol Quantification 02/22/2024 negative  100 ng/mL Final   • O-desmethyl-tramadol Quantification 02/22/2024 negative  100 ng/mL Final   • N-DESMETHYL-TRAMADOL QUANTIFICATION 02/22/2024 negative  100 ng/mL Final   • Codeine Quantification 02/22/2024 negative  50 ng/mL Final   • Morphine Quantification 02/22/2024 negative  50 ng/mL Final   • Hydrocodone Quantification 02/22/2024 negative  50 ng/mL Final   • Norhydrocodone Quantification 02/22/2024 negative  50 ng/mL Final   • Hydromorphone Quantification 02/22/2024 negative  50 ng/mL Final   • EUTYLONE QUANTIFICATION 02/22/2024 negative  10 ng/mL Final   • METHYLONE QUANTIFICATION 02/22/2024 negative  3 ng/mL Final   • Ethyl Glucuronide Quantification 02/22/2024 negative  500 ng/mL Final   • Ethyl Sulfate Quantification 02/22/2024 negative  500 ng/mL Final   • Mitragynine (Kratom alkaloid) Mateo* 02/22/2024 negative  1 ng/mL Final   • 6-PT-Kfodlbxidoq (Kratom alkaloid)* 02/22/2024 negative  1 ng/mL Final   • 5F-ADB-M7 02/22/2024 negative  10 ng/mL Final   • YK-LGLGQCNM-A3 METABOLITE QUANTIFI* 02/22/2024 negative  10 ng/mL Final   • QJED-OMESCJQL-S6 METABOLITE QUANTI* 02/22/2024 negative  10 ng/mL Final   • ABC223 metabolite Quantification 02/22/2024 negative  10 ng/mL Final   • UED178 metabolite Quantification 02/22/2024 negative  10 ng/mL Final   • RCS4 METABOLITE QUANTIFICATION 02/22/2024 negative  10 ng/mL Final   • XLR11/ METABOLITE QUANTIFICAT* 02/22/2024 negative  10 ng/mL Final   • Methylphenidate Quantification 02/22/2024 negative  50 ng/mL Final   • RITALINIC ACID QUANTIFICATION 02/22/2024 negative  50 ng/mL Final   • PHENTERMINE QUANTIFICATION 02/22/2024 negative  50 ng/mL Final   • OXIDANT 02/22/2024 normal-0  <200 ug/mL ug/mL Final   • CREATININE  "02/22/2024 normal-194.8  >20 mg/dL mg/dL Final   • pH 02/22/2024 normal-6.0  4.5 - 9.5 Final   • SPECIFIC GRAVITY URINE 02/22/2024 normal-1.024  1.003 - 1.035 Final   • XYLAZINE 02/22/2024 negative  10 ng/mL Final   • 4-HYDROXY XYLAZINE 02/22/2024 negative  10 ng/mL Final         Gustabo Sy MD        \"This note has been constructed using a voice recognition system.Therefore there may be syntax, spelling, and/or grammatical errors. Please call if you have any questions. \"  "

## 2024-05-16 ENCOUNTER — OFFICE VISIT (OUTPATIENT)
Dept: FAMILY MEDICINE CLINIC | Facility: CLINIC | Age: 69
End: 2024-05-16
Payer: COMMERCIAL

## 2024-05-16 VITALS
HEIGHT: 62 IN | BODY MASS INDEX: 41.41 KG/M2 | SYSTOLIC BLOOD PRESSURE: 128 MMHG | WEIGHT: 225 LBS | HEART RATE: 77 BPM | DIASTOLIC BLOOD PRESSURE: 76 MMHG | OXYGEN SATURATION: 95 %

## 2024-05-16 DIAGNOSIS — F41.9 ANXIETY AND DEPRESSION: ICD-10-CM

## 2024-05-16 DIAGNOSIS — M54.50 CHRONIC MIDLINE LOW BACK PAIN WITHOUT SCIATICA: Primary | ICD-10-CM

## 2024-05-16 DIAGNOSIS — L08.9 INFECTION OF LEFT HAND: ICD-10-CM

## 2024-05-16 DIAGNOSIS — I10 PRIMARY HYPERTENSION: ICD-10-CM

## 2024-05-16 DIAGNOSIS — E66.9 CLASS 2 OBESITY: ICD-10-CM

## 2024-05-16 DIAGNOSIS — G89.4 CHRONIC PAIN SYNDROME: ICD-10-CM

## 2024-05-16 DIAGNOSIS — Z98.84 H/O BARIATRIC SURGERY: ICD-10-CM

## 2024-05-16 DIAGNOSIS — Z96.641 STATUS POST TOTAL REPLACEMENT OF RIGHT HIP: ICD-10-CM

## 2024-05-16 DIAGNOSIS — F11.20 CONTINUOUS OPIOID DEPENDENCE (HCC): ICD-10-CM

## 2024-05-16 DIAGNOSIS — E78.5 DYSLIPIDEMIA: ICD-10-CM

## 2024-05-16 DIAGNOSIS — Z12.31 SCREENING MAMMOGRAM FOR BREAST CANCER: ICD-10-CM

## 2024-05-16 DIAGNOSIS — M16.11 PRIMARY OSTEOARTHRITIS OF RIGHT HIP: ICD-10-CM

## 2024-05-16 DIAGNOSIS — E83.52 HYPERCALCEMIA: ICD-10-CM

## 2024-05-16 DIAGNOSIS — G89.29 CHRONIC MIDLINE LOW BACK PAIN WITHOUT SCIATICA: Primary | ICD-10-CM

## 2024-05-16 DIAGNOSIS — K21.9 GASTROESOPHAGEAL REFLUX DISEASE WITHOUT ESOPHAGITIS: ICD-10-CM

## 2024-05-16 DIAGNOSIS — F32.A ANXIETY AND DEPRESSION: ICD-10-CM

## 2024-05-16 PROCEDURE — 99214 OFFICE O/P EST MOD 30 MIN: CPT | Performed by: INTERNAL MEDICINE

## 2024-05-16 PROCEDURE — G2211 COMPLEX E/M VISIT ADD ON: HCPCS | Performed by: INTERNAL MEDICINE

## 2024-05-16 RX ORDER — CARVEDILOL 12.5 MG/1
12.5 TABLET ORAL 2 TIMES DAILY WITH MEALS
Qty: 60 TABLET | Refills: 5 | Status: SHIPPED | OUTPATIENT
Start: 2024-05-16

## 2024-05-16 RX ORDER — OXYCODONE AND ACETAMINOPHEN 7.5; 325 MG/1; MG/1
1 TABLET ORAL EVERY 6 HOURS PRN
Qty: 120 TABLET | Refills: 0 | Status: SHIPPED | OUTPATIENT
Start: 2024-05-16

## 2024-05-16 NOTE — ASSESSMENT & PLAN NOTE
Continue with Lexapro 15 mg daily appears to be helping well with her anxiety and depression symptoms

## 2024-05-16 NOTE — ASSESSMENT & PLAN NOTE
Patient is on oxycodone every 6 hours as needed avoiding nonsteroidals patient with gastric bypass surgery in the past

## 2024-05-16 NOTE — ASSESSMENT & PLAN NOTE
Continues to experience pain in the low back area takes pain medication oxycodone.  He currently if she does not take the medication she has difficulty to move around we will continue with the same medication renewed

## 2024-05-16 NOTE — ASSESSMENT & PLAN NOTE
Patient has a small wound she developed accidentally left palm area she had some clindamycin at home she started taking it starting to feel better on exam there is some swelling noted in the palmar aspect with small open area we will monitor it closely for now.

## 2024-06-13 ENCOUNTER — OFFICE VISIT (OUTPATIENT)
Dept: FAMILY MEDICINE CLINIC | Facility: CLINIC | Age: 69
End: 2024-06-13
Payer: COMMERCIAL

## 2024-06-13 ENCOUNTER — NURSE TRIAGE (OUTPATIENT)
Dept: OTHER | Facility: OTHER | Age: 69
End: 2024-06-13

## 2024-06-13 VITALS
SYSTOLIC BLOOD PRESSURE: 126 MMHG | HEIGHT: 62 IN | WEIGHT: 226.2 LBS | OXYGEN SATURATION: 97 % | DIASTOLIC BLOOD PRESSURE: 70 MMHG | BODY MASS INDEX: 41.62 KG/M2 | HEART RATE: 86 BPM

## 2024-06-13 DIAGNOSIS — Z96.641 STATUS POST TOTAL REPLACEMENT OF RIGHT HIP: ICD-10-CM

## 2024-06-13 DIAGNOSIS — G89.4 CHRONIC PAIN SYNDROME: ICD-10-CM

## 2024-06-13 DIAGNOSIS — E66.9 CLASS 2 OBESITY: ICD-10-CM

## 2024-06-13 DIAGNOSIS — F32.A ANXIETY AND DEPRESSION: ICD-10-CM

## 2024-06-13 DIAGNOSIS — G89.29 CHRONIC MIDLINE LOW BACK PAIN WITHOUT SCIATICA: Primary | ICD-10-CM

## 2024-06-13 DIAGNOSIS — M16.11 PRIMARY OSTEOARTHRITIS OF RIGHT HIP: ICD-10-CM

## 2024-06-13 DIAGNOSIS — F11.20 CONTINUOUS OPIOID DEPENDENCE (HCC): ICD-10-CM

## 2024-06-13 DIAGNOSIS — Z98.84 H/O BARIATRIC SURGERY: ICD-10-CM

## 2024-06-13 DIAGNOSIS — L08.9 INFECTION OF LEFT HAND: ICD-10-CM

## 2024-06-13 DIAGNOSIS — K21.9 GASTROESOPHAGEAL REFLUX DISEASE WITHOUT ESOPHAGITIS: ICD-10-CM

## 2024-06-13 DIAGNOSIS — F41.9 ANXIETY AND DEPRESSION: ICD-10-CM

## 2024-06-13 DIAGNOSIS — E78.5 DYSLIPIDEMIA: ICD-10-CM

## 2024-06-13 DIAGNOSIS — E83.52 HYPERCALCEMIA: ICD-10-CM

## 2024-06-13 DIAGNOSIS — I10 PRIMARY HYPERTENSION: ICD-10-CM

## 2024-06-13 DIAGNOSIS — M54.50 CHRONIC MIDLINE LOW BACK PAIN WITHOUT SCIATICA: Primary | ICD-10-CM

## 2024-06-13 PROBLEM — M65.341 ACQUIRED TRIGGER FINGER OF RIGHT RING FINGER: Status: RESOLVED | Noted: 2023-08-31 | Resolved: 2024-06-13

## 2024-06-13 PROCEDURE — G2211 COMPLEX E/M VISIT ADD ON: HCPCS | Performed by: INTERNAL MEDICINE

## 2024-06-13 PROCEDURE — 99213 OFFICE O/P EST LOW 20 MIN: CPT | Performed by: INTERNAL MEDICINE

## 2024-06-13 RX ORDER — OMEPRAZOLE 20 MG/1
20 CAPSULE, DELAYED RELEASE ORAL DAILY
Qty: 90 CAPSULE | Refills: 1 | Status: SHIPPED | OUTPATIENT
Start: 2024-06-13

## 2024-06-13 RX ORDER — OXYCODONE AND ACETAMINOPHEN 7.5; 325 MG/1; MG/1
1 TABLET ORAL EVERY 6 HOURS PRN
Qty: 120 TABLET | Refills: 0 | Status: SHIPPED | OUTPATIENT
Start: 2024-06-13 | End: 2024-06-14 | Stop reason: SDUPTHER

## 2024-06-13 NOTE — TELEPHONE ENCOUNTER
Patient's Percocet prescription was sent to [facility] instead of [facility]. Please call patient back to confirm that prescription was sent to the local pharmacy.

## 2024-06-13 NOTE — ASSESSMENT & PLAN NOTE
Patient with anxiety and depression symptoms but Lexapro has been helping her a lot we will continue with the same

## 2024-06-13 NOTE — ASSESSMENT & PLAN NOTE
Blood pressure has been stable with current medications today it is 126/70 currently she is taking amlodipine 10 mg daily carvedilol 12.5 mg daily Cardura 1 mg daily and losartan/HCTZ 100/12.5 we will continue with the same.

## 2024-06-13 NOTE — PROGRESS NOTES
Office Visit Note  24     Zonia Taylor 69 y.o. female MRN: [id number]  : 1955    Assessment:     1. Chronic midline low back pain without sciatica  Assessment & Plan:  Patient with chronic low back pain on oxycodone we will continue with the same  Orders:  -     oxyCODONE-acetaminophen (Percocet) 7.5-325 MG per tablet; Take 1 tablet by mouth every 6 (six) hours as needed for moderate pain Max Daily Amount: 4 tablets  2. Chronic pain syndrome  Assessment & Plan:  Patient status post gastric sleeve avoiding nonsteroidals continue oxycodone for pain  Orders:  -     oxyCODONE-acetaminophen (Percocet) 7.5-325 MG per tablet; Take 1 tablet by mouth every 6 (six) hours as needed for moderate pain Max Daily Amount: 4 tablets  -     Biotin 5000 MCG CAPS; Take 1 capsule (5,000 mcg total) by mouth in the morning  3. Anxiety and depression  Assessment & Plan:  Patient with anxiety and depression symptoms but Lexapro has been helping her a lot we will continue with the same  4. Primary hypertension  Assessment & Plan:  Blood pressure has been stable with current medications today it is 126/70 currently she is taking amlodipine 10 mg daily carvedilol 12.5 mg daily Cardura 1 mg daily and losartan/HCTZ 100/12.5 we will continue with the same.  5. Primary osteoarthritis of right hip  -     oxyCODONE-acetaminophen (Percocet) 7.5-325 MG per tablet; Take 1 tablet by mouth every 6 (six) hours as needed for moderate pain Max Daily Amount: 4 tablets  6. Status post total replacement of right hip  -     oxyCODONE-acetaminophen (Percocet) 7.5-325 MG per tablet; Take 1 tablet by mouth every 6 (six) hours as needed for moderate pain Max Daily Amount: 4 tablets  7. Class 2 obesity  Assessment & Plan:  BMI is 41.37 continue with diet exercise lifestyle modification s/p gastric sleeve  8. Dyslipidemia  9. Gastroesophageal reflux disease without esophagitis  10. H/O bariatric surgery  11. Hypercalcemia  12. Infection of left  hand  13. Continuous opioid dependence (HCC)  Assessment & Plan:  Patient has been on oxycodone every 6 hours not able to give him nonsteroidals             Discussion Summary and Plan:  Today's care plan and medications were reviewed with patient in detail and all their questions answered to their satisfaction.    Chief Complaint   Patient presents with   • Follow-up      Subjective:  Patient with chronic pain syndrome of the low back area and also the right hip for which she underwent surgery pain is better after surgery but still present currently taking oxycodone.  Without taking medication she has difficulty with ambulation and do day-to-day tasks.  Medications reviewed labs reviewed.        The following portions of the patient's history were reviewed and updated as appropriate: allergies, current medications, past family history, past medical history, past social history, past surgical history and problem list.    Review of Systems   Constitutional:  Negative for chills and fever.   HENT:  Negative for ear pain and sore throat.    Eyes:  Negative for pain and visual disturbance.   Respiratory:  Negative for cough and shortness of breath.    Cardiovascular:  Negative for chest pain and palpitations.   Gastrointestinal:  Negative for abdominal pain and vomiting.   Genitourinary:  Negative for dysuria and hematuria.   Musculoskeletal:  Positive for arthralgias and back pain.   Skin:  Negative for color change and rash.   Neurological:  Negative for seizures and syncope.   All other systems reviewed and are negative.        Historical Information   Patient Active Problem List   Diagnosis   • Chronic pain syndrome   • Chronic midline low back pain without sciatica   • H/O bariatric surgery   • Hypertension   • Gastroesophageal reflux disease   • Continuous opioid dependence (HCC)   • Primary osteoarthritis   • Hypercalcemia   • Dyslipidemia   • Right hip pain   • Anxiety and depression   • Primary osteoarthritis  "of right hip   • Chronic hepatitis C without hepatic coma (HCC)   • Class 2 obesity   • Status post total replacement of right hip   • Infection of left hand     Past Medical History:   Diagnosis Date   • Acquired trigger finger of right ring finger 08/31/2023   • Anxiety    • Apnea, sleep     patient states \"repeated test says no\"   • Arthritis     severe in the joints   • Chronic pain disorder     joints   • Depression    • DJD (degenerative joint disease)    • Fall 11/22/2018    tripped on a folded rug- left hamstring injury   • Foot injury     right foot-fx of bone-no repair-has severe arthritis in the foot and ankle   • Fractures yr. 2000 or before    left ankle   • GERD (gastroesophageal reflux disease)    • Hepatitis C not sure   • History of sleep apnea    • Hypercholesteremia    • Hypertension    • Insomnia    • Low back pain    • Obesity     Lost 70lb with gastric sleeve (2011)   • Osteoarthritis    • Stomach disorder     take meds. for this   • Use of cane as ambulatory aid    • Wears glasses    • Wears partial dentures     upper and lower-but did lose the lower      Past Surgical History:   Procedure Laterality Date   • ANKLE ARTHROSCOPY Right    • ANKLE SURGERY  not sure    orthoscopic/rt. ankle   • APPENDECTOMY     • BARIATRIC SURGERY  maybe 2011   • CHOLECYSTECTOMY     • COLONOSCOPY  10/26/2021   • DXA PROCEDURE (HISTORICAL)  10/13/2020   • GASTRECTOMY SLEEVE LAPAROSCOPIC  2011    lost 70 lb   • HAND SURGERY      rt. hand, trigger finger   • JOINT REPLACEMENT Bilateral     knee replacements   • KNEE SURGERY  2001 maybe   • MAMMO (HISTORICAL)  01/26/2022   • NERVE BLOCK Bilateral 12/29/2017    Procedure: L4, L5, S1 MEDIAL BRANCH BLOCK #1;  Surgeon: Lloyd Lewis MD;  Location: [facility] MAIN OR;  Service: Pain Management    • NERVE BLOCK Bilateral 01/12/2018    Procedure: BLOCK MEDIAL BRANCH L4, L5-S1  #2;  Surgeon: Lloyd Lewis MD;  Location: [facility] MAIN OR;  Service: Pain Management    • WI " ARTHROPLASTY GLENOHUMERAL JOINT TOTAL SHOULDER Right 2019    Procedure: ARTHROPLASTY SHOULDER TOTAL , excision of deep cyst at shoulder, biceps tenotomy;  Surgeon: Dileep Thomas MD;  Location: [facility] MAIN OR;  Service: Orthopedics   • MI ARTHRP ACETBLR/PROX FEM PROSTC AGRFT/ALGRFT Right 10/16/2023    Procedure: ARTHROPLASTY HIP TOTAL ANTERIOR,NAVIGATED - overnight;  Surgeon: Ovi Jaime DO;  Location: [facility] MAIN OR;  Service: Orthopedics   • MI INJECT SI JOINT ARTHRGRPHY&/ANES/STEROID W/LESA Right 10/23/2020    Procedure: Sacroiliac Joint injection (29389);  Surgeon: Lloyd Lewis MD;  Location: [facility] MAIN OR;  Service: Pain Management    • MI TENDON SHEATH INCISION Right 2021    Procedure: RELEASE TRIGGER RING FINGER;  Surgeon: Dileep Thomas MD;  Location: [facility] MAIN OR;  Service: Orthopedics   • RADIOFREQUENCY ABLATION Right 2018    Procedure: Rt L4 L5 S1 RFA;  Surgeon: Lloyd Lewis MD;  Location: [facility] MAIN OR;  Service: Pain Management    • RADIOFREQUENCY ABLATION Left 2018    Procedure: L3-S1 Radio Frequency Ablation (53722 [id number]);  Surgeon: Lloyd Lewis MD;  Location: [facility] MAIN OR;  Service: Pain Management    • RHIZOTOMY Left 2018    Procedure: L4, L5, S1 RFA;  Surgeon: Lloyd Lewis MD;  Location: [facility] MAIN OR;  Service: Pain Management    • SHOULDER SURGERY  2019    rt. total  replacement   • TONSILLECTOMY       Social History     Substance and Sexual Activity   Alcohol Use Not Currently    Comment: quit many yrs. ago     Social History     Substance and Sexual Activity   Drug Use Not Currently   • Types: Amphetamines, Marijuana    Comment: aprox. 35 years ago, maybe longer     Social History     Tobacco Use   Smoking Status Former   • Current packs/day: 0.00   • Types: Cigarettes   • Quit date: 2004   • Years since quittin.4   • Passive exposure: Past   Smokeless Tobacco Never     Family History   Problem Relation Age of Onset   • Other  Mother         in a nursing home-fx of the leg   • Heart disease Father         CHF   • Alcohol abuse Father    • Cirrhosis Father    • Colon cancer Maternal Grandmother    • No Known Problems Maternal Grandfather    • No Known Problems Paternal Grandmother    • No Known Problems Paternal Grandfather    • Other Brother         lung abscess, pneumonia   • Colon cancer Maternal Aunt    • No Known Problems Maternal Aunt    • No Known Problems Maternal Aunt    • No Known Problems Maternal Uncle    • No Known Problems Paternal Aunt    • No Known Problems Paternal Uncle    • Breast cancer Cousin 50     Health Maintenance Due   Topic   • Pneumococcal Vaccine: 65+ Years (1 of 2 - PCV)   • Hepatitis A Vaccine (1 of 2 - Risk 2-dose series)   • RSV Vaccine Age 60+ Years (1 - 1-dose 60+ series)   • Hepatitis B Vaccine (1 of 3 - Risk 3-dose series)   • COVID-19 Vaccine (5 - 2023-24 season)   • PT PLAN OF CARE    • Breast Cancer Screening: Mammogram    • Medicare Annual Wellness Visit (AWV)       Meds/Allergies       Current Outpatient Medications:   •  acetaminophen (TYLENOL) 325 mg tablet, Take 3 tablets (975 mg total) by mouth every 8 (eight) hours, Disp: , Rfl: 0  •  amLODIPine (NORVASC) 10 mg tablet, TAKE 1 TABLET BY MOUTH DAILY, Disp: 90 tablet, Rfl: 3  •  Ascorbic Acid (vitamin C) 100 MG tablet, Take 1 tablet (100 mg total) by mouth daily, Disp: , Rfl:   •  Biotin 5000 MCG CAPS, Take 1 capsule (5,000 mcg total) by mouth in the morning, Disp: , Rfl:   •  carvedilol (COREG) 12.5 mg tablet, TAKE ONE TABLET BY MOUTH TWICE A DAY WITH MEALS, Disp: 60 tablet, Rfl: 5  •  cholecalciferol (VITAMIN D3) 1,000 units tablet, Take 1 tablet (1,000 Units total) by mouth daily, Disp: , Rfl:   •  Coenzyme Q10 (CoQ-10) 100 MG capsule, Take 1 capsule (100 mg total) by mouth daily, Disp: , Rfl:   •  Cyanocobalamin (VITAMIN B 12 PO), Take 500 mcg by mouth every morning, Disp: , Rfl:   •  diphenhydrAMINE (BENADRYL) 25 mg tablet, Take 25 mg by  "mouth every 8 (eight) hours as needed for itching , Disp: , Rfl:   •  doxazosin (CARDURA) 1 mg tablet, TAKE ONE TABLET DAILY AT BEDTIME, Disp: 30 tablet, Rfl: 5  •  escitalopram (LEXAPRO) 10 mg tablet, TAKE 1 AND 1/2 TABLETS BY MOUTH  DAILY AT BEDTIME, Disp: 135 tablet, Rfl: 1  •  losartan-hydrochlorothiazide (HYZAAR) 100-12.5 MG per tablet, Take 1 tablet by mouth daily, Disp: 30 tablet, Rfl: 5  •  Melatonin 10 MG TABS, Take by mouth daily at bedtime, Disp: , Rfl:   •  naloxone (NARCAN) 4 mg/0.1 mL nasal spray, Administer 1 spray into a nostril. If no response after 2-3 minutes, give another dose in the other nostril using a new spray., Disp: 1 each, Rfl: 0  •  omeprazole (PriLOSEC) 20 mg delayed release capsule, TAKE 1 CAPSULE BY MOUTH DAILY, Disp: 90 capsule, Rfl: 1  •  oxyCODONE-acetaminophen (Percocet) 7.5-325 MG per tablet, Take 1 tablet by mouth every 6 (six) hours as needed for moderate pain Max Daily Amount: 4 tablets, Disp: 120 tablet, Rfl: 0  •  psyllium (METAMUCIL) 58.6 % powder, Take by mouth daily, Disp: , Rfl:   •  zinc gluconate 50 mg tablet, Take 1 tablet (50 mg total) by mouth daily, Disp: , Rfl:       Objective:    Vitals:   /70 (BP Location: Right arm, Patient Position: Sitting, Cuff Size: Standard)   Pulse 86   Ht 5' 2\" (1.575 m)   Wt 103 kg (226 lb 3.2 oz)   SpO2 97%   BMI 41.37 kg/m²   Body mass index is 41.37 kg/m².  Vitals:    06/13/24 1418   Weight: 103 kg (226 lb 3.2 oz)       Physical Exam  Vitals and nursing note reviewed.   Constitutional:       Appearance: Normal appearance.   Cardiovascular:      Rate and Rhythm: Normal rate and regular rhythm.      Heart sounds: Normal heart sounds.   Pulmonary:      Effort: Pulmonary effort is normal.      Breath sounds: Normal breath sounds.   Abdominal:      General: Abdomen is flat.      Palpations: Abdomen is soft.   Musculoskeletal:      Cervical back: Normal range of motion and neck supple.      Right lower leg: No edema.      Left " lower leg: No edema.      Comments: Lumbar spine movements causing pain right hip movements causing pain   Skin:     General: Skin is warm and dry.   Neurological:      Mental Status: She is alert and oriented to person, place, and time.   Psychiatric:         Mood and Affect: Mood normal.         Behavior: Behavior normal.         Lab Review   No visits with results within 2 Month(s) from this visit.   Latest known visit with results is:   Office Visit on 02/22/2024   Component Date Value Ref Range Status   • RESULT ALL_PRESC MEDS SP INSTRNS 02/22/2024 Not Applicable   Final   • Amphetamine Quantification 02/22/2024 negative  100 ng/mL Final   • Methamphetamine Quantification 02/22/2024 negative  100 ng/mL Final   • Butalbital Quantification 02/22/2024 negative  200 ng/mL Final   • Phenobarbital Quantification 02/22/2024 negative  200 ng/mL Final   • Secobarbital Quantification 02/22/2024 negative  200 ng/mL Final   • Alpha-Hydroxyalprazolam Quantifica* 02/22/2024 negative  20 ng/mL Final   • 7-Amino-Clonazepam Quantification * 02/22/2024 negative  20 ng/mL Final   • Nordiazepam Quantification 02/22/2024 negative  40 ng/mL Final   • Temazepam Quantification 02/22/2024 negative  50 ng/mL Final   • Oxazepam Quantification 02/22/2024 negative  40 ng/mL Final   • Lorazepam Quantification 02/22/2024 negative  40 ng/mL Final   • Gabapentin Quantification 02/22/2024 negative  1000 Final   • PREGABALIN QUANTIFICATION 02/22/2024 negative  400 Final   • Cocaine metabolite Quantification 02/22/2024 negative  50 ng/mL Final   • 6-CLIFF (Heroin metabolite) Quantifi* 02/22/2024 negative  10 ng/mL Final   • Buprenorphine Quantification 02/22/2024 negative  5 ng/mL Final   • Norbuprenorphine Quantification 02/22/2024 negative  20 ng/mL Final   • Dextrorphan (Dextromethorphan meta* 02/22/2024 negative  50 ng/mL Final   • Meperidine Quantification 02/22/2024 negative  50 ng/mL Final   • Normeperidine Quantification 02/22/2024 negative   50 ng/mL Final   • Naltrexone Quantification 02/22/2024 negative  10 ng/mL Final   • NALTREXOL (NALTREXONE METABOLITE) * 02/22/2024 negative  10 ng/mL Final   • Fentanyl Quantification 02/22/2024 negative  1 ng/mL Final   • Norfentanyl Quantification 02/22/2024 negative  8 ng/mL Final   • 4-ANPP Quantification 02/22/2024 Fen Neg  2 ng/mL Final   • Acetyl fentanyl Quantification 02/22/2024 Fen Neg  2 ng/mL Final   • Acetyl norfentanyl Quantification 02/22/2024 Fen Neg  5 ng/mL Final   • Acryl fentanyl Quantification 02/22/2024 Fen Neg  1 ng/mL Final   • Carfentanil Quantification 02/22/2024 Fen Neg  2 ng/mL Final   • Para-fluorofentanyl Quantification 02/22/2024 Fen Neg  1 ng/mL Final   • Methadone Quantification 02/22/2024 negative  100 ng/mL Final   • EDDP (Methadone metabolite) Quanti* 02/22/2024 negative  100 ng/mL Final   • Oxycodone Quantification 02/22/2024 positive-3411.420  50 ng/mL Final   • Noroxycodone Quantification 02/22/2024 positive-> 6400  50 ng/mL Final   • Oxymorphone Quantification 02/22/2024 positive-2614.197  50 ng/mL Final   • Tapentadol Quantification 02/22/2024 negative  50 ng/mL Final   • cTHC (Marijuana metabolite) Quanti* 02/22/2024 negative  15 ng/mL Final   • Tramadol Quantification 02/22/2024 negative  100 ng/mL Final   • O-desmethyl-tramadol Quantification 02/22/2024 negative  100 ng/mL Final   • N-DESMETHYL-TRAMADOL QUANTIFICATION 02/22/2024 negative  100 ng/mL Final   • Codeine Quantification 02/22/2024 negative  50 ng/mL Final   • Morphine Quantification 02/22/2024 negative  50 ng/mL Final   • Hydrocodone Quantification 02/22/2024 negative  50 ng/mL Final   • Norhydrocodone Quantification 02/22/2024 negative  50 ng/mL Final   • Hydromorphone Quantification 02/22/2024 negative  50 ng/mL Final   • EUTYLONE QUANTIFICATION 02/22/2024 negative  10 ng/mL Final   • METHYLONE QUANTIFICATION 02/22/2024 negative  3 ng/mL Final   • Ethyl Glucuronide Quantification 02/22/2024 negative  500  "ng/mL Final   • Ethyl Sulfate Quantification 02/22/2024 negative  500 ng/mL Final   • Mitragynine (Kratom alkaloid) Mateo* 02/22/2024 negative  1 ng/mL Final   • 4-WP-Zbecbjmitzv (Kratom alkaloid)* 02/22/2024 negative  1 ng/mL Final   • 5F-ADB-M7 02/22/2024 negative  10 ng/mL Final   • CM-TXBDPGTM-W4 METABOLITE QUANTIFI* 02/22/2024 negative  10 ng/mL Final   • JUQM-UQSCHFMI-K7 METABOLITE QUANTI* 02/22/2024 negative  10 ng/mL Final   • GSK781 metabolite Quantification 02/22/2024 negative  10 ng/mL Final   • NBP377 metabolite Quantification 02/22/2024 negative  10 ng/mL Final   • RCS4 METABOLITE QUANTIFICATION 02/22/2024 negative  10 ng/mL Final   • XLR11/ METABOLITE QUANTIFICAT* 02/22/2024 negative  10 ng/mL Final   • Methylphenidate Quantification 02/22/2024 negative  50 ng/mL Final   • RITALINIC ACID QUANTIFICATION 02/22/2024 negative  50 ng/mL Final   • PHENTERMINE QUANTIFICATION 02/22/2024 negative  50 ng/mL Final   • OXIDANT 02/22/2024 normal-0  <200 ug/mL ug/mL Final   • CREATININE 02/22/2024 normal-194.8  >20 mg/dL mg/dL Final   • pH 02/22/2024 normal-6.0  4.5 - 9.5 Final   • SPECIFIC GRAVITY URINE 02/22/2024 normal-1.024  1.003 - 1.035 Final   • XYLAZINE 02/22/2024 negative  10 ng/mL Final   • 4-HYDROXY XYLAZINE 02/22/2024 negative  10 ng/mL Final         Gustabo Sy MD        \"This note has been constructed using a voice recognition system.Therefore there may be syntax, spelling, and/or grammatical errors. Please call if you have any questions. \"  "

## 2024-06-13 NOTE — TELEPHONE ENCOUNTER
"Regarding: Prescription problem  ----- Message from Edwina LUBIN sent at 6/13/2024  5:49 PM EDT -----  \"I need my oxyCODONE-acetaminophen (Percocet) 7.5-325 MG per tablet prescription to be send to [id number] - [place], [place] - [address].\"    "

## 2024-06-13 NOTE — TELEPHONE ENCOUNTER
"Reason for Disposition  • Prescription refill request for a CONTROLLED substance (e.g., narcotics, ADHD medicines)    Answer Assessment - Initial Assessment Questions  1. NAME of MEDICATION: \"What medicine are you calling about?\"      Percocet    2. QUESTION: \"What is your question?\" (e.g., medication refill, side effect)      Script sent to mail in pharmacy instead of to [facility]  3. PRESCRIBING HCP: \"Who prescribed it?\" Reason: if prescribed by specialist, call should be referred to that group.      Dr. Sy    Protocols used: Medication Question Call-ADULT-    "

## 2024-06-14 DIAGNOSIS — G89.4 CHRONIC PAIN SYNDROME: ICD-10-CM

## 2024-06-14 DIAGNOSIS — M54.50 CHRONIC MIDLINE LOW BACK PAIN WITHOUT SCIATICA: ICD-10-CM

## 2024-06-14 DIAGNOSIS — M16.11 PRIMARY OSTEOARTHRITIS OF RIGHT HIP: ICD-10-CM

## 2024-06-14 DIAGNOSIS — Z96.641 STATUS POST TOTAL REPLACEMENT OF RIGHT HIP: ICD-10-CM

## 2024-06-14 DIAGNOSIS — G89.29 CHRONIC MIDLINE LOW BACK PAIN WITHOUT SCIATICA: ICD-10-CM

## 2024-06-14 RX ORDER — OXYCODONE AND ACETAMINOPHEN 7.5; 325 MG/1; MG/1
1 TABLET ORAL EVERY 6 HOURS PRN
Qty: 120 TABLET | Refills: 0 | Status: CANCELLED | OUTPATIENT
Start: 2024-06-14

## 2024-06-14 RX ORDER — OXYCODONE AND ACETAMINOPHEN 7.5; 325 MG/1; MG/1
1 TABLET ORAL EVERY 6 HOURS PRN
Qty: 120 TABLET | Refills: 0 | Status: SHIPPED | OUTPATIENT
Start: 2024-06-14

## 2024-06-14 NOTE — TELEPHONE ENCOUNTER
Patient called again this morning for update on this.  I let her know that her message was received last night and sent to Dr. Sy to change.  She only wanted to speak to Dena.  Warm transferred to Gloria in office to transfer patient to Dena

## 2024-07-10 NOTE — PROGRESS NOTES
Office Visit Note  24     Zonia Taylor 69 y.o. female MRN: [id number]  : 1955    Assessment:     1. Primary hypertension  Assessment & Plan:  Blood pressure has been stable on the current medications including amlodipine carvedilol Cardura and losartan HCTZ  2. Chronic midline low back pain without sciatica  Assessment & Plan:  Patient with chronic low back pain on oxycodone we will continue with the same  3. Chronic pain syndrome  Assessment & Plan:  Patient with gastric sleeve avoiding nonsteroidals on oxycodone discussed with patient regarding dependence on the same  4. Prediabetes  5. Anxiety and depression  Assessment & Plan:  Patient with anxiety and depression on Lexapro we will continue the same since it is helping her.  6. Class 2 obesity  Assessment & Plan:  Patient BMI is 41.70 it was 41.27 emphasized regarding diet exercise lifestyle modification      7. Continuous opioid dependence (HCC)  Assessment & Plan:  Discussed with patient regarding dependence on the medication but patient is not able to function without taking the same  8. Dyslipidemia  Assessment & Plan:  Follow-up with a lipid profile  9. Gastroesophageal reflux disease without esophagitis  Assessment & Plan:  Continue pantoprazole if she does not take the medication she gets increased GERD symptoms  10. H/O bariatric surgery  11. Hypercalcemia  12. Primary osteoarthritis of right hip  Assessment & Plan:  Patient with osteoarthritis of multiple joints especially right hip for which she underwent surgery hip replacement currently on pain medication oxycodone as needed  13. Status post total replacement of right hip             Discussion Summary and Plan:  Today's care plan and medications were reviewed with patient in detail and all their questions answered to their satisfaction.    Chief Complaint   Patient presents with    Follow-up      Subjective:  Patient is coming here for evaluation regarding symptoms of low back  "pain chronic pain syndrome and also with a history of obesity, hypertension, dyslipidemia, GERD.  Medications reviewed labs reviewed discussed with patient at length regarding diet exercise.        The following portions of the patient's history were reviewed and updated as appropriate: allergies, current medications, past family history, past medical history, past social history, past surgical history and problem list.    Review of Systems   Constitutional:  Negative for chills and fever.   HENT:  Negative for ear pain and sore throat.    Eyes:  Negative for pain and visual disturbance.   Respiratory:  Negative for cough and shortness of breath.    Cardiovascular:  Negative for chest pain and palpitations.   Gastrointestinal:  Negative for abdominal pain and vomiting.   Genitourinary:  Negative for dysuria and hematuria.   Musculoskeletal:  Positive for arthralgias and back pain.   Skin:  Negative for color change and rash.   Neurological:  Negative for seizures and syncope.   All other systems reviewed and are negative.        Historical Information   Patient Active Problem List   Diagnosis    Chronic pain syndrome    Chronic midline low back pain without sciatica    H/O bariatric surgery    Hypertension    Gastroesophageal reflux disease    Continuous opioid dependence (HCC)    Primary osteoarthritis    Hypercalcemia    Dyslipidemia    Right hip pain    Anxiety and depression    Primary osteoarthritis of right hip    Chronic hepatitis C without hepatic coma (HCC)    Class 2 obesity    Status post total replacement of right hip    Infection of left hand     Past Medical History:   Diagnosis Date    Acquired trigger finger of right ring finger 08/31/2023    Anxiety     Apnea, sleep     patient states \"repeated test says no\"    Arthritis     severe in the joints    Chronic pain disorder     joints    Depression     DJD (degenerative joint disease)     Fall 11/22/2018    tripped on a folded rug- left hamstring injury "    Foot injury     right foot-fx of bone-no repair-has severe arthritis in the foot and ankle    Fractures yr. 2000 or before    left ankle    GERD (gastroesophageal reflux disease)     Hepatitis C not sure    History of sleep apnea     Hypercholesteremia     Hypertension     Insomnia     Low back pain     Obesity     Lost 70lb with gastric sleeve (2011)    Osteoarthritis     Stomach disorder     take meds. for this    Use of cane as ambulatory aid     Wears glasses     Wears partial dentures     upper and lower-but did lose the lower      Past Surgical History:   Procedure Laterality Date    ANKLE ARTHROSCOPY Right     ANKLE SURGERY  not sure    orthoscopic/rt. ankle    APPENDECTOMY      BARIATRIC SURGERY  maybe 2011    CHOLECYSTECTOMY      COLONOSCOPY  10/26/2021    DXA PROCEDURE (HISTORICAL)  10/13/2020    GASTRECTOMY SLEEVE LAPAROSCOPIC  2011    lost 70 lb    HAND SURGERY      rt. hand, trigger finger    JOINT REPLACEMENT Bilateral     knee replacements    KNEE SURGERY  2001 maybe    MAMMO (HISTORICAL)  01/26/2022    NERVE BLOCK Bilateral 12/29/2017    Procedure: L4, L5, S1 MEDIAL BRANCH BLOCK #1;  Surgeon: Lloyd Lewis MD;  Location: [facility] MAIN OR;  Service: Pain Management     NERVE BLOCK Bilateral 01/12/2018    Procedure: BLOCK MEDIAL BRANCH L4, L5-S1  #2;  Surgeon: Lloyd Lewis MD;  Location: [facility] MAIN OR;  Service: Pain Management     AR ARTHROPLASTY GLENOHUMERAL JOINT TOTAL SHOULDER Right 05/23/2019    Procedure: ARTHROPLASTY SHOULDER TOTAL , excision of deep cyst at shoulder, biceps tenotomy;  Surgeon: Dileep Thomas MD;  Location: [facility] MAIN OR;  Service: Orthopedics    AR ARTHRP ACETBLR/PROX FEM PROSTC AGRFT/ALGRFT Right 10/16/2023    Procedure: ARTHROPLASTY HIP TOTAL ANTERIOR,NAVIGATED - overnight;  Surgeon: Ovi Jaime DO;  Location: [facility] MAIN OR;  Service: Orthopedics    AR INJECT SI JOINT ARTHRGRPHY&/ANES/STEROID W/LESA Right 10/23/2020    Procedure: Sacroiliac Joint injection (96619);   Surgeon: Lloyd Lewis MD;  Location: [facility] MAIN OR;  Service: Pain Management     OH TENDON SHEATH INCISION Right 2021    Procedure: RELEASE TRIGGER RING FINGER;  Surgeon: Dileep Thomas MD;  Location: [facility] MAIN OR;  Service: Orthopedics    RADIOFREQUENCY ABLATION Right 2018    Procedure: Rt L4 L5 S1 RFA;  Surgeon: Lloyd Lewis MD;  Location: [facility] MAIN OR;  Service: Pain Management     RADIOFREQUENCY ABLATION Left 2018    Procedure: L3-S1 Radio Frequency Ablation ([id number]);  Surgeon: Lloyd Lewis MD;  Location: [facility] MAIN OR;  Service: Pain Management     RHIZOTOMY Left 2018    Procedure: L4, L5, S1 RFA;  Surgeon: Lloyd Lewis MD;  Location: [facility] MAIN OR;  Service: Pain Management     SHOULDER SURGERY  2019    rt. total  replacement    TONSILLECTOMY       Social History     Substance and Sexual Activity   Alcohol Use Not Currently    Comment: quit many yrs. ago     Social History     Substance and Sexual Activity   Drug Use Not Currently    Types: Amphetamines, Marijuana    Comment: aprox. 35 years ago, maybe longer     Social History     Tobacco Use   Smoking Status Former    Current packs/day: 0.00    Types: Cigarettes    Quit date: 2004    Years since quittin.5    Passive exposure: Past   Smokeless Tobacco Never     Family History   Problem Relation Age of Onset    Other Mother         in a nursing home-fx of the leg    Heart disease Father         CHF    Alcohol abuse Father     Cirrhosis Father     Colon cancer Maternal Grandmother     No Known Problems Maternal Grandfather     No Known Problems Paternal Grandmother     No Known Problems Paternal Grandfather     Other Brother         lung abscess, pneumonia    Colon cancer Maternal Aunt     No Known Problems Maternal Aunt     No Known Problems Maternal Aunt     No Known Problems Maternal Uncle     No Known Problems Paternal Aunt     No Known Problems Paternal Uncle     Breast cancer Cousin 50      Health Maintenance Due   Topic    Pneumococcal Vaccine: 65+ Years (1 of 2 - PCV)    Hepatitis A Vaccine (1 of 2 - Risk 2-dose series)    RSV Vaccine Age 60+ Years (1 - 1-dose 60+ series)    Hepatitis B Vaccine (1 of 3 - Risk 3-dose series)    COVID-19 Vaccine (5 - 2023-24 season)    PT PLAN OF CARE     Breast Cancer Screening: Mammogram     Medicare Annual Wellness Visit (AWV)     Influenza Vaccine (1)    Fall Risk       Meds/Allergies       Current Outpatient Medications:     acetaminophen (TYLENOL) 325 mg tablet, Take 3 tablets (975 mg total) by mouth every 8 (eight) hours, Disp: , Rfl: 0    amLODIPine (NORVASC) 10 mg tablet, TAKE 1 TABLET BY MOUTH DAILY, Disp: 90 tablet, Rfl: 3    Ascorbic Acid (vitamin C) 100 MG tablet, Take 1 tablet (100 mg total) by mouth daily, Disp: , Rfl:     Biotin 5000 MCG CAPS, Take 1 capsule (5,000 mcg total) by mouth in the morning, Disp: , Rfl:     carvedilol (COREG) 12.5 mg tablet, TAKE ONE TABLET BY MOUTH TWICE A DAY WITH MEALS, Disp: 60 tablet, Rfl: 5    cholecalciferol (VITAMIN D3) 1,000 units tablet, Take 1 tablet (1,000 Units total) by mouth daily, Disp: , Rfl:     Coenzyme Q10 (CoQ-10) 100 MG capsule, Take 1 capsule (100 mg total) by mouth daily, Disp: , Rfl:     Cyanocobalamin (VITAMIN B 12 PO), Take 500 mcg by mouth every morning, Disp: , Rfl:     diphenhydrAMINE (BENADRYL) 25 mg tablet, Take 25 mg by mouth every 8 (eight) hours as needed for itching , Disp: , Rfl:     doxazosin (CARDURA) 1 mg tablet, TAKE ONE TABLET DAILY AT BEDTIME, Disp: 30 tablet, Rfl: 5    escitalopram (LEXAPRO) 10 mg tablet, TAKE 1 AND 1/2 TABLETS BY MOUTH  DAILY AT BEDTIME, Disp: 135 tablet, Rfl: 1    losartan-hydrochlorothiazide (HYZAAR) 100-12.5 MG per tablet, Take 1 tablet by mouth daily, Disp: 30 tablet, Rfl: 5    Melatonin 10 MG TABS, Take by mouth daily at bedtime, Disp: , Rfl:     naloxone (NARCAN) 4 mg/0.1 mL nasal spray, Administer 1 spray into a nostril. If no response after 2-3 minutes,  "give another dose in the other nostril using a new spray., Disp: 1 each, Rfl: 0    omeprazole (PriLOSEC) 20 mg delayed release capsule, TAKE 1 CAPSULE BY MOUTH DAILY, Disp: 90 capsule, Rfl: 1    oxyCODONE-acetaminophen (Percocet) 7.5-325 MG per tablet, Take 1 tablet by mouth every 6 (six) hours as needed for moderate pain Max Daily Amount: 4 tablets, Disp: 120 tablet, Rfl: 0    psyllium (METAMUCIL) 58.6 % powder, Take by mouth daily, Disp: , Rfl:     zinc gluconate 50 mg tablet, Take 1 tablet (50 mg total) by mouth daily, Disp: , Rfl:       Objective:    Vitals:   /72 (BP Location: Right arm, Patient Position: Sitting, Cuff Size: Standard)   Pulse 68   Ht 5' 2\" (1.575 m)   Wt 103 kg (228 lb)   SpO2 96%   BMI 41.70 kg/m²   Body mass index is 41.7 kg/m².  Vitals:    07/11/24 1019   Weight: 103 kg (228 lb)       Physical Exam  Vitals and nursing note reviewed.   Constitutional:       Appearance: Normal appearance. She is obese.   Cardiovascular:      Rate and Rhythm: Normal rate and regular rhythm.      Heart sounds: Normal heart sounds.   Pulmonary:      Effort: Pulmonary effort is normal.      Breath sounds: Normal breath sounds.   Abdominal:      Palpations: Abdomen is soft.   Musculoskeletal:      Cervical back: Normal range of motion and neck supple.      Right lower leg: No edema.      Left lower leg: No edema.      Comments: Movements of the lumbar spine causing discomfort and pain   Skin:     General: Skin is warm and dry.   Neurological:      Mental Status: She is alert and oriented to person, place, and time.   Psychiatric:         Mood and Affect: Mood normal.         Behavior: Behavior normal.         Lab Review   No visits with results within 2 Month(s) from this visit.   Latest known visit with results is:   Office Visit on 02/22/2024   Component Date Value Ref Range Status    RESULT ALL_PRESC MEDS SP INSTRNS 02/22/2024 Not Applicable   Final    Amphetamine Quantification 02/22/2024 negative  " 100 ng/mL Final    Methamphetamine Quantification 02/22/2024 negative  100 ng/mL Final    Butalbital Quantification 02/22/2024 negative  200 ng/mL Final    Phenobarbital Quantification 02/22/2024 negative  200 ng/mL Final    Secobarbital Quantification 02/22/2024 negative  200 ng/mL Final    Alpha-Hydroxyalprazolam Quantifica* 02/22/2024 negative  20 ng/mL Final    7-Amino-Clonazepam Quantification * 02/22/2024 negative  20 ng/mL Final    Nordiazepam Quantification 02/22/2024 negative  40 ng/mL Final    Temazepam Quantification 02/22/2024 negative  50 ng/mL Final    Oxazepam Quantification 02/22/2024 negative  40 ng/mL Final    Lorazepam Quantification 02/22/2024 negative  40 ng/mL Final    Gabapentin Quantification 02/22/2024 negative  1000 Final    PREGABALIN QUANTIFICATION 02/22/2024 negative  400 Final    Cocaine metabolite Quantification 02/22/2024 negative  50 ng/mL Final    6-CLIFF (Heroin metabolite) Quantifi* 02/22/2024 negative  10 ng/mL Final    Buprenorphine Quantification 02/22/2024 negative  5 ng/mL Final    Norbuprenorphine Quantification 02/22/2024 negative  20 ng/mL Final    Dextrorphan (Dextromethorphan meta* 02/22/2024 negative  50 ng/mL Final    Meperidine Quantification 02/22/2024 negative  50 ng/mL Final    Normeperidine Quantification 02/22/2024 negative  50 ng/mL Final    Naltrexone Quantification 02/22/2024 negative  10 ng/mL Final    NALTREXOL (NALTREXONE METABOLITE) * 02/22/2024 negative  10 ng/mL Final    Fentanyl Quantification 02/22/2024 negative  1 ng/mL Final    Norfentanyl Quantification 02/22/2024 negative  8 ng/mL Final    4-ANPP Quantification 02/22/2024 Fen Neg  2 ng/mL Final    Acetyl fentanyl Quantification 02/22/2024 Fen Neg  2 ng/mL Final    Acetyl norfentanyl Quantification 02/22/2024 Fen Neg  5 ng/mL Final    Acryl fentanyl Quantification 02/22/2024 Fen Neg  1 ng/mL Final    Carfentanil Quantification 02/22/2024 Fen Neg  2 ng/mL Final    Para-fluorofentanyl Quantification  02/22/2024 Fen Neg  1 ng/mL Final    Methadone Quantification 02/22/2024 negative  100 ng/mL Final    EDDP (Methadone metabolite) Quanti* 02/22/2024 negative  100 ng/mL Final    Oxycodone Quantification 02/22/2024 positive-3411.420  50 ng/mL Final    Noroxycodone Quantification 02/22/2024 positive-> 6400  50 ng/mL Final    Oxymorphone Quantification 02/22/2024 positive-2614.197  50 ng/mL Final    Tapentadol Quantification 02/22/2024 negative  50 ng/mL Final    cTHC (Marijuana metabolite) Quanti* 02/22/2024 negative  15 ng/mL Final    Tramadol Quantification 02/22/2024 negative  100 ng/mL Final    O-desmethyl-tramadol Quantification 02/22/2024 negative  100 ng/mL Final    N-DESMETHYL-TRAMADOL QUANTIFICATION 02/22/2024 negative  100 ng/mL Final    Codeine Quantification 02/22/2024 negative  50 ng/mL Final    Morphine Quantification 02/22/2024 negative  50 ng/mL Final    Hydrocodone Quantification 02/22/2024 negative  50 ng/mL Final    Norhydrocodone Quantification 02/22/2024 negative  50 ng/mL Final    Hydromorphone Quantification 02/22/2024 negative  50 ng/mL Final    EUTYLONE QUANTIFICATION 02/22/2024 negative  10 ng/mL Final    METHYLONE QUANTIFICATION 02/22/2024 negative  3 ng/mL Final    Ethyl Glucuronide Quantification 02/22/2024 negative  500 ng/mL Final    Ethyl Sulfate Quantification 02/22/2024 negative  500 ng/mL Final    Mitragynine (Kratom alkaloid) Mateo* 02/22/2024 negative  1 ng/mL Final    3-SC-Lbingbdarzc (Kratom alkaloid)* 02/22/2024 negative  1 ng/mL Final    5F-ADB-M7 02/22/2024 negative  10 ng/mL Final    GK-USKDQIPA-W6 METABOLITE QUANTIFI* 02/22/2024 negative  10 ng/mL Final    IZQG-WZGWUSAT-C9 METABOLITE QUANTI* 02/22/2024 negative  10 ng/mL Final    ERD609 metabolite Quantification 02/22/2024 negative  10 ng/mL Final    WWQ093 metabolite Quantification 02/22/2024 negative  10 ng/mL Final    RCS4 METABOLITE QUANTIFICATION 02/22/2024 negative  10 ng/mL Final    XLR11/ METABOLITE QUANTIFICAT*  "02/22/2024 negative  10 ng/mL Final    Methylphenidate Quantification 02/22/2024 negative  50 ng/mL Final    RITALINIC ACID QUANTIFICATION 02/22/2024 negative  50 ng/mL Final    PHENTERMINE QUANTIFICATION 02/22/2024 negative  50 ng/mL Final    OXIDANT 02/22/2024 normal-0  <200 ug/mL ug/mL Final    CREATININE 02/22/2024 normal-194.8  >20 mg/dL mg/dL Final    pH 02/22/2024 normal-6.0  4.5 - 9.5 Final    SPECIFIC GRAVITY URINE 02/22/2024 normal-1.024  1.003 - 1.035 Final    XYLAZINE 02/22/2024 negative  10 ng/mL Final    4-HYDROXY XYLAZINE 02/22/2024 negative  10 ng/mL Final         Gustabo Sy MD        \"This note has been constructed using a voice recognition system.Therefore there may be syntax, spelling, and/or grammatical errors. Please call if you have any questions. \"  "

## 2024-07-11 ENCOUNTER — OFFICE VISIT (OUTPATIENT)
Dept: FAMILY MEDICINE CLINIC | Facility: CLINIC | Age: 69
End: 2024-07-11
Payer: COMMERCIAL

## 2024-07-11 ENCOUNTER — HOSPITAL ENCOUNTER (OUTPATIENT)
Dept: RADIOLOGY | Facility: HOSPITAL | Age: 69
Discharge: HOME | End: 2024-07-11
Attending: INTERNAL MEDICINE
Payer: COMMERCIAL

## 2024-07-11 VITALS
WEIGHT: 228 LBS | DIASTOLIC BLOOD PRESSURE: 72 MMHG | BODY MASS INDEX: 41.96 KG/M2 | SYSTOLIC BLOOD PRESSURE: 120 MMHG | OXYGEN SATURATION: 96 % | HEART RATE: 68 BPM | HEIGHT: 62 IN

## 2024-07-11 VITALS — WEIGHT: 221 LBS | HEIGHT: 62 IN | BODY MASS INDEX: 40.67 KG/M2

## 2024-07-11 DIAGNOSIS — Z98.84 H/O BARIATRIC SURGERY: ICD-10-CM

## 2024-07-11 DIAGNOSIS — F11.20 CONTINUOUS OPIOID DEPENDENCE (HCC): ICD-10-CM

## 2024-07-11 DIAGNOSIS — Z96.641 STATUS POST TOTAL REPLACEMENT OF RIGHT HIP: ICD-10-CM

## 2024-07-11 DIAGNOSIS — M54.50 CHRONIC MIDLINE LOW BACK PAIN WITHOUT SCIATICA: ICD-10-CM

## 2024-07-11 DIAGNOSIS — E78.5 DYSLIPIDEMIA: ICD-10-CM

## 2024-07-11 DIAGNOSIS — E66.9 CLASS 2 OBESITY: ICD-10-CM

## 2024-07-11 DIAGNOSIS — F41.9 ANXIETY AND DEPRESSION: ICD-10-CM

## 2024-07-11 DIAGNOSIS — R73.03 PREDIABETES: ICD-10-CM

## 2024-07-11 DIAGNOSIS — F32.A ANXIETY AND DEPRESSION: ICD-10-CM

## 2024-07-11 DIAGNOSIS — Z12.31 SCREENING MAMMOGRAM FOR BREAST CANCER: ICD-10-CM

## 2024-07-11 DIAGNOSIS — M16.11 PRIMARY OSTEOARTHRITIS OF RIGHT HIP: ICD-10-CM

## 2024-07-11 DIAGNOSIS — K21.9 GASTROESOPHAGEAL REFLUX DISEASE WITHOUT ESOPHAGITIS: ICD-10-CM

## 2024-07-11 DIAGNOSIS — I10 PRIMARY HYPERTENSION: Primary | ICD-10-CM

## 2024-07-11 DIAGNOSIS — E83.52 HYPERCALCEMIA: ICD-10-CM

## 2024-07-11 DIAGNOSIS — G89.4 CHRONIC PAIN SYNDROME: ICD-10-CM

## 2024-07-11 DIAGNOSIS — G89.29 CHRONIC MIDLINE LOW BACK PAIN WITHOUT SCIATICA: ICD-10-CM

## 2024-07-11 PROCEDURE — 77063 BREAST TOMOSYNTHESIS BI: CPT

## 2024-07-11 PROCEDURE — 99214 OFFICE O/P EST MOD 30 MIN: CPT | Performed by: INTERNAL MEDICINE

## 2024-07-11 PROCEDURE — G2211 COMPLEX E/M VISIT ADD ON: HCPCS | Performed by: INTERNAL MEDICINE

## 2024-07-11 PROCEDURE — 77067 SCR MAMMO BI INCL CAD: CPT

## 2024-07-11 RX ORDER — CARVEDILOL 12.5 MG/1
12.5 TABLET ORAL 2 TIMES DAILY WITH MEALS
Qty: 180 TABLET | Refills: 1 | Status: SHIPPED | OUTPATIENT
Start: 2024-07-11

## 2024-07-11 RX ORDER — OXYCODONE AND ACETAMINOPHEN 7.5; 325 MG/1; MG/1
1 TABLET ORAL EVERY 6 HOURS PRN
Qty: 120 TABLET | Refills: 0 | Status: SHIPPED | OUTPATIENT
Start: 2024-07-11

## 2024-07-11 RX ORDER — LOSARTAN POTASSIUM AND HYDROCHLOROTHIAZIDE 12.5; 1 MG/1; MG/1
1 TABLET ORAL DAILY
Qty: 90 TABLET | Refills: 1 | Status: SHIPPED | OUTPATIENT
Start: 2024-07-11

## 2024-07-11 RX ORDER — DOXAZOSIN MESYLATE 1 MG/1
1 TABLET ORAL
Qty: 90 TABLET | Refills: 1 | Status: SHIPPED | OUTPATIENT
Start: 2024-07-11

## 2024-07-11 NOTE — ASSESSMENT & PLAN NOTE
Patient with gastric sleeve avoiding nonsteroidals on oxycodone discussed with patient regarding dependence on the same

## 2024-07-11 NOTE — ASSESSMENT & PLAN NOTE
Discussed with patient regarding dependence on the medication but patient is not able to function without taking the same

## 2024-07-11 NOTE — ASSESSMENT & PLAN NOTE
Blood pressure has been stable on the current medications including amlodipine carvedilol Cardura and losartan HCTZ

## 2024-07-11 NOTE — ASSESSMENT & PLAN NOTE
Patient with osteoarthritis of multiple joints especially right hip for which she underwent surgery hip replacement currently on pain medication oxycodone as needed

## 2024-07-27 DIAGNOSIS — I10 PRIMARY HYPERTENSION: ICD-10-CM

## 2024-07-27 RX ORDER — LOSARTAN POTASSIUM AND HYDROCHLOROTHIAZIDE 12.5; 1 MG/1; MG/1
1 TABLET ORAL DAILY
Qty: 30 TABLET | Refills: 5 | Status: SHIPPED | OUTPATIENT
Start: 2024-07-27

## 2024-07-31 ENCOUNTER — RA CDI HCC (OUTPATIENT)
Dept: OTHER | Facility: HOSPITAL | Age: 69
End: 2024-07-31

## 2024-08-07 NOTE — PROGRESS NOTES
Office Visit Note  24     Zonia Taylor 69 y.o. female MRN: [id number]  : 1955    Assessment:     1. Chronic midline low back pain without sciatica  Assessment & Plan:  Patient with chronic low back pain in the midline but now she complains of more pain in the left side also after she lifted this air conditioner.  She still has some discomfort in spite of taking the pain medication exam shows some tenderness in the left side sacroiliac joint area we will give her a muscle relaxer she can still continue the pain medication.  Patient has taken Skelaxin in the past.  Based on the response with the muscle relaxer if necessary for start physical therapy discussed with the patient  Orders:  -     oxyCODONE-acetaminophen (Percocet) 7.5-325 MG per tablet; Take 1 tablet by mouth every 6 (six) hours as needed for moderate pain Max Daily Amount: 4 tablets  -     metaxalone (SKELAXIN) 800 mg tablet; Take 1 tablet (800 mg total) by mouth 3 (three) times a day as needed for muscle spasms  2. Chronic pain syndrome  Assessment & Plan:  Patient with chronic pain syndrome avoiding nonsteroidals because of the gastric sleeve surgery she had been prescribed oxycodone we will continue with the same  Orders:  -     oxyCODONE-acetaminophen (Percocet) 7.5-325 MG per tablet; Take 1 tablet by mouth every 6 (six) hours as needed for moderate pain Max Daily Amount: 4 tablets  3. Class 2 obesity  Assessment & Plan:  Emphasized regarding diet exercise lifestyle modification today his BMI is at 42.03 went up.  According to her she was in the past on cabbage soup diet and has lost weight she is going to resume the same  4. Primary osteoarthritis of right hip  Assessment & Plan:  Patient status post right hip replacement still gets pain on and off on pain medication avoiding nonsteroidals because of the gastric sleeve  Orders:  -     oxyCODONE-acetaminophen (Percocet) 7.5-325 MG per tablet; Take 1 tablet by mouth every 6 (six)  hours as needed for moderate pain Max Daily Amount: 4 tablets  5. Anxiety and depression  Assessment & Plan:  Patient on Lexapro 10 mg daily will continue the same.  6. Primary hypertension  Assessment & Plan:  Patient is currently taking carvedilol Cardura losartan HCTZ and amlodipine blood pressure is under control at 120/68 we will continue with the same.  7. Status post total replacement of right hip  -     oxyCODONE-acetaminophen (Percocet) 7.5-325 MG per tablet; Take 1 tablet by mouth every 6 (six) hours as needed for moderate pain Max Daily Amount: 4 tablets  8. Continuous opioid dependence (HCC)  Assessment & Plan:  Concerned about the dependence on the pain medication discussed with the patient  9. Dyslipidemia  Assessment & Plan:  I have ordered the lipid profile not done yet.  Patient is currently on no statins  10. Gastroesophageal reflux disease without esophagitis  Assessment & Plan:  Continue with pantoprazole  11. H/O bariatric surgery  Assessment & Plan:  Status post gastric sleeve 2011 stable  12. Hypercalcemia  Assessment & Plan:  Follow-up with repeat lab             Discussion Summary and Plan:  Today's care plan and medications were reviewed with patient in detail and all their questions answered to their satisfaction.    Chief Complaint   Patient presents with    Follow-up      Subjective:  Patient is coming here for evaluation regarding increasing pain in the left side in the back after she has lifted an air conditioner to place it in the window.  Patient with chronic pain syndrome with low back pain.  Pain is more in the back not radiating into the left lower extremity at this time.  No tingling numbness feeling.  Patient with chronic pain syndrome with low back pain.    Medication reviewed.  I have ordered labs last visit not done yet.  Patient had a mammogram done which came back normal.        The following portions of the patient's history were reviewed and updated as appropriate:  "allergies, current medications, past family history, past medical history, past social history, past surgical history and problem list.    Review of Systems   Constitutional:  Negative for chills and fever.   HENT:  Negative for ear pain and sore throat.    Eyes:  Negative for pain and visual disturbance.   Respiratory:  Negative for cough and shortness of breath.    Cardiovascular:  Negative for chest pain and palpitations.   Gastrointestinal:  Negative for abdominal pain and vomiting.   Genitourinary:  Negative for dysuria and hematuria.   Musculoskeletal:  Positive for back pain. Negative for arthralgias.   Skin:  Negative for color change and rash.   Neurological:  Negative for seizures and syncope.   All other systems reviewed and are negative.        Historical Information   Patient Active Problem List   Diagnosis    Chronic pain syndrome    Chronic midline low back pain without sciatica    H/O bariatric surgery    Hypertension    Gastroesophageal reflux disease    Continuous opioid dependence (HCC)    Primary osteoarthritis    Hypercalcemia    Dyslipidemia    Right hip pain    Anxiety and depression    Primary osteoarthritis of right hip    Chronic hepatitis C without hepatic coma (HCC)    Class 2 obesity    Status post total replacement of right hip     Past Medical History:   Diagnosis Date    Acquired trigger finger of right ring finger 08/31/2023    Anxiety     Apnea, sleep     patient states \"repeated test says no\"    Arthritis     severe in the joints    Chronic pain disorder     joints    Depression     DJD (degenerative joint disease)     Fall 11/22/2018    tripped on a folded rug- left hamstring injury    Foot injury     right foot-fx of bone-no repair-has severe arthritis in the foot and ankle    Fractures yr. 2000 or before    left ankle    GERD (gastroesophageal reflux disease)     Hepatitis C not sure    History of sleep apnea     Hypercholesteremia     Hypertension     Insomnia     Low back pain "     Obesity     Lost 70lb with gastric sleeve (2011)    Osteoarthritis     Stomach disorder     take meds. for this    Use of cane as ambulatory aid     Wears glasses     Wears partial dentures     upper and lower-but did lose the lower      Past Surgical History:   Procedure Laterality Date    ANKLE ARTHROSCOPY Right     ANKLE SURGERY  not sure    orthoscopic/rt. ankle    APPENDECTOMY      BARIATRIC SURGERY  maybe 2011    CHOLECYSTECTOMY      COLONOSCOPY  10/26/2021    DXA PROCEDURE (HISTORICAL)  10/13/2020    GASTRECTOMY SLEEVE LAPAROSCOPIC  2011    lost 70 lb    HAND SURGERY      rt. hand, trigger finger    JOINT REPLACEMENT Bilateral     knee replacements    KNEE SURGERY  2001 maybe    MAMMO (HISTORICAL)  01/26/2022    NERVE BLOCK Bilateral 12/29/2017    Procedure: L4, L5, S1 MEDIAL BRANCH BLOCK #1;  Surgeon: Lloyd Lewis MD;  Location: [facility] MAIN OR;  Service: Pain Management     NERVE BLOCK Bilateral 01/12/2018    Procedure: BLOCK MEDIAL BRANCH L4, L5-S1  #2;  Surgeon: Lloyd Lewis MD;  Location: [facility] MAIN OR;  Service: Pain Management     WI ARTHROPLASTY GLENOHUMERAL JOINT TOTAL SHOULDER Right 05/23/2019    Procedure: ARTHROPLASTY SHOULDER TOTAL , excision of deep cyst at shoulder, biceps tenotomy;  Surgeon: Dileep Thomas MD;  Location: [facility] MAIN OR;  Service: Orthopedics    WI ARTHRP ACETBLR/PROX FEM PROSTC AGRFT/ALGRFT Right 10/16/2023    Procedure: ARTHROPLASTY HIP TOTAL ANTERIOR,NAVIGATED - overnight;  Surgeon: Ovi Jaime DO;  Location: [facility] MAIN OR;  Service: Orthopedics    WI INJECT SI JOINT ARTHRGRPHY&/ANES/STEROID W/LESA Right 10/23/2020    Procedure: Sacroiliac Joint injection (49722);  Surgeon: Lloyd Lewis MD;  Location: [facility] MAIN OR;  Service: Pain Management     WI TENDON SHEATH INCISION Right 12/21/2021    Procedure: RELEASE TRIGGER RING FINGER;  Surgeon: Dileep Thomas MD;  Location: [facility] MAIN OR;  Service: Orthopedics    RADIOFREQUENCY ABLATION Right 03/16/2018     Procedure: Rt L4 L5 S1 RFA;  Surgeon: Lloyd Lewis MD;  Location: [facility] MAIN OR;  Service: Pain Management     RADIOFREQUENCY ABLATION Left 2018    Procedure: L3-S1 Radio Frequency Ablation ([id number]);  Surgeon: Lloyd Lewis MD;  Location: [facility] MAIN OR;  Service: Pain Management     RHIZOTOMY Left 2018    Procedure: L4, L5, S1 RFA;  Surgeon: Lloyd Lewis MD;  Location: [facility] MAIN OR;  Service: Pain Management     SHOULDER SURGERY  2019    rt. total  replacement    TONSILLECTOMY       Social History     Substance and Sexual Activity   Alcohol Use Not Currently    Comment: quit many yrs. ago     Social History     Substance and Sexual Activity   Drug Use Not Currently    Types: Amphetamines, Marijuana    Comment: aprox. 35 years ago, maybe longer     Social History     Tobacco Use   Smoking Status Former    Current packs/day: 0.00    Types: Cigarettes    Quit date: 2004    Years since quittin.6    Passive exposure: Past   Smokeless Tobacco Never     Family History   Problem Relation Age of Onset    Other Mother         in a nursing home-fx of the leg    Heart disease Father         CHF    Alcohol abuse Father     Cirrhosis Father     Colon cancer Maternal Grandmother     No Known Problems Maternal Grandfather     No Known Problems Paternal Grandmother     No Known Problems Paternal Grandfather     Other Brother         lung abscess, pneumonia    Colon cancer Maternal Aunt     No Known Problems Maternal Aunt     No Known Problems Maternal Aunt     No Known Problems Maternal Uncle     No Known Problems Paternal Aunt     No Known Problems Paternal Uncle     Breast cancer Cousin 50     Health Maintenance Due   Topic    Pneumococcal Vaccine: 65+ Years (1 of 2 - PCV)    Hepatitis A Vaccine (1 of 2 - Risk 2-dose series)    RSV Vaccine Age 60+ Years (1 - 1-dose 60+ series)    Hepatitis B Vaccine (1 of 3 - Risk 3-dose series)    COVID-19 Vaccine ( season)    PT PLAN OF CARE      Medicare Annual Wellness Visit (AWV)     Influenza Vaccine (1)    Fall Risk       Meds/Allergies       Current Outpatient Medications:     acetaminophen (TYLENOL) 325 mg tablet, Take 3 tablets (975 mg total) by mouth every 8 (eight) hours, Disp: , Rfl: 0    amLODIPine (NORVASC) 10 mg tablet, TAKE 1 TABLET BY MOUTH DAILY, Disp: 90 tablet, Rfl: 3    Ascorbic Acid (vitamin C) 100 MG tablet, Take 1 tablet (100 mg total) by mouth daily, Disp: , Rfl:     Biotin 5000 MCG CAPS, Take 1 capsule (5,000 mcg total) by mouth in the morning, Disp: , Rfl:     carvedilol (COREG) 12.5 mg tablet, Take 1 tablet (12.5 mg total) by mouth 2 (two) times a day with meals, Disp: 180 tablet, Rfl: 1    cholecalciferol (VITAMIN D3) 1,000 units tablet, Take 1 tablet (1,000 Units total) by mouth daily, Disp: , Rfl:     Coenzyme Q10 (CoQ-10) 100 MG capsule, Take 1 capsule (100 mg total) by mouth daily, Disp: , Rfl:     Cyanocobalamin (VITAMIN B 12 PO), Take 500 mcg by mouth every morning, Disp: , Rfl:     diphenhydrAMINE (BENADRYL) 25 mg tablet, Take 25 mg by mouth every 8 (eight) hours as needed for itching , Disp: , Rfl:     doxazosin (CARDURA) 1 mg tablet, Take 1 tablet (1 mg total) by mouth daily at bedtime, Disp: 90 tablet, Rfl: 1    escitalopram (LEXAPRO) 10 mg tablet, TAKE 1 AND 1/2 TABLETS BY MOUTH  DAILY AT BEDTIME, Disp: 135 tablet, Rfl: 1    losartan-hydrochlorothiazide (HYZAAR) 100-12.5 MG per tablet, TAKE ONE TABLET BY MOUTH EVERY DAY, Disp: 30 tablet, Rfl: 5    Melatonin 10 MG TABS, Take by mouth daily at bedtime, Disp: , Rfl:     metaxalone (SKELAXIN) 800 mg tablet, Take 1 tablet (800 mg total) by mouth 3 (three) times a day as needed for muscle spasms, Disp: 30 tablet, Rfl: 0    naloxone (NARCAN) 4 mg/0.1 mL nasal spray, Administer 1 spray into a nostril. If no response after 2-3 minutes, give another dose in the other nostril using a new spray., Disp: 1 each, Rfl: 0    omeprazole (PriLOSEC) 20 mg delayed release capsule, TAKE 1  "CAPSULE BY MOUTH DAILY, Disp: 90 capsule, Rfl: 1    oxyCODONE-acetaminophen (Percocet) 7.5-325 MG per tablet, Take 1 tablet by mouth every 6 (six) hours as needed for moderate pain Max Daily Amount: 4 tablets, Disp: 120 tablet, Rfl: 0    psyllium (METAMUCIL) 58.6 % powder, Take by mouth daily, Disp: , Rfl:     zinc gluconate 50 mg tablet, Take 1 tablet (50 mg total) by mouth daily, Disp: , Rfl:       Objective:    Vitals:   /68 (BP Location: Right arm, Patient Position: Sitting, Cuff Size: Extra-Large)   Pulse 73   Ht 5' 2\" (1.575 m)   Wt 104 kg (229 lb 12.8 oz)   SpO2 96%   BMI 42.03 kg/m²   Body mass index is 42.03 kg/m².  Vitals:    08/08/24 1039   Weight: 104 kg (229 lb 12.8 oz)       Physical Exam  Vitals and nursing note reviewed.   Constitutional:       Appearance: Normal appearance.   Cardiovascular:      Rate and Rhythm: Normal rate and regular rhythm.      Heart sounds: Normal heart sounds.   Pulmonary:      Effort: Pulmonary effort is normal.      Breath sounds: Normal breath sounds.   Musculoskeletal:      Cervical back: Normal range of motion and neck supple.      Right lower leg: No edema.      Left lower leg: No edema.      Comments: Tenderness noted in the left side in the back.  Patient is using a cane to assist with ambulation now because of the pain.   Skin:     General: Skin is dry.   Neurological:      Mental Status: She is alert and oriented to person, place, and time.   Psychiatric:         Mood and Affect: Mood normal.         Lab Review   No visits with results within 2 Month(s) from this visit.   Latest known visit with results is:   Office Visit on 02/22/2024   Component Date Value Ref Range Status    RESULT ALL_PRESC MEDS SP INSTRNS 02/22/2024 Not Applicable   Final    Amphetamine Quantification 02/22/2024 negative  100 ng/mL Final    Methamphetamine Quantification 02/22/2024 negative  100 ng/mL Final    Butalbital Quantification 02/22/2024 negative  200 ng/mL Final    " Phenobarbital Quantification 02/22/2024 negative  200 ng/mL Final    Secobarbital Quantification 02/22/2024 negative  200 ng/mL Final    Alpha-Hydroxyalprazolam Quantifica* 02/22/2024 negative  20 ng/mL Final    7-Amino-Clonazepam Quantification * 02/22/2024 negative  20 ng/mL Final    Nordiazepam Quantification 02/22/2024 negative  40 ng/mL Final    Temazepam Quantification 02/22/2024 negative  50 ng/mL Final    Oxazepam Quantification 02/22/2024 negative  40 ng/mL Final    Lorazepam Quantification 02/22/2024 negative  40 ng/mL Final    Gabapentin Quantification 02/22/2024 negative  1000 Final    PREGABALIN QUANTIFICATION 02/22/2024 negative  400 Final    Cocaine metabolite Quantification 02/22/2024 negative  50 ng/mL Final    6-CLIFF (Heroin metabolite) Quantifi* 02/22/2024 negative  10 ng/mL Final    Buprenorphine Quantification 02/22/2024 negative  5 ng/mL Final    Norbuprenorphine Quantification 02/22/2024 negative  20 ng/mL Final    Dextrorphan (Dextromethorphan meta* 02/22/2024 negative  50 ng/mL Final    Meperidine Quantification 02/22/2024 negative  50 ng/mL Final    Normeperidine Quantification 02/22/2024 negative  50 ng/mL Final    Naltrexone Quantification 02/22/2024 negative  10 ng/mL Final    NALTREXOL (NALTREXONE METABOLITE) * 02/22/2024 negative  10 ng/mL Final    Fentanyl Quantification 02/22/2024 negative  1 ng/mL Final    Norfentanyl Quantification 02/22/2024 negative  8 ng/mL Final    4-ANPP Quantification 02/22/2024 Fen Neg  2 ng/mL Final    Acetyl fentanyl Quantification 02/22/2024 Fen Neg  2 ng/mL Final    Acetyl norfentanyl Quantification 02/22/2024 Fen Neg  5 ng/mL Final    Acryl fentanyl Quantification 02/22/2024 Fen Neg  1 ng/mL Final    Carfentanil Quantification 02/22/2024 Fen Neg  2 ng/mL Final    Para-fluorofentanyl Quantification 02/22/2024 Fen Neg  1 ng/mL Final    Methadone Quantification 02/22/2024 negative  100 ng/mL Final    EDDP (Methadone metabolite) Quanti* 02/22/2024 negative   100 ng/mL Final    Oxycodone Quantification 02/22/2024 positive-3411.420  50 ng/mL Final    Noroxycodone Quantification 02/22/2024 positive-> 6400  50 ng/mL Final    Oxymorphone Quantification 02/22/2024 positive-2614.197  50 ng/mL Final    Tapentadol Quantification 02/22/2024 negative  50 ng/mL Final    cTHC (Marijuana metabolite) Quanti* 02/22/2024 negative  15 ng/mL Final    Tramadol Quantification 02/22/2024 negative  100 ng/mL Final    O-desmethyl-tramadol Quantification 02/22/2024 negative  100 ng/mL Final    N-DESMETHYL-TRAMADOL QUANTIFICATION 02/22/2024 negative  100 ng/mL Final    Codeine Quantification 02/22/2024 negative  50 ng/mL Final    Morphine Quantification 02/22/2024 negative  50 ng/mL Final    Hydrocodone Quantification 02/22/2024 negative  50 ng/mL Final    Norhydrocodone Quantification 02/22/2024 negative  50 ng/mL Final    Hydromorphone Quantification 02/22/2024 negative  50 ng/mL Final    EUTYLONE QUANTIFICATION 02/22/2024 negative  10 ng/mL Final    METHYLONE QUANTIFICATION 02/22/2024 negative  3 ng/mL Final    Ethyl Glucuronide Quantification 02/22/2024 negative  500 ng/mL Final    Ethyl Sulfate Quantification 02/22/2024 negative  500 ng/mL Final    Mitragynine (Kratom alkaloid) Mateo* 02/22/2024 negative  1 ng/mL Final    8-QY-Rmfpbwfnjhs (Kratom alkaloid)* 02/22/2024 negative  1 ng/mL Final    5F-ADB-M7 02/22/2024 negative  10 ng/mL Final    EH-KHBUVLLW-M9 METABOLITE QUANTIFI* 02/22/2024 negative  10 ng/mL Final    AIHP-YAJIACAY-H8 METABOLITE QUANTI* 02/22/2024 negative  10 ng/mL Final    CKB739 metabolite Quantification 02/22/2024 negative  10 ng/mL Final    JOU033 metabolite Quantification 02/22/2024 negative  10 ng/mL Final    RCS4 METABOLITE QUANTIFICATION 02/22/2024 negative  10 ng/mL Final    XLR11/ METABOLITE QUANTIFICAT* 02/22/2024 negative  10 ng/mL Final    Methylphenidate Quantification 02/22/2024 negative  50 ng/mL Final    RITALINIC ACID QUANTIFICATION 02/22/2024 negative   "50 ng/mL Final    PHENTERMINE QUANTIFICATION 02/22/2024 negative  50 ng/mL Final    OXIDANT 02/22/2024 normal-0  <200 ug/mL ug/mL Final    CREATININE 02/22/2024 normal-194.8  >20 mg/dL mg/dL Final    pH 02/22/2024 normal-6.0  4.5 - 9.5 Final    SPECIFIC GRAVITY URINE 02/22/2024 normal-1.024  1.003 - 1.035 Final    XYLAZINE 02/22/2024 negative  10 ng/mL Final    4-HYDROXY XYLAZINE 02/22/2024 negative  10 ng/mL Final         Gustabo Sy MD        \"This note has been constructed using a voice recognition system.Therefore there may be syntax, spelling, and/or grammatical errors. Please call if you have any questions. \"  "

## 2024-08-08 ENCOUNTER — OFFICE VISIT (OUTPATIENT)
Dept: FAMILY MEDICINE CLINIC | Facility: CLINIC | Age: 69
End: 2024-08-08
Payer: COMMERCIAL

## 2024-08-08 VITALS
WEIGHT: 229.8 LBS | HEIGHT: 62 IN | HEART RATE: 73 BPM | BODY MASS INDEX: 42.29 KG/M2 | SYSTOLIC BLOOD PRESSURE: 120 MMHG | OXYGEN SATURATION: 96 % | DIASTOLIC BLOOD PRESSURE: 68 MMHG

## 2024-08-08 DIAGNOSIS — Z96.641 STATUS POST TOTAL REPLACEMENT OF RIGHT HIP: ICD-10-CM

## 2024-08-08 DIAGNOSIS — F11.20 CONTINUOUS OPIOID DEPENDENCE (HCC): ICD-10-CM

## 2024-08-08 DIAGNOSIS — K21.9 GASTROESOPHAGEAL REFLUX DISEASE WITHOUT ESOPHAGITIS: ICD-10-CM

## 2024-08-08 DIAGNOSIS — I10 PRIMARY HYPERTENSION: ICD-10-CM

## 2024-08-08 DIAGNOSIS — Z98.84 H/O BARIATRIC SURGERY: ICD-10-CM

## 2024-08-08 DIAGNOSIS — M54.50 CHRONIC MIDLINE LOW BACK PAIN WITHOUT SCIATICA: Primary | ICD-10-CM

## 2024-08-08 DIAGNOSIS — M16.11 PRIMARY OSTEOARTHRITIS OF RIGHT HIP: ICD-10-CM

## 2024-08-08 DIAGNOSIS — E83.52 HYPERCALCEMIA: ICD-10-CM

## 2024-08-08 DIAGNOSIS — E78.5 DYSLIPIDEMIA: ICD-10-CM

## 2024-08-08 DIAGNOSIS — F41.9 ANXIETY AND DEPRESSION: ICD-10-CM

## 2024-08-08 DIAGNOSIS — F32.A ANXIETY AND DEPRESSION: ICD-10-CM

## 2024-08-08 DIAGNOSIS — G89.29 CHRONIC MIDLINE LOW BACK PAIN WITHOUT SCIATICA: Primary | ICD-10-CM

## 2024-08-08 DIAGNOSIS — E66.9 CLASS 2 OBESITY: ICD-10-CM

## 2024-08-08 DIAGNOSIS — G89.4 CHRONIC PAIN SYNDROME: ICD-10-CM

## 2024-08-08 PROBLEM — L08.9 INFECTION OF LEFT HAND: Status: RESOLVED | Noted: 2024-05-16 | Resolved: 2024-08-08

## 2024-08-08 PROCEDURE — G2211 COMPLEX E/M VISIT ADD ON: HCPCS | Performed by: INTERNAL MEDICINE

## 2024-08-08 PROCEDURE — 99214 OFFICE O/P EST MOD 30 MIN: CPT | Performed by: INTERNAL MEDICINE

## 2024-08-08 RX ORDER — METAXALONE 800 MG/1
800 TABLET ORAL 3 TIMES DAILY PRN
Qty: 30 TABLET | Refills: 0 | Status: SHIPPED | OUTPATIENT
Start: 2024-08-08

## 2024-08-08 RX ORDER — OXYCODONE AND ACETAMINOPHEN 7.5; 325 MG/1; MG/1
1 TABLET ORAL EVERY 6 HOURS PRN
Qty: 120 TABLET | Refills: 0 | Status: SHIPPED | OUTPATIENT
Start: 2024-08-08

## 2024-08-08 NOTE — ASSESSMENT & PLAN NOTE
Patient status post right hip replacement still gets pain on and off on pain medication avoiding nonsteroidals because of the gastric sleeve

## 2024-08-08 NOTE — ASSESSMENT & PLAN NOTE
Patient with chronic low back pain in the midline but now she complains of more pain in the left side also after she lifted this air conditioner.  She still has some discomfort in spite of taking the pain medication exam shows some tenderness in the left side sacroiliac joint area we will give her a muscle relaxer she can still continue the pain medication.  Patient has taken Skelaxin in the past.  Based on the response with the muscle relaxer if necessary for start physical therapy discussed with the patient

## 2024-08-08 NOTE — ASSESSMENT & PLAN NOTE
Patient with chronic pain syndrome avoiding nonsteroidals because of the gastric sleeve surgery she had been prescribed oxycodone we will continue with the same

## 2024-08-08 NOTE — ASSESSMENT & PLAN NOTE
Emphasized regarding diet exercise lifestyle modification today his BMI is at 42.03 went up.  According to her she was in the past on cabbage soup diet and has lost weight she is going to resume the same

## 2024-08-08 NOTE — ASSESSMENT & PLAN NOTE
Patient is currently taking carvedilol Cardura losartan HCTZ and amlodipine blood pressure is under control at 120/68 we will continue with the same.

## 2024-08-28 ENCOUNTER — TELEPHONE (OUTPATIENT)
Age: 69
End: 2024-08-28

## 2024-08-28 NOTE — TELEPHONE ENCOUNTER
5 days ago started symptoms    Head cold, congestion, coughing up phlegm, fatigue. Patient refused an apt but would like a zpak called in.  She stated Dr. Sy has done this in the past and she does have an apt next week with him.     Pharmacy [facility] [place]

## 2024-08-28 NOTE — TELEPHONE ENCOUNTER
Pt contacted that she will need an appt to be evaluated. Pt declined appt and will keep appt for next week. Advised to ED with worsening sx.

## 2024-08-29 ENCOUNTER — TELEPHONE (OUTPATIENT)
Age: 69
End: 2024-08-29

## 2024-08-29 DIAGNOSIS — Z96.641 STATUS POST TOTAL REPLACEMENT OF RIGHT HIP: Primary | ICD-10-CM

## 2024-08-29 RX ORDER — CLINDAMYCIN HCL 300 MG
600 CAPSULE ORAL
Qty: 2 CAPSULE | Refills: 2 | Status: SHIPPED | OUTPATIENT
Start: 2024-08-29 | End: 2024-11-27

## 2024-08-29 NOTE — TELEPHONE ENCOUNTER
Caller: Patient    Doctor: Dr. Jaime     Reason for call: Patient asking if a prescription for Clindamycin 300 MG for upcoming dental work.  She may have dental appt tomorrow.    [id number] - [address] [facility] [phone]     Call back#: [phone]

## 2024-09-04 PROCEDURE — 3288F FALL RISK ASSESSMENT DOCD: CPT | Performed by: INTERNAL MEDICINE

## 2024-09-04 NOTE — PROGRESS NOTES
Ambulatory Visit  Name: Zonia Taylor      : 1955      MRN: [id number]  Encounter Provider: Gustabo Sy MD  Encounter Date: 2024   Encounter department: [facility]    Assessment & Plan   1. Viral upper respiratory tract infection  Assessment & Plan:  Patient with upper respiratory symptoms sinus congestion postnasal drip mild sore throat some bodyaches no fever appears to be viral symptomatic treatment for now reassured patient.  2. Chronic midline low back pain without sciatica  Assessment & Plan:  Patient still has chronic low back pain but the pain she had experienced when she was here the last time on the left side is better after we gave her the muscle relaxer.  Patient is on chronic pain medications oxycodone every 6 hours as needed will continue with the same  Orders:  -     oxyCODONE-acetaminophen (Percocet) 7.5-325 MG per tablet; Take 1 tablet by mouth every 6 (six) hours as needed for moderate pain Max Daily Amount: 4 tablets  3. Chronic pain syndrome  Assessment & Plan:  Patient with chronic pain syndrome status post gastric sleeve surgery not able to give any nonsteroidals will continue with oxycodone for the pain management.  Orders:  -     oxyCODONE-acetaminophen (Percocet) 7.5-325 MG per tablet; Take 1 tablet by mouth every 6 (six) hours as needed for moderate pain Max Daily Amount: 4 tablets  4. Primary osteoarthritis of right hip  -     oxyCODONE-acetaminophen (Percocet) 7.5-325 MG per tablet; Take 1 tablet by mouth every 6 (six) hours as needed for moderate pain Max Daily Amount: 4 tablets  5. Status post total replacement of right hip  Assessment & Plan:  Patient status post right hip replacement she had osteoarthritis of the right hip  Orders:  -     oxyCODONE-acetaminophen (Percocet) 7.5-325 MG per tablet; Take 1 tablet by mouth every 6 (six) hours as needed for moderate pain Max Daily Amount: 4 tablets  6. Anxiety and depression  Assessment &  Plan:  Patient with anxiety and depression currently taking Lexapro which appears to be helping we will continue with the same.  7. Class 2 obesity  Assessment & Plan:  BMI today is 41.56 emphasized regarding diet exercise lifestyle modification and lose weight.  8. Continuous opioid dependence (HCC)  Assessment & Plan:  Phonic pain discussed with her about the dependence but without pain medication patient is not able to function at this time.  9. Dyslipidemia  Assessment & Plan:  Repeat lipid profile was ordered but not done yet emphasized the need for the same  10. Gastroesophageal reflux disease without esophagitis  Assessment & Plan:  Patient is on pantoprazole we will continue  11. H/O bariatric surgery  Assessment & Plan:  Status post gastric sleeve 2011 stable  12. Hypercalcemia  Assessment & Plan:  Patient with hypercalcemia have ordered a repeat lab not done yet  13. Primary hypertension  Assessment & Plan:  Today's blood pressure is 120/66 currently patient is taking carvedilol Cardura and losartan HCTZ and amlodipine we will continue the same  14. Medicare annual wellness visit, subsequent       Preventive health issues were discussed with patient, and age appropriate screening tests were ordered as noted in patient's After Visit Summary. Personalized health advice and appropriate referrals for health education or preventive services given if needed, as noted in patient's After Visit Summary.    History of Present Illness     Patient is coming in for evaluation regarding symptoms of upper respiratory tract runny nose sinus congestion feeling no sore throat no difficulty swallowing does have some bodyaches no nausea vomiting.  No fever.  Patient also complains of discomfort feeling in the left forearm area pain sometimes severe no injuries.  Medications reviewed labs reviewed.  Patient with chronic pain syndrome    Last visit patient was having increasing pain in the low back area left side in spite of  taking the pain medication but got better after we gave her the Skelaxin.       Patient Care Team:  Gustabo Sy MD as PCP - General (Internal Medicine)  Lloyd Lewis MD    Review of Systems   Constitutional:  Negative for chills and fever.   HENT:  Negative for ear pain and sore throat.    Eyes:  Negative for pain and visual disturbance.   Respiratory:  Negative for cough and shortness of breath.    Cardiovascular:  Negative for chest pain and palpitations.   Gastrointestinal:  Negative for abdominal pain and vomiting.   Genitourinary:  Negative for dysuria and hematuria.   Musculoskeletal:  Positive for arthralgias and back pain.   Skin:  Negative for color change and rash.   Neurological:  Negative for seizures and syncope.   All other systems reviewed and are negative.    Medical History Reviewed by provider this encounter:       Annual Wellness Visit Questionnaire   Zonia is here for her Initial Wellness visit. Last Medicare Wellness visit information reviewed, patient interviewed, no change since last AWV. Last Medicare Wellness visit information reviewed, patient interviewed and updates made to the record today.      Health Risk Assessment:   Patient rates overall health as good. Patient feels that their physical health rating is same. Patient is satisfied with their life. Eyesight was rated as same. Hearing was rated as same. Patient feels that their emotional and mental health rating is same. Patients states they are never, rarely angry. Patient states they are never, rarely unusually tired/fatigued. Pain experienced in the last 7 days has been a lot. Patient's pain rating has been 6/10. Patient states that she has experienced no weight loss or gain in last 6 months.     Fall Risk Screening:   In the past year, patient has experienced: no history of falling in past year      Urinary Incontinence Screening:   Patient has not leaked urine accidently in the last six months.     Home Safety:  Patient does  not have trouble with stairs inside or outside of their home. Patient has working smoke alarms and has working carbon monoxide detector. Home safety hazards include: none.     Nutrition:   Current diet is Regular.     Medications:   Patient is currently taking over-the-counter supplements. OTC medications include: see medication list. Patient is able to manage medications.     Activities of Daily Living (ADLs)/Instrumental Activities of Daily Living (IADLs):   Walk and transfer into and out of bed and chair?: Yes  Dress and groom yourself?: Yes    Bathe or shower yourself?: Yes    Feed yourself? Yes  Do your laundry/housekeeping?: Yes  Manage your money, pay your bills and track your expenses?: Yes  Make your own meals?: Yes    Do your own shopping?: Yes    Previous Hospitalizations:   Any hospitalizations or ED visits within the last 12 months?: No      Advance Care Planning:   Living will: Yes    Durable POA for healthcare: Yes    Advanced directive: Yes    Advanced directive counseling given: Yes    ACP document given: Yes    Patient declined ACP directive: No    End of Life Decisions reviewed with patient: Yes    Provider agrees with end of life decisions: Yes      Comments: Patient has a living will DURABLE POWER OF  and advanced directives at home she will bring a copy of the same for us to review and file it    PREVENTIVE SCREENINGS      Cardiovascular Screening:    General: Screening Current      Diabetes Screening:     General: Screening Current      Colorectal Cancer Screening:     General: Screening Current      Breast Cancer Screening:     General: Screening Current      Cervical Cancer Screening:    General: Screening Not Indicated      Osteoporosis Screening:    General: Screening Current      Abdominal Aortic Aneurysm (AAA) Screening:        General: Screening Not Indicated      Lung Cancer Screening:     General: Screening Not Indicated      Hepatitis C Screening:    General: Screening Not  Indicated and History Hepatitis C    Screening, Brief Intervention, and Referral to Treatment (SBIRT)    Screening  Typical number of drinks in a day: 0  Typical number of drinks in a week: 0  Interpretation: Low risk drinking behavior.    AUDIT-C Screenin) How often did you have a drink containing alcohol in the past year? never  2) How many drinks did you have on a typical day when you were drinking in the past year? 0  3) How often did you have 6 or more drinks on one occasion in the past year? never    AUDIT-C Score: 0  Interpretation: Score 0-2 (female): Negative screen for alcohol misuse    Single Item Drug Screening:  How often have you used an illegal drug (including marijuana) or a prescription medication for non-medical reasons in the past year? never    Single Item Drug Screen Score: 0  Interpretation: Negative screen for possible drug use disorder    Brief Intervention  Alcohol & drug use screenings were reviewed. No concerns regarding substance use disorder identified.     Review of Current Opioid Use    Opioid Risk Tool (ORT) Interpretation: Complete Opioid Risk Tool (ORT)    Other Counseling Topics:   Car/seat belt/driving safety and regular weightbearing exercise.     Social Determinants of Health     Financial Resource Strain: Low Risk  (2023)    Overall Financial Resource Strain (CARDIA)     Difficulty of Paying Living Expenses: Not very hard   Recent Concern: Financial Resource Strain - Medium Risk (2023)    Overall Financial Resource Strain (CARDIA)     Difficulty of Paying Living Expenses: Somewhat hard   Food Insecurity: No Food Insecurity (9/3/2024)    Hunger Vital Sign     Worried About Running Out of Food in the Last Year: Never true     Ran Out of Food in the Last Year: Never true   Transportation Needs: No Transportation Needs (9/3/2024)    PRAPARE - Transportation     Lack of Transportation (Medical): No     Lack of Transportation (Non-Medical): No   Housing Stability: Low  "Risk  (9/3/2024)    Housing Stability Vital Sign     Unable to Pay for Housing in the Last Year: No     Number of Times Moved in the Last Year: 0     Homeless in the Last Year: No   Utilities: Not At Risk (9/3/2024)    [facility] Utilities     Threatened with loss of utilities: No     No results found.    Objective     /66 (BP Location: Right arm, Patient Position: Sitting, Cuff Size: Standard)   Pulse 73   Ht 5' 2\" (1.575 m)   Wt 103 kg (227 lb 3.2 oz)   SpO2 96%   BMI 41.56 kg/m²     Physical Exam  Vitals and nursing note reviewed.   Constitutional:       General: She is not in acute distress.     Appearance: She is well-developed. She is obese.   HENT:      Head: Normocephalic and atraumatic.      Mouth/Throat:      Mouth: Mucous membranes are moist.   Eyes:      Conjunctiva/sclera: Conjunctivae normal.   Cardiovascular:      Rate and Rhythm: Normal rate and regular rhythm.      Heart sounds: No murmur heard.  Pulmonary:      Effort: Pulmonary effort is normal. No respiratory distress.      Breath sounds: Normal breath sounds.   Abdominal:      Palpations: Abdomen is soft.      Tenderness: There is no abdominal tenderness.   Musculoskeletal:         General: No swelling.      Cervical back: Neck supple.      Right lower leg: No edema.      Left lower leg: No edema.      Comments: Movements of the lumbar  spine causing discomfort   Skin:     General: Skin is warm and dry.      Capillary Refill: Capillary refill takes less than 2 seconds.   Neurological:      Mental Status: She is alert.   Psychiatric:         Mood and Affect: Mood normal.           "

## 2024-09-04 NOTE — PATIENT INSTRUCTIONS
Medicare Preventive Visit Patient Instructions  Thank you for completing your Welcome to Medicare Visit or Medicare Annual Wellness Visit today. Your next wellness visit will be due in one year (9/5/2025).  The screening/preventive services that you may require over the next 5-10 years are detailed below. Some tests may not apply to you based off risk factors and/or age. Screening tests ordered at today's visit but not completed yet may show as past due. Also, please note that scanned in results may not display below.  Preventive Screenings:  Service Recommendations Previous Testing/Comments   Colorectal Cancer Screening  * Colonoscopy    * Fecal Occult Blood Test (FOBT)/Fecal Immunochemical Test (FIT)  * Fecal DNA/Cologuard Test  * Flexible Sigmoidoscopy Age: 45-75 years old   Colonoscopy: every 10 years (may be performed more frequently if at higher risk)  OR  FOBT/FIT: every 1 year  OR  Cologuard: every 3 years  OR  Sigmoidoscopy: every 5 years  Screening may be recommended earlier than age 45 if at higher risk for colorectal cancer. Also, an individualized decision between you and your healthcare provider will decide whether screening between the ages of 76-85 would be appropriate. Colonoscopy: 10/26/2021  FOBT/FIT: Not on file  Cologuard: Not on file  Sigmoidoscopy: Not on file          Breast Cancer Screening Age: 40+ years old  Frequency: every 1-2 years  Not required if history of left and right mastectomy Mammogram: 07/11/2024        Cervical Cancer Screening Between the ages of 21-29, pap smear recommended once every 3 years.   Between the ages of 30-65, can perform pap smear with HPV co-testing every 5 years.   Recommendations may differ for women with a history of total hysterectomy, cervical cancer, or abnormal pap smears in past. Pap Smear: Not on file        Hepatitis C Screening Once for adults born between 1945 and 1965  More frequently in patients at high risk for Hepatitis C Hep C Antibody: Not on  file        Diabetes Screening 1-2 times per year if you're at risk for diabetes or have pre-diabetes Fasting glucose: 90 mg/dL (1/18/2024)  A1C: 5.7 % (1/18/2024)      Cholesterol Screening Once every 5 years if you don't have a lipid disorder. May order more often based on risk factors. Lipid panel: 01/18/2024          Other Preventive Screenings Covered by Medicare:  Abdominal Aortic Aneurysm (AAA) Screening: covered once if your at risk. You're considered to be at risk if you have a family history of AAA.  Lung Cancer Screening: covers low dose CT scan once per year if you meet all of the following conditions: (1) Age 55-77; (2) No signs or symptoms of lung cancer; (3) Current smoker or have quit smoking within the last 15 years; (4) You have a tobacco smoking history of at least 20 pack years (packs per day multiplied by number of years you smoked); (5) You get a written order from a healthcare provider.  Glaucoma Screening: covered annually if you're considered high risk: (1) You have diabetes OR (2) Family history of glaucoma OR (3)  aged 50 and older OR (4)  American aged 65 and older  Osteoporosis Screening: covered every 2 years if you meet one of the following conditions: (1) You're estrogen deficient and at risk for osteoporosis based off medical history and other findings; (2) Have a vertebral abnormality; (3) On glucocorticoid therapy for more than 3 months; (4) Have primary hyperparathyroidism; (5) On osteoporosis medications and need to assess response to drug therapy.   Last bone density test (DXA Scan): 06/07/2023.  HIV Screening: covered annually if you're between the age of 15-65. Also covered annually if you are younger than 15 and older than 65 with risk factors for HIV infection. For pregnant patients, it is covered up to 3 times per pregnancy.    Immunizations:  Immunization Recommendations   Influenza Vaccine Annual influenza vaccination during flu season is  recommended for all persons aged >= 6 months who do not have contraindications   Pneumococcal Vaccine   * Pneumococcal conjugate vaccine = PCV13 (Prevnar 13), PCV15 (Vaxneuvance), PCV20 (Prevnar 20)  * Pneumococcal polysaccharide vaccine = PPSV23 (Pneumovax) Adults 19-63 yo with certain risk factors or if 65+ yo  If never received any pneumonia vaccine: recommend Prevnar 20 (PCV20)  Give PCV20 if previously received 1 dose of PCV13 or PPSV23   Hepatitis B Vaccine 3 dose series if at intermediate or high risk (ex: diabetes, end stage renal disease, liver disease)   Respiratory syncytial virus (RSV) Vaccine - COVERED BY MEDICARE PART D  * RSVPreF3 (Arexvy) CDC recommends that adults 60 years of age and older may receive a single dose of RSV vaccine using shared clinical decision-making (SCDM)   Tetanus (Td) Vaccine - COST NOT COVERED BY MEDICARE PART B Following completion of primary series, a booster dose should be given every 10 years to maintain immunity against tetanus. Td may also be given as tetanus wound prophylaxis.   Tdap Vaccine - COST NOT COVERED BY MEDICARE PART B Recommended at least once for all adults. For pregnant patients, recommended with each pregnancy.   Shingles Vaccine (Shingrix) - COST NOT COVERED BY MEDICARE PART B  2 shot series recommended in those 19 years and older who have or will have weakened immune systems or those 50 years and older     Health Maintenance Due:      Topic Date Due   • Breast Cancer Screening: Mammogram  07/11/2025   • Colorectal Cancer Screening  10/25/2026   • Hepatitis C Screening  Discontinued     Immunizations Due:      Topic Date Due   • Pneumococcal Vaccine: 65+ Years (1 of 2 - PCV) Never done   • Hepatitis A Vaccine (1 of 2 - Risk 2-dose series) Never done   • Hepatitis B Vaccine (1 of 3 - Risk 3-dose series) Never done   • COVID-19 Vaccine (5 - 2023-24 season) 09/01/2024   • Influenza Vaccine (1) 09/01/2024     Advance Directives   What are advance directives?   Advance directives are legal documents that state your wishes and plans for medical care. These plans are made ahead of time in case you lose your ability to make decisions for yourself. Advance directives can apply to any medical decision, such as the treatments you want, and if you want to donate organs.   What are the types of advance directives?  There are many types of advance directives, and each state has rules about how to use them. You may choose a combination of any of the following:  Living will:  This is a written record of the treatment you want. You can also choose which treatments you do not want, which to limit, and which to stop at a certain time. This includes surgery, medicine, IV fluid, and tube feedings.   Durable power of  for healthcare (DPAHC):  This is a written record that states who you want to make healthcare choices for you when you are unable to make them for yourself. This person, called a proxy, is usually a family member or a friend. You may choose more than 1 proxy.  Do not resuscitate (DNR) order:  A DNR order is used in case your heart stops beating or you stop breathing. It is a request not to have certain forms of treatment, such as CPR. A DNR order may be included in other types of advance directives.  Medical directive:  This covers the care that you want if you are in a coma, near death, or unable to make decisions for yourself. You can list the treatments you want for each condition. Treatment may include pain medicine, surgery, blood transfusions, dialysis, IV or tube feedings, and a ventilator (breathing machine).  Values history:  This document has questions about your views, beliefs, and how you feel and think about life. This information can help others choose the care that you would choose.  Why are advance directives important?  An advance directive helps you control your care. Although spoken wishes may be used, it is better to have your wishes written down.  Spoken wishes can be misunderstood, or not followed. Treatments may be given even if you do not want them. An advance directive may make it easier for your family to make difficult choices about your care.   Weight Management   Why it is important to manage your weight:  Being overweight increases your risk of health conditions such as heart disease, high blood pressure, type 2 diabetes, and certain types of cancer. It can also increase your risk for osteoarthritis, sleep apnea, and other respiratory problems. Aim for a slow, steady weight loss. Even a small amount of weight loss can lower your risk of health problems.  How to lose weight safely:  A safe and healthy way to lose weight is to eat fewer calories and get regular exercise. You can lose up about 1 pound a week by decreasing the number of calories you eat by 500 calories each day.   Healthy meal plan for weight management:  A healthy meal plan includes a variety of foods, contains fewer calories, and helps you stay healthy. A healthy meal plan includes the following:  Eat whole-grain foods more often.  A healthy meal plan should contain fiber. Fiber is the part of grains, fruits, and vegetables that is not broken down by your body. Whole-grain foods are healthy and provide extra fiber in your diet. Some examples of whole-grain foods are whole-wheat breads and pastas, oatmeal, brown rice, and bulgur.  Eat a variety of vegetables every day.  Include dark, leafy greens such as spinach, kale, cesar greens, and mustard greens. Eat yellow and orange vegetables such as carrots, sweet potatoes, and winter squash.   Eat a variety of fruits every day.  Choose fresh or canned fruit (canned in its own juice or light syrup) instead of juice. Fruit juice has very little or no fiber.  Eat low-fat dairy foods.  Drink fat-free (skim) milk or 1% milk. Eat fat-free yogurt and low-fat cottage cheese. Try low-fat cheeses such as mozzarella and other reduced-fat  cheeses.  Choose meat and other protein foods that are low in fat.  Choose beans or other legumes such as split peas or lentils. Choose fish, skinless poultry (chicken or turkey), or lean cuts of red meat (beef or pork). Before you cook meat or poultry, cut off any visible fat.   Use less fat and oil.  Try baking foods instead of frying them. Add less fat, such as margarine, sour cream, regular salad dressing and mayonnaise to foods. Eat fewer high-fat foods. Some examples of high-fat foods include french fries, doughnuts, ice cream, and cakes.  Eat fewer sweets.  Limit foods and drinks that are high in sugar. This includes candy, cookies, regular soda, and sweetened drinks.  Exercise:  Exercise at least 30 minutes per day on most days of the week. Some examples of exercise include walking, biking, dancing, and swimming. You can also fit in more physical activity by taking the stairs instead of the elevator or parking farther away from stores. Ask your healthcare provider about the best exercise plan for you.      © Copyright [employer] 2018 Information is for End User's use only and may not be sold, redistributed or otherwise used for commercial purposes. All illustrations and images included in CareNotes® are the copyrighted property of [employer].D.A.M., Inc[employer] or [employer]

## 2024-09-05 ENCOUNTER — OFFICE VISIT (OUTPATIENT)
Dept: FAMILY MEDICINE CLINIC | Facility: CLINIC | Age: 69
End: 2024-09-05
Payer: COMMERCIAL

## 2024-09-05 VITALS
DIASTOLIC BLOOD PRESSURE: 66 MMHG | BODY MASS INDEX: 41.81 KG/M2 | WEIGHT: 227.2 LBS | SYSTOLIC BLOOD PRESSURE: 120 MMHG | OXYGEN SATURATION: 96 % | HEART RATE: 73 BPM | HEIGHT: 62 IN

## 2024-09-05 DIAGNOSIS — F32.A ANXIETY AND DEPRESSION: ICD-10-CM

## 2024-09-05 DIAGNOSIS — F11.20 CONTINUOUS OPIOID DEPENDENCE (HCC): ICD-10-CM

## 2024-09-05 DIAGNOSIS — J06.9 VIRAL UPPER RESPIRATORY TRACT INFECTION: Primary | ICD-10-CM

## 2024-09-05 DIAGNOSIS — E83.52 HYPERCALCEMIA: ICD-10-CM

## 2024-09-05 DIAGNOSIS — F41.9 ANXIETY AND DEPRESSION: ICD-10-CM

## 2024-09-05 DIAGNOSIS — I10 PRIMARY HYPERTENSION: ICD-10-CM

## 2024-09-05 DIAGNOSIS — M54.50 CHRONIC MIDLINE LOW BACK PAIN WITHOUT SCIATICA: ICD-10-CM

## 2024-09-05 DIAGNOSIS — G89.29 CHRONIC MIDLINE LOW BACK PAIN WITHOUT SCIATICA: ICD-10-CM

## 2024-09-05 DIAGNOSIS — Z96.641 STATUS POST TOTAL REPLACEMENT OF RIGHT HIP: ICD-10-CM

## 2024-09-05 DIAGNOSIS — Z98.84 H/O BARIATRIC SURGERY: ICD-10-CM

## 2024-09-05 DIAGNOSIS — E78.5 DYSLIPIDEMIA: ICD-10-CM

## 2024-09-05 DIAGNOSIS — Z00.00 MEDICARE ANNUAL WELLNESS VISIT, SUBSEQUENT: ICD-10-CM

## 2024-09-05 DIAGNOSIS — K21.9 GASTROESOPHAGEAL REFLUX DISEASE WITHOUT ESOPHAGITIS: ICD-10-CM

## 2024-09-05 DIAGNOSIS — G89.4 CHRONIC PAIN SYNDROME: ICD-10-CM

## 2024-09-05 DIAGNOSIS — E66.9 CLASS 2 OBESITY: ICD-10-CM

## 2024-09-05 DIAGNOSIS — M16.11 PRIMARY OSTEOARTHRITIS OF RIGHT HIP: ICD-10-CM

## 2024-09-05 PROCEDURE — G0439 PPPS, SUBSEQ VISIT: HCPCS | Performed by: INTERNAL MEDICINE

## 2024-09-05 PROCEDURE — 1160F RVW MEDS BY RX/DR IN RCRD: CPT | Performed by: INTERNAL MEDICINE

## 2024-09-05 PROCEDURE — 3074F SYST BP LT 130 MM HG: CPT | Performed by: INTERNAL MEDICINE

## 2024-09-05 PROCEDURE — 99213 OFFICE O/P EST LOW 20 MIN: CPT | Performed by: INTERNAL MEDICINE

## 2024-09-05 PROCEDURE — 1159F MED LIST DOCD IN RCRD: CPT | Performed by: INTERNAL MEDICINE

## 2024-09-05 PROCEDURE — 1125F AMNT PAIN NOTED PAIN PRSNT: CPT | Performed by: INTERNAL MEDICINE

## 2024-09-05 PROCEDURE — 3078F DIAST BP <80 MM HG: CPT | Performed by: INTERNAL MEDICINE

## 2024-09-05 RX ORDER — OXYCODONE AND ACETAMINOPHEN 7.5; 325 MG/1; MG/1
1 TABLET ORAL EVERY 6 HOURS PRN
Qty: 120 TABLET | Refills: 0 | Status: SHIPPED | OUTPATIENT
Start: 2024-09-05

## 2024-09-05 NOTE — ASSESSMENT & PLAN NOTE
Patient with upper respiratory symptoms sinus congestion postnasal drip mild sore throat some bodyaches no fever appears to be viral symptomatic treatment for now reassured patient.

## 2024-09-05 NOTE — ASSESSMENT & PLAN NOTE
Notified Philomena Navarro that Dr George Berumen does not typically use Serzone. He switched the medication to Zoloft which he prefers. She states understanding. Patient with anxiety and depression currently taking Lexapro which appears to be helping we will continue with the same.

## 2024-09-05 NOTE — ASSESSMENT & PLAN NOTE
Today's blood pressure is 120/66 currently patient is taking carvedilol Cardura and losartan HCTZ and amlodipine we will continue the same

## 2024-09-05 NOTE — ASSESSMENT & PLAN NOTE
Patient still has chronic low back pain but the pain she had experienced when she was here the last time on the left side is better after we gave her the muscle relaxer.  Patient is on chronic pain medications oxycodone every 6 hours as needed will continue with the same

## 2024-09-05 NOTE — ASSESSMENT & PLAN NOTE
Phonic pain discussed with her about the dependence but without pain medication patient is not able to function at this time.

## 2024-09-05 NOTE — ASSESSMENT & PLAN NOTE
Patient with chronic pain syndrome status post gastric sleeve surgery not able to give any nonsteroidals will continue with oxycodone for the pain management.

## 2024-09-25 ENCOUNTER — APPOINTMENT (OUTPATIENT)
Dept: LAB | Facility: HOSPITAL | Age: 69
End: 2024-09-25
Attending: INTERNAL MEDICINE
Payer: COMMERCIAL

## 2024-09-25 DIAGNOSIS — R73.03 PREDIABETES: ICD-10-CM

## 2024-09-25 DIAGNOSIS — E78.5 DYSLIPIDEMIA: ICD-10-CM

## 2024-09-25 LAB
ALBUMIN SERPL BCG-MCNC: 4.2 G/DL (ref 3.5–5)
ALP SERPL-CCNC: 71 U/L (ref 34–104)
ALT SERPL W P-5'-P-CCNC: 14 U/L (ref 7–52)
ANION GAP SERPL CALCULATED.3IONS-SCNC: 7 MMOL/L (ref 4–13)
AST SERPL W P-5'-P-CCNC: 19 U/L (ref 13–39)
BILIRUB SERPL-MCNC: 0.38 MG/DL (ref 0.2–1)
BUN SERPL-MCNC: 18 MG/DL (ref 5–25)
CALCIUM SERPL-MCNC: 9.9 MG/DL (ref 8.4–10.2)
CHLORIDE SERPL-SCNC: 102 MMOL/L (ref 96–108)
CHOLEST SERPL-MCNC: 200 MG/DL
CO2 SERPL-SCNC: 32 MMOL/L (ref 21–32)
CREAT SERPL-MCNC: 0.52 MG/DL (ref 0.6–1.3)
EST. AVERAGE GLUCOSE BLD GHB EST-MCNC: 117 MG/DL
GFR SERPL CREATININE-BSD FRML MDRD: 97 ML/MIN/1.73SQ M
GLUCOSE P FAST SERPL-MCNC: 91 MG/DL (ref 65–99)
HBA1C MFR BLD: 5.7 %
HDLC SERPL-MCNC: 71 MG/DL
LDLC SERPL CALC-MCNC: 109 MG/DL (ref 0–100)
NONHDLC SERPL-MCNC: 129 MG/DL
POTASSIUM SERPL-SCNC: 4.3 MMOL/L (ref 3.5–5.3)
PROT SERPL-MCNC: 7.4 G/DL (ref 6.4–8.4)
SODIUM SERPL-SCNC: 141 MMOL/L (ref 135–147)
TRIGL SERPL-MCNC: 99 MG/DL

## 2024-09-25 PROCEDURE — 83036 HEMOGLOBIN GLYCOSYLATED A1C: CPT

## 2024-09-25 PROCEDURE — 80061 LIPID PANEL: CPT

## 2024-09-25 PROCEDURE — 36415 COLL VENOUS BLD VENIPUNCTURE: CPT

## 2024-09-25 PROCEDURE — 80053 COMPREHEN METABOLIC PANEL: CPT

## 2024-10-01 NOTE — PROGRESS NOTES
Office Visit Note  10/02/24     Zonia Taylor 69 y.o. female MRN: [id number]  : 1955    Assessment:     1. Dyslipidemia  Assessment & Plan:  Lipid profile shows cholesterol at 200 triglycerides 99 HDL 71 LDL at 109 currently she is not on any medication does not want to be on any medications but recommended very strongly to follow the strict diet we will be repeating the lipid profiles again in 4 months time  2. Primary hypertension  Assessment & Plan:  Blood pressure today is 120/65 currently patient is on Cardura, losartan/HCTZ, amlodipine and carvedilol we will continue with this cold medications  3. Chronic midline low back pain without sciatica  Assessment & Plan:  Patient with a history of chronic low back pain and also seen in the past by the pain management physician currently taking oxycodone every 6 hours as needed for pain and she also takes muscle relaxer as needed will continue with the same  4. Anxiety and depression  Assessment & Plan:  Patient currently is on escitalopram 10 mg daily will continue with the same since it has been helping her with her anxiety and depression symptoms.  5. Chronic pain syndrome  Assessment & Plan:  Patient with chronic pain syndrome status post gastric sleeve surgery not able to give any nonsteroidals continue oxycodone for the pain management along with the muscle relaxers  6. Class 2 obesity  Assessment & Plan:  Patient gained about 3 pounds compared to the last month admits to be noncompliant with the diet in the recent past patient BMI is 42.07 it was 41.56 before emphasized regarding diet exercise lifestyle modification cutting back calorie intake and lose weight  7. Continuous opioid dependence (HCC)  Assessment & Plan:  Discussed with patient regarding dependence however patient is not able to function and do her day-to-day activities without taking the medication for pain  8. Gastroesophageal reflux disease without esophagitis  Assessment &  Plan:  Currently on omeprazole 20 mg daily continue the same recommend patient not eat late in the night keep the head of the bed up  9. H/O bariatric surgery  Assessment & Plan:  Patient status post gastric sleeve done in the year 2011 stable  10. Hypercalcemia  Assessment & Plan:  Calcium level came back normal at 9.9 will monitor with periodic labs  11. Primary osteoarthritis of right hip  Assessment & Plan:  Patient with osteoarthritis multiple joints status post right hip replacement stable still needs to take the medication oxycodone for her low back pain and joint pains  12. Status post total replacement of right hip             Discussion Summary and Plan:  Today's care plan and medications were reviewed with patient in detail and all their questions answered to their satisfaction.    Chief Complaint   Patient presents with   • Follow-up      Subjective:  Patient is coming for a follow-up evaluation with a history of chronic low back pain chronic pain syndrome hypertension dyslipidemia obesity.  Continues to experience low back pain takes the pain medication.  Had lab work done which came back unremarkable except cholesterol went up to 200 patient admits to being noncompliant in the recent past with the diet.      The following portions of the patient's history were reviewed and updated as appropriate: allergies, current medications, past family history, past medical history, past social history, past surgical history and problem list.    Review of Systems   Constitutional:  Negative for chills and fever.   HENT:  Negative for ear pain and sore throat.    Eyes:  Negative for pain and visual disturbance.   Respiratory:  Negative for cough and shortness of breath.    Cardiovascular:  Negative for chest pain and palpitations.   Gastrointestinal:  Negative for abdominal pain and vomiting.   Genitourinary:  Negative for dysuria and hematuria.   Musculoskeletal:  Negative for arthralgias and back pain.   Skin:   "Negative for color change and rash.   Neurological:  Negative for seizures and syncope.   All other systems reviewed and are negative.        Historical Information   Patient Active Problem List   Diagnosis   • Chronic pain syndrome   • Chronic midline low back pain without sciatica   • H/O bariatric surgery   • Hypertension   • Gastroesophageal reflux disease   • Continuous opioid dependence (HCC)   • Primary osteoarthritis   • Hypercalcemia   • Dyslipidemia   • Right hip pain   • Anxiety and depression   • Primary osteoarthritis of right hip   • Chronic hepatitis C without hepatic coma (HCC)   • Class 2 obesity   • Status post total replacement of right hip   • Viral upper respiratory tract infection     Past Medical History:   Diagnosis Date   • Acquired trigger finger of right ring finger 08/31/2023   • Anxiety    • Apnea, sleep     patient states \"repeated test says no\"   • Arthritis     severe in the joints   • Chronic pain disorder     joints   • Depression    • DJD (degenerative joint disease)    • Fall 11/22/2018    tripped on a folded rug- left hamstring injury   • Foot injury     right foot-fx of bone-no repair-has severe arthritis in the foot and ankle   • Fractures yr. 2000 or before    left ankle   • GERD (gastroesophageal reflux disease)    • Hepatitis C not sure   • History of sleep apnea    • Hypercholesteremia    • Hypertension    • Insomnia    • Low back pain    • Obesity     Lost 70lb with gastric sleeve (2011)   • Osteoarthritis    • Stomach disorder     take meds. for this   • Use of cane as ambulatory aid    • Wears glasses    • Wears partial dentures     upper and lower-but did lose the lower      Past Surgical History:   Procedure Laterality Date   • ANKLE ARTHROSCOPY Right    • ANKLE SURGERY  not sure    orthoscopic/rt. ankle   • APPENDECTOMY     • BARIATRIC SURGERY  maybe 2011   • CHOLECYSTECTOMY     • COLONOSCOPY  10/26/2021   • DXA PROCEDURE (HISTORICAL)  10/13/2020   • GASTRECTOMY " SLEEVE LAPAROSCOPIC  2011    lost 70 lb   • HAND SURGERY      rt. hand, trigger finger   • JOINT REPLACEMENT Bilateral     knee replacements   • KNEE SURGERY  2001 maybe   • MAMMO (HISTORICAL)  01/26/2022   • NERVE BLOCK Bilateral 12/29/2017    Procedure: L4, L5, S1 MEDIAL BRANCH BLOCK #1;  Surgeon: Lloyd Lewis MD;  Location: [facility] MAIN OR;  Service: Pain Management    • NERVE BLOCK Bilateral 01/12/2018    Procedure: BLOCK MEDIAL BRANCH L4, L5-S1  #2;  Surgeon: Lloyd Lewis MD;  Location: [facility] MAIN OR;  Service: Pain Management    • AL ARTHROPLASTY GLENOHUMERAL JOINT TOTAL SHOULDER Right 05/23/2019    Procedure: ARTHROPLASTY SHOULDER TOTAL , excision of deep cyst at shoulder, biceps tenotomy;  Surgeon: Dileep Thomas MD;  Location: [facility] MAIN OR;  Service: Orthopedics   • AL ARTHRP ACETBLR/PROX FEM PROSTC AGRFT/ALGRFT Right 10/16/2023    Procedure: ARTHROPLASTY HIP TOTAL ANTERIOR,NAVIGATED - overnight;  Surgeon: Ovi Jaime DO;  Location: [facility] MAIN OR;  Service: Orthopedics   • AL INJECT SI JOINT ARTHRGRPHY&/ANES/STEROID W/LESA Right 10/23/2020    Procedure: Sacroiliac Joint injection (93922);  Surgeon: Lloyd Lewis MD;  Location: [facility] MAIN OR;  Service: Pain Management    • AL TENDON SHEATH INCISION Right 12/21/2021    Procedure: RELEASE TRIGGER RING FINGER;  Surgeon: Dileep Thomas MD;  Location: [facility] MAIN OR;  Service: Orthopedics   • RADIOFREQUENCY ABLATION Right 03/16/2018    Procedure: Rt L4 L5 S1 RFA;  Surgeon: Lloyd Lewis MD;  Location: [facility] MAIN OR;  Service: Pain Management    • RADIOFREQUENCY ABLATION Left 12/14/2018    Procedure: L3-S1 Radio Frequency Ablation ([id number]);  Surgeon: Lloyd Lewis MD;  Location: [facility] MAIN OR;  Service: Pain Management    • RHIZOTOMY Left 01/25/2018    Procedure: L4, L5, S1 RFA;  Surgeon: Lloyd Lewis MD;  Location: [facility] MAIN OR;  Service: Pain Management    • SHOULDER SURGERY  April/2019    rt. total  replacement   • TONSILLECTOMY        Social History     Substance and Sexual Activity   Alcohol Use Not Currently    Comment: quit many yrs. ago     Social History     Substance and Sexual Activity   Drug Use Not Currently   • Types: Amphetamines, Marijuana    Comment: aprox. 35 years ago, maybe longer     Social History     Tobacco Use   Smoking Status Former   • Current packs/day: 0.00   • Types: Cigarettes   • Quit date: 2004   • Years since quittin.7   • Passive exposure: Past   Smokeless Tobacco Never     Family History   Problem Relation Age of Onset   • Other Mother         in a nursing home-fx of the leg   • Heart disease Father         CHF   • Alcohol abuse Father    • Cirrhosis Father    • Colon cancer Maternal Grandmother    • No Known Problems Maternal Grandfather    • No Known Problems Paternal Grandmother    • No Known Problems Paternal Grandfather    • Other Brother         lung abscess, pneumonia   • Colon cancer Maternal Aunt    • No Known Problems Maternal Aunt    • No Known Problems Maternal Aunt    • No Known Problems Maternal Uncle    • No Known Problems Paternal Aunt    • No Known Problems Paternal Uncle    • Breast cancer Cousin 50     Health Maintenance Due   Topic   • Pneumococcal Vaccine: 65+ Years (1 of 2 - PCV)   • Hepatitis A Vaccine (1 of 2 - Risk 2-dose series)   • RSV Vaccine Age 60+ Years (1 - 1-dose 60+ series)   • Hepatitis B Vaccine (1 of 3 - Risk 3-dose series)   • PT PLAN OF CARE    • Influenza Vaccine (1)   • COVID-19 Vaccine ( season)   • Depression Screening       Meds/Allergies       Current Outpatient Medications:   •  acetaminophen (TYLENOL) 325 mg tablet, Take 3 tablets (975 mg total) by mouth every 8 (eight) hours, Disp: , Rfl: 0  •  amLODIPine (NORVASC) 10 mg tablet, TAKE 1 TABLET BY MOUTH DAILY, Disp: 90 tablet, Rfl: 3  •  Ascorbic Acid (vitamin C) 100 MG tablet, Take 1 tablet (100 mg total) by mouth daily, Disp: , Rfl:   •  Biotin 5000 MCG CAPS, Take 1 capsule (5,000 mcg total) by  mouth in the morning, Disp: , Rfl:   •  carvedilol (COREG) 12.5 mg tablet, Take 1 tablet (12.5 mg total) by mouth 2 (two) times a day with meals, Disp: 180 tablet, Rfl: 1  •  cholecalciferol (VITAMIN D3) 1,000 units tablet, Take 1 tablet (1,000 Units total) by mouth daily, Disp: , Rfl:   •  clindamycin (CLEOCIN) 300 MG capsule, Take 2 capsules (600 mg total) by mouth 60 minutes pre-procedure, Disp: 2 capsule, Rfl: 2  •  Coenzyme Q10 (CoQ-10) 100 MG capsule, Take 1 capsule (100 mg total) by mouth daily, Disp: , Rfl:   •  Cyanocobalamin (VITAMIN B 12 PO), Take 500 mcg by mouth every morning, Disp: , Rfl:   •  diphenhydrAMINE (BENADRYL) 25 mg tablet, Take 25 mg by mouth every 8 (eight) hours as needed for itching , Disp: , Rfl:   •  doxazosin (CARDURA) 1 mg tablet, Take 1 tablet (1 mg total) by mouth daily at bedtime, Disp: 90 tablet, Rfl: 1  •  escitalopram (LEXAPRO) 10 mg tablet, TAKE 1 AND 1/2 TABLETS BY MOUTH  DAILY AT BEDTIME, Disp: 135 tablet, Rfl: 1  •  losartan-hydrochlorothiazide (HYZAAR) 100-12.5 MG per tablet, TAKE ONE TABLET BY MOUTH EVERY DAY, Disp: 30 tablet, Rfl: 5  •  Melatonin 10 MG TABS, Take by mouth daily at bedtime, Disp: , Rfl:   •  metaxalone (SKELAXIN) 800 mg tablet, Take 1 tablet (800 mg total) by mouth 3 (three) times a day as needed for muscle spasms, Disp: 30 tablet, Rfl: 0  •  naloxone (NARCAN) 4 mg/0.1 mL nasal spray, Administer 1 spray into a nostril. If no response after 2-3 minutes, give another dose in the other nostril using a new spray., Disp: 1 each, Rfl: 0  •  omeprazole (PriLOSEC) 20 mg delayed release capsule, TAKE 1 CAPSULE BY MOUTH DAILY, Disp: 90 capsule, Rfl: 1  •  oxyCODONE-acetaminophen (Percocet) 7.5-325 MG per tablet, Take 1 tablet by mouth every 6 (six) hours as needed for moderate pain Max Daily Amount: 4 tablets, Disp: 120 tablet, Rfl: 0  •  psyllium (METAMUCIL) 58.6 % powder, Take by mouth daily, Disp: , Rfl:   •  zinc gluconate 50 mg tablet, Take 1 tablet (50 mg  "total) by mouth daily, Disp: , Rfl:       Objective:    Vitals:   /65 (BP Location: Right arm, Patient Position: Sitting, Cuff Size: Standard)   Pulse 71   Ht 5' 2\" (1.575 m)   Wt 104 kg (230 lb)   SpO2 98%   BMI 42.07 kg/m²   Body mass index is 42.07 kg/m².  Vitals:    10/02/24 0852   Weight: 104 kg (230 lb)       Physical Exam  Vitals and nursing note reviewed.   Constitutional:       Appearance: Normal appearance. She is obese.   Cardiovascular:      Rate and Rhythm: Normal rate and regular rhythm.      Heart sounds: Normal heart sounds.   Pulmonary:      Effort: Pulmonary effort is normal.      Breath sounds: Normal breath sounds.   Abdominal:      Palpations: Abdomen is soft.   Musculoskeletal:      Cervical back: Normal range of motion and neck supple.      Right lower leg: No edema.      Left lower leg: No edema.      Comments: Lumbar spine movements causing discomfort and pain   Skin:     General: Skin is warm and dry.   Neurological:      Mental Status: She is alert and oriented to person, place, and time.   Psychiatric:         Mood and Affect: Mood normal.         Behavior: Behavior normal.       Lab Review   Appointment on 09/25/2024   Component Date Value Ref Range Status   • Sodium 09/25/2024 141  135 - 147 mmol/L Final   • Potassium 09/25/2024 4.3  3.5 - 5.3 mmol/L Final   • Chloride 09/25/2024 102  96 - 108 mmol/L Final   • CO2 09/25/2024 32  21 - 32 mmol/L Final   • ANION GAP 09/25/2024 7  4 - 13 mmol/L Final   • BUN 09/25/2024 18  5 - 25 mg/dL Final   • Creatinine 09/25/2024 0.52 (L)  0.60 - 1.30 mg/dL Final    Standardized to IDMS reference method   • Glucose, Fasting 09/25/2024 91  65 - 99 mg/dL Final   • Calcium 09/25/2024 9.9  8.4 - 10.2 mg/dL Final   • AST 09/25/2024 19  13 - 39 U/L Final   • ALT 09/25/2024 14  7 - 52 U/L Final    Specimen collection should occur prior to Sulfasalazine administration due to the potential for falsely depressed results.    • Alkaline Phosphatase " 09/25/2024 71  34 - 104 U/L Final   • Total Protein 09/25/2024 7.4  6.4 - 8.4 g/dL Final   • Albumin 09/25/2024 4.2  3.5 - 5.0 g/dL Final   • Total Bilirubin 09/25/2024 0.38  0.20 - 1.00 mg/dL Final    Use of this assay is not recommended for patients undergoing treatment with eltrombopag due to the potential for falsely elevated results.  N-acetyl-p-benzoquinone imine (metabolite of Acetaminophen) will generate erroneously low results in samples for patients that have taken an overdose of Acetaminophen.   • eGFR 09/25/2024 97  ml/min/1.73sq m Final   • Hemoglobin A1C 09/25/2024 5.7 (H)  Normal 4.0-5.6%; PreDiabetic 5.7-6.4%; Diabetic >=6.5%; Glycemic control for adults with diabetes <7.0% % Final   • EAG 09/25/2024 117  mg/dl Final   • Cholesterol 09/25/2024 200 (H)  See Comment mg/dL Final    Cholesterol:         Pediatric <18 Years        Desirable          <170 mg/dL      Borderline High    170-199 mg/dL      High               >=200 mg/dL        Adult >=18 Years            Desirable         <200 mg/dL      Borderline High   200-239 mg/dL      High              >239 mg/dL     • Triglycerides 09/25/2024 99  See Comment mg/dL Final    Triglyceride:     0-9Y            <75mg/dL     10Y-17Y         <90 mg/dL       >=18Y     Normal          <150 mg/dL     Borderline High 150-199 mg/dL     High            200-499 mg/dL        Very High       >499 mg/dL    Specimen collection should occur prior to Metamizole administration due to the potential for falsely depressed results.   • HDL, Direct 09/25/2024 71  >=50 mg/dL Final   • LDL Calculated 09/25/2024 109 (H)  0 - 100 mg/dL Final    LDL Cholesterol:     Optimal           <100 mg/dl     Near Optimal      100-129 mg/dl     Above Optimal       Borderline High 130-159 mg/dl       High            160-189 mg/dl       Very High       >189 mg/dl         This screening LDL is a calculated result.   It does not have the accuracy of the Direct Measured LDL in the monitoring of  "patients with hyperlipidemia and/or statin therapy.   Direct Measure LDL (LDC520) must be ordered separately in these patients.   • Non-HDL-Chol (CHOL-HDL) 09/25/2024 129  mg/dl Final         Gustabo Sy MD        \"This note has been constructed using a voice recognition system.Therefore there may be syntax, spelling, and/or grammatical errors. Please call if you have any questions. \"  "

## 2024-10-02 ENCOUNTER — OFFICE VISIT (OUTPATIENT)
Dept: FAMILY MEDICINE CLINIC | Facility: CLINIC | Age: 69
End: 2024-10-02
Payer: COMMERCIAL

## 2024-10-02 VITALS
HEART RATE: 71 BPM | DIASTOLIC BLOOD PRESSURE: 65 MMHG | WEIGHT: 230 LBS | HEIGHT: 62 IN | OXYGEN SATURATION: 98 % | BODY MASS INDEX: 42.33 KG/M2 | SYSTOLIC BLOOD PRESSURE: 120 MMHG

## 2024-10-02 DIAGNOSIS — M16.11 PRIMARY OSTEOARTHRITIS OF RIGHT HIP: ICD-10-CM

## 2024-10-02 DIAGNOSIS — G89.4 CHRONIC PAIN SYNDROME: ICD-10-CM

## 2024-10-02 DIAGNOSIS — Z98.84 H/O BARIATRIC SURGERY: ICD-10-CM

## 2024-10-02 DIAGNOSIS — F11.20 CONTINUOUS OPIOID DEPENDENCE (HCC): ICD-10-CM

## 2024-10-02 DIAGNOSIS — G89.29 CHRONIC MIDLINE LOW BACK PAIN WITHOUT SCIATICA: ICD-10-CM

## 2024-10-02 DIAGNOSIS — K21.9 GASTROESOPHAGEAL REFLUX DISEASE WITHOUT ESOPHAGITIS: ICD-10-CM

## 2024-10-02 DIAGNOSIS — F32.A ANXIETY AND DEPRESSION: ICD-10-CM

## 2024-10-02 DIAGNOSIS — E78.5 DYSLIPIDEMIA: Primary | ICD-10-CM

## 2024-10-02 DIAGNOSIS — E66.812 CLASS 2 OBESITY: ICD-10-CM

## 2024-10-02 DIAGNOSIS — M54.50 CHRONIC MIDLINE LOW BACK PAIN WITHOUT SCIATICA: ICD-10-CM

## 2024-10-02 DIAGNOSIS — Z23 ENCOUNTER FOR IMMUNIZATION: ICD-10-CM

## 2024-10-02 DIAGNOSIS — F41.9 ANXIETY AND DEPRESSION: ICD-10-CM

## 2024-10-02 DIAGNOSIS — E83.52 HYPERCALCEMIA: ICD-10-CM

## 2024-10-02 DIAGNOSIS — Z96.641 STATUS POST TOTAL REPLACEMENT OF RIGHT HIP: ICD-10-CM

## 2024-10-02 DIAGNOSIS — I10 PRIMARY HYPERTENSION: ICD-10-CM

## 2024-10-02 PROCEDURE — 99214 OFFICE O/P EST MOD 30 MIN: CPT | Performed by: INTERNAL MEDICINE

## 2024-10-02 PROCEDURE — 90662 IIV NO PRSV INCREASED AG IM: CPT

## 2024-10-02 PROCEDURE — G0008 ADMIN INFLUENZA VIRUS VAC: HCPCS

## 2024-10-02 RX ORDER — OXYCODONE AND ACETAMINOPHEN 7.5; 325 MG/1; MG/1
1 TABLET ORAL EVERY 6 HOURS PRN
Qty: 120 TABLET | Refills: 0 | Status: SHIPPED | OUTPATIENT
Start: 2024-10-02

## 2024-10-02 NOTE — ASSESSMENT & PLAN NOTE
Patient with osteoarthritis multiple joints status post right hip replacement stable still needs to take the medication oxycodone for her low back pain and joint pains

## 2024-10-02 NOTE — ASSESSMENT & PLAN NOTE
Currently on omeprazole 20 mg daily continue the same recommend patient not eat late in the night keep the head of the bed up

## 2024-10-02 NOTE — ASSESSMENT & PLAN NOTE
Patient with a history of chronic low back pain and also seen in the past by the pain management physician currently taking oxycodone every 6 hours as needed for pain and she also takes muscle relaxer as needed will continue with the same

## 2024-10-02 NOTE — ASSESSMENT & PLAN NOTE
Patient gained about 3 pounds compared to the last month admits to be noncompliant with the diet in the recent past patient BMI is 42.07 it was 41.56 before emphasized regarding diet exercise lifestyle modification cutting back calorie intake and lose weight

## 2024-10-02 NOTE — ASSESSMENT & PLAN NOTE
Lipid profile shows cholesterol at 200 triglycerides 99 HDL 71 LDL at 109 currently she is not on any medication does not want to be on any medications but recommended very strongly to follow the strict diet we will be repeating the lipid profiles again in 4 months time

## 2024-10-02 NOTE — ASSESSMENT & PLAN NOTE
Patient currently is on escitalopram 10 mg daily will continue with the same since it has been helping her with her anxiety and depression symptoms.

## 2024-10-02 NOTE — ASSESSMENT & PLAN NOTE
Blood pressure today is 120/65 currently patient is on Cardura, losartan/HCTZ, amlodipine and carvedilol we will continue with this cold medications

## 2024-10-02 NOTE — ASSESSMENT & PLAN NOTE
Patient with chronic pain syndrome status post gastric sleeve surgery not able to give any nonsteroidals continue oxycodone for the pain management along with the muscle relaxers

## 2024-10-02 NOTE — ASSESSMENT & PLAN NOTE
Discussed with patient regarding dependence however patient is not able to function and do her day-to-day activities without taking the medication for pain

## 2024-10-05 PROBLEM — J06.9 VIRAL UPPER RESPIRATORY TRACT INFECTION: Status: RESOLVED | Noted: 2024-09-05 | Resolved: 2024-10-05

## 2024-10-09 DIAGNOSIS — F41.9 ANXIETY: ICD-10-CM

## 2024-10-10 RX ORDER — ESCITALOPRAM OXALATE 10 MG/1
TABLET ORAL
Qty: 135 TABLET | Refills: 1 | Status: SHIPPED | OUTPATIENT
Start: 2024-10-10

## 2024-10-11 ENCOUNTER — OFFICE VISIT (OUTPATIENT)
Dept: OBGYN CLINIC | Facility: CLINIC | Age: 69
End: 2024-10-11
Payer: COMMERCIAL

## 2024-10-11 ENCOUNTER — APPOINTMENT (OUTPATIENT)
Dept: RADIOLOGY | Facility: CLINIC | Age: 69
End: 2024-10-11
Payer: COMMERCIAL

## 2024-10-11 VITALS
WEIGHT: 233.6 LBS | HEART RATE: 66 BPM | BODY MASS INDEX: 42.99 KG/M2 | SYSTOLIC BLOOD PRESSURE: 129 MMHG | HEIGHT: 62 IN | DIASTOLIC BLOOD PRESSURE: 62 MMHG

## 2024-10-11 DIAGNOSIS — Z96.641 AFTERCARE FOLLOWING RIGHT HIP JOINT REPLACEMENT SURGERY: ICD-10-CM

## 2024-10-11 DIAGNOSIS — Z47.1 AFTERCARE FOLLOWING RIGHT HIP JOINT REPLACEMENT SURGERY: ICD-10-CM

## 2024-10-11 DIAGNOSIS — Z96.641 STATUS POST TOTAL REPLACEMENT OF RIGHT HIP: Primary | ICD-10-CM

## 2024-10-11 DIAGNOSIS — Z96.641 STATUS POST TOTAL REPLACEMENT OF RIGHT HIP: ICD-10-CM

## 2024-10-11 PROCEDURE — 73502 X-RAY EXAM HIP UNI 2-3 VIEWS: CPT

## 2024-10-11 PROCEDURE — 99214 OFFICE O/P EST MOD 30 MIN: CPT | Performed by: ORTHOPAEDIC SURGERY

## 2024-10-11 NOTE — PROGRESS NOTES
Assessment/Plan:  1. Status post total replacement of right hip  XR hip/pelv 2-3 vws right if performed      2. Aftercare following right hip joint replacement surgery          Scribe Attestation      I,:  Pradip Norwood am acting as a scribe while in the presence of the attending physician.:       I,:  Ovi Jaime, DO personally performed the services described in this documentation    as scribed in my presence.:           Zonia is a pleasant 69-year-old female who returns today for follow-up evaluation 1 year status post right total hip arthroplasty. I am very pleased with her imaging and clinical presentation today in the office. She may continue with activity to her tolerance. She understands she requires prophylactic antibiotics prior to dental work moving forward. All of her questions and concerns were addressed today. We will plan to see her back as needed.     Subjective: Follow-up evaluation 1 year status post right total hip arthroplasty    Patient ID: Zonia Taylor is a 69 y.o. female who returns today for follow-up evaluation 1 year status post right total hip arthroplasty.  At today's visit, she reports that she has been doing well.  She reports minimal pain about her hip.  She also reports no limitation with respect to her level of activity in her hip.  She does report being careful with her activity level.  She is very pleased with the outcome of her surgery.  She denies any new injury or trauma.    Review of Systems   Constitutional:  Positive for activity change. Negative for chills, fever and unexpected weight change.   HENT:  Negative for hearing loss, nosebleeds and sore throat.    Eyes:  Negative for pain, redness and visual disturbance.   Respiratory:  Negative for cough, shortness of breath and wheezing.    Cardiovascular:  Negative for chest pain, palpitations and leg swelling.   Gastrointestinal:  Negative for abdominal pain, nausea and vomiting.   Endocrine: Negative for polydipsia  "and polyuria.   Genitourinary:  Negative for dysuria and hematuria.   Musculoskeletal:  Negative for arthralgias, joint swelling and myalgias.        See HPI   Skin:  Negative for rash and wound.   Neurological:  Negative for dizziness, numbness and headaches.   Psychiatric/Behavioral:  Negative for decreased concentration and suicidal ideas. The patient is not nervous/anxious.          Past Medical History:   Diagnosis Date    Acquired trigger finger of right ring finger 08/31/2023    Anxiety     Apnea, sleep     patient states \"repeated test says no\"    Arthritis     severe in the joints    Chronic pain disorder     joints    Depression     DJD (degenerative joint disease)     Fall 11/22/2018    tripped on a folded rug- left hamstring injury    Foot injury     right foot-fx of bone-no repair-has severe arthritis in the foot and ankle    Fractures yr. 2000 or before    left ankle    GERD (gastroesophageal reflux disease)     Hepatitis C not sure    History of sleep apnea     Hypercholesteremia     Hypertension     Insomnia     Low back pain     Obesity     Lost 70lb with gastric sleeve (2011)    Osteoarthritis     Stomach disorder     take meds. for this    Use of cane as ambulatory aid     Wears glasses     Wears partial dentures     upper and lower-but did lose the lower        Past Surgical History:   Procedure Laterality Date    ANKLE ARTHROSCOPY Right     ANKLE SURGERY  not sure    orthoscopic/rt. ankle    APPENDECTOMY      BARIATRIC SURGERY  maybe 2011    CHOLECYSTECTOMY      COLONOSCOPY  10/26/2021    DXA PROCEDURE (HISTORICAL)  10/13/2020    GASTRECTOMY SLEEVE LAPAROSCOPIC  2011    lost 70 lb    HAND SURGERY      rt. hand, trigger finger    JOINT REPLACEMENT Bilateral     knee replacements    KNEE SURGERY  2001 maybe    MAMMO (HISTORICAL)  01/26/2022    NERVE BLOCK Bilateral 12/29/2017    Procedure: L4, L5, S1 MEDIAL BRANCH BLOCK #1;  Surgeon: Lloyd Lewis MD;  Location: [facility] MAIN OR;  Service: Pain " Management     NERVE BLOCK Bilateral 01/12/2018    Procedure: BLOCK MEDIAL BRANCH L4, L5-S1  #2;  Surgeon: Lloyd Lewis MD;  Location: [facility] MAIN OR;  Service: Pain Management     IA ARTHROPLASTY GLENOHUMERAL JOINT TOTAL SHOULDER Right 05/23/2019    Procedure: ARTHROPLASTY SHOULDER TOTAL , excision of deep cyst at shoulder, biceps tenotomy;  Surgeon: Dileep Thomas MD;  Location: [facility] MAIN OR;  Service: Orthopedics    IA ARTHRP ACETBLR/PROX FEM PROSTC AGRFT/ALGRFT Right 10/16/2023    Procedure: ARTHROPLASTY HIP TOTAL ANTERIOR,NAVIGATED - overnight;  Surgeon: Ovi Jaime DO;  Location: [facility] MAIN OR;  Service: Orthopedics    IA INJECT SI JOINT ARTHRGRPHY&/ANES/STEROID W/LESA Right 10/23/2020    Procedure: Sacroiliac Joint injection (04961);  Surgeon: Lloyd Lewis MD;  Location: [facility] MAIN OR;  Service: Pain Management     IA TENDON SHEATH INCISION Right 12/21/2021    Procedure: RELEASE TRIGGER RING FINGER;  Surgeon: Dileep Thomas MD;  Location: [facility] MAIN OR;  Service: Orthopedics    RADIOFREQUENCY ABLATION Right 03/16/2018    Procedure: Rt L4 L5 S1 RFA;  Surgeon: Lloyd Lewis MD;  Location: [facility] MAIN OR;  Service: Pain Management     RADIOFREQUENCY ABLATION Left 12/14/2018    Procedure: L3-S1 Radio Frequency Ablation ([id number]);  Surgeon: Lloyd Lewis MD;  Location: [facility] MAIN OR;  Service: Pain Management     RHIZOTOMY Left 01/25/2018    Procedure: L4, L5, S1 RFA;  Surgeon: Lloyd Lewis MD;  Location: [facility] MAIN OR;  Service: Pain Management     SHOULDER SURGERY  April/2019    rt. total  replacement    TONSILLECTOMY         Family History   Problem Relation Age of Onset    Other Mother         in a nursing home-fx of the leg    Heart disease Father         CHF    Alcohol abuse Father     Cirrhosis Father     Colon cancer Maternal Grandmother     No Known Problems Maternal Grandfather     No Known Problems Paternal Grandmother     No Known Problems Paternal Grandfather     Other Brother          lung abscess, pneumonia    Colon cancer Maternal Aunt     No Known Problems Maternal Aunt     No Known Problems Maternal Aunt     No Known Problems Maternal Uncle     No Known Problems Paternal Aunt     No Known Problems Paternal Uncle     Breast cancer Cousin 50       Social History     Occupational History    Not on file   Tobacco Use    Smoking status: Former     Current packs/day: 0.00     Types: Cigarettes     Quit date: 2004     Years since quittin.7     Passive exposure: Past    Smokeless tobacco: Never   Vaping Use    Vaping status: Never Used   Substance and Sexual Activity    Alcohol use: Not Currently     Comment: quit many yrs. ago    Drug use: Not Currently     Types: Amphetamines, Marijuana     Comment: aprox. 35 years ago, maybe longer    Sexual activity: Not Currently     Partners: Male     Birth control/protection: Abstinence, Post-menopausal     Comment: no sex for many yrs. (aprox. 20 yrs.)         Current Outpatient Medications:     acetaminophen (TYLENOL) 325 mg tablet, Take 3 tablets (975 mg total) by mouth every 8 (eight) hours, Disp: , Rfl: 0    amLODIPine (NORVASC) 10 mg tablet, TAKE 1 TABLET BY MOUTH DAILY, Disp: 90 tablet, Rfl: 3    Ascorbic Acid (vitamin C) 100 MG tablet, Take 1 tablet (100 mg total) by mouth daily, Disp: , Rfl:     Biotin 5000 MCG CAPS, Take 1 capsule (5,000 mcg total) by mouth in the morning, Disp: , Rfl:     carvedilol (COREG) 12.5 mg tablet, Take 1 tablet (12.5 mg total) by mouth 2 (two) times a day with meals, Disp: 180 tablet, Rfl: 1    cholecalciferol (VITAMIN D3) 1,000 units tablet, Take 1 tablet (1,000 Units total) by mouth daily, Disp: , Rfl:     clindamycin (CLEOCIN) 300 MG capsule, Take 2 capsules (600 mg total) by mouth 60 minutes pre-procedure, Disp: 2 capsule, Rfl: 2    Coenzyme Q10 (CoQ-10) 100 MG capsule, Take 1 capsule (100 mg total) by mouth daily, Disp: , Rfl:     Cyanocobalamin (VITAMIN B 12 PO), Take 500 mcg by mouth every morning,  Disp: , Rfl:     diphenhydrAMINE (BENADRYL) 25 mg tablet, Take 25 mg by mouth every 8 (eight) hours as needed for itching , Disp: , Rfl:     doxazosin (CARDURA) 1 mg tablet, Take 1 tablet (1 mg total) by mouth daily at bedtime, Disp: 90 tablet, Rfl: 1    escitalopram (LEXAPRO) 10 mg tablet, TAKE 1 AND 1/2 TABLETS BY MOUTH  DAILY AT BEDTIME, Disp: 135 tablet, Rfl: 1    losartan-hydrochlorothiazide (HYZAAR) 100-12.5 MG per tablet, TAKE ONE TABLET BY MOUTH EVERY DAY, Disp: 30 tablet, Rfl: 5    Melatonin 10 MG TABS, Take by mouth daily at bedtime, Disp: , Rfl:     metaxalone (SKELAXIN) 800 mg tablet, Take 1 tablet (800 mg total) by mouth 3 (three) times a day as needed for muscle spasms, Disp: 30 tablet, Rfl: 0    naloxone (NARCAN) 4 mg/0.1 mL nasal spray, Administer 1 spray into a nostril. If no response after 2-3 minutes, give another dose in the other nostril using a new spray., Disp: 1 each, Rfl: 0    omeprazole (PriLOSEC) 20 mg delayed release capsule, TAKE 1 CAPSULE BY MOUTH DAILY, Disp: 90 capsule, Rfl: 1    oxyCODONE-acetaminophen (Percocet) 7.5-325 MG per tablet, Take 1 tablet by mouth every 6 (six) hours as needed for moderate pain Max Daily Amount: 4 tablets, Disp: 120 tablet, Rfl: 0    psyllium (METAMUCIL) 58.6 % powder, Take by mouth daily, Disp: , Rfl:     zinc gluconate 50 mg tablet, Take 1 tablet (50 mg total) by mouth daily, Disp: , Rfl:     Allergies   Allergen Reactions    Penicillins Anaphylaxis     Total body redness and fever    Levaquin [Levofloxacin] Itching       Objective:  Vitals:    10/11/24 0832   BP: 129/62   Pulse: 66       Body mass index is 42.73 kg/m².    Right Hip Exam     Tenderness   The patient is experiencing no tenderness.     Range of Motion   Abduction:  50   Adduction:  30   Flexion:  120   External rotation:  50   Internal rotation:  20     Muscle Strength   Flexion: 5/5     Tests   ABDOULAYE: negative    Other   Erythema: absent  Scars: present (well healed anterior surgical  incision)  Sensation: normal  Pulse: present    Comments:  Well healed surgical scar  Stinchfield: negative  FADIR: negative            Physical Exam  Vitals and nursing note reviewed.   Constitutional:       Appearance: Normal appearance. She is well-developed.   HENT:      Head: Normocephalic and atraumatic.      Right Ear: External ear normal.      Left Ear: External ear normal.   Eyes:      General: No scleral icterus.     Extraocular Movements: Extraocular movements intact.      Conjunctiva/sclera: Conjunctivae normal.   Cardiovascular:      Rate and Rhythm: Normal rate.   Pulmonary:      Effort: Pulmonary effort is normal. No respiratory distress.   Musculoskeletal:      Cervical back: Normal range of motion and neck supple.      Comments: See Ortho exam   Skin:     General: Skin is warm and dry.   Neurological:      General: No focal deficit present.      Mental Status: She is alert and oriented to person, place, and time.   Psychiatric:         Behavior: Behavior normal.         I have personally reviewed pertinent films in PACS.    X-ray of the right hip obtained on 10/11/2024 reviewed demonstrating a well-positioned and aligned total hip arthroplasty without evidence of failure.  There is no new fracture, dislocation, lytic or blastic lesion.  There is advanced multilevel degenerative change noted in the lumbar spine.    This document was created using speech voice recognition software.   Grammatical errors, random word insertions, pronoun errors, and incomplete sentences are an occasional consequence of this system due to software limitations, ambient noise, and hardware issues.   Any formal questions or concerns about content, text, or information contained within the body of this dictation should be directly addressed to the provider for clarification.

## 2024-10-19 DIAGNOSIS — I10 PRIMARY HYPERTENSION: ICD-10-CM

## 2024-10-19 RX ORDER — DOXAZOSIN 1 MG/1
1 TABLET ORAL
Qty: 90 TABLET | Refills: 1 | Status: SHIPPED | OUTPATIENT
Start: 2024-10-19

## 2024-10-23 ENCOUNTER — RA CDI HCC (OUTPATIENT)
Dept: OTHER | Facility: HOSPITAL | Age: 69
End: 2024-10-23

## 2024-10-29 NOTE — PROGRESS NOTES
Office Visit Note  10/30/24     Zonia Taylor 69 y.o. female MRN: [id number]  : 1955    Assessment:     1. Chronic midline low back pain without sciatica  Assessment & Plan:  Patient is on oxycodone every 6 hours for pain without taking the pain medication she is having difficulty to move around we will continue with the same.  2. Chronic pain syndrome  Assessment & Plan:  Patient status post gastric sleeve surgery not able to give any nonsteroidals continue with oxycodone not on any muscle relaxers at this time.  3. Continuous opioid dependence (HCC)  Assessment & Plan:  Patient with continued opioid dependence discussed with the patient about the same but not able to do any work without taking the pain medication will monitor  4. Primary osteoarthritis of right hip  5. Status post total replacement of right hip  6. Anxiety and depression  7. Class 2 obesity  8. Dyslipidemia  9. Gastroesophageal reflux disease without esophagitis  10. H/O bariatric surgery  11. Primary hypertension             Discussion Summary and Plan:  Today's care plan and medications were reviewed with patient in detail and all their questions answered to their satisfaction.    Chief Complaint   Patient presents with    Follow-up      Subjective:  Patient is coming here for a follow-up evaluation with regards to her symptoms of low back pain and right hip pain chronic pain syndrome currently taking pain medications oxycodone.  She was recently seen by the orthopedic surgeon reports reviewed no follow-up required with him anymore status post right hip replacement stable.  Medications reviewed labs reviewed        The following portions of the patient's history were reviewed and updated as appropriate: allergies, current medications, past family history, past medical history, past social history, past surgical history and problem list.    Review of Systems   Constitutional:  Negative for chills and fever.   HENT:  Negative for ear  "pain and sore throat.    Eyes:  Negative for pain and visual disturbance.   Respiratory:  Negative for cough and shortness of breath.    Cardiovascular:  Negative for chest pain and palpitations.   Gastrointestinal:  Negative for abdominal pain and vomiting.   Genitourinary:  Negative for dysuria and hematuria.   Musculoskeletal:  Positive for arthralgias and back pain.   Skin:  Negative for color change and rash.   Neurological:  Negative for seizures and syncope.   All other systems reviewed and are negative.        Historical Information   Patient Active Problem List   Diagnosis    Chronic pain syndrome    Chronic midline low back pain without sciatica    H/O bariatric surgery    Hypertension    Gastroesophageal reflux disease    Continuous opioid dependence (HCC)    Primary osteoarthritis    Hypercalcemia    Dyslipidemia    Right hip pain    Anxiety and depression    Primary osteoarthritis of right hip    Chronic hepatitis C without hepatic coma (HCC)    Class 2 obesity    Status post total replacement of right hip     Past Medical History:   Diagnosis Date    Acquired trigger finger of right ring finger 08/31/2023    Anxiety     Apnea, sleep     patient states \"repeated test says no\"    Arthritis     severe in the joints    Chronic pain disorder     joints    Depression     DJD (degenerative joint disease)     Fall 11/22/2018    tripped on a folded rug- left hamstring injury    Foot injury     right foot-fx of bone-no repair-has severe arthritis in the foot and ankle    Fractures yr. 2000 or before    left ankle    GERD (gastroesophageal reflux disease)     Hepatitis C not sure    History of sleep apnea     Hypercholesteremia     Hypertension     Insomnia     Low back pain     Obesity     Lost 70lb with gastric sleeve (2011)    Osteoarthritis     Stomach disorder     take meds. for this    Use of cane as ambulatory aid     Wears glasses     Wears partial dentures     upper and lower-but did lose the lower  "     Past Surgical History:   Procedure Laterality Date    ANKLE ARTHROSCOPY Right     ANKLE SURGERY  not sure    orthoscopic/rt. ankle    APPENDECTOMY      BARIATRIC SURGERY  maybe 2011    CHOLECYSTECTOMY      COLONOSCOPY  10/26/2021    DXA PROCEDURE (HISTORICAL)  10/13/2020    GASTRECTOMY SLEEVE LAPAROSCOPIC  2011    lost 70 lb    HAND SURGERY      rt. hand, trigger finger    JOINT REPLACEMENT Bilateral     knee replacements    KNEE SURGERY  2001 maybe    MAMMO (HISTORICAL)  01/26/2022    NERVE BLOCK Bilateral 12/29/2017    Procedure: L4, L5, S1 MEDIAL BRANCH BLOCK #1;  Surgeon: Lloyd Lewis MD;  Location: [facility] MAIN OR;  Service: Pain Management     NERVE BLOCK Bilateral 01/12/2018    Procedure: BLOCK MEDIAL BRANCH L4, L5-S1  #2;  Surgeon: Lloyd Lewis MD;  Location: [facility] MAIN OR;  Service: Pain Management     SC ARTHROPLASTY GLENOHUMERAL JOINT TOTAL SHOULDER Right 05/23/2019    Procedure: ARTHROPLASTY SHOULDER TOTAL , excision of deep cyst at shoulder, biceps tenotomy;  Surgeon: Dileep Thomas MD;  Location: [facility] MAIN OR;  Service: Orthopedics    SC ARTHRP ACETBLR/PROX FEM PROSTC AGRFT/ALGRFT Right 10/16/2023    Procedure: ARTHROPLASTY HIP TOTAL ANTERIOR,NAVIGATED - overnight;  Surgeon: Ovi Jaime DO;  Location: [facility] MAIN OR;  Service: Orthopedics    SC INJECT SI JOINT ARTHRGRPHY&/ANES/STEROID W/LESA Right 10/23/2020    Procedure: Sacroiliac Joint injection (54417);  Surgeon: Lloyd Lewis MD;  Location: [facility] MAIN OR;  Service: Pain Management     SC TENDON SHEATH INCISION Right 12/21/2021    Procedure: RELEASE TRIGGER RING FINGER;  Surgeon: Dileep Thomas MD;  Location: [facility] MAIN OR;  Service: Orthopedics    RADIOFREQUENCY ABLATION Right 03/16/2018    Procedure: Rt L4 L5 S1 RFA;  Surgeon: Lloyd Lewis MD;  Location: [facility] MAIN OR;  Service: Pain Management     RADIOFREQUENCY ABLATION Left 12/14/2018    Procedure: L3-S1 Radio Frequency Ablation ([id number]);  Surgeon: Lloyd Lewis MD;   Location: [facility] MAIN OR;  Service: Pain Management     RHIZOTOMY Left 2018    Procedure: L4, L5, S1 RFA;  Surgeon: Lloyd Lewis MD;  Location: [facility] MAIN OR;  Service: Pain Management     SHOULDER SURGERY  2019    rt. total  replacement    TONSILLECTOMY       Social History     Substance and Sexual Activity   Alcohol Use Not Currently    Comment: quit many yrs. ago     Social History     Substance and Sexual Activity   Drug Use Not Currently    Types: Amphetamines, Marijuana    Comment: aprox. 35 years ago, maybe longer     Social History     Tobacco Use   Smoking Status Former    Current packs/day: 0.00    Types: Cigarettes    Quit date: 2004    Years since quittin.8    Passive exposure: Past   Smokeless Tobacco Never     Family History   Problem Relation Age of Onset    Other Mother         in a nursing home-fx of the leg    Heart disease Father         CHF    Alcohol abuse Father     Cirrhosis Father     Colon cancer Maternal Grandmother     No Known Problems Maternal Grandfather     No Known Problems Paternal Grandmother     No Known Problems Paternal Grandfather     Other Brother         lung abscess, pneumonia    Colon cancer Maternal Aunt     No Known Problems Maternal Aunt     No Known Problems Maternal Aunt     No Known Problems Maternal Uncle     No Known Problems Paternal Aunt     No Known Problems Paternal Uncle     Breast cancer Cousin 50     Health Maintenance Due   Topic    Pneumococcal Vaccine: 65+ Years (1 of 2 - PCV)    Hepatitis A Vaccine (1 of 2 - Risk 2-dose series)    RSV Vaccine Age 60+ Years (1 - 1-dose 60+ series)    Hepatitis B Vaccine (1 of 3 - Risk 3-dose series)    PT PLAN OF CARE     COVID-19 Vaccine ( season)    Depression Screening       Meds/Allergies       Current Outpatient Medications:     acetaminophen (TYLENOL) 325 mg tablet, Take 3 tablets (975 mg total) by mouth every 8 (eight) hours, Disp: , Rfl: 0    amLODIPine (NORVASC) 10 mg tablet, TAKE  1 TABLET BY MOUTH DAILY, Disp: 90 tablet, Rfl: 3    Ascorbic Acid (vitamin C) 100 MG tablet, Take 1 tablet (100 mg total) by mouth daily, Disp: , Rfl:     Biotin 5000 MCG CAPS, Take 1 capsule (5,000 mcg total) by mouth in the morning, Disp: , Rfl:     carvedilol (COREG) 12.5 mg tablet, Take 1 tablet (12.5 mg total) by mouth 2 (two) times a day with meals, Disp: 180 tablet, Rfl: 1    cholecalciferol (VITAMIN D3) 1,000 units tablet, Take 1 tablet (1,000 Units total) by mouth daily, Disp: , Rfl:     clindamycin (CLEOCIN) 300 MG capsule, Take 2 capsules (600 mg total) by mouth 60 minutes pre-procedure, Disp: 2 capsule, Rfl: 2    Coenzyme Q10 (CoQ-10) 100 MG capsule, Take 1 capsule (100 mg total) by mouth daily, Disp: , Rfl:     Cyanocobalamin (VITAMIN B 12 PO), Take 500 mcg by mouth every morning, Disp: , Rfl:     diphenhydrAMINE (BENADRYL) 25 mg tablet, Take 25 mg by mouth every 8 (eight) hours as needed for itching , Disp: , Rfl:     doxazosin (CARDURA) 1 mg tablet, TAKE 1 TABLET BY MOUTH DAILY AT  BEDTIME, Disp: 90 tablet, Rfl: 1    escitalopram (LEXAPRO) 10 mg tablet, TAKE 1 AND 1/2 TABLETS BY MOUTH  DAILY AT BEDTIME, Disp: 135 tablet, Rfl: 1    losartan-hydrochlorothiazide (HYZAAR) 100-12.5 MG per tablet, TAKE ONE TABLET BY MOUTH EVERY DAY, Disp: 30 tablet, Rfl: 5    Melatonin 10 MG TABS, Take by mouth daily at bedtime, Disp: , Rfl:     metaxalone (SKELAXIN) 800 mg tablet, Take 1 tablet (800 mg total) by mouth 3 (three) times a day as needed for muscle spasms, Disp: 30 tablet, Rfl: 0    naloxone (NARCAN) 4 mg/0.1 mL nasal spray, Administer 1 spray into a nostril. If no response after 2-3 minutes, give another dose in the other nostril using a new spray., Disp: 1 each, Rfl: 0    omeprazole (PriLOSEC) 20 mg delayed release capsule, TAKE 1 CAPSULE BY MOUTH DAILY, Disp: 90 capsule, Rfl: 1    oxyCODONE-acetaminophen (Percocet) 7.5-325 MG per tablet, Take 1 tablet by mouth every 6 (six) hours as needed for moderate pain  "Max Daily Amount: 4 tablets, Disp: 120 tablet, Rfl: 0    psyllium (METAMUCIL) 58.6 % powder, Take by mouth daily, Disp: , Rfl:     zinc gluconate 50 mg tablet, Take 1 tablet (50 mg total) by mouth daily, Disp: , Rfl:       Objective:    Vitals:   /64 (BP Location: Right arm, Patient Position: Sitting, Cuff Size: Standard)   Pulse 79   Ht 5' 2\" (1.575 m)   Wt 102 kg (225 lb 9.6 oz)   SpO2 96%   BMI 41.26 kg/m²   Body mass index is 41.26 kg/m².  Vitals:    10/30/24 0859   Weight: 102 kg (225 lb 9.6 oz)       Physical Exam  Vitals and nursing note reviewed.   Constitutional:       Appearance: Normal appearance.   Cardiovascular:      Rate and Rhythm: Normal rate and regular rhythm.      Heart sounds: Normal heart sounds.   Pulmonary:      Effort: Pulmonary effort is normal.      Breath sounds: Normal breath sounds.   Abdominal:      Palpations: Abdomen is soft.   Musculoskeletal:      Cervical back: Normal range of motion and neck supple.      Right lower leg: No edema.      Left lower leg: No edema.      Comments: Movements of the right hip causing mild discomfort SLR is about 30 degrees   Skin:     General: Skin is warm and dry.   Neurological:      General: No focal deficit present.      Mental Status: She is alert.   Psychiatric:         Mood and Affect: Mood normal.         Behavior: Behavior normal.         Lab Review   Appointment on 09/25/2024   Component Date Value Ref Range Status    Sodium 09/25/2024 141  135 - 147 mmol/L Final    Potassium 09/25/2024 4.3  3.5 - 5.3 mmol/L Final    Chloride 09/25/2024 102  96 - 108 mmol/L Final    CO2 09/25/2024 32  21 - 32 mmol/L Final    ANION GAP 09/25/2024 7  4 - 13 mmol/L Final    BUN 09/25/2024 18  5 - 25 mg/dL Final    Creatinine 09/25/2024 0.52 (L)  0.60 - 1.30 mg/dL Final    Standardized to IDMS reference method    Glucose, Fasting 09/25/2024 91  65 - 99 mg/dL Final    Calcium 09/25/2024 9.9  8.4 - 10.2 mg/dL Final    AST 09/25/2024 19  13 - 39 U/L Final "    ALT 09/25/2024 14  7 - 52 U/L Final    Specimen collection should occur prior to Sulfasalazine administration due to the potential for falsely depressed results.     Alkaline Phosphatase 09/25/2024 71  34 - 104 U/L Final    Total Protein 09/25/2024 7.4  6.4 - 8.4 g/dL Final    Albumin 09/25/2024 4.2  3.5 - 5.0 g/dL Final    Total Bilirubin 09/25/2024 0.38  0.20 - 1.00 mg/dL Final    Use of this assay is not recommended for patients undergoing treatment with eltrombopag due to the potential for falsely elevated results.  N-acetyl-p-benzoquinone imine (metabolite of Acetaminophen) will generate erroneously low results in samples for patients that have taken an overdose of Acetaminophen.    eGFR 09/25/2024 97  ml/min/1.73sq m Final    Hemoglobin A1C 09/25/2024 5.7 (H)  Normal 4.0-5.6%; PreDiabetic 5.7-6.4%; Diabetic >=6.5%; Glycemic control for adults with diabetes <7.0% % Final    EAG 09/25/2024 117  mg/dl Final    Cholesterol 09/25/2024 200 (H)  See Comment mg/dL Final    Cholesterol:         Pediatric <18 Years        Desirable          <170 mg/dL      Borderline High    170-199 mg/dL      High               >=200 mg/dL        Adult >=18 Years            Desirable         <200 mg/dL      Borderline High   200-239 mg/dL      High              >239 mg/dL      Triglycerides 09/25/2024 99  See Comment mg/dL Final    Triglyceride:     0-9Y            <75mg/dL     10Y-17Y         <90 mg/dL       >=18Y     Normal          <150 mg/dL     Borderline High 150-199 mg/dL     High            200-499 mg/dL        Very High       >499 mg/dL    Specimen collection should occur prior to Metamizole administration due to the potential for falsely depressed results.    HDL, Direct 09/25/2024 71  >=50 mg/dL Final    LDL Calculated 09/25/2024 109 (H)  0 - 100 mg/dL Final    LDL Cholesterol:     Optimal           <100 mg/dl     Near Optimal      100-129 mg/dl     Above Optimal       Borderline High 130-159 mg/dl       High             "160-189 mg/dl       Very High       >189 mg/dl         This screening LDL is a calculated result.   It does not have the accuracy of the Direct Measured LDL in the monitoring of patients with hyperlipidemia and/or statin therapy.   Direct Measure LDL ([id number]) must be ordered separately in these patients.    Non-HDL-Chol (CHOL-HDL) 09/25/2024 129  mg/dl Final         Gustabo Sy MD        \"This note has been constructed using a voice recognition system.Therefore there may be syntax, spelling, and/or grammatical errors. Please call if you have any questions. \"  "

## 2024-10-30 ENCOUNTER — OFFICE VISIT (OUTPATIENT)
Dept: FAMILY MEDICINE CLINIC | Facility: CLINIC | Age: 69
End: 2024-10-30
Payer: COMMERCIAL

## 2024-10-30 VITALS
BODY MASS INDEX: 41.51 KG/M2 | DIASTOLIC BLOOD PRESSURE: 64 MMHG | OXYGEN SATURATION: 96 % | WEIGHT: 225.6 LBS | SYSTOLIC BLOOD PRESSURE: 125 MMHG | HEIGHT: 62 IN | HEART RATE: 79 BPM

## 2024-10-30 DIAGNOSIS — E78.5 DYSLIPIDEMIA: ICD-10-CM

## 2024-10-30 DIAGNOSIS — F32.A ANXIETY AND DEPRESSION: ICD-10-CM

## 2024-10-30 DIAGNOSIS — M16.11 PRIMARY OSTEOARTHRITIS OF RIGHT HIP: ICD-10-CM

## 2024-10-30 DIAGNOSIS — Z98.84 H/O BARIATRIC SURGERY: ICD-10-CM

## 2024-10-30 DIAGNOSIS — G89.4 CHRONIC PAIN SYNDROME: ICD-10-CM

## 2024-10-30 DIAGNOSIS — K21.9 GASTROESOPHAGEAL REFLUX DISEASE WITHOUT ESOPHAGITIS: ICD-10-CM

## 2024-10-30 DIAGNOSIS — Z96.641 STATUS POST TOTAL REPLACEMENT OF RIGHT HIP: ICD-10-CM

## 2024-10-30 DIAGNOSIS — E66.812 CLASS 2 OBESITY: ICD-10-CM

## 2024-10-30 DIAGNOSIS — M54.50 CHRONIC MIDLINE LOW BACK PAIN WITHOUT SCIATICA: Primary | ICD-10-CM

## 2024-10-30 DIAGNOSIS — F41.9 ANXIETY AND DEPRESSION: ICD-10-CM

## 2024-10-30 DIAGNOSIS — I10 PRIMARY HYPERTENSION: ICD-10-CM

## 2024-10-30 DIAGNOSIS — F11.20 CONTINUOUS OPIOID DEPENDENCE (HCC): ICD-10-CM

## 2024-10-30 DIAGNOSIS — G89.29 CHRONIC MIDLINE LOW BACK PAIN WITHOUT SCIATICA: Primary | ICD-10-CM

## 2024-10-30 PROCEDURE — 99213 OFFICE O/P EST LOW 20 MIN: CPT | Performed by: INTERNAL MEDICINE

## 2024-10-30 PROCEDURE — G2211 COMPLEX E/M VISIT ADD ON: HCPCS | Performed by: INTERNAL MEDICINE

## 2024-10-30 RX ORDER — OXYCODONE AND ACETAMINOPHEN 7.5; 325 MG/1; MG/1
1 TABLET ORAL EVERY 6 HOURS PRN
Qty: 120 TABLET | Refills: 0 | Status: SHIPPED | OUTPATIENT
Start: 2024-10-30

## 2024-10-30 NOTE — ASSESSMENT & PLAN NOTE
Patient is on oxycodone every 6 hours for pain without taking the pain medication she is having difficulty to move around we will continue with the same.

## 2024-10-30 NOTE — ASSESSMENT & PLAN NOTE
Patient with continued opioid dependence discussed with the patient about the same but not able to do any work without taking the pain medication will monitor

## 2024-10-30 NOTE — ASSESSMENT & PLAN NOTE
Patient status post gastric sleeve surgery not able to give any nonsteroidals continue with oxycodone not on any muscle relaxers at this time.

## 2024-11-12 DIAGNOSIS — K21.9 GASTROESOPHAGEAL REFLUX DISEASE WITHOUT ESOPHAGITIS: ICD-10-CM

## 2024-11-21 ENCOUNTER — RA CDI HCC (OUTPATIENT)
Dept: OTHER | Facility: HOSPITAL | Age: 69
End: 2024-11-21

## 2024-11-26 NOTE — PROGRESS NOTES
Office Visit Note  24     Zonia Taylor 69 y.o. female MRN: [id number]  : 1955    Assessment:     1. Chronic midline low back pain without sciatica  Assessment & Plan:  Patient is on oxycodone every 6 hours for the pain in the low back area.  History of longstanding problem with pain and has been on pain medication without taking it she is increasing pain and affects day-to-day activities we will renew the same.  Orders:  -     oxyCODONE-acetaminophen (Percocet) 7.5-325 MG per tablet; Take 1 tablet by mouth every 6 (six) hours as needed for moderate pain Max Daily Amount: 4 tablets  2. Chronic pain syndrome  Assessment & Plan:  Continue with oxycodone avoiding nonsteroidal patient status post gastric sleeve  Orders:  -     oxyCODONE-acetaminophen (Percocet) 7.5-325 MG per tablet; Take 1 tablet by mouth every 6 (six) hours as needed for moderate pain Max Daily Amount: 4 tablets  3. Primary osteoarthritis of right hip  -     oxyCODONE-acetaminophen (Percocet) 7.5-325 MG per tablet; Take 1 tablet by mouth every 6 (six) hours as needed for moderate pain Max Daily Amount: 4 tablets  4. Anxiety and depression  Assessment & Plan:  Patient with anxiety and depression symptoms currently taking escitalopram 10 mg daily will continue with the same  5. Status post total replacement of right hip  -     oxyCODONE-acetaminophen (Percocet) 7.5-325 MG per tablet; Take 1 tablet by mouth every 6 (six) hours as needed for moderate pain Max Daily Amount: 4 tablets  6. Class 2 obesity  7. Continuous opioid dependence (HCC)  Assessment & Plan:  Patient with continuous opioid dependence without pain medication she is not able to function discussed with the patient about dependence on the medication  8. Primary hypertension  Assessment & Plan:  Please blood pressure is 121/72 currently on Cardura losartan HCTZ and amlodipine and carvedilol will continue on these 4 medications             Discussion Summary and  Plan:  Today's care plan and medications were reviewed with patient in detail and all their questions answered to their satisfaction.    Chief Complaint   Patient presents with    Follow-up      Subjective:  Patient is coming here for a follow-up evaluation with regards to chronic pain syndrome low back pain currently on pain medications.  Also has got history of GERD DJD anxiety disorder and dyslipidemia.  Medications reviewed labs reviewed.        The following portions of the patient's history were reviewed and updated as appropriate: allergies, current medications, past family history, past medical history, past social history, past surgical history and problem list.    Review of Systems   Constitutional:  Negative for chills and fever.   HENT:  Negative for ear pain and sore throat.    Eyes:  Negative for pain and visual disturbance.   Respiratory:  Negative for cough and shortness of breath.    Cardiovascular:  Negative for chest pain and palpitations.   Gastrointestinal:  Negative for abdominal pain and vomiting.   Genitourinary:  Negative for dysuria and hematuria.   Musculoskeletal:  Positive for arthralgias. Negative for back pain.   Skin:  Negative for color change and rash.   Neurological:  Negative for seizures and syncope.   All other systems reviewed and are negative.        Historical Information   Patient Active Problem List   Diagnosis    Chronic pain syndrome    Chronic midline low back pain without sciatica    H/O bariatric surgery    Hypertension    Gastroesophageal reflux disease    Continuous opioid dependence (HCC)    Primary osteoarthritis    Hypercalcemia    Dyslipidemia    Right hip pain    Anxiety and depression    Primary osteoarthritis of right hip    Chronic hepatitis C without hepatic coma (HCC)    Class 2 obesity    Status post total replacement of right hip     Past Medical History:   Diagnosis Date    Acquired trigger finger of right ring finger 08/31/2023    Anxiety     Apnea,  "sleep     patient states \"repeated test says no\"    Arthritis     severe in the joints    Chronic pain disorder     joints    Depression     DJD (degenerative joint disease)     Fall 11/22/2018    tripped on a folded rug- left hamstring injury    Foot injury     right foot-fx of bone-no repair-has severe arthritis in the foot and ankle    Fractures yr. 2000 or before    left ankle    GERD (gastroesophageal reflux disease)     Hepatitis C not sure    History of sleep apnea     Hypercholesteremia     Hypertension     Insomnia     Low back pain     Obesity     Lost 70lb with gastric sleeve (2011)    Osteoarthritis     Stomach disorder     take meds. for this    Use of cane as ambulatory aid     Wears glasses     Wears partial dentures     upper and lower-but did lose the lower      Past Surgical History:   Procedure Laterality Date    ANKLE ARTHROSCOPY Right     ANKLE SURGERY  not sure    orthoscopic/rt. ankle    APPENDECTOMY      BARIATRIC SURGERY  maybe 2011    CHOLECYSTECTOMY      COLONOSCOPY  10/26/2021    DXA PROCEDURE (HISTORICAL)  10/13/2020    GASTRECTOMY SLEEVE LAPAROSCOPIC  2011    lost 70 lb    HAND SURGERY      rt. hand, trigger finger    JOINT REPLACEMENT Bilateral     knee replacements    KNEE SURGERY  2001 maybe    MAMMO (HISTORICAL)  01/26/2022    NERVE BLOCK Bilateral 12/29/2017    Procedure: L4, L5, S1 MEDIAL BRANCH BLOCK #1;  Surgeon: Lloyd Lewis MD;  Location: [facility] MAIN OR;  Service: Pain Management     NERVE BLOCK Bilateral 01/12/2018    Procedure: BLOCK MEDIAL BRANCH L4, L5-S1  #2;  Surgeon: Lloyd Lewis MD;  Location: [facility] MAIN OR;  Service: Pain Management     NH ARTHROPLASTY GLENOHUMERAL JOINT TOTAL SHOULDER Right 05/23/2019    Procedure: ARTHROPLASTY SHOULDER TOTAL , excision of deep cyst at shoulder, biceps tenotomy;  Surgeon: Dileep Thomas MD;  Location: [facility] MAIN OR;  Service: Orthopedics    NH ARTHRP ACETBLR/PROX FEM PROSTC AGRFT/ALGRFT Right 10/16/2023    Procedure: " ARTHROPLASTY HIP TOTAL ANTERIOR,NAVIGATED - overnight;  Surgeon: Ovi Jaime DO;  Location: [facility] MAIN OR;  Service: Orthopedics    WV INJECT SI JOINT ARTHRGRPHY&/ANES/STEROID W/LESA Right 10/23/2020    Procedure: Sacroiliac Joint injection (26579);  Surgeon: Lloyd Lewis MD;  Location: [facility] MAIN OR;  Service: Pain Management     WV TENDON SHEATH INCISION Right 2021    Procedure: RELEASE TRIGGER RING FINGER;  Surgeon: Dileep Thomas MD;  Location: [facility] MAIN OR;  Service: Orthopedics    RADIOFREQUENCY ABLATION Right 2018    Procedure: Rt L4 L5 S1 RFA;  Surgeon: Lloyd Lewis MD;  Location: [facility] MAIN OR;  Service: Pain Management     RADIOFREQUENCY ABLATION Left 2018    Procedure: L3-S1 Radio Frequency Ablation ([id number]);  Surgeon: Lloyd Lewis MD;  Location: [facility] MAIN OR;  Service: Pain Management     RHIZOTOMY Left 2018    Procedure: L4, L5, S1 RFA;  Surgeon: Lloyd Lewis MD;  Location: [facility] MAIN OR;  Service: Pain Management     SHOULDER SURGERY  2019    rt. total  replacement    TONSILLECTOMY       Social History     Substance and Sexual Activity   Alcohol Use Not Currently    Comment: quit many yrs. ago     Social History     Substance and Sexual Activity   Drug Use Not Currently    Types: Amphetamines, Marijuana    Comment: aprox. 35 years ago, maybe longer     Social History     Tobacco Use   Smoking Status Former    Current packs/day: 0.00    Types: Cigarettes    Quit date: 2004    Years since quittin.9    Passive exposure: Past   Smokeless Tobacco Never     Family History   Problem Relation Age of Onset    Other Mother         in a nursing home-fx of the leg    Heart disease Father         CHF    Alcohol abuse Father     Cirrhosis Father     Colon cancer Maternal Grandmother     No Known Problems Maternal Grandfather     No Known Problems Paternal Grandmother     No Known Problems Paternal Grandfather     Other Brother         lung abscess,  pneumonia    Colon cancer Maternal Aunt     No Known Problems Maternal Aunt     No Known Problems Maternal Aunt     No Known Problems Maternal Uncle     No Known Problems Paternal Aunt     No Known Problems Paternal Uncle     Breast cancer Cousin 50     Health Maintenance Due   Topic    Pneumococcal Vaccine: 65+ Years (1 of 2 - PCV)    Hepatitis A Vaccine (1 of 2 - Risk 2-dose series)    RSV Vaccine Age 60+ Years (1 - Risk 60-74 years 1-dose series)    Hepatitis B Vaccine (1 of 3 - Risk 3-dose series)    PT PLAN OF CARE     COVID-19 Vaccine (5 - 2024-25 season)    Depression Screening       Meds/Allergies       Current Outpatient Medications:     acetaminophen (TYLENOL) 325 mg tablet, Take 3 tablets (975 mg total) by mouth every 8 (eight) hours, Disp: , Rfl: 0    amLODIPine (NORVASC) 10 mg tablet, TAKE 1 TABLET BY MOUTH DAILY, Disp: 90 tablet, Rfl: 3    Ascorbic Acid (vitamin C) 100 MG tablet, Take 1 tablet (100 mg total) by mouth daily, Disp: , Rfl:     Biotin 5000 MCG CAPS, Take 1 capsule (5,000 mcg total) by mouth in the morning, Disp: , Rfl:     carvedilol (COREG) 12.5 mg tablet, Take 1 tablet (12.5 mg total) by mouth 2 (two) times a day with meals, Disp: 180 tablet, Rfl: 1    cholecalciferol (VITAMIN D3) 1,000 units tablet, Take 1 tablet (1,000 Units total) by mouth daily, Disp: , Rfl:     clindamycin (CLEOCIN) 300 MG capsule, Take 2 capsules (600 mg total) by mouth 60 minutes pre-procedure, Disp: 2 capsule, Rfl: 2    Coenzyme Q10 (CoQ-10) 100 MG capsule, Take 1 capsule (100 mg total) by mouth daily, Disp: , Rfl:     Cyanocobalamin (VITAMIN B 12 PO), Take 500 mcg by mouth every morning, Disp: , Rfl:     diphenhydrAMINE (BENADRYL) 25 mg tablet, Take 25 mg by mouth every 8 (eight) hours as needed for itching , Disp: , Rfl:     doxazosin (CARDURA) 1 mg tablet, TAKE 1 TABLET BY MOUTH DAILY AT  BEDTIME, Disp: 90 tablet, Rfl: 1    escitalopram (LEXAPRO) 10 mg tablet, TAKE 1 AND 1/2 TABLETS BY MOUTH  DAILY AT BEDTIME,  "Disp: 135 tablet, Rfl: 1    losartan-hydrochlorothiazide (HYZAAR) 100-12.5 MG per tablet, TAKE ONE TABLET BY MOUTH EVERY DAY, Disp: 30 tablet, Rfl: 5    Melatonin 10 MG TABS, Take by mouth daily at bedtime, Disp: , Rfl:     metaxalone (SKELAXIN) 800 mg tablet, Take 1 tablet (800 mg total) by mouth 3 (three) times a day as needed for muscle spasms, Disp: 30 tablet, Rfl: 0    naloxone (NARCAN) 4 mg/0.1 mL nasal spray, Administer 1 spray into a nostril. If no response after 2-3 minutes, give another dose in the other nostril using a new spray., Disp: 1 each, Rfl: 0    omeprazole (PriLOSEC) 20 mg delayed release capsule, TAKE 1 CAPSULE BY MOUTH DAILY, Disp: 90 capsule, Rfl: 1    oxyCODONE-acetaminophen (Percocet) 7.5-325 MG per tablet, Take 1 tablet by mouth every 6 (six) hours as needed for moderate pain Max Daily Amount: 4 tablets, Disp: 120 tablet, Rfl: 0    psyllium (METAMUCIL) 58.6 % powder, Take by mouth daily, Disp: , Rfl:     zinc gluconate 50 mg tablet, Take 1 tablet (50 mg total) by mouth daily, Disp: , Rfl:       Objective:    Vitals:   /72 (BP Location: Right arm, Patient Position: Sitting, Cuff Size: Large)   Pulse 75   Ht 5' 2\" (1.575 m)   Wt 102 kg (224 lb 3.2 oz)   SpO2 96%   BMI 41.01 kg/m²   Body mass index is 41.01 kg/m².  Vitals:    11/27/24 0921   Weight: 102 kg (224 lb 3.2 oz)       Physical Exam  Vitals and nursing note reviewed.   Constitutional:       Appearance: She is obese.   Cardiovascular:      Rate and Rhythm: Normal rate and regular rhythm.      Heart sounds: Normal heart sounds.   Pulmonary:      Effort: Pulmonary effort is normal.      Breath sounds: Normal breath sounds.   Abdominal:      Palpations: Abdomen is soft.   Musculoskeletal:      Cervical back: Normal range of motion and neck supple.      Right lower leg: No edema.      Left lower leg: No edema.      Comments: Lumbar spine movements causing discomfort and pain   Skin:     General: Skin is dry.   Neurological:      " Mental Status: She is alert and oriented to person, place, and time.   Psychiatric:         Mood and Affect: Mood normal.         Behavior: Behavior normal.         Lab Review   No visits with results within 2 Month(s) from this visit.   Latest known visit with results is:   Appointment on 09/25/2024   Component Date Value Ref Range Status    Sodium 09/25/2024 141  135 - 147 mmol/L Final    Potassium 09/25/2024 4.3  3.5 - 5.3 mmol/L Final    Chloride 09/25/2024 102  96 - 108 mmol/L Final    CO2 09/25/2024 32  21 - 32 mmol/L Final    ANION GAP 09/25/2024 7  4 - 13 mmol/L Final    BUN 09/25/2024 18  5 - 25 mg/dL Final    Creatinine 09/25/2024 0.52 (L)  0.60 - 1.30 mg/dL Final    Standardized to IDMS reference method    Glucose, Fasting 09/25/2024 91  65 - 99 mg/dL Final    Calcium 09/25/2024 9.9  8.4 - 10.2 mg/dL Final    AST 09/25/2024 19  13 - 39 U/L Final    ALT 09/25/2024 14  7 - 52 U/L Final    Specimen collection should occur prior to Sulfasalazine administration due to the potential for falsely depressed results.     Alkaline Phosphatase 09/25/2024 71  34 - 104 U/L Final    Total Protein 09/25/2024 7.4  6.4 - 8.4 g/dL Final    Albumin 09/25/2024 4.2  3.5 - 5.0 g/dL Final    Total Bilirubin 09/25/2024 0.38  0.20 - 1.00 mg/dL Final    Use of this assay is not recommended for patients undergoing treatment with eltrombopag due to the potential for falsely elevated results.  N-acetyl-p-benzoquinone imine (metabolite of Acetaminophen) will generate erroneously low results in samples for patients that have taken an overdose of Acetaminophen.    eGFR 09/25/2024 97  ml/min/1.73sq m Final    Hemoglobin A1C 09/25/2024 5.7 (H)  Normal 4.0-5.6%; PreDiabetic 5.7-6.4%; Diabetic >=6.5%; Glycemic control for adults with diabetes <7.0% % Final    EAG 09/25/2024 117  mg/dl Final    Cholesterol 09/25/2024 200 (H)  See Comment mg/dL Final    Cholesterol:         Pediatric <18 Years        Desirable          <170 mg/dL       "Borderline High    170-199 mg/dL      High               >=200 mg/dL        Adult >=18 Years            Desirable         <200 mg/dL      Borderline High   200-239 mg/dL      High              >239 mg/dL      Triglycerides 09/25/2024 99  See Comment mg/dL Final    Triglyceride:     0-9Y            <75mg/dL     10Y-17Y         <90 mg/dL       >=18Y     Normal          <150 mg/dL     Borderline High 150-199 mg/dL     High            200-499 mg/dL        Very High       >499 mg/dL    Specimen collection should occur prior to Metamizole administration due to the potential for falsely depressed results.    HDL, Direct 09/25/2024 71  >=50 mg/dL Final    LDL Calculated 09/25/2024 109 (H)  0 - 100 mg/dL Final    LDL Cholesterol:     Optimal           <100 mg/dl     Near Optimal      100-129 mg/dl     Above Optimal       Borderline High 130-159 mg/dl       High            160-189 mg/dl       Very High       >189 mg/dl         This screening LDL is a calculated result.   It does not have the accuracy of the Direct Measured LDL in the monitoring of patients with hyperlipidemia and/or statin therapy.   Direct Measure LDL ([id number]) must be ordered separately in these patients.    Non-HDL-Chol (CHOL-HDL) 09/25/2024 129  mg/dl Final         Gustabo Sy MD        \"This note has been constructed using a voice recognition system.Therefore there may be syntax, spelling, and/or grammatical errors. Please call if you have any questions. \"  "

## 2024-11-27 ENCOUNTER — OFFICE VISIT (OUTPATIENT)
Dept: FAMILY MEDICINE CLINIC | Facility: CLINIC | Age: 69
End: 2024-11-27
Payer: COMMERCIAL

## 2024-11-27 VITALS
BODY MASS INDEX: 41.26 KG/M2 | SYSTOLIC BLOOD PRESSURE: 121 MMHG | HEART RATE: 75 BPM | DIASTOLIC BLOOD PRESSURE: 72 MMHG | OXYGEN SATURATION: 96 % | WEIGHT: 224.2 LBS | HEIGHT: 62 IN

## 2024-11-27 DIAGNOSIS — G89.29 CHRONIC MIDLINE LOW BACK PAIN WITHOUT SCIATICA: Primary | ICD-10-CM

## 2024-11-27 DIAGNOSIS — M16.11 PRIMARY OSTEOARTHRITIS OF RIGHT HIP: ICD-10-CM

## 2024-11-27 DIAGNOSIS — E66.812 CLASS 2 OBESITY: ICD-10-CM

## 2024-11-27 DIAGNOSIS — F11.20 CONTINUOUS OPIOID DEPENDENCE (HCC): ICD-10-CM

## 2024-11-27 DIAGNOSIS — F32.A ANXIETY AND DEPRESSION: ICD-10-CM

## 2024-11-27 DIAGNOSIS — I10 PRIMARY HYPERTENSION: ICD-10-CM

## 2024-11-27 DIAGNOSIS — F41.9 ANXIETY AND DEPRESSION: ICD-10-CM

## 2024-11-27 DIAGNOSIS — M54.50 CHRONIC MIDLINE LOW BACK PAIN WITHOUT SCIATICA: Primary | ICD-10-CM

## 2024-11-27 DIAGNOSIS — Z96.641 STATUS POST TOTAL REPLACEMENT OF RIGHT HIP: ICD-10-CM

## 2024-11-27 DIAGNOSIS — G89.4 CHRONIC PAIN SYNDROME: ICD-10-CM

## 2024-11-27 PROCEDURE — G2211 COMPLEX E/M VISIT ADD ON: HCPCS | Performed by: INTERNAL MEDICINE

## 2024-11-27 PROCEDURE — 99213 OFFICE O/P EST LOW 20 MIN: CPT | Performed by: INTERNAL MEDICINE

## 2024-11-27 RX ORDER — OXYCODONE AND ACETAMINOPHEN 7.5; 325 MG/1; MG/1
1 TABLET ORAL EVERY 6 HOURS PRN
Qty: 120 TABLET | Refills: 0 | Status: SHIPPED | OUTPATIENT
Start: 2024-11-27

## 2024-11-27 NOTE — ASSESSMENT & PLAN NOTE
Patient with anxiety and depression symptoms currently taking escitalopram 10 mg daily will continue with the same

## 2024-11-27 NOTE — ASSESSMENT & PLAN NOTE
Patient with continuous opioid dependence without pain medication she is not able to function discussed with the patient about dependence on the medication

## 2024-11-27 NOTE — ASSESSMENT & PLAN NOTE
Patient is on oxycodone every 6 hours for the pain in the low back area.  History of longstanding problem with pain and has been on pain medication without taking it she is increasing pain and affects day-to-day activities we will renew the same.

## 2024-11-27 NOTE — ASSESSMENT & PLAN NOTE
Please blood pressure is 121/72 currently on Cardura losartan HCTZ and amlodipine and carvedilol will continue on these 4 medications

## 2024-12-23 NOTE — PROGRESS NOTES
Office Visit Note  24     Zonia WILLARD Robertkeeshacristina 69 y.o. female MRN: [id number]  : 1955    Assessment:     1. Chronic midline low back pain without sciatica  Assessment & Plan:  Patient with chronic low back pain without sciatica currently on pain medication oxycodone every 6 hours without taking the pain medication patient is having increased difficulty with her day-to-day activities we will renew the same.  Orders:  -     oxyCODONE-acetaminophen (Percocet) 7.5-325 MG per tablet; Take 1 tablet by mouth every 6 (six) hours as needed for moderate pain Max Daily Amount: 4 tablets  2. Chronic pain syndrome  Assessment & Plan:  Patient cannot be given nonsteroidals for the management of pain because of the gastric sleeve surgery will continue with the pain medication oxycodone  Orders:  -     oxyCODONE-acetaminophen (Percocet) 7.5-325 MG per tablet; Take 1 tablet by mouth every 6 (six) hours as needed for moderate pain Max Daily Amount: 4 tablets  3. Anxiety and depression  Assessment & Plan:  Patient is on escitalopram 10 mg daily will continue with the same.  4. Dyslipidemia  Assessment & Plan:  Follow-up with repeat lipid profile    Orders:  -     Lipid panel; Future  5. Primary osteoarthritis of right hip  Assessment & Plan:  Status post right hip replacement still gets the pain avoiding nonsteroidals because of gastric sleeve continue with pain medication oxycodone  Orders:  -     oxyCODONE-acetaminophen (Percocet) 7.5-325 MG per tablet; Take 1 tablet by mouth every 6 (six) hours as needed for moderate pain Max Daily Amount: 4 tablets  6. Primary hypertension  Assessment & Plan:  Today's blood pressure is 121/71 continue with Cardura losartan HCTZ amlodipine and carvedilol  7. Class 2 obesity  Assessment & Plan:  Patient's status post gastric sleeve BMI today is 41.68 emphasized regarding diet exercise lifestyle modification lose weight which can help with her back and joint pains.  8. Status post total  replacement of right hip  -     oxyCODONE-acetaminophen (Percocet) 7.5-325 MG per tablet; Take 1 tablet by mouth every 6 (six) hours as needed for moderate pain Max Daily Amount: 4 tablets  9. Continuous opioid dependence (HCC)  Assessment & Plan:  Patient with continuous opioid dependence without taking the pain medication she is not able to function at this time history of previous low back pain joint pains she has been on the pain medication for a while now.  10. Gastroesophageal reflux disease without esophagitis  Assessment & Plan:  Patient is on omeprazole we will continue with the same recommend patient not eat late in the night and keep the head end of the bed up while sleeping  11. H/O bariatric surgery  Assessment & Plan:  Status post gastric sleeve 2011  12. Screening for deficiency anemia  -     CBC and differential; Future  13. Pre-diabetes  -     Comprehensive metabolic panel; Future  -     Hemoglobin A1C; Future; Expected date: 12/24/2024  -     UA w Reflex to Microscopic w Reflex to Culture; Future; Expected date: 12/24/2024  14. Screening for thyroid disorder  -     TSH, 3rd generation with Free T4 reflex; Future; Expected date: 12/24/2024             Discussion Summary and Plan:  Today's care plan and medications were reviewed with patient in detail and all their questions answered to their satisfaction.    Chief Complaint   Patient presents with    Follow-up      Subjective:  Patient is coming here for a follow-up evaluation with a history of hypertension, GERD, chronic pain syndrome, osteoarthritis status post right hip replacement.  Patient continues to experience pain in the low back area joint pains currently taking oxycodone for severe pain.  Medications reviewed patient due for annual lab work we will order the same.  Labs reviewed from before.        The following portions of the patient's history were reviewed and updated as appropriate: allergies, current medications, past family history,  "past medical history, past social history, past surgical history and problem list.    Review of Systems   Constitutional:  Negative for chills and fever.   HENT:  Negative for ear pain and sore throat.    Eyes:  Negative for pain and visual disturbance.   Respiratory:  Negative for cough and shortness of breath.    Cardiovascular:  Negative for chest pain and palpitations.   Gastrointestinal:  Negative for abdominal pain and vomiting.   Genitourinary:  Negative for dysuria and hematuria.   Musculoskeletal:  Positive for arthralgias and back pain.   Skin:  Negative for color change and rash.   Neurological:  Negative for seizures and syncope.   All other systems reviewed and are negative.        Historical Information   Patient Active Problem List   Diagnosis    Chronic pain syndrome    Chronic midline low back pain without sciatica    H/O bariatric surgery    Hypertension    Gastroesophageal reflux disease    Continuous opioid dependence (HCC)    Primary osteoarthritis    Hypercalcemia    Dyslipidemia    Right hip pain    Anxiety and depression    Primary osteoarthritis of right hip    Chronic hepatitis C without hepatic coma (HCC)    Class 2 obesity    Status post total replacement of right hip     Past Medical History:   Diagnosis Date    Acquired trigger finger of right ring finger 08/31/2023    Anxiety     Apnea, sleep     patient states \"repeated test says no\"    Arthritis     severe in the joints    Chronic pain disorder     joints    Depression     DJD (degenerative joint disease)     Fall 11/22/2018    tripped on a folded rug- left hamstring injury    Foot injury     right foot-fx of bone-no repair-has severe arthritis in the foot and ankle    Fractures yr. 2000 or before    left ankle    GERD (gastroesophageal reflux disease)     Hepatitis C not sure    History of sleep apnea     Hypercholesteremia     Hypertension     Insomnia     Low back pain     Obesity     Lost 70lb with gastric sleeve (2011)    " Osteoarthritis     Stomach disorder     take meds. for this    Use of cane as ambulatory aid     Wears glasses     Wears partial dentures     upper and lower-but did lose the lower      Past Surgical History:   Procedure Laterality Date    ANKLE ARTHROSCOPY Right     ANKLE SURGERY  not sure    orthoscopic/rt. ankle    APPENDECTOMY      BARIATRIC SURGERY  maybe 2011    CHOLECYSTECTOMY      COLONOSCOPY  10/26/2021    DXA PROCEDURE (HISTORICAL)  10/13/2020    GASTRECTOMY SLEEVE LAPAROSCOPIC  2011    lost 70 lb    HAND SURGERY      rt. hand, trigger finger    JOINT REPLACEMENT Bilateral     knee replacements    KNEE SURGERY  2001 maybe    MAMMO (HISTORICAL)  01/26/2022    NERVE BLOCK Bilateral 12/29/2017    Procedure: L4, L5, S1 MEDIAL BRANCH BLOCK #1;  Surgeon: Lloyd Lewis MD;  Location: [facility] MAIN OR;  Service: Pain Management     NERVE BLOCK Bilateral 01/12/2018    Procedure: BLOCK MEDIAL BRANCH L4, L5-S1  #2;  Surgeon: Lloyd Lewis MD;  Location: [facility] MAIN OR;  Service: Pain Management     MN ARTHROPLASTY GLENOHUMERAL JOINT TOTAL SHOULDER Right 05/23/2019    Procedure: ARTHROPLASTY SHOULDER TOTAL , excision of deep cyst at shoulder, biceps tenotomy;  Surgeon: Dileep Thomas MD;  Location: [facility] MAIN OR;  Service: Orthopedics    MN ARTHRP ACETBLR/PROX FEM PROSTC AGRFT/ALGRFT Right 10/16/2023    Procedure: ARTHROPLASTY HIP TOTAL ANTERIOR,NAVIGATED - overnight;  Surgeon: Ovi Jaime DO;  Location: [facility] MAIN OR;  Service: Orthopedics    MN INJECT SI JOINT ARTHRGRPHY&/ANES/STEROID W/LESA Right 10/23/2020    Procedure: Sacroiliac Joint injection (68221);  Surgeon: Lloyd Lewis MD;  Location: [facility] MAIN OR;  Service: Pain Management     MN TENDON SHEATH INCISION Right 12/21/2021    Procedure: RELEASE TRIGGER RING FINGER;  Surgeon: Dileep Thomas MD;  Location: [facility] MAIN OR;  Service: Orthopedics    RADIOFREQUENCY ABLATION Right 03/16/2018    Procedure: Rt L4 L5 S1 RFA;  Surgeon: Lloyd Lewis MD;   Location: [facility] MAIN OR;  Service: Pain Management     RADIOFREQUENCY ABLATION Left 2018    Procedure: L3-S1 Radio Frequency Ablation ([id number]);  Surgeon: Lloyd Lewis MD;  Location: [facility] MAIN OR;  Service: Pain Management     RHIZOTOMY Left 2018    Procedure: L4, L5, S1 RFA;  Surgeon: Lloyd Lewis MD;  Location: [facility] MAIN OR;  Service: Pain Management     SHOULDER SURGERY  2019    rt. total  replacement    TONSILLECTOMY       Social History     Substance and Sexual Activity   Alcohol Use Not Currently    Comment: quit many yrs. ago     Social History     Substance and Sexual Activity   Drug Use Not Currently    Types: Amphetamines, Marijuana    Comment: aprox. 35 years ago, maybe longer     Social History     Tobacco Use   Smoking Status Former    Current packs/day: 0.00    Types: Cigarettes    Quit date: 2004    Years since quittin.9    Passive exposure: Past   Smokeless Tobacco Never     Family History   Problem Relation Age of Onset    Other Mother         in a nursing home-fx of the leg    Heart disease Father         CHF    Alcohol abuse Father     Cirrhosis Father     Colon cancer Maternal Grandmother     No Known Problems Maternal Grandfather     No Known Problems Paternal Grandmother     No Known Problems Paternal Grandfather     Other Brother         lung abscess, pneumonia    Colon cancer Maternal Aunt     No Known Problems Maternal Aunt     No Known Problems Maternal Aunt     No Known Problems Maternal Uncle     No Known Problems Paternal Aunt     No Known Problems Paternal Uncle     Breast cancer Cousin 50     Health Maintenance Due   Topic    Pneumococcal Vaccine: 65+ Years (1 of 2 - PCV)    Hepatitis A Vaccine (1 of 2 - Risk 2-dose series)    RSV Vaccine Age 60+ Years (1 - Risk 60-74 years 1-dose series)    Hepatitis B Vaccine (1 of 3 - Risk 3-dose series)    PT PLAN OF CARE     COVID-19 Vaccine (5 - 2024-25 season)    Depression Screening       Meds/Allergies        Current Outpatient Medications:     acetaminophen (TYLENOL) 325 mg tablet, Take 3 tablets (975 mg total) by mouth every 8 (eight) hours, Disp: , Rfl: 0    amLODIPine (NORVASC) 10 mg tablet, TAKE 1 TABLET BY MOUTH DAILY, Disp: 90 tablet, Rfl: 3    Ascorbic Acid (vitamin C) 100 MG tablet, Take 1 tablet (100 mg total) by mouth daily, Disp: , Rfl:     Biotin 5000 MCG CAPS, Take 1 capsule (5,000 mcg total) by mouth in the morning, Disp: , Rfl:     carvedilol (COREG) 12.5 mg tablet, Take 1 tablet (12.5 mg total) by mouth 2 (two) times a day with meals, Disp: 180 tablet, Rfl: 1    cholecalciferol (VITAMIN D3) 1,000 units tablet, Take 1 tablet (1,000 Units total) by mouth daily, Disp: , Rfl:     Coenzyme Q10 (CoQ-10) 100 MG capsule, Take 1 capsule (100 mg total) by mouth daily, Disp: , Rfl:     Cyanocobalamin (VITAMIN B 12 PO), Take 500 mcg by mouth every morning, Disp: , Rfl:     diphenhydrAMINE (BENADRYL) 25 mg tablet, Take 25 mg by mouth every 8 (eight) hours as needed for itching , Disp: , Rfl:     doxazosin (CARDURA) 1 mg tablet, TAKE 1 TABLET BY MOUTH DAILY AT  BEDTIME, Disp: 90 tablet, Rfl: 1    escitalopram (LEXAPRO) 10 mg tablet, TAKE 1 AND 1/2 TABLETS BY MOUTH  DAILY AT BEDTIME, Disp: 135 tablet, Rfl: 1    losartan-hydrochlorothiazide (HYZAAR) 100-12.5 MG per tablet, TAKE ONE TABLET BY MOUTH EVERY DAY, Disp: 30 tablet, Rfl: 5    Melatonin 10 MG TABS, Take by mouth daily at bedtime, Disp: , Rfl:     metaxalone (SKELAXIN) 800 mg tablet, Take 1 tablet (800 mg total) by mouth 3 (three) times a day as needed for muscle spasms, Disp: 30 tablet, Rfl: 0    naloxone (NARCAN) 4 mg/0.1 mL nasal spray, Administer 1 spray into a nostril. If no response after 2-3 minutes, give another dose in the other nostril using a new spray., Disp: 1 each, Rfl: 0    omeprazole (PriLOSEC) 20 mg delayed release capsule, TAKE 1 CAPSULE BY MOUTH DAILY, Disp: 90 capsule, Rfl: 1    oxyCODONE-acetaminophen (Percocet) 7.5-325 MG per tablet,  "Take 1 tablet by mouth every 6 (six) hours as needed for moderate pain Max Daily Amount: 4 tablets, Disp: 120 tablet, Rfl: 0    psyllium (METAMUCIL) 58.6 % powder, Take by mouth daily, Disp: , Rfl:     zinc gluconate 50 mg tablet, Take 1 tablet (50 mg total) by mouth daily, Disp: , Rfl:       Objective:    Vitals:   /71 (BP Location: Right arm, Patient Position: Sitting, Cuff Size: Large)   Pulse 63   Ht 5' 2\" (1.575 m)   Wt 103 kg (227 lb 14.4 oz)   SpO2 95%   BMI 41.68 kg/m²   Body mass index is 41.68 kg/m².  Vitals:    12/24/24 0919   Weight: 103 kg (227 lb 14.4 oz)       Physical Exam  Vitals and nursing note reviewed.   Constitutional:       Appearance: Normal appearance. She is obese.   Cardiovascular:      Rate and Rhythm: Normal rate and regular rhythm.      Heart sounds: Normal heart sounds.   Pulmonary:      Effort: Pulmonary effort is normal.      Breath sounds: Normal breath sounds.   Abdominal:      Palpations: Abdomen is soft.   Musculoskeletal:      Cervical back: Normal range of motion and neck supple.      Right lower leg: No edema.      Left lower leg: No edema.      Comments: Lumbar spine movements restricted causing discomfort and pain   Skin:     General: Skin is warm and dry.   Neurological:      Mental Status: She is alert and oriented to person, place, and time. Mental status is at baseline.   Psychiatric:         Mood and Affect: Mood normal.         Behavior: Behavior normal.         Lab Review   No visits with results within 2 Month(s) from this visit.   Latest known visit with results is:   Appointment on 09/25/2024   Component Date Value Ref Range Status    Sodium 09/25/2024 141  135 - 147 mmol/L Final    Potassium 09/25/2024 4.3  3.5 - 5.3 mmol/L Final    Chloride 09/25/2024 102  96 - 108 mmol/L Final    CO2 09/25/2024 32  21 - 32 mmol/L Final    ANION GAP 09/25/2024 7  4 - 13 mmol/L Final    BUN 09/25/2024 18  5 - 25 mg/dL Final    Creatinine 09/25/2024 0.52 (L)  0.60 - 1.30 " mg/dL Final    Standardized to IDMS reference method    Glucose, Fasting 09/25/2024 91  65 - 99 mg/dL Final    Calcium 09/25/2024 9.9  8.4 - 10.2 mg/dL Final    AST 09/25/2024 19  13 - 39 U/L Final    ALT 09/25/2024 14  7 - 52 U/L Final    Specimen collection should occur prior to Sulfasalazine administration due to the potential for falsely depressed results.     Alkaline Phosphatase 09/25/2024 71  34 - 104 U/L Final    Total Protein 09/25/2024 7.4  6.4 - 8.4 g/dL Final    Albumin 09/25/2024 4.2  3.5 - 5.0 g/dL Final    Total Bilirubin 09/25/2024 0.38  0.20 - 1.00 mg/dL Final    Use of this assay is not recommended for patients undergoing treatment with eltrombopag due to the potential for falsely elevated results.  N-acetyl-p-benzoquinone imine (metabolite of Acetaminophen) will generate erroneously low results in samples for patients that have taken an overdose of Acetaminophen.    eGFR 09/25/2024 97  ml/min/1.73sq m Final    Hemoglobin A1C 09/25/2024 5.7 (H)  Normal 4.0-5.6%; PreDiabetic 5.7-6.4%; Diabetic >=6.5%; Glycemic control for adults with diabetes <7.0% % Final    EAG 09/25/2024 117  mg/dl Final    Cholesterol 09/25/2024 200 (H)  See Comment mg/dL Final    Cholesterol:         Pediatric <18 Years        Desirable          <170 mg/dL      Borderline High    170-199 mg/dL      High               >=200 mg/dL        Adult >=18 Years            Desirable         <200 mg/dL      Borderline High   200-239 mg/dL      High              >239 mg/dL      Triglycerides 09/25/2024 99  See Comment mg/dL Final    Triglyceride:     0-9Y            <75mg/dL     10Y-17Y         <90 mg/dL       >=18Y     Normal          <150 mg/dL     Borderline High 150-199 mg/dL     High            200-499 mg/dL        Very High       >499 mg/dL    Specimen collection should occur prior to Metamizole administration due to the potential for falsely depressed results.    HDL, Direct 09/25/2024 71  >=50 mg/dL Final    LDL Calculated  "09/25/2024 109 (H)  0 - 100 mg/dL Final    LDL Cholesterol:     Optimal           <100 mg/dl     Near Optimal      100-129 mg/dl     Above Optimal       Borderline High 130-159 mg/dl       High            160-189 mg/dl       Very High       >189 mg/dl         This screening LDL is a calculated result.   It does not have the accuracy of the Direct Measured LDL in the monitoring of patients with hyperlipidemia and/or statin therapy.   Direct Measure LDL (AJJ802) must be ordered separately in these patients.    Non-HDL-Chol (CHOL-HDL) 09/25/2024 129  mg/dl Final         Gustabo Sy MD        \"This note has been constructed using a voice recognition system.Therefore there may be syntax, spelling, and/or grammatical errors. Please call if you have any questions. \"  "

## 2024-12-24 ENCOUNTER — OFFICE VISIT (OUTPATIENT)
Dept: FAMILY MEDICINE CLINIC | Facility: CLINIC | Age: 69
End: 2024-12-24
Payer: COMMERCIAL

## 2024-12-24 VITALS
WEIGHT: 227.9 LBS | SYSTOLIC BLOOD PRESSURE: 121 MMHG | BODY MASS INDEX: 41.94 KG/M2 | DIASTOLIC BLOOD PRESSURE: 71 MMHG | HEIGHT: 62 IN | OXYGEN SATURATION: 95 % | HEART RATE: 63 BPM

## 2024-12-24 DIAGNOSIS — M54.50 CHRONIC MIDLINE LOW BACK PAIN WITHOUT SCIATICA: Primary | ICD-10-CM

## 2024-12-24 DIAGNOSIS — I10 PRIMARY HYPERTENSION: ICD-10-CM

## 2024-12-24 DIAGNOSIS — E78.5 DYSLIPIDEMIA: ICD-10-CM

## 2024-12-24 DIAGNOSIS — K21.9 GASTROESOPHAGEAL REFLUX DISEASE WITHOUT ESOPHAGITIS: ICD-10-CM

## 2024-12-24 DIAGNOSIS — R73.03 PRE-DIABETES: ICD-10-CM

## 2024-12-24 DIAGNOSIS — F32.A ANXIETY AND DEPRESSION: ICD-10-CM

## 2024-12-24 DIAGNOSIS — M16.11 PRIMARY OSTEOARTHRITIS OF RIGHT HIP: ICD-10-CM

## 2024-12-24 DIAGNOSIS — Z13.29 SCREENING FOR THYROID DISORDER: ICD-10-CM

## 2024-12-24 DIAGNOSIS — Z98.84 H/O BARIATRIC SURGERY: ICD-10-CM

## 2024-12-24 DIAGNOSIS — E66.812 CLASS 2 OBESITY: ICD-10-CM

## 2024-12-24 DIAGNOSIS — G89.29 CHRONIC MIDLINE LOW BACK PAIN WITHOUT SCIATICA: Primary | ICD-10-CM

## 2024-12-24 DIAGNOSIS — G89.4 CHRONIC PAIN SYNDROME: ICD-10-CM

## 2024-12-24 DIAGNOSIS — F41.9 ANXIETY AND DEPRESSION: ICD-10-CM

## 2024-12-24 DIAGNOSIS — Z96.641 STATUS POST TOTAL REPLACEMENT OF RIGHT HIP: ICD-10-CM

## 2024-12-24 DIAGNOSIS — Z13.0 SCREENING FOR DEFICIENCY ANEMIA: ICD-10-CM

## 2024-12-24 DIAGNOSIS — F11.20 CONTINUOUS OPIOID DEPENDENCE (HCC): ICD-10-CM

## 2024-12-24 PROCEDURE — G2211 COMPLEX E/M VISIT ADD ON: HCPCS | Performed by: INTERNAL MEDICINE

## 2024-12-24 PROCEDURE — 99214 OFFICE O/P EST MOD 30 MIN: CPT | Performed by: INTERNAL MEDICINE

## 2024-12-24 RX ORDER — OXYCODONE AND ACETAMINOPHEN 7.5; 325 MG/1; MG/1
1 TABLET ORAL EVERY 6 HOURS PRN
Qty: 120 TABLET | Refills: 0 | Status: SHIPPED | OUTPATIENT
Start: 2024-12-24

## 2024-12-24 NOTE — ASSESSMENT & PLAN NOTE
Palliative Care Focus Note:    Initial Palliative Care  attendance at trauma rounds with multi-disciplinary team. No current palliative care needs identified, will continue to round with team and make recommendations as needed. Fall at home with rib fractures.     Yue LOMBARDO,   Palliative Care   [phone] Page  [phone] Office         Patient with continuous opioid dependence without taking the pain medication she is not able to function at this time history of previous low back pain joint pains she has been on the pain medication for a while now.

## 2024-12-24 NOTE — ASSESSMENT & PLAN NOTE
Status post right hip replacement still gets the pain avoiding nonsteroidals because of gastric sleeve continue with pain medication oxycodone

## 2024-12-24 NOTE — ASSESSMENT & PLAN NOTE
Patient cannot be given nonsteroidals for the management of pain because of the gastric sleeve surgery will continue with the pain medication oxycodone

## 2024-12-24 NOTE — ASSESSMENT & PLAN NOTE
Patient's status post gastric sleeve BMI today is 41.68 emphasized regarding diet exercise lifestyle modification lose weight which can help with her back and joint pains.

## 2024-12-24 NOTE — ASSESSMENT & PLAN NOTE
Patient is on omeprazole we will continue with the same recommend patient not eat late in the night and keep the head end of the bed up while sleeping

## 2024-12-24 NOTE — ASSESSMENT & PLAN NOTE
Patient with chronic low back pain without sciatica currently on pain medication oxycodone every 6 hours without taking the pain medication patient is having increased difficulty with her day-to-day activities we will renew the same.

## 2024-12-25 DIAGNOSIS — I10 PRIMARY HYPERTENSION: ICD-10-CM

## 2024-12-25 RX ORDER — CARVEDILOL 12.5 MG/1
12.5 TABLET ORAL 2 TIMES DAILY WITH MEALS
Qty: 180 TABLET | Refills: 1 | Status: SHIPPED | OUTPATIENT
Start: 2024-12-25

## 2024-12-25 RX ORDER — LOSARTAN POTASSIUM AND HYDROCHLOROTHIAZIDE 12.5; 1 MG/1; MG/1
1 TABLET ORAL DAILY
Qty: 90 TABLET | Refills: 1 | Status: SHIPPED | OUTPATIENT
Start: 2024-12-25

## 2024-12-30 DIAGNOSIS — I10 PRIMARY HYPERTENSION: ICD-10-CM

## 2024-12-31 RX ORDER — AMLODIPINE BESYLATE 10 MG/1
10 TABLET ORAL DAILY
Qty: 90 TABLET | Refills: 0 | Status: SHIPPED | OUTPATIENT
Start: 2024-12-31

## 2025-01-16 ENCOUNTER — RA CDI HCC (OUTPATIENT)
Dept: OTHER | Facility: HOSPITAL | Age: 70
End: 2025-01-16

## 2025-01-17 ENCOUNTER — TELEPHONE (OUTPATIENT)
Dept: OBGYN CLINIC | Facility: MEDICAL CENTER | Age: 70
End: 2025-01-17

## 2025-01-17 DIAGNOSIS — Z96.641 STATUS POST TOTAL REPLACEMENT OF RIGHT HIP: Primary | ICD-10-CM

## 2025-01-17 RX ORDER — CLINDAMYCIN HYDROCHLORIDE 300 MG/1
600 CAPSULE ORAL
Qty: 10 CAPSULE | Refills: 2 | Status: SHIPPED | OUTPATIENT
Start: 2025-01-17 | End: 2025-01-23 | Stop reason: SDUPTHER

## 2025-01-17 NOTE — TELEPHONE ENCOUNTER
Caller: Zonia     Doctor: Dr Jaime     Reason for call: The patient is having dental work done Monday 1/20. She understands she needs antibiotics.      She is asking to have them sent to:   [id number] - [place], [place] - [address] [facility] ( [address])  [address] [place] [address] ( [address])[facility] [address]  Phone: [phone]  Fax: [phone]  SOFI #: --   May she have 10 or 20 incase of an emergency?     Call back#: [phone]

## 2025-01-22 NOTE — PROGRESS NOTES
Office Visit Note  25     Zonia Bassettsally 69 y.o. female MRN: [id number]  : 1955    Assessment:     1. Chronic pain syndrome  Assessment & Plan:  Patient status post gastric sleeve surgery cannot give nonsteroidals patient is on oxycodone we will continue with the same for now for controlling the pain  Orders:  -     oxyCODONE-acetaminophen (Percocet) 7.5-325 MG per tablet; Take 1 tablet by mouth every 6 (six) hours as needed for moderate pain Max Daily Amount: 4 tablets  2. Anxiety and depression  Assessment & Plan:  Continue with escitalopram daily 10 mg doing much better with this medication.  3. Chronic midline low back pain without sciatica  Assessment & Plan:  Patient with chronic pain syndrome low back pain without sciatica on oxycodone every 6 hours as needed without taking the pain medication she has difficulty with ambulation  With her day-to-day activities we will continue the same.  Orders:  -     oxyCODONE-acetaminophen (Percocet) 7.5-325 MG per tablet; Take 1 tablet by mouth every 6 (six) hours as needed for moderate pain Max Daily Amount: 4 tablets  4. Continuous opioid dependence (HCC)  Assessment & Plan:  Patient with continuous opioid dependence without taking the medication patient is having difficulty with her day-to-day activities not able to function she also has pain in both the hips and history of hip replacement  5. Opioid dependence, uncomplicated (HCC)  Assessment & Plan:  Patient with continuous opioid dependence without taking the medication patient is having difficulty with her day-to-day activities not able to function she also has pain in both the hips and history of hip replacement  6. Primary hypertension  Assessment & Plan:  Blood pressure is stable 116/74 on Cardura, losartan HCTZ, amlodipine and carvedilol  7. Status post total replacement of right hip  -     clindamycin (CLEOCIN) 300 MG capsule; 2 capsules prior to the dental procedure  -      oxyCODONE-acetaminophen (Percocet) 7.5-325 MG per tablet; Take 1 tablet by mouth every 6 (six) hours as needed for moderate pain Max Daily Amount: 4 tablets  8. Primary osteoarthritis of right hip  -     oxyCODONE-acetaminophen (Percocet) 7.5-325 MG per tablet; Take 1 tablet by mouth every 6 (six) hours as needed for moderate pain Max Daily Amount: 4 tablets  9. Chronic hepatitis C without hepatic coma (HCC)             Discussion Summary and Plan:  Today's care plan and medications were reviewed with patient in detail and all their questions answered to their satisfaction.    Chief Complaint   Patient presents with    Follow-up      Subjective:  Patient is coming here for a follow-up evaluation with regards to low back pain hypertension GERD anxiety and depression right hip pain.  Patient was supposed to go for blood work not done yet.  Denies any symptoms of chest pain palpitation shortness of breath.  Medications reviewed labs reviewed.        The following portions of the patient's history were reviewed and updated as appropriate: allergies, current medications, past family history, past medical history, past social history, past surgical history and problem list.    Review of Systems   Constitutional:  Negative for chills and fever.   HENT:  Negative for ear pain and sore throat.    Eyes:  Negative for pain and visual disturbance.   Respiratory:  Negative for cough and shortness of breath.    Cardiovascular:  Negative for chest pain and palpitations.   Gastrointestinal:  Negative for abdominal pain and vomiting.   Genitourinary:  Negative for dysuria and hematuria.   Musculoskeletal:  Negative for arthralgias and back pain.   Skin:  Negative for color change and rash.   Neurological:  Negative for seizures and syncope.   All other systems reviewed and are negative.        Historical Information   Patient Active Problem List   Diagnosis    Chronic pain syndrome    Chronic midline low back pain without sciatica  "   H/O bariatric surgery    Hypertension    Gastroesophageal reflux disease    Opioid dependence, uncomplicated (HCC)    Primary osteoarthritis    Hypercalcemia    Dyslipidemia    Right hip pain    Anxiety and depression    Primary osteoarthritis of right hip    Chronic hepatitis C without hepatic coma (HCC)    Class 2 obesity    Status post total replacement of right hip     Past Medical History:   Diagnosis Date    Acquired trigger finger of right ring finger 08/31/2023    Anxiety     Apnea, sleep     patient states \"repeated test says no\"    Arthritis     severe in the joints    Chronic pain disorder     joints    Depression     DJD (degenerative joint disease)     Fall 11/22/2018    tripped on a folded rug- left hamstring injury    Foot injury     right foot-fx of bone-no repair-has severe arthritis in the foot and ankle    Fractures yr. 2000 or before    left ankle    GERD (gastroesophageal reflux disease)     Hepatitis C not sure    History of sleep apnea     Hypercholesteremia     Hypertension     Insomnia     Low back pain     Obesity     Lost 70lb with gastric sleeve (2011)    Osteoarthritis     Stomach disorder     take meds. for this    Use of cane as ambulatory aid     Wears glasses     Wears partial dentures     upper and lower-but did lose the lower      Past Surgical History:   Procedure Laterality Date    ANKLE ARTHROSCOPY Right     ANKLE SURGERY  not sure    orthoscopic/rt. ankle    APPENDECTOMY      BARIATRIC SURGERY  maybe 2011    CHOLECYSTECTOMY      COLONOSCOPY  10/26/2021    DXA PROCEDURE (HISTORICAL)  10/13/2020    GASTRECTOMY SLEEVE LAPAROSCOPIC  2011    lost 70 lb    HAND SURGERY      rt. hand, trigger finger    JOINT REPLACEMENT Bilateral     knee replacements    KNEE SURGERY  2001 maybe    MAMMO (HISTORICAL)  01/26/2022    NERVE BLOCK Bilateral 12/29/2017    Procedure: L4, L5, S1 MEDIAL BRANCH BLOCK #1;  Surgeon: Lloyd Lewis MD;  Location: [facility] MAIN OR;  Service: Pain Management     " NERVE BLOCK Bilateral 01/12/2018    Procedure: BLOCK MEDIAL BRANCH L4, L5-S1  #2;  Surgeon: Lloyd Lewis MD;  Location: [facility] MAIN OR;  Service: Pain Management     NE ARTHROPLASTY GLENOHUMERAL JOINT TOTAL SHOULDER Right 05/23/2019    Procedure: ARTHROPLASTY SHOULDER TOTAL , excision of deep cyst at shoulder, biceps tenotomy;  Surgeon: Dileep Thomas MD;  Location: [facility] MAIN OR;  Service: Orthopedics    NE ARTHRP ACETBLR/PROX FEM PROSTC AGRFT/ALGRFT Right 10/16/2023    Procedure: ARTHROPLASTY HIP TOTAL ANTERIOR,NAVIGATED - overnight;  Surgeon: Ovi Jaime DO;  Location: [facility] MAIN OR;  Service: Orthopedics    NE INJECT SI JOINT ARTHRGRPHY&/ANES/STEROID W/LESA Right 10/23/2020    Procedure: Sacroiliac Joint injection (97038);  Surgeon: Lloyd Lewis MD;  Location: [facility] MAIN OR;  Service: Pain Management     NE TENDON SHEATH INCISION Right 12/21/2021    Procedure: RELEASE TRIGGER RING FINGER;  Surgeon: Dileep Thomas MD;  Location: [facility] MAIN OR;  Service: Orthopedics    RADIOFREQUENCY ABLATION Right 03/16/2018    Procedure: Rt L4 L5 S1 RFA;  Surgeon: Lloyd Lewis MD;  Location: [facility] MAIN OR;  Service: Pain Management     RADIOFREQUENCY ABLATION Left 12/14/2018    Procedure: L3-S1 Radio Frequency Ablation ([id number]);  Surgeon: Lloyd Lewis MD;  Location: [facility] MAIN OR;  Service: Pain Management     RHIZOTOMY Left 01/25/2018    Procedure: L4, L5, S1 RFA;  Surgeon: Lloyd Lewis MD;  Location: [facility] MAIN OR;  Service: Pain Management     SHOULDER SURGERY  April/2019    rt. total  replacement    TONSILLECTOMY       Social History     Substance and Sexual Activity   Alcohol Use Not Currently    Comment: quit many yrs. ago     Social History     Substance and Sexual Activity   Drug Use Not Currently    Types: Amphetamines, Marijuana    Comment: aprox. 35 years ago, maybe longer     Social History     Tobacco Use   Smoking Status Former    Current packs/day: 0.00    Types: Cigarettes    Quit date:  2004    Years since quittin.0    Passive exposure: Past   Smokeless Tobacco Never     Family History   Problem Relation Age of Onset    Other Mother         in a nursing home-fx of the leg    Heart disease Father         CHF    Alcohol abuse Father     Cirrhosis Father     Colon cancer Maternal Grandmother     No Known Problems Maternal Grandfather     No Known Problems Paternal Grandmother     No Known Problems Paternal Grandfather     Other Brother         lung abscess, pneumonia    Colon cancer Maternal Aunt     No Known Problems Maternal Aunt     No Known Problems Maternal Aunt     No Known Problems Maternal Uncle     No Known Problems Paternal Aunt     No Known Problems Paternal Uncle     Breast cancer Cousin 50     Health Maintenance Due   Topic    Pneumococcal Vaccine: 65+ Years (1 of 2 - PCV)    Hepatitis A Vaccine (1 of 2 - Risk 2-dose series)    RSV Vaccine for Pregnant Patients and Patients Age 60+ Years (1 - Risk 60-74 years 1-dose series)    Hepatitis B Vaccine (1 of 3 - Risk 3-dose series)    PT PLAN OF CARE     COVID-19 Vaccine (5 - 2024-25 season)      Meds/Allergies       Current Outpatient Medications:     acetaminophen (TYLENOL) 325 mg tablet, Take 3 tablets (975 mg total) by mouth every 8 (eight) hours, Disp: , Rfl: 0    amLODIPine (NORVASC) 10 mg tablet, TAKE 1 TABLET BY MOUTH DAILY, Disp: 90 tablet, Rfl: 0    Ascorbic Acid (vitamin C) 100 MG tablet, Take 1 tablet (100 mg total) by mouth daily, Disp: , Rfl:     Biotin 5000 MCG CAPS, Take 1 capsule (5,000 mcg total) by mouth in the morning, Disp: , Rfl:     carvedilol (COREG) 12.5 mg tablet, TAKE 1 TABLET BY MOUTH TWICE  DAILY WITH MEALS, Disp: 180 tablet, Rfl: 1    cholecalciferol (VITAMIN D3) 1,000 units tablet, Take 1 tablet (1,000 Units total) by mouth daily, Disp: , Rfl:     clindamycin (CLEOCIN) 300 MG capsule, 2 capsules prior to the dental procedure, Disp: 20 capsule, Rfl: 0    Coenzyme Q10 (CoQ-10) 100 MG capsule, Take 1 capsule  "(100 mg total) by mouth daily, Disp: , Rfl:     Cyanocobalamin (VITAMIN B 12 PO), Take 500 mcg by mouth every morning, Disp: , Rfl:     diphenhydrAMINE (BENADRYL) 25 mg tablet, Take 25 mg by mouth every 8 (eight) hours as needed for itching , Disp: , Rfl:     doxazosin (CARDURA) 1 mg tablet, TAKE 1 TABLET BY MOUTH DAILY AT  BEDTIME, Disp: 90 tablet, Rfl: 1    escitalopram (LEXAPRO) 10 mg tablet, TAKE 1 AND 1/2 TABLETS BY MOUTH  DAILY AT BEDTIME, Disp: 135 tablet, Rfl: 1    losartan-hydrochlorothiazide (HYZAAR) 100-12.5 MG per tablet, TAKE 1 TABLET BY MOUTH DAILY, Disp: 90 tablet, Rfl: 1    Melatonin 10 MG TABS, Take by mouth daily at bedtime, Disp: , Rfl:     metaxalone (SKELAXIN) 800 mg tablet, Take 1 tablet (800 mg total) by mouth 3 (three) times a day as needed for muscle spasms, Disp: 30 tablet, Rfl: 0    naloxone (NARCAN) 4 mg/0.1 mL nasal spray, Administer 1 spray into a nostril. If no response after 2-3 minutes, give another dose in the other nostril using a new spray., Disp: 1 each, Rfl: 0    omeprazole (PriLOSEC) 20 mg delayed release capsule, TAKE 1 CAPSULE BY MOUTH DAILY, Disp: 90 capsule, Rfl: 1    oxyCODONE-acetaminophen (Percocet) 7.5-325 MG per tablet, Take 1 tablet by mouth every 6 (six) hours as needed for moderate pain Max Daily Amount: 4 tablets, Disp: 120 tablet, Rfl: 0    psyllium (METAMUCIL) 58.6 % powder, Take by mouth daily, Disp: , Rfl:     zinc gluconate 50 mg tablet, Take 1 tablet (50 mg total) by mouth daily, Disp: , Rfl:       Objective:    Vitals:   /74 (BP Location: Right arm, Patient Position: Sitting, Cuff Size: Large)   Pulse 80   Ht 5' 2\" (1.575 m)   Wt 102 kg (225 lb)   SpO2 96%   BMI 41.15 kg/m²   Body mass index is 41.15 kg/m².  Vitals:    01/23/25 0936   Weight: 102 kg (225 lb)       Physical Exam  Vitals and nursing note reviewed.   Constitutional:       Appearance: Normal appearance.   Cardiovascular:      Rate and Rhythm: Normal rate and regular rhythm.      Heart " sounds: Normal heart sounds.   Pulmonary:      Effort: Pulmonary effort is normal.      Breath sounds: Normal breath sounds.   Abdominal:      Palpations: Abdomen is soft.   Musculoskeletal:      Cervical back: Normal range of motion and neck supple.      Right lower leg: No edema.      Left lower leg: No edema.      Comments: Lumbar spine movements and hip movements causing discomfort and pain especially of the right hip.   Skin:     General: Skin is warm and dry.   Neurological:      Mental Status: She is alert and oriented to person, place, and time. Mental status is at baseline.   Psychiatric:         Mood and Affect: Mood normal.         Behavior: Behavior normal.         Lab Review   No visits with results within 2 Month(s) from this visit.   Latest known visit with results is:   Appointment on 09/25/2024   Component Date Value Ref Range Status    Sodium 09/25/2024 141  135 - 147 mmol/L Final    Potassium 09/25/2024 4.3  3.5 - 5.3 mmol/L Final    Chloride 09/25/2024 102  96 - 108 mmol/L Final    CO2 09/25/2024 32  21 - 32 mmol/L Final    ANION GAP 09/25/2024 7  4 - 13 mmol/L Final    BUN 09/25/2024 18  5 - 25 mg/dL Final    Creatinine 09/25/2024 0.52 (L)  0.60 - 1.30 mg/dL Final    Standardized to IDMS reference method    Glucose, Fasting 09/25/2024 91  65 - 99 mg/dL Final    Calcium 09/25/2024 9.9  8.4 - 10.2 mg/dL Final    AST 09/25/2024 19  13 - 39 U/L Final    ALT 09/25/2024 14  7 - 52 U/L Final    Specimen collection should occur prior to Sulfasalazine administration due to the potential for falsely depressed results.     Alkaline Phosphatase 09/25/2024 71  34 - 104 U/L Final    Total Protein 09/25/2024 7.4  6.4 - 8.4 g/dL Final    Albumin 09/25/2024 4.2  3.5 - 5.0 g/dL Final    Total Bilirubin 09/25/2024 0.38  0.20 - 1.00 mg/dL Final    Use of this assay is not recommended for patients undergoing treatment with eltrombopag due to the potential for falsely elevated results.  N-acetyl-p-benzoquinone imine  "(metabolite of Acetaminophen) will generate erroneously low results in samples for patients that have taken an overdose of Acetaminophen.    eGFR 09/25/2024 97  ml/min/1.73sq m Final    Hemoglobin A1C 09/25/2024 5.7 (H)  Normal 4.0-5.6%; PreDiabetic 5.7-6.4%; Diabetic >=6.5%; Glycemic control for adults with diabetes <7.0% % Final    EAG 09/25/2024 117  mg/dl Final    Cholesterol 09/25/2024 200 (H)  See Comment mg/dL Final    Cholesterol:         Pediatric <18 Years        Desirable          <170 mg/dL      Borderline High    170-199 mg/dL      High               >=200 mg/dL        Adult >=18 Years            Desirable         <200 mg/dL      Borderline High   200-239 mg/dL      High              >239 mg/dL      Triglycerides 09/25/2024 99  See Comment mg/dL Final    Triglyceride:     0-9Y            <75mg/dL     10Y-17Y         <90 mg/dL       >=18Y     Normal          <150 mg/dL     Borderline High 150-199 mg/dL     High            200-499 mg/dL        Very High       >499 mg/dL    Specimen collection should occur prior to Metamizole administration due to the potential for falsely depressed results.    HDL, Direct 09/25/2024 71  >=50 mg/dL Final    LDL Calculated 09/25/2024 109 (H)  0 - 100 mg/dL Final    LDL Cholesterol:     Optimal           <100 mg/dl     Near Optimal      100-129 mg/dl     Above Optimal       Borderline High 130-159 mg/dl       High            160-189 mg/dl       Very High       >189 mg/dl         This screening LDL is a calculated result.   It does not have the accuracy of the Direct Measured LDL in the monitoring of patients with hyperlipidemia and/or statin therapy.   Direct Measure LDL (RME167) must be ordered separately in these patients.    Non-HDL-Chol (CHOL-HDL) 09/25/2024 129  mg/dl Final         Gustabo Sy MD        \"This note has been constructed using a voice recognition system.Therefore there may be syntax, spelling, and/or grammatical errors. Please call if you have any " "questions. \"  "

## 2025-01-23 ENCOUNTER — OFFICE VISIT (OUTPATIENT)
Dept: FAMILY MEDICINE CLINIC | Facility: CLINIC | Age: 70
End: 2025-01-23
Payer: COMMERCIAL

## 2025-01-23 VITALS
SYSTOLIC BLOOD PRESSURE: 116 MMHG | WEIGHT: 225 LBS | BODY MASS INDEX: 41.41 KG/M2 | HEIGHT: 62 IN | OXYGEN SATURATION: 96 % | HEART RATE: 80 BPM | DIASTOLIC BLOOD PRESSURE: 74 MMHG

## 2025-01-23 DIAGNOSIS — I10 PRIMARY HYPERTENSION: ICD-10-CM

## 2025-01-23 DIAGNOSIS — M54.50 CHRONIC MIDLINE LOW BACK PAIN WITHOUT SCIATICA: ICD-10-CM

## 2025-01-23 DIAGNOSIS — F11.20 CONTINUOUS OPIOID DEPENDENCE (HCC): ICD-10-CM

## 2025-01-23 DIAGNOSIS — G89.29 CHRONIC MIDLINE LOW BACK PAIN WITHOUT SCIATICA: ICD-10-CM

## 2025-01-23 DIAGNOSIS — F11.20 OPIOID DEPENDENCE, UNCOMPLICATED (HCC): ICD-10-CM

## 2025-01-23 DIAGNOSIS — M16.11 PRIMARY OSTEOARTHRITIS OF RIGHT HIP: ICD-10-CM

## 2025-01-23 DIAGNOSIS — F41.9 ANXIETY AND DEPRESSION: ICD-10-CM

## 2025-01-23 DIAGNOSIS — Z96.641 STATUS POST TOTAL REPLACEMENT OF RIGHT HIP: ICD-10-CM

## 2025-01-23 DIAGNOSIS — B18.2 CHRONIC HEPATITIS C WITHOUT HEPATIC COMA (HCC): ICD-10-CM

## 2025-01-23 DIAGNOSIS — G89.4 CHRONIC PAIN SYNDROME: Primary | ICD-10-CM

## 2025-01-23 DIAGNOSIS — F32.A ANXIETY AND DEPRESSION: ICD-10-CM

## 2025-01-23 PROCEDURE — G2211 COMPLEX E/M VISIT ADD ON: HCPCS | Performed by: INTERNAL MEDICINE

## 2025-01-23 PROCEDURE — 99213 OFFICE O/P EST LOW 20 MIN: CPT | Performed by: INTERNAL MEDICINE

## 2025-01-23 RX ORDER — OXYCODONE AND ACETAMINOPHEN 7.5; 325 MG/1; MG/1
1 TABLET ORAL EVERY 6 HOURS PRN
Qty: 120 TABLET | Refills: 0 | Status: SHIPPED | OUTPATIENT
Start: 2025-01-23

## 2025-01-23 RX ORDER — CLINDAMYCIN HYDROCHLORIDE 300 MG/1
CAPSULE ORAL
Qty: 20 CAPSULE | Refills: 0 | Status: SHIPPED | OUTPATIENT
Start: 2025-01-23 | End: 2025-01-31

## 2025-01-23 NOTE — ASSESSMENT & PLAN NOTE
Patient status post gastric sleeve surgery cannot give nonsteroidals patient is on oxycodone we will continue with the same for now for controlling the pain

## 2025-01-23 NOTE — ASSESSMENT & PLAN NOTE
Patient with chronic pain syndrome low back pain without sciatica on oxycodone every 6 hours as needed without taking the pain medication she has difficulty with ambulation  With her day-to-day activities we will continue the same.

## 2025-01-23 NOTE — ASSESSMENT & PLAN NOTE
Patient with continuous opioid dependence without taking the medication patient is having difficulty with her day-to-day activities not able to function she also has pain in both the hips and history of hip replacement

## 2025-02-14 ENCOUNTER — APPOINTMENT (OUTPATIENT)
Dept: LAB | Facility: HOSPITAL | Age: 70
End: 2025-02-14
Attending: INTERNAL MEDICINE
Payer: COMMERCIAL

## 2025-02-14 ENCOUNTER — RESULTS FOLLOW-UP (OUTPATIENT)
Dept: FAMILY MEDICINE CLINIC | Facility: CLINIC | Age: 70
End: 2025-02-14

## 2025-02-14 DIAGNOSIS — R73.03 PRE-DIABETES: ICD-10-CM

## 2025-02-14 DIAGNOSIS — Z13.0 SCREENING FOR DEFICIENCY ANEMIA: ICD-10-CM

## 2025-02-14 DIAGNOSIS — Z13.29 SCREENING FOR THYROID DISORDER: ICD-10-CM

## 2025-02-14 DIAGNOSIS — E78.5 DYSLIPIDEMIA: ICD-10-CM

## 2025-02-14 LAB
ALBUMIN SERPL BCG-MCNC: 4.4 G/DL (ref 3.5–5)
ALP SERPL-CCNC: 75 U/L (ref 34–104)
ALT SERPL W P-5'-P-CCNC: 15 U/L (ref 7–52)
ANION GAP SERPL CALCULATED.3IONS-SCNC: 13 MMOL/L (ref 4–13)
AST SERPL W P-5'-P-CCNC: 18 U/L (ref 13–39)
BASOPHILS # BLD AUTO: 0.04 THOUSANDS/ÂΜL (ref 0–0.1)
BASOPHILS NFR BLD AUTO: 1 % (ref 0–1)
BILIRUB SERPL-MCNC: 0.46 MG/DL (ref 0.2–1)
BILIRUB UR QL STRIP: NEGATIVE
BUN SERPL-MCNC: 21 MG/DL (ref 5–25)
CALCIUM SERPL-MCNC: 10.3 MG/DL (ref 8.4–10.2)
CHLORIDE SERPL-SCNC: 103 MMOL/L (ref 96–108)
CHOLEST SERPL-MCNC: 216 MG/DL (ref ?–200)
CLARITY UR: CLEAR
CO2 SERPL-SCNC: 24 MMOL/L (ref 21–32)
COLOR UR: YELLOW
CREAT SERPL-MCNC: 0.45 MG/DL (ref 0.6–1.3)
EOSINOPHIL # BLD AUTO: 0.23 THOUSAND/ÂΜL (ref 0–0.61)
EOSINOPHIL NFR BLD AUTO: 3 % (ref 0–6)
ERYTHROCYTE [DISTWIDTH] IN BLOOD BY AUTOMATED COUNT: 14 % (ref 11.6–15.1)
EST. AVERAGE GLUCOSE BLD GHB EST-MCNC: 120 MG/DL
GFR SERPL CREATININE-BSD FRML MDRD: 102 ML/MIN/1.73SQ M
GLUCOSE P FAST SERPL-MCNC: 97 MG/DL (ref 65–99)
GLUCOSE UR STRIP-MCNC: NEGATIVE MG/DL
HBA1C MFR BLD: 5.8 %
HCT VFR BLD AUTO: 43.4 % (ref 34.8–46.1)
HDLC SERPL-MCNC: 73 MG/DL
HGB BLD-MCNC: 13.9 G/DL (ref 11.5–15.4)
HGB UR QL STRIP.AUTO: NEGATIVE
IMM GRANULOCYTES # BLD AUTO: 0.01 THOUSAND/UL (ref 0–0.2)
IMM GRANULOCYTES NFR BLD AUTO: 0 % (ref 0–2)
KETONES UR STRIP-MCNC: NEGATIVE MG/DL
LDLC SERPL CALC-MCNC: 120 MG/DL (ref 0–100)
LEUKOCYTE ESTERASE UR QL STRIP: NEGATIVE
LYMPHOCYTES # BLD AUTO: 1.97 THOUSANDS/ÂΜL (ref 0.6–4.47)
LYMPHOCYTES NFR BLD AUTO: 28 % (ref 14–44)
MCH RBC QN AUTO: 28 PG (ref 26.8–34.3)
MCHC RBC AUTO-ENTMCNC: 32 G/DL (ref 31.4–37.4)
MCV RBC AUTO: 87 FL (ref 82–98)
MONOCYTES # BLD AUTO: 0.52 THOUSAND/ÂΜL (ref 0.17–1.22)
MONOCYTES NFR BLD AUTO: 8 % (ref 4–12)
NEUTROPHILS # BLD AUTO: 4.18 THOUSANDS/ÂΜL (ref 1.85–7.62)
NEUTS SEG NFR BLD AUTO: 60 % (ref 43–75)
NITRITE UR QL STRIP: NEGATIVE
NONHDLC SERPL-MCNC: 143 MG/DL
NRBC BLD AUTO-RTO: 0 /100 WBCS
PH UR STRIP.AUTO: 6 [PH]
PLATELET # BLD AUTO: 320 THOUSANDS/UL (ref 149–390)
PMV BLD AUTO: 10.4 FL (ref 8.9–12.7)
POTASSIUM SERPL-SCNC: 4.1 MMOL/L (ref 3.5–5.3)
PROT SERPL-MCNC: 7.6 G/DL (ref 6.4–8.4)
PROT UR STRIP-MCNC: NEGATIVE MG/DL
RBC # BLD AUTO: 4.97 MILLION/UL (ref 3.81–5.12)
SODIUM SERPL-SCNC: 140 MMOL/L (ref 135–147)
SP GR UR STRIP.AUTO: 1.01 (ref 1–1.03)
TRIGL SERPL-MCNC: 115 MG/DL (ref ?–150)
TSH SERPL DL<=0.05 MIU/L-ACNC: 2.81 UIU/ML (ref 0.45–4.5)
UROBILINOGEN UR STRIP-ACNC: <2 MG/DL
WBC # BLD AUTO: 6.95 THOUSAND/UL (ref 4.31–10.16)

## 2025-02-14 PROCEDURE — 80053 COMPREHEN METABOLIC PANEL: CPT

## 2025-02-14 PROCEDURE — 84443 ASSAY THYROID STIM HORMONE: CPT

## 2025-02-14 PROCEDURE — 81003 URINALYSIS AUTO W/O SCOPE: CPT

## 2025-02-14 PROCEDURE — 36415 COLL VENOUS BLD VENIPUNCTURE: CPT

## 2025-02-14 PROCEDURE — 83036 HEMOGLOBIN GLYCOSYLATED A1C: CPT

## 2025-02-14 PROCEDURE — 85025 COMPLETE CBC W/AUTO DIFF WBC: CPT

## 2025-02-14 PROCEDURE — 80061 LIPID PANEL: CPT

## 2025-02-19 NOTE — PROGRESS NOTES
Office Visit Note  25     Zonia Taylor 69 y.o. female MRN: [id number]  : 1955    Assessment:     1. Dyslipidemia  Assessment & Plan:  Lipid profile shows cholesterol at 216 slightly higher than the last time triglycerides 70 and 15 HDL 73 LDL at 120 emphasized regarding diet exercise lifestyle modification repeat the lab in couple of months if still running high will start the patient on medication discussed with patient at length regarding diet it appears she eats a lot of cheese  2. Chronic midline low back pain without sciatica  Assessment & Plan:  Patient with chronic pain syndrome continues to experience pain in the low back area without [place]o taking oxycodone every 6 hours as needed without taking pain medications.  Has difficulty with her day-to-day activities we will continue with the same.  Orders:  -     oxyCODONE-acetaminophen (Percocet) 7.5-325 MG per tablet; Take 1 tablet by mouth every 6 (six) hours as needed for moderate pain Max Daily Amount: 4 tablets  3. Chronic pain syndrome  Assessment & Plan:  As mentioned above not able to give any nonsteroidals to the patient because of the gastric surgery she had continue with the oxycodone pain medication.  Orders:  -     oxyCODONE-acetaminophen (Percocet) 7.5-325 MG per tablet; Take 1 tablet by mouth every 6 (six) hours as needed for moderate pain Max Daily Amount: 4 tablets  4. Opioid dependence, uncomplicated (HCC)  Assessment & Plan:  Patient on opioids follow-up.  Of time for pain management discussed with patient about dependence also but patient not able to function without taking the pain medication  5. Primary hypertension  Assessment & Plan:  Blood pressure is 120/72 continue with carvedilol, losartan, HCTZ, amlodipine and carvedilol  6. Status post total replacement of right hip  -     oxyCODONE-acetaminophen (Percocet) 7.5-325 MG per tablet; Take 1 tablet by mouth every 6 (six) hours as needed for moderate pain Max Daily  Amount: 4 tablets  7. Prediabetes  Assessment & Plan:  A1c came back at 5.8 it was 5.7 before emphasized regarding diet exercise lifestyle modification cutting back on carbohydrate intake.  Family history with father having diabetes   8. Gastroesophageal reflux disease without esophagitis  Assessment & Plan:  Currently patient is taking omeprazole recommend patient not to eat late in the night and keep the head of the bed up while sleeping.  9. Anxiety and depression  10. Primary osteoarthritis of right hip  Assessment & Plan:  Status post right hip replacement still gets some pain discomfort on and off  Orders:  -     oxyCODONE-acetaminophen (Percocet) 7.5-325 MG per tablet; Take 1 tablet by mouth every 6 (six) hours as needed for moderate pain Max Daily Amount: 4 tablets  11. Class 2 obesity  Assessment & Plan:  Status post gastric sleeve 2011.  Current BMI is 41.81 slightly higher than the last time I again emphasized regarding diet exercise lifestyle modification and lose weight patient not able to do much exercise because of the back pain and hip pains  12. H/O bariatric surgery  13. Hypercalcemia  Assessment & Plan:  Calcium level came back at 10.3 has been fluctuating a lot in the past last level came back normal at 9.9 we did a workup for her which came back negative we will continue to monitor with periodic calcium levels.  Repeat in couple of months             Discussion Summary and Plan:  Today's care plan and medications were reviewed with patient in detail and all their questions answered to their satisfaction.    Chief Complaint   Patient presents with   • Follow-up      Subjective:  Patient is communicative follow-up evaluation with regards to the symptoms of chronic pain syndrome low back pain hip pains.  Patient also with a history of hypertension GERD DJD chronic opioid usage dyslipidemia hypercalcemia and obesity.    Patient had lab work done which showed mild elevated calcium level at 10.3, A1c  "5.8, mild elevated cholesterol.    Medications reviewed labs reviewed        The following portions of the patient's history were reviewed and updated as appropriate: allergies, current medications, past family history, past medical history, past social history, past surgical history and problem list.    Review of Systems   Constitutional:  Negative for chills and fever.   HENT:  Negative for ear pain and sore throat.    Eyes:  Negative for pain and visual disturbance.   Respiratory:  Negative for cough and shortness of breath.    Cardiovascular:  Negative for chest pain and palpitations.   Gastrointestinal:  Negative for abdominal pain and vomiting.   Genitourinary:  Negative for dysuria and hematuria.   Musculoskeletal:  Positive for arthralgias and back pain.   Skin:  Negative for color change and rash.   Neurological:  Negative for seizures and syncope.   All other systems reviewed and are negative.        Historical Information   Patient Active Problem List   Diagnosis   • Chronic pain syndrome   • Chronic midline low back pain without sciatica   • H/O bariatric surgery   • Hypertension   • Gastroesophageal reflux disease   • Opioid dependence, uncomplicated (HCC)   • Primary osteoarthritis   • Hypercalcemia   • Dyslipidemia   • Right hip pain   • Anxiety and depression   • Primary osteoarthritis of right hip   • Prediabetes   • Chronic hepatitis C without hepatic coma (HCC)   • Class 2 obesity   • Status post total replacement of right hip     Past Medical History:   Diagnosis Date   • Acquired trigger finger of right ring finger 08/31/2023   • Anxiety    • Apnea, sleep     patient states \"repeated test says no\"   • Arthritis     severe in the joints   • Chronic pain disorder     joints   • Depression    • DJD (degenerative joint disease)    • Fall 11/22/2018    tripped on a folded rug- left hamstring injury   • Foot injury     right foot-fx of bone-no repair-has severe arthritis in the foot and ankle   • " Fractures yr. 2000 or before    left ankle   • GERD (gastroesophageal reflux disease)    • Hepatitis C not sure   • History of sleep apnea    • Hypercholesteremia    • Hypertension    • Insomnia    • Low back pain    • Obesity     Lost 70lb with gastric sleeve (2011)   • Osteoarthritis    • Stomach disorder     take meds. for this   • Use of cane as ambulatory aid    • Wears glasses    • Wears partial dentures     upper and lower-but did lose the lower      Past Surgical History:   Procedure Laterality Date   • ANKLE ARTHROSCOPY Right    • ANKLE SURGERY  not sure    orthoscopic/rt. ankle   • APPENDECTOMY     • BARIATRIC SURGERY  maybe 2011   • CHOLECYSTECTOMY     • COLONOSCOPY  10/26/2021   • DXA PROCEDURE (HISTORICAL)  10/13/2020   • GASTRECTOMY SLEEVE LAPAROSCOPIC  2011    lost 70 lb   • HAND SURGERY      rt. hand, trigger finger   • JOINT REPLACEMENT Bilateral     knee replacements   • KNEE SURGERY  2001 maybe   • MAMMO (HISTORICAL)  01/26/2022   • NERVE BLOCK Bilateral 12/29/2017    Procedure: L4, L5, S1 MEDIAL BRANCH BLOCK #1;  Surgeon: Lloyd Lewis MD;  Location: [facility] MAIN OR;  Service: Pain Management    • NERVE BLOCK Bilateral 01/12/2018    Procedure: BLOCK MEDIAL BRANCH L4, L5-S1  #2;  Surgeon: Lloyd Lewis MD;  Location: [facility] MAIN OR;  Service: Pain Management    • DE ARTHROPLASTY GLENOHUMERAL JOINT TOTAL SHOULDER Right 05/23/2019    Procedure: ARTHROPLASTY SHOULDER TOTAL , excision of deep cyst at shoulder, biceps tenotomy;  Surgeon: Dileep Thomas MD;  Location: [facility];  Service: Orthopedics   • DE ARTHRP ACETBLR/PROX FEM PROSTC AGRFT/ALGRFT Right 10/16/2023    Procedure: ARTHROPLASTY HIP TOTAL ANTERIOR,NAVIGATED - overnight;  Surgeon: Ovi Jaime DO;  Location: [facility];  Service: Orthopedics   • DE INJECT SI JOINT ARTHRGRPHY&/ANES/STEROID W/LESA Right 10/23/2020    Procedure: Sacroiliac Joint injection (32114);  Surgeon: Lloyd Lewis MD;  Location: [facility] MAIN OR;  Service: Pain  Management    • KS TENDON SHEATH INCISION Right 2021    Procedure: RELEASE TRIGGER RING FINGER;  Surgeon: Dileep Thomas MD;  Location: [facility];  Service: Orthopedics   • RADIOFREQUENCY ABLATION Right 2018    Procedure: Rt L4 L5 S1 RFA;  Surgeon: Lloyd Lewis MD;  Location: [facility] MAIN OR;  Service: Pain Management    • RADIOFREQUENCY ABLATION Left 2018    Procedure: L3-S1 Radio Frequency Ablation ([id number]);  Surgeon: Lloyd Lewis MD;  Location: [facility] MAIN OR;  Service: Pain Management    • RHIZOTOMY Left 2018    Procedure: L4, L5, S1 RFA;  Surgeon: Lloyd Lewis MD;  Location: [facility] MAIN OR;  Service: Pain Management    • SHOULDER SURGERY  2019    rt. total  replacement   • TONSILLECTOMY       Social History     Substance and Sexual Activity   Alcohol Use Not Currently    Comment: quit many yrs. ago     Social History     Substance and Sexual Activity   Drug Use Not Currently   • Types: Amphetamines, Marijuana    Comment: aprox. 35 years ago, maybe longer     Social History     Tobacco Use   Smoking Status Former   • Current packs/day: 0.00   • Types: Cigarettes   • Quit date: 2004   • Years since quittin.1   • Passive exposure: Past   Smokeless Tobacco Never     Family History   Problem Relation Age of Onset   • Other Mother         in a nursing home-fx of the leg   • Heart disease Father         CHF   • Alcohol abuse Father    • Cirrhosis Father    • Colon cancer Maternal Grandmother    • No Known Problems Maternal Grandfather    • No Known Problems Paternal Grandmother    • No Known Problems Paternal Grandfather    • Other Brother         lung abscess, pneumonia   • Colon cancer Maternal Aunt    • No Known Problems Maternal Aunt    • No Known Problems Maternal Aunt    • No Known Problems Maternal Uncle    • No Known Problems Paternal Aunt    • No Known Problems Paternal Uncle    • Breast cancer Cousin 50     Health Maintenance Due   Topic   •  Pneumococcal Vaccine: 65+ Years (1 of 2 - PCV)   • Hepatitis A Vaccine (1 of 2 - Risk 2-dose series)   • RSV Vaccine for Pregnant Patients and Patients Age 60+ Years (1 - Risk 60-74 years 1-dose series)   • Hepatitis B Vaccine (1 of 3 - Risk 3-dose series)   • PT PLAN OF CARE    • COVID-19 Vaccine (5 - 2024-25 season)      Meds/Allergies       Current Outpatient Medications:   •  acetaminophen (TYLENOL) 325 mg tablet, Take 3 tablets (975 mg total) by mouth every 8 (eight) hours, Disp: , Rfl: 0  •  amLODIPine (NORVASC) 10 mg tablet, TAKE 1 TABLET BY MOUTH DAILY, Disp: 90 tablet, Rfl: 0  •  Ascorbic Acid (vitamin C) 100 MG tablet, Take 1 tablet (100 mg total) by mouth daily, Disp: , Rfl:   •  Biotin 5000 MCG CAPS, Take 1 capsule (5,000 mcg total) by mouth in the morning, Disp: , Rfl:   •  carvedilol (COREG) 12.5 mg tablet, TAKE 1 TABLET BY MOUTH TWICE  DAILY WITH MEALS, Disp: 180 tablet, Rfl: 1  •  cholecalciferol (VITAMIN D3) 1,000 units tablet, Take 1 tablet (1,000 Units total) by mouth daily, Disp: , Rfl:   •  Coenzyme Q10 (CoQ-10) 100 MG capsule, Take 1 capsule (100 mg total) by mouth daily, Disp: , Rfl:   •  Cyanocobalamin (VITAMIN B 12 PO), Take 500 mcg by mouth every morning, Disp: , Rfl:   •  diphenhydrAMINE (BENADRYL) 25 mg tablet, Take 25 mg by mouth every 8 (eight) hours as needed for itching , Disp: , Rfl:   •  doxazosin (CARDURA) 1 mg tablet, TAKE 1 TABLET BY MOUTH DAILY AT  BEDTIME, Disp: 90 tablet, Rfl: 1  •  escitalopram (LEXAPRO) 10 mg tablet, TAKE 1 AND 1/2 TABLETS BY MOUTH  DAILY AT BEDTIME, Disp: 135 tablet, Rfl: 1  •  losartan-hydrochlorothiazide (HYZAAR) 100-12.5 MG per tablet, TAKE 1 TABLET BY MOUTH DAILY, Disp: 90 tablet, Rfl: 1  •  Melatonin 10 MG TABS, Take by mouth daily at bedtime, Disp: , Rfl:   •  metaxalone (SKELAXIN) 800 mg tablet, Take 1 tablet (800 mg total) by mouth 3 (three) times a day as needed for muscle spasms, Disp: 30 tablet, Rfl: 0  •  omeprazole (PriLOSEC) 20 mg delayed  "release capsule, TAKE 1 CAPSULE BY MOUTH DAILY, Disp: 90 capsule, Rfl: 1  •  oxyCODONE-acetaminophen (Percocet) 7.5-325 MG per tablet, Take 1 tablet by mouth every 6 (six) hours as needed for moderate pain Max Daily Amount: 4 tablets, Disp: 120 tablet, Rfl: 0  •  psyllium (METAMUCIL) 58.6 % powder, Take by mouth daily, Disp: , Rfl:   •  zinc gluconate 50 mg tablet, Take 1 tablet (50 mg total) by mouth daily, Disp: , Rfl:   •  naloxone (NARCAN) 4 mg/0.1 mL nasal spray, Administer 1 spray into a nostril. If no response after 2-3 minutes, give another dose in the other nostril using a new spray., Disp: 1 each, Rfl: 0      Objective:    Vitals:   /72 (BP Location: Right arm, Patient Position: Sitting, Cuff Size: Large)   Pulse 86   Ht 5' 2\" (1.575 m)   Wt 104 kg (228 lb 9.6 oz)   SpO2 97%   BMI 41.81 kg/m²   Body mass index is 41.81 kg/m².  Vitals:    02/20/25 0850   Weight: 104 kg (228 lb 9.6 oz)       Physical Exam  Vitals and nursing note reviewed.   Constitutional:       Appearance: Normal appearance.   Cardiovascular:      Rate and Rhythm: Normal rate and regular rhythm.      Heart sounds: Normal heart sounds.   Pulmonary:      Effort: Pulmonary effort is normal.      Breath sounds: Normal breath sounds.   Abdominal:      Palpations: Abdomen is soft.   Musculoskeletal:      Cervical back: Normal range of motion and neck supple.      Right lower leg: No edema.      Left lower leg: No edema.      Comments: Lumbar spine movements and hip movements causing discomfort and pain   Neurological:      Mental Status: She is alert and oriented to person, place, and time.   Psychiatric:         Mood and Affect: Mood normal.         Behavior: Behavior normal.         Lab Review   Appointment on 02/14/2025   Component Date Value Ref Range Status   • WBC 02/14/2025 6.95  4.31 - 10.16 Thousand/uL Final   • RBC 02/14/2025 4.97  3.81 - 5.12 Million/uL Final   • Hemoglobin 02/14/2025 13.9  11.5 - 15.4 g/dL Final   • " Hematocrit 02/14/2025 43.4  34.8 - 46.1 % Final   • MCV 02/14/2025 87  82 - 98 fL Final   • MCH 02/14/2025 28.0  26.8 - 34.3 pg Final   • MCHC 02/14/2025 32.0  31.4 - 37.4 g/dL Final   • RDW 02/14/2025 14.0  11.6 - 15.1 % Final   • MPV 02/14/2025 10.4  8.9 - 12.7 fL Final   • Platelets 02/14/2025 320  149 - 390 Thousands/uL Final   • nRBC 02/14/2025 0  /100 WBCs Final   • Segmented % 02/14/2025 60  43 - 75 % Final   • Immature Grans % 02/14/2025 0  0 - 2 % Final   • Lymphocytes % 02/14/2025 28  14 - 44 % Final   • Monocytes % 02/14/2025 8  4 - 12 % Final   • Eosinophils Relative 02/14/2025 3  0 - 6 % Final   • Basophils Relative 02/14/2025 1  0 - 1 % Final   • Absolute Neutrophils 02/14/2025 4.18  1.85 - 7.62 Thousands/µL Final   • Absolute Immature Grans 02/14/2025 0.01  0.00 - 0.20 Thousand/uL Final   • Absolute Lymphocytes 02/14/2025 1.97  0.60 - 4.47 Thousands/µL Final   • Absolute Monocytes 02/14/2025 0.52  0.17 - 1.22 Thousand/µL Final   • Eosinophils Absolute 02/14/2025 0.23  0.00 - 0.61 Thousand/µL Final   • Basophils Absolute 02/14/2025 0.04  0.00 - 0.10 Thousands/µL Final   • Sodium 02/14/2025 140  135 - 147 mmol/L Final   • Potassium 02/14/2025 4.1  3.5 - 5.3 mmol/L Final   • Chloride 02/14/2025 103  96 - 108 mmol/L Final   • CO2 02/14/2025 24  21 - 32 mmol/L Final   • ANION GAP 02/14/2025 13  4 - 13 mmol/L Final   • BUN 02/14/2025 21  5 - 25 mg/dL Final   • Creatinine 02/14/2025 0.45 (L)  0.60 - 1.30 mg/dL Final    Standardized to IDMS reference method   • Glucose, Fasting 02/14/2025 97  65 - 99 mg/dL Final   • Calcium 02/14/2025 10.3 (H)  8.4 - 10.2 mg/dL Final   • AST 02/14/2025 18  13 - 39 U/L Final   • ALT 02/14/2025 15  7 - 52 U/L Final    Specimen collection should occur prior to Sulfasalazine administration due to the potential for falsely depressed results.    • Alkaline Phosphatase 02/14/2025 75  34 - 104 U/L Final   • Total Protein 02/14/2025 7.6  6.4 - 8.4 g/dL Final   • Albumin 02/14/2025 4.4   3.5 - 5.0 g/dL Final   • Total Bilirubin 02/14/2025 0.46  0.20 - 1.00 mg/dL Final    Use of this assay is not recommended for patients undergoing treatment with eltrombopag due to the potential for falsely elevated results.  N-acetyl-p-benzoquinone imine (metabolite of Acetaminophen) will generate erroneously low results in samples for patients that have taken an overdose of Acetaminophen.   • eGFR 02/14/2025 102  ml/min/1.73sq m Final   • Hemoglobin A1C 02/14/2025 5.8 (H)  Normal 4.0-5.6%; PreDiabetic 5.7-6.4%; Diabetic >=6.5%; Glycemic control for adults with diabetes <7.0% % Final   • EAG 02/14/2025 120  mg/dl Final   • Cholesterol 02/14/2025 216 (H)  See Comment mg/dL Final    Cholesterol:         Pediatric <18 Years        Desirable          <170 mg/dL      Borderline High    170-199 mg/dL      High               >=200 mg/dL        Adult >=18 Years            Desirable         <200 mg/dL      Borderline High   200-239 mg/dL      High              >239 mg/dL     • Triglycerides 02/14/2025 115  See Comment mg/dL Final    Triglyceride:     0-9Y            <75mg/dL     10Y-17Y         <90 mg/dL       >=18Y     Normal          <150 mg/dL     Borderline High 150-199 mg/dL     High            200-499 mg/dL        Very High       >499 mg/dL    Specimen collection should occur prior to Metamizole administration due to the potential for falsely depressed results.   • HDL, Direct 02/14/2025 73  >=50 mg/dL Final   • LDL Calculated 02/14/2025 120 (H)  0 - 100 mg/dL Final    LDL Cholesterol:     Optimal           <100 mg/dl     Near Optimal      100-129 mg/dl     Above Optimal       Borderline High 130-159 mg/dl       High            160-189 mg/dl       Very High       >189 mg/dl         This screening LDL is a calculated result.   It does not have the accuracy of the Direct Measured LDL in the monitoring of patients with hyperlipidemia and/or statin therapy.   Direct Measure LDL (GUB837) must be ordered separately in these  "patients.   • Non-HDL-Chol (CHOL-HDL) 02/14/2025 143  mg/dl Final   • TSH 3RD GENERATON 02/14/2025 2.815  0.450 - 4.500 uIU/mL Final    The recommended reference ranges for TSH during pregnancy are as follows:   First trimester 0.100 to 2.500 uIU/mL   Second trimester  0.200 to 3.000 uIU/mL   Third trimester 0.300 to 3.000 uIU/m    Note: Normal ranges may not apply to patients who are transgender, non-binary, or whose legal sex, sex at birth, and gender identity differ.  Adult TSH (3rd generation) reference range follows the recommended guidelines of the [employer] Association, January, 2020.   • Color, UA 02/14/2025 Yellow   Final   • Clarity, UA 02/14/2025 Clear   Final   • Specific Gravity, UA 02/14/2025 1.015  1.005 - 1.030 Final   • pH, UA 02/14/2025 6.0  4.5, 5.0, 5.5, 6.0, 6.5, 7.0, 7.5, 8.0 Final   • Leukocytes, UA 02/14/2025 Negative  Negative Final   • Nitrite, UA 02/14/2025 Negative  Negative Final   • Protein, UA 02/14/2025 Negative  Negative mg/dl Final   • Glucose, UA 02/14/2025 Negative  Negative mg/dl Final   • Ketones, UA 02/14/2025 Negative  Negative mg/dl Final   • Urobilinogen, UA 02/14/2025 <2.0  <2.0 mg/dl mg/dl Final   • Bilirubin, UA 02/14/2025 Negative  Negative Final   • Occult Blood, UA 02/14/2025 Negative  Negative Final         Gustabo Sy MD        \"This note has been constructed using a voice recognition system.Therefore there may be syntax, spelling, and/or grammatical errors. Please call if you have any questions. \"  "

## 2025-02-20 ENCOUNTER — OFFICE VISIT (OUTPATIENT)
Dept: FAMILY MEDICINE CLINIC | Facility: CLINIC | Age: 70
End: 2025-02-20
Payer: COMMERCIAL

## 2025-02-20 VITALS
HEIGHT: 62 IN | DIASTOLIC BLOOD PRESSURE: 72 MMHG | OXYGEN SATURATION: 97 % | SYSTOLIC BLOOD PRESSURE: 120 MMHG | WEIGHT: 228.6 LBS | HEART RATE: 86 BPM | BODY MASS INDEX: 42.07 KG/M2

## 2025-02-20 DIAGNOSIS — Z98.84 H/O BARIATRIC SURGERY: ICD-10-CM

## 2025-02-20 DIAGNOSIS — K21.9 GASTROESOPHAGEAL REFLUX DISEASE WITHOUT ESOPHAGITIS: ICD-10-CM

## 2025-02-20 DIAGNOSIS — I10 PRIMARY HYPERTENSION: ICD-10-CM

## 2025-02-20 DIAGNOSIS — M16.11 PRIMARY OSTEOARTHRITIS OF RIGHT HIP: ICD-10-CM

## 2025-02-20 DIAGNOSIS — R73.03 PREDIABETES: ICD-10-CM

## 2025-02-20 DIAGNOSIS — M54.50 CHRONIC MIDLINE LOW BACK PAIN WITHOUT SCIATICA: ICD-10-CM

## 2025-02-20 DIAGNOSIS — G89.29 CHRONIC MIDLINE LOW BACK PAIN WITHOUT SCIATICA: ICD-10-CM

## 2025-02-20 DIAGNOSIS — E78.5 DYSLIPIDEMIA: Primary | ICD-10-CM

## 2025-02-20 DIAGNOSIS — F32.A ANXIETY AND DEPRESSION: ICD-10-CM

## 2025-02-20 DIAGNOSIS — G89.4 CHRONIC PAIN SYNDROME: ICD-10-CM

## 2025-02-20 DIAGNOSIS — E83.52 HYPERCALCEMIA: ICD-10-CM

## 2025-02-20 DIAGNOSIS — F41.9 ANXIETY AND DEPRESSION: ICD-10-CM

## 2025-02-20 DIAGNOSIS — F11.20 OPIOID DEPENDENCE, UNCOMPLICATED (HCC): ICD-10-CM

## 2025-02-20 DIAGNOSIS — E66.812 CLASS 2 OBESITY: ICD-10-CM

## 2025-02-20 DIAGNOSIS — Z96.641 STATUS POST TOTAL REPLACEMENT OF RIGHT HIP: ICD-10-CM

## 2025-02-20 PROCEDURE — 99214 OFFICE O/P EST MOD 30 MIN: CPT | Performed by: INTERNAL MEDICINE

## 2025-02-20 PROCEDURE — G2211 COMPLEX E/M VISIT ADD ON: HCPCS | Performed by: INTERNAL MEDICINE

## 2025-02-20 RX ORDER — OXYCODONE AND ACETAMINOPHEN 7.5; 325 MG/1; MG/1
1 TABLET ORAL EVERY 6 HOURS PRN
Qty: 120 TABLET | Refills: 0 | Status: SHIPPED | OUTPATIENT
Start: 2025-02-20

## 2025-02-20 NOTE — ASSESSMENT & PLAN NOTE
A1c came back at 5.8 it was 5.7 before emphasized regarding diet exercise lifestyle modification cutting back on carbohydrate intake.  Family history with father having diabetes

## 2025-02-20 NOTE — ASSESSMENT & PLAN NOTE
As mentioned above not able to give any nonsteroidals to the patient because of the gastric surgery she had continue with the oxycodone pain medication.

## 2025-02-20 NOTE — ASSESSMENT & PLAN NOTE
Currently patient is taking omeprazole recommend patient not to eat late in the night and keep the head of the bed up while sleeping.

## 2025-02-20 NOTE — ASSESSMENT & PLAN NOTE
Patient with chronic pain syndrome continues to experience pain in the low back area without [place] taking oxycodone every 6 hours as needed without taking pain medications.  Has difficulty with her day-to-day activities we will continue with the same.

## 2025-02-20 NOTE — ASSESSMENT & PLAN NOTE
Status post gastric sleeve 2011.  Current BMI is 41.81 slightly higher than the last time I again emphasized regarding diet exercise lifestyle modification and lose weight patient not able to do much exercise because of the back pain and hip pains

## 2025-02-20 NOTE — ASSESSMENT & PLAN NOTE
Calcium level came back at 10.3 has been fluctuating a lot in the past last level came back normal at 9.9 we did a workup for her which came back negative we will continue to monitor with periodic calcium levels.  Repeat in couple of months

## 2025-02-20 NOTE — ASSESSMENT & PLAN NOTE
Patient on opioids follow-up.  Of time for pain management discussed with patient about dependence also but patient not able to function without taking the pain medication

## 2025-02-20 NOTE — ASSESSMENT & PLAN NOTE
Lipid profile shows cholesterol at 216 slightly higher than the last time triglycerides 70 and 15 HDL 73 LDL at 120 emphasized regarding diet exercise lifestyle modification repeat the lab in couple of months if still running high will start the patient on medication discussed with patient at length regarding diet it appears she eats a lot of cheese

## 2025-03-09 DIAGNOSIS — F41.9 ANXIETY: ICD-10-CM

## 2025-03-09 DIAGNOSIS — I10 PRIMARY HYPERTENSION: ICD-10-CM

## 2025-03-10 RX ORDER — ESCITALOPRAM OXALATE 10 MG/1
TABLET ORAL
Qty: 135 TABLET | Refills: 3 | Status: SHIPPED | OUTPATIENT
Start: 2025-03-10

## 2025-03-10 RX ORDER — AMLODIPINE BESYLATE 10 MG/1
10 TABLET ORAL DAILY
Qty: 90 TABLET | Refills: 3 | Status: SHIPPED | OUTPATIENT
Start: 2025-03-10

## 2025-03-13 ENCOUNTER — RA CDI HCC (OUTPATIENT)
Dept: OTHER | Facility: HOSPITAL | Age: 70
End: 2025-03-13

## 2025-03-20 ENCOUNTER — OFFICE VISIT (OUTPATIENT)
Dept: FAMILY MEDICINE CLINIC | Facility: CLINIC | Age: 70
End: 2025-03-20
Payer: COMMERCIAL

## 2025-03-20 VITALS
DIASTOLIC BLOOD PRESSURE: 70 MMHG | HEIGHT: 62 IN | OXYGEN SATURATION: 97 % | HEART RATE: 73 BPM | BODY MASS INDEX: 42.51 KG/M2 | SYSTOLIC BLOOD PRESSURE: 121 MMHG | WEIGHT: 231 LBS

## 2025-03-20 DIAGNOSIS — Z98.84 H/O BARIATRIC SURGERY: ICD-10-CM

## 2025-03-20 DIAGNOSIS — E66.812 CLASS 2 OBESITY: ICD-10-CM

## 2025-03-20 DIAGNOSIS — F32.A ANXIETY AND DEPRESSION: ICD-10-CM

## 2025-03-20 DIAGNOSIS — K59.00 CONSTIPATION, UNSPECIFIED CONSTIPATION TYPE: ICD-10-CM

## 2025-03-20 DIAGNOSIS — G89.4 CHRONIC PAIN SYNDROME: ICD-10-CM

## 2025-03-20 DIAGNOSIS — I10 PRIMARY HYPERTENSION: ICD-10-CM

## 2025-03-20 DIAGNOSIS — E78.5 DYSLIPIDEMIA: ICD-10-CM

## 2025-03-20 DIAGNOSIS — K21.9 GASTROESOPHAGEAL REFLUX DISEASE WITHOUT ESOPHAGITIS: ICD-10-CM

## 2025-03-20 DIAGNOSIS — M16.11 PRIMARY OSTEOARTHRITIS OF RIGHT HIP: ICD-10-CM

## 2025-03-20 DIAGNOSIS — F41.9 ANXIETY AND DEPRESSION: ICD-10-CM

## 2025-03-20 DIAGNOSIS — Z96.641 STATUS POST TOTAL REPLACEMENT OF RIGHT HIP: ICD-10-CM

## 2025-03-20 DIAGNOSIS — G89.29 CHRONIC MIDLINE LOW BACK PAIN WITHOUT SCIATICA: Primary | ICD-10-CM

## 2025-03-20 DIAGNOSIS — M54.50 CHRONIC MIDLINE LOW BACK PAIN WITHOUT SCIATICA: Primary | ICD-10-CM

## 2025-03-20 DIAGNOSIS — F11.20 OPIOID DEPENDENCE, UNCOMPLICATED (HCC): ICD-10-CM

## 2025-03-20 PROCEDURE — G2211 COMPLEX E/M VISIT ADD ON: HCPCS | Performed by: INTERNAL MEDICINE

## 2025-03-20 PROCEDURE — 99214 OFFICE O/P EST MOD 30 MIN: CPT | Performed by: INTERNAL MEDICINE

## 2025-03-20 RX ORDER — DOCUSATE SODIUM 100 MG/1
100 CAPSULE, LIQUID FILLED ORAL 2 TIMES DAILY
Qty: 200 CAPSULE | Refills: 3 | Status: SHIPPED | OUTPATIENT
Start: 2025-03-20 | End: 2025-03-20

## 2025-03-20 RX ORDER — DOCUSATE SODIUM 100 MG/1
100 CAPSULE, LIQUID FILLED ORAL 2 TIMES DAILY
Status: CANCELLED | OUTPATIENT
Start: 2025-03-20

## 2025-03-20 RX ORDER — DOCUSATE SODIUM 100 MG/1
100 CAPSULE, LIQUID FILLED ORAL DAILY
Qty: 100 CAPSULE | Refills: 3 | Status: SHIPPED | OUTPATIENT
Start: 2025-03-20 | End: 2025-03-24 | Stop reason: SDUPTHER

## 2025-03-20 RX ORDER — OXYCODONE AND ACETAMINOPHEN 7.5; 325 MG/1; MG/1
1 TABLET ORAL EVERY 6 HOURS PRN
Qty: 120 TABLET | Refills: 0 | Status: SHIPPED | OUTPATIENT
Start: 2025-03-20

## 2025-03-20 NOTE — ASSESSMENT & PLAN NOTE
Blood pressure is stable 121/70 on carvedilol 12.5 mg twice a day losartan/HCTZ 100/12.5, amlodipine 10 mg daily continue along with Cardura 1 mg daily

## 2025-03-20 NOTE — ASSESSMENT & PLAN NOTE
Patient with constipation problem I have prescribed her Colace 100 mg daily appears to be working good we will continue with the same.

## 2025-03-20 NOTE — ASSESSMENT & PLAN NOTE
BMI is 42.25 slightly up compared to the last time emphasized regarding diet exercise lifestyle modification and lose weight patient claims she is not able to do much exercise because of the back pain and hip pains maybe she can try like a stationary bicycle

## 2025-03-20 NOTE — ASSESSMENT & PLAN NOTE
Patient is pain in the muscle area in the back of the thigh and in the hip area on and off symptomatic treatment

## 2025-03-20 NOTE — ASSESSMENT & PLAN NOTE
Patient continues to experience chronic pain in the low back area currently on oxycodone every 6 hours as needed.  Without taking the pain medication patient not able to function and do her day-to-day activities   83

## 2025-03-20 NOTE — PROGRESS NOTES
Office Visit Note  25     Zonia Taylor 70 y.o. female MRN: [id number]  : 1955    Assessment:     1. Chronic midline low back pain without sciatica  Assessment & Plan:  Patient continues to experience chronic pain in the low back area currently on oxycodone every 6 hours as needed.  Without taking the pain medication patient not able to function and do her day-to-day activities  Orders:  -     oxyCODONE-acetaminophen (Percocet) 7.5-325 MG per tablet; Take 1 tablet by mouth every 6 (six) hours as needed for moderate pain Max Daily Amount: 4 tablets  2. Chronic pain syndrome  Assessment & Plan:  Continue with oxycodone not able to continue the nonsteroidals because of the gastric surgery  Orders:  -     oxyCODONE-acetaminophen (Percocet) 7.5-325 MG per tablet; Take 1 tablet by mouth every 6 (six) hours as needed for moderate pain Max Daily Amount: 4 tablets  3. Constipation, unspecified constipation type  Assessment & Plan:  Patient with constipation problem I have prescribed her Colace 100 mg daily appears to be working good we will continue with the same.  Orders:  -     docusate sodium (COLACE) 100 mg capsule; Take 1 capsule (100 mg total) by mouth daily  4. Status post total replacement of right hip  -     oxyCODONE-acetaminophen (Percocet) 7.5-325 MG per tablet; Take 1 tablet by mouth every 6 (six) hours as needed for moderate pain Max Daily Amount: 4 tablets  5. Primary osteoarthritis of right hip  Assessment & Plan:  Patient is pain in the muscle area in the back of the thigh and in the hip area on and off symptomatic treatment  Orders:  -     oxyCODONE-acetaminophen (Percocet) 7.5-325 MG per tablet; Take 1 tablet by mouth every 6 (six) hours as needed for moderate pain Max Daily Amount: 4 tablets  6. Anxiety and depression  Assessment & Plan:  Continue with Lexapro 10 mg daily appears to be helping with her anxiety symptoms.  7. Class 2 obesity  Assessment & Plan:  BMI is 42.25 slightly up  compared to the last time emphasized regarding diet exercise lifestyle modification and lose weight patient claims she is not able to do much exercise because of the back pain and hip pains maybe she can try like a stationary bicycle  8. Dyslipidemia  Assessment & Plan:  Continue with diet exercise lifestyle modification last cholesterol level 216 HDL is good at 73 will follow-up with repeat lipid profile  9. Gastroesophageal reflux disease without esophagitis  Assessment & Plan:  Continue with omeprazole recommend patient not to eat late in the night and keep the head of the bed up while sleeping  10. H/O bariatric surgery  11. Primary hypertension  Assessment & Plan:  Blood pressure is stable 121/70 on carvedilol 12.5 mg twice a day losartan/HCTZ 100/12.5, amlodipine 10 mg daily continue along with Cardura 1 mg daily  12. Opioid dependence, uncomplicated (HCC)  Assessment & Plan:  As mentioned above patient with chronic pain on opioid dependent continue with oxycodone             Discussion Summary and Plan:  Today's care plan and medications were reviewed with patient in detail and all their questions answered to their satisfaction.    Chief Complaint   Patient presents with   • Follow-up      Subjective:  Patient is coming here for a follow-up evaluation regarding chronic pain syndrome low back pain on oxycodone.  She also has having constipation and ended started taking Colace 100 mg daily at bedtime which appears to be working all right.    Patient also recently had gum infection seen by the dentist received clindamycin appears to be getting better.    Denies chest pain palpitation shortness of breath.  Complains of bilateral shoulder pain also.  Patient also complains of pain in the muscles in the right thigh area on and off.        The following portions of the patient's history were reviewed and updated as appropriate: allergies, current medications, past family history, past medical history, past social  "history, past surgical history and problem list.    Review of Systems   Constitutional:  Negative for chills and fever.   HENT:  Negative for ear pain and sore throat.    Eyes:  Negative for pain and visual disturbance.   Respiratory:  Negative for cough and shortness of breath.    Cardiovascular:  Negative for chest pain and palpitations.   Gastrointestinal:  Negative for abdominal pain and vomiting.   Genitourinary:  Negative for dysuria and hematuria.   Musculoskeletal:  Negative for arthralgias and back pain.   Skin:  Negative for color change and rash.   Neurological:  Negative for seizures and syncope.   All other systems reviewed and are negative.        Historical Information   Patient Active Problem List   Diagnosis   • Chronic pain syndrome   • Chronic midline low back pain without sciatica   • H/O bariatric surgery   • Hypertension   • Gastroesophageal reflux disease   • Opioid dependence, uncomplicated (HCC)   • Primary osteoarthritis   • Hypercalcemia   • Dyslipidemia   • Right hip pain   • Anxiety and depression   • Primary osteoarthritis of right hip   • Prediabetes   • Chronic hepatitis C without hepatic coma (HCC)   • Class 2 obesity   • Status post total replacement of right hip   • Constipation     Past Medical History:   Diagnosis Date   • Acquired trigger finger of right ring finger 08/31/2023   • Anxiety    • Apnea, sleep     patient states \"repeated test says no\"   • Arthritis     severe in the joints   • Chronic pain disorder     joints   • Depression    • DJD (degenerative joint disease)    • Fall 11/22/2018    tripped on a folded rug- left hamstring injury   • Foot injury     right foot-fx of bone-no repair-has severe arthritis in the foot and ankle   • Fractures yr. 2000 or before    left ankle   • GERD (gastroesophageal reflux disease)    • Hepatitis C not sure   • History of sleep apnea    • Hypercholesteremia    • Hypertension    • Insomnia    • Low back pain    • Obesity     Lost 70lb " with gastric sleeve (2011)   • Osteoarthritis    • Stomach disorder     take meds. for this   • Use of cane as ambulatory aid    • Wears glasses    • Wears partial dentures     upper and lower-but did lose the lower      Past Surgical History:   Procedure Laterality Date   • ANKLE ARTHROSCOPY Right    • ANKLE SURGERY  not sure    orthoscopic/rt. ankle   • APPENDECTOMY     • BARIATRIC SURGERY  maybe 2011   • CHOLECYSTECTOMY     • COLONOSCOPY  10/26/2021   • DXA PROCEDURE (HISTORICAL)  10/13/2020   • GASTRECTOMY SLEEVE LAPAROSCOPIC  2011    lost 70 lb   • HAND SURGERY      rt. hand, trigger finger   • JOINT REPLACEMENT Bilateral     knee replacements   • KNEE SURGERY  2001 maybe   • MAMMO (HISTORICAL)  01/26/2022   • NERVE BLOCK Bilateral 12/29/2017    Procedure: L4, L5, S1 MEDIAL BRANCH BLOCK #1;  Surgeon: Lloyd Lewis MD;  Location: [facility] MAIN OR;  Service: Pain Management    • NERVE BLOCK Bilateral 01/12/2018    Procedure: BLOCK MEDIAL BRANCH L4, L5-S1  #2;  Surgeon: Lloyd Lewis MD;  Location: [facility] MAIN OR;  Service: Pain Management    • HI ARTHROPLASTY GLENOHUMERAL JOINT TOTAL SHOULDER Right 05/23/2019    Procedure: ARTHROPLASTY SHOULDER TOTAL , excision of deep cyst at shoulder, biceps tenotomy;  Surgeon: Dileep Thomas MD;  Location: [facility] MAIN OR;  Service: Orthopedics   • HI ARTHRP ACETBLR/PROX FEM PROSTC AGRFT/ALGRFT Right 10/16/2023    Procedure: ARTHROPLASTY HIP TOTAL ANTERIOR,NAVIGATED - overnight;  Surgeon: Ovi Jaime DO;  Location: [facility] MAIN OR;  Service: Orthopedics   • HI INJECT SI JOINT ARTHRGRPHY&/ANES/STEROID W/LESA Right 10/23/2020    Procedure: Sacroiliac Joint injection (31656);  Surgeon: Lloyd Lewis MD;  Location: [facility] MAIN OR;  Service: Pain Management    • HI TENDON SHEATH INCISION Right 12/21/2021    Procedure: RELEASE TRIGGER RING FINGER;  Surgeon: Dileep Thomas MD;  Location: [facility] MAIN OR;  Service: Orthopedics   • RADIOFREQUENCY ABLATION Right 03/16/2018     Procedure: Rt L4 L5 S1 RFA;  Surgeon: Lloyd Lewis MD;  Location: [facility] MAIN OR;  Service: Pain Management    • RADIOFREQUENCY ABLATION Left 2018    Procedure: L3-S1 Radio Frequency Ablation ([id number]);  Surgeon: Lloyd Lewis MD;  Location: [facility] MAIN OR;  Service: Pain Management    • RHIZOTOMY Left 2018    Procedure: L4, L5, S1 RFA;  Surgeon: Lloyd Lewis MD;  Location: [facility] MAIN OR;  Service: Pain Management    • SHOULDER SURGERY  2019    rt. total  replacement   • TONSILLECTOMY       Social History     Substance and Sexual Activity   Alcohol Use Not Currently    Comment: quit many yrs. ago     Social History     Substance and Sexual Activity   Drug Use Not Currently   • Types: Amphetamines, Marijuana    Comment: aprox. 35 years ago, maybe longer     Social History     Tobacco Use   Smoking Status Former   • Current packs/day: 0.00   • Types: Cigarettes   • Quit date: 2004   • Years since quittin.2   • Passive exposure: Past   Smokeless Tobacco Never     Family History   Problem Relation Age of Onset   • Other Mother         in a nursing home-fx of the leg   • Heart disease Father         CHF   • Alcohol abuse Father    • Cirrhosis Father    • Colon cancer Maternal Grandmother    • No Known Problems Maternal Grandfather    • No Known Problems Paternal Grandmother    • No Known Problems Paternal Grandfather    • Other Brother         lung abscess, pneumonia   • Colon cancer Maternal Aunt    • No Known Problems Maternal Aunt    • No Known Problems Maternal Aunt    • No Known Problems Maternal Uncle    • No Known Problems Paternal Aunt    • No Known Problems Paternal Uncle    • Breast cancer Cousin 50     Health Maintenance Due   Topic   • Pneumococcal Vaccine: 65+ Years (1 of 2 - PCV)   • Hepatitis A Vaccine (1 of 2 - Risk 2-dose series)   • RSV Vaccine for Pregnant Patients and Patients Age 60+ Years (1 - Risk 60-74 years 1-dose series)   • Hepatitis B Vaccine (1 of 3 - Risk  3-dose series)   • PT PLAN OF CARE    • COVID-19 Vaccine (5 - 2024-25 season)      Meds/Allergies       Current Outpatient Medications:   •  acetaminophen (TYLENOL) 325 mg tablet, Take 3 tablets (975 mg total) by mouth every 8 (eight) hours, Disp: , Rfl: 0  •  amLODIPine (NORVASC) 10 mg tablet, TAKE 1 TABLET BY MOUTH DAILY, Disp: 90 tablet, Rfl: 3  •  Ascorbic Acid (vitamin C) 100 MG tablet, Take 1 tablet (100 mg total) by mouth daily, Disp: , Rfl:   •  Biotin 5000 MCG CAPS, Take 1 capsule (5,000 mcg total) by mouth in the morning, Disp: , Rfl:   •  carvedilol (COREG) 12.5 mg tablet, TAKE 1 TABLET BY MOUTH TWICE  DAILY WITH MEALS, Disp: 180 tablet, Rfl: 1  •  cholecalciferol (VITAMIN D3) 1,000 units tablet, Take 1 tablet (1,000 Units total) by mouth daily, Disp: , Rfl:   •  Coenzyme Q10 (CoQ-10) 100 MG capsule, Take 1 capsule (100 mg total) by mouth daily, Disp: , Rfl:   •  Cyanocobalamin (VITAMIN B 12 PO), Take 500 mcg by mouth every morning, Disp: , Rfl:   •  diphenhydrAMINE (BENADRYL) 25 mg tablet, Take 25 mg by mouth every 8 (eight) hours as needed for itching , Disp: , Rfl:   •  docusate sodium (COLACE) 100 mg capsule, Take 1 capsule (100 mg total) by mouth daily, Disp: 100 capsule, Rfl: 3  •  doxazosin (CARDURA) 1 mg tablet, TAKE 1 TABLET BY MOUTH DAILY AT  BEDTIME, Disp: 90 tablet, Rfl: 1  •  escitalopram (LEXAPRO) 10 mg tablet, TAKE 1 AND 1/2 TABLETS BY MOUTH  DAILY AT BEDTIME, Disp: 135 tablet, Rfl: 3  •  losartan-hydrochlorothiazide (HYZAAR) 100-12.5 MG per tablet, TAKE 1 TABLET BY MOUTH DAILY, Disp: 90 tablet, Rfl: 1  •  Melatonin 10 MG TABS, Take by mouth daily at bedtime, Disp: , Rfl:   •  metaxalone (SKELAXIN) 800 mg tablet, Take 1 tablet (800 mg total) by mouth 3 (three) times a day as needed for muscle spasms, Disp: 30 tablet, Rfl: 0  •  omeprazole (PriLOSEC) 20 mg delayed release capsule, TAKE 1 CAPSULE BY MOUTH DAILY, Disp: 90 capsule, Rfl: 1  •  oxyCODONE-acetaminophen (Percocet) 7.5-325 MG per  "tablet, Take 1 tablet by mouth every 6 (six) hours as needed for moderate pain Max Daily Amount: 4 tablets, Disp: 120 tablet, Rfl: 0  •  psyllium (METAMUCIL) 58.6 % powder, Take by mouth daily, Disp: , Rfl:   •  zinc gluconate 50 mg tablet, Take 1 tablet (50 mg total) by mouth daily, Disp: , Rfl:   •  naloxone (NARCAN) 4 mg/0.1 mL nasal spray, Administer 1 spray into a nostril. If no response after 2-3 minutes, give another dose in the other nostril using a new spray., Disp: 1 each, Rfl: 0      Objective:    Vitals:   /70 (BP Location: Right arm, Patient Position: Sitting, Cuff Size: Large)   Pulse 73   Ht 5' 2\" (1.575 m)   Wt 105 kg (231 lb)   SpO2 97%   BMI 42.25 kg/m²   Body mass index is 42.25 kg/m².  Vitals:    03/20/25 0844   Weight: 105 kg (231 lb)       Physical Exam  Vitals and nursing note reviewed.   Constitutional:       Appearance: Normal appearance. She is obese.   Cardiovascular:      Rate and Rhythm: Normal rate and regular rhythm.      Heart sounds: Normal heart sounds.   Pulmonary:      Effort: Pulmonary effort is normal.      Breath sounds: Normal breath sounds.   Abdominal:      Palpations: Abdomen is soft.   Musculoskeletal:      Cervical back: Normal range of motion and neck supple.      Right lower leg: No edema.      Left lower leg: No edema.      Comments: Movements of the shoulders and the right hip.  Causing discomfort and pain   Skin:     General: Skin is warm and dry.   Neurological:      Mental Status: She is alert and oriented to person, place, and time.   Psychiatric:         Mood and Affect: Mood normal.         Behavior: Behavior normal.         Lab Review   Appointment on 02/14/2025   Component Date Value Ref Range Status   • WBC 02/14/2025 6.95  4.31 - 10.16 Thousand/uL Final   • RBC 02/14/2025 4.97  3.81 - 5.12 Million/uL Final   • Hemoglobin 02/14/2025 13.9  11.5 - 15.4 g/dL Final   • Hematocrit 02/14/2025 43.4  34.8 - 46.1 % Final   • MCV 02/14/2025 87  82 - 98 fL " Final   • MCH 02/14/2025 28.0  26.8 - 34.3 pg Final   • MCHC 02/14/2025 32.0  31.4 - 37.4 g/dL Final   • RDW 02/14/2025 14.0  11.6 - 15.1 % Final   • MPV 02/14/2025 10.4  8.9 - 12.7 fL Final   • Platelets 02/14/2025 320  149 - 390 Thousands/uL Final   • nRBC 02/14/2025 0  /100 WBCs Final   • Segmented % 02/14/2025 60  43 - 75 % Final   • Immature Grans % 02/14/2025 0  0 - 2 % Final   • Lymphocytes % 02/14/2025 28  14 - 44 % Final   • Monocytes % 02/14/2025 8  4 - 12 % Final   • Eosinophils Relative 02/14/2025 3  0 - 6 % Final   • Basophils Relative 02/14/2025 1  0 - 1 % Final   • Absolute Neutrophils 02/14/2025 4.18  1.85 - 7.62 Thousands/µL Final   • Absolute Immature Grans 02/14/2025 0.01  0.00 - 0.20 Thousand/uL Final   • Absolute Lymphocytes 02/14/2025 1.97  0.60 - 4.47 Thousands/µL Final   • Absolute Monocytes 02/14/2025 0.52  0.17 - 1.22 Thousand/µL Final   • Eosinophils Absolute 02/14/2025 0.23  0.00 - 0.61 Thousand/µL Final   • Basophils Absolute 02/14/2025 0.04  0.00 - 0.10 Thousands/µL Final   • Sodium 02/14/2025 140  135 - 147 mmol/L Final   • Potassium 02/14/2025 4.1  3.5 - 5.3 mmol/L Final   • Chloride 02/14/2025 103  96 - 108 mmol/L Final   • CO2 02/14/2025 24  21 - 32 mmol/L Final   • ANION GAP 02/14/2025 13  4 - 13 mmol/L Final   • BUN 02/14/2025 21  5 - 25 mg/dL Final   • Creatinine 02/14/2025 0.45 (L)  0.60 - 1.30 mg/dL Final    Standardized to IDMS reference method   • Glucose, Fasting 02/14/2025 97  65 - 99 mg/dL Final   • Calcium 02/14/2025 10.3 (H)  8.4 - 10.2 mg/dL Final   • AST 02/14/2025 18  13 - 39 U/L Final   • ALT 02/14/2025 15  7 - 52 U/L Final    Specimen collection should occur prior to Sulfasalazine administration due to the potential for falsely depressed results.    • Alkaline Phosphatase 02/14/2025 75  34 - 104 U/L Final   • Total Protein 02/14/2025 7.6  6.4 - 8.4 g/dL Final   • Albumin 02/14/2025 4.4  3.5 - 5.0 g/dL Final   • Total Bilirubin 02/14/2025 0.46  0.20 - 1.00 mg/dL  Final    Use of this assay is not recommended for patients undergoing treatment with eltrombopag due to the potential for falsely elevated results.  N-acetyl-p-benzoquinone imine (metabolite of Acetaminophen) will generate erroneously low results in samples for patients that have taken an overdose of Acetaminophen.   • eGFR 02/14/2025 102  ml/min/1.73sq m Final   • Hemoglobin A1C 02/14/2025 5.8 (H)  Normal 4.0-5.6%; PreDiabetic 5.7-6.4%; Diabetic >=6.5%; Glycemic control for adults with diabetes <7.0% % Final   • EAG 02/14/2025 120  mg/dl Final   • Cholesterol 02/14/2025 216 (H)  See Comment mg/dL Final    Cholesterol:         Pediatric <18 Years        Desirable          <170 mg/dL      Borderline High    170-199 mg/dL      High               >=200 mg/dL        Adult >=18 Years            Desirable         <200 mg/dL      Borderline High   200-239 mg/dL      High              >239 mg/dL     • Triglycerides 02/14/2025 115  See Comment mg/dL Final    Triglyceride:     0-9Y            <75mg/dL     10Y-17Y         <90 mg/dL       >=18Y     Normal          <150 mg/dL     Borderline High 150-199 mg/dL     High            200-499 mg/dL        Very High       >499 mg/dL    Specimen collection should occur prior to Metamizole administration due to the potential for falsely depressed results.   • HDL, Direct 02/14/2025 73  >=50 mg/dL Final   • LDL Calculated 02/14/2025 120 (H)  0 - 100 mg/dL Final    LDL Cholesterol:     Optimal           <100 mg/dl     Near Optimal      100-129 mg/dl     Above Optimal       Borderline High 130-159 mg/dl       High            160-189 mg/dl       Very High       >189 mg/dl         This screening LDL is a calculated result.   It does not have the accuracy of the Direct Measured LDL in the monitoring of patients with hyperlipidemia and/or statin therapy.   Direct Measure LDL (YRF122) must be ordered separately in these patients.   • Non-HDL-Chol (CHOL-HDL) 02/14/2025 143  mg/dl Final   • TSH  "3RD GENERATON 02/14/2025 2.815  0.450 - 4.500 uIU/mL Final    The recommended reference ranges for TSH during pregnancy are as follows:   First trimester 0.100 to 2.500 uIU/mL   Second trimester  0.200 to 3.000 uIU/mL   Third trimester 0.300 to 3.000 uIU/m    Note: Normal ranges may not apply to patients who are transgender, non-binary, or whose legal sex, sex at birth, and gender identity differ.  Adult TSH (3rd generation) reference range follows the recommended guidelines of the [employer] Thy[employer] Association, January, 2020.   • Color, UA 02/14/2025 Yellow   Final   • Clarity, UA 02/14/2025 Clear   Final   • Specific Gravity, UA 02/14/2025 1.015  1.005 - 1.030 Final   • pH, UA 02/14/2025 6.0  4.5, 5.0, 5.5, 6.0, 6.5, 7.0, 7.5, 8.0 Final   • Leukocytes, UA 02/14/2025 Negative  Negative Final   • Nitrite, UA 02/14/2025 Negative  Negative Final   • Protein, UA 02/14/2025 Negative  Negative mg/dl Final   • Glucose, UA 02/14/2025 Negative  Negative mg/dl Final   • Ketones, UA 02/14/2025 Negative  Negative mg/dl Final   • Urobilinogen, UA 02/14/2025 <2.0  <2.0 mg/dl mg/dl Final   • Bilirubin, UA 02/14/2025 Negative  Negative Final   • Occult Blood, UA 02/14/2025 Negative  Negative Final         Gustabo Sy MD        \"This note has been constructed using a voice recognition system.Therefore there may be syntax, spelling, and/or grammatical errors. Please call if you have any questions. \"  "

## 2025-03-20 NOTE — ASSESSMENT & PLAN NOTE
Continue with omeprazole recommend patient not to eat late in the night and keep the head of the bed up while sleeping

## 2025-03-24 ENCOUNTER — TELEPHONE (OUTPATIENT)
Age: 70
End: 2025-03-24

## 2025-03-24 DIAGNOSIS — K59.00 CONSTIPATION, UNSPECIFIED CONSTIPATION TYPE: ICD-10-CM

## 2025-03-24 RX ORDER — DOCUSATE SODIUM 100 MG/1
100 CAPSULE, LIQUID FILLED ORAL DAILY
Qty: 90 CAPSULE | Refills: 3 | Status: SHIPPED | OUTPATIENT
Start: 2025-03-24

## 2025-04-07 DIAGNOSIS — K21.9 GASTROESOPHAGEAL REFLUX DISEASE WITHOUT ESOPHAGITIS: ICD-10-CM

## 2025-04-09 RX ORDER — OMEPRAZOLE 20 MG/1
20 CAPSULE, DELAYED RELEASE ORAL DAILY
Qty: 90 CAPSULE | Refills: 1 | Status: SHIPPED | OUTPATIENT
Start: 2025-04-09

## 2025-04-14 NOTE — PROGRESS NOTES
Name: Zonia Taylor      : 1955      MRN: [id number]  Encounter Provider: Gustaob Sy MD  Encounter Date: 4/15/2025   Encounter department: [facility]  :  Assessment & Plan  Chronic pain syndrome  As mentioned above continue with oxycodone for severe pain  Orders:  •  oxyCODONE-acetaminophen (Percocet) 7.5-325 MG per tablet; Take 1 tablet by mouth every 6 (six) hours as needed for moderate pain Max Daily Amount: 4 tablets    Chronic midline low back pain without sciatica  Patient with pain in the low back area and also left shoulder and the neck and right hip currently on oxycodone pain medication we will continue with the same for now not able to give nonsteroidals because of the gastric surgery patient also in the past received physical therapy was also followed by the orthopedic and pain and spine management physicians  Orders:  •  oxyCODONE-acetaminophen (Percocet) 7.5-325 MG per tablet; Take 1 tablet by mouth every 6 (six) hours as needed for moderate pain Max Daily Amount: 4 tablets    Anxiety and depression  Patient is on Lexapro 10 mg daily continue with the same       Class 2 obesity  Emphasized regarding diet exercise lifestyle modification BMI is 42.43 about the same as before       Primary hypertension  Blood pressure today is 130/78 on carvedilol 12.5 mg daily 2 times losartan/HCTZ 100/12.5 daily amlodipine 10 mg daily and Cardura 1 mg daily continue with the same       Status post total replacement of right hip    Orders:  •  oxyCODONE-acetaminophen (Percocet) 7.5-325 MG per tablet; Take 1 tablet by mouth every 6 (six) hours as needed for moderate pain Max Daily Amount: 4 tablets    Primary osteoarthritis of right hip    Orders:  •  oxyCODONE-acetaminophen (Percocet) 7.5-325 MG per tablet; Take 1 tablet by mouth every 6 (six) hours as needed for moderate pain Max Daily Amount: 4 tablets           History of Present Illness   For evaluation regarding symptoms of  "chronic pain syndrome low back pain right hip pain.  Currently patient is on oxycodone pain medication not able to give any nonsteroidals because of the gastric surgery.  Patient had received physical therapy in the past with some improvement but pain is still present without taking the pain medication patient has increasing difficulty with day-to-day activities.      Review of Systems   Constitutional:  Negative for chills and fever.   HENT:  Negative for ear pain and sore throat.    Eyes:  Negative for pain and visual disturbance.   Respiratory:  Negative for cough and shortness of breath.    Cardiovascular:  Negative for chest pain and palpitations.   Gastrointestinal:  Negative for abdominal pain and vomiting.   Genitourinary:  Negative for dysuria and hematuria.   Musculoskeletal:  Positive for arthralgias and back pain.   Skin:  Negative for color change and rash.   Neurological:  Negative for seizures and syncope.   All other systems reviewed and are negative.      Objective   /78 (BP Location: Right arm, Patient Position: Sitting, Cuff Size: Large)   Pulse 78   Ht 5' 2\" (1.575 m)   Wt 105 kg (232 lb)   SpO2 98%   BMI 42.43 kg/m²      Physical Exam  Vitals and nursing note reviewed.   Constitutional:       General: She is not in acute distress.     Appearance: She is well-developed.   HENT:      Head: Normocephalic and atraumatic.   Eyes:      Conjunctiva/sclera: Conjunctivae normal.   Cardiovascular:      Rate and Rhythm: Normal rate and regular rhythm.      Heart sounds: No murmur heard.  Pulmonary:      Effort: Pulmonary effort is normal. No respiratory distress.      Breath sounds: Normal breath sounds.   Abdominal:      Palpations: Abdomen is soft.      Tenderness: There is no abdominal tenderness.   Musculoskeletal:         General: No swelling.      Cervical back: Neck supple.      Comments: Movements of the lumbar spine and right hip causing discomfort and pain   Skin:     General: Skin is " warm and dry.      Capillary Refill: Capillary refill takes less than 2 seconds.   Neurological:      Mental Status: She is alert.   Psychiatric:         Mood and Affect: Mood normal.     No results found for this or any previous visit (from the past 8 weeks).

## 2025-04-15 ENCOUNTER — OFFICE VISIT (OUTPATIENT)
Dept: FAMILY MEDICINE CLINIC | Facility: CLINIC | Age: 70
End: 2025-04-15
Payer: COMMERCIAL

## 2025-04-15 VITALS
HEART RATE: 78 BPM | OXYGEN SATURATION: 98 % | SYSTOLIC BLOOD PRESSURE: 130 MMHG | WEIGHT: 232 LBS | BODY MASS INDEX: 42.69 KG/M2 | DIASTOLIC BLOOD PRESSURE: 78 MMHG | HEIGHT: 62 IN

## 2025-04-15 DIAGNOSIS — F32.A ANXIETY AND DEPRESSION: ICD-10-CM

## 2025-04-15 DIAGNOSIS — M16.11 PRIMARY OSTEOARTHRITIS OF RIGHT HIP: ICD-10-CM

## 2025-04-15 DIAGNOSIS — F41.9 ANXIETY AND DEPRESSION: ICD-10-CM

## 2025-04-15 DIAGNOSIS — M54.50 CHRONIC MIDLINE LOW BACK PAIN WITHOUT SCIATICA: ICD-10-CM

## 2025-04-15 DIAGNOSIS — I10 PRIMARY HYPERTENSION: ICD-10-CM

## 2025-04-15 DIAGNOSIS — G89.4 CHRONIC PAIN SYNDROME: Primary | ICD-10-CM

## 2025-04-15 DIAGNOSIS — G89.29 CHRONIC MIDLINE LOW BACK PAIN WITHOUT SCIATICA: ICD-10-CM

## 2025-04-15 DIAGNOSIS — E66.812 CLASS 2 OBESITY: ICD-10-CM

## 2025-04-15 DIAGNOSIS — Z96.641 STATUS POST TOTAL REPLACEMENT OF RIGHT HIP: ICD-10-CM

## 2025-04-15 PROCEDURE — G2211 COMPLEX E/M VISIT ADD ON: HCPCS | Performed by: INTERNAL MEDICINE

## 2025-04-15 PROCEDURE — 99213 OFFICE O/P EST LOW 20 MIN: CPT | Performed by: INTERNAL MEDICINE

## 2025-04-15 RX ORDER — OXYCODONE AND ACETAMINOPHEN 7.5; 325 MG/1; MG/1
1 TABLET ORAL EVERY 6 HOURS PRN
Qty: 120 TABLET | Refills: 0 | Status: SHIPPED | OUTPATIENT
Start: 2025-04-15

## 2025-04-15 NOTE — ASSESSMENT & PLAN NOTE
Orders:  •  oxyCODONE-acetaminophen (Percocet) 7.5-325 MG per tablet; Take 1 tablet by mouth every 6 (six) hours as needed for moderate pain Max Daily Amount: 4 tablets

## 2025-04-15 NOTE — ASSESSMENT & PLAN NOTE
Patient with pain in the low back area and also left shoulder and the neck and right hip currently on oxycodone pain medication we will continue with the same for now not able to give nonsteroidals because of the gastric surgery patient also in the past received physical therapy was also followed by the orthopedic and pain and spine management physicians  Orders:  •  oxyCODONE-acetaminophen (Percocet) 7.5-325 MG per tablet; Take 1 tablet by mouth every 6 (six) hours as needed for moderate pain Max Daily Amount: 4 tablets

## 2025-04-15 NOTE — ASSESSMENT & PLAN NOTE
Emphasized regarding diet exercise lifestyle modification BMI is 42.43 about the same as before

## 2025-04-15 NOTE — ASSESSMENT & PLAN NOTE
Blood pressure today is 130/78 on carvedilol 12.5 mg daily 2 times losartan/HCTZ 100/12.5 daily amlodipine 10 mg daily and Cardura 1 mg daily continue with the same

## 2025-04-15 NOTE — ASSESSMENT & PLAN NOTE
As mentioned above continue with oxycodone for severe pain  Orders:  •  oxyCODONE-acetaminophen (Percocet) 7.5-325 MG per tablet; Take 1 tablet by mouth every 6 (six) hours as needed for moderate pain Max Daily Amount: 4 tablets

## 2025-05-15 ENCOUNTER — OFFICE VISIT (OUTPATIENT)
Dept: FAMILY MEDICINE CLINIC | Facility: CLINIC | Age: 70
End: 2025-05-15
Payer: COMMERCIAL

## 2025-05-15 VITALS
HEIGHT: 62 IN | HEART RATE: 59 BPM | BODY MASS INDEX: 43.39 KG/M2 | DIASTOLIC BLOOD PRESSURE: 73 MMHG | WEIGHT: 235.8 LBS | OXYGEN SATURATION: 96 % | SYSTOLIC BLOOD PRESSURE: 121 MMHG

## 2025-05-15 DIAGNOSIS — M16.11 PRIMARY OSTEOARTHRITIS OF RIGHT HIP: ICD-10-CM

## 2025-05-15 DIAGNOSIS — I10 PRIMARY HYPERTENSION: ICD-10-CM

## 2025-05-15 DIAGNOSIS — E66.812 CLASS 2 OBESITY: ICD-10-CM

## 2025-05-15 DIAGNOSIS — Z96.641 STATUS POST TOTAL REPLACEMENT OF RIGHT HIP: ICD-10-CM

## 2025-05-15 DIAGNOSIS — G89.4 CHRONIC PAIN SYNDROME: ICD-10-CM

## 2025-05-15 DIAGNOSIS — F11.20 OPIOID DEPENDENCE, UNCOMPLICATED (HCC): ICD-10-CM

## 2025-05-15 DIAGNOSIS — F32.A ANXIETY AND DEPRESSION: ICD-10-CM

## 2025-05-15 DIAGNOSIS — G89.29 CHRONIC MIDLINE LOW BACK PAIN WITHOUT SCIATICA: Primary | ICD-10-CM

## 2025-05-15 DIAGNOSIS — M54.50 CHRONIC MIDLINE LOW BACK PAIN WITHOUT SCIATICA: Primary | ICD-10-CM

## 2025-05-15 DIAGNOSIS — F41.9 ANXIETY AND DEPRESSION: ICD-10-CM

## 2025-05-15 DIAGNOSIS — E78.5 DYSLIPIDEMIA: ICD-10-CM

## 2025-05-15 DIAGNOSIS — Z12.31 ENCOUNTER FOR SCREENING MAMMOGRAM FOR MALIGNANT NEOPLASM OF BREAST: ICD-10-CM

## 2025-05-15 PROCEDURE — 99213 OFFICE O/P EST LOW 20 MIN: CPT | Performed by: INTERNAL MEDICINE

## 2025-05-15 PROCEDURE — G2211 COMPLEX E/M VISIT ADD ON: HCPCS | Performed by: INTERNAL MEDICINE

## 2025-05-15 RX ORDER — OXYCODONE AND ACETAMINOPHEN 7.5; 325 MG/1; MG/1
1 TABLET ORAL EVERY 6 HOURS PRN
Qty: 120 TABLET | Refills: 0 | Status: SHIPPED | OUTPATIENT
Start: 2025-05-15

## 2025-05-15 RX ORDER — CLINDAMYCIN HYDROCHLORIDE 300 MG/1
CAPSULE ORAL
Qty: 20 CAPSULE | Refills: 0 | Status: SHIPPED | OUTPATIENT
Start: 2025-05-15 | End: 2025-05-23

## 2025-05-15 NOTE — ASSESSMENT & PLAN NOTE
Patient BMI is 43.13 remained on the high side in spite of having gastric surgery recommend diet exercise and lifestyle modification.

## 2025-05-15 NOTE — ASSESSMENT & PLAN NOTE
Patient with chronic pain on opioids dependent on that without that patient is not able to function

## 2025-05-15 NOTE — ASSESSMENT & PLAN NOTE
Patient with chronic pain low back area now also complains of left shoulder pain neck pain and right hip pain on oxycodone for pain management not able to do nonsteroidals because of the gastric surgery patient had received physical therapy in the past and also followed by the orthopedic in the past currently patient is on oxycodone 7.5 every 6 hours as needed for the pain we will continue with the same.  Orders:  •  oxyCODONE-acetaminophen (Percocet) 7.5-325 MG per tablet; Take 1 tablet by mouth every 6 (six) hours as needed for moderate pain Max Daily Amount: 4 tablets

## 2025-05-15 NOTE — ASSESSMENT & PLAN NOTE
On pain medication  Orders:  •  oxyCODONE-acetaminophen (Percocet) 7.5-325 MG per tablet; Take 1 tablet by mouth every 6 (six) hours as needed for moderate pain Max Daily Amount: 4 tablets

## 2025-05-15 NOTE — ASSESSMENT & PLAN NOTE
As mentioned above patient is on oxycodone for severe pain avoiding nonsteroidals because of the gastric surgery  Orders:  •  oxyCODONE-acetaminophen (Percocet) 7.5-325 MG per tablet; Take 1 tablet by mouth every 6 (six) hours as needed for moderate pain Max Daily Amount: 4 tablets

## 2025-05-15 NOTE — ASSESSMENT & PLAN NOTE
Stable  Orders:  •  oxyCODONE-acetaminophen (Percocet) 7.5-325 MG per tablet; Take 1 tablet by mouth every 6 (six) hours as needed for moderate pain Max Daily Amount: 4 tablets  •  clindamycin (CLEOCIN) 300 MG capsule; 2 capsules prior to the dental procedure

## 2025-05-15 NOTE — PROGRESS NOTES
Name: Zonia Taylor      : 1955      MRN: [id number]  Encounter Provider: Gustabo Sy MD  Encounter Date: 5/15/2025   Encounter department: [facility]  :  Assessment & Plan  Chronic midline low back pain without sciatica  Patient with chronic pain low back area now also complains of left shoulder pain neck pain and right hip pain on oxycodone for pain management not able to do nonsteroidals because of the gastric surgery patient had received physical therapy in the past and also followed by the orthopedic in the past currently patient is on oxycodone 7.5 every 6 hours as needed for the pain we will continue with the same.  Orders:  •  oxyCODONE-acetaminophen (Percocet) 7.5-325 MG per tablet; Take 1 tablet by mouth every 6 (six) hours as needed for moderate pain Max Daily Amount: 4 tablets    Chronic pain syndrome  As mentioned above patient is on oxycodone for severe pain avoiding nonsteroidals because of the gastric surgery  Orders:  •  oxyCODONE-acetaminophen (Percocet) 7.5-325 MG per tablet; Take 1 tablet by mouth every 6 (six) hours as needed for moderate pain Max Daily Amount: 4 tablets    Opioid dependence, uncomplicated (HCC)  Patient with chronic pain on opioids dependent on that without that patient is not able to function       Encounter for screening mammogram for malignant neoplasm of breast    Orders:  •  Mammo screening bilateral w 3d and cad; Future    Status post total replacement of right hip  Stable  Orders:  •  oxyCODONE-acetaminophen (Percocet) 7.5-325 MG per tablet; Take 1 tablet by mouth every 6 (six) hours as needed for moderate pain Max Daily Amount: 4 tablets  •  clindamycin (CLEOCIN) 300 MG capsule; 2 capsules prior to the dental procedure    Primary osteoarthritis of right hip  On pain medication  Orders:  •  oxyCODONE-acetaminophen (Percocet) 7.5-325 MG per tablet; Take 1 tablet by mouth every 6 (six) hours as needed for moderate pain Max Daily  "Amount: 4 tablets    Anxiety and depression  Continue Lexapro appears to be helping       Class 2 obesity  Patient BMI is 43.13 remained on the high side in spite of having gastric surgery recommend diet exercise and lifestyle modification.       Dyslipidemia  Follow-up with repeat lipid profile later       Primary hypertension  Continue amlodipine, carvedilol, doxazosin              History of Present Illness   Patient is coming here for a follow-up evaluation regarding chronic pain syndrome low back pain DJD, hypertension, status post bariatric surgery history of dyslipidemia, obesity.  Patient not able to function without taking the pain medications.    Medications reviewed labs from February reviewed.      Review of Systems   Constitutional:  Negative for chills and fever.   HENT:  Negative for ear pain and sore throat.    Eyes:  Negative for pain and visual disturbance.   Respiratory:  Negative for cough and shortness of breath.    Cardiovascular:  Negative for chest pain and palpitations.   Gastrointestinal:  Negative for abdominal pain and vomiting.   Genitourinary:  Negative for dysuria and hematuria.   Musculoskeletal:  Negative for arthralgias and back pain.   Skin:  Negative for color change and rash.   Neurological:  Negative for seizures and syncope.   All other systems reviewed and are negative.      Objective   /73 (BP Location: Right arm, Patient Position: Sitting, Cuff Size: Standard)   Pulse 59   Ht 5' 2\" (1.575 m)   Wt 107 kg (235 lb 12.8 oz)   SpO2 96%   BMI 43.13 kg/m²      Physical Exam  Vitals and nursing note reviewed.   Constitutional:       General: She is not in acute distress.     Appearance: She is well-developed.   HENT:      Head: Normocephalic and atraumatic.     Eyes:      Conjunctiva/sclera: Conjunctivae normal.       Cardiovascular:      Rate and Rhythm: Normal rate and regular rhythm.      Heart sounds: No murmur heard.  Pulmonary:      Effort: Pulmonary effort is " normal. No respiratory distress.      Breath sounds: Normal breath sounds.   Abdominal:      Palpations: Abdomen is soft.      Tenderness: There is no abdominal tenderness.     Musculoskeletal:         General: No swelling.      Cervical back: Neck supple.     Skin:     General: Skin is warm and dry.      Capillary Refill: Capillary refill takes less than 2 seconds.     Neurological:      Mental Status: She is alert.     Psychiatric:         Mood and Affect: Mood normal.     No results found for this or any previous visit (from the past 8 weeks).

## 2025-05-16 DIAGNOSIS — I10 PRIMARY HYPERTENSION: ICD-10-CM

## 2025-05-18 RX ORDER — DOXAZOSIN 1 MG/1
1 TABLET ORAL
Qty: 90 TABLET | Refills: 1 | Status: SHIPPED | OUTPATIENT
Start: 2025-05-18

## 2025-05-18 RX ORDER — LOSARTAN POTASSIUM AND HYDROCHLOROTHIAZIDE 12.5; 1 MG/1; MG/1
1 TABLET ORAL DAILY
Qty: 90 TABLET | Refills: 1 | Status: SHIPPED | OUTPATIENT
Start: 2025-05-18

## 2025-05-18 RX ORDER — CARVEDILOL 12.5 MG/1
12.5 TABLET ORAL 2 TIMES DAILY WITH MEALS
Qty: 180 TABLET | Refills: 1 | Status: SHIPPED | OUTPATIENT
Start: 2025-05-18

## 2025-05-19 ENCOUNTER — TELEPHONE (OUTPATIENT)
Age: 70
End: 2025-05-19

## 2025-05-20 NOTE — TELEPHONE ENCOUNTER
Gave phone # for  [facility] and Dr Conte  
Pt called in stating she doesn't want to go all the way to [place] from [place]. She would like to know if there is a closer office. Please contact the pt.  
Pt was recently at the eye [facility] and he stated she needed to have cataract surgery done. Zonia is looking for your recommendation for an ophthalmologist with in the [facility] network relatively close by. I was only able to locate once in [place], however pt states that is too far for her.    Please advise, thank you  
Opt out

## 2025-06-04 ENCOUNTER — RA CDI HCC (OUTPATIENT)
Dept: OTHER | Facility: HOSPITAL | Age: 70
End: 2025-06-04

## 2025-06-11 NOTE — PROGRESS NOTES
Name: Zonia Taylor      : 1955      MRN: [id number]  Encounter Provider: Gustabo Sy MD  Encounter Date: 2025   Encounter department: [facility]  :  Assessment & Plan  Chronic midline low back pain without sciatica  Patient with chronic pain syndrome with low back pain also patient with neck pain shoulder pain currently taking oxycodone for pain management not able to do nonsteroidals because of the gastric surgery she had for weight reduction will renew the pain medication oxycodone 7.5 mg.  Movements of the lumbar spine causing discomfort and pain  Orders:  •  oxyCODONE-acetaminophen (Percocet) 7.5-325 MG per tablet; Take 1 tablet by mouth every 6 (six) hours as needed for moderate pain Max Daily Amount: 4 tablets    Chronic pain syndrome  Continue with oxycodone avoiding nonsteroidals  Orders:  •  oxyCODONE-acetaminophen (Percocet) 7.5-325 MG per tablet; Take 1 tablet by mouth every 6 (six) hours as needed for moderate pain Max Daily Amount: 4 tablets    Anxiety and depression  Continue escitalopram 10 mg daily which is helping with her anxiety symptoms.       Class 2 obesity  Patient BMI is 43.53 weight is 238 pounds has gone up compared to the last time patient also admits to being noncompliant with the diet patient status post gastric surgery for weight reduction however with weight going up again emphasized very strongly to watch the diet cut back and calorie intake carbohydrate intake and lose weight.       Dyslipidemia  Patient with mildly elevated cholesterol levels we will follow with repeat test meanwhile recommend responding to follow strict diet cut back and fat intake       Hypercalcemia  Patient calcium level test to fluctuate last 1 is at 10.3 prior to that it was 9.9 we did workup for hypercalcemia it came back negative       Gastroesophageal reflux disease without esophagitis  Patient is on omeprazole continue the same recommend patient not to eat late  "in the night and to keep the head end of the bed up as much as possible       Primary hypertension  Blood pressure today 120/72 continue with amlodipine carvedilol and doxazosin       Opioid dependence, uncomplicated (HCC)  Patient with chronic pain syndrome on opioids will continue same not able to function without taking the pain medication       H/O bariatric surgery  Patient with a history of gastric sleeve and here 2011             Depression Screening and Follow-up Plan: Patient was screened for depression during today's encounter. They screened negative with a PHQ-9 score of 0.        History of Present Illness   Patient is coming here for a follow-up evaluation with regards to the low back pain chronic pain syndrome also patient with history of hypertension, GERD, DJD, anxiety disorder, chronic pain syndrome status post bariatric surgery and obesity.  Medications reviewed labs reviewed discussed with patient.      Review of Systems   Constitutional:  Negative for chills and fever.   HENT:  Negative for ear pain and sore throat.    Eyes:  Negative for pain and visual disturbance.   Respiratory:  Negative for cough and shortness of breath.    Cardiovascular:  Negative for chest pain and palpitations.   Gastrointestinal:  Negative for abdominal pain and vomiting.   Genitourinary:  Negative for dysuria and hematuria.   Musculoskeletal:  Negative for arthralgias and back pain.   Skin:  Negative for color change and rash.   Neurological:  Negative for seizures and syncope.   All other systems reviewed and are negative.      Objective   /72 (BP Location: Right arm, Patient Position: Sitting, Cuff Size: Large)   Pulse 80   Ht 5' 2\" (1.575 m)   Wt 108 kg (238 lb)   SpO2 96%   BMI 43.53 kg/m²      Physical Exam  Vitals and nursing note reviewed.   Constitutional:       General: She is not in acute distress.     Appearance: She is well-developed.   HENT:      Head: Normocephalic and atraumatic.     Eyes:     "  Conjunctiva/sclera: Conjunctivae normal.       Cardiovascular:      Rate and Rhythm: Normal rate and regular rhythm.      Heart sounds: No murmur heard.  Pulmonary:      Effort: Pulmonary effort is normal. No respiratory distress.      Breath sounds: Normal breath sounds.   Abdominal:      Palpations: Abdomen is soft.      Tenderness: There is no abdominal tenderness.     Musculoskeletal:         General: No swelling.      Cervical back: Neck supple.      Comments: Movements of the lumbar spine causing discomfort and pain     Skin:     General: Skin is warm and dry.      Capillary Refill: Capillary refill takes less than 2 seconds.     Neurological:      Mental Status: She is alert.     Psychiatric:         Mood and Affect: Mood normal.     No results found for this or any previous visit (from the past 8 weeks).

## 2025-06-12 ENCOUNTER — OFFICE VISIT (OUTPATIENT)
Dept: FAMILY MEDICINE CLINIC | Facility: CLINIC | Age: 70
End: 2025-06-12
Payer: COMMERCIAL

## 2025-06-12 VITALS
SYSTOLIC BLOOD PRESSURE: 120 MMHG | BODY MASS INDEX: 43.79 KG/M2 | OXYGEN SATURATION: 96 % | HEART RATE: 80 BPM | HEIGHT: 62 IN | DIASTOLIC BLOOD PRESSURE: 72 MMHG | WEIGHT: 238 LBS

## 2025-06-12 DIAGNOSIS — I10 PRIMARY HYPERTENSION: ICD-10-CM

## 2025-06-12 DIAGNOSIS — Z98.84 H/O BARIATRIC SURGERY: ICD-10-CM

## 2025-06-12 DIAGNOSIS — E66.812 CLASS 2 OBESITY: ICD-10-CM

## 2025-06-12 DIAGNOSIS — E78.5 DYSLIPIDEMIA: ICD-10-CM

## 2025-06-12 DIAGNOSIS — M54.50 CHRONIC MIDLINE LOW BACK PAIN WITHOUT SCIATICA: Primary | ICD-10-CM

## 2025-06-12 DIAGNOSIS — E83.52 HYPERCALCEMIA: ICD-10-CM

## 2025-06-12 DIAGNOSIS — F11.20 OPIOID DEPENDENCE, UNCOMPLICATED (HCC): ICD-10-CM

## 2025-06-12 DIAGNOSIS — K21.9 GASTROESOPHAGEAL REFLUX DISEASE WITHOUT ESOPHAGITIS: ICD-10-CM

## 2025-06-12 DIAGNOSIS — G89.4 CHRONIC PAIN SYNDROME: ICD-10-CM

## 2025-06-12 DIAGNOSIS — F41.9 ANXIETY AND DEPRESSION: ICD-10-CM

## 2025-06-12 DIAGNOSIS — F32.A ANXIETY AND DEPRESSION: ICD-10-CM

## 2025-06-12 DIAGNOSIS — G89.29 CHRONIC MIDLINE LOW BACK PAIN WITHOUT SCIATICA: Primary | ICD-10-CM

## 2025-06-12 PROCEDURE — G2211 COMPLEX E/M VISIT ADD ON: HCPCS | Performed by: INTERNAL MEDICINE

## 2025-06-12 PROCEDURE — 99214 OFFICE O/P EST MOD 30 MIN: CPT | Performed by: INTERNAL MEDICINE

## 2025-06-12 RX ORDER — OXYCODONE AND ACETAMINOPHEN 7.5; 325 MG/1; MG/1
1 TABLET ORAL EVERY 6 HOURS PRN
Qty: 120 TABLET | Refills: 0 | Status: SHIPPED | OUTPATIENT
Start: 2025-06-12

## 2025-06-12 NOTE — ASSESSMENT & PLAN NOTE
Patient with mildly elevated cholesterol levels we will follow with repeat test meanwhile recommend responding to follow strict diet cut back and fat intake

## 2025-06-12 NOTE — ASSESSMENT & PLAN NOTE
Patient is on omeprazole continue the same recommend patient not to eat late in the night and to keep the head end of the bed up as much as possible

## 2025-06-12 NOTE — ASSESSMENT & PLAN NOTE
Patient BMI is 43.53 weight is 238 pounds has gone up compared to the last time patient also admits to being noncompliant with the diet patient status post gastric surgery for weight reduction however with weight going up again emphasized very strongly to watch the diet cut back and calorie intake carbohydrate intake and lose weight.

## 2025-06-12 NOTE — ASSESSMENT & PLAN NOTE
Patient with chronic pain syndrome with low back pain also patient with neck pain shoulder pain currently taking oxycodone for pain management not able to do nonsteroidals because of the gastric surgery she had for weight reduction will renew the pain medication oxycodone 7.5 mg.  Movements of the lumbar spine causing discomfort and pain  Orders:  •  oxyCODONE-acetaminophen (Percocet) 7.5-325 MG per tablet; Take 1 tablet by mouth every 6 (six) hours as needed for moderate pain Max Daily Amount: 4 tablets

## 2025-06-12 NOTE — ASSESSMENT & PLAN NOTE
Patient calcium level test to fluctuate last 1 is at 10.3 prior to that it was 9.9 we did workup for hypercalcemia it came back negative

## 2025-06-12 NOTE — ASSESSMENT & PLAN NOTE
Patient with chronic pain syndrome on opioids will continue same not able to function without taking the pain medication

## 2025-06-12 NOTE — ASSESSMENT & PLAN NOTE
Continue with oxycodone avoiding nonsteroidals  Orders:  •  oxyCODONE-acetaminophen (Percocet) 7.5-325 MG per tablet; Take 1 tablet by mouth every 6 (six) hours as needed for moderate pain Max Daily Amount: 4 tablets

## 2025-07-10 ENCOUNTER — OFFICE VISIT (OUTPATIENT)
Age: 70
End: 2025-07-10
Payer: COMMERCIAL

## 2025-07-10 VITALS
HEIGHT: 62 IN | OXYGEN SATURATION: 97 % | HEART RATE: 85 BPM | BODY MASS INDEX: 44.53 KG/M2 | DIASTOLIC BLOOD PRESSURE: 75 MMHG | SYSTOLIC BLOOD PRESSURE: 124 MMHG | WEIGHT: 242 LBS

## 2025-07-10 DIAGNOSIS — E78.5 DYSLIPIDEMIA: ICD-10-CM

## 2025-07-10 DIAGNOSIS — F32.A ANXIETY AND DEPRESSION: ICD-10-CM

## 2025-07-10 DIAGNOSIS — K21.9 GASTROESOPHAGEAL REFLUX DISEASE WITHOUT ESOPHAGITIS: ICD-10-CM

## 2025-07-10 DIAGNOSIS — E66.812 CLASS 2 OBESITY: ICD-10-CM

## 2025-07-10 DIAGNOSIS — I10 PRIMARY HYPERTENSION: ICD-10-CM

## 2025-07-10 DIAGNOSIS — M54.50 CHRONIC MIDLINE LOW BACK PAIN WITHOUT SCIATICA: ICD-10-CM

## 2025-07-10 DIAGNOSIS — G89.29 CHRONIC MIDLINE LOW BACK PAIN WITHOUT SCIATICA: ICD-10-CM

## 2025-07-10 DIAGNOSIS — F11.20 OPIOID DEPENDENCE, UNCOMPLICATED (HCC): ICD-10-CM

## 2025-07-10 DIAGNOSIS — M25.512 ACUTE PAIN OF LEFT SHOULDER: Primary | ICD-10-CM

## 2025-07-10 DIAGNOSIS — G89.4 CHRONIC PAIN SYNDROME: ICD-10-CM

## 2025-07-10 DIAGNOSIS — F41.9 ANXIETY AND DEPRESSION: ICD-10-CM

## 2025-07-10 PROCEDURE — G2211 COMPLEX E/M VISIT ADD ON: HCPCS | Performed by: INTERNAL MEDICINE

## 2025-07-10 PROCEDURE — 99214 OFFICE O/P EST MOD 30 MIN: CPT | Performed by: INTERNAL MEDICINE

## 2025-07-10 RX ORDER — OXYCODONE AND ACETAMINOPHEN 7.5; 325 MG/1; MG/1
1 TABLET ORAL EVERY 6 HOURS PRN
Qty: 120 TABLET | Refills: 0 | Status: SHIPPED | OUTPATIENT
Start: 2025-07-10

## 2025-07-10 NOTE — ASSESSMENT & PLAN NOTE
Patient continues to experience pain in the low back area avoiding nonsteroidals because of the gastric surgery will renew her pain medications.  Orders:  •  oxyCODONE-acetaminophen (Percocet) 7.5-325 MG per tablet; Take 1 tablet by mouth every 6 (six) hours as needed for moderate pain Max Daily Amount: 4 tablets

## 2025-07-10 NOTE — ASSESSMENT & PLAN NOTE
Currently patient is on pain medications oxycodone as needed avoiding nonsteroidals patient not able to function without taking pain medication.  Orders:  •  oxyCODONE-acetaminophen (Percocet) 7.5-325 MG per tablet; Take 1 tablet by mouth every 6 (six) hours as needed for moderate pain Max Daily Amount: 4 tablets

## 2025-07-10 NOTE — ASSESSMENT & PLAN NOTE
Patient with pain in the left shoulder in the recent past has been experiencing more and more pain history of similar kind of pain in the past on the right side for which she underwent right shoulder replacement.  Movements of the left shoulder causing discomfort and pain will have her seen once by the orthopedics will also get an x-ray of the left shoulder and reevaluate.  Orders:  •  XR shoulder 2+ vw left; Future  •  Ambulatory referral to Orthopedic Surgery; Future

## 2025-07-10 NOTE — ASSESSMENT & PLAN NOTE
Patient weight went up to 242 it was 238 before again emphasized regarding diet exercise lifestyle modification status post gastric bypass surgery recommend strongly to cut back on carbohydrate intake calorie intake

## 2025-07-10 NOTE — ASSESSMENT & PLAN NOTE
Patient is dependent on the pain medication without taking them.  Patient has difficulty with day-to-day activities

## 2025-07-10 NOTE — PROGRESS NOTES
Name: Zonia Taylor      : 1955      MRN: [id number]  Encounter Provider: Gustabo Sy MD  Encounter Date: 7/10/2025   Encounter department: [facility]  :  Assessment & Plan  Acute pain of left shoulder  Patient with pain in the left shoulder in the recent past has been experiencing more and more pain history of similar kind of pain in the past on the right side for which she underwent right shoulder replacement.  Movements of the left shoulder causing discomfort and pain will have her seen once by the orthopedics will also get an x-ray of the left shoulder and reevaluate.  Orders:  •  XR shoulder 2+ vw left; Future  •  Ambulatory referral to Orthopedic Surgery; Future    Chronic midline low back pain without sciatica  Patient continues to experience pain in the low back area avoiding nonsteroidals because of the gastric surgery will renew her pain medications.  Orders:  •  oxyCODONE-acetaminophen (Percocet) 7.5-325 MG per tablet; Take 1 tablet by mouth every 6 (six) hours as needed for moderate pain Max Daily Amount: 4 tablets    Chronic pain syndrome  Currently patient is on pain medications oxycodone as needed avoiding nonsteroidals patient not able to function without taking pain medication.  Orders:  •  oxyCODONE-acetaminophen (Percocet) 7.5-325 MG per tablet; Take 1 tablet by mouth every 6 (six) hours as needed for moderate pain Max Daily Amount: 4 tablets    Anxiety and depression  Continue with escitalopram for her anxiety symptoms.       Class 2 obesity  Patient weight went up to 242 it was 238 before again emphasized regarding diet exercise lifestyle modification status post gastric bypass surgery recommend strongly to cut back on carbohydrate intake calorie intake       Dyslipidemia  Low-cholesterol diet       Gastroesophageal reflux disease without esophagitis  Continue omeprazole       Primary hypertension  Blood pressure today 124/75 patient is currently taking  "amlodipine carvedilol and doxazosin continue       Opioid dependence, uncomplicated (HCC)  Patient is dependent on the pain medication without taking them.  Patient has difficulty with day-to-day activities              History of Present Illness   Patient is coming here for evaluation regarding symptoms of pain discomfort in the left shoulder area especially with the movements.  History of having similar kind of symptoms in the past on the right shoulder for which she underwent right shoulder replacement.  History of having bilateral knee replacement and also right hip replacement.  Patient with severe low back pain low back syndrome on pain medications.      Review of Systems   Constitutional:  Negative for chills and fever.   HENT:  Negative for ear pain and sore throat.    Eyes:  Negative for pain and visual disturbance.   Respiratory:  Negative for cough and shortness of breath.    Cardiovascular:  Negative for chest pain and palpitations.   Gastrointestinal:  Negative for abdominal pain and vomiting.   Genitourinary:  Negative for dysuria and hematuria.   Musculoskeletal:  Positive for arthralgias and back pain.        Low back pain left shoulder pain   Skin:  Negative for color change and rash.   Neurological:  Negative for seizures and syncope.   All other systems reviewed and are negative.      Objective   /75 (BP Location: Right arm, Patient Position: Sitting, Cuff Size: Standard)   Pulse 85   Ht 5' 2\" (1.575 m)   Wt 110 kg (242 lb)   SpO2 97%   BMI 44.26 kg/m²      Physical Exam  Vitals and nursing note reviewed.   Constitutional:       General: She is not in acute distress.     Appearance: She is well-developed.   HENT:      Head: Normocephalic and atraumatic.     Eyes:      Conjunctiva/sclera: Conjunctivae normal.       Cardiovascular:      Rate and Rhythm: Normal rate and regular rhythm.      Heart sounds: No murmur heard.  Pulmonary:      Effort: Pulmonary effort is normal. No respiratory " distress.      Breath sounds: Normal breath sounds.   Abdominal:      Palpations: Abdomen is soft.      Tenderness: There is no abdominal tenderness.     Musculoskeletal:         General: No swelling.      Cervical back: Neck supple.      Comments: Movements of the lumbar spine causing discomfort and pain also     Movements of the left shoulder  causing discomfort and pain     Skin:     General: Skin is warm and dry.      Capillary Refill: Capillary refill takes less than 2 seconds.     Neurological:      Mental Status: She is alert and oriented to person, place, and time.     Psychiatric:         Mood and Affect: Mood normal.         Behavior: Behavior normal.

## 2025-07-10 NOTE — ASSESSMENT & PLAN NOTE
Blood pressure today 124/75 patient is currently taking amlodipine carvedilol and doxazosin continue

## 2025-07-14 ENCOUNTER — APPOINTMENT (OUTPATIENT)
Dept: RADIOLOGY | Facility: CLINIC | Age: 70
End: 2025-07-14
Attending: ORTHOPAEDIC SURGERY
Payer: COMMERCIAL

## 2025-07-14 ENCOUNTER — OFFICE VISIT (OUTPATIENT)
Dept: OBGYN CLINIC | Facility: CLINIC | Age: 70
End: 2025-07-14
Payer: COMMERCIAL

## 2025-07-14 VITALS — BODY MASS INDEX: 44.53 KG/M2 | WEIGHT: 242 LBS | HEIGHT: 62 IN

## 2025-07-14 DIAGNOSIS — M19.012 OSTEOARTHRITIS OF LEFT GLENOHUMERAL JOINT: Primary | ICD-10-CM

## 2025-07-14 DIAGNOSIS — M25.512 ACUTE PAIN OF LEFT SHOULDER: ICD-10-CM

## 2025-07-14 PROCEDURE — 73030 X-RAY EXAM OF SHOULDER: CPT

## 2025-07-14 PROCEDURE — 99213 OFFICE O/P EST LOW 20 MIN: CPT | Performed by: ORTHOPAEDIC SURGERY

## 2025-07-14 PROCEDURE — 20610 DRAIN/INJ JOINT/BURSA W/O US: CPT

## 2025-07-14 RX ORDER — CLINDAMYCIN HYDROCHLORIDE 300 MG/1
500 CAPSULE ORAL
COMMUNITY
Start: 2025-06-27

## 2025-07-14 RX ADMIN — TRIAMCINOLONE ACETONIDE 40 MG: 40 INJECTION, SUSPENSION INTRA-ARTICULAR; INTRAMUSCULAR at 14:00

## 2025-07-14 RX ADMIN — ROPIVACAINE HYDROCHLORIDE 4 ML: 5 INJECTION, SOLUTION EPIDURAL; INFILTRATION; PERINEURAL at 14:00

## 2025-07-15 RX ORDER — ROPIVACAINE HYDROCHLORIDE 5 MG/ML
4 INJECTION, SOLUTION EPIDURAL; INFILTRATION; PERINEURAL
Status: COMPLETED | OUTPATIENT
Start: 2025-07-14 | End: 2025-07-14

## 2025-07-15 RX ORDER — TRIAMCINOLONE ACETONIDE 40 MG/ML
40 INJECTION, SUSPENSION INTRA-ARTICULAR; INTRAMUSCULAR
Status: COMPLETED | OUTPATIENT
Start: 2025-07-14 | End: 2025-07-14

## 2025-07-30 ENCOUNTER — OFFICE VISIT (OUTPATIENT)
Dept: PHYSICAL THERAPY | Facility: CLINIC | Age: 70
End: 2025-07-30
Payer: COMMERCIAL

## 2025-07-30 DIAGNOSIS — M19.012 OSTEOARTHRITIS OF LEFT GLENOHUMERAL JOINT: Primary | ICD-10-CM

## 2025-07-30 PROCEDURE — 97161 PT EVAL LOW COMPLEX 20 MIN: CPT | Performed by: PHYSICAL THERAPIST

## 2025-08-05 ENCOUNTER — OFFICE VISIT (OUTPATIENT)
Dept: PHYSICAL THERAPY | Facility: CLINIC | Age: 70
End: 2025-08-05
Payer: COMMERCIAL

## 2025-08-05 DIAGNOSIS — M19.012 OSTEOARTHRITIS OF LEFT GLENOHUMERAL JOINT: Primary | ICD-10-CM

## 2025-08-05 PROCEDURE — 97112 NEUROMUSCULAR REEDUCATION: CPT | Performed by: PHYSICAL THERAPIST

## 2025-08-05 PROCEDURE — 97110 THERAPEUTIC EXERCISES: CPT | Performed by: PHYSICAL THERAPIST

## 2025-08-07 ENCOUNTER — OFFICE VISIT (OUTPATIENT)
Age: 70
End: 2025-08-07
Payer: COMMERCIAL

## 2025-08-07 VITALS
OXYGEN SATURATION: 96 % | HEIGHT: 62 IN | SYSTOLIC BLOOD PRESSURE: 120 MMHG | WEIGHT: 236 LBS | DIASTOLIC BLOOD PRESSURE: 68 MMHG | HEART RATE: 74 BPM | BODY MASS INDEX: 43.43 KG/M2

## 2025-08-07 DIAGNOSIS — G89.4 CHRONIC PAIN SYNDROME: Primary | ICD-10-CM

## 2025-08-07 DIAGNOSIS — M54.50 CHRONIC MIDLINE LOW BACK PAIN WITHOUT SCIATICA: ICD-10-CM

## 2025-08-07 DIAGNOSIS — F11.20 OPIOID DEPENDENCE, UNCOMPLICATED (HCC): ICD-10-CM

## 2025-08-07 DIAGNOSIS — E66.812 CLASS 2 OBESITY: ICD-10-CM

## 2025-08-07 DIAGNOSIS — R73.03 PREDIABETES: ICD-10-CM

## 2025-08-07 DIAGNOSIS — G89.29 CHRONIC MIDLINE LOW BACK PAIN WITHOUT SCIATICA: ICD-10-CM

## 2025-08-07 DIAGNOSIS — E78.5 DYSLIPIDEMIA: ICD-10-CM

## 2025-08-07 DIAGNOSIS — F41.9 ANXIETY AND DEPRESSION: ICD-10-CM

## 2025-08-07 DIAGNOSIS — I10 PRIMARY HYPERTENSION: ICD-10-CM

## 2025-08-07 DIAGNOSIS — M25.512 ACUTE PAIN OF LEFT SHOULDER: ICD-10-CM

## 2025-08-07 DIAGNOSIS — F32.A ANXIETY AND DEPRESSION: ICD-10-CM

## 2025-08-07 DIAGNOSIS — K21.9 GASTROESOPHAGEAL REFLUX DISEASE WITHOUT ESOPHAGITIS: ICD-10-CM

## 2025-08-07 PROCEDURE — 99214 OFFICE O/P EST MOD 30 MIN: CPT | Performed by: INTERNAL MEDICINE

## 2025-08-07 PROCEDURE — G2211 COMPLEX E/M VISIT ADD ON: HCPCS | Performed by: INTERNAL MEDICINE

## 2025-08-07 RX ORDER — OXYCODONE AND ACETAMINOPHEN 7.5; 325 MG/1; MG/1
1 TABLET ORAL EVERY 6 HOURS PRN
Qty: 120 TABLET | Refills: 0 | Status: SHIPPED | OUTPATIENT
Start: 2025-08-07

## 2025-08-08 ENCOUNTER — OFFICE VISIT (OUTPATIENT)
Dept: PHYSICAL THERAPY | Facility: CLINIC | Age: 70
End: 2025-08-08
Payer: COMMERCIAL

## 2025-08-08 DIAGNOSIS — M19.012 OSTEOARTHRITIS OF LEFT GLENOHUMERAL JOINT: Primary | ICD-10-CM

## 2025-08-08 PROCEDURE — 97112 NEUROMUSCULAR REEDUCATION: CPT | Performed by: PHYSICAL THERAPIST

## 2025-08-08 PROCEDURE — 97110 THERAPEUTIC EXERCISES: CPT | Performed by: PHYSICAL THERAPIST

## 2025-08-11 ENCOUNTER — OFFICE VISIT (OUTPATIENT)
Dept: PHYSICAL THERAPY | Facility: CLINIC | Age: 70
End: 2025-08-11
Payer: COMMERCIAL

## 2025-08-15 ENCOUNTER — OFFICE VISIT (OUTPATIENT)
Dept: PHYSICAL THERAPY | Facility: CLINIC | Age: 70
End: 2025-08-15
Payer: COMMERCIAL

## 2025-08-18 ENCOUNTER — OFFICE VISIT (OUTPATIENT)
Facility: CLINIC | Age: 70
End: 2025-08-18
Payer: COMMERCIAL

## 2025-08-18 DIAGNOSIS — M19.012 OSTEOARTHRITIS OF LEFT GLENOHUMERAL JOINT: Primary | ICD-10-CM

## 2025-08-18 PROCEDURE — 97110 THERAPEUTIC EXERCISES: CPT | Performed by: PHYSICAL THERAPIST

## 2025-08-18 PROCEDURE — 97112 NEUROMUSCULAR REEDUCATION: CPT | Performed by: PHYSICAL THERAPIST

## (undated) DEVICE — SUT STRATAFIX SPIRAL 3-0 PGA/PCL 30 X 30 CM SXMD2B410

## (undated) DEVICE — UNIVERSAL BLOCK TRAY: Brand: INTEGRA®

## (undated) DEVICE — ORTHOPEDIC PACK: Brand: CARDINAL HEALTH

## (undated) DEVICE — HANDPIECE SET WITH HIGH FLOW TIP AND SUCTION TUBE: Brand: INTERPULSE

## (undated) DEVICE — CHLORAPREP APPLICATOR TINTED 10.5ML ONE-STEP

## (undated) DEVICE — ASTOUND SURGICAL GOWN, XXX LARGE, X-LONG: Brand: CONVERTORS

## (undated) DEVICE — TOWEL SET X-RAY

## (undated) DEVICE — GLOVE SRG BIOGEL 8.5

## (undated) DEVICE — TRAY FOLEY 16FR URIMETER SURESTEP

## (undated) DEVICE — INTENDED FOR TISSUE SEPARATION, AND OTHER PROCEDURES THAT REQUIRE A SHARP SURGICAL BLADE TO PUNCTURE OR CUT.: Brand: BARD-PARKER SAFETY BLADES SIZE 15, STERILE

## (undated) DEVICE — SUT ETHIBOND 5 V-37 30 IN MB66G

## (undated) DEVICE — NON-STERILE REUSABLE TOURNIQUET CUFF SINGLE BLADDER, DUAL PORT AND QUICK CONNECT CONNECTOR: Brand: COLOR CUFF

## (undated) DEVICE — 3M™ STERI-DRAPE™ U-DRAPE 1015: Brand: STERI-DRAPE™

## (undated) DEVICE — ACE WRAP 2 IN UNSTERILE

## (undated) DEVICE — ASTOUND STANDARD SURGICAL GOWN, XL: Brand: CONVERTORS

## (undated) DEVICE — SCD SEQUENTIAL COMPRESSION COMFORT SLEEVE MEDIUM KNEE LENGTH: Brand: KENDALL SCD

## (undated) DEVICE — ADHESIVE SKIN HIGH VISCOSITY EXOFIN 1ML

## (undated) DEVICE — SUT VICRYL 4-0 PS-2 18 IN J496G

## (undated) DEVICE — TUBE MINI KAMVAC SUCTION 7310 7 LATEX FREE

## (undated) DEVICE — CHLORAPREP HI-LITE 26ML ORANGE

## (undated) DEVICE — PLASTIC ADHESIVE BANDAGE: Brand: CURITY

## (undated) DEVICE — REM POLYHESIVE ADULT PATIENT RETURN ELECTRODE: Brand: VALLEYLAB

## (undated) DEVICE — FABRIC REINFORCED, SURGICAL GOWN, XL: Brand: CONVERTORS

## (undated) DEVICE — RADIOLOGY STERILE LABELS: Brand: CENTURION

## (undated) DEVICE — SUT ETHIBOND 2 V-37 30 IN MX69G

## (undated) DEVICE — NEEDLE BLUNT 18 G X 1 1/2 W FILTER

## (undated) DEVICE — DRESSING MEPILEX AG BORDER POST-OP 4 X 10 IN

## (undated) DEVICE — GLOVE INDICATOR PI UNDERGLOVE SZ 7.5 BLUE

## (undated) DEVICE — REPEL CUT REST SURGICAL GLV LNRS LG: Brand: REPEL

## (undated) DEVICE — PAD GROUNDING SLF ADHESIVE

## (undated) DEVICE — 60 ML SYRINGE,REGULAR TIP: Brand: MONOJECT

## (undated) DEVICE — SYRINGE 5ML LL

## (undated) DEVICE — ADHESIVE SKN CLSR HISTOACRYL FLEX 0.5ML LF

## (undated) DEVICE — NEEDLE SPINAL 25G X 3.5 IN QUINCKE

## (undated) DEVICE — POSITIONER TRIMANO LIMB BEACH CHAIR

## (undated) DEVICE — WIPES BABY PAMPERS SENSITIVE 36/PK

## (undated) DEVICE — DRESSING MEPILEX AG BORDER 4 X 8 IN

## (undated) DEVICE — STOCKINETTE REGULAR

## (undated) DEVICE — BASIC DOUBLE BASIN 2-LF: Brand: MEDLINE INDUSTRIES, INC.

## (undated) DEVICE — GLOVE INDICATOR PI UNDERGLOVE SZ 8.5 BLUE

## (undated) DEVICE — 450 ML BOTTLE OF 0.05% CHLORHEXIDINE GLUCONATE IN 99.95% STERILE WATER FOR IRRIGATION, USP AND APPLICATOR.: Brand: IRRISEPT ANTIMICROBIAL WOUND LAVAGE

## (undated) DEVICE — ELECTRODE BLADE E-Z CLEAN 6.5IN -0014

## (undated) DEVICE — SUT VICRYL 0 CP-1 27 IN J267H

## (undated) DEVICE — INSTRUMENT POUCH: Brand: CONVERTORS

## (undated) DEVICE — NEPTUNE E-SEP SMOKE EVACUATION PENCIL, COATED, 70MM BLADE, PUSH BUTTON SWITCH: Brand: NEPTUNE E-SEP

## (undated) DEVICE — SUT STRATAFIX SPIRAL 1-0  CT-1 30 X 30CM SXPD2B403

## (undated) DEVICE — PAD CAST 4 IN COTTON NON STERILE

## (undated) DEVICE — GLOVE INDICATOR PI UNDERGLOVE SZ 6.5 BLUE

## (undated) DEVICE — VEST SURGEON DISPOSABLE

## (undated) DEVICE — Device

## (undated) DEVICE — ANTIBACTERIAL VIOLET BRAIDED (POLYGLACTIN 910), SYNTHETIC ABSORBABLE SUTURE: Brand: COATED VICRYL

## (undated) DEVICE — DRAPE SHEET X-LG

## (undated) DEVICE — DUAL CUT SAGITTAL BLADE

## (undated) DEVICE — DRAPE SHEET THREE QUARTER

## (undated) DEVICE — GLOVE SRG BIOGEL 8

## (undated) DEVICE — NEEDLE SPINAL 22G X 3.5 IN PLST HUB

## (undated) DEVICE — TIBURON SPLIT SHEET: Brand: CONVERTORS

## (undated) DEVICE — HOOD: Brand: FLYTE, SURGICOOL

## (undated) DEVICE — CAPIT UPCHRG EMPHASYS ACETABULAR

## (undated) DEVICE — BIPOLAR SEALER 23-113-1 AQM 2.3: Brand: AQUAMANTYS™

## (undated) DEVICE — FOOT SWITCH DRAPE: Brand: UNBRANDED

## (undated) DEVICE — GLOVE SRG BIOGEL 7.5

## (undated) DEVICE — 3M™ IOBAN™ 2 ANTIMICROBIAL INCISE DRAPE 6650EZ: Brand: IOBAN™ 2

## (undated) DEVICE — NEEDLE SPINAL 22G X 5IN QUINCKE

## (undated) DEVICE — POSITIONER HANA TABLE PACK

## (undated) DEVICE — TRAY EPIDURAL PERIFIX 20GA X 3.5IN TUOHY 8ML

## (undated) DEVICE — TIBURON HAND DRAPE: Brand: CONVERTORS

## (undated) DEVICE — ATTUNE KNEE SYSTEM REVISION FEMORAL SLEEVE POROCOAT FULLY COATED 35MM: Brand: ATTUNE

## (undated) DEVICE — OSCILLATING TIP SAW CARTRIDGE: Brand: PRECISION FALCON

## (undated) DEVICE — PACK MAJOR ORTHO W/SPLITS PBDS

## (undated) DEVICE — 3/8 INCH SMOKE TUBING

## (undated) DEVICE — INTENDED FOR TISSUE SEPARATION, AND OTHER PROCEDURES THAT REQUIRE A SHARP SURGICAL BLADE TO PUNCTURE OR CUT.: Brand: BARD-PARKER ® CARBON RIB-BACK BLADES

## (undated) DEVICE — SMALL NEEDLE COUNTER NEST

## (undated) DEVICE — ELECTRODE RF 10CM

## (undated) DEVICE — STRETCH BANDAGE: Brand: CURITY

## (undated) DEVICE — SKIN MARKER DUAL TIP WITH RULER CAP, FLEXIBLE RULER AND LABELS: Brand: DEVON

## (undated) DEVICE — PACK GENERAL LF

## (undated) DEVICE — WEBRIL 6 IN UNSTERILE

## (undated) DEVICE — ANTIBACTERIAL UNDYED BRAIDED (POLYGLACTIN 910), SYNTHETIC ABSORBABLE SUTURE: Brand: COATED VICRYL

## (undated) DEVICE — OCCLUSIVE GAUZE STRIP,3% BISMUTH TRIBROMOPHENATE IN PETROLATUM BLEND: Brand: XEROFORM

## (undated) DEVICE — NEEDLE SPINAL 22G X 3.5IN  QUINCKE

## (undated) DEVICE — SUT ETHILON 4-0 PS-2 18 IN 1667G

## (undated) DEVICE — DRAPE C-ARM X-RAY

## (undated) DEVICE — GLOVE SRG BIOGEL 6.5

## (undated) DEVICE — CAPIT HIP COP -CMNT/POR-ACTIS

## (undated) DEVICE — FLEXIBLE ADHESIVE BANDAGE,X-LARGE: Brand: CURITY

## (undated) DEVICE — ARTHROSCOPY FLOOR MAT